# Patient Record
Sex: MALE | Race: ASIAN | NOT HISPANIC OR LATINO
[De-identification: names, ages, dates, MRNs, and addresses within clinical notes are randomized per-mention and may not be internally consistent; named-entity substitution may affect disease eponyms.]

---

## 2015-03-02 RX ORDER — ENOXAPARIN SODIUM 100 MG/ML
14 INJECTION SUBCUTANEOUS
Qty: 0 | Refills: 0 | COMMUNITY
Start: 2015-03-02

## 2017-01-11 ENCOUNTER — RESULT REVIEW (OUTPATIENT)
Age: 57
End: 2017-01-11

## 2017-01-11 ENCOUNTER — CHART COPY (OUTPATIENT)
Age: 57
End: 2017-01-11

## 2017-01-12 ENCOUNTER — APPOINTMENT (OUTPATIENT)
Dept: INFUSION THERAPY | Facility: HOSPITAL | Age: 57
End: 2017-01-12

## 2017-01-15 ENCOUNTER — FORM ENCOUNTER (OUTPATIENT)
Age: 57
End: 2017-01-15

## 2017-01-16 ENCOUNTER — APPOINTMENT (OUTPATIENT)
Dept: MRI IMAGING | Facility: IMAGING CENTER | Age: 57
End: 2017-01-16

## 2017-01-16 ENCOUNTER — OUTPATIENT (OUTPATIENT)
Dept: OUTPATIENT SERVICES | Facility: HOSPITAL | Age: 57
LOS: 1 days | End: 2017-01-16
Payer: MEDICAID

## 2017-01-16 DIAGNOSIS — Z95.5 PRESENCE OF CORONARY ANGIOPLASTY IMPLANT AND GRAFT: Chronic | ICD-10-CM

## 2017-01-16 DIAGNOSIS — C79.31 SECONDARY MALIGNANT NEOPLASM OF BRAIN: ICD-10-CM

## 2017-01-16 PROCEDURE — A9585: CPT

## 2017-01-16 PROCEDURE — 70553 MRI BRAIN STEM W/O & W/DYE: CPT

## 2017-01-17 ENCOUNTER — APPOINTMENT (OUTPATIENT)
Dept: GERIATRICS | Facility: CLINIC | Age: 57
End: 2017-01-17

## 2017-01-18 ENCOUNTER — RESULT REVIEW (OUTPATIENT)
Age: 57
End: 2017-01-18

## 2017-01-19 ENCOUNTER — APPOINTMENT (OUTPATIENT)
Dept: HEMATOLOGY ONCOLOGY | Facility: CLINIC | Age: 57
End: 2017-01-19

## 2017-01-19 VITALS
WEIGHT: 186.29 LBS | DIASTOLIC BLOOD PRESSURE: 97 MMHG | TEMPERATURE: 98 F | HEART RATE: 87 BPM | OXYGEN SATURATION: 96 % | RESPIRATION RATE: 16 BRPM | SYSTOLIC BLOOD PRESSURE: 143 MMHG | BODY MASS INDEX: 31.98 KG/M2

## 2017-01-24 ENCOUNTER — OUTPATIENT (OUTPATIENT)
Dept: OUTPATIENT SERVICES | Facility: HOSPITAL | Age: 57
LOS: 1 days | Discharge: HOME | End: 2017-01-24

## 2017-01-24 DIAGNOSIS — Z95.5 PRESENCE OF CORONARY ANGIOPLASTY IMPLANT AND GRAFT: Chronic | ICD-10-CM

## 2017-01-24 DIAGNOSIS — C64.9 MALIGNANT NEOPLASM OF UNSPECIFIED KIDNEY, EXCEPT RENAL PELVIS: ICD-10-CM

## 2017-01-25 ENCOUNTER — RESULT REVIEW (OUTPATIENT)
Age: 57
End: 2017-01-25

## 2017-01-25 ENCOUNTER — OTHER (OUTPATIENT)
Age: 57
End: 2017-01-25

## 2017-01-26 ENCOUNTER — LABORATORY RESULT (OUTPATIENT)
Age: 57
End: 2017-01-26

## 2017-01-26 ENCOUNTER — APPOINTMENT (OUTPATIENT)
Dept: INFUSION THERAPY | Facility: HOSPITAL | Age: 57
End: 2017-01-26

## 2017-01-26 LAB
HCT VFR BLD CALC: 35 % — LOW (ref 39–50)
HGB BLD-MCNC: 11.4 G/DL — LOW (ref 13–17)
MCHC RBC-ENTMCNC: 27 PG — SIGNIFICANT CHANGE UP (ref 27–34)
MCHC RBC-ENTMCNC: 32.6 GM/DL — SIGNIFICANT CHANGE UP (ref 32–36)
MCV RBC AUTO: 82.7 FL — SIGNIFICANT CHANGE UP (ref 80–100)
PLATELET # BLD AUTO: 324 K/UL — SIGNIFICANT CHANGE UP (ref 150–400)
RBC # BLD: 4.23 M/UL — SIGNIFICANT CHANGE UP (ref 4.2–5.8)
RBC # FLD: 14.7 % — HIGH (ref 10.3–14.5)
WBC # BLD: 8.6 K/UL — SIGNIFICANT CHANGE UP (ref 3.8–10.5)
WBC # FLD AUTO: 8.6 K/UL — SIGNIFICANT CHANGE UP (ref 3.8–10.5)

## 2017-01-27 ENCOUNTER — APPOINTMENT (OUTPATIENT)
Dept: CT IMAGING | Facility: IMAGING CENTER | Age: 57
End: 2017-01-27

## 2017-01-27 ENCOUNTER — OUTPATIENT (OUTPATIENT)
Dept: OUTPATIENT SERVICES | Facility: HOSPITAL | Age: 57
LOS: 1 days | End: 2017-01-27
Payer: MEDICAID

## 2017-01-27 DIAGNOSIS — C64.9 MALIGNANT NEOPLASM OF UNSPECIFIED KIDNEY, EXCEPT RENAL PELVIS: ICD-10-CM

## 2017-01-27 DIAGNOSIS — Z51.11 ENCOUNTER FOR ANTINEOPLASTIC CHEMOTHERAPY: ICD-10-CM

## 2017-01-27 DIAGNOSIS — Z95.5 PRESENCE OF CORONARY ANGIOPLASTY IMPLANT AND GRAFT: Chronic | ICD-10-CM

## 2017-01-27 PROCEDURE — 82565 ASSAY OF CREATININE: CPT

## 2017-01-27 PROCEDURE — 71260 CT THORAX DX C+: CPT

## 2017-01-27 PROCEDURE — 74177 CT ABD & PELVIS W/CONTRAST: CPT

## 2017-02-01 ENCOUNTER — RESULT REVIEW (OUTPATIENT)
Age: 57
End: 2017-02-01

## 2017-02-02 ENCOUNTER — APPOINTMENT (OUTPATIENT)
Dept: HEMATOLOGY ONCOLOGY | Facility: CLINIC | Age: 57
End: 2017-02-02

## 2017-02-02 VITALS
SYSTOLIC BLOOD PRESSURE: 120 MMHG | RESPIRATION RATE: 16 BRPM | TEMPERATURE: 99.2 F | HEART RATE: 99 BPM | OXYGEN SATURATION: 98 % | DIASTOLIC BLOOD PRESSURE: 80 MMHG | BODY MASS INDEX: 31.98 KG/M2 | WEIGHT: 186.29 LBS

## 2017-02-02 DIAGNOSIS — G62.9 POLYNEUROPATHY, UNSPECIFIED: ICD-10-CM

## 2017-02-02 LAB
HCT VFR BLD CALC: 38.1 % — LOW (ref 39–50)
HGB BLD-MCNC: 12 G/DL — LOW (ref 13–17)
MCHC RBC-ENTMCNC: 26.4 PG — LOW (ref 27–34)
MCHC RBC-ENTMCNC: 31.5 GM/DL — LOW (ref 32–36)
MCV RBC AUTO: 83.9 FL — SIGNIFICANT CHANGE UP (ref 80–100)
PLATELET # BLD AUTO: 359 K/UL — SIGNIFICANT CHANGE UP (ref 150–400)
RBC # BLD: 4.54 M/UL — SIGNIFICANT CHANGE UP (ref 4.2–5.8)
RBC # FLD: 14.6 % — HIGH (ref 10.3–14.5)
WBC # BLD: 9.6 K/UL — SIGNIFICANT CHANGE UP (ref 3.8–10.5)
WBC # FLD AUTO: 9.6 K/UL — SIGNIFICANT CHANGE UP (ref 3.8–10.5)

## 2017-02-03 LAB
ALBUMIN SERPL ELPH-MCNC: 4.3 G/DL — SIGNIFICANT CHANGE UP (ref 3.3–5)
ALP SERPL-CCNC: 137 U/L — HIGH (ref 40–120)
ALT FLD-CCNC: 14 U/L — SIGNIFICANT CHANGE UP (ref 10–45)
ANION GAP SERPL CALC-SCNC: 18 MMOL/L — HIGH (ref 5–17)
AST SERPL-CCNC: 17 U/L — SIGNIFICANT CHANGE UP (ref 10–40)
BILIRUB SERPL-MCNC: 0.5 MG/DL — SIGNIFICANT CHANGE UP (ref 0.2–1.2)
BUN SERPL-MCNC: 21 MG/DL — SIGNIFICANT CHANGE UP (ref 7–23)
CALCIUM SERPL-MCNC: 9.5 MG/DL — SIGNIFICANT CHANGE UP (ref 8.4–10.5)
CHLORIDE SERPL-SCNC: 93 MMOL/L — LOW (ref 96–108)
CO2 SERPL-SCNC: 27 MMOL/L — SIGNIFICANT CHANGE UP (ref 22–31)
CREAT SERPL-MCNC: 1.15 MG/DL — SIGNIFICANT CHANGE UP (ref 0.5–1.3)
GLUCOSE SERPL-MCNC: 270 MG/DL — HIGH (ref 70–99)
POTASSIUM SERPL-MCNC: 4.5 MMOL/L — SIGNIFICANT CHANGE UP (ref 3.5–5.3)
POTASSIUM SERPL-SCNC: 4.5 MMOL/L — SIGNIFICANT CHANGE UP (ref 3.5–5.3)
PROT SERPL-MCNC: 8.4 G/DL — HIGH (ref 6–8.3)
SODIUM SERPL-SCNC: 138 MMOL/L — SIGNIFICANT CHANGE UP (ref 135–145)
T3 SERPL-MCNC: 91 NG/DL — SIGNIFICANT CHANGE UP (ref 80–200)
T3FREE SERPL-MCNC: 2.19 PG/ML — SIGNIFICANT CHANGE UP (ref 1.8–4.6)
T4 AB SER-ACNC: 6.7 UG/DL — SIGNIFICANT CHANGE UP (ref 4.6–12)
T4 FREE SERPL-MCNC: 1 NG/DL — SIGNIFICANT CHANGE UP (ref 0.9–1.8)
TSH SERPL-MCNC: 35.98 UIU/ML — HIGH (ref 0.27–4.2)

## 2017-02-13 ENCOUNTER — RESULT REVIEW (OUTPATIENT)
Age: 57
End: 2017-02-13

## 2017-02-14 ENCOUNTER — APPOINTMENT (OUTPATIENT)
Dept: INFUSION THERAPY | Facility: HOSPITAL | Age: 57
End: 2017-02-14

## 2017-02-14 LAB
ALBUMIN SERPL ELPH-MCNC: 4 G/DL — SIGNIFICANT CHANGE UP (ref 3.3–5)
ALP SERPL-CCNC: 133 U/L — HIGH (ref 40–120)
ALT FLD-CCNC: 14 U/L — SIGNIFICANT CHANGE UP (ref 10–45)
ANION GAP SERPL CALC-SCNC: 16 MMOL/L — SIGNIFICANT CHANGE UP (ref 5–17)
AST SERPL-CCNC: 17 U/L — SIGNIFICANT CHANGE UP (ref 10–40)
BILIRUB SERPL-MCNC: 0.5 MG/DL — SIGNIFICANT CHANGE UP (ref 0.2–1.2)
BUN SERPL-MCNC: 23 MG/DL — SIGNIFICANT CHANGE UP (ref 7–23)
CALCIUM SERPL-MCNC: 9 MG/DL — SIGNIFICANT CHANGE UP (ref 8.4–10.5)
CHLORIDE SERPL-SCNC: 90 MMOL/L — LOW (ref 96–108)
CO2 SERPL-SCNC: 28 MMOL/L — SIGNIFICANT CHANGE UP (ref 22–31)
CREAT SERPL-MCNC: 1.09 MG/DL — SIGNIFICANT CHANGE UP (ref 0.5–1.3)
GLUCOSE SERPL-MCNC: 422 MG/DL — HIGH (ref 70–99)
HCT VFR BLD CALC: 35.1 % — LOW (ref 39–50)
HGB BLD-MCNC: 11.6 G/DL — LOW (ref 13–17)
MCHC RBC-ENTMCNC: 27.5 PG — SIGNIFICANT CHANGE UP (ref 27–34)
MCHC RBC-ENTMCNC: 33.1 G/DL — SIGNIFICANT CHANGE UP (ref 32–36)
MCV RBC AUTO: 83.1 FL — SIGNIFICANT CHANGE UP (ref 80–100)
PLATELET # BLD AUTO: 338 K/UL — SIGNIFICANT CHANGE UP (ref 150–400)
POTASSIUM SERPL-MCNC: 4.1 MMOL/L — SIGNIFICANT CHANGE UP (ref 3.5–5.3)
POTASSIUM SERPL-SCNC: 4.1 MMOL/L — SIGNIFICANT CHANGE UP (ref 3.5–5.3)
PROT SERPL-MCNC: 7.9 G/DL — SIGNIFICANT CHANGE UP (ref 6–8.3)
RBC # BLD: 4.22 M/UL — SIGNIFICANT CHANGE UP (ref 4.2–5.8)
RBC # FLD: 14.6 % — HIGH (ref 10.3–14.5)
SODIUM SERPL-SCNC: 134 MMOL/L — LOW (ref 135–145)
WBC # BLD: 8.1 K/UL — SIGNIFICANT CHANGE UP (ref 3.8–10.5)
WBC # FLD AUTO: 8.1 K/UL — SIGNIFICANT CHANGE UP (ref 3.8–10.5)

## 2017-02-27 ENCOUNTER — OUTPATIENT (OUTPATIENT)
Dept: OUTPATIENT SERVICES | Facility: HOSPITAL | Age: 57
LOS: 1 days | Discharge: HOME | End: 2017-02-27

## 2017-02-27 ENCOUNTER — RESULT REVIEW (OUTPATIENT)
Age: 57
End: 2017-02-27

## 2017-02-27 DIAGNOSIS — Z95.5 PRESENCE OF CORONARY ANGIOPLASTY IMPLANT AND GRAFT: Chronic | ICD-10-CM

## 2017-02-27 DIAGNOSIS — C64.9 MALIGNANT NEOPLASM OF UNSPECIFIED KIDNEY, EXCEPT RENAL PELVIS: ICD-10-CM

## 2017-02-28 ENCOUNTER — APPOINTMENT (OUTPATIENT)
Dept: INFUSION THERAPY | Facility: HOSPITAL | Age: 57
End: 2017-02-28

## 2017-02-28 LAB
HCT VFR BLD CALC: 30.7 % — LOW (ref 39–50)
HGB BLD-MCNC: 10.2 G/DL — LOW (ref 13–17)
MCHC RBC-ENTMCNC: 27.4 PG — SIGNIFICANT CHANGE UP (ref 27–34)
MCHC RBC-ENTMCNC: 33.3 G/DL — SIGNIFICANT CHANGE UP (ref 32–36)
MCV RBC AUTO: 82.2 FL — SIGNIFICANT CHANGE UP (ref 80–100)
PLATELET # BLD AUTO: 293 K/UL — SIGNIFICANT CHANGE UP (ref 150–400)
RBC # BLD: 3.74 M/UL — LOW (ref 4.2–5.8)
RBC # FLD: 14.9 % — HIGH (ref 10.3–14.5)
WBC # BLD: 7.2 K/UL — SIGNIFICANT CHANGE UP (ref 3.8–10.5)
WBC # FLD AUTO: 7.2 K/UL — SIGNIFICANT CHANGE UP (ref 3.8–10.5)

## 2017-03-01 DIAGNOSIS — Z51.11 ENCOUNTER FOR ANTINEOPLASTIC CHEMOTHERAPY: ICD-10-CM

## 2017-03-01 LAB
ALBUMIN SERPL ELPH-MCNC: 3.8 G/DL — SIGNIFICANT CHANGE UP (ref 3.3–5)
ALP SERPL-CCNC: 120 U/L — SIGNIFICANT CHANGE UP (ref 40–120)
ALT FLD-CCNC: 11 U/L — SIGNIFICANT CHANGE UP (ref 10–45)
ANION GAP SERPL CALC-SCNC: 21 MMOL/L — HIGH (ref 5–17)
AST SERPL-CCNC: 20 U/L — SIGNIFICANT CHANGE UP (ref 10–40)
BILIRUB SERPL-MCNC: 0.5 MG/DL — SIGNIFICANT CHANGE UP (ref 0.2–1.2)
BUN SERPL-MCNC: 17 MG/DL — SIGNIFICANT CHANGE UP (ref 7–23)
CALCIUM SERPL-MCNC: 9.1 MG/DL — SIGNIFICANT CHANGE UP (ref 8.4–10.5)
CHLORIDE SERPL-SCNC: 93 MMOL/L — LOW (ref 96–108)
CO2 SERPL-SCNC: 21 MMOL/L — LOW (ref 22–31)
CREAT SERPL-MCNC: 0.96 MG/DL — SIGNIFICANT CHANGE UP (ref 0.5–1.3)
GLUCOSE SERPL-MCNC: 302 MG/DL — HIGH (ref 70–99)
POTASSIUM SERPL-MCNC: 4.7 MMOL/L — SIGNIFICANT CHANGE UP (ref 3.5–5.3)
POTASSIUM SERPL-SCNC: 4.7 MMOL/L — SIGNIFICANT CHANGE UP (ref 3.5–5.3)
PROT SERPL-MCNC: 7.7 G/DL — SIGNIFICANT CHANGE UP (ref 6–8.3)
SODIUM SERPL-SCNC: 135 MMOL/L — SIGNIFICANT CHANGE UP (ref 135–145)

## 2017-03-02 ENCOUNTER — APPOINTMENT (OUTPATIENT)
Dept: RADIOLOGY | Facility: IMAGING CENTER | Age: 57
End: 2017-03-02

## 2017-03-02 ENCOUNTER — APPOINTMENT (OUTPATIENT)
Dept: HEMATOLOGY ONCOLOGY | Facility: CLINIC | Age: 57
End: 2017-03-02

## 2017-03-02 ENCOUNTER — OUTPATIENT (OUTPATIENT)
Dept: OUTPATIENT SERVICES | Facility: HOSPITAL | Age: 57
LOS: 1 days | End: 2017-03-02
Payer: MEDICAID

## 2017-03-02 VITALS
DIASTOLIC BLOOD PRESSURE: 77 MMHG | HEART RATE: 81 BPM | SYSTOLIC BLOOD PRESSURE: 129 MMHG | BODY MASS INDEX: 32.73 KG/M2 | OXYGEN SATURATION: 93 % | RESPIRATION RATE: 16 BRPM | TEMPERATURE: 99.7 F | WEIGHT: 190.7 LBS

## 2017-03-02 DIAGNOSIS — Z95.5 PRESENCE OF CORONARY ANGIOPLASTY IMPLANT AND GRAFT: Chronic | ICD-10-CM

## 2017-03-02 DIAGNOSIS — R05 COUGH: ICD-10-CM

## 2017-03-02 PROCEDURE — 71046 X-RAY EXAM CHEST 2 VIEWS: CPT

## 2017-03-16 ENCOUNTER — RESULT REVIEW (OUTPATIENT)
Age: 57
End: 2017-03-16

## 2017-03-17 ENCOUNTER — APPOINTMENT (OUTPATIENT)
Dept: INFUSION THERAPY | Facility: HOSPITAL | Age: 57
End: 2017-03-17

## 2017-03-17 LAB
ALBUMIN SERPL ELPH-MCNC: 4.1 G/DL — SIGNIFICANT CHANGE UP (ref 3.3–5)
ALP SERPL-CCNC: 107 U/L — SIGNIFICANT CHANGE UP (ref 40–120)
ALT FLD-CCNC: 10 U/L — SIGNIFICANT CHANGE UP (ref 10–45)
ANION GAP SERPL CALC-SCNC: 13 MMOL/L — SIGNIFICANT CHANGE UP (ref 5–17)
AST SERPL-CCNC: 15 U/L — SIGNIFICANT CHANGE UP (ref 10–40)
BILIRUB SERPL-MCNC: 0.4 MG/DL — SIGNIFICANT CHANGE UP (ref 0.2–1.2)
BUN SERPL-MCNC: 21 MG/DL — SIGNIFICANT CHANGE UP (ref 7–23)
CALCIUM SERPL-MCNC: 9.9 MG/DL — SIGNIFICANT CHANGE UP (ref 8.4–10.5)
CHLORIDE SERPL-SCNC: 96 MMOL/L — SIGNIFICANT CHANGE UP (ref 96–108)
CO2 SERPL-SCNC: 28 MMOL/L — SIGNIFICANT CHANGE UP (ref 22–31)
CREAT SERPL-MCNC: 1.44 MG/DL — HIGH (ref 0.5–1.3)
GLUCOSE SERPL-MCNC: 120 MG/DL — HIGH (ref 70–99)
HCT VFR BLD CALC: 33.2 % — LOW (ref 39–50)
HGB BLD-MCNC: 10.8 G/DL — LOW (ref 13–17)
MCHC RBC-ENTMCNC: 27.4 PG — SIGNIFICANT CHANGE UP (ref 27–34)
MCHC RBC-ENTMCNC: 32.6 G/DL — SIGNIFICANT CHANGE UP (ref 32–36)
MCV RBC AUTO: 84 FL — SIGNIFICANT CHANGE UP (ref 80–100)
PLATELET # BLD AUTO: 391 K/UL — SIGNIFICANT CHANGE UP (ref 150–400)
POTASSIUM SERPL-MCNC: 4.9 MMOL/L — SIGNIFICANT CHANGE UP (ref 3.5–5.3)
POTASSIUM SERPL-SCNC: 4.9 MMOL/L — SIGNIFICANT CHANGE UP (ref 3.5–5.3)
PROT SERPL-MCNC: 7.9 G/DL — SIGNIFICANT CHANGE UP (ref 6–8.3)
RBC # BLD: 3.95 M/UL — LOW (ref 4.2–5.8)
RBC # FLD: 15.4 % — HIGH (ref 10.3–14.5)
SODIUM SERPL-SCNC: 137 MMOL/L — SIGNIFICANT CHANGE UP (ref 135–145)
T3 SERPL-MCNC: 110 NG/DL — SIGNIFICANT CHANGE UP (ref 80–200)
T3FREE SERPL-MCNC: 2.82 PG/ML — SIGNIFICANT CHANGE UP (ref 1.8–4.6)
T4 AB SER-ACNC: 8.1 UG/DL — SIGNIFICANT CHANGE UP (ref 4.6–12)
T4 FREE SERPL-MCNC: 1.3 NG/DL — SIGNIFICANT CHANGE UP (ref 0.9–1.8)
TSH SERPL-MCNC: 12.2 UIU/ML — HIGH (ref 0.27–4.2)
WBC # BLD: 10.2 K/UL — SIGNIFICANT CHANGE UP (ref 3.8–10.5)
WBC # FLD AUTO: 10.2 K/UL — SIGNIFICANT CHANGE UP (ref 3.8–10.5)

## 2017-03-18 LAB
BASOPHILS # BLD AUTO: 0.1 K/UL — SIGNIFICANT CHANGE UP (ref 0–0.2)
BASOPHILS NFR BLD AUTO: 0.6 % — SIGNIFICANT CHANGE UP (ref 0–2)
EOSINOPHIL # BLD AUTO: 1.3 K/UL — HIGH (ref 0–0.5)
EOSINOPHIL NFR BLD AUTO: 12.3 % — HIGH (ref 0–6)
LYMPHOCYTES # BLD AUTO: 2.2 K/UL — SIGNIFICANT CHANGE UP (ref 1–3.3)
LYMPHOCYTES # BLD AUTO: 21.4 % — SIGNIFICANT CHANGE UP (ref 13–44)
MONOCYTES # BLD AUTO: 0.6 K/UL — SIGNIFICANT CHANGE UP (ref 0–0.9)
MONOCYTES NFR BLD AUTO: 6.1 % — SIGNIFICANT CHANGE UP (ref 2–14)
NEUTROPHILS # BLD AUTO: 6.1 K/UL — SIGNIFICANT CHANGE UP (ref 1.8–7.4)
NEUTROPHILS NFR BLD AUTO: 59.6 % — SIGNIFICANT CHANGE UP (ref 43–77)

## 2017-03-20 ENCOUNTER — APPOINTMENT (OUTPATIENT)
Dept: RADIATION ONCOLOGY | Facility: CLINIC | Age: 57
End: 2017-03-20

## 2017-03-20 VITALS
HEART RATE: 97 BPM | RESPIRATION RATE: 16 BRPM | SYSTOLIC BLOOD PRESSURE: 145 MMHG | WEIGHT: 187.17 LBS | BODY MASS INDEX: 32.13 KG/M2 | OXYGEN SATURATION: 96 % | DIASTOLIC BLOOD PRESSURE: 96 MMHG

## 2017-03-20 DIAGNOSIS — L27.0 GENERALIZED SKIN ERUPTION DUE TO DRUGS AND MEDICAMENTS TAKEN INTERNALLY: ICD-10-CM

## 2017-03-20 RX ORDER — AZITHROMYCIN 250 MG/1
250 TABLET, FILM COATED ORAL
Qty: 6 | Refills: 0 | Status: DISCONTINUED | COMMUNITY
Start: 2017-03-02 | End: 2017-03-20

## 2017-03-21 ENCOUNTER — OUTPATIENT (OUTPATIENT)
Dept: OUTPATIENT SERVICES | Facility: HOSPITAL | Age: 57
LOS: 1 days | Discharge: HOME | End: 2017-03-21

## 2017-03-21 DIAGNOSIS — Z95.5 PRESENCE OF CORONARY ANGIOPLASTY IMPLANT AND GRAFT: Chronic | ICD-10-CM

## 2017-03-27 ENCOUNTER — RX RENEWAL (OUTPATIENT)
Age: 57
End: 2017-03-27

## 2017-03-30 ENCOUNTER — OUTPATIENT (OUTPATIENT)
Dept: OUTPATIENT SERVICES | Facility: HOSPITAL | Age: 57
LOS: 1 days | Discharge: HOME | End: 2017-03-30

## 2017-03-30 DIAGNOSIS — C64.9 MALIGNANT NEOPLASM OF UNSPECIFIED KIDNEY, EXCEPT RENAL PELVIS: ICD-10-CM

## 2017-03-30 DIAGNOSIS — Z95.5 PRESENCE OF CORONARY ANGIOPLASTY IMPLANT AND GRAFT: Chronic | ICD-10-CM

## 2017-03-31 ENCOUNTER — LABORATORY RESULT (OUTPATIENT)
Age: 57
End: 2017-03-31

## 2017-03-31 ENCOUNTER — APPOINTMENT (OUTPATIENT)
Dept: INFUSION THERAPY | Facility: HOSPITAL | Age: 57
End: 2017-03-31

## 2017-04-02 ENCOUNTER — RESULT REVIEW (OUTPATIENT)
Age: 57
End: 2017-04-02

## 2017-04-03 ENCOUNTER — APPOINTMENT (OUTPATIENT)
Dept: HEMATOLOGY ONCOLOGY | Facility: CLINIC | Age: 57
End: 2017-04-03

## 2017-04-03 VITALS
DIASTOLIC BLOOD PRESSURE: 90 MMHG | OXYGEN SATURATION: 95 % | TEMPERATURE: 98.5 F | RESPIRATION RATE: 16 BRPM | BODY MASS INDEX: 31.94 KG/M2 | WEIGHT: 186.07 LBS | SYSTOLIC BLOOD PRESSURE: 143 MMHG | HEART RATE: 84 BPM

## 2017-04-03 DIAGNOSIS — R05 COUGH: ICD-10-CM

## 2017-04-03 DIAGNOSIS — Z51.11 ENCOUNTER FOR ANTINEOPLASTIC CHEMOTHERAPY: ICD-10-CM

## 2017-04-03 LAB
ALBUMIN SERPL ELPH-MCNC: 4.2 G/DL — SIGNIFICANT CHANGE UP (ref 3.3–5)
ALP SERPL-CCNC: 113 U/L — SIGNIFICANT CHANGE UP (ref 40–120)
ALT FLD-CCNC: 11 U/L — SIGNIFICANT CHANGE UP (ref 10–45)
ANION GAP SERPL CALC-SCNC: 20 MMOL/L — HIGH (ref 5–17)
AST SERPL-CCNC: 17 U/L — SIGNIFICANT CHANGE UP (ref 10–40)
BASOPHILS # BLD AUTO: 0.1 K/UL — SIGNIFICANT CHANGE UP (ref 0–0.2)
BASOPHILS NFR BLD AUTO: 0.9 % — SIGNIFICANT CHANGE UP (ref 0–2)
BILIRUB SERPL-MCNC: 0.6 MG/DL — SIGNIFICANT CHANGE UP (ref 0.2–1.2)
BUN SERPL-MCNC: 16 MG/DL — SIGNIFICANT CHANGE UP (ref 7–23)
CALCIUM SERPL-MCNC: 9.5 MG/DL — SIGNIFICANT CHANGE UP (ref 8.4–10.5)
CHLORIDE SERPL-SCNC: 95 MMOL/L — LOW (ref 96–108)
CO2 SERPL-SCNC: 26 MMOL/L — SIGNIFICANT CHANGE UP (ref 22–31)
CREAT SERPL-MCNC: 1.07 MG/DL — SIGNIFICANT CHANGE UP (ref 0.5–1.3)
EOSINOPHIL # BLD AUTO: 0.8 K/UL — HIGH (ref 0–0.5)
EOSINOPHIL NFR BLD AUTO: 9.9 % — HIGH (ref 0–6)
GLUCOSE SERPL-MCNC: 184 MG/DL — HIGH (ref 70–99)
HCT VFR BLD CALC: 32.3 % — LOW (ref 39–50)
HGB BLD-MCNC: 10.7 G/DL — LOW (ref 13–17)
LYMPHOCYTES # BLD AUTO: 1.3 K/UL — SIGNIFICANT CHANGE UP (ref 1–3.3)
LYMPHOCYTES # BLD AUTO: 16.6 % — SIGNIFICANT CHANGE UP (ref 13–44)
MCHC RBC-ENTMCNC: 27.5 PG — SIGNIFICANT CHANGE UP (ref 27–34)
MCHC RBC-ENTMCNC: 33.2 G/DL — SIGNIFICANT CHANGE UP (ref 32–36)
MCV RBC AUTO: 83 FL — SIGNIFICANT CHANGE UP (ref 80–100)
MONOCYTES # BLD AUTO: 0.5 K/UL — SIGNIFICANT CHANGE UP (ref 0–0.9)
MONOCYTES NFR BLD AUTO: 5.8 % — SIGNIFICANT CHANGE UP (ref 2–14)
NEUTROPHILS # BLD AUTO: 5.3 K/UL — SIGNIFICANT CHANGE UP (ref 1.8–7.4)
NEUTROPHILS NFR BLD AUTO: 66.9 % — SIGNIFICANT CHANGE UP (ref 43–77)
PLATELET # BLD AUTO: 323 K/UL — SIGNIFICANT CHANGE UP (ref 150–400)
POTASSIUM SERPL-MCNC: 4.7 MMOL/L — SIGNIFICANT CHANGE UP (ref 3.5–5.3)
POTASSIUM SERPL-SCNC: 4.7 MMOL/L — SIGNIFICANT CHANGE UP (ref 3.5–5.3)
PROT SERPL-MCNC: 8.2 G/DL — SIGNIFICANT CHANGE UP (ref 6–8.3)
RBC # BLD: 3.89 M/UL — LOW (ref 4.2–5.8)
RBC # FLD: 15.3 % — HIGH (ref 10.3–14.5)
SODIUM SERPL-SCNC: 141 MMOL/L — SIGNIFICANT CHANGE UP (ref 135–145)
T3 SERPL-MCNC: 101 NG/DL — SIGNIFICANT CHANGE UP (ref 80–200)
T4 AB SER-ACNC: 9.1 UG/DL — SIGNIFICANT CHANGE UP (ref 4.6–12)
TSH SERPL-MCNC: 5.34 UIU/ML — HIGH (ref 0.27–4.2)
WBC # BLD: 7.9 K/UL — SIGNIFICANT CHANGE UP (ref 3.8–10.5)
WBC # FLD AUTO: 7.9 K/UL — SIGNIFICANT CHANGE UP (ref 3.8–10.5)

## 2017-04-12 ENCOUNTER — APPOINTMENT (OUTPATIENT)
Dept: CT IMAGING | Facility: IMAGING CENTER | Age: 57
End: 2017-04-12

## 2017-04-12 ENCOUNTER — OUTPATIENT (OUTPATIENT)
Dept: OUTPATIENT SERVICES | Facility: HOSPITAL | Age: 57
LOS: 1 days | End: 2017-04-12
Payer: MEDICAID

## 2017-04-12 VITALS
OXYGEN SATURATION: 97 % | RESPIRATION RATE: 18 BRPM | SYSTOLIC BLOOD PRESSURE: 135 MMHG | DIASTOLIC BLOOD PRESSURE: 85 MMHG | HEART RATE: 79 BPM

## 2017-04-12 DIAGNOSIS — C79.9 SECONDARY MALIGNANT NEOPLASM OF UNSPECIFIED SITE: ICD-10-CM

## 2017-04-12 DIAGNOSIS — C64.9 MALIGNANT NEOPLASM OF UNSPECIFIED KIDNEY, EXCEPT RENAL PELVIS: ICD-10-CM

## 2017-04-12 DIAGNOSIS — Z95.5 PRESENCE OF CORONARY ANGIOPLASTY IMPLANT AND GRAFT: Chronic | ICD-10-CM

## 2017-04-12 PROCEDURE — 82565 ASSAY OF CREATININE: CPT

## 2017-04-12 PROCEDURE — 74177 CT ABD & PELVIS W/CONTRAST: CPT

## 2017-04-12 PROCEDURE — 71260 CT THORAX DX C+: CPT

## 2017-04-14 ENCOUNTER — APPOINTMENT (OUTPATIENT)
Dept: GERIATRICS | Facility: CLINIC | Age: 57
End: 2017-04-14

## 2017-04-14 ENCOUNTER — APPOINTMENT (OUTPATIENT)
Dept: INFUSION THERAPY | Facility: HOSPITAL | Age: 57
End: 2017-04-14

## 2017-04-14 DIAGNOSIS — K21.9 GASTRO-ESOPHAGEAL REFLUX DISEASE W/OUT ESOPHAGITIS: ICD-10-CM

## 2017-04-25 ENCOUNTER — OUTPATIENT (OUTPATIENT)
Dept: OUTPATIENT SERVICES | Facility: HOSPITAL | Age: 57
LOS: 1 days | Discharge: HOME | End: 2017-04-25

## 2017-04-25 DIAGNOSIS — Z95.5 PRESENCE OF CORONARY ANGIOPLASTY IMPLANT AND GRAFT: Chronic | ICD-10-CM

## 2017-04-25 DIAGNOSIS — C64.9 MALIGNANT NEOPLASM OF UNSPECIFIED KIDNEY, EXCEPT RENAL PELVIS: ICD-10-CM

## 2017-04-27 ENCOUNTER — RESULT REVIEW (OUTPATIENT)
Age: 57
End: 2017-04-27

## 2017-04-28 ENCOUNTER — LABORATORY RESULT (OUTPATIENT)
Age: 57
End: 2017-04-28

## 2017-04-28 ENCOUNTER — APPOINTMENT (OUTPATIENT)
Dept: INFUSION THERAPY | Facility: HOSPITAL | Age: 57
End: 2017-04-28

## 2017-04-28 LAB
BASOPHILS # BLD AUTO: 0 K/UL — SIGNIFICANT CHANGE UP (ref 0–0.2)
BASOPHILS NFR BLD AUTO: 0.6 % — SIGNIFICANT CHANGE UP (ref 0–2)
EOSINOPHIL # BLD AUTO: 0.7 K/UL — HIGH (ref 0–0.5)
EOSINOPHIL NFR BLD AUTO: 11 % — HIGH (ref 0–6)
HCT VFR BLD CALC: 30.7 % — LOW (ref 39–50)
HGB BLD-MCNC: 10.2 G/DL — LOW (ref 13–17)
LYMPHOCYTES # BLD AUTO: 1.3 K/UL — SIGNIFICANT CHANGE UP (ref 1–3.3)
LYMPHOCYTES # BLD AUTO: 19.4 % — SIGNIFICANT CHANGE UP (ref 13–44)
MCHC RBC-ENTMCNC: 27.3 PG — SIGNIFICANT CHANGE UP (ref 27–34)
MCHC RBC-ENTMCNC: 33.3 G/DL — SIGNIFICANT CHANGE UP (ref 32–36)
MCV RBC AUTO: 82.1 FL — SIGNIFICANT CHANGE UP (ref 80–100)
MONOCYTES # BLD AUTO: 0.4 K/UL — SIGNIFICANT CHANGE UP (ref 0–0.9)
MONOCYTES NFR BLD AUTO: 6.7 % — SIGNIFICANT CHANGE UP (ref 2–14)
NEUTROPHILS # BLD AUTO: 4 K/UL — SIGNIFICANT CHANGE UP (ref 1.8–7.4)
NEUTROPHILS NFR BLD AUTO: 62.3 % — SIGNIFICANT CHANGE UP (ref 43–77)
PLATELET # BLD AUTO: 303 K/UL — SIGNIFICANT CHANGE UP (ref 150–400)
RBC # BLD: 3.75 M/UL — LOW (ref 4.2–5.8)
RBC # FLD: 15.2 % — HIGH (ref 10.3–14.5)
WBC # BLD: 6.5 K/UL — SIGNIFICANT CHANGE UP (ref 3.8–10.5)
WBC # FLD AUTO: 6.5 K/UL — SIGNIFICANT CHANGE UP (ref 3.8–10.5)

## 2017-05-01 DIAGNOSIS — Z51.11 ENCOUNTER FOR ANTINEOPLASTIC CHEMOTHERAPY: ICD-10-CM

## 2017-05-04 ENCOUNTER — RESULT REVIEW (OUTPATIENT)
Age: 57
End: 2017-05-04

## 2017-05-04 ENCOUNTER — APPOINTMENT (OUTPATIENT)
Dept: HEMATOLOGY ONCOLOGY | Facility: CLINIC | Age: 57
End: 2017-05-04

## 2017-05-04 VITALS
OXYGEN SATURATION: 98 % | RESPIRATION RATE: 16 BRPM | SYSTOLIC BLOOD PRESSURE: 124 MMHG | HEART RATE: 80 BPM | WEIGHT: 182.98 LBS | TEMPERATURE: 98 F | DIASTOLIC BLOOD PRESSURE: 74 MMHG | BODY MASS INDEX: 31.41 KG/M2

## 2017-05-04 LAB
ALBUMIN SERPL ELPH-MCNC: 4.2 G/DL — SIGNIFICANT CHANGE UP (ref 3.3–5)
ALP SERPL-CCNC: 116 U/L — SIGNIFICANT CHANGE UP (ref 40–120)
ALT FLD-CCNC: 13 U/L — SIGNIFICANT CHANGE UP (ref 10–45)
ANION GAP SERPL CALC-SCNC: 16 MMOL/L — SIGNIFICANT CHANGE UP (ref 5–17)
AST SERPL-CCNC: 12 U/L — SIGNIFICANT CHANGE UP (ref 10–40)
BILIRUB SERPL-MCNC: 0.6 MG/DL — SIGNIFICANT CHANGE UP (ref 0.2–1.2)
BUN SERPL-MCNC: 17 MG/DL — SIGNIFICANT CHANGE UP (ref 7–23)
CALCIUM SERPL-MCNC: 9.7 MG/DL — SIGNIFICANT CHANGE UP (ref 8.4–10.5)
CHLORIDE SERPL-SCNC: 94 MMOL/L — LOW (ref 96–108)
CO2 SERPL-SCNC: 26 MMOL/L — SIGNIFICANT CHANGE UP (ref 22–31)
CREAT SERPL-MCNC: 1.19 MG/DL — SIGNIFICANT CHANGE UP (ref 0.5–1.3)
GLUCOSE SERPL-MCNC: 288 MG/DL — HIGH (ref 70–99)
HCT VFR BLD CALC: 32.9 % — LOW (ref 39–50)
HGB BLD-MCNC: 10.7 G/DL — LOW (ref 13–17)
MCHC RBC-ENTMCNC: 26.9 PG — LOW (ref 27–34)
MCHC RBC-ENTMCNC: 32.7 G/DL — SIGNIFICANT CHANGE UP (ref 32–36)
MCV RBC AUTO: 82.3 FL — SIGNIFICANT CHANGE UP (ref 80–100)
PLATELET # BLD AUTO: 296 K/UL — SIGNIFICANT CHANGE UP (ref 150–400)
POTASSIUM SERPL-MCNC: 4.5 MMOL/L — SIGNIFICANT CHANGE UP (ref 3.5–5.3)
POTASSIUM SERPL-SCNC: 4.5 MMOL/L — SIGNIFICANT CHANGE UP (ref 3.5–5.3)
PROT SERPL-MCNC: 8.6 G/DL — HIGH (ref 6–8.3)
RBC # BLD: 3.99 M/UL — LOW (ref 4.2–5.8)
RBC # FLD: 15.4 % — HIGH (ref 10.3–14.5)
SODIUM SERPL-SCNC: 136 MMOL/L — SIGNIFICANT CHANGE UP (ref 135–145)
T3FREE SERPL-MCNC: 2.32 PG/ML — SIGNIFICANT CHANGE UP (ref 1.8–4.6)
T4 FREE SERPL-MCNC: 1.2 NG/DL — SIGNIFICANT CHANGE UP (ref 0.9–1.8)
TSH SERPL-MCNC: 6.21 UIU/ML — HIGH (ref 0.27–4.2)
WBC # BLD: 6.9 K/UL — SIGNIFICANT CHANGE UP (ref 3.8–10.5)
WBC # FLD AUTO: 6.9 K/UL — SIGNIFICANT CHANGE UP (ref 3.8–10.5)

## 2017-05-05 LAB
BASOPHILS # BLD AUTO: 0 K/UL — SIGNIFICANT CHANGE UP (ref 0–0.2)
BASOPHILS NFR BLD AUTO: 0.6 % — SIGNIFICANT CHANGE UP (ref 0–2)
EOSINOPHIL # BLD AUTO: 0.7 K/UL — HIGH (ref 0–0.5)
EOSINOPHIL NFR BLD AUTO: 10.2 % — HIGH (ref 0–6)
LYMPHOCYTES # BLD AUTO: 1.1 K/UL — SIGNIFICANT CHANGE UP (ref 1–3.3)
LYMPHOCYTES # BLD AUTO: 16.2 % — SIGNIFICANT CHANGE UP (ref 13–44)
MONOCYTES # BLD AUTO: 0.3 K/UL — SIGNIFICANT CHANGE UP (ref 0–0.9)
MONOCYTES NFR BLD AUTO: 4.8 % — SIGNIFICANT CHANGE UP (ref 2–14)
NEUTROPHILS # BLD AUTO: 4.7 K/UL — SIGNIFICANT CHANGE UP (ref 1.8–7.4)
NEUTROPHILS NFR BLD AUTO: 68.2 % — SIGNIFICANT CHANGE UP (ref 43–77)

## 2017-05-11 ENCOUNTER — APPOINTMENT (OUTPATIENT)
Dept: INFUSION THERAPY | Facility: HOSPITAL | Age: 57
End: 2017-05-11

## 2017-05-15 ENCOUNTER — APPOINTMENT (OUTPATIENT)
Dept: RADIATION ONCOLOGY | Facility: CLINIC | Age: 57
End: 2017-05-15

## 2017-05-16 ENCOUNTER — APPOINTMENT (OUTPATIENT)
Dept: MRI IMAGING | Facility: IMAGING CENTER | Age: 57
End: 2017-05-16

## 2017-05-31 ENCOUNTER — OUTPATIENT (OUTPATIENT)
Dept: OUTPATIENT SERVICES | Facility: HOSPITAL | Age: 57
LOS: 1 days | Discharge: HOME | End: 2017-05-31

## 2017-05-31 DIAGNOSIS — C64.9 MALIGNANT NEOPLASM OF UNSPECIFIED KIDNEY, EXCEPT RENAL PELVIS: ICD-10-CM

## 2017-05-31 DIAGNOSIS — Z95.5 PRESENCE OF CORONARY ANGIOPLASTY IMPLANT AND GRAFT: Chronic | ICD-10-CM

## 2017-06-06 ENCOUNTER — APPOINTMENT (OUTPATIENT)
Dept: RADIATION ONCOLOGY | Facility: CLINIC | Age: 57
End: 2017-06-06

## 2017-06-06 ENCOUNTER — RESULT REVIEW (OUTPATIENT)
Age: 57
End: 2017-06-06

## 2017-06-06 ENCOUNTER — APPOINTMENT (OUTPATIENT)
Dept: INFUSION THERAPY | Facility: HOSPITAL | Age: 57
End: 2017-06-06

## 2017-06-06 ENCOUNTER — FORM ENCOUNTER (OUTPATIENT)
Age: 57
End: 2017-06-06

## 2017-06-07 ENCOUNTER — APPOINTMENT (OUTPATIENT)
Dept: MRI IMAGING | Facility: IMAGING CENTER | Age: 57
End: 2017-06-07
Payer: MEDICARE

## 2017-06-07 ENCOUNTER — OUTPATIENT (OUTPATIENT)
Dept: OUTPATIENT SERVICES | Facility: HOSPITAL | Age: 57
LOS: 1 days | End: 2017-06-07
Payer: MEDICARE

## 2017-06-07 DIAGNOSIS — Z00.8 ENCOUNTER FOR OTHER GENERAL EXAMINATION: ICD-10-CM

## 2017-06-07 DIAGNOSIS — Z95.5 PRESENCE OF CORONARY ANGIOPLASTY IMPLANT AND GRAFT: Chronic | ICD-10-CM

## 2017-06-07 DIAGNOSIS — Z51.11 ENCOUNTER FOR ANTINEOPLASTIC CHEMOTHERAPY: ICD-10-CM

## 2017-06-07 LAB
T3 SERPL-MCNC: 77 NG/DL — LOW (ref 80–200)
T4 AB SER-ACNC: 7.3 UG/DL — SIGNIFICANT CHANGE UP (ref 4.6–12)
TSH SERPL-MCNC: 4.03 UIU/ML — SIGNIFICANT CHANGE UP (ref 0.27–4.2)

## 2017-06-07 PROCEDURE — 82565 ASSAY OF CREATININE: CPT

## 2017-06-07 PROCEDURE — A9585: CPT

## 2017-06-07 PROCEDURE — 72157 MRI CHEST SPINE W/O & W/DYE: CPT | Mod: 26

## 2017-06-07 PROCEDURE — 72157 MRI CHEST SPINE W/O & W/DYE: CPT

## 2017-06-09 ENCOUNTER — OUTPATIENT (OUTPATIENT)
Dept: OUTPATIENT SERVICES | Facility: HOSPITAL | Age: 57
LOS: 1 days | End: 2017-06-09

## 2017-06-09 ENCOUNTER — APPOINTMENT (OUTPATIENT)
Dept: ENDOCRINOLOGY | Facility: HOSPITAL | Age: 57
End: 2017-06-09
Payer: MEDICARE

## 2017-06-09 ENCOUNTER — LABORATORY RESULT (OUTPATIENT)
Age: 57
End: 2017-06-09

## 2017-06-09 ENCOUNTER — RESULT CHARGE (OUTPATIENT)
Age: 57
End: 2017-06-09

## 2017-06-09 VITALS
BODY MASS INDEX: 31.24 KG/M2 | HEIGHT: 64 IN | HEART RATE: 95 BPM | DIASTOLIC BLOOD PRESSURE: 72 MMHG | SYSTOLIC BLOOD PRESSURE: 129 MMHG | WEIGHT: 183 LBS

## 2017-06-09 DIAGNOSIS — E11.65 TYPE 2 DIABETES MELLITUS WITH HYPERGLYCEMIA: ICD-10-CM

## 2017-06-09 DIAGNOSIS — R94.6 ABNORMAL RESULTS OF THYROID FUNCTION STUDIES: ICD-10-CM

## 2017-06-09 DIAGNOSIS — Z00.00 ENCOUNTER FOR GENERAL ADULT MEDICAL EXAMINATION WITHOUT ABNORMAL FINDINGS: ICD-10-CM

## 2017-06-09 DIAGNOSIS — Z95.5 PRESENCE OF CORONARY ANGIOPLASTY IMPLANT AND GRAFT: Chronic | ICD-10-CM

## 2017-06-09 DIAGNOSIS — C79.51 SECONDARY MALIGNANT NEOPLASM OF BONE: ICD-10-CM

## 2017-06-09 DIAGNOSIS — E03.9 HYPOTHYROIDISM, UNSPECIFIED: ICD-10-CM

## 2017-06-09 DIAGNOSIS — C79.31 SECONDARY MALIGNANT NEOPLASM OF BRAIN: ICD-10-CM

## 2017-06-09 LAB
GLUCOSE BLDC GLUCOMTR-MCNC: 139
HBA1C BLD-MCNC: 8.3 % — HIGH (ref 4–5.6)
T4 FREE SERPL-MCNC: 1.2 NG/DL — SIGNIFICANT CHANGE UP (ref 0.9–1.8)
TSH SERPL-MCNC: 5.3 UIU/ML — HIGH (ref 0.27–4.2)

## 2017-06-09 PROCEDURE — 99214 OFFICE O/P EST MOD 30 MIN: CPT | Mod: GC

## 2017-06-09 RX ORDER — FLUTICASONE PROPIONATE 50 UG/1
50 SPRAY, METERED NASAL
Qty: 3 | Refills: 1 | Status: DISCONTINUED | COMMUNITY
Start: 2017-01-17 | End: 2017-06-09

## 2017-06-15 ENCOUNTER — APPOINTMENT (OUTPATIENT)
Dept: RADIATION ONCOLOGY | Facility: CLINIC | Age: 57
End: 2017-06-15
Payer: MEDICARE

## 2017-06-15 ENCOUNTER — APPOINTMENT (OUTPATIENT)
Dept: HEMATOLOGY ONCOLOGY | Facility: CLINIC | Age: 57
End: 2017-06-15

## 2017-06-15 ENCOUNTER — RESULT REVIEW (OUTPATIENT)
Age: 57
End: 2017-06-15

## 2017-06-15 VITALS — OXYGEN SATURATION: 97 % | WEIGHT: 182.98 LBS | RESPIRATION RATE: 16 BRPM | BODY MASS INDEX: 30.45 KG/M2

## 2017-06-15 VITALS — HEIGHT: 65 IN | BODY MASS INDEX: 30.49 KG/M2 | WEIGHT: 183 LBS

## 2017-06-15 PROCEDURE — 99214 OFFICE O/P EST MOD 30 MIN: CPT

## 2017-06-16 RX ORDER — LEVOTHYROXINE SODIUM 100 UG/1
100 TABLET ORAL
Refills: 0 | Status: DISCONTINUED | COMMUNITY
End: 2017-06-16

## 2017-06-20 ENCOUNTER — APPOINTMENT (OUTPATIENT)
Dept: INFUSION THERAPY | Facility: HOSPITAL | Age: 57
End: 2017-06-20

## 2017-06-29 ENCOUNTER — OUTPATIENT (OUTPATIENT)
Dept: OUTPATIENT SERVICES | Facility: HOSPITAL | Age: 57
LOS: 1 days | Discharge: HOME | End: 2017-06-29

## 2017-06-29 DIAGNOSIS — Z95.5 PRESENCE OF CORONARY ANGIOPLASTY IMPLANT AND GRAFT: Chronic | ICD-10-CM

## 2017-06-29 DIAGNOSIS — C64.9 MALIGNANT NEOPLASM OF UNSPECIFIED KIDNEY, EXCEPT RENAL PELVIS: ICD-10-CM

## 2017-07-05 ENCOUNTER — RESULT REVIEW (OUTPATIENT)
Age: 57
End: 2017-07-05

## 2017-07-05 ENCOUNTER — APPOINTMENT (OUTPATIENT)
Dept: INFUSION THERAPY | Facility: HOSPITAL | Age: 57
End: 2017-07-05

## 2017-07-06 DIAGNOSIS — Z51.11 ENCOUNTER FOR ANTINEOPLASTIC CHEMOTHERAPY: ICD-10-CM

## 2017-07-06 LAB
ALBUMIN SERPL ELPH-MCNC: 4 G/DL — SIGNIFICANT CHANGE UP (ref 3.3–5)
ALP SERPL-CCNC: 116 U/L — SIGNIFICANT CHANGE UP (ref 40–120)
ALT FLD-CCNC: 10 U/L — SIGNIFICANT CHANGE UP (ref 10–45)
ANION GAP SERPL CALC-SCNC: 18 MMOL/L — HIGH (ref 5–17)
AST SERPL-CCNC: 15 U/L — SIGNIFICANT CHANGE UP (ref 10–40)
BILIRUB SERPL-MCNC: 0.4 MG/DL — SIGNIFICANT CHANGE UP (ref 0.2–1.2)
BUN SERPL-MCNC: 24 MG/DL — HIGH (ref 7–23)
CALCIUM SERPL-MCNC: 9.8 MG/DL — SIGNIFICANT CHANGE UP (ref 8.4–10.5)
CHLORIDE SERPL-SCNC: 95 MMOL/L — LOW (ref 96–108)
CO2 SERPL-SCNC: 25 MMOL/L — SIGNIFICANT CHANGE UP (ref 22–31)
CREAT SERPL-MCNC: 1.49 MG/DL — HIGH (ref 0.5–1.3)
GLUCOSE SERPL-MCNC: 122 MG/DL — HIGH (ref 70–99)
POTASSIUM SERPL-MCNC: 4.5 MMOL/L — SIGNIFICANT CHANGE UP (ref 3.5–5.3)
POTASSIUM SERPL-SCNC: 4.5 MMOL/L — SIGNIFICANT CHANGE UP (ref 3.5–5.3)
PROT SERPL-MCNC: 8.5 G/DL — HIGH (ref 6–8.3)
SODIUM SERPL-SCNC: 138 MMOL/L — SIGNIFICANT CHANGE UP (ref 135–145)
T3 SERPL-MCNC: 99 NG/DL — SIGNIFICANT CHANGE UP (ref 80–200)
T4 AB SER-ACNC: 8.5 UG/DL — SIGNIFICANT CHANGE UP (ref 4.6–12)
TSH SERPL-MCNC: 4.1 UIU/ML — SIGNIFICANT CHANGE UP (ref 0.27–4.2)

## 2017-07-07 ENCOUNTER — APPOINTMENT (OUTPATIENT)
Dept: GERIATRICS | Facility: CLINIC | Age: 57
End: 2017-07-07

## 2017-07-07 VITALS
WEIGHT: 179.99 LBS | SYSTOLIC BLOOD PRESSURE: 110 MMHG | OXYGEN SATURATION: 97 % | BODY MASS INDEX: 29.95 KG/M2 | RESPIRATION RATE: 16 BRPM | DIASTOLIC BLOOD PRESSURE: 70 MMHG | HEART RATE: 92 BPM | TEMPERATURE: 98.2 F

## 2017-07-07 DIAGNOSIS — J31.0 CHRONIC RHINITIS: ICD-10-CM

## 2017-07-10 ENCOUNTER — APPOINTMENT (OUTPATIENT)
Dept: HEMATOLOGY ONCOLOGY | Facility: CLINIC | Age: 57
End: 2017-07-10

## 2017-07-13 ENCOUNTER — OUTPATIENT (OUTPATIENT)
Dept: OUTPATIENT SERVICES | Facility: HOSPITAL | Age: 57
LOS: 1 days | End: 2017-07-13
Payer: MEDICARE

## 2017-07-13 ENCOUNTER — APPOINTMENT (OUTPATIENT)
Dept: CT IMAGING | Facility: IMAGING CENTER | Age: 57
End: 2017-07-13
Payer: MEDICARE

## 2017-07-13 DIAGNOSIS — C64.9 MALIGNANT NEOPLASM OF UNSPECIFIED KIDNEY, EXCEPT RENAL PELVIS: ICD-10-CM

## 2017-07-13 DIAGNOSIS — Z00.8 ENCOUNTER FOR OTHER GENERAL EXAMINATION: ICD-10-CM

## 2017-07-13 DIAGNOSIS — Z95.5 PRESENCE OF CORONARY ANGIOPLASTY IMPLANT AND GRAFT: Chronic | ICD-10-CM

## 2017-07-13 PROCEDURE — 71260 CT THORAX DX C+: CPT

## 2017-07-13 PROCEDURE — 82565 ASSAY OF CREATININE: CPT

## 2017-07-13 PROCEDURE — 71260 CT THORAX DX C+: CPT | Mod: 26

## 2017-07-13 PROCEDURE — 74177 CT ABD & PELVIS W/CONTRAST: CPT | Mod: 26

## 2017-07-13 PROCEDURE — 74177 CT ABD & PELVIS W/CONTRAST: CPT

## 2017-07-18 ENCOUNTER — APPOINTMENT (OUTPATIENT)
Dept: INFUSION THERAPY | Facility: HOSPITAL | Age: 57
End: 2017-07-18

## 2017-07-24 ENCOUNTER — APPOINTMENT (OUTPATIENT)
Dept: INFUSION THERAPY | Facility: HOSPITAL | Age: 57
End: 2017-07-24

## 2017-08-03 ENCOUNTER — OUTPATIENT (OUTPATIENT)
Dept: OUTPATIENT SERVICES | Facility: HOSPITAL | Age: 57
LOS: 1 days | Discharge: HOME | End: 2017-08-03

## 2017-08-03 DIAGNOSIS — Z95.5 PRESENCE OF CORONARY ANGIOPLASTY IMPLANT AND GRAFT: Chronic | ICD-10-CM

## 2017-08-03 DIAGNOSIS — C64.9 MALIGNANT NEOPLASM OF UNSPECIFIED KIDNEY, EXCEPT RENAL PELVIS: ICD-10-CM

## 2017-08-07 ENCOUNTER — APPOINTMENT (OUTPATIENT)
Dept: INFUSION THERAPY | Facility: HOSPITAL | Age: 57
End: 2017-08-07

## 2017-08-08 DIAGNOSIS — Z51.11 ENCOUNTER FOR ANTINEOPLASTIC CHEMOTHERAPY: ICD-10-CM

## 2017-08-11 ENCOUNTER — RESULT CHARGE (OUTPATIENT)
Age: 57
End: 2017-08-11

## 2017-08-11 ENCOUNTER — APPOINTMENT (OUTPATIENT)
Dept: ENDOCRINOLOGY | Facility: HOSPITAL | Age: 57
End: 2017-08-11
Payer: MEDICARE

## 2017-08-11 ENCOUNTER — OUTPATIENT (OUTPATIENT)
Dept: OUTPATIENT SERVICES | Facility: HOSPITAL | Age: 57
LOS: 1 days | End: 2017-08-11

## 2017-08-11 VITALS
DIASTOLIC BLOOD PRESSURE: 72 MMHG | HEART RATE: 79 BPM | SYSTOLIC BLOOD PRESSURE: 109 MMHG | WEIGHT: 183 LBS | BODY MASS INDEX: 30.49 KG/M2 | HEIGHT: 65 IN

## 2017-08-11 DIAGNOSIS — E11.65 TYPE 2 DIABETES MELLITUS WITH HYPERGLYCEMIA: ICD-10-CM

## 2017-08-11 DIAGNOSIS — E03.9 HYPOTHYROIDISM, UNSPECIFIED: ICD-10-CM

## 2017-08-11 DIAGNOSIS — Z95.5 PRESENCE OF CORONARY ANGIOPLASTY IMPLANT AND GRAFT: Chronic | ICD-10-CM

## 2017-08-11 DIAGNOSIS — I10 ESSENTIAL (PRIMARY) HYPERTENSION: ICD-10-CM

## 2017-08-11 PROCEDURE — 99215 OFFICE O/P EST HI 40 MIN: CPT | Mod: GC

## 2017-08-15 LAB — GLUCOSE BLDC GLUCOMTR-MCNC: 279

## 2017-08-29 ENCOUNTER — APPOINTMENT (OUTPATIENT)
Dept: INFUSION THERAPY | Facility: HOSPITAL | Age: 57
End: 2017-08-29

## 2017-08-29 ENCOUNTER — RESULT REVIEW (OUTPATIENT)
Age: 57
End: 2017-08-29

## 2017-08-29 LAB
HCT VFR BLD CALC: 30.7 % — LOW (ref 39–50)
HGB BLD-MCNC: 9.6 G/DL — LOW (ref 13–17)
MCHC RBC-ENTMCNC: 24.8 PG — LOW (ref 27–34)
MCHC RBC-ENTMCNC: 31.1 G/DL — LOW (ref 32–36)
MCV RBC AUTO: 79.6 FL — LOW (ref 80–100)
PLATELET # BLD AUTO: 354 K/UL — SIGNIFICANT CHANGE UP (ref 150–400)
RBC # BLD: 3.86 M/UL — LOW (ref 4.2–5.8)
RBC # FLD: 18.1 % — HIGH (ref 10.3–14.5)
WBC # BLD: 7.1 K/UL — SIGNIFICANT CHANGE UP (ref 3.8–10.5)
WBC # FLD AUTO: 7.1 K/UL — SIGNIFICANT CHANGE UP (ref 3.8–10.5)

## 2017-08-30 LAB
ALBUMIN SERPL ELPH-MCNC: 3.9 G/DL — SIGNIFICANT CHANGE UP (ref 3.3–5)
ALP SERPL-CCNC: 114 U/L — SIGNIFICANT CHANGE UP (ref 40–120)
ALT FLD-CCNC: 9 U/L — LOW (ref 10–45)
ANION GAP SERPL CALC-SCNC: 16 MMOL/L — SIGNIFICANT CHANGE UP (ref 5–17)
AST SERPL-CCNC: 11 U/L — SIGNIFICANT CHANGE UP (ref 10–40)
BILIRUB SERPL-MCNC: 0.4 MG/DL — SIGNIFICANT CHANGE UP (ref 0.2–1.2)
BUN SERPL-MCNC: 18 MG/DL — SIGNIFICANT CHANGE UP (ref 7–23)
CALCIUM SERPL-MCNC: 8.7 MG/DL — SIGNIFICANT CHANGE UP (ref 8.4–10.5)
CHLORIDE SERPL-SCNC: 91 MMOL/L — LOW (ref 96–108)
CO2 SERPL-SCNC: 26 MMOL/L — SIGNIFICANT CHANGE UP (ref 22–31)
CREAT SERPL-MCNC: 1.24 MG/DL — SIGNIFICANT CHANGE UP (ref 0.5–1.3)
GLUCOSE SERPL-MCNC: 326 MG/DL — HIGH (ref 70–99)
POTASSIUM SERPL-MCNC: 4.4 MMOL/L — SIGNIFICANT CHANGE UP (ref 3.5–5.3)
POTASSIUM SERPL-SCNC: 4.4 MMOL/L — SIGNIFICANT CHANGE UP (ref 3.5–5.3)
PROT SERPL-MCNC: 8 G/DL — SIGNIFICANT CHANGE UP (ref 6–8.3)
SODIUM SERPL-SCNC: 133 MMOL/L — LOW (ref 135–145)

## 2017-09-08 ENCOUNTER — OUTPATIENT (OUTPATIENT)
Dept: OUTPATIENT SERVICES | Facility: HOSPITAL | Age: 57
LOS: 1 days | Discharge: HOME | End: 2017-09-08

## 2017-09-08 DIAGNOSIS — Z95.5 PRESENCE OF CORONARY ANGIOPLASTY IMPLANT AND GRAFT: Chronic | ICD-10-CM

## 2017-09-08 DIAGNOSIS — C64.9 MALIGNANT NEOPLASM OF UNSPECIFIED KIDNEY, EXCEPT RENAL PELVIS: ICD-10-CM

## 2017-09-12 ENCOUNTER — APPOINTMENT (OUTPATIENT)
Dept: INFUSION THERAPY | Facility: HOSPITAL | Age: 57
End: 2017-09-12

## 2017-09-13 DIAGNOSIS — Z51.11 ENCOUNTER FOR ANTINEOPLASTIC CHEMOTHERAPY: ICD-10-CM

## 2017-09-15 ENCOUNTER — APPOINTMENT (OUTPATIENT)
Dept: RADIATION ONCOLOGY | Facility: CLINIC | Age: 57
End: 2017-09-15
Payer: MEDICARE

## 2017-09-15 VITALS
SYSTOLIC BLOOD PRESSURE: 123 MMHG | TEMPERATURE: 98.4 F | WEIGHT: 183.76 LBS | OXYGEN SATURATION: 99 % | RESPIRATION RATE: 16 BRPM | HEART RATE: 79 BPM | HEIGHT: 65 IN | DIASTOLIC BLOOD PRESSURE: 76 MMHG | BODY MASS INDEX: 30.62 KG/M2

## 2017-09-15 PROCEDURE — 99214 OFFICE O/P EST MOD 30 MIN: CPT

## 2017-09-26 ENCOUNTER — APPOINTMENT (OUTPATIENT)
Dept: INFUSION THERAPY | Facility: HOSPITAL | Age: 57
End: 2017-09-26

## 2017-09-28 ENCOUNTER — APPOINTMENT (OUTPATIENT)
Dept: HEMATOLOGY ONCOLOGY | Facility: CLINIC | Age: 57
End: 2017-09-28

## 2017-09-28 VITALS
SYSTOLIC BLOOD PRESSURE: 143 MMHG | HEART RATE: 79 BPM | OXYGEN SATURATION: 98 % | DIASTOLIC BLOOD PRESSURE: 81 MMHG | BODY MASS INDEX: 30.08 KG/M2 | TEMPERATURE: 99.4 F | WEIGHT: 180.78 LBS | RESPIRATION RATE: 20 BRPM

## 2017-10-03 ENCOUNTER — FORM ENCOUNTER (OUTPATIENT)
Age: 57
End: 2017-10-03

## 2017-10-04 ENCOUNTER — OUTPATIENT (OUTPATIENT)
Dept: OUTPATIENT SERVICES | Facility: HOSPITAL | Age: 57
LOS: 1 days | End: 2017-10-04
Payer: MEDICARE

## 2017-10-04 ENCOUNTER — APPOINTMENT (OUTPATIENT)
Dept: MRI IMAGING | Facility: IMAGING CENTER | Age: 57
End: 2017-10-04
Payer: MEDICARE

## 2017-10-04 DIAGNOSIS — Z95.5 PRESENCE OF CORONARY ANGIOPLASTY IMPLANT AND GRAFT: Chronic | ICD-10-CM

## 2017-10-04 DIAGNOSIS — M54.9 DORSALGIA, UNSPECIFIED: ICD-10-CM

## 2017-10-04 PROCEDURE — 72157 MRI CHEST SPINE W/O & W/DYE: CPT | Mod: 26

## 2017-10-04 PROCEDURE — 72157 MRI CHEST SPINE W/O & W/DYE: CPT

## 2017-10-04 PROCEDURE — 82565 ASSAY OF CREATININE: CPT

## 2017-10-04 PROCEDURE — A9585: CPT

## 2017-10-05 ENCOUNTER — OUTPATIENT (OUTPATIENT)
Dept: OUTPATIENT SERVICES | Facility: HOSPITAL | Age: 57
LOS: 1 days | Discharge: HOME | End: 2017-10-05

## 2017-10-05 DIAGNOSIS — Z95.5 PRESENCE OF CORONARY ANGIOPLASTY IMPLANT AND GRAFT: Chronic | ICD-10-CM

## 2017-10-05 DIAGNOSIS — C64.9 MALIGNANT NEOPLASM OF UNSPECIFIED KIDNEY, EXCEPT RENAL PELVIS: ICD-10-CM

## 2017-10-06 ENCOUNTER — APPOINTMENT (OUTPATIENT)
Dept: MRI IMAGING | Facility: IMAGING CENTER | Age: 57
End: 2017-10-06

## 2017-10-09 ENCOUNTER — APPOINTMENT (OUTPATIENT)
Dept: GERIATRICS | Facility: CLINIC | Age: 57
End: 2017-10-09

## 2017-10-10 ENCOUNTER — APPOINTMENT (OUTPATIENT)
Dept: INFUSION THERAPY | Facility: HOSPITAL | Age: 57
End: 2017-10-10

## 2017-10-10 ENCOUNTER — LABORATORY RESULT (OUTPATIENT)
Age: 57
End: 2017-10-10

## 2017-10-11 DIAGNOSIS — Z51.11 ENCOUNTER FOR ANTINEOPLASTIC CHEMOTHERAPY: ICD-10-CM

## 2017-10-13 ENCOUNTER — RESULT CHARGE (OUTPATIENT)
Age: 57
End: 2017-10-13

## 2017-10-13 ENCOUNTER — APPOINTMENT (OUTPATIENT)
Dept: ENDOCRINOLOGY | Facility: HOSPITAL | Age: 57
End: 2017-10-13
Payer: MEDICARE

## 2017-10-13 ENCOUNTER — LABORATORY RESULT (OUTPATIENT)
Age: 57
End: 2017-10-13

## 2017-10-13 ENCOUNTER — OUTPATIENT (OUTPATIENT)
Dept: OUTPATIENT SERVICES | Facility: HOSPITAL | Age: 57
LOS: 1 days | End: 2017-10-13

## 2017-10-13 VITALS
DIASTOLIC BLOOD PRESSURE: 78 MMHG | HEIGHT: 65 IN | WEIGHT: 181 LBS | BODY MASS INDEX: 30.16 KG/M2 | HEART RATE: 67 BPM | SYSTOLIC BLOOD PRESSURE: 148 MMHG

## 2017-10-13 DIAGNOSIS — Z95.5 PRESENCE OF CORONARY ANGIOPLASTY IMPLANT AND GRAFT: Chronic | ICD-10-CM

## 2017-10-13 LAB
CHOLEST SERPL-MCNC: 126 MG/DL — SIGNIFICANT CHANGE UP (ref 120–199)
CREAT UR-MCNC: 162 MG/DL — SIGNIFICANT CHANGE UP
GLUCOSE BLDC GLUCOMTR-MCNC: 110
HBA1C BLD-MCNC: 9.5 % — HIGH (ref 4–5.6)
HDLC SERPL-MCNC: 29 MG/DL — LOW (ref 35–55)
LIPID PNL WITH DIRECT LDL SERPL: 84 MG/DL — SIGNIFICANT CHANGE UP
MICROALBUMIN UR-MCNC: 20.6 MG/DL — SIGNIFICANT CHANGE UP
MICROALBUMIN/CREAT UR-RTO: 127 MG/G — HIGH (ref 0–30)
TRIGL SERPL-MCNC: 57 MG/DL — SIGNIFICANT CHANGE UP (ref 10–149)

## 2017-10-13 PROCEDURE — 99214 OFFICE O/P EST MOD 30 MIN: CPT | Mod: GC

## 2017-10-16 DIAGNOSIS — E11.65 TYPE 2 DIABETES MELLITUS WITH HYPERGLYCEMIA: ICD-10-CM

## 2017-10-24 ENCOUNTER — RESULT REVIEW (OUTPATIENT)
Age: 57
End: 2017-10-24

## 2017-10-24 ENCOUNTER — APPOINTMENT (OUTPATIENT)
Dept: INFUSION THERAPY | Facility: HOSPITAL | Age: 57
End: 2017-10-24

## 2017-10-24 ENCOUNTER — APPOINTMENT (OUTPATIENT)
Dept: HEMATOLOGY ONCOLOGY | Facility: CLINIC | Age: 57
End: 2017-10-24

## 2017-10-24 DIAGNOSIS — R11.2 NAUSEA WITH VOMITING, UNSPECIFIED: ICD-10-CM

## 2017-10-25 DIAGNOSIS — R11.2 NAUSEA WITH VOMITING, UNSPECIFIED: ICD-10-CM

## 2017-10-26 ENCOUNTER — APPOINTMENT (OUTPATIENT)
Dept: HEMATOLOGY ONCOLOGY | Facility: CLINIC | Age: 57
End: 2017-10-26

## 2017-10-26 ENCOUNTER — RESULT REVIEW (OUTPATIENT)
Age: 57
End: 2017-10-26

## 2017-10-26 ENCOUNTER — FORM ENCOUNTER (OUTPATIENT)
Age: 57
End: 2017-10-26

## 2017-10-26 VITALS
WEIGHT: 181.88 LBS | BODY MASS INDEX: 30.27 KG/M2 | TEMPERATURE: 98.2 F | RESPIRATION RATE: 18 BRPM | DIASTOLIC BLOOD PRESSURE: 70 MMHG | SYSTOLIC BLOOD PRESSURE: 130 MMHG | HEART RATE: 94 BPM | OXYGEN SATURATION: 97 %

## 2017-10-27 ENCOUNTER — APPOINTMENT (OUTPATIENT)
Dept: CT IMAGING | Facility: IMAGING CENTER | Age: 57
End: 2017-10-27
Payer: MEDICARE

## 2017-10-27 ENCOUNTER — OUTPATIENT (OUTPATIENT)
Dept: OUTPATIENT SERVICES | Facility: HOSPITAL | Age: 57
LOS: 1 days | End: 2017-10-27
Payer: MEDICARE

## 2017-10-27 DIAGNOSIS — Z95.5 PRESENCE OF CORONARY ANGIOPLASTY IMPLANT AND GRAFT: Chronic | ICD-10-CM

## 2017-10-27 DIAGNOSIS — C64.9 MALIGNANT NEOPLASM OF UNSPECIFIED KIDNEY, EXCEPT RENAL PELVIS: ICD-10-CM

## 2017-10-27 LAB
FERRITIN SERPL-MCNC: 342 NG/ML — SIGNIFICANT CHANGE UP (ref 30–400)
HAPTOGLOB SERPL-MCNC: 369 MG/DL — HIGH (ref 34–200)
IRON SATN MFR SERPL: 11 % — LOW (ref 16–55)
IRON SATN MFR SERPL: 30 UG/DL — LOW (ref 45–165)
LDH SERPL L TO P-CCNC: 228 U/L — SIGNIFICANT CHANGE UP (ref 50–242)
TIBC SERPL-MCNC: 281 UG/DL — SIGNIFICANT CHANGE UP (ref 220–430)
UIBC SERPL-MCNC: 251 UG/DL — SIGNIFICANT CHANGE UP (ref 110–370)
VIT B12 SERPL-MCNC: 383 PG/ML — SIGNIFICANT CHANGE UP (ref 243–894)

## 2017-10-27 PROCEDURE — 71260 CT THORAX DX C+: CPT | Mod: 26

## 2017-10-27 PROCEDURE — 74177 CT ABD & PELVIS W/CONTRAST: CPT | Mod: 26

## 2017-10-27 PROCEDURE — 71260 CT THORAX DX C+: CPT

## 2017-10-27 PROCEDURE — 74177 CT ABD & PELVIS W/CONTRAST: CPT

## 2017-11-03 ENCOUNTER — OUTPATIENT (OUTPATIENT)
Dept: OUTPATIENT SERVICES | Facility: HOSPITAL | Age: 57
LOS: 1 days | Discharge: HOME | End: 2017-11-03

## 2017-11-03 DIAGNOSIS — Z95.5 PRESENCE OF CORONARY ANGIOPLASTY IMPLANT AND GRAFT: Chronic | ICD-10-CM

## 2017-11-03 DIAGNOSIS — C64.9 MALIGNANT NEOPLASM OF UNSPECIFIED KIDNEY, EXCEPT RENAL PELVIS: ICD-10-CM

## 2017-11-07 ENCOUNTER — APPOINTMENT (OUTPATIENT)
Dept: INFUSION THERAPY | Facility: HOSPITAL | Age: 57
End: 2017-11-07

## 2017-11-09 ENCOUNTER — APPOINTMENT (OUTPATIENT)
Dept: HEMATOLOGY ONCOLOGY | Facility: CLINIC | Age: 57
End: 2017-11-09

## 2017-11-09 VITALS
DIASTOLIC BLOOD PRESSURE: 84 MMHG | HEART RATE: 84 BPM | SYSTOLIC BLOOD PRESSURE: 138 MMHG | WEIGHT: 181.88 LBS | OXYGEN SATURATION: 98 % | RESPIRATION RATE: 18 BRPM | TEMPERATURE: 98.3 F | BODY MASS INDEX: 30.27 KG/M2

## 2017-11-21 ENCOUNTER — APPOINTMENT (OUTPATIENT)
Dept: INFUSION THERAPY | Facility: HOSPITAL | Age: 57
End: 2017-11-21

## 2017-11-27 ENCOUNTER — APPOINTMENT (OUTPATIENT)
Dept: HEMATOLOGY ONCOLOGY | Facility: CLINIC | Age: 57
End: 2017-11-27

## 2017-11-27 ENCOUNTER — RESULT REVIEW (OUTPATIENT)
Age: 57
End: 2017-11-27

## 2017-11-27 VITALS
SYSTOLIC BLOOD PRESSURE: 138 MMHG | TEMPERATURE: 97.5 F | OXYGEN SATURATION: 99 % | RESPIRATION RATE: 16 BRPM | WEIGHT: 182.98 LBS | BODY MASS INDEX: 30.45 KG/M2 | DIASTOLIC BLOOD PRESSURE: 91 MMHG | HEART RATE: 69 BPM

## 2017-11-27 LAB
ALBUMIN SERPL ELPH-MCNC: 4.1 G/DL — SIGNIFICANT CHANGE UP (ref 3.3–5)
ALP SERPL-CCNC: 143 U/L — HIGH (ref 40–120)
ALT FLD-CCNC: 56 U/L — HIGH (ref 10–45)
ANION GAP SERPL CALC-SCNC: 15 MMOL/L — SIGNIFICANT CHANGE UP (ref 5–17)
AST SERPL-CCNC: 42 U/L — HIGH (ref 10–40)
BASOPHILS # BLD AUTO: 0 K/UL — SIGNIFICANT CHANGE UP (ref 0–0.2)
BASOPHILS NFR BLD AUTO: 0.6 % — SIGNIFICANT CHANGE UP (ref 0–2)
BILIRUB SERPL-MCNC: 0.5 MG/DL — SIGNIFICANT CHANGE UP (ref 0.2–1.2)
BUN SERPL-MCNC: 16 MG/DL — SIGNIFICANT CHANGE UP (ref 7–23)
CALCIUM SERPL-MCNC: 9.5 MG/DL — SIGNIFICANT CHANGE UP (ref 8.4–10.5)
CHLORIDE SERPL-SCNC: 96 MMOL/L — SIGNIFICANT CHANGE UP (ref 96–108)
CO2 SERPL-SCNC: 29 MMOL/L — SIGNIFICANT CHANGE UP (ref 22–31)
CREAT SERPL-MCNC: 1.24 MG/DL — SIGNIFICANT CHANGE UP (ref 0.5–1.3)
EOSINOPHIL # BLD AUTO: 0.8 K/UL — HIGH (ref 0–0.5)
EOSINOPHIL NFR BLD AUTO: 10.9 % — HIGH (ref 0–6)
GLUCOSE SERPL-MCNC: 142 MG/DL — HIGH (ref 70–99)
LYMPHOCYTES # BLD AUTO: 1.4 K/UL — SIGNIFICANT CHANGE UP (ref 1–3.3)
LYMPHOCYTES # BLD AUTO: 19.3 % — SIGNIFICANT CHANGE UP (ref 13–44)
MONOCYTES # BLD AUTO: 0.3 K/UL — SIGNIFICANT CHANGE UP (ref 0–0.9)
MONOCYTES NFR BLD AUTO: 3.8 % — SIGNIFICANT CHANGE UP (ref 2–14)
NEUTROPHILS # BLD AUTO: 4.6 K/UL — SIGNIFICANT CHANGE UP (ref 1.8–7.4)
NEUTROPHILS NFR BLD AUTO: 65.5 % — SIGNIFICANT CHANGE UP (ref 43–77)
POTASSIUM SERPL-MCNC: 4.6 MMOL/L — SIGNIFICANT CHANGE UP (ref 3.5–5.3)
POTASSIUM SERPL-SCNC: 4.6 MMOL/L — SIGNIFICANT CHANGE UP (ref 3.5–5.3)
PROT SERPL-MCNC: 8.6 G/DL — HIGH (ref 6–8.3)
SODIUM SERPL-SCNC: 140 MMOL/L — SIGNIFICANT CHANGE UP (ref 135–145)

## 2017-12-05 ENCOUNTER — APPOINTMENT (OUTPATIENT)
Dept: INFUSION THERAPY | Facility: HOSPITAL | Age: 57
End: 2017-12-05

## 2017-12-07 ENCOUNTER — OUTPATIENT (OUTPATIENT)
Dept: OUTPATIENT SERVICES | Facility: HOSPITAL | Age: 57
LOS: 1 days | Discharge: HOME | End: 2017-12-07

## 2017-12-07 DIAGNOSIS — Z95.5 PRESENCE OF CORONARY ANGIOPLASTY IMPLANT AND GRAFT: Chronic | ICD-10-CM

## 2017-12-07 DIAGNOSIS — C64.9 MALIGNANT NEOPLASM OF UNSPECIFIED KIDNEY, EXCEPT RENAL PELVIS: ICD-10-CM

## 2017-12-14 ENCOUNTER — RESULT REVIEW (OUTPATIENT)
Age: 57
End: 2017-12-14

## 2017-12-14 ENCOUNTER — APPOINTMENT (OUTPATIENT)
Dept: HEMATOLOGY ONCOLOGY | Facility: CLINIC | Age: 57
End: 2017-12-14

## 2017-12-14 VITALS
TEMPERATURE: 97.3 F | OXYGEN SATURATION: 94 % | SYSTOLIC BLOOD PRESSURE: 158 MMHG | DIASTOLIC BLOOD PRESSURE: 107 MMHG | WEIGHT: 185.19 LBS | RESPIRATION RATE: 16 BRPM | BODY MASS INDEX: 30.82 KG/M2 | HEART RATE: 87 BPM

## 2017-12-14 DIAGNOSIS — K06.8 OTHER SPECIFIED DISORDERS OF GINGIVA AND EDENTULOUS ALVEOLAR RIDGE: ICD-10-CM

## 2017-12-14 LAB
BASOPHILS # BLD AUTO: 0.1 K/UL — SIGNIFICANT CHANGE UP (ref 0–0.2)
BASOPHILS NFR BLD AUTO: 0.7 % — SIGNIFICANT CHANGE UP (ref 0–2)
EOSINOPHIL # BLD AUTO: 0.5 K/UL — SIGNIFICANT CHANGE UP (ref 0–0.5)
EOSINOPHIL NFR BLD AUTO: 3.7 % — SIGNIFICANT CHANGE UP (ref 0–6)
LYMPHOCYTES # BLD AUTO: 20.7 % — SIGNIFICANT CHANGE UP (ref 13–44)
LYMPHOCYTES # BLD AUTO: 3 K/UL — SIGNIFICANT CHANGE UP (ref 1–3.3)
MONOCYTES # BLD AUTO: 1 K/UL — HIGH (ref 0–0.9)
MONOCYTES NFR BLD AUTO: 7.3 % — SIGNIFICANT CHANGE UP (ref 2–14)
NEUTROPHILS # BLD AUTO: 9.6 K/UL — HIGH (ref 1.8–7.4)
NEUTROPHILS NFR BLD AUTO: 67.5 % — SIGNIFICANT CHANGE UP (ref 43–77)

## 2017-12-14 RX ORDER — CLOTRIMAZOLE 10 MG/G
1 CREAM TOPICAL TWICE DAILY
Qty: 30 | Refills: 0 | Status: DISCONTINUED | COMMUNITY
Start: 2017-02-28 | End: 2017-12-14

## 2017-12-14 RX ORDER — METOCLOPRAMIDE HYDROCHLORIDE 10 MG/1
10 TABLET, ORALLY DISINTEGRATING ORAL EVERY 8 HOURS
Qty: 42 | Refills: 0 | Status: DISCONTINUED | COMMUNITY
Start: 2017-10-24 | End: 2017-12-14

## 2017-12-15 PROBLEM — K06.8 PAIN IN GUMS: Status: ACTIVE | Noted: 2017-12-14

## 2017-12-19 ENCOUNTER — APPOINTMENT (OUTPATIENT)
Dept: INFUSION THERAPY | Facility: HOSPITAL | Age: 57
End: 2017-12-19

## 2017-12-29 ENCOUNTER — OUTPATIENT (OUTPATIENT)
Dept: OUTPATIENT SERVICES | Facility: HOSPITAL | Age: 57
LOS: 1 days | Discharge: HOME | End: 2017-12-29

## 2017-12-29 DIAGNOSIS — C64.9 MALIGNANT NEOPLASM OF UNSPECIFIED KIDNEY, EXCEPT RENAL PELVIS: ICD-10-CM

## 2017-12-29 DIAGNOSIS — Z95.5 PRESENCE OF CORONARY ANGIOPLASTY IMPLANT AND GRAFT: Chronic | ICD-10-CM

## 2018-01-08 ENCOUNTER — INPATIENT (INPATIENT)
Facility: HOSPITAL | Age: 58
LOS: 1 days | Discharge: HOME | End: 2018-01-10
Attending: HOSPITALIST | Admitting: HOSPITALIST
Payer: MEDICARE

## 2018-01-08 ENCOUNTER — APPOINTMENT (OUTPATIENT)
Dept: HEMATOLOGY ONCOLOGY | Facility: CLINIC | Age: 58
End: 2018-01-08

## 2018-01-08 VITALS
RESPIRATION RATE: 18 BRPM | HEART RATE: 92 BPM | OXYGEN SATURATION: 98 % | SYSTOLIC BLOOD PRESSURE: 208 MMHG | TEMPERATURE: 98 F | DIASTOLIC BLOOD PRESSURE: 138 MMHG

## 2018-01-08 VITALS
OXYGEN SATURATION: 96 % | RESPIRATION RATE: 16 BRPM | TEMPERATURE: 97.7 F | SYSTOLIC BLOOD PRESSURE: 197 MMHG | HEART RATE: 93 BPM | DIASTOLIC BLOOD PRESSURE: 141 MMHG | BODY MASS INDEX: 30.45 KG/M2 | WEIGHT: 182.98 LBS

## 2018-01-08 DIAGNOSIS — I10 ESSENTIAL (PRIMARY) HYPERTENSION: ICD-10-CM

## 2018-01-08 DIAGNOSIS — Z95.5 PRESENCE OF CORONARY ANGIOPLASTY IMPLANT AND GRAFT: Chronic | ICD-10-CM

## 2018-01-08 DIAGNOSIS — I16.0 HYPERTENSIVE URGENCY: ICD-10-CM

## 2018-01-08 LAB
ALBUMIN SERPL ELPH-MCNC: 4 G/DL — SIGNIFICANT CHANGE UP (ref 3.3–5)
ALP SERPL-CCNC: 119 U/L — SIGNIFICANT CHANGE UP (ref 40–120)
ALT FLD-CCNC: 54 U/L — HIGH (ref 4–41)
AST SERPL-CCNC: 50 U/L — HIGH (ref 4–40)
BASE EXCESS BLDV CALC-SCNC: 9.3 MMOL/L — SIGNIFICANT CHANGE UP
BASOPHILS # BLD AUTO: 0.04 K/UL — SIGNIFICANT CHANGE UP (ref 0–0.2)
BASOPHILS NFR BLD AUTO: 0.6 % — SIGNIFICANT CHANGE UP (ref 0–2)
BILIRUB SERPL-MCNC: 0.7 MG/DL — SIGNIFICANT CHANGE UP (ref 0.2–1.2)
BLOOD GAS VENOUS - CREATININE: 1.13 MG/DL — SIGNIFICANT CHANGE UP (ref 0.5–1.3)
BUN SERPL-MCNC: 13 MG/DL — SIGNIFICANT CHANGE UP (ref 7–23)
CALCIUM SERPL-MCNC: 8.5 MG/DL — SIGNIFICANT CHANGE UP (ref 8.4–10.5)
CHLORIDE BLDV-SCNC: 99 MMOL/L — SIGNIFICANT CHANGE UP (ref 96–108)
CHLORIDE SERPL-SCNC: 98 MMOL/L — SIGNIFICANT CHANGE UP (ref 98–107)
CK MB BLD-MCNC: 1.1 — SIGNIFICANT CHANGE UP (ref 0–2.5)
CK MB BLD-MCNC: 2.32 NG/ML — SIGNIFICANT CHANGE UP (ref 1–6.6)
CK SERPL-CCNC: 212 U/L — HIGH (ref 30–200)
CO2 SERPL-SCNC: 33 MMOL/L — HIGH (ref 22–31)
CREAT SERPL-MCNC: 1.24 MG/DL — SIGNIFICANT CHANGE UP (ref 0.5–1.3)
EOSINOPHIL # BLD AUTO: 0.49 K/UL — SIGNIFICANT CHANGE UP (ref 0–0.5)
EOSINOPHIL NFR BLD AUTO: 7.3 % — HIGH (ref 0–6)
GAS PNL BLDV: 137 MMOL/L — SIGNIFICANT CHANGE UP (ref 136–146)
GLUCOSE BLDV-MCNC: 133 — HIGH (ref 70–99)
GLUCOSE SERPL-MCNC: 130 MG/DL — HIGH (ref 70–99)
HCO3 BLDV-SCNC: 30 MMOL/L — HIGH (ref 20–27)
HCT VFR BLD CALC: 38.8 % — LOW (ref 39–50)
HCT VFR BLDV CALC: 39.9 % — SIGNIFICANT CHANGE UP (ref 39–51)
HGB BLD-MCNC: 12.6 G/DL — LOW (ref 13–17)
HGB BLDV-MCNC: 13 G/DL — SIGNIFICANT CHANGE UP (ref 13–17)
IMM GRANULOCYTES # BLD AUTO: 0.02 # — SIGNIFICANT CHANGE UP
IMM GRANULOCYTES NFR BLD AUTO: 0.3 % — SIGNIFICANT CHANGE UP (ref 0–1.5)
LACTATE BLDV-MCNC: 1.9 MMOL/L — SIGNIFICANT CHANGE UP (ref 0.5–2)
LYMPHOCYTES # BLD AUTO: 1.75 K/UL — SIGNIFICANT CHANGE UP (ref 1–3.3)
LYMPHOCYTES # BLD AUTO: 25.9 % — SIGNIFICANT CHANGE UP (ref 13–44)
MCHC RBC-ENTMCNC: 27.1 PG — SIGNIFICANT CHANGE UP (ref 27–34)
MCHC RBC-ENTMCNC: 32.5 % — SIGNIFICANT CHANGE UP (ref 32–36)
MCV RBC AUTO: 83.4 FL — SIGNIFICANT CHANGE UP (ref 80–100)
MONOCYTES # BLD AUTO: 0.34 K/UL — SIGNIFICANT CHANGE UP (ref 0–0.9)
MONOCYTES NFR BLD AUTO: 5 % — SIGNIFICANT CHANGE UP (ref 2–14)
NEUTROPHILS # BLD AUTO: 4.11 K/UL — SIGNIFICANT CHANGE UP (ref 1.8–7.4)
NEUTROPHILS NFR BLD AUTO: 60.9 % — SIGNIFICANT CHANGE UP (ref 43–77)
NRBC # FLD: 0 — SIGNIFICANT CHANGE UP
PCO2 BLDV: 65 MMHG — HIGH (ref 41–51)
PH BLDV: 7.35 PH — SIGNIFICANT CHANGE UP (ref 7.32–7.43)
PLATELET # BLD AUTO: 163 K/UL — SIGNIFICANT CHANGE UP (ref 150–400)
PMV BLD: 9.4 FL — SIGNIFICANT CHANGE UP (ref 7–13)
PO2 BLDV: < 24 MMHG — LOW (ref 35–40)
POTASSIUM BLDV-SCNC: 3.6 MMOL/L — SIGNIFICANT CHANGE UP (ref 3.4–4.5)
POTASSIUM SERPL-MCNC: 3.9 MMOL/L — SIGNIFICANT CHANGE UP (ref 3.5–5.3)
POTASSIUM SERPL-SCNC: 3.9 MMOL/L — SIGNIFICANT CHANGE UP (ref 3.5–5.3)
PROT SERPL-MCNC: 8.2 G/DL — SIGNIFICANT CHANGE UP (ref 6–8.3)
RBC # BLD: 4.65 M/UL — SIGNIFICANT CHANGE UP (ref 4.2–5.8)
RBC # FLD: 21.2 % — HIGH (ref 10.3–14.5)
SAO2 % BLDV: 13.6 % — LOW (ref 60–85)
SODIUM SERPL-SCNC: 142 MMOL/L — SIGNIFICANT CHANGE UP (ref 135–145)
T3 SERPL-MCNC: 63.2 NG/DL — LOW (ref 80–200)
T4 FREE SERPL-MCNC: 0.77 NG/DL — LOW (ref 0.9–1.8)
TROPONIN T SERPL-MCNC: < 0.06 NG/ML — SIGNIFICANT CHANGE UP (ref 0–0.06)
TSH SERPL-MCNC: 28.37 UIU/ML — HIGH (ref 0.27–4.2)
WBC # BLD: 6.75 K/UL — SIGNIFICANT CHANGE UP (ref 3.8–10.5)
WBC # FLD AUTO: 6.75 K/UL — SIGNIFICANT CHANGE UP (ref 3.8–10.5)

## 2018-01-08 PROCEDURE — 71046 X-RAY EXAM CHEST 2 VIEWS: CPT | Mod: 26

## 2018-01-08 PROCEDURE — 99223 1ST HOSP IP/OBS HIGH 75: CPT | Mod: GC

## 2018-01-08 PROCEDURE — 70450 CT HEAD/BRAIN W/O DYE: CPT | Mod: 26

## 2018-01-08 RX ORDER — ATORVASTATIN CALCIUM 80 MG/1
20 TABLET, FILM COATED ORAL AT BEDTIME
Qty: 0 | Refills: 0 | Status: DISCONTINUED | OUTPATIENT
Start: 2018-01-08 | End: 2018-01-10

## 2018-01-08 RX ORDER — LORATADINE 10 MG/1
10 TABLET ORAL DAILY
Qty: 0 | Refills: 0 | Status: DISCONTINUED | OUTPATIENT
Start: 2018-01-08 | End: 2018-01-10

## 2018-01-08 RX ORDER — LABETALOL HCL 100 MG
10 TABLET ORAL ONCE
Qty: 0 | Refills: 0 | Status: COMPLETED | OUTPATIENT
Start: 2018-01-08 | End: 2018-01-08

## 2018-01-08 RX ORDER — DEXTROSE 50 % IN WATER 50 %
12.5 SYRINGE (ML) INTRAVENOUS ONCE
Qty: 0 | Refills: 0 | Status: DISCONTINUED | OUTPATIENT
Start: 2018-01-08 | End: 2018-01-10

## 2018-01-08 RX ORDER — LABETALOL HCL 100 MG
100 TABLET ORAL ONCE
Qty: 0 | Refills: 0 | Status: COMPLETED | OUTPATIENT
Start: 2018-01-08 | End: 2018-01-08

## 2018-01-08 RX ORDER — SENNA PLUS 8.6 MG/1
2 TABLET ORAL AT BEDTIME
Qty: 0 | Refills: 0 | Status: DISCONTINUED | OUTPATIENT
Start: 2018-01-08 | End: 2018-01-10

## 2018-01-08 RX ORDER — GLUCAGON INJECTION, SOLUTION 0.5 MG/.1ML
1 INJECTION, SOLUTION SUBCUTANEOUS ONCE
Qty: 0 | Refills: 0 | Status: DISCONTINUED | OUTPATIENT
Start: 2018-01-08 | End: 2018-01-10

## 2018-01-08 RX ORDER — OXYCODONE HYDROCHLORIDE 5 MG/1
10 TABLET ORAL EVERY 6 HOURS
Qty: 0 | Refills: 0 | Status: DISCONTINUED | OUTPATIENT
Start: 2018-01-08 | End: 2018-01-10

## 2018-01-08 RX ORDER — INSULIN GLARGINE 100 [IU]/ML
14 INJECTION, SOLUTION SUBCUTANEOUS AT BEDTIME
Qty: 0 | Refills: 0 | Status: DISCONTINUED | OUTPATIENT
Start: 2018-01-08 | End: 2018-01-10

## 2018-01-08 RX ORDER — DEXTROSE 50 % IN WATER 50 %
1 SYRINGE (ML) INTRAVENOUS ONCE
Qty: 0 | Refills: 0 | Status: DISCONTINUED | OUTPATIENT
Start: 2018-01-08 | End: 2018-01-10

## 2018-01-08 RX ORDER — INSULIN LISPRO 100/ML
VIAL (ML) SUBCUTANEOUS
Qty: 0 | Refills: 0 | Status: DISCONTINUED | OUTPATIENT
Start: 2018-01-08 | End: 2018-01-10

## 2018-01-08 RX ORDER — INSULIN LISPRO 100/ML
VIAL (ML) SUBCUTANEOUS AT BEDTIME
Qty: 0 | Refills: 0 | Status: DISCONTINUED | OUTPATIENT
Start: 2018-01-08 | End: 2018-01-10

## 2018-01-08 RX ORDER — DEXTROSE 50 % IN WATER 50 %
25 SYRINGE (ML) INTRAVENOUS ONCE
Qty: 0 | Refills: 0 | Status: DISCONTINUED | OUTPATIENT
Start: 2018-01-08 | End: 2018-01-10

## 2018-01-08 RX ORDER — DOCUSATE SODIUM 100 MG
100 CAPSULE ORAL DAILY
Qty: 0 | Refills: 0 | Status: DISCONTINUED | OUTPATIENT
Start: 2018-01-08 | End: 2018-01-10

## 2018-01-08 RX ORDER — ENOXAPARIN SODIUM 100 MG/ML
40 INJECTION SUBCUTANEOUS DAILY
Qty: 0 | Refills: 0 | Status: DISCONTINUED | OUTPATIENT
Start: 2018-01-08 | End: 2018-01-08

## 2018-01-08 RX ORDER — SODIUM CHLORIDE 9 MG/ML
1000 INJECTION, SOLUTION INTRAVENOUS
Qty: 0 | Refills: 0 | Status: DISCONTINUED | OUTPATIENT
Start: 2018-01-08 | End: 2018-01-10

## 2018-01-08 RX ADMIN — Medication 100 MILLIGRAM(S): at 20:07

## 2018-01-08 RX ADMIN — Medication 10 MILLIGRAM(S): at 16:03

## 2018-01-08 RX ADMIN — Medication 10 MILLIGRAM(S): at 15:25

## 2018-01-08 NOTE — H&P ADULT - PROBLEM SELECTOR PLAN 6
Likely 2/2 chemotherapy, no liver lesions seen on CT abd/pel from October 2017, AST/ALT normal in Oct 2017 before starting current chemotherapy  - Continue to trend daily  - Avoid hepatotoxic agents

## 2018-01-08 NOTE — ED PROVIDER NOTE - PMH
Cervicalgia    Coronary artery disease    Diabetes mellitus    Elevated TSH    GERD (gastroesophageal reflux disease)    Hypertension    Hypothyroidism    Metastatic renal cell carcinoma to brain

## 2018-01-08 NOTE — ED PROVIDER NOTE - ATTENDING CONTRIBUTION TO CARE
Dr. Patel: I have personally performed a face to face bedside history and physical examination of this patient. I have discussed the history, examination, review of systems, assessment and plan of management with the resident. I have reviewed the electronic medical record and amended it to reflect my history, review of systems, physical exam, assessment and plan.  57M h/o renal cell carcinoma with mets, HTN, DM, sent by PMD for HTN systolic 240s. Pt also c/o lightheadedness and dizziness, no vertigo. No cp or sob. Takes enalapril and took it today. No fevers or chills, no nausea or vomiting, no abdo pain, no dysuria.  On exam pt appears well, nad, rrr, ctab, abdo soft/nt/nd, no pedal edema or calf ttp.  Plan - Aileen, recheck bp, tx if >200, CT head given h/o mets, likely admit

## 2018-01-08 NOTE — H&P ADULT - NSHPLABSRESULTS_GEN_ALL_CORE
12.6   6.75  )-----------( 163      ( 08 Jan 2018 14:47 )             38.8   01-08    142  |  98  |  13  ----------------------------<  130<H>  3.9   |  33<H>  |  1.24    Ca    8.5      08 Jan 2018 14:47    TPro  8.2  /  Alb  4.0  /  TBili  0.7  /  DBili  x   /  AST  50<H>  /  ALT  54<H>  /  AlkPhos  119  01-08    TSH 28.37   T3 63.2  T4 0.77    CARDIAC MARKERS ( 08 Jan 2018 14:47 )  x     / < 0.06 ng/mL / 212 u/L / 2.32 ng/mL / x        VBG: pH 7.45 pCO2 65 pO2 <24 HCO3 30    CT head with no acute changes    CXR negative for consolidations 12.6   6.75  )-----------( 163      ( 08 Jan 2018 14:47 )             38.8   01-08    142  |  98  |  13  ----------------------------<  130<H>  3.9   |  33<H>  |  1.24    Ca    8.5      08 Jan 2018 14:47    TPro  8.2  /  Alb  4.0  /  TBili  0.7  /  DBili  x   /  AST  50<H>  /  ALT  54<H>  /  AlkPhos  119  01-08    TSH 28.37   T3 63.2  T4 0.77    CARDIAC MARKERS ( 08 Jan 2018 14:47 )  x     / < 0.06 ng/mL / 212 u/L / 2.32 ng/mL / x        VBG: pH 7.45 pCO2 65 pO2 <24 HCO3 30    CT head with no acute changes    CXR negative for consolidations    EKG: Normal sinus rhythm, no ST elevations EKG, 1/8/2018, NSR, 86bpm, qtc 476, no acute TW or ST changes, unchanged compared to EKG from 6/3/2016 - my reading     12.6   6.75  )-----------( 163      ( 08 Jan 2018 14:47 )             38.8   01-08    142  |  98  |  13  ----------------------------<  130<H>  3.9   |  33<H>  |  1.24    Ca    8.5      08 Jan 2018 14:47    TPro  8.2  /  Alb  4.0  /  TBili  0.7  /  DBili  x   /  AST  50<H>  /  ALT  54<H>  /  AlkPhos  119  01-08    TSH 28.37   T3 63.2  T4 0.77    CARDIAC MARKERS ( 08 Jan 2018 14:47 )  x     / < 0.06 ng/mL / 212 u/L / 2.32 ng/mL / x        VBG: pH 7.45 pCO2 65 pO2 <24 HCO3 30    CT head with no acute changes    CXR negative for consolidations    EKG: Normal sinus rhythm, no ST elevations EKG, 1/8/2018, NSR, 86bpm, qtc 476, no acute TW or ST changes, unchanged compared to EKG from 6/3/2016 - my reading     CT BRAIN, Jan 8 2018   COMPARISON: MRI brain 1/16/2017.  There is no hydrocephalus, midline shift, mass effect, or acute   hemorrhage. There is lucency within the high left frontal lobe   corresponding to known previously enhancing lesion seen on prior MRI   currently consistent with gliosis.  The ventricles, and sulci are normal in size for the patient's age.  The   basal cisterns are patent.   Moderate mucosal thickening of the bilateral ethmoid sinuses, left   maxillary, and left sphenoid sinuses. There is no displaced calvarial   fracture.   IMPRESSION:   Lucency in the high right frontal region consistent with treated   metastatic disease.  No hydrocephalus, mass effect, shift, or acute   intracranial hemorrhage.             12.6   6.75  )-----------( 163      ( 08 Jan 2018 14:47 )             38.8   01-08    142  |  98  |  13  ----------------------------<  130<H>  3.9   |  33<H>  |  1.24    Ca    8.5      08 Jan 2018 14:47    TPro  8.2  /  Alb  4.0  /  TBili  0.7  /  DBili  x   /  AST  50<H>  /  ALT  54<H>  /  AlkPhos  119  01-08    TSH 28.37   T3 63.2  T4 0.77

## 2018-01-08 NOTE — H&P ADULT - PROBLEM SELECTOR PLAN 4
HgbA1c 9.5% in Oct 2017  - Will cover with 8 units of Lantus for now and resume full home dose of 14 units tomorrow  - ISS and FS qac and qHS  - Holding home repaglinide   - HgbA1c in AM

## 2018-01-08 NOTE — H&P ADULT - PROBLEM SELECTOR PLAN 8
DVT ppx: SCDs- pt with known RCC metastatic to the brain  Diet: DASH, consistent carb  Dispo: Likely home, no needs

## 2018-01-08 NOTE — H&P ADULT - HISTORY OF PRESENT ILLNESS
Pt is a 58yo M with a history of right-sided RCC diagnosed in 2014 s/p chemo (currently on cabzatinib) and RT with metastatic disease to the lung, spine and brain, HTN and Type 2 DM presenting from [facility] for uncontrolled hypertension. As per pt, he has noticed increasing blood pressure at home while on cabzatinib, which he was told may happen. He has been having blurry vision and dizziness for the past several days. Pt usually checks his BP at home and notes that it is usually around 140s/80s. He takes enalapril 20mg qd at home which he has been compliant with. He also endorses having chronic abdominal pain which he takes oxycodone at home for. Pt was [place] [facility] [place] and noted to have a BP of 197/141 and was sent to the ED for further management.    ED vitals: Temp 98.3 HR 92 /138 initially, max 240/150 RR 18 O2 98% on room air. PT was given labetalol 10mg IV x2 and labetalol 100mg PO x1. Labs remarkable for normocytic anemia with Hgb 12.6, creatinine 1.24 at pt's baseline, HCO3 33, AST 50, ALT 54, TSH 28.36, T3 63, T4 0.77, , negative troponin x1. CT head with no acute changes but showing a lucency in the high right frontal region consistent with treated metastatic disease. CXR showed clear lungs with scarring of the left mid to upper lung. Pt is a 56yo Male with a history of right-sided RCC diagnosed in 2014 s/p chemo (currently on cabzatinib) and RT with metastatic disease to the lung, spine and brain, HTN and Type 2 DM presenting from [facility] for uncontrolled hypertension. As per pt, he has noticed increasing blood pressure at home while on cabzatinib, which he was told may happen. He has been having blurry vision and dizziness for the past several days. Pt usually checks his BP at home and notes that it is usually around 140s/80s. He takes enalapril 20mg qd at home which he has been compliant with. He also endorses having chronic abdominal pain which he takes oxycodone at home for. [place] [facility] [place] noted to have a BP of 197/141 and was sent to the ED for further management. The pt says that takes Levothyroxine in the morning 1 hrs before eating every day; the medication was gradually increased from 25 mcg to 100mcg within the period of 1/2016 to 11/2017 by the pt's endocrinologist;     ED vitals: Temp 98.3 HR 92 /138 initially, max 240/150 RR 18 O2 98% on room air. PT was given labetalol 10mg IV x2 and labetalol 100mg PO x1. Labs remarkable for normocytic anemia with Hgb 12.6, creatinine 1.24 at pt's baseline, HCO3 33, AST 50, ALT 54, TSH 28.36, T3 63, T4 0.77, , negative troponin x1. CT head with no acute changes but showing a lucency in the high right frontal region consistent with treated metastatic disease. CXR showed clear lungs with scarring of the left mid to upper lung. Pt is a 58yo Male with a history of right-sided RCC diagnosed in 2014 s/p chemo (currently on cabzatinib) and RT with metastatic disease to the lung, spine and brain, HTN and Type 2 DM presenting from [facility] for uncontrolled hypertension. As per pt, he has noticed increasing blood pressure at home while on cabzatinib, which he was told may happen. He has been having blurry vision and dizziness for the past several days. Pt usually checks his BP at home and notes that it is usually around 140s/80s. He takes enalapril 20mg qd at home which he has been compliant with. He also endorses having chronic abdominal pain which he takes oxycodone at home for. Pt was seen at [facility] today and noted to have a BP of 197/141 and was sent to the ED for further management. The pt says that takes Levothyroxine in the morning 1 hrs before eating every day; the medication was gradually increased from 25 mcg to 100mcg within the period of 1/2016 to 11/2017 by the pt's endocrinologist, per the pt;     ED vitals: Temp 98.3 HR 92 /138 initially, max 240/150 RR 18 O2 98% on room air. PT was given labetalol 10mg IV x2 and labetalol 100mg PO x1. Labs remarkable for normocytic anemia with Hgb 12.6, creatinine 1.24 at pt's baseline, HCO3 33, AST 50, ALT 54, TSH 28.36, T3 63, T4 0.77, , negative troponin x1. CT head with no acute changes but showing a lucency in the high right frontal region consistent with treated metastatic disease. CXR showed clear lungs with scarring of the left mid to upper lung.

## 2018-01-08 NOTE — H&P ADULT - PROBLEM SELECTOR PLAN 2
Pt with initial BP of 208/138 on presentation, increased to 240/150  - Plan as stated above, if pt requires long-acting antihypertensive agent would consider amlodipine, and holding off on home enalapril for now Pt with initial BP of 208/138 on presentation, increased to 240/150  - Plan as stated above, if pt requires long-acting antihypertensive agent would consider amlodipine, and holding off on home enalapril for now  - EKG - no acute changes Pt with initial BP of 208/138 on presentation, increased to 240/150  - Plan as stated above, if pt requires long-acting antihypertensive agent would consider amlodipine, and holding off on home enalapril for now  - EKG - no acute changes  - control BP with Labetalol and low dose of Enalapril (home medication)   - CT head c/w no acute changes  - Telemetry monitoring (will d/c if 2nd set of CE negative)  - CE serial, 1st set negative

## 2018-01-08 NOTE — H&P ADULT - PROBLEM SELECTOR PLAN 3
Oncology consult in AM  - Metastatic disease to bone, brain and lung  - Hold off on DVT ppx for now given brain mets Oncology consult in AM  - Metastatic disease to bone, brain and lung  - Hold off Heparin/Lovenox DVT ppx given RCC brain mets

## 2018-01-08 NOTE — H&P ADULT - PROBLEM SELECTOR PLAN 1
Hypertension likely adverse reaction to cabzatinib- on [employer], elevated high blood pressure can be seen in 39-96% of patients  - Goal of lowering BP by no more than 25% within 24hours  - Would hold off on standing antihypertensives at this point and continue to monitor as current BP in 170s is slightly below goal  - Oncology consult in AM  - Vitals q4h Hypertension likely adverse reaction to Cabzatinib, per [employer] article, elevated high blood pressure can be seen in 39-96% of patients  - Goal of lowering BP  25% within 24hours  - Would hold off on standing antihypertensives at this point and continue to monitor as current BP in 170s is slightly below goal  - Oncology consult in AM  - Vitals q4h Hypertension likely adverse reaction to Cabzatinib, per [employer] article, elevated high blood pressure can be seen in 39-96% of patients  - Goal of lowering BP  25% within 24hours  - Oncology consult in AM  - Vitals q4h

## 2018-01-08 NOTE — ED PROVIDER NOTE - FAMILY HISTORY
Family history of diabetes mellitus in father     Family history of coronary artery disease     Family history of diabetes mellitus in mother     Father  Still living? Unknown  Family history of colon cancer, Age at diagnosis: Age Unknown

## 2018-01-08 NOTE — H&P ADULT - NSHPSOCIALHISTORY_GEN_ALL_CORE
Lives at home with his wife. Retired [employer] worker. Denies history of smoking. Previous alcohol use, drinking 1 cup of vodka on the weekends, quit 5 years ago. Denies illicit drug use. Lives at home with his wife.  Retired cable company worker.  Denies history of smoking.  Previous alcohol use, drinking 1 cup of vodka on the weekends, quit 5 years ago.  Denies illicit drug use.

## 2018-01-08 NOTE — ED ADULT NURSE NOTE - OBJECTIVE STATEMENT
pt A&Ox3 c/o high blood pressure for past week, as per pt he was recently placed on BP medication but its has not been working. as per pt hes been taking his chem medication which is known to make his bp elevate. denies cp, dizziness, headache, report given to primary rn perla

## 2018-01-08 NOTE — ED PROVIDER NOTE - OBJECTIVE STATEMENT
57M hx of RCC w/ mets to lung, liver, bone, brain, HTN, T2DM sent for ED from [facility] for hypertension. Patient states that he was told his blood pressure has been going up slowly since he started his chemotherapy. He was started Cabzatinib about 6 weeks ago which he self-discontinued on Friday because of his blood pressure. He is on Enalpril 20mg daily for HTN, which he took today. He went to [facility] for appointment were his SBP was 190s and was told to come to ED. He denies headache but is endorsing 1.5 weeks of dizziness. Denies chest pain, shortness of breath, fevers.  He endorses chronic nausea while on the chemotherapy.     Onc: Dr. Wren ([facility]) 57M hx of RCC w/ mets to lung, liver, bone, brain, HTN, T2DM sent for ED from [facility] for hypertension. Patient states that he was told his blood pressure has been going up slowly since he started his chemotherapy. He was started Cabzatinib about 6 weeks ago which he self-discontinued on Friday because of his blood pressure. He is on Enalpril 20mg daily for HTN, which he took today. He went to [facility] for appointment were his SBP was 190s and was told to come to ED. He denies headache but is endorsing 1.5 weeks of blurry vision and puffiness around his eyes. Denies chest pain, shortness of breath, fevers.  He endorses chronic nausea while on the chemotherapy.     Onc: Dr. Wren ([facility] [place]) 57M hx of RCC w/ mets to lung/liver/bone/brain, HTN, T2DM, hypothyroidism [place] [facility] [place]. Patient states that he was told his blood pressure has been going up slowly since he started his chemotherapy. He was started Cabzatinib about 6 weeks ago which he self-discontinued on Friday because of his blood pressure. He is on Enalpril 20mg daily for HTN, which he took today. [place] [facility] [place] were his SBP was 190s and was told to come to ED. He denies headache but is endorsing 1.5 weeks of blurry vision and puffiness around his eyes. Denies chest pain, shortness of breath, fevers.  He endorses chronic nausea while on the chemotherapy.     PCP: Dr. Cuevas   Onc: Dr. Wren ([facility] 57M hx of RCC w/ mets to lung/liver/bone/brain, HTN, T2DM, hypothyroidism sent for ED from [facility] for hypertension. Patient states that he was told his blood pressure has been going up slowly since he started his chemotherapy. He was started Cabzatinib about 6 weeks ago which he self-discontinued on Friday because of his blood pressure. He is on Enalpril 20mg daily for HTN, which he took today. He went to [facility] for appointment were his SBP was 190s and was told to come to ED. He denies headache but is endorsing 1.5 weeks of blurry vision and puffiness around his eyes. Denies chest pain, shortness of breath, fevers.  He endorses chronic nausea while on the chemotherapy.     PCP: Dr. Cuevas   Onc: Dr. Bradley Goldberg ([facility])

## 2018-01-08 NOTE — ED ADULT TRIAGE NOTE - CHIEF COMPLAINT QUOTE
p/t c/o of mild neck discomfort and high b/p for few days sent by pmd for eval, p/t denies any chest pain denies sob, no neuro deficits noted

## 2018-01-08 NOTE — ED PROVIDER NOTE - PSH
History of coronary artery stent placement    Status post gamma knife treatment  Left forehead/temple

## 2018-01-08 NOTE — H&P ADULT - PROBLEM SELECTOR PLAN 7
Pt unsure about many medications that he takes at home  - Pt's pharmacy is [facility]- #[phone]- will need to call for further med rec in AM

## 2018-01-08 NOTE — ED PROVIDER NOTE - MEDICAL DECISION MAKING DETAILS
57M hx of RCC w/ mets to lung, liver, bone, brain, HTN, T2DM sent for ED from [facility] for hypertension. BP elevated to 230s-240s/110s. Will get basic labs, Aileen, CXR. Likely admit. 57M hx of RCC w/ mets to lung, liver, bone, brain, HTN, T2DM [place] [facility] [place]. BP elevated to 230s-240s/110s. Will get basic labs, Aileen, CXR. Will give labetalol 10mg IVP for HTN and re-eval. Likely admit.

## 2018-01-08 NOTE — H&P ADULT - NEGATIVE GENERAL GENITOURINARY SYMPTOMS
normal urinary frequency/no dysuria no flank pain L/no dysuria/no flank pain R/normal urinary frequency

## 2018-01-08 NOTE — H&P ADULT - MUSCULOSKELETAL
detailed exam no calf tenderness/no joint swelling/normal strength/no joint warmth/ROM intact/no joint erythema details…

## 2018-01-08 NOTE — H&P ADULT - PROBLEM SELECTOR PLAN 5
TSH elevated to 28, unsure if pt compliant with home dose of levothyroxine  - Will restart home dose of levothyroxine for now TSH elevated to 28, unsure if pt compliant with home dose of levothyroxine  - Will restart home dose of levothyroxine, 100mcg, for now  - Consider Endo c/s

## 2018-01-08 NOTE — ED PROVIDER NOTE - PROGRESS NOTE DETAILS
Resident Frank: TSH noted to be elevated to 28, will add on T3/T4.  Will get CT head given hx of brain mets and HTN. [place]ot: Pt signed out to me. 57M with metastatic RCC, here with dizziness and HTN to 240s.  BP has been treated, CXR shows no acute path, trop neg x 1.  Pending CT head.  If neg, will admit for ACS rule-out.

## 2018-01-08 NOTE — H&P ADULT - FAMILY HISTORY
Mother  Still living? Unknown  Family history of diabetes mellitus in mother, Age at diagnosis: Age Unknown  Family history of colon cancer, Age at diagnosis: Age Unknown     Father  Still living? Unknown  Family history of diabetes mellitus in father, Age at diagnosis: Age Unknown

## 2018-01-08 NOTE — H&P ADULT - ASSESSMENT
56yo M with a history of right-sided RCC diagnosed in 2014 s/p chemo (currently on cabzatinib) and RT with metastatic disease to the lung, spine and brain, HTN and Type 2 DM presenting from [facility] for hypertensive urgency likely 2/2 chemotherapy. 56yo Male with a history of right-sided RCC diagnosed in 2014 s/p chemo (currently on Cabzatinib) and RT with metastatic disease to the lung, spine and brain, HTN and Type 2 DM presenting from [facility] for hypertensive urgency likely side-effect due chemotherapy agent. Found to have uncontrolled hypothyroidism in the setting of reported compliance with Levothyroxine supplementation;

## 2018-01-09 DIAGNOSIS — Z29.9 ENCOUNTER FOR PROPHYLACTIC MEASURES, UNSPECIFIED: ICD-10-CM

## 2018-01-09 DIAGNOSIS — R74.0 NONSPECIFIC ELEVATION OF LEVELS OF TRANSAMINASE AND LACTIC ACID DEHYDROGENASE [LDH]: ICD-10-CM

## 2018-01-09 DIAGNOSIS — T45.1X5A ADVERSE EFFECT OF ANTINEOPLASTIC AND IMMUNOSUPPRESSIVE DRUGS, INITIAL ENCOUNTER: ICD-10-CM

## 2018-01-09 DIAGNOSIS — E11.42 TYPE 2 DIABETES MELLITUS WITH DIABETIC POLYNEUROPATHY: ICD-10-CM

## 2018-01-09 DIAGNOSIS — E03.9 HYPOTHYROIDISM, UNSPECIFIED: ICD-10-CM

## 2018-01-09 DIAGNOSIS — I16.0 HYPERTENSIVE URGENCY: ICD-10-CM

## 2018-01-09 DIAGNOSIS — Z79.899 OTHER LONG TERM (CURRENT) DRUG THERAPY: ICD-10-CM

## 2018-01-09 DIAGNOSIS — C64.1 MALIGNANT NEOPLASM OF RIGHT KIDNEY, EXCEPT RENAL PELVIS: ICD-10-CM

## 2018-01-09 LAB
ALBUMIN SERPL ELPH-MCNC: 3.5 G/DL — SIGNIFICANT CHANGE UP (ref 3.3–5)
ALP SERPL-CCNC: 98 U/L — SIGNIFICANT CHANGE UP (ref 40–120)
ALT FLD-CCNC: 43 U/L — HIGH (ref 4–41)
AST SERPL-CCNC: 41 U/L — HIGH (ref 4–40)
BILIRUB SERPL-MCNC: 0.7 MG/DL — SIGNIFICANT CHANGE UP (ref 0.2–1.2)
BUN SERPL-MCNC: 17 MG/DL — SIGNIFICANT CHANGE UP (ref 7–23)
CALCIUM SERPL-MCNC: 8.2 MG/DL — LOW (ref 8.4–10.5)
CHLORIDE SERPL-SCNC: 98 MMOL/L — SIGNIFICANT CHANGE UP (ref 98–107)
CK MB BLD-MCNC: 1 — SIGNIFICANT CHANGE UP (ref 0–2.5)
CK MB BLD-MCNC: 1.74 NG/ML — SIGNIFICANT CHANGE UP (ref 1–6.6)
CK SERPL-CCNC: 174 U/L — SIGNIFICANT CHANGE UP (ref 30–200)
CO2 SERPL-SCNC: 28 MMOL/L — SIGNIFICANT CHANGE UP (ref 22–31)
CREAT SERPL-MCNC: 1.15 MG/DL — SIGNIFICANT CHANGE UP (ref 0.5–1.3)
GLUCOSE SERPL-MCNC: 129 MG/DL — HIGH (ref 70–99)
HBA1C BLD-MCNC: 7.1 % — HIGH (ref 4–5.6)
HCT VFR BLD CALC: 33.3 % — LOW (ref 39–50)
HGB BLD-MCNC: 10.4 G/DL — LOW (ref 13–17)
MAGNESIUM SERPL-MCNC: 1.4 MG/DL — LOW (ref 1.6–2.6)
MCHC RBC-ENTMCNC: 25.8 PG — LOW (ref 27–34)
MCHC RBC-ENTMCNC: 31.2 % — LOW (ref 32–36)
MCV RBC AUTO: 82.6 FL — SIGNIFICANT CHANGE UP (ref 80–100)
NRBC # FLD: 0 — SIGNIFICANT CHANGE UP
PHOSPHATE SERPL-MCNC: 2.8 MG/DL — SIGNIFICANT CHANGE UP (ref 2.5–4.5)
PLATELET # BLD AUTO: 136 K/UL — LOW (ref 150–400)
PMV BLD: 8.5 FL — SIGNIFICANT CHANGE UP (ref 7–13)
POTASSIUM SERPL-MCNC: 3.7 MMOL/L — SIGNIFICANT CHANGE UP (ref 3.5–5.3)
POTASSIUM SERPL-SCNC: 3.7 MMOL/L — SIGNIFICANT CHANGE UP (ref 3.5–5.3)
PROT SERPL-MCNC: 7.5 G/DL — SIGNIFICANT CHANGE UP (ref 6–8.3)
RBC # BLD: 4.03 M/UL — LOW (ref 4.2–5.8)
RBC # FLD: 21.2 % — HIGH (ref 10.3–14.5)
SODIUM SERPL-SCNC: 138 MMOL/L — SIGNIFICANT CHANGE UP (ref 135–145)
TROPONIN T SERPL-MCNC: < 0.06 NG/ML — SIGNIFICANT CHANGE UP (ref 0–0.06)
WBC # BLD: 4.84 K/UL — SIGNIFICANT CHANGE UP (ref 3.8–10.5)
WBC # FLD AUTO: 4.84 K/UL — SIGNIFICANT CHANGE UP (ref 3.8–10.5)

## 2018-01-09 PROCEDURE — 99233 SBSQ HOSP IP/OBS HIGH 50: CPT

## 2018-01-09 PROCEDURE — 99233 SBSQ HOSP IP/OBS HIGH 50: CPT | Mod: GC

## 2018-01-09 RX ORDER — LEVOTHYROXINE SODIUM 125 MCG
100 TABLET ORAL DAILY
Qty: 0 | Refills: 0 | Status: DISCONTINUED | OUTPATIENT
Start: 2018-01-09 | End: 2018-01-10

## 2018-01-09 RX ORDER — LABETALOL HCL 100 MG
100 TABLET ORAL ONCE
Qty: 0 | Refills: 0 | Status: COMPLETED | OUTPATIENT
Start: 2018-01-09 | End: 2018-01-09

## 2018-01-09 RX ORDER — ACETAMINOPHEN 500 MG
650 TABLET ORAL EVERY 6 HOURS
Qty: 0 | Refills: 0 | Status: DISCONTINUED | OUTPATIENT
Start: 2018-01-09 | End: 2018-01-10

## 2018-01-09 RX ORDER — LABETALOL HCL 100 MG
200 TABLET ORAL ONCE
Qty: 0 | Refills: 0 | Status: COMPLETED | OUTPATIENT
Start: 2018-01-09 | End: 2018-01-09

## 2018-01-09 RX ORDER — INSULIN GLARGINE 100 [IU]/ML
8 INJECTION, SOLUTION SUBCUTANEOUS ONCE
Qty: 0 | Refills: 0 | Status: COMPLETED | OUTPATIENT
Start: 2018-01-09 | End: 2018-01-09

## 2018-01-09 RX ORDER — MAGNESIUM SULFATE 500 MG/ML
2 VIAL (ML) INJECTION ONCE
Qty: 0 | Refills: 0 | Status: COMPLETED | OUTPATIENT
Start: 2018-01-09 | End: 2018-01-09

## 2018-01-09 RX ORDER — PANTOPRAZOLE SODIUM 20 MG/1
40 TABLET, DELAYED RELEASE ORAL
Qty: 0 | Refills: 0 | Status: DISCONTINUED | OUTPATIENT
Start: 2018-01-09 | End: 2018-01-10

## 2018-01-09 RX ORDER — INFLUENZA VIRUS VACCINE 15; 15; 15; 15 UG/.5ML; UG/.5ML; UG/.5ML; UG/.5ML
0.5 SUSPENSION INTRAMUSCULAR ONCE
Qty: 0 | Refills: 0 | Status: DISCONTINUED | OUTPATIENT
Start: 2018-01-09 | End: 2018-01-10

## 2018-01-09 RX ADMIN — LORATADINE 10 MILLIGRAM(S): 10 TABLET ORAL at 13:23

## 2018-01-09 RX ADMIN — Medication 100 MILLIGRAM(S): at 02:06

## 2018-01-09 RX ADMIN — INSULIN GLARGINE 14 UNIT(S): 100 INJECTION, SOLUTION SUBCUTANEOUS at 22:05

## 2018-01-09 RX ADMIN — Medication 1: at 13:22

## 2018-01-09 RX ADMIN — Medication 20 MILLIGRAM(S): at 22:00

## 2018-01-09 RX ADMIN — Medication 100 MICROGRAM(S): at 06:04

## 2018-01-09 RX ADMIN — Medication 100 MILLIGRAM(S): at 13:23

## 2018-01-09 RX ADMIN — Medication 200 MILLIGRAM(S): at 03:22

## 2018-01-09 RX ADMIN — Medication 650 MILLIGRAM(S): at 06:14

## 2018-01-09 RX ADMIN — ATORVASTATIN CALCIUM 20 MILLIGRAM(S): 80 TABLET, FILM COATED ORAL at 22:00

## 2018-01-09 RX ADMIN — SENNA PLUS 2 TABLET(S): 8.6 TABLET ORAL at 22:00

## 2018-01-09 RX ADMIN — PANTOPRAZOLE SODIUM 40 MILLIGRAM(S): 20 TABLET, DELAYED RELEASE ORAL at 06:04

## 2018-01-09 RX ADMIN — Medication 650 MILLIGRAM(S): at 07:00

## 2018-01-09 RX ADMIN — Medication 50 GRAM(S): at 08:52

## 2018-01-09 RX ADMIN — INSULIN GLARGINE 8 UNIT(S): 100 INJECTION, SOLUTION SUBCUTANEOUS at 02:03

## 2018-01-09 NOTE — CONSULT NOTE ADULT - PROBLEM SELECTOR RECOMMENDATION 2
- would restart patient's home enalapril and uptitrate his PO BP meds for better control    Steve Cruz, PGY4  Hematology/Oncology Fellow  Pager: [phone]

## 2018-01-09 NOTE — CONSULT NOTE ADULT - ATTENDING COMMENTS
Mr. Jack was seen in the emergency room in consultation today. He has been on treatment with cabozantinib since sometime in late October. There was some instances where medication was held due to elevated blood pressure. He contacted the office on Friday, and his blood pressure was elevated. He was told to stop the medication. He came into the office on Monday, and his blood pressure was 140+ diastolic. He was referred to the emergency room for evaluation he received labetalol intravenously. He has good control of his blood pressure at this time. He has been off of medication for 3 or 4 days.    He does have a home blood pressure monitor, and has been reasonably well-controlled. He is on enalapril. His medication list also says that he is on amlodipine and hydrochlorothiazide, but it does not appear that he has been taking these medications. We discussed the adverse events that may occur with elevated blood pressures and tyrosine kinase inhibitors. The danger of posterior reversible leukoencephalopathy is a concern with uncontrolled blood pressure.    Given that his blood pressure is now much better controlled, we feel that is safe for him to go home. He don't think he should start the medication as yet. He should contact Dr. Goldberg in regards to directions for blood pressure medications. Once his blood pressure is well stabilized, he can then likely resume treatment with cabozantinib. All questions were answered to the best of my ability and to their apparent satisfaction. I agree with the above assessment and plan as outlined by Dr. Cruz.

## 2018-01-09 NOTE — CONSULT NOTE ADULT - PROBLEM SELECTOR RECOMMENDATION 9
- hold capozantinib for now   - patient to f/u with Dr. Bradley Goldberg as an outpatient regarding restarting the medication with better blood pressure control

## 2018-01-09 NOTE — PROGRESS NOTE ADULT - PROBLEM SELECTOR PLAN 1
Pt sent from [facility] [place] for hypertension attributed to chemotherapy side effect. Studies have shown that elevated BP can occur as an adverse effect in 39-96% of patients.   -Monitor vital signs Q4  -Oncology consulted, recommendations appreciated Pt sent from [facility] for hypertension suspected 2/2 chemotherapy side effect. Studies have shown that elevated BP can occur as an adverse effect in 39-96% of patients.   -Monitor vital signs Q4  -Oncology consulted, recommendations appreciated. Plan is to hold capozantinib for now an patient to f/u with Dr. Bradley Goldberg as an outpatient regarding restarting the medication with better blood pressure control. Pt sent from [facility] [place] for hypertension suspected 2/2 chemotherapy side effect. Studies have shown that elevated BP can occur as an adverse effect in 39-96% of patients.   -Monitor vital signs Q4  -Oncology consulted, recommendations appreciated. Plan is to hold capozantinib for now an patient to f/u with Dr. Newsome as an outpatient regarding restarting the medication with better blood pressure control

## 2018-01-09 NOTE — CONSULT NOTE ADULT - SUBJECTIVE AND OBJECTIVE BOX
HPI: 57M PMH metastatic RCC to the lungs, mediastinum and bones who presents with with severely elevated BP associated with headache and blurred vision. HE received labetalol IV and PO with improvement in his BP. He is on cabozantinib and measures his blood pressure at home as hypertension is a known adverse effect. Patient takes enalapril 20mg for his BP. He reports his blood pressure has been going up for the past week. HE has been on this medication since October. Patient denies any chest pain, sob, headache, nausea, vomiting. Patient stopped taking his cabozantinib on Saturday (1/6). Patient's CT head was without intracranial hemorrhage, he was ruled out for ACS and his renal function is stable.    Patient was previously treated with pazopanib, sunitinib and nivolumab with progression of disease.      Allergies    No Known Allergies    Intolerances        MEDICATIONS  (STANDING):  atorvastatin 20 milliGRAM(s) Oral at bedtime  dextrose 5%. 1000 milliLiter(s) (50 mL/Hr) IV Continuous <Continuous>  dextrose 50% Injectable 12.5 Gram(s) IV Push once  dextrose 50% Injectable 25 Gram(s) IV Push once  dextrose 50% Injectable 25 Gram(s) IV Push once  docusate sodium 100 milliGRAM(s) Oral daily  insulin glargine Injectable (LANTUS) 14 Unit(s) SubCutaneous at bedtime  insulin lispro (HumaLOG) corrective regimen sliding scale   SubCutaneous three times a day before meals  insulin lispro (HumaLOG) corrective regimen sliding scale   SubCutaneous at bedtime  levothyroxine 100 MICROGram(s) Oral daily  loratadine 10 milliGRAM(s) Oral daily  pantoprazole    Tablet 40 milliGRAM(s) Oral before breakfast  senna 2 Tablet(s) Oral at bedtime    MEDICATIONS  (PRN):  acetaminophen   Tablet. 650 milliGRAM(s) Oral every 6 hours PRN Mild to moderate pain  dextrose Gel 1 Dose(s) Oral once PRN Blood Glucose LESS THAN 70 milliGRAM(s)/deciliter  glucagon  Injectable 1 milliGRAM(s) IntraMuscular once PRN Glucose LESS THAN 70 milligrams/deciliter  oxyCODONE    IR 10 milliGRAM(s) Oral every 6 hours PRN Moderate to severe pain      PAST MEDICAL & SURGICAL HISTORY:  Hypothyroidism  GERD (gastroesophageal reflux disease)  Elevated TSH  Cervicalgia  Metastatic renal cell carcinoma to brain  Coronary artery disease  Hypertension  Diabetes mellitus  History of coronary artery stent placement  Status post gamma knife treatment: Left forehead/temple      FAMILY HISTORY:  Family history of colon cancer (Mother)  Family history of diabetes mellitus in mother (Mother)  Family history of diabetes mellitus in father (Father)      SOCIAL HISTORY: No EtOH, no tobacco    REVIEW OF SYSTEMS:    CONSTITUTIONAL: No weakness, fevers or chills  EYES/ENT: No vertigo or throat pain, +blurred vision  NECK: No pain or stiffness  RESPIRATORY: No cough, wheezing, hemoptysis; No shortness of breath  CARDIOVASCULAR: No chest pain or palpitations  GASTROINTESTINAL: chronic RUQ discomfort related to renal mass  GENITOURINARY: No dysuria, frequency or hematuria  NEUROLOGICAL: No numbness or weakness  SKIN: No itching, burning, rashes, or lesions   All other review of systems is negative unless indicated above.        T(F): 97.9 (01-09-18 @ 17:36), Max: 98.9 (01-08-18 @ 18:03)  HR: 56 (01-09-18 @ 17:36)  BP: 187/115 (01-09-18 @ 17:36)  RR: 16 (01-09-18 @ 17:36)  SpO2: 98% (01-09-18 @ 17:36)  Wt(kg): --    GENERAL: NAD, well-developed  HEAD:  Atraumatic, Normocephalic  EYES: EOMI, PERRLA, conjunctiva and sclera clear  NECK: Supple, No JVD  CHEST/LUNG: Clear to auscultation bilaterally; No wheeze  HEART: Regular rate and rhythm; No murmurs, rubs, or gallops  ABDOMEN: Soft, protuberant, RUQ tenderness to deep palpation  EXTREMITIES:  2+ Peripheral Pulses, No clubbing, cyanosis, or edema  NEUROLOGY: non-focal  SKIN: No rashes or lesions                          10.4   4.84  )-----------( 136      ( 09 Jan 2018 05:30 )             33.3       01-09    138  |  98  |  17  ----------------------------<  129<H>  3.7   |  28  |  1.15    Ca    8.2<L>      09 Jan 2018 05:30  Phos  2.8     01-09  Mg     1.4     01-09    TPro  7.5  /  Alb  3.5  /  TBili  0.7  /  DBili  x   /  AST  41<H>  /  ALT  43<H>  /  AlkPhos  98  01-09      Phosphorus Level, Serum: 2.8 mg/dL (01-09 @ 05:30)  Magnesium, Serum: 1.4 mg/dL (01-09 @ 05:30)

## 2018-01-09 NOTE — PROGRESS NOTE ADULT - SUBJECTIVE AND OBJECTIVE BOX
CONTACT INFO:  Izzy Babin MD  Internal Medicine PGY1  Pager:  [phone] / [phone]    ROBIN PETTIT  Patient is a 57y old  Male who presents with a chief complaint of High blood pressure (08 Jan 2018 23:15)    INTERVAL HPI / OVERNIGHT EVENTS:      OBJECTIVE:  Vitals Signs (24 Hrs):  T(C): 37.1 (01-09-18 @ 05:20), Max: 37.2 (01-08-18 @ 18:03)  HR: 60 (01-09-18 @ 05:20) (60 - 98)  BP: 159/87 (01-09-18 @ 05:20) (159/87 - 240/150)  RR: 18 (01-09-18 @ 05:20) (14 - 18)  SpO2: 98% (01-09-18 @ 05:20) (95% - 100%)    PHYSICAL EXAM:  General: Comfortable, no apparent distress  HEENT: Atraumatic; EOMI, PERRLA, conjunctiva and sclera clear; no tonsillar erythema/exudates/enlargement  Neck: Supple; no JVD; thyroid normal without masses or enlargement  Chest/Lungs: Clear to auscultation B/L; no rales, rhonchi or wheezing  Heart: Regular rate and rhythm; normal S1/S2; no murmurs, rubs, or gallops  Abdomen: Soft, nontender, nondistended; bowel sounds present  Extremities: PT/DP pulses 2+ B/L; no edema  Skin: No rashes or lesions  Neurological: Alert and oriented to person/place/time    LABS:                        10.4   4.84  )-----------( 136      ( 09 Jan 2018 05:30 )             33.3     01-09    138  |  98  |  17  ----------------------------<  129<H>  3.7   |  28  |  1.15    Ca       8.2<L>      09 Jan 2018 05:30  Phos   2.8     01-09  Mg     1.4     01-09    TPro  7.5  /  Alb  3.5  /  TBili  0.7  /  DBili  x   /  AST  41<H>  /  ALT  43<H>  /  AlkPhos  98  01-09    POCT Blood Glucose.: 122 mg/dL (09 Jan 2018 09:05)  POCT Blood Glucose.: 147 mg/dL (09 Jan 2018 01:28)    RADIOLOGY & ADDITIONAL TESTS:    MEDICATIONS:  acetaminophen   Tablet. 650 milliGRAM(s) Oral every 6 hours PRN Mild to moderate pain  atorvastatin 20 milliGRAM(s) Oral at bedtime  dextrose 5%. 1000 milliLiter(s) (50 mL/Hr) IV Continuous <Continuous>  dextrose 50% Injectable 12.5 Gram(s) IV Push once  dextrose 50% Injectable 25 Gram(s) IV Push once  dextrose 50% Injectable 25 Gram(s) IV Push once  dextrose Gel 1 Dose(s) Oral once PRN Blood Glucose LESS THAN 70 milliGRAM(s)/deciliter  docusate sodium 100 milliGRAM(s) Oral daily  glucagon  Injectable 1 milliGRAM(s) IntraMuscular once PRN Glucose LESS THAN 70 milligrams/deciliter  insulin glargine Injectable (LANTUS) 14 Unit(s) SubCutaneous at bedtime  insulin lispro (HumaLOG) corrective regimen sliding scale   SubCutaneous three times a day before meals  insulin lispro (HumaLOG) corrective regimen sliding scale   SubCutaneous at bedtime  levothyroxine 100 MICROGram(s) Oral daily  loratadine 10 milliGRAM(s) Oral daily  oxyCODONE    IR 10 milliGRAM(s) Oral every 6 hours PRN Moderate to severe pain  pantoprazole    Tablet 40 milliGRAM(s) Oral before breakfast  senna 2 Tablet(s) Oral at bedtime    ALLERGIES:  No Known Allergies    Labs and imaging personally reviewed and interpreted [ X ]  Consult notes reviewed   Case discussed with consultants CONTACT INFO:  Izzy Babin MD  Internal Medicine PGY1  Pager:  [phone] / [phone]    ROBIN PETTIT  Patient is a 57y old  Male who presents with a chief complaint of High blood pressure (08 Jan 2018 23:15)    INTERVAL HPI / OVERNIGHT EVENTS:  57 y.o. man with metastatic RCC on daily chemotherapy with cabzatinib for the past 2 months, HTN, and T2DM presenting from [facility] for elevated BP in 190s/140s. He c/o blurred vision and dizziness but was otherwise asymptomatic.     OBJECTIVE:  Vitals Signs (24 Hrs):  T(C): 37.1 (01-09-18 @ 05:20), Max: 37.2 (01-08-18 @ 18:03)  HR: 60 (01-09-18 @ 05:20) (60 - 98)  BP: 159/87 (01-09-18 @ 05:20) (159/87 - 240/150)  RR: 18 (01-09-18 @ 05:20) (14 - 18)  SpO2: 98% (01-09-18 @ 05:20) (95% - 100%)    PHYSICAL EXAM:  General: Comfortable, no apparent distress  HEENT: Head atraumatic; EOMI, PERRLA, conjunctiva and sclera clear; no tonsillar erythema/exudates/enlargement  Neck: Supple; no JVD  Chest/Lungs: Clear to auscultation B/L; no rales, rhonchi or wheezing  Heart: Regular rate and rhythm; normal S1/S2; no murmurs or extra heart sounds  Abdomen: Soft, nontender, nondistended; bowel sounds present  Extremities: PT/DP pulses 2+ B/L; no edema  Skin: No rashes or lesions  Neurological: Alert and oriented to person/place/time    LABS:                        10.4   4.84  )-----------( 136      ( 09 Jan 2018 05:30 )             33.3     01-09    138  |  98  |  17  ----------------------------<  129<H>  3.7   |  28  |  1.15    Ca       8.2<L>      09 Jan 2018 05:30  Phos   2.8     01-09  Mg     1.4     01-09    TPro  7.5  /  Alb  3.5  /  TBili  0.7  /  DBili  x   /  AST  41<H>  /  ALT  43<H>  /  AlkPhos  98  01-09    POCT Blood Glucose.: 122 mg/dL (09 Jan 2018 09:05)  POCT Blood Glucose.: 147 mg/dL (09 Jan 2018 01:28)    RADIOLOGY & ADDITIONAL TESTS:  CT Head No Cont (01.08.18 @ 18:55)  IMPRESSION:   Lucency in the high right frontal region consistent with treated   metastatic disease.  No hydrocephalus, mass effect, shift,or acute   intracranial hemorrhage.     Xray Chest 2 Views PA/Lat (01.08.18 @ 15:39)  IMPRESSION:  Low lung volumes.   Transversely oriented thin linear scarring again noted in left mid to   upper lung. Clear remaining visualized lungs. No pleural effusions or pneumothorax.  Stable cardiac and mediastinal silhouettes.  Trachea midline.  Old healed posterior lateral left 6th rib fracture deformity again noted.   Lytic osseous metastases apparent in a thoracic vertebra on the CT images   not as well demonstrated radiographically.    MEDICATIONS:  acetaminophen   Tablet. 650 milliGRAM(s) Oral every 6 hours PRN Mild to moderate pain  atorvastatin 20 milliGRAM(s) Oral at bedtime  dextrose 5%. 1000 milliLiter(s) (50 mL/Hr) IV Continuous <Continuous>  dextrose 50% Injectable 12.5 Gram(s) IV Push once  dextrose 50% Injectable 25 Gram(s) IV Push once  dextrose 50% Injectable 25 Gram(s) IV Push once  dextrose Gel 1 Dose(s) Oral once PRN Blood Glucose LESS THAN 70 milliGRAM(s)/deciliter  docusate sodium 100 milliGRAM(s) Oral daily  glucagon  Injectable 1 milliGRAM(s) IntraMuscular once PRN Glucose LESS THAN 70 milligrams/deciliter  insulin glargine Injectable (LANTUS) 14 Unit(s) SubCutaneous at bedtime  insulin lispro (HumaLOG) corrective regimen sliding scale   SubCutaneous three times a day before meals  insulin lispro (HumaLOG) corrective regimen sliding scale   SubCutaneous at bedtime  levothyroxine 100 MICROGram(s) Oral daily  loratadine 10 milliGRAM(s) Oral daily  oxyCODONE    IR 10 milliGRAM(s) Oral every 6 hours PRN Moderate to severe pain  pantoprazole    Tablet 40 milliGRAM(s) Oral before breakfast  senna 2 Tablet(s) Oral at bedtime    ALLERGIES:  No Known Allergies    Labs and imaging personally reviewed and interpreted [ X ]  Consult notes reviewed   Case discussed with consultants CONTACT INFO:  Izzy Babin MD  Internal Medicine PGY1  Pager:  [phone] / [phone]    ROBIN PETTIT  Patient is a 57y old  Male who presents with a chief complaint of High blood pressure (08 Jan 2018 23:15)    INTERVAL HPI / OVERNIGHT EVENTS:  57 y.o. man with metastatic RCC on daily chemotherapy with cabzatinib for the past 2 months, HTN, and T2DM presenting from [facility] for elevated BP in 190s/140s. He c/o blurred vision and dizziness but was otherwise asymptomatic.     OBJECTIVE:  Vitals Signs (24 Hrs):  T(C): 37.1 (01-09-18 @ 05:20), Max: 37.2 (01-08-18 @ 18:03)  HR: 60 (01-09-18 @ 05:20) (60 - 98)  BP: 159/87 (01-09-18 @ 05:20) (159/87 - 240/150)  RR: 18 (01-09-18 @ 05:20) (14 - 18)  SpO2: 98% (01-09-18 @ 05:20) (95% - 100%)    PHYSICAL EXAM:  General: Comfortable, no apparent distress  HEENT: Head atraumatic; EOMI, PERRLA, conjunctiva and sclera clear; no tonsillar erythema/exudates/enlargement  Neck: Supple; no JVD  Chest/Lungs: Clear to auscultation B/L; no rales, rhonchi or wheezing  Heart: Regular rate and rhythm; normal S1/S2; no murmurs or extra heart sounds  Abdomen: Soft, nontender, nondistended; bowel sounds present  Extremities: PT/DP pulses 2+ B/L; no edema  Skin: No rashes or lesions  Neurological: Alert and oriented to person/place/time    LABS:                        10.4   4.84  )-----------( 136      ( 09 Jan 2018 05:30 )             33.3     01-09    138  |  98  |  17  ----------------------------<  129<H>  3.7   |  28  |  1.15    Ca       8.2<L>      09 Jan 2018 05:30  Phos   2.8     01-09  Mg     1.4     01-09    TPro  7.5  /  Alb  3.5  /  TBili  0.7  /  DBili  x   /  AST  41<H>  /  ALT  43<H>  /  AlkPhos  98  01-09    POCT Blood Glucose.: 122 mg/dL (09 Jan 2018 09:05)  POCT Blood Glucose.: 147 mg/dL (09 Jan 2018 01:28)    RADIOLOGY & ADDITIONAL TESTS Personally reviewed :  CT Head No Cont (01.08.18 @ 18:55)  IMPRESSION:   Lucency in the high right frontal region consistent with treated   metastatic disease.  No hydrocephalus, mass effect, shift,or acute   intracranial hemorrhage.     Xray Chest 2 Views PA/Lat (01.08.18 @ 15:39)  IMPRESSION:  Low lung volumes.   Transversely oriented thin linear scarring again noted in left mid to   upper lung. Clear remaining visualized lungs. No pleural effusions or pneumothorax.  Stable cardiac and mediastinal silhouettes.  Trachea midline.  Old healed posterior lateral left 6th rib fracture deformity again noted.   Lytic osseous metastases apparent in a thoracic vertebra on the CT images   not as well demonstrated radiographically.    MEDICATIONS:  acetaminophen   Tablet. 650 milliGRAM(s) Oral every 6 hours PRN Mild to moderate pain  atorvastatin 20 milliGRAM(s) Oral at bedtime  dextrose 5%. 1000 milliLiter(s) (50 mL/Hr) IV Continuous <Continuous>  dextrose 50% Injectable 12.5 Gram(s) IV Push once  dextrose 50% Injectable 25 Gram(s) IV Push once  dextrose 50% Injectable 25 Gram(s) IV Push once  dextrose Gel 1 Dose(s) Oral once PRN Blood Glucose LESS THAN 70 milliGRAM(s)/deciliter  docusate sodium 100 milliGRAM(s) Oral daily  glucagon  Injectable 1 milliGRAM(s) IntraMuscular once PRN Glucose LESS THAN 70 milligrams/deciliter  insulin glargine Injectable (LANTUS) 14 Unit(s) SubCutaneous at bedtime  insulin lispro (HumaLOG) corrective regimen sliding scale   SubCutaneous three times a day before meals  insulin lispro (HumaLOG) corrective regimen sliding scale   SubCutaneous at bedtime  levothyroxine 100 MICROGram(s) Oral daily  loratadine 10 milliGRAM(s) Oral daily  oxyCODONE    IR 10 milliGRAM(s) Oral every 6 hours PRN Moderate to severe pain  pantoprazole    Tablet 40 milliGRAM(s) Oral before breakfast  senna 2 Tablet(s) Oral at bedtime    ALLERGIES:  No Known Allergies    Labs and imaging personally reviewed and interpreted [ X ]  Consult notes reviewed   Case discussed with consultants CONTACT INFO:  Izzy Babin MD  Internal Medicine PGY1  Pager:  [phone] / [phone]    ROBIN PETTIT  Patient is a 57y old  Male who presents with a chief complaint of High blood pressure (08 Jan 2018 23:15)    INTERVAL HPI / OVERNIGHT EVENTS:  57 y.o. man with metastatic RCC on daily chemotherapy with cabzatinib for the past 2 months, HTN, and T2DM presenting from [facility] for elevated BP in 190s/140s. He c/o blurred vision and dizziness but was otherwise asymptomatic.     OBJECTIVE:  Vitals Signs (24 Hrs):  T(C): 37.1 (01-09-18 @ 05:20), Max: 37.2 (01-08-18 @ 18:03)  HR: 60 (01-09-18 @ 05:20) (60 - 98)  BP: 159/87 (01-09-18 @ 05:20) (159/87 - 240/150)  RR: 18 (01-09-18 @ 05:20) (14 - 18)  SpO2: 98% (01-09-18 @ 05:20) (95% - 100%)    PHYSICAL EXAM:  General: Comfortable, no apparent distress  HEENT: Head atraumatic; EOMI, PERRLA, conjunctiva and sclera clear; no tonsillar erythema/exudates/enlargement  Neck: Supple; no JVD  Chest/Lungs: Clear to auscultation B/L; no rales, rhonchi or wheezing  Heart: Regular rate and rhythm; normal S1/S2; no murmurs or extra heart sounds  Abdomen: Soft, nontender, nondistended; bowel sounds present  Extremities: PT/DP pulses 2+ B/L; no edema  Skin: No rashes or lesions  Neurological: Alert and oriented to person/place/time    LABS:                        10.4   4.84  )-----------( 136      ( 09 Jan 2018 05:30 )             33.3     01-09    138  |  98  |  17  ----------------------------<  129<H>  3.7   |  28  |  1.15    Ca       8.2<L>      09 Jan 2018 05:30  Phos   2.8     01-09  Mg     1.4     01-09    TPro  7.5  /  Alb  3.5  /  TBili  0.7  /  DBili  x   /  AST  41<H>  /  ALT  43<H>  /  AlkPhos  98  01-09    POCT Blood Glucose.: 122 mg/dL (09 Jan 2018 09:05)  POCT Blood Glucose.: 147 mg/dL (09 Jan 2018 01:28)    RADIOLOGY & ADDITIONAL TESTS Personally reviewed :  CT Head No Cont (01.08.18 @ 18:55)  IMPRESSION:   Lucency in the high right frontal region consistent with treated   metastatic disease.  No hydrocephalus, mass effect, shift,or acute   intracranial hemorrhage.     Xray Chest 2 Views PA/Lat (01.08.18 @ 15:39)  IMPRESSION:  Low lung volumes.   Transversely oriented thin linear scarring again noted in left mid to   upper lung. Clear remaining visualized lungs. No pleural effusions or pneumothorax.  Stable cardiac and mediastinal silhouettes.  Trachea midline.  Old healed posterior lateral left 6th rib fracture deformity again noted.   Lytic osseous metastases apparent in a thoracic vertebra on the CT images   not as well demonstrated radiographically.    MEDICATIONS:  acetaminophen   Tablet. 650 milliGRAM(s) Oral every 6 hours PRN Mild to moderate pain  atorvastatin 20 milliGRAM(s) Oral at bedtime  dextrose 5%. 1000 milliLiter(s) (50 mL/Hr) IV Continuous <Continuous>  dextrose 50% Injectable 12.5 Gram(s) IV Push once  dextrose 50% Injectable 25 Gram(s) IV Push once  dextrose 50% Injectable 25 Gram(s) IV Push once  dextrose Gel 1 Dose(s) Oral once PRN Blood Glucose LESS THAN 70 milliGRAM(s)/deciliter  docusate sodium 100 milliGRAM(s) Oral daily  glucagon  Injectable 1 milliGRAM(s) IntraMuscular once PRN Glucose LESS THAN 70 milligrams/deciliter  insulin glargine Injectable (LANTUS) 14 Unit(s) SubCutaneous at bedtime  insulin lispro (HumaLOG) corrective regimen sliding scale   SubCutaneous three times a day before meals  insulin lispro (HumaLOG) corrective regimen sliding scale   SubCutaneous at bedtime  levothyroxine 100 MICROGram(s) Oral daily  loratadine 10 milliGRAM(s) Oral daily  oxyCODONE    IR 10 milliGRAM(s) Oral every 6 hours PRN Moderate to severe pain  pantoprazole    Tablet 40 milliGRAM(s) Oral before breakfast  senna 2 Tablet(s) Oral at bedtime    ALLERGIES:  No Known Allergies    Labs and imaging personally reviewed and interpreted [ X ]  Consult notes reviewed   Case discussed with consultants

## 2018-01-09 NOTE — PROGRESS NOTE ADULT - PROBLEM SELECTOR PLAN 2
Pt with initial BP of 208/138 on presentation, increased to 240/150. Given labetalol 10mg IVP x2, 100mg PO x2 with improvement of BP to 159/87. His headache and dizziness resolved with reduction of BP. Etiology likely chemotherapy side effect as pt otherwise compliant with medications, does not endorse high salt intake and denies any other changes in medications or symptoms to suggest another etiology. CT head negative for acute hemorrhage. CXR without evidence of pulmonary edema. ECG without ischemic changes. Cardiac enzymes negative x2.   -Will hold off on further antihypertensives today as pt has already reached goal BP and would not want to lower it further in a 24-hr period  -Resume enalapril 20mg (home dose) tomorrow  -Currently on telemetry -- can discontinue as cardiac enzymes negative and ECG wnl  -Monitor BP Pt with initial BP of 208/138 on presentation, increased to 240/150. Given labetalol 10mg IVP x2, 100mg PO x2 with improvement of BP to 159/87. His headache and dizziness resolved with reduction of BP. Etiology likely chemotherapy side effect as pt otherwise compliant with medications, does not endorse high salt intake and denies any other changes in medications or symptoms to suggest another etiology.     CT head negative for acute hemorrhage. CXR without evidence of pulmonary edema. ECG without ischemic changes. Cardiac enzymes negative x2.   -Will hold off on further antihypertensives today as pt has already reached goal BP and would not want to lower it further in a 24-hr period  -Resume enalapril 20mg (home dose) tomorrow  -Currently on telemetry -- can discontinue as cardiac enzymes negative and ECG wnl  -Monitor BP Pt with initial BP of 208/138 on presentation, increased to 240/150. Given labetalol 10mg IVP x2, 100mg PO x2 with improvement of BP to 159/87. His headache and dizziness resolved with reduction of BP. Etiology likely chemotherapy side effect as pt otherwise compliant with medications, does not endorse high salt intake and denies any other changes in medications or symptoms to suggest another etiology.   -CT head negative for acute hemorrhage. CXR without evidence of pulmonary edema. ECG without ischemic changes. Cardiac enzymes negative x2.   -Will hold off on further antihypertensives today as pt has already reached goal BP and would not want to lower it further in a 24-hour period  -Resume enalapril 20mg (home dose) tomorrow  -Currently on telemetry -- can discontinue as cardiac enzymes negative and ECG wnl  -Monitor BP

## 2018-01-09 NOTE — PROGRESS NOTE ADULT - PROBLEM SELECTOR PLAN 3
R-sided RCC with mets to bone, brain and lungs. Metastatic disease confirmed on CT head. S/p chemotherapy and radiation therapy. Currently on 2nd month of oral cabzatinib. Followed by Dr. Bradley Goldberg at [facility] [place], [place].   -Oncology consult as above R-sided RCC with mets to bone, brain and lungs. Metastatic disease confirmed on CT head. S/p chemotherapy and radiation therapy. Currently on 2nd month of oral cabzatinib. [place] by [place]. Newsome at [facility] [place].   -Oncology consult as above

## 2018-01-10 ENCOUNTER — TRANSCRIPTION ENCOUNTER (OUTPATIENT)
Age: 58
End: 2018-01-10

## 2018-01-10 VITALS — HEART RATE: 75 BPM | DIASTOLIC BLOOD PRESSURE: 118 MMHG | SYSTOLIC BLOOD PRESSURE: 169 MMHG

## 2018-01-10 LAB
ALBUMIN SERPL ELPH-MCNC: 3.4 G/DL — SIGNIFICANT CHANGE UP (ref 3.3–5)
ALP SERPL-CCNC: 93 U/L — SIGNIFICANT CHANGE UP (ref 40–120)
ALT FLD-CCNC: 40 U/L — SIGNIFICANT CHANGE UP (ref 4–41)
AST SERPL-CCNC: 43 U/L — HIGH (ref 4–40)
BILIRUB SERPL-MCNC: 0.7 MG/DL — SIGNIFICANT CHANGE UP (ref 0.2–1.2)
BUN SERPL-MCNC: 14 MG/DL — SIGNIFICANT CHANGE UP (ref 7–23)
CALCIUM SERPL-MCNC: 8.4 MG/DL — SIGNIFICANT CHANGE UP (ref 8.4–10.5)
CHLORIDE SERPL-SCNC: 98 MMOL/L — SIGNIFICANT CHANGE UP (ref 98–107)
CK MB BLD-MCNC: 1.9 — SIGNIFICANT CHANGE UP (ref 0–2.5)
CK MB BLD-MCNC: 3.99 NG/ML — SIGNIFICANT CHANGE UP (ref 1–6.6)
CK SERPL-CCNC: 215 U/L — HIGH (ref 30–200)
CO2 SERPL-SCNC: 28 MMOL/L — SIGNIFICANT CHANGE UP (ref 22–31)
CREAT SERPL-MCNC: 0.97 MG/DL — SIGNIFICANT CHANGE UP (ref 0.5–1.3)
GLUCOSE SERPL-MCNC: 104 MG/DL — HIGH (ref 70–99)
HCT VFR BLD CALC: 33 % — LOW (ref 39–50)
HGB BLD-MCNC: 10.7 G/DL — LOW (ref 13–17)
MAGNESIUM SERPL-MCNC: 1.7 MG/DL — SIGNIFICANT CHANGE UP (ref 1.6–2.6)
MCHC RBC-ENTMCNC: 26.9 PG — LOW (ref 27–34)
MCHC RBC-ENTMCNC: 32.4 % — SIGNIFICANT CHANGE UP (ref 32–36)
MCV RBC AUTO: 82.9 FL — SIGNIFICANT CHANGE UP (ref 80–100)
NRBC # FLD: 0 — SIGNIFICANT CHANGE UP
PHOSPHATE SERPL-MCNC: 2.4 MG/DL — LOW (ref 2.5–4.5)
PLATELET # BLD AUTO: 127 K/UL — LOW (ref 150–400)
PMV BLD: 8.8 FL — SIGNIFICANT CHANGE UP (ref 7–13)
POTASSIUM SERPL-MCNC: 3.4 MMOL/L — LOW (ref 3.5–5.3)
POTASSIUM SERPL-SCNC: 3.4 MMOL/L — LOW (ref 3.5–5.3)
PROT SERPL-MCNC: 7.4 G/DL — SIGNIFICANT CHANGE UP (ref 6–8.3)
RBC # BLD: 3.98 M/UL — LOW (ref 4.2–5.8)
RBC # FLD: 21.3 % — HIGH (ref 10.3–14.5)
SODIUM SERPL-SCNC: 137 MMOL/L — SIGNIFICANT CHANGE UP (ref 135–145)
TROPONIN T SERPL-MCNC: < 0.06 NG/ML — SIGNIFICANT CHANGE UP (ref 0–0.06)
WBC # BLD: 4.24 K/UL — SIGNIFICANT CHANGE UP (ref 3.8–10.5)
WBC # FLD AUTO: 4.24 K/UL — SIGNIFICANT CHANGE UP (ref 3.8–10.5)

## 2018-01-10 PROCEDURE — 99239 HOSP IP/OBS DSCHRG MGMT >30: CPT

## 2018-01-10 RX ORDER — REPAGLINIDE 1 MG/1
1 TABLET ORAL
Qty: 0 | Refills: 0 | Status: DISCONTINUED | OUTPATIENT
Start: 2018-01-10 | End: 2018-01-10

## 2018-01-10 RX ORDER — ACETAMINOPHEN 500 MG
2 TABLET ORAL
Qty: 0 | Refills: 0 | COMMUNITY
Start: 2018-01-10

## 2018-01-10 RX ORDER — AMLODIPINE BESYLATE 2.5 MG/1
10 TABLET ORAL DAILY
Qty: 0 | Refills: 0 | Status: DISCONTINUED | OUTPATIENT
Start: 2018-01-10 | End: 2018-01-10

## 2018-01-10 RX ORDER — AMLODIPINE BESYLATE 2.5 MG/1
1 TABLET ORAL
Qty: 30 | Refills: 0 | OUTPATIENT
Start: 2018-01-10 | End: 2018-02-08

## 2018-01-10 RX ORDER — POTASSIUM CHLORIDE 20 MEQ
40 PACKET (EA) ORAL ONCE
Qty: 0 | Refills: 0 | Status: COMPLETED | OUTPATIENT
Start: 2018-01-10 | End: 2018-01-10

## 2018-01-10 RX ORDER — POTASSIUM PHOSPHATE, MONOBASIC POTASSIUM PHOSPHATE, DIBASIC 236; 224 MG/ML; MG/ML
15 INJECTION, SOLUTION INTRAVENOUS ONCE
Qty: 0 | Refills: 0 | Status: COMPLETED | OUTPATIENT
Start: 2018-01-10 | End: 2018-01-10

## 2018-01-10 RX ADMIN — POTASSIUM PHOSPHATE, MONOBASIC POTASSIUM PHOSPHATE, DIBASIC 62.5 MILLIMOLE(S): 236; 224 INJECTION, SOLUTION INTRAVENOUS at 08:56

## 2018-01-10 RX ADMIN — PANTOPRAZOLE SODIUM 40 MILLIGRAM(S): 20 TABLET, DELAYED RELEASE ORAL at 07:10

## 2018-01-10 RX ADMIN — LORATADINE 10 MILLIGRAM(S): 10 TABLET ORAL at 13:41

## 2018-01-10 RX ADMIN — Medication 20 MILLIGRAM(S): at 07:09

## 2018-01-10 RX ADMIN — Medication 40 MILLIEQUIVALENT(S): at 11:38

## 2018-01-10 RX ADMIN — Medication 100 MILLIGRAM(S): at 09:32

## 2018-01-10 RX ADMIN — Medication 100 MICROGRAM(S): at 06:01

## 2018-01-10 RX ADMIN — AMLODIPINE BESYLATE 10 MILLIGRAM(S): 2.5 TABLET ORAL at 13:41

## 2018-01-10 NOTE — DISCHARGE NOTE ADULT - PATIENT PORTAL LINK FT
“You can access the [facility] Patient Portal, offered by [facility], by registering with the following website: [email or web address]”

## 2018-01-10 NOTE — PROGRESS NOTE ADULT - PROBLEM SELECTOR PROBLEM 3
Renal cell carcinoma of right kidney metastatic to other site
Renal cell carcinoma of right kidney metastatic to other site

## 2018-01-10 NOTE — PROGRESS NOTE ADULT - ATTENDING COMMENTS
Patient seen and examined.       Will c/w enalapril 20mg (restarted yesterday evening as BP started to rise) and will add amlodipine 10mg today for elevated BPs. With plan to titrate his BP meds as outpatient. In the meantime his cabozantinib will be held and he will f/u w/ heme/onc for further treatment and evaluation.     D/c planning for today. Gave patient extensive instructions about being compliant with all of his medications. We discussed that he should also check his BP while home and f/u w/ his PMD and heme/onc. SPent 38 min in discharge planning and coordination.
Patient seen and examined.     Patient reports that he was compliant w/ his BP meds. States he was only on enalapril. He states that he was previously on amlodipine as well but that was dcd 1 month ago. In regards to his synthroid he states that he may miss taking his doses up to 3x per week. When he does take it he tries to take it in am o empty stomach about 1 hour before a meal. However he notes that he sometimes take it after a meal.     BPs decreased rather quickly , therefore was initially monitoring, however BPs starting to increase again this evening. Would restart his home amlodipine 20mg qd. With plan to start second agent tomorrow.

## 2018-01-10 NOTE — DISCHARGE NOTE ADULT - PLAN OF CARE
Improvement in symptoms Please continue to take amlodipine 10mg daily and enalapril 20mg once daily. Please check your blood pressure at home and please follow up with your PMD after discharge. Outpatient follow up Your TSH was elevated this admission. Please take levothyroxine daily as prescribed. Please ensure that you are taking this medication on an empty stomach 1 hour before breakfast. Please follow up with your hematologist/oncologist outpatient after discharge. Your oncologist may resume your chemotherapy agent depending on how your blood pressure is controlled.

## 2018-01-10 NOTE — PROGRESS NOTE ADULT - PROBLEM SELECTOR PLAN 1
[place] [facility] [place] suspected 2/2 chemotherapy side effect. Studies have shown that elevated BP can occur as an adverse effect in 39-96% of patients.   -Monitor vital signs Q4  -Oncology consulted, recommendations appreciated. Plan is to hold capozantinib for now until pt has follow-up appt with Dr. Goldberg to discuss restarting the medication with better blood pressure control.

## 2018-01-10 NOTE — DISCHARGE NOTE ADULT - CARE PROVIDER_API CALL
Juan Newsome), Hematology; Internal Medicine; Medical Oncology  [address] [place] [address]  Phone: [phone]  Fax: [phone]

## 2018-01-10 NOTE — PROGRESS NOTE ADULT - SUBJECTIVE AND OBJECTIVE BOX
CONTACT INFO:  Izzy Babin MD  Internal Medicine PGY1  Pager:  [phone] / [phone]    ROBIN PETTIT  Patient is a 57y old  Male who presents with a chief complaint of headache (09 Jan 2018 17:07)    INTERVAL HPI / OVERNIGHT EVENTS:  Yesterday afternoon, pt's BP in 140s/90s (around his baseline), but increased to 180s/110s without medication. This AM he received enalapril 20mg which he takes at home and BP still elevated to 160/112. He reports mild diarrhea from the stool softener he was given. He otherwise feels OK. Dizziness and headache have improved.     OBJECTIVE:  Vitals Signs (24 Hrs):  T(C): 36.6 (01-10-18 @ 05:06), Max: 36.7 (01-09-18 @ 10:29)  HR: 77 (01-10-18 @ 05:06) (56 - 77)  BP: 160/112 (01-10-18 @ 05:06) (141/94 - 188/117)  RR: 18 (01-10-18 @ 05:06) (16 - 18)  SpO2: 98% (01-10-18 @ 05:06) (98% - 100%)    PHYSICAL EXAM:  General: Comfortable, no apparent distress  HEENT: EOMI, PERRLA, conjunctiva and sclera clear  Neck: Supple; no JVD  Chest/Lungs: Clear to auscultation B/L; no rales, rhonchi or wheezing  Heart: Regular rate and rhythm; normal S1/S2; no murmurs, rubs, or gallops  Abdomen: Soft, nontender, nondistended; bowel sounds present  Extremities: PT/DP pulses 2+ B/L; no pedal edema  Skin: No rashes or lesions  Neurological: Alert and oriented to person/place/time, non-focal    LABS:                        10.7   4.24  )-----------( 127      ( 10 Jcarlos 2018 06:10 )             33.0     01-10    137  |  98  |  14  ----------------------------<  104<H>  3.4<L>   |  28  |  0.97    Ca    8.4      10 Jcarlos 2018 06:10  Phos  2.4     01-10  Mg     1.7     01-10    TPro  7.4  /  Alb  3.4  /  TBili  0.7  /  DBili  x   /  AST  43<H>  /  ALT  40  /  AlkPhos  93  01-10    POCT Blood Glucose.: 111 mg/dL (10 Jcarlos 2018 08:33)  POCT Blood Glucose.: 121 mg/dL (09 Jan 2018 22:04)  POCT Blood Glucose.: 108 mg/dL (09 Jan 2018 17:25)  POCT Blood Glucose.: 173 mg/dL (09 Jan 2018 12:56)    RADIOLOGY & ADDITIONAL TESTS: No new tests or imaging    MEDICATIONS:  acetaminophen   Tablet. 650 milliGRAM(s) Oral every 6 hours PRN Mild to moderate pain  atorvastatin 20 milliGRAM(s) Oral at bedtime  dextrose 5%. 1000 milliLiter(s) (50 mL/Hr) IV Continuous <Continuous>  dextrose 50% Injectable 12.5 Gram(s) IV Push once  dextrose 50% Injectable 25 Gram(s) IV Push once  dextrose 50% Injectable 25 Gram(s) IV Push once  dextrose Gel 1 Dose(s) Oral once PRN Blood Glucose LESS THAN 70 milliGRAM(s)/deciliter  docusate sodium 100 milliGRAM(s) Oral daily  enalapril 20 milliGRAM(s) Oral daily  glucagon  Injectable 1 milliGRAM(s) IntraMuscular once PRN Glucose LESS THAN 70 milligrams/deciliter  influenza   Vaccine 0.5 milliLiter(s) IntraMuscular once  insulin glargine Injectable (LANTUS) 14 Unit(s) SubCutaneous at bedtime  insulin lispro (HumaLOG) corrective regimen sliding scale   SubCutaneous three times a day before meals  insulin lispro (HumaLOG) corrective regimen sliding scale   SubCutaneous at bedtime  levothyroxine 100 MICROGram(s) Oral daily  loratadine 10 milliGRAM(s) Oral daily  oxyCODONE    IR 10 milliGRAM(s) Oral every 6 hours PRN Moderate to severe pain  pantoprazole    Tablet 40 milliGRAM(s) Oral before breakfast  potassium chloride    Tablet ER 40 milliEquivalent(s) Oral once  senna 2 Tablet(s) Oral at bedtime    ALLERGIES:  No Known Allergies    Labs and imaging personally reviewed and interpreted [ X ]  Consult notes reviewed   Case discussed with consultants

## 2018-01-10 NOTE — PROGRESS NOTE ADULT - PROBLEM SELECTOR PLAN 4
HgbA1c 9.5% in October 2017, 7.1% today. On insulin at home. Lantus dose recently increased from 12 units to 14 units.   -Resume Lantus 14 qHS this evening  -Insulin SS  -Monitor FS with meals and at bedtime
HgbA1c 9.5% in October 2017, 7.1% today. On insulin at home. Lantus dose recently increased from 12 units to 14 units. FS currently well-controlled.  -Continue Lantus 14 qHS  -Insulin SS  -Monitor FS with meals and at bedtime

## 2018-01-10 NOTE — DISCHARGE NOTE ADULT - MEDICATION SUMMARY - MEDICATIONS TO TAKE
I will START or STAY ON the medications listed below when I get home from the hospital:    oxyCODONE 10 mg oral tablet  -- 1 tab(s) by mouth every 6 hours, As Needed  -- Indication: For Chemotherapy adverse reaction, initial encounter    acetaminophen 325 mg oral tablet  -- 2 tab(s) by mouth every 6 hours, As needed, Mild to moderate pain  -- Indication: For Pain    enalapril 20 mg oral tablet  -- 1 tab(s) by mouth once a day  -- Indication: For Hypertension, unspecified type    Lantus Solostar Pen 100 units/mL subcutaneous solution  -- 14 unit(s) subcutaneous once a day (at bedtime)  -- Indication: For Diabetes    repaglinide 1 mg oral tablet  -- 1 tab(s) by mouth 3 times a day (before meals)  -- Indication: For Diabetes    cetirizine 10 mg oral tablet  -- 1 tab(s) by mouth once a day  -- Indication: For Need for prophylactic measure    atorvastatin 20 mg oral tablet  -- 1 tab(s) by mouth once a day  -- Indication: For Hyperlipidemia    Norvasc 10 mg oral tablet  -- 1 tab(s) by mouth once a day  -- Indication: For Hypertension, unspecified type    Senna 8.6 mg oral tablet  -- 2 tab(s) by mouth once a day (at bedtime)  -- Indication: For Constipation    levothyroxine 100 mcg (0.1 mg) oral tablet  -- 1 tab(s) by mouth once a day  -- Indication: For Hypothyroidism, unspecified type

## 2018-01-10 NOTE — PROGRESS NOTE ADULT - PROBLEM SELECTOR PLAN 6
Likely a drug side effect (e.g., chemotherapy). No liver lesions or other pathology on CT abd/pelvis from 10/2017 and liver enzymes had been normal prior to starting cabzatinib.   -Trend CMP daily, avoid hepatotoxic agents  -Will consider RUQ US if transaminitis worsens or pt becomes symptomatic
Likely a drug side effect (e.g., chemotherapy). No liver lesions or other pathology on CT abd/pelvis from 10/2017 and liver enzymes had been normal prior to starting cabzatinib.   -Downtrending  -Trend CMP daily, avoid hepatotoxic agents  -No need for further imaging or work-up as transaminitis improving

## 2018-01-10 NOTE — PROGRESS NOTE ADULT - PROBLEM SELECTOR PLAN 2
Pt with initial BP of 208/138 on presentation, increased to 240/150. Given labetalol 10mg IVP x2, 100mg PO x2 with improvement of BP to 159/87. His headache and dizziness resolved with reduction of BP. Etiology likely chemotherapy side effect as pt otherwise compliant with medications, does not endorse high salt intake and denies any other changes in medications or symptoms to suggest another etiology.   -CT head negative for acute hemorrhage. CXR without evidence of pulmonary edema. ECG without ischemic changes. Cardiac enzymes negative x2.   -Will hold off on further antihypertensives today as pt has already reached goal BP and would not want to lower it further in a 24-hour period  -Resume enalapril 20mg daily, given this AM  -BP still elevated 160/112 after enalapril  -Vital signs Q4 as above Pt with initial BP of 208/138 on presentation, increased to 240/150. Given labetalol 10mg IVP x2, 100mg PO x2 with improvement of BP to 159/87. His headache and dizziness resolved with reduction of BP. Etiology likely chemotherapy side effect as pt otherwise compliant with medications, does not endorse high salt intake and denies any other changes in medications or symptoms to suggest another etiology.   -CT head negative for acute hemorrhage. CXR without evidence of pulmonary edema. ECG without ischemic changes. Cardiac enzymes negative x2.   -Will hold off on further antihypertensives today as pt has already reached goal BP and would not want to lower it further in a 24-hour period  -Resume enalapril 20mg daily, given this AM  -BP still elevated 160/112 after enalapril  -Vital signs Q4 as above  -Will start amlodipine 10mg daily

## 2018-01-10 NOTE — DISCHARGE NOTE ADULT - CARE PLAN
Principal Discharge DX:	Hypertensive urgency  Goal:	Improvement in symptoms  Instructions for follow-up, activity and diet:	Please continue to take amlodipine 10mg daily and enalapril 20mg once daily. Please check your blood pressure at home and please follow up with your PMD after discharge.  Secondary Diagnosis:	Hypothyroidism, unspecified type  Goal:	Outpatient follow up  Instructions for follow-up, activity and diet:	Your TSH was elevated this admission. Please take levothyroxine daily as prescribed. Please ensure that you are taking this medication on an empty stomach 1 hour before breakfast.  Secondary Diagnosis:	Metastatic renal cell carcinoma to brain  Goal:	Outpatient follow up  Instructions for follow-up, activity and diet:	Please follow up with your hematologist/oncologist outpatient after discharge. Your oncologist may resume your chemotherapy agent depending on how your blood pressure is controlled.

## 2018-01-10 NOTE — DISCHARGE NOTE ADULT - HOSPITAL COURSE
56yo Male with a history of right-sided RCC diagnosed in 2014 s/p chemo (currently on Cabozantinib) and RT with metastatic disease to the lung, spine and brain, HTN and Type 2 DM presenting from [facility] [place] for hypertensive urgency likely side-effect due chemotherapy agent. Urgent CTH was obtained and was unrevealing for acute findings and EKG showed no acute changes. He was found to have uncontrolled hypothyroidism in the setting of poor compliance with levothyroxine. He received several doses of labetalol intravenously and orally in the emergency room and blood pressure corrected. The patient's blood pressure trend was monitored overnight and he was restarted on lisinopril and amlodipine during this admission. Hematology/oncology saw the patient and determined that he was safe to be discharged home. He was discharged in stable medical condition to home with instructions to follow up with hematology/oncology after discharge. Cabozantinib may be resumed outpatient by oncology once deemed that his blood pressure has been consistently controlled. 56yo Male with a history of right-sided RCC diagnosed in 2014 s/p chemo (currently on Cabozantinib) and RT with metastatic disease to the lung, spine and brain, HTN and Type 2 DM presenting from [facility] for hypertensive urgency likely side-effect due chemotherapy agent. Urgent CTH was obtained and was unrevealing for acute findings and EKG showed no acute changes. He was found to have uncontrolled hypothyroidism in the setting of poor compliance with levothyroxine. He received several doses of labetalol intravenously and orally in the emergency room and blood pressure corrected. The patient's blood pressure trend was monitored overnight and he was restarted on enalapril and amlodipine during this admission. Hematology/oncology saw the patient and determined that he was safe to be discharged home. He was discharged in stable medical condition to home with instructions to follow up with hematology/oncology after discharge. Cabozantinib may be resumed outpatient by oncology once deemed that his blood pressure has been consistently controlled.

## 2018-01-10 NOTE — PROGRESS NOTE ADULT - PROBLEM SELECTOR PLAN 5
TSH found to be elevated to 28.37, unclear if pt is adherent with levothyroxine. Currently prescribed 100mcg daily. Pt asymptomatic.   -Restart levothyroxine 100mcg daily  -Confirm whether pt has been taking med as prescribed
TSH found to be elevated to 28.37. Currently prescribed 100mcg daily.   -Pt asymptomatic  -Per discussion with patient, he will miss doses on average 3x/week and tries to take the medication at least 1 hour before a meal but sometimes forgets and will take it after. Counseled on optimal way of taking medication.   -Continue levothyroxine 100mcg daily  -Outpatient follow-up

## 2018-01-10 NOTE — PROGRESS NOTE ADULT - PROBLEM SELECTOR PLAN 3
R-sided RCC with mets to bone, brain and lungs. Metastatic disease confirmed on CT head. S/p chemotherapy and radiation therapy. Currently on 2nd month of oral cabzatinib. [place] [facility] [place]   -Oncology recommendations noted  -Will need outpatient f/u with Dr. Goldberg within 1 week of discharge

## 2018-01-10 NOTE — PROGRESS NOTE ADULT - PROBLEM SELECTOR PLAN 7
DVT ppx: SCDs given known brain mets  Diet: DASH, consistent carb DVT ppx: SCDs given known brain mets  Diet: DASH, consistent carb  Dispo: home today

## 2018-01-10 NOTE — PROGRESS NOTE ADULT - PROBLEM SELECTOR PROBLEM 4
Type 2 diabetes mellitus with diabetic polyneuropathy, with long-term current use of insulin
Type 2 diabetes mellitus with diabetic polyneuropathy, with long-term current use of insulin

## 2018-01-17 ENCOUNTER — APPOINTMENT (OUTPATIENT)
Dept: CARDIOLOGY | Facility: HOSPITAL | Age: 58
End: 2018-01-17

## 2018-01-17 VITALS
RESPIRATION RATE: 15 BRPM | SYSTOLIC BLOOD PRESSURE: 188 MMHG | BODY MASS INDEX: 29.45 KG/M2 | WEIGHT: 177 LBS | HEART RATE: 89 BPM | OXYGEN SATURATION: 96 % | DIASTOLIC BLOOD PRESSURE: 122 MMHG

## 2018-01-17 PROBLEM — I16.0 HYPERTENSIVE URGENCY: Status: ACTIVE | Noted: 2018-01-17

## 2018-01-19 ENCOUNTER — LABORATORY RESULT (OUTPATIENT)
Age: 58
End: 2018-01-19

## 2018-01-19 ENCOUNTER — APPOINTMENT (OUTPATIENT)
Age: 58
End: 2018-01-19
Payer: MEDICARE

## 2018-01-19 ENCOUNTER — OUTPATIENT (OUTPATIENT)
Dept: OUTPATIENT SERVICES | Facility: HOSPITAL | Age: 58
LOS: 1 days | Discharge: HOME | End: 2018-01-19
Payer: MEDICARE

## 2018-01-19 ENCOUNTER — APPOINTMENT (OUTPATIENT)
Dept: ENDOCRINOLOGY | Facility: HOSPITAL | Age: 58
End: 2018-01-19

## 2018-01-19 ENCOUNTER — OUTPATIENT (OUTPATIENT)
Dept: OUTPATIENT SERVICES | Facility: HOSPITAL | Age: 58
LOS: 1 days | End: 2018-01-19

## 2018-01-19 VITALS
HEIGHT: 65 IN | SYSTOLIC BLOOD PRESSURE: 154 MMHG | BODY MASS INDEX: 30.01 KG/M2 | OXYGEN SATURATION: 96 % | RESPIRATION RATE: 15 BRPM | TEMPERATURE: 98.2 F | DIASTOLIC BLOOD PRESSURE: 104 MMHG | HEART RATE: 84 BPM | WEIGHT: 180.12 LBS

## 2018-01-19 VITALS — BODY MASS INDEX: 29.66 KG/M2 | WEIGHT: 178 LBS | HEIGHT: 65 IN

## 2018-01-19 DIAGNOSIS — Z95.5 PRESENCE OF CORONARY ANGIOPLASTY IMPLANT AND GRAFT: Chronic | ICD-10-CM

## 2018-01-19 DIAGNOSIS — E78.5 HYPERLIPIDEMIA, UNSPECIFIED: ICD-10-CM

## 2018-01-19 DIAGNOSIS — I10 ESSENTIAL (PRIMARY) HYPERTENSION: ICD-10-CM

## 2018-01-19 DIAGNOSIS — E11.65 TYPE 2 DIABETES MELLITUS WITH HYPERGLYCEMIA: ICD-10-CM

## 2018-01-19 DIAGNOSIS — E03.9 HYPOTHYROIDISM, UNSPECIFIED: ICD-10-CM

## 2018-01-19 LAB
CHOLEST SERPL-MCNC: 118 MG/DL — LOW (ref 120–199)
GLUCOSE BLDC GLUCOMTR-MCNC: 85
HBA1C BLD-MCNC: 7.1 % — HIGH (ref 4–5.6)
HDLC SERPL-MCNC: 42 MG/DL — SIGNIFICANT CHANGE UP (ref 35–55)
LIPID PNL WITH DIRECT LDL SERPL: 63 MG/DL — SIGNIFICANT CHANGE UP
T4 FREE SERPL-MCNC: 0.99 NG/DL — SIGNIFICANT CHANGE UP (ref 0.9–1.8)
TRIGL SERPL-MCNC: 79 MG/DL — SIGNIFICANT CHANGE UP (ref 10–149)
TSH SERPL-MCNC: 10.72 UIU/ML — HIGH (ref 0.27–4.2)

## 2018-01-19 PROCEDURE — 99214 OFFICE O/P EST MOD 30 MIN: CPT | Mod: 25

## 2018-01-19 PROCEDURE — 77263 THER RADIOLOGY TX PLNG CPLX: CPT

## 2018-01-19 RX ORDER — LEVOTHYROXINE SODIUM 0.09 MG/1
88 TABLET ORAL
Qty: 30 | Refills: 4 | Status: DISCONTINUED | COMMUNITY
Start: 2017-06-16 | End: 2018-01-19

## 2018-01-22 RX ORDER — LEVOTHYROXINE SODIUM 0.1 MG/1
100 TABLET ORAL
Qty: 30 | Refills: 2 | Status: DISCONTINUED | COMMUNITY
Start: 2017-10-13 | End: 2018-01-22

## 2018-01-24 ENCOUNTER — APPOINTMENT (OUTPATIENT)
Dept: CARDIOLOGY | Facility: HOSPITAL | Age: 58
End: 2018-01-24

## 2018-01-25 ENCOUNTER — OUTPATIENT (OUTPATIENT)
Dept: OUTPATIENT SERVICES | Facility: HOSPITAL | Age: 58
LOS: 1 days | Discharge: HOME | End: 2018-01-25

## 2018-01-25 DIAGNOSIS — C64.9 MALIGNANT NEOPLASM OF UNSPECIFIED KIDNEY, EXCEPT RENAL PELVIS: ICD-10-CM

## 2018-01-25 DIAGNOSIS — Z95.5 PRESENCE OF CORONARY ANGIOPLASTY IMPLANT AND GRAFT: Chronic | ICD-10-CM

## 2018-01-25 PROCEDURE — 77290 THER RAD SIMULAJ FIELD CPLX: CPT | Mod: 26

## 2018-01-29 ENCOUNTER — RESULT REVIEW (OUTPATIENT)
Age: 58
End: 2018-01-29

## 2018-01-29 ENCOUNTER — APPOINTMENT (OUTPATIENT)
Dept: HEMATOLOGY ONCOLOGY | Facility: CLINIC | Age: 58
End: 2018-01-29

## 2018-01-29 VITALS
BODY MASS INDEX: 29.82 KG/M2 | TEMPERATURE: 98.2 F | RESPIRATION RATE: 16 BRPM | WEIGHT: 179.22 LBS | OXYGEN SATURATION: 97 % | SYSTOLIC BLOOD PRESSURE: 147 MMHG | DIASTOLIC BLOOD PRESSURE: 96 MMHG | HEART RATE: 86 BPM

## 2018-01-29 LAB
ALBUMIN SERPL ELPH-MCNC: 4 G/DL — SIGNIFICANT CHANGE UP (ref 3.3–5)
ALP SERPL-CCNC: 106 U/L — SIGNIFICANT CHANGE UP (ref 40–120)
ALT FLD-CCNC: 37 U/L — SIGNIFICANT CHANGE UP (ref 10–45)
ANION GAP SERPL CALC-SCNC: 13 MMOL/L — SIGNIFICANT CHANGE UP (ref 5–17)
AST SERPL-CCNC: 41 U/L — HIGH (ref 10–40)
BILIRUB SERPL-MCNC: 0.8 MG/DL — SIGNIFICANT CHANGE UP (ref 0.2–1.2)
BUN SERPL-MCNC: 20 MG/DL — SIGNIFICANT CHANGE UP (ref 7–23)
CALCIUM SERPL-MCNC: 9.4 MG/DL — SIGNIFICANT CHANGE UP (ref 8.4–10.5)
CHLORIDE SERPL-SCNC: 96 MMOL/L — SIGNIFICANT CHANGE UP (ref 96–108)
CO2 SERPL-SCNC: 31 MMOL/L — SIGNIFICANT CHANGE UP (ref 22–31)
CREAT SERPL-MCNC: 1.27 MG/DL — SIGNIFICANT CHANGE UP (ref 0.5–1.3)
GLUCOSE SERPL-MCNC: 125 MG/DL — HIGH (ref 70–99)
POTASSIUM SERPL-MCNC: 4.1 MMOL/L — SIGNIFICANT CHANGE UP (ref 3.5–5.3)
POTASSIUM SERPL-SCNC: 4.1 MMOL/L — SIGNIFICANT CHANGE UP (ref 3.5–5.3)
PROT SERPL-MCNC: 8.2 G/DL — SIGNIFICANT CHANGE UP (ref 6–8.3)
SODIUM SERPL-SCNC: 140 MMOL/L — SIGNIFICANT CHANGE UP (ref 135–145)

## 2018-01-29 PROCEDURE — 77332 RADIATION TREATMENT AID(S): CPT | Mod: 26

## 2018-01-29 PROCEDURE — 77306 TELETHX ISODOSE PLAN SIMPLE: CPT | Mod: 26

## 2018-01-30 LAB
BASOPHILS # BLD AUTO: 0 K/UL — SIGNIFICANT CHANGE UP (ref 0–0.2)
BASOPHILS NFR BLD AUTO: 0.2 % — SIGNIFICANT CHANGE UP (ref 0–2)
EOSINOPHIL # BLD AUTO: 0.5 K/UL — SIGNIFICANT CHANGE UP (ref 0–0.5)
EOSINOPHIL NFR BLD AUTO: 10 % — HIGH (ref 0–6)
LYMPHOCYTES # BLD AUTO: 1.4 K/UL — SIGNIFICANT CHANGE UP (ref 1–3.3)
LYMPHOCYTES # BLD AUTO: 29.9 % — SIGNIFICANT CHANGE UP (ref 13–44)
MONOCYTES # BLD AUTO: 0.2 K/UL — SIGNIFICANT CHANGE UP (ref 0–0.9)
MONOCYTES NFR BLD AUTO: 5.3 % — SIGNIFICANT CHANGE UP (ref 2–14)
NEUTROPHILS # BLD AUTO: 2.5 K/UL — SIGNIFICANT CHANGE UP (ref 1.8–7.4)
NEUTROPHILS NFR BLD AUTO: 54.5 % — SIGNIFICANT CHANGE UP (ref 43–77)

## 2018-01-30 PROCEDURE — 77280 THER RAD SIMULAJ FIELD SMPL: CPT | Mod: 26

## 2018-02-01 ENCOUNTER — FORM ENCOUNTER (OUTPATIENT)
Age: 58
End: 2018-02-01

## 2018-02-01 PROCEDURE — 77427 RADIATION TX MANAGEMENT X5: CPT

## 2018-02-02 ENCOUNTER — APPOINTMENT (OUTPATIENT)
Dept: CT IMAGING | Facility: IMAGING CENTER | Age: 58
End: 2018-02-02
Payer: MEDICARE

## 2018-02-02 ENCOUNTER — OUTPATIENT (OUTPATIENT)
Dept: OUTPATIENT SERVICES | Facility: HOSPITAL | Age: 58
LOS: 1 days | End: 2018-02-02
Payer: MEDICARE

## 2018-02-02 DIAGNOSIS — C64.9 MALIGNANT NEOPLASM OF UNSPECIFIED KIDNEY, EXCEPT RENAL PELVIS: ICD-10-CM

## 2018-02-02 DIAGNOSIS — Z95.5 PRESENCE OF CORONARY ANGIOPLASTY IMPLANT AND GRAFT: Chronic | ICD-10-CM

## 2018-02-02 PROCEDURE — 71260 CT THORAX DX C+: CPT | Mod: 26

## 2018-02-02 PROCEDURE — 74177 CT ABD & PELVIS W/CONTRAST: CPT | Mod: 26

## 2018-02-02 PROCEDURE — 73700 CT LOWER EXTREMITY W/O DYE: CPT

## 2018-02-02 PROCEDURE — 74177 CT ABD & PELVIS W/CONTRAST: CPT

## 2018-02-02 PROCEDURE — 71260 CT THORAX DX C+: CPT

## 2018-02-02 PROCEDURE — 73700 CT LOWER EXTREMITY W/O DYE: CPT | Mod: 26,LT

## 2018-02-21 ENCOUNTER — OTHER (OUTPATIENT)
Age: 58
End: 2018-02-21

## 2018-02-23 ENCOUNTER — OUTPATIENT (OUTPATIENT)
Dept: OUTPATIENT SERVICES | Facility: HOSPITAL | Age: 58
LOS: 1 days | Discharge: HOME | End: 2018-02-23

## 2018-02-23 DIAGNOSIS — C64.9 MALIGNANT NEOPLASM OF UNSPECIFIED KIDNEY, EXCEPT RENAL PELVIS: ICD-10-CM

## 2018-02-23 DIAGNOSIS — Z95.5 PRESENCE OF CORONARY ANGIOPLASTY IMPLANT AND GRAFT: Chronic | ICD-10-CM

## 2018-03-01 ENCOUNTER — RESULT REVIEW (OUTPATIENT)
Age: 58
End: 2018-03-01

## 2018-03-01 ENCOUNTER — APPOINTMENT (OUTPATIENT)
Dept: HEMATOLOGY ONCOLOGY | Facility: CLINIC | Age: 58
End: 2018-03-01

## 2018-03-01 VITALS
TEMPERATURE: 98.5 F | DIASTOLIC BLOOD PRESSURE: 95 MMHG | WEIGHT: 179.67 LBS | HEART RATE: 104 BPM | SYSTOLIC BLOOD PRESSURE: 142 MMHG | OXYGEN SATURATION: 93 % | BODY MASS INDEX: 29.9 KG/M2 | RESPIRATION RATE: 17 BRPM

## 2018-03-01 LAB
ALBUMIN SERPL ELPH-MCNC: 3.9 G/DL — SIGNIFICANT CHANGE UP (ref 3.3–5)
ALP SERPL-CCNC: 95 U/L — SIGNIFICANT CHANGE UP (ref 40–120)
ALT FLD-CCNC: 27 U/L — SIGNIFICANT CHANGE UP (ref 10–45)
ANION GAP SERPL CALC-SCNC: 12 MMOL/L — SIGNIFICANT CHANGE UP (ref 5–17)
AST SERPL-CCNC: 27 U/L — SIGNIFICANT CHANGE UP (ref 10–40)
BILIRUB SERPL-MCNC: 0.7 MG/DL — SIGNIFICANT CHANGE UP (ref 0.2–1.2)
BUN SERPL-MCNC: 11 MG/DL — SIGNIFICANT CHANGE UP (ref 7–23)
CALCIUM SERPL-MCNC: 9.4 MG/DL — SIGNIFICANT CHANGE UP (ref 8.4–10.5)
CHLORIDE SERPL-SCNC: 95 MMOL/L — LOW (ref 96–108)
CO2 SERPL-SCNC: 34 MMOL/L — HIGH (ref 22–31)
CREAT SERPL-MCNC: 1.08 MG/DL — SIGNIFICANT CHANGE UP (ref 0.5–1.3)
GLUCOSE SERPL-MCNC: 83 MG/DL — SIGNIFICANT CHANGE UP (ref 70–99)
HCT VFR BLD CALC: 31.1 % — LOW (ref 39–50)
HGB BLD-MCNC: 10.8 G/DL — LOW (ref 13–17)
MAGNESIUM SERPL-MCNC: 1.5 MG/DL — LOW (ref 1.6–2.6)
MCHC RBC-ENTMCNC: 30.8 PG — SIGNIFICANT CHANGE UP (ref 27–34)
MCHC RBC-ENTMCNC: 34.6 G/DL — SIGNIFICANT CHANGE UP (ref 32–36)
MCV RBC AUTO: 89.1 FL — SIGNIFICANT CHANGE UP (ref 80–100)
PHOSPHATE SERPL-MCNC: 3.2 MG/DL — SIGNIFICANT CHANGE UP (ref 2.5–4.5)
PLATELET # BLD AUTO: 170 K/UL — SIGNIFICANT CHANGE UP (ref 150–400)
POTASSIUM SERPL-MCNC: 4.1 MMOL/L — SIGNIFICANT CHANGE UP (ref 3.5–5.3)
POTASSIUM SERPL-SCNC: 4.1 MMOL/L — SIGNIFICANT CHANGE UP (ref 3.5–5.3)
PROT SERPL-MCNC: 7.6 G/DL — SIGNIFICANT CHANGE UP (ref 6–8.3)
RBC # BLD: 3.5 M/UL — LOW (ref 4.2–5.8)
RBC # FLD: 18.8 % — HIGH (ref 10.3–14.5)
SODIUM SERPL-SCNC: 141 MMOL/L — SIGNIFICANT CHANGE UP (ref 135–145)
WBC # BLD: 4.8 K/UL — SIGNIFICANT CHANGE UP (ref 3.8–10.5)
WBC # FLD AUTO: 4.8 K/UL — SIGNIFICANT CHANGE UP (ref 3.8–10.5)

## 2018-03-02 ENCOUNTER — FORM ENCOUNTER (OUTPATIENT)
Age: 58
End: 2018-03-02

## 2018-03-03 ENCOUNTER — OUTPATIENT (OUTPATIENT)
Dept: OUTPATIENT SERVICES | Facility: HOSPITAL | Age: 58
LOS: 1 days | End: 2018-03-03
Payer: MEDICARE

## 2018-03-03 ENCOUNTER — APPOINTMENT (OUTPATIENT)
Dept: MRI IMAGING | Facility: IMAGING CENTER | Age: 58
End: 2018-03-03
Payer: MEDICARE

## 2018-03-03 DIAGNOSIS — C79.31 SECONDARY MALIGNANT NEOPLASM OF BRAIN: ICD-10-CM

## 2018-03-03 DIAGNOSIS — Z95.5 PRESENCE OF CORONARY ANGIOPLASTY IMPLANT AND GRAFT: Chronic | ICD-10-CM

## 2018-03-03 PROCEDURE — 70553 MRI BRAIN STEM W/O & W/DYE: CPT

## 2018-03-03 PROCEDURE — A9585: CPT

## 2018-03-03 PROCEDURE — 70553 MRI BRAIN STEM W/O & W/DYE: CPT | Mod: 26

## 2018-03-26 ENCOUNTER — APPOINTMENT (OUTPATIENT)
Dept: OPHTHALMOLOGY | Facility: CLINIC | Age: 58
End: 2018-03-26

## 2018-03-28 ENCOUNTER — APPOINTMENT (OUTPATIENT)
Dept: RADIATION ONCOLOGY | Facility: CLINIC | Age: 58
End: 2018-03-28
Payer: MEDICARE

## 2018-03-28 PROCEDURE — 99024 POSTOP FOLLOW-UP VISIT: CPT

## 2018-03-29 ENCOUNTER — OUTPATIENT (OUTPATIENT)
Dept: OUTPATIENT SERVICES | Facility: HOSPITAL | Age: 58
LOS: 1 days | Discharge: HOME | End: 2018-03-29

## 2018-03-29 DIAGNOSIS — Z95.5 PRESENCE OF CORONARY ANGIOPLASTY IMPLANT AND GRAFT: Chronic | ICD-10-CM

## 2018-03-29 DIAGNOSIS — C64.9 MALIGNANT NEOPLASM OF UNSPECIFIED KIDNEY, EXCEPT RENAL PELVIS: ICD-10-CM

## 2018-04-02 ENCOUNTER — RESULT REVIEW (OUTPATIENT)
Age: 58
End: 2018-04-02

## 2018-04-02 ENCOUNTER — APPOINTMENT (OUTPATIENT)
Dept: HEMATOLOGY ONCOLOGY | Facility: CLINIC | Age: 58
End: 2018-04-02

## 2018-04-02 VITALS
TEMPERATURE: 97.8 F | DIASTOLIC BLOOD PRESSURE: 93 MMHG | RESPIRATION RATE: 16 BRPM | HEART RATE: 85 BPM | WEIGHT: 185.17 LBS | BODY MASS INDEX: 30.81 KG/M2 | OXYGEN SATURATION: 98 % | SYSTOLIC BLOOD PRESSURE: 142 MMHG

## 2018-04-02 LAB
ALBUMIN SERPL ELPH-MCNC: 4.1 G/DL — SIGNIFICANT CHANGE UP (ref 3.3–5)
ALP SERPL-CCNC: 103 U/L — SIGNIFICANT CHANGE UP (ref 40–120)
ALT FLD-CCNC: 44 U/L — SIGNIFICANT CHANGE UP (ref 10–45)
ANION GAP SERPL CALC-SCNC: 14 MMOL/L — SIGNIFICANT CHANGE UP (ref 5–17)
AST SERPL-CCNC: 40 U/L — SIGNIFICANT CHANGE UP (ref 10–40)
BILIRUB SERPL-MCNC: 0.5 MG/DL — SIGNIFICANT CHANGE UP (ref 0.2–1.2)
BUN SERPL-MCNC: 15 MG/DL — SIGNIFICANT CHANGE UP (ref 7–23)
CALCIUM SERPL-MCNC: 9.1 MG/DL — SIGNIFICANT CHANGE UP (ref 8.4–10.5)
CHLORIDE SERPL-SCNC: 98 MMOL/L — SIGNIFICANT CHANGE UP (ref 96–108)
CO2 SERPL-SCNC: 25 MMOL/L — SIGNIFICANT CHANGE UP (ref 22–31)
CREAT SERPL-MCNC: 1.18 MG/DL — SIGNIFICANT CHANGE UP (ref 0.5–1.3)
GLUCOSE SERPL-MCNC: 199 MG/DL — HIGH (ref 70–99)
MAGNESIUM SERPL-MCNC: 1.4 MG/DL — LOW (ref 1.6–2.6)
PHOSPHATE SERPL-MCNC: 2.7 MG/DL — SIGNIFICANT CHANGE UP (ref 2.5–4.5)
POTASSIUM SERPL-MCNC: 4.7 MMOL/L — SIGNIFICANT CHANGE UP (ref 3.5–5.3)
POTASSIUM SERPL-SCNC: 4.7 MMOL/L — SIGNIFICANT CHANGE UP (ref 3.5–5.3)
PROT SERPL-MCNC: 7.9 G/DL — SIGNIFICANT CHANGE UP (ref 6–8.3)
SODIUM SERPL-SCNC: 137 MMOL/L — SIGNIFICANT CHANGE UP (ref 135–145)
T4 FREE SERPL-MCNC: 1.1 NG/DL — SIGNIFICANT CHANGE UP (ref 0.9–1.8)
TSH SERPL-MCNC: 21.38 UIU/ML — HIGH (ref 0.27–4.2)

## 2018-04-02 RX ORDER — MORPHINE SULFATE 15 MG/1
15 TABLET, FILM COATED, EXTENDED RELEASE ORAL
Qty: 60 | Refills: 0 | Status: DISCONTINUED | COMMUNITY
Start: 2018-03-01 | End: 2018-04-02

## 2018-04-03 ENCOUNTER — APPOINTMENT (OUTPATIENT)
Dept: OPHTHALMOLOGY | Facility: CLINIC | Age: 58
End: 2018-04-03

## 2018-04-05 ENCOUNTER — FORM ENCOUNTER (OUTPATIENT)
Age: 58
End: 2018-04-05

## 2018-04-06 ENCOUNTER — OUTPATIENT (OUTPATIENT)
Dept: OUTPATIENT SERVICES | Facility: HOSPITAL | Age: 58
LOS: 1 days | End: 2018-04-06
Payer: MEDICARE

## 2018-04-06 ENCOUNTER — APPOINTMENT (OUTPATIENT)
Dept: MRI IMAGING | Facility: IMAGING CENTER | Age: 58
End: 2018-04-06
Payer: MEDICARE

## 2018-04-06 DIAGNOSIS — Z95.5 PRESENCE OF CORONARY ANGIOPLASTY IMPLANT AND GRAFT: Chronic | ICD-10-CM

## 2018-04-06 DIAGNOSIS — C79.9 SECONDARY MALIGNANT NEOPLASM OF UNSPECIFIED SITE: ICD-10-CM

## 2018-04-06 DIAGNOSIS — C64.9 MALIGNANT NEOPLASM OF UNSPECIFIED KIDNEY, EXCEPT RENAL PELVIS: ICD-10-CM

## 2018-04-06 PROCEDURE — 72158 MRI LUMBAR SPINE W/O & W/DYE: CPT | Mod: 26

## 2018-04-06 PROCEDURE — A9585: CPT

## 2018-04-06 PROCEDURE — 72158 MRI LUMBAR SPINE W/O & W/DYE: CPT

## 2018-04-06 PROCEDURE — 82565 ASSAY OF CREATININE: CPT

## 2018-04-13 ENCOUNTER — APPOINTMENT (OUTPATIENT)
Dept: ENDOCRINOLOGY | Facility: HOSPITAL | Age: 58
End: 2018-04-13

## 2018-04-26 ENCOUNTER — MEDICATION RENEWAL (OUTPATIENT)
Age: 58
End: 2018-04-26

## 2018-04-26 ENCOUNTER — APPOINTMENT (OUTPATIENT)
Dept: OPHTHALMOLOGY | Facility: CLINIC | Age: 58
End: 2018-04-26

## 2018-04-29 ENCOUNTER — FORM ENCOUNTER (OUTPATIENT)
Age: 58
End: 2018-04-29

## 2018-04-30 ENCOUNTER — OUTPATIENT (OUTPATIENT)
Dept: OUTPATIENT SERVICES | Facility: HOSPITAL | Age: 58
LOS: 1 days | End: 2018-04-30
Payer: MEDICARE

## 2018-04-30 ENCOUNTER — APPOINTMENT (OUTPATIENT)
Dept: CT IMAGING | Facility: IMAGING CENTER | Age: 58
End: 2018-04-30
Payer: MEDICARE

## 2018-04-30 DIAGNOSIS — C64.9 MALIGNANT NEOPLASM OF UNSPECIFIED KIDNEY, EXCEPT RENAL PELVIS: ICD-10-CM

## 2018-04-30 DIAGNOSIS — Z95.5 PRESENCE OF CORONARY ANGIOPLASTY IMPLANT AND GRAFT: Chronic | ICD-10-CM

## 2018-04-30 PROCEDURE — 74178 CT ABD&PLV WO CNTR FLWD CNTR: CPT

## 2018-04-30 PROCEDURE — 70491 CT SOFT TISSUE NECK W/DYE: CPT

## 2018-04-30 PROCEDURE — 70491 CT SOFT TISSUE NECK W/DYE: CPT | Mod: 26

## 2018-04-30 PROCEDURE — 71260 CT THORAX DX C+: CPT | Mod: 26

## 2018-04-30 PROCEDURE — 71260 CT THORAX DX C+: CPT

## 2018-04-30 PROCEDURE — 74178 CT ABD&PLV WO CNTR FLWD CNTR: CPT | Mod: 26

## 2018-04-30 PROCEDURE — 82565 ASSAY OF CREATININE: CPT

## 2018-05-14 ENCOUNTER — OUTPATIENT (OUTPATIENT)
Dept: OUTPATIENT SERVICES | Facility: HOSPITAL | Age: 58
LOS: 1 days | Discharge: HOME | End: 2018-05-14

## 2018-05-14 DIAGNOSIS — C64.9 MALIGNANT NEOPLASM OF UNSPECIFIED KIDNEY, EXCEPT RENAL PELVIS: ICD-10-CM

## 2018-05-14 DIAGNOSIS — Z95.5 PRESENCE OF CORONARY ANGIOPLASTY IMPLANT AND GRAFT: Chronic | ICD-10-CM

## 2018-06-07 ENCOUNTER — RESULT REVIEW (OUTPATIENT)
Age: 58
End: 2018-06-07

## 2018-06-07 ENCOUNTER — APPOINTMENT (OUTPATIENT)
Dept: HEMATOLOGY ONCOLOGY | Facility: CLINIC | Age: 58
End: 2018-06-07

## 2018-06-07 VITALS
BODY MASS INDEX: 30.63 KG/M2 | TEMPERATURE: 98.1 F | DIASTOLIC BLOOD PRESSURE: 79 MMHG | WEIGHT: 184.08 LBS | SYSTOLIC BLOOD PRESSURE: 114 MMHG | OXYGEN SATURATION: 97 % | RESPIRATION RATE: 16 BRPM | HEART RATE: 89 BPM

## 2018-06-07 LAB
BASOPHILS # BLD AUTO: 0 K/UL — SIGNIFICANT CHANGE UP (ref 0–0.2)
BASOPHILS NFR BLD AUTO: 0.6 % — SIGNIFICANT CHANGE UP (ref 0–2)
EOSINOPHIL # BLD AUTO: 0.3 K/UL — SIGNIFICANT CHANGE UP (ref 0–0.5)
EOSINOPHIL NFR BLD AUTO: 6.2 % — HIGH (ref 0–6)
LYMPHOCYTES # BLD AUTO: 1.1 K/UL — SIGNIFICANT CHANGE UP (ref 1–3.3)
LYMPHOCYTES # BLD AUTO: 20.7 % — SIGNIFICANT CHANGE UP (ref 13–44)
MONOCYTES # BLD AUTO: 0.4 K/UL — SIGNIFICANT CHANGE UP (ref 0–0.9)
MONOCYTES NFR BLD AUTO: 6.9 % — SIGNIFICANT CHANGE UP (ref 2–14)
NEUTROPHILS # BLD AUTO: 3.5 K/UL — SIGNIFICANT CHANGE UP (ref 1.8–7.4)
NEUTROPHILS NFR BLD AUTO: 65.6 % — SIGNIFICANT CHANGE UP (ref 43–77)
TSH SERPL-MCNC: 13.01 UIU/ML — HIGH (ref 0.27–4.2)

## 2018-06-08 ENCOUNTER — OUTPATIENT (OUTPATIENT)
Dept: OUTPATIENT SERVICES | Facility: HOSPITAL | Age: 58
LOS: 1 days | End: 2018-06-08

## 2018-06-08 ENCOUNTER — RESULT CHARGE (OUTPATIENT)
Age: 58
End: 2018-06-08

## 2018-06-08 ENCOUNTER — APPOINTMENT (OUTPATIENT)
Dept: ENDOCRINOLOGY | Facility: HOSPITAL | Age: 58
End: 2018-06-08
Payer: MEDICARE

## 2018-06-08 ENCOUNTER — LABORATORY RESULT (OUTPATIENT)
Age: 58
End: 2018-06-08

## 2018-06-08 VITALS
HEART RATE: 84 BPM | HEIGHT: 65 IN | DIASTOLIC BLOOD PRESSURE: 87 MMHG | BODY MASS INDEX: 30.82 KG/M2 | SYSTOLIC BLOOD PRESSURE: 124 MMHG | WEIGHT: 185 LBS

## 2018-06-08 DIAGNOSIS — E78.5 HYPERLIPIDEMIA, UNSPECIFIED: ICD-10-CM

## 2018-06-08 DIAGNOSIS — I10 ESSENTIAL (PRIMARY) HYPERTENSION: ICD-10-CM

## 2018-06-08 DIAGNOSIS — E11.65 TYPE 2 DIABETES MELLITUS WITH HYPERGLYCEMIA: ICD-10-CM

## 2018-06-08 DIAGNOSIS — Z95.5 PRESENCE OF CORONARY ANGIOPLASTY IMPLANT AND GRAFT: Chronic | ICD-10-CM

## 2018-06-08 DIAGNOSIS — E11.9 TYPE 2 DIABETES MELLITUS WITHOUT COMPLICATIONS: ICD-10-CM

## 2018-06-08 DIAGNOSIS — E03.9 HYPOTHYROIDISM, UNSPECIFIED: ICD-10-CM

## 2018-06-08 LAB
ALBUMIN SERPL ELPH-MCNC: 3.9 G/DL — SIGNIFICANT CHANGE UP (ref 3.3–5)
ALP SERPL-CCNC: 97 U/L — SIGNIFICANT CHANGE UP (ref 40–120)
ALT FLD-CCNC: 40 U/L — SIGNIFICANT CHANGE UP (ref 10–45)
ANION GAP SERPL CALC-SCNC: 13 MMOL/L — SIGNIFICANT CHANGE UP (ref 5–17)
AST SERPL-CCNC: 41 U/L — HIGH (ref 10–40)
BILIRUB SERPL-MCNC: 0.5 MG/DL — SIGNIFICANT CHANGE UP (ref 0.2–1.2)
BUN SERPL-MCNC: 19 MG/DL — SIGNIFICANT CHANGE UP (ref 7–23)
CALCIUM SERPL-MCNC: 9.6 MG/DL — SIGNIFICANT CHANGE UP (ref 8.4–10.5)
CHLORIDE SERPL-SCNC: 96 MMOL/L — SIGNIFICANT CHANGE UP (ref 96–108)
CO2 SERPL-SCNC: 31 MMOL/L — SIGNIFICANT CHANGE UP (ref 22–31)
CREAT SERPL-MCNC: 1.15 MG/DL — SIGNIFICANT CHANGE UP (ref 0.5–1.3)
GLUCOSE BLDC GLUCOMTR-MCNC: 223
GLUCOSE SERPL-MCNC: 208 MG/DL — HIGH (ref 70–99)
HBA1C BLD-MCNC: 6.7 % — HIGH (ref 4–5.6)
POTASSIUM SERPL-MCNC: 4.8 MMOL/L — SIGNIFICANT CHANGE UP (ref 3.5–5.3)
POTASSIUM SERPL-SCNC: 4.8 MMOL/L — SIGNIFICANT CHANGE UP (ref 3.5–5.3)
PROT SERPL-MCNC: 7.7 G/DL — SIGNIFICANT CHANGE UP (ref 6–8.3)
SODIUM SERPL-SCNC: 140 MMOL/L — SIGNIFICANT CHANGE UP (ref 135–145)

## 2018-06-08 PROCEDURE — 99214 OFFICE O/P EST MOD 30 MIN: CPT | Mod: GC

## 2018-06-11 ENCOUNTER — INPATIENT (INPATIENT)
Facility: HOSPITAL | Age: 58
LOS: 3 days | Discharge: HOME | End: 2018-06-15
Attending: HOSPITALIST | Admitting: HOSPITALIST
Payer: MEDICARE

## 2018-06-11 VITALS
RESPIRATION RATE: 16 BRPM | HEART RATE: 78 BPM | TEMPERATURE: 99 F | DIASTOLIC BLOOD PRESSURE: 86 MMHG | OXYGEN SATURATION: 99 % | SYSTOLIC BLOOD PRESSURE: 127 MMHG

## 2018-06-11 DIAGNOSIS — G95.20 UNSPECIFIED CORD COMPRESSION: ICD-10-CM

## 2018-06-11 DIAGNOSIS — Z95.5 PRESENCE OF CORONARY ANGIOPLASTY IMPLANT AND GRAFT: Chronic | ICD-10-CM

## 2018-06-11 DIAGNOSIS — D49.2 NEOPLASM OF UNSPECIFIED BEHAVIOR OF BONE, SOFT TISSUE, AND SKIN: ICD-10-CM

## 2018-06-11 LAB
ALBUMIN SERPL ELPH-MCNC: 3.9 G/DL — SIGNIFICANT CHANGE UP (ref 3.3–5)
ALP SERPL-CCNC: 92 U/L — SIGNIFICANT CHANGE UP (ref 40–120)
ALT FLD-CCNC: 38 U/L — SIGNIFICANT CHANGE UP (ref 4–41)
APPEARANCE UR: CLEAR — SIGNIFICANT CHANGE UP
APTT BLD: 30.2 SEC — SIGNIFICANT CHANGE UP (ref 27.5–37.4)
AST SERPL-CCNC: 35 U/L — SIGNIFICANT CHANGE UP (ref 4–40)
BASOPHILS # BLD AUTO: 0.03 K/UL — SIGNIFICANT CHANGE UP (ref 0–0.2)
BASOPHILS NFR BLD AUTO: 0.6 % — SIGNIFICANT CHANGE UP (ref 0–2)
BILIRUB SERPL-MCNC: 0.4 MG/DL — SIGNIFICANT CHANGE UP (ref 0.2–1.2)
BILIRUB UR-MCNC: NEGATIVE — SIGNIFICANT CHANGE UP
BLD GP AB SCN SERPL QL: NEGATIVE — SIGNIFICANT CHANGE UP
BLOOD UR QL VISUAL: NEGATIVE — SIGNIFICANT CHANGE UP
BUN SERPL-MCNC: 21 MG/DL — SIGNIFICANT CHANGE UP (ref 7–23)
CALCIUM SERPL-MCNC: 9.1 MG/DL — SIGNIFICANT CHANGE UP (ref 8.4–10.5)
CHLORIDE SERPL-SCNC: 98 MMOL/L — SIGNIFICANT CHANGE UP (ref 98–107)
CO2 SERPL-SCNC: 30 MMOL/L — SIGNIFICANT CHANGE UP (ref 22–31)
COLOR SPEC: YELLOW — SIGNIFICANT CHANGE UP
CREAT SERPL-MCNC: 1.22 MG/DL — SIGNIFICANT CHANGE UP (ref 0.5–1.3)
EOSINOPHIL # BLD AUTO: 0.27 K/UL — SIGNIFICANT CHANGE UP (ref 0–0.5)
EOSINOPHIL NFR BLD AUTO: 5.5 % — SIGNIFICANT CHANGE UP (ref 0–6)
GLUCOSE SERPL-MCNC: 88 MG/DL — SIGNIFICANT CHANGE UP (ref 70–99)
GLUCOSE UR-MCNC: 50 — SIGNIFICANT CHANGE UP
HCT VFR BLD CALC: 31.5 % — LOW (ref 39–50)
HGB BLD-MCNC: 10 G/DL — LOW (ref 13–17)
IMM GRANULOCYTES # BLD AUTO: 0.01 # — SIGNIFICANT CHANGE UP
IMM GRANULOCYTES NFR BLD AUTO: 0.2 % — SIGNIFICANT CHANGE UP (ref 0–1.5)
INR BLD: 1.05 — SIGNIFICANT CHANGE UP (ref 0.88–1.17)
KETONES UR-MCNC: NEGATIVE — SIGNIFICANT CHANGE UP
LEUKOCYTE ESTERASE UR-ACNC: NEGATIVE — SIGNIFICANT CHANGE UP
LYMPHOCYTES # BLD AUTO: 1.14 K/UL — SIGNIFICANT CHANGE UP (ref 1–3.3)
LYMPHOCYTES # BLD AUTO: 23.4 % — SIGNIFICANT CHANGE UP (ref 13–44)
MCHC RBC-ENTMCNC: 29.9 PG — SIGNIFICANT CHANGE UP (ref 27–34)
MCHC RBC-ENTMCNC: 31.7 % — LOW (ref 32–36)
MCV RBC AUTO: 94 FL — SIGNIFICANT CHANGE UP (ref 80–100)
MONOCYTES # BLD AUTO: 0.36 K/UL — SIGNIFICANT CHANGE UP (ref 0–0.9)
MONOCYTES NFR BLD AUTO: 7.4 % — SIGNIFICANT CHANGE UP (ref 2–14)
MUCOUS THREADS # UR AUTO: SIGNIFICANT CHANGE UP
NEUTROPHILS # BLD AUTO: 3.06 K/UL — SIGNIFICANT CHANGE UP (ref 1.8–7.4)
NEUTROPHILS NFR BLD AUTO: 62.9 % — SIGNIFICANT CHANGE UP (ref 43–77)
NITRITE UR-MCNC: NEGATIVE — SIGNIFICANT CHANGE UP
NRBC # FLD: 0 — SIGNIFICANT CHANGE UP
PH UR: 6 — SIGNIFICANT CHANGE UP (ref 4.6–8)
PLATELET # BLD AUTO: 223 K/UL — SIGNIFICANT CHANGE UP (ref 150–400)
PMV BLD: 9.6 FL — SIGNIFICANT CHANGE UP (ref 7–13)
POTASSIUM SERPL-MCNC: 4.2 MMOL/L — SIGNIFICANT CHANGE UP (ref 3.5–5.3)
POTASSIUM SERPL-SCNC: 4.2 MMOL/L — SIGNIFICANT CHANGE UP (ref 3.5–5.3)
PROT SERPL-MCNC: 8 G/DL — SIGNIFICANT CHANGE UP (ref 6–8.3)
PROT UR-MCNC: 20 MG/DL — SIGNIFICANT CHANGE UP
PROTHROM AB SERPL-ACNC: 12.1 SEC — SIGNIFICANT CHANGE UP (ref 9.8–13.1)
RBC # BLD: 3.35 M/UL — LOW (ref 4.2–5.8)
RBC # FLD: 16.6 % — HIGH (ref 10.3–14.5)
RBC CASTS # UR COMP ASSIST: SIGNIFICANT CHANGE UP (ref 0–?)
RH IG SCN BLD-IMP: POSITIVE — SIGNIFICANT CHANGE UP
SODIUM SERPL-SCNC: 139 MMOL/L — SIGNIFICANT CHANGE UP (ref 135–145)
SP GR SPEC: 1.02 — SIGNIFICANT CHANGE UP (ref 1–1.04)
UROBILINOGEN FLD QL: NORMAL MG/DL — SIGNIFICANT CHANGE UP
WBC # BLD: 4.87 K/UL — SIGNIFICANT CHANGE UP (ref 3.8–10.5)
WBC # FLD AUTO: 4.87 K/UL — SIGNIFICANT CHANGE UP (ref 3.8–10.5)
WBC UR QL: SIGNIFICANT CHANGE UP (ref 0–?)

## 2018-06-11 PROCEDURE — 70450 CT HEAD/BRAIN W/O DYE: CPT | Mod: 26

## 2018-06-11 PROCEDURE — 99223 1ST HOSP IP/OBS HIGH 75: CPT

## 2018-06-11 PROCEDURE — 71045 X-RAY EXAM CHEST 1 VIEW: CPT | Mod: 26

## 2018-06-11 PROCEDURE — 99231 SBSQ HOSP IP/OBS SF/LOW 25: CPT

## 2018-06-11 PROCEDURE — 76498 UNLISTED MR PROCEDURE: CPT | Mod: 26

## 2018-06-11 PROCEDURE — 72157 MRI CHEST SPINE W/O & W/DYE: CPT | Mod: 26

## 2018-06-11 RX ORDER — DIPHENHYDRAMINE HCL 50 MG
25 CAPSULE ORAL ONCE
Qty: 0 | Refills: 0 | Status: COMPLETED | OUTPATIENT
Start: 2018-06-11 | End: 2018-06-11

## 2018-06-11 RX ORDER — SODIUM CHLORIDE 9 MG/ML
1000 INJECTION, SOLUTION INTRAVENOUS
Qty: 0 | Refills: 0 | Status: DISCONTINUED | OUTPATIENT
Start: 2018-06-11 | End: 2018-06-15

## 2018-06-11 RX ORDER — SODIUM CHLORIDE 9 MG/ML
1000 INJECTION INTRAMUSCULAR; INTRAVENOUS; SUBCUTANEOUS ONCE
Qty: 0 | Refills: 0 | Status: COMPLETED | OUTPATIENT
Start: 2018-06-11 | End: 2018-06-11

## 2018-06-11 RX ORDER — INSULIN GLARGINE 100 [IU]/ML
14 INJECTION, SOLUTION SUBCUTANEOUS AT BEDTIME
Qty: 0 | Refills: 0 | Status: DISCONTINUED | OUTPATIENT
Start: 2018-06-12 | End: 2018-06-12

## 2018-06-11 RX ORDER — INSULIN GLARGINE 100 [IU]/ML
14 INJECTION, SOLUTION SUBCUTANEOUS ONCE
Qty: 0 | Refills: 0 | Status: COMPLETED | OUTPATIENT
Start: 2018-06-11 | End: 2018-06-12

## 2018-06-11 RX ORDER — AMLODIPINE BESYLATE 2.5 MG/1
10 TABLET ORAL ONCE
Qty: 0 | Refills: 0 | Status: COMPLETED | OUTPATIENT
Start: 2018-06-11 | End: 2018-06-12

## 2018-06-11 RX ORDER — DEXAMETHASONE 0.5 MG/5ML
10 ELIXIR ORAL ONCE
Qty: 0 | Refills: 0 | Status: COMPLETED | OUTPATIENT
Start: 2018-06-11 | End: 2018-06-11

## 2018-06-11 RX ORDER — GLUCAGON INJECTION, SOLUTION 0.5 MG/.1ML
1 INJECTION, SOLUTION SUBCUTANEOUS ONCE
Qty: 0 | Refills: 0 | Status: DISCONTINUED | OUTPATIENT
Start: 2018-06-11 | End: 2018-06-15

## 2018-06-11 RX ORDER — DEXTROSE 50 % IN WATER 50 %
15 SYRINGE (ML) INTRAVENOUS ONCE
Qty: 0 | Refills: 0 | Status: DISCONTINUED | OUTPATIENT
Start: 2018-06-11 | End: 2018-06-15

## 2018-06-11 RX ORDER — DEXAMETHASONE 0.5 MG/5ML
4 ELIXIR ORAL EVERY 6 HOURS
Qty: 0 | Refills: 0 | Status: DISCONTINUED | OUTPATIENT
Start: 2018-06-11 | End: 2018-06-15

## 2018-06-11 RX ORDER — DEXTROSE 50 % IN WATER 50 %
12.5 SYRINGE (ML) INTRAVENOUS ONCE
Qty: 0 | Refills: 0 | Status: DISCONTINUED | OUTPATIENT
Start: 2018-06-11 | End: 2018-06-15

## 2018-06-11 RX ORDER — INSULIN LISPRO 100/ML
VIAL (ML) SUBCUTANEOUS AT BEDTIME
Qty: 0 | Refills: 0 | Status: DISCONTINUED | OUTPATIENT
Start: 2018-06-11 | End: 2018-06-12

## 2018-06-11 RX ORDER — DEXTROSE 50 % IN WATER 50 %
25 SYRINGE (ML) INTRAVENOUS ONCE
Qty: 0 | Refills: 0 | Status: DISCONTINUED | OUTPATIENT
Start: 2018-06-11 | End: 2018-06-15

## 2018-06-11 RX ORDER — INSULIN LISPRO 100/ML
VIAL (ML) SUBCUTANEOUS
Qty: 0 | Refills: 0 | Status: DISCONTINUED | OUTPATIENT
Start: 2018-06-11 | End: 2018-06-12

## 2018-06-11 RX ORDER — DEXAMETHASONE 0.5 MG/5ML
10 ELIXIR ORAL ONCE
Qty: 0 | Refills: 0 | Status: DISCONTINUED | OUTPATIENT
Start: 2018-06-11 | End: 2018-06-11

## 2018-06-11 RX ADMIN — Medication 25 MILLIGRAM(S): at 13:36

## 2018-06-11 RX ADMIN — Medication 4 MILLIGRAM(S): at 21:41

## 2018-06-11 RX ADMIN — SODIUM CHLORIDE 1000 MILLILITER(S): 9 INJECTION INTRAMUSCULAR; INTRAVENOUS; SUBCUTANEOUS at 13:36

## 2018-06-11 RX ADMIN — Medication 102 MILLIGRAM(S): at 13:36

## 2018-06-11 NOTE — H&P ADULT - NSHPLABSRESULTS_GEN_ALL_CORE
10.0   4.87  )-----------( 223      ( 2018 13:20 )             31.5     06-11    139  |  98  |  21  ----------------------------<  88  4.2   |  30  |  1.22    Ca    9.1      2018 13:20    TPro  8.0  /  Alb  3.9  /  TBili  0.4  /  DBili  x   /  AST  35  /  ALT  38  /  AlkPhos  92  06-11    CAPILLARY BLOOD GLUCOSE        PT/INR - ( 2018 13:20 )   PT: 12.1 SEC;   INR: 1.05          PTT - ( 2018 13:20 )  PTT:30.2 SEC  Urinalysis Basic - ( 2018 13:20 )    Color: YELLOW / Appearance: CLEAR / S.019 / pH: 6.0  Gluc: 50 / Ketone: NEGATIVE  / Bili: NEGATIVE / Urobili: NORMAL mg/dL   Blood: NEGATIVE / Protein: 20 mg/dL / Nitrite: NEGATIVE   Leuk Esterase: NEGATIVE / RBC: 0-2 / WBC 0-2   Sq Epi: x / Non Sq Epi: x / Bacteria: x      Vital Signs Last 24 Hrs  T(C): 36.4 (2018 23:07), Max: 37 (2018 11:24)  T(F): 97.5 (2018 23:07), Max: 98.6 (2018 11:24)  HR: 92 (2018 23:07) (73 - 92)  BP: 180/100 (2018 23:08) (127/86 - 195/101)  BP(mean): --  RR: 18 (2018 23:07) (16 - 18)  SpO2: 99% (2018 23:07) (99% - 100%)

## 2018-06-11 NOTE — ED ADULT NURSE REASSESSMENT NOTE - NS ED NURSE REASSESS COMMENT FT1
intake pt with Hx. right kidney Ca on Chemo coming with left leg pain/numbness with some loss sensation x 1 month + some toe movements, labs sent IV to right AC 20 g angio cath medicated as ordered.   pt to [facility].     Amarilys Hicks RN

## 2018-06-11 NOTE — H&P ADULT - PROBLEM SELECTOR PLAN 3
-hold oral hypoglycemics  -c/w lantus, place on low dose ISS; may need to increase 2/2 decadron regimen

## 2018-06-11 NOTE — CONSULT NOTE ADULT - SUBJECTIVE AND OBJECTIVE BOX
57 YO Male with a history of right-sided RCC diagnosed in 2014 s/p chemo (currently on Cabozantinib) and RT with metastatic disease to the lung, spine and brain, C/O LLE weakness and groin numbness X 5 weeks, acutely worsening over the past four days. Pt denies urine retention but states his urine flow has been diminished. No bowel incontinence.    WDWN male in NAD  Vital Signs Last 24 Hrs  T(C): 37 (11 Jun 2018 11:24), Max: 37 (11 Jun 2018 11:24)  T(F): 98.6 (11 Jun 2018 11:24), Max: 98.6 (11 Jun 2018 11:24)  HR: 78 (11 Jun 2018 11:24) (78 - 78)  BP: 127/86 (11 Jun 2018 11:24) (127/86 - 127/86)  BP(mean): --  RR: 16 (11 Jun 2018 11:24) (16 - 16)  SpO2: 99% (11 Jun 2018 11:24) (99% - 99%)    AAO X 3  PERRLA, EOMI  STEWART Left LE 4/5, strength otherwise 5/5  Sensation mildly decreased in groin and left thigh, otherwise SILT    < from: MR Thoracic Spine w/wo IV Cont (06.11.18 @ 17:20) >  VERTEBRAL BODIES:  Again noted is abnormal signal within the TE 6 T7 and   T8 vertebral bodies. At the T6 level heterogeneous signal intensity is   seen with mild enhancement noted which has decreased from the prior exam   and inferior endplate cavity is seen which also demonstrates decreased   enhancement suggesting decreased metastatic involvement. At the T7 level   diffuse T1 hypointensity is seen replacing the fatty signal seen   previously and there is diffuse heterogeneous enhancement compatible with   tumor involvement which has progressed. Abnormal signal is seen within   the paraspinal and prevertebral soft tissues particularly on the left as   seen previously compatible with extraosseous tumor extension. At the T8   level the vertebral body is diffusely hypointense with a cavity   superiorly and mild enhancement. This is compatible with diffuse tumor   involvement which is relatively unchanged or may have decreased slightly.   A superior endplate deformity of L2 is seen, not in its entirety which   may represent a Schmorl's node although metastatic involvement is not   excluded. The remaining vertebral bodies are normal in appearance.    ALIGNMENT:  No subluxations.    INTERVERTEBRAL DISCS:No significant disc bulge or focal disc herniation.    NEURAL FORAMINA:  No significant foraminal narrowing.    SPINAL CORD: There is a focus of abnormal enhancement within the ventral   aspect of the spinal cord at the T7-T8 interspace with mild associated   cord expansion and adjacent edema. Edema extends from the level of the T7   vertebral body to the T8-T9 interspace. This was not seen on the prior   exam. The spinal cord is otherwise unremarkable.    SPINAL CANAL:  No other intradural or extradural defects are seen. No   abnormal enhancement is seen.      MISCELLANEOUS:  None.      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    Metastatic disease involving the T6-T8 vertebral bodies with progression   at the T7 level as described above.    < end of copied text >

## 2018-06-11 NOTE — H&P ADULT - PROBLEM SELECTOR PLAN 2
-will obtain formal Onc input tomorrow  -daily cabozantinib pending  Onc reccs tomorrow  -MRI abd pending Onc input tomorrow -will obtain formal Onc input tomorrow  -daily cabozantinib pending  Onc reccs tomorrow  -MRI abd pending Onc input tomorrow  -c/w oxycodone prn

## 2018-06-11 NOTE — ED PROVIDER NOTE - PROGRESS NOTE DETAILS
José Luis JESSICA- wife told about the mets to the spine and pending ortho and radiation oncology JW Cord compression.  PT received steroids.  Ortho spine no acute intervention.  Rad Onc Dr. Goldberg will see the patient on the medicine service in preparation for radiation therapy. MAR text paged. José Luis JESSICA- pt seen by farida covering spine, pt has intramedually process and needs neurosurgical consult , called neurosurgery

## 2018-06-11 NOTE — H&P ADULT - PROBLEM SELECTOR PLAN 5
IMPROVE VTE Individual Risk Assessment    RISK                                                          Points  [] Previous VTE                                           3  [] Thrombophilia                                        2  [] Lower limb paralysis                              2   [x] Current Cancer                                       2   [] Immobilization > 24 hrs                        1  [] ICU/CCU stay > 24 hours                       1  [] Age > 60                                                   1    IMPROVE VTE Score: 2

## 2018-06-11 NOTE — ED ADULT TRIAGE NOTE - CHIEF COMPLAINT QUOTE
states" I have pain in groin with pain to back and leg and with trouble urinating since few days"" also has problem with foot and needs an MRI

## 2018-06-11 NOTE — ED PROVIDER NOTE - OBJECTIVE STATEMENT
58yo Male with a history of right-sided RCC diagnosed in 2014 s/p chemo (currently on Cabozantinib) and RT with metastatic disease to the lung, spine and brain, HTN and Type 2 DM presenting from [facility] [place] for hypertensive urgency likely side-effect due chemotherapy 56yo Male with a history of right-sided RCC diagnosed in 2014 s/p chemo (currently on Cabozantinib) and RT with metastatic disease to the lung, spine and brain, HTN and Type 2 DM presenting from [facility] for hypertensive urgency likely side-effect due chemotherapy c/o LLEX weakness for the past month acutely worsening over the past four days.  Associated decreased urinary output, left groin numbness.  PT schedule for MRI in the future had difficulty walking today and came to the ED.  No trauma, no midline spinal tenderness,  no diarrhea, no loss of bowel continence, and no loss of bladder continence.

## 2018-06-11 NOTE — ED PROVIDER NOTE - MEDICAL DECISION MAKING DETAILS
58yo Male with a history of right-sided RCC diagnosed in 2014 s/p chemo (currently on Cabozantinib) and RT with metastatic disease to the lung, spine and brain, HTN and Type 2 DM [place] [facility] [place] likely side-effect due chemotherapy c/o LLEX weakness for the past month acutely worsening over the past four days.  VSS WNL.  Exam concerning for cord compression.  BS US bladder scan reveals no urinary retention: 38ccs post void.  DDX sciatica, neuropathy.  R/O cord compression.  MR cord compression study.  Symptomatic treatment, decadron, neurosurgery consultation, reassess.

## 2018-06-11 NOTE — H&P ADULT - NSHPPHYSICALEXAM_GEN_ALL_CORE
PHYSICAL EXAM:      Constitutional: NAD, well-groomed, well-developed  HEENT:  EOMI, Normal Hearing  Neck: No LAD, No JVD  Back: Normal spine flexure  Respiratory: CTAB  Cardiovascular: S1 and S2, RRR  Gastrointestinal: BS+, mild LLQ tenderness on palpation   Extremities: No peripheral edema  Vascular: 2+ peripheral pulses  Neurological: A/O x 3  Psychiatric: Normal mood, normal affect  Musculoskeletal: left sided weakness leg extension and flexion at knee as well as left foot weakness with plantar and dorsiflexion. Numbness throughout leg, most pronounced posteriorly   Skin: No rashes

## 2018-06-11 NOTE — CHART NOTE - NSCHARTNOTEFT_GEN_A_CORE
Briefly, Mr. Jack is a 58 year old man with a history of renal cell carcinoma who has a history of several months of LLE weakness, but which acutely worsened over the past few days. He also complains of urinary retention and pelvic numbness. MRI revealed osseous spinal mets from T6-8 with cord compression, in addition to intramedullary disease at T7 with surrounding cord edema.     Given that the patient received SBRT 16Gy/1fx (high dose radiation) in 2015 to the same area in question (T6 & T8), a complex plan evaluation will be required to see if repeat radiation to this area is feasible. Please load the patient with decadron and have neurosurgery evaluate the patient. We will see him and drop a full consult note tomorrow morning. Once neurosurgery has decided on their plan of action and we are able to assess the possibility of re-irradiation, we will be able to create a more comprehensive plan.     Call [phone] with any further questions. Briefly, Mr. Jack is a 58 year old man with a history of renal cell carcinoma who has a history of several months of LLE weakness, but which acutely worsened over the past few days. He also complains of urinary retention and pelvic numbness. MRI revealed osseous spinal mets from T6-8 with cord compression, in addition to intramedullary disease at T7 with surrounding cord edema.     Treatment planning records from patient's previous spine radiation (SBRT to T6, T8) were reviewed.  His thoracic spine MRI was reviewed.  Patient has disease / progression in the area of T6 to T8.  Given that the patient received SBRT 16Gy/1fx (high dose radiation) in 2015, a complex plan evaluation will be required to see if repeat radiation to this area is feasible.  It may be feasible for patient to have some additional radiation, using SBRT.  This will require simulation with complex immobilization set up and planning at the [facility] location.  Please continue decadron and have neurosurgery evaluate the patient.  Once neurosurgery has decided on their plan of action and we are able to assess the possibility of re-irradiation, we will be able to create a more comprehensive plan and likely set up as outpatient at [facility].    Call [phone] with any further questions.

## 2018-06-11 NOTE — ED PROVIDER NOTE - ATTENDING CONTRIBUTION TO CARE
José Luis JESSICA- 58 Y/O M with h/o rt sided renal cell carcinoma with mets and now on oral chemo everyday , htn, dm p/w left leg weakness which is progressive and he is dragging his left leg with foot drop and has numbness in his saddle area and urinary hesitancy. No fall or trauma reported , no fever, chills, weight loss but has been having itchy rash all over the face and trunk, No back pain, nausea, vomiting. Pt has gained weight recently.    Pt is alert, well appearing male s1s2 normal reg, b/l clear breath sounds, abd soft, nt, nd, normal bowel sounds, back non tender in midline, saddle anesthesia noted, full rom at both hips , knee and ankle and foot but pt has decreased flexion and extension strength at foot and knee, able to straight raise the left leg but decreased strength, nekc soft, supple, full rom, peerl eomi, aox3, cn 2-12 intact    plan to do MRI whole spine given cancer history to r.o cord compression vs recurrent or worsening mets, will check labs, cxr, ct head, and given fluids and benadryl for rash

## 2018-06-11 NOTE — H&P ADULT - PROBLEM SELECTOR PLAN 1
-seen by neurosurgery  who for now recommend decadron; no role for surgical intervention at this time  -seen by radiation oncology who will determine if there is role for RT  -pt should continue to ambulate as tolerated with fall precuations -seen by neurosurgery  who for now recommend decadron; no role for surgical intervention at this time  -seen by radiation oncology who will determine if there is role for RT  -pt should continue to ambulate as tolerated with fall precautions

## 2018-06-11 NOTE — H&P ADULT - NSHPREVIEWOFSYSTEMS_GEN_ALL_CORE
Review of Systems:   CONSTITUTIONAL: No fever, weight loss  EYES: No eye pain, visual disturbances, or discharge  ENMT:  No difficulty hearing, tinnitus, vertigo; No sinus or throat pain  NECK: No pain or stiffness  RESPIRATORY: No cough, wheezing, chills or hemoptysis; No shortness of breath  CARDIOVASCULAR: No chest pain, palpitations, dizziness, or leg swelling  GASTROINTESTINAL: LLQ abdominal pain. No nausea, vomiting, or hematemesis; No diarrhea or constipation. No melena or hematochezia.  GENITOURINARY: Increased retention   NEUROLOGICAL: No headaches, memory loss; + loss of strength, numbness,   SKIN: No itching, burning, rashes, or lesions   MUSCULOSKELETAL: Left leg, and lower back  pain

## 2018-06-11 NOTE — CONSULT NOTE ADULT - SUBJECTIVE AND OBJECTIVE BOX
Ortho Spine Consult    HPI:   Patient is a 58y Male with hx of metastatic RCC, HTN, HLD, hypothyroidswho presents c/o 6 weeks of lower extremity weakness and new urinary incontinence of for 3-4 days.     . Denies HS/LOC. Denies pain/injury elsewhere. Denies numbness/tingling/paresthesias/weakness. Denies bowel/bladder incontinence. Denies fevers/chills. No other complaints at this time.            HEALTH ISSUES - PROBLEM Dx:          MEDICATIONS  (STANDING):  dexamethasone  Injectable 4 milliGRAM(s) IV Push every 6 hours      Allergies    No Known Allergies    Intolerances        PAST MEDICAL & SURGICAL HISTORY:  Hypothyroidism  GERD (gastroesophageal reflux disease)  Elevated TSH  Cervicalgia  Metastatic renal cell carcinoma to brain  Metastatic carcinoma to bone  Coronary artery disease  Hypertension  Metastatic renal cell carcinoma  Diabetes mellitus  Cancer  No pertinent past medical history  History of coronary artery stent placement  Status post gamma knife treatment: Left forehead/temple  No significant past surgical history                            10.0   4.87  )-----------( 223      ( 2018 13:20 )             31.5       2018 13:20    139    |  98     |  21     ----------------------------<  88     4.2     |  30     |  1.22     Ca    9.1        2018 13:20    TPro  8.0    /  Alb  3.9    /  TBili  0.4    /  DBili  x      /  AST  35     /  ALT  38     /  AlkPhos  92     2018 13:20      PT/INR - ( 2018 13:20 )   PT: 12.1 SEC;   INR: 1.05          PTT - ( 2018 13:20 )  PTT:30.2 SEC    Urinalysis Basic - ( 2018 13:20 )    Color: YELLOW / Appearance: CLEAR / S.019 / pH: 6.0  Gluc: 50 / Ketone: NEGATIVE  / Bili: NEGATIVE / Urobili: NORMAL mg/dL   Blood: NEGATIVE / Protein: 20 mg/dL / Nitrite: NEGATIVE   Leuk Esterase: NEGATIVE / RBC: 0-2 / WBC 0-2   Sq Epi: x / Non Sq Epi: x / Bacteria: x        Vital Signs Last 24 Hrs  T(C): 37 (18 @ 11:24), Max: 37 (18 @ 11:24)  T(F): 98.6 (18 @ 11:24), Max: 98.6 (18 @ 11:24)  HR: 78 (18 @ 11:24) (78 - 78)  BP: 127/86 (18 @ 11:24) (127/86 - 127/86)  BP(mean): --  RR: 16 (18 @ 11:24) (16 - 16)  SpO2: 99% (18 @ 11:24) (99% - 99%)    Gen: NAD    Spine PE:  Skin intact  No gross deformity  No midnline TTP C/T/L/S spine  No bony step offs  No paraspinal muscle ttp/hypertonicity   Negative Straight leg raise  Negative clonus  Negative babinski  Negative olea  + rectal tone  No saddle anesthesia    Motor:                   C5                C6              C7               C8           T1   R            5/5                5/5            5/5             5/5          5/5  L             5/5               5/5             5/5             5/5          5/5                L2             L3             L4               L5            S1  R         5/5           5/5          5/5             5/5           5/5  L          5/5          5/5           5/5             5/5           5/5    Sensory:            C5         C6         C7      C8       T1        (0=absent, 1=impaired, 2=normal, NT=not testable)  R         2            2           2        2         2  L          2            2           2        2         2               L2          L3         L4      L5       S1         (0=absent, 1=impaired, 2=normal, NT=not testable)  R         2            2            2        2        2  L          2            2           2        2         2    Imaging: ???    A/P: 58y Male with ???  Pain control  WBAT with assistive devices as needed  FU Labs/imaging  SCDs Ortho Spine Consult    HPI:   Patient is a 58y Male with hx of metastatic RCC, HTN, HLD, hypothyroidswho presents c/o 6 weeks of lower extremity weakness and new urinary incontinence of for 3-4 days. Endorses weakness and numbness of LLE. Urinary incontinence has been worsening and denies bowel incontinence. No saddle anesthesia or upper extremity symptoms. Denies back pain and no recent trauma Denies fevers/chills. No other complaints at this time.    MEDICATIONS  (STANDING):  dexamethasone  Injectable 4 milliGRAM(s) IV Push every 6 hours      Allergies    No Known Allergies    Intolerances        PAST MEDICAL & SURGICAL HISTORY:  Hypothyroidism  GERD (gastroesophageal reflux disease)  Elevated TSH  Cervicalgia  Metastatic renal cell carcinoma to brain  Metastatic carcinoma to bone  Coronary artery disease  Hypertension  Metastatic renal cell carcinoma  Diabetes mellitus  Cancer  No pertinent past medical history  History of coronary artery stent placement  Status post gamma knife treatment: Left forehead/temple  No significant past surgical history                            10.0   4.87  )-----------( 223      ( 2018 13:20 )             31.5       2018 13:20    139    |  98     |  21     ----------------------------<  88     4.2     |  30     |  1.22     Ca    9.1        2018 13:20    TPro  8.0    /  Alb  3.9    /  TBili  0.4    /  DBili  x      /  AST  35     /  ALT  38     /  AlkPhos  92     2018 13:20      PT/INR - ( 2018 13:20 )   PT: 12.1 SEC;   INR: 1.05          PTT - ( 2018 13:20 )  PTT:30.2 SEC    Urinalysis Basic - ( 2018 13:20 )    Color: YELLOW / Appearance: CLEAR / S.019 / pH: 6.0  Gluc: 50 / Ketone: NEGATIVE  / Bili: NEGATIVE / Urobili: NORMAL mg/dL   Blood: NEGATIVE / Protein: 20 mg/dL / Nitrite: NEGATIVE   Leuk Esterase: NEGATIVE / RBC: 0-2 / WBC 0-2   Sq Epi: x / Non Sq Epi: x / Bacteria: x        Vital Signs Last 24 Hrs  T(C): 37 (18 @ 11:24), Max: 37 (18 @ 11:24)  T(F): 98.6 (18 @ 11:24), Max: 98.6 (18 @ 11:24)  HR: 78 (18 @ 11:24) (78 - 78)  BP: 127/86 (18 @ 11:24) (127/86 - 127/86)  BP(mean): --  RR: 16 (18 @ 11:24) (16 - 16)  SpO2: 99% (18 @ 11:24) (99% - 99%)    Gen: NAD    Spine PE:  Skin intact  No gross deformity  No midline TTP C/T/L/S spine  No bony step offs  No paraspinal muscle ttp/hypertonicity   Negative Straight leg raise  Negative clonus  Negative babinski  Negative olea  + rectal tone  No saddle anesthesia    Motor:                   C5                C6              C7               C8           T1   R            5/5                5/5            5/5             5/5          5/5  L             5/5               5/5             5/5             5/5          5/5                L2             L3             L4               L5            S1  R         5/5           5/5          5/5             5/5           5/5  L          3/5          3/5           3/5             3/5           3/5    Sensory:            C5         C6         C7      C8       T1        (0=absent, 1=impaired, 2=normal, NT=not testable)  R         2            2           2        2         2  L          2            2           2        2         2               L2          L3         L4      L5       S1         (0=absent, 1=impaired, 2=normal, NT=not testable)  R         2            2            2        2        2  L          1            1           1        1         1    Imaging:   T spine MRI  IMPRESSION:   Focal area of enhancement with surrounding edema and cord expansion at the   level of the T7-8 interspace compatible with an intramedullary spinal cord   metastasis.     Metastatic disease involving the T6-T8 vertebral bodies with progression at   the T7 level as described above.         A/P: 58y Male with RCC with metastatic disease involving T6-T8 VBs with intramedullary spinal cord mets at T7-T8. Patient have been largely chronic in nature and due to intramedullary process no acute orthopaedic intervention will be offered at this time.      -Neuro surgery consult due to intramedullary spinal cord process  -Radiation oncology evaluation for feasibility of therapy  -Pain control  -WBAT with assistive devices as needed  -FU Labs/imaging  -SCDs

## 2018-06-11 NOTE — H&P ADULT - HISTORY OF PRESENT ILLNESS
57 yo m with stage 4 RCC and 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 S/p gamma knife to brain met, not a candidate for nephrectomy per Urology given brain mets. S/p spinal radiation. Previously on pazopanib, switched to nivolumab, now on cabozantinib since Nov 2017. Pt also with secondary htn attributed to RCC antineoplastic agents, no on htn meds 59 yo m with stage 4 RCC and 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 S/p gamma knife to brain met, not a candidate for nephrectomy per Urology given brain mets. S/p spinal radiation. Previously on pazopanib, switched to nivolumab, now on cabozantinib since Nov 2017. Pt also with secondary htn attributed to RCC antineoplastic agents, now on htn meds 59 yo m with stage 4 RCC and 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 S/p gamma knife to brain met, not a candidate for nephrectomy per Urology given brain mets. S/p spinal radiation. Previously on pazopanib, switched to nivolumab, now on cabozantinib since Nov 2017. 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  Pt comes to ed in setting of several weeks of LLE weakness and numbness but which acutely worsened over the past few days. He also complains of urinary retention, (able to void, but weakened stream, incomplete bladder emptying) and pelvic numbness. MRI revealed osseous spinal mets from T6-8 with cord compression, in addition to intramedullary disease at T7 with surrounding cord edema.   Pt also with BP elevation in ED as high as 195/101. Takes enalapril daily for last few months (and this morning) for HTN attributed to cabozantinib; checks his BP daily and says has been consistently below 140. Normal BP in ed prior to decadron. Denies HA, blurry vision, chest pain, or sob. Reports left-sided abd and lower back pain for last 4- 5 weeks that he reports was to be evaluated with MRI of abdomen shortly. Denies constipation but notes increased straining for passage of BM. UA negative  Pt also with DM 59 yo m with 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 S/p gamma knife to brain met, not a candidate for nephrectomy per Urology given brain mets. S/p spinal radiation. Previously on pazopanib, switched to nivolumab, now on cabozantinib since Nov 2017. 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  Pt comes to ed in setting of several weeks of LLE weakness and numbness but which acutely worsened over the past few days. He also complains of urinary retention, (able to void, but weakened stream, incomplete bladder emptying) and pelvic numbness. MRI revealed osseous spinal mets from T6-8 with cord compression, in addition to intramedullary disease at T7 with surrounding cord edema.   Pt also with BP elevation in ED as high as 195/101. Takes enalapril daily for last few months (and this morning) for HTN attributed to cabozantinib; checks his BP daily and says has been consistently below 140. Normal BP in ed prior to decadron. Denies HA, blurry vision, chest pain, or sob. Reports left-sided abd and lower back pain for last 4- 5 weeks that he reports was to be evaluated with MRI of abdomen shortly. Denies constipation but notes increased straining for passage of BM. UA negative  Pt also with DM, hypothyroid

## 2018-06-11 NOTE — ED PROCEDURE NOTE - PROCEDURE ADDITIONAL DETAILS
Focused ED ultrasound of the bladder for volume measurement [id number]    Indication: suspected urinary retention  Findings:  Bladder nondistended  Bladder volume: 38cc  Impression: No urinary retention    Procedure note and images placed in patient's chart

## 2018-06-11 NOTE — H&P ADULT - PROBLEM SELECTOR PLAN 4
DD includes cabozantinib effect vs decadron effect vs white-coat htn as similar spikes  in BP have occurred during prior hospitalization and outpt visits  -will c/w home enalapril, and add norvasc to regimen for now DD includes cabozantinib effect vs decadron effect vs white-coat htn as similar spikes  in BP have occurred during prior hospitalization and outpt visits  -will c/w home enalapril, and add norvasc to regimen for now    med list based on pt verbalization; will email pharmacy for med rec DD includes cabozantinib effect vs decadron effect vs pain vs white-coat htn as similar spikes  in BP have occurred during prior hospitalization and outpt visits  -will c/w home enalapril, and add norvasc to regimen for now      med list based on pt verbalization; will email pharmacy for med rec

## 2018-06-12 DIAGNOSIS — Z29.9 ENCOUNTER FOR PROPHYLACTIC MEASURES, UNSPECIFIED: ICD-10-CM

## 2018-06-12 DIAGNOSIS — I15.8 OTHER SECONDARY HYPERTENSION: ICD-10-CM

## 2018-06-12 DIAGNOSIS — G95.20 UNSPECIFIED CORD COMPRESSION: ICD-10-CM

## 2018-06-12 DIAGNOSIS — E11.8 TYPE 2 DIABETES MELLITUS WITH UNSPECIFIED COMPLICATIONS: ICD-10-CM

## 2018-06-12 DIAGNOSIS — E03.9 HYPOTHYROIDISM, UNSPECIFIED: ICD-10-CM

## 2018-06-12 DIAGNOSIS — C79.31 SECONDARY MALIGNANT NEOPLASM OF BRAIN: ICD-10-CM

## 2018-06-12 LAB
BUN SERPL-MCNC: 21 MG/DL — SIGNIFICANT CHANGE UP (ref 7–23)
CALCIUM SERPL-MCNC: 9.2 MG/DL — SIGNIFICANT CHANGE UP (ref 8.4–10.5)
CHLORIDE SERPL-SCNC: 92 MMOL/L — LOW (ref 98–107)
CO2 SERPL-SCNC: 24 MMOL/L — SIGNIFICANT CHANGE UP (ref 22–31)
CREAT SERPL-MCNC: 1.01 MG/DL — SIGNIFICANT CHANGE UP (ref 0.5–1.3)
GLUCOSE BLDC GLUCOMTR-MCNC: 253 MG/DL — HIGH (ref 70–99)
GLUCOSE BLDC GLUCOMTR-MCNC: 356 MG/DL — HIGH (ref 70–99)
GLUCOSE BLDC GLUCOMTR-MCNC: 403 MG/DL — HIGH (ref 70–99)
GLUCOSE SERPL-MCNC: 298 MG/DL — HIGH (ref 70–99)
HCT VFR BLD CALC: 34.4 % — LOW (ref 39–50)
HGB BLD-MCNC: 11.2 G/DL — LOW (ref 13–17)
MCHC RBC-ENTMCNC: 30.2 PG — SIGNIFICANT CHANGE UP (ref 27–34)
MCHC RBC-ENTMCNC: 32.6 % — SIGNIFICANT CHANGE UP (ref 32–36)
MCV RBC AUTO: 92.7 FL — SIGNIFICANT CHANGE UP (ref 80–100)
NRBC # FLD: 0 — SIGNIFICANT CHANGE UP
PLATELET # BLD AUTO: 286 K/UL — SIGNIFICANT CHANGE UP (ref 150–400)
PMV BLD: 9.7 FL — SIGNIFICANT CHANGE UP (ref 7–13)
POTASSIUM SERPL-MCNC: 4.8 MMOL/L — SIGNIFICANT CHANGE UP (ref 3.5–5.3)
POTASSIUM SERPL-SCNC: 4.8 MMOL/L — SIGNIFICANT CHANGE UP (ref 3.5–5.3)
RBC # BLD: 3.71 M/UL — LOW (ref 4.2–5.8)
RBC # FLD: 16.5 % — HIGH (ref 10.3–14.5)
SODIUM SERPL-SCNC: 133 MMOL/L — LOW (ref 135–145)
SPECIMEN SOURCE: SIGNIFICANT CHANGE UP
WBC # BLD: 7.01 K/UL — SIGNIFICANT CHANGE UP (ref 3.8–10.5)
WBC # FLD AUTO: 7.01 K/UL — SIGNIFICANT CHANGE UP (ref 3.8–10.5)

## 2018-06-12 PROCEDURE — 99233 SBSQ HOSP IP/OBS HIGH 50: CPT

## 2018-06-12 PROCEDURE — 99223 1ST HOSP IP/OBS HIGH 75: CPT

## 2018-06-12 PROCEDURE — 93010 ELECTROCARDIOGRAM REPORT: CPT

## 2018-06-12 PROCEDURE — 99233 SBSQ HOSP IP/OBS HIGH 50: CPT | Mod: GC

## 2018-06-12 RX ORDER — INSULIN LISPRO 100/ML
VIAL (ML) SUBCUTANEOUS
Qty: 0 | Refills: 0 | Status: DISCONTINUED | OUTPATIENT
Start: 2018-06-12 | End: 2018-06-15

## 2018-06-12 RX ORDER — OXYCODONE HYDROCHLORIDE 5 MG/1
10 TABLET ORAL EVERY 6 HOURS
Qty: 0 | Refills: 0 | Status: DISCONTINUED | OUTPATIENT
Start: 2018-06-12 | End: 2018-06-14

## 2018-06-12 RX ORDER — SENNA PLUS 8.6 MG/1
2 TABLET ORAL AT BEDTIME
Qty: 0 | Refills: 0 | Status: DISCONTINUED | OUTPATIENT
Start: 2018-06-12 | End: 2018-06-15

## 2018-06-12 RX ORDER — PANTOPRAZOLE SODIUM 20 MG/1
40 TABLET, DELAYED RELEASE ORAL
Qty: 0 | Refills: 0 | Status: DISCONTINUED | OUTPATIENT
Start: 2018-06-12 | End: 2018-06-15

## 2018-06-12 RX ORDER — HYDROCORTISONE 1 %
1 OINTMENT (GRAM) TOPICAL DAILY
Qty: 0 | Refills: 0 | Status: DISCONTINUED | OUTPATIENT
Start: 2018-06-12 | End: 2018-06-15

## 2018-06-12 RX ORDER — AMLODIPINE BESYLATE 2.5 MG/1
10 TABLET ORAL DAILY
Qty: 0 | Refills: 0 | Status: DISCONTINUED | OUTPATIENT
Start: 2018-06-12 | End: 2018-06-15

## 2018-06-12 RX ORDER — HEPARIN SODIUM 5000 [USP'U]/ML
5000 INJECTION INTRAVENOUS; SUBCUTANEOUS EVERY 8 HOURS
Qty: 0 | Refills: 0 | Status: DISCONTINUED | OUTPATIENT
Start: 2018-06-12 | End: 2018-06-15

## 2018-06-12 RX ORDER — INSULIN GLARGINE 100 [IU]/ML
16 INJECTION, SOLUTION SUBCUTANEOUS AT BEDTIME
Qty: 0 | Refills: 0 | Status: DISCONTINUED | OUTPATIENT
Start: 2018-06-12 | End: 2018-06-13

## 2018-06-12 RX ORDER — ACETAMINOPHEN 500 MG
650 TABLET ORAL EVERY 6 HOURS
Qty: 0 | Refills: 0 | Status: DISCONTINUED | OUTPATIENT
Start: 2018-06-12 | End: 2018-06-15

## 2018-06-12 RX ORDER — LEVOTHYROXINE SODIUM 125 MCG
137 TABLET ORAL DAILY
Qty: 0 | Refills: 0 | Status: DISCONTINUED | OUTPATIENT
Start: 2018-06-12 | End: 2018-06-15

## 2018-06-12 RX ORDER — LEVOTHYROXINE SODIUM 125 MCG
1 TABLET ORAL
Qty: 0 | Refills: 0 | COMMUNITY

## 2018-06-12 RX ORDER — ATORVASTATIN CALCIUM 80 MG/1
20 TABLET, FILM COATED ORAL AT BEDTIME
Qty: 0 | Refills: 0 | Status: DISCONTINUED | OUTPATIENT
Start: 2018-06-12 | End: 2018-06-15

## 2018-06-12 RX ORDER — INSULIN LISPRO 100/ML
6 VIAL (ML) SUBCUTANEOUS
Qty: 0 | Refills: 0 | Status: DISCONTINUED | OUTPATIENT
Start: 2018-06-12 | End: 2018-06-13

## 2018-06-12 RX ADMIN — Medication 6 UNIT(S): at 18:04

## 2018-06-12 RX ADMIN — OXYCODONE HYDROCHLORIDE 10 MILLIGRAM(S): 5 TABLET ORAL at 09:08

## 2018-06-12 RX ADMIN — Medication 5: at 12:53

## 2018-06-12 RX ADMIN — OXYCODONE HYDROCHLORIDE 10 MILLIGRAM(S): 5 TABLET ORAL at 10:05

## 2018-06-12 RX ADMIN — OXYCODONE HYDROCHLORIDE 10 MILLIGRAM(S): 5 TABLET ORAL at 23:16

## 2018-06-12 RX ADMIN — INSULIN GLARGINE 14 UNIT(S): 100 INJECTION, SOLUTION SUBCUTANEOUS at 02:15

## 2018-06-12 RX ADMIN — Medication 4 MILLIGRAM(S): at 13:01

## 2018-06-12 RX ADMIN — Medication 4 MILLIGRAM(S): at 08:00

## 2018-06-12 RX ADMIN — PANTOPRAZOLE SODIUM 40 MILLIGRAM(S): 20 TABLET, DELAYED RELEASE ORAL at 05:51

## 2018-06-12 RX ADMIN — Medication 4 MILLIGRAM(S): at 02:17

## 2018-06-12 RX ADMIN — HEPARIN SODIUM 5000 UNIT(S): 5000 INJECTION INTRAVENOUS; SUBCUTANEOUS at 05:51

## 2018-06-12 RX ADMIN — Medication 1 APPLICATION(S): at 11:52

## 2018-06-12 RX ADMIN — INSULIN GLARGINE 16 UNIT(S): 100 INJECTION, SOLUTION SUBCUTANEOUS at 22:13

## 2018-06-12 RX ADMIN — ATORVASTATIN CALCIUM 20 MILLIGRAM(S): 80 TABLET, FILM COATED ORAL at 22:14

## 2018-06-12 RX ADMIN — Medication 12: at 18:04

## 2018-06-12 RX ADMIN — AMLODIPINE BESYLATE 10 MILLIGRAM(S): 2.5 TABLET ORAL at 00:06

## 2018-06-12 RX ADMIN — HEPARIN SODIUM 5000 UNIT(S): 5000 INJECTION INTRAVENOUS; SUBCUTANEOUS at 22:13

## 2018-06-12 RX ADMIN — Medication 2: at 00:05

## 2018-06-12 RX ADMIN — HEPARIN SODIUM 5000 UNIT(S): 5000 INJECTION INTRAVENOUS; SUBCUTANEOUS at 13:01

## 2018-06-12 RX ADMIN — Medication 4 MILLIGRAM(S): at 19:15

## 2018-06-12 RX ADMIN — Medication 137 MICROGRAM(S): at 05:51

## 2018-06-12 RX ADMIN — Medication 2: at 09:08

## 2018-06-12 RX ADMIN — Medication 20 MILLIGRAM(S): at 05:50

## 2018-06-12 RX ADMIN — OXYCODONE HYDROCHLORIDE 10 MILLIGRAM(S): 5 TABLET ORAL at 22:16

## 2018-06-12 RX ADMIN — AMLODIPINE BESYLATE 10 MILLIGRAM(S): 2.5 TABLET ORAL at 05:51

## 2018-06-12 NOTE — CONSULT NOTE ADULT - SUBJECTIVE AND OBJECTIVE BOX
HPI:  59 yo m with a history of RCC diffusely metastatic, s/p multiple systemic regimens (under care of Dr. Bradley Goldberg), s/p multiple RT courses to skull base, brain, pelvis, lung, and SBRT to T6 & T8 (16Gy/1fx in 2015), presents with several month history of LLE weakness and pain, which has acutely worsened over the past few days. In addition to this, he has L foot drop, urinary retention, and LLE/pelvic numbness. 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 No complaints of bowel changes or other symptoms. He is still ambulatory. He states that his pain and urinary retention have improved this morning, but are still present.         Allergies    No Known Allergies    ROS: [  ] Fever  [  ] Chills  [  ]Chest Pain [  ] SOB  [  ]Cough [  ] N/V  [  ] Diarrhea [  ]Constipation [  ]Other ROS:  [  ] ROS otherwise negative    PAST MEDICAL & SURGICAL HISTORY:  Hypothyroidism  GERD (gastroesophageal reflux disease)  Elevated TSH  Cervicalgia  Metastatic renal cell carcinoma to brain  Metastatic carcinoma to bone  Coronary artery disease  Hypertension  Metastatic renal cell carcinoma  Diabetes mellitus  Cancer  No pertinent past medical history  History of coronary artery stent placement  Status post gamma knife treatment: Left forehead/temple  No significant past surgical history      FAMILY HISTORY:  Family history of colon cancer  Family history of diabetes mellitus in mother  Family history of diabetes mellitus in father      MEDICATIONS  (STANDING):  amLODIPine   Tablet 10 milliGRAM(s) Oral daily  atorvastatin 20 milliGRAM(s) Oral at bedtime  dexamethasone  Injectable 4 milliGRAM(s) IV Push every 6 hours  dextrose 5%. 1000 milliLiter(s) (50 mL/Hr) IV Continuous <Continuous>  dextrose 50% Injectable 12.5 Gram(s) IV Push once  dextrose 50% Injectable 25 Gram(s) IV Push once  dextrose 50% Injectable 25 Gram(s) IV Push once  enalapril 20 milliGRAM(s) Oral daily  heparin  Injectable 5000 Unit(s) SubCutaneous every 8 hours  hydrocortisone 0.5% Cream 1 Application(s) Topical daily  insulin glargine Injectable (LANTUS) 14 Unit(s) SubCutaneous at bedtime  insulin lispro (HumaLOG) corrective regimen sliding scale   SubCutaneous three times a day before meals  insulin lispro (HumaLOG) corrective regimen sliding scale   SubCutaneous at bedtime  levothyroxine 137 MICROGram(s) Oral daily  pantoprazole    Tablet 40 milliGRAM(s) Oral before breakfast  senna 2 Tablet(s) Oral at bedtime    MEDICATIONS  (PRN):  acetaminophen   Tablet 650 milliGRAM(s) Oral every 6 hours PRN Mild and moderate pain  dextrose 40% Gel 15 Gram(s) Oral once PRN Blood Glucose LESS THAN 70 milliGRAM(s)/deciliter  glucagon  Injectable 1 milliGRAM(s) IntraMuscular once PRN Glucose LESS THAN 70 milligrams/deciliter  oxyCODONE    IR 10 milliGRAM(s) Oral every 6 hours PRN Severe Pain (7 - 10)      PHYSICAL EXAM  General: Well nourished, well developed, no acute distress  HEENT: NC/AT; EOMI, PERRL, sclera nonicteric; external ears normal; no rhinorrhea or epistaxis; mucous membranes moist; oropharynx clear and without erythema  CV: NR, RR; no appreciable r/m/g  Lungs: CTAB, no increased work of breathing  Abdomen: Bowel sounds present; soft, NTND  Neuro: AAOx3; cranial nerves II-XII intact; strength 5/5 in upper extremities and RLE, 4/5 in LLE; sensation of LLE and pelvis present but decreased compared to RLE.   Psych: Full affect; mood congruent  Skin: no visible rashes on limited examination        ASSESSMENT/PLAN    ROBIN PETTIT is a 57yo M with history of RCC, s/p multiple systemic agents, s/p multiple RT courses to skull base, brain, pelvis, lung, and SBRT to T6 & T8 (16Gy/1fx in 2015), presents with several month history of LLE weakness, which has acutely worsened over the past few days. In addition to this, he has L foot drop, urinary retention, and pelvic numbness. 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     Due to the previous SBRT (high dose radiation) to this area (T6 & T8), he is at increased risk of toxicity from re-irradiation. Please have neurosurgery consulted. If they do not want to intervene at this time, we will consider radiation. Please reach out to our department if there are further questions and/or once neurosurgery has seen the patient ([phone]).     We discussed the use of palliative radiation in this setting, namely to improve quality of life through the reduction of symptoms.  We talked about the risks, benefits, acute and long term side effects, as well as expected treatment outcomes.  He was given the opportunity to ask questions, which were answered to his apparent satisfaction. HPI:  57 yo male with a history of RCC diffusely metastatic, s/p multiple systemic regimens (under care of Dr. Bradley Goldberg), s/p multiple RT courses to skull base, brain, pelvis, lung, and SBRT to T6 & T8 (16Gy/1fx in June 2015), presents with several month history of LLE weakness and pain, which has acutely worsened over the past few days. In addition to this, he has complaints of L foot drop, urinary retention, and LLE/pelvic numbness.  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  No complaints of bowel changes or other symptoms. He is still ambulatory. He states that his pain and urinary retention have improved this morning, but are still present.         Allergies    No Known Allergies    ROS: [  ] Fever  [  ] Chills  [  ]Chest Pain [  ] SOB  [  ]Cough [  ] N/V  [  ] Diarrhea [  ]Constipation [  ]Other ROS:  [  ] ROS otherwise negative    PAST MEDICAL & SURGICAL HISTORY:  Hypothyroidism  GERD (gastroesophageal reflux disease)  Elevated TSH  Cervicalgia  Metastatic renal cell carcinoma to brain  Metastatic carcinoma to bone  Coronary artery disease  Hypertension  Metastatic renal cell carcinoma  Diabetes mellitus  Cancer  No pertinent past medical history  History of coronary artery stent placement  Status post gamma knife treatment: Left forehead/temple  No significant past surgical history      FAMILY HISTORY:  Family history of colon cancer  Family history of diabetes mellitus in mother  Family history of diabetes mellitus in father      MEDICATIONS  (STANDING):  amLODIPine   Tablet 10 milliGRAM(s) Oral daily  atorvastatin 20 milliGRAM(s) Oral at bedtime  dexamethasone  Injectable 4 milliGRAM(s) IV Push every 6 hours  dextrose 5%. 1000 milliLiter(s) (50 mL/Hr) IV Continuous <Continuous>  dextrose 50% Injectable 12.5 Gram(s) IV Push once  dextrose 50% Injectable 25 Gram(s) IV Push once  dextrose 50% Injectable 25 Gram(s) IV Push once  enalapril 20 milliGRAM(s) Oral daily  heparin  Injectable 5000 Unit(s) SubCutaneous every 8 hours  hydrocortisone 0.5% Cream 1 Application(s) Topical daily  insulin glargine Injectable (LANTUS) 14 Unit(s) SubCutaneous at bedtime  insulin lispro (HumaLOG) corrective regimen sliding scale   SubCutaneous three times a day before meals  insulin lispro (HumaLOG) corrective regimen sliding scale   SubCutaneous at bedtime  levothyroxine 137 MICROGram(s) Oral daily  pantoprazole    Tablet 40 milliGRAM(s) Oral before breakfast  senna 2 Tablet(s) Oral at bedtime    MEDICATIONS  (PRN):  acetaminophen   Tablet 650 milliGRAM(s) Oral every 6 hours PRN Mild and moderate pain  dextrose 40% Gel 15 Gram(s) Oral once PRN Blood Glucose LESS THAN 70 milliGRAM(s)/deciliter  glucagon  Injectable 1 milliGRAM(s) IntraMuscular once PRN Glucose LESS THAN 70 milligrams/deciliter  oxyCODONE    IR 10 milliGRAM(s) Oral every 6 hours PRN Severe Pain (7 - 10)      PHYSICAL EXAM  General: Well nourished, well developed, no acute distress  HEENT: NC/AT; EOMI, PERRL, sclera nonicteric; external ears normal; no rhinorrhea or epistaxis; mucous membranes moist; oropharynx clear and without erythema  CV: NR, RR; no appreciable r/m/g  Lungs: CTAB, no increased work of breathing  Abdomen: Bowel sounds present; soft, NTND  Neuro: AAOx3; cranial nerves II-XII intact; strength 5/5 in upper extremities and RLE, 4/5 in LLE; sensation of LLE and pelvis present but decreased compared to RLE.   Psych: Full affect; mood congruent  Skin: no visible rashes on limited examination        ASSESSMENT/PLAN    ROBIN PETTIT is a 59yo M with history of RCC, s/p multiple systemic agents, s/p multiple RT courses to skull base, brain, pelvis, lung, and SBRT to T6 & T8 (16Gy/1fx in 2015), presents with several month history of LLE weakness, which has acutely worsened over the past few days. In addition to this, he has L foot drop, urinary retention, and pelvic numbness. 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     Due to the previous radiation therapy with SBRT (high dose radiation) to this area (T6 & T8) as well as palliative RT to the lung, his spinal cord has received radiation and he is at increased risk of toxicity from re-irradiation.  Please have neurosurgery consult to see if patient is a candidate for neurosurgical intervention.  In the meantime, we will review patient's RT treatment planning records to determine if there is more room for additional spine RT.  Please reach out to our department if there are further questions and/or once neurosurgery has seen the patient ([phone]).     We discussed the use of palliative radiation in this setting, namely to improve quality of life through the reduction of symptoms.  We talked about the risks, benefits, acute and long term side effects, as well as expected treatment outcomes.  He was given the opportunity to ask questions, which were answered to his apparent satisfaction.

## 2018-06-12 NOTE — PROGRESS NOTE ADULT - SUBJECTIVE AND OBJECTIVE BOX
Patient is a 58y old  Male who presents with a chief complaint of back pain (2018 22:53)      SUBJECTIVE / OVERNIGHT EVENTS: pt seen this am at 11.  felt a bit better w/ his leg pain.  Still having trouble urinating but better since he came in    MEDICATIONS  (STANDING):  amLODIPine   Tablet 10 milliGRAM(s) Oral daily  atorvastatin 20 milliGRAM(s) Oral at bedtime  dexamethasone  Injectable 4 milliGRAM(s) IV Push every 6 hours  dextrose 5%. 1000 milliLiter(s) (50 mL/Hr) IV Continuous <Continuous>  dextrose 50% Injectable 12.5 Gram(s) IV Push once  dextrose 50% Injectable 25 Gram(s) IV Push once  dextrose 50% Injectable 25 Gram(s) IV Push once  enalapril 20 milliGRAM(s) Oral daily  heparin  Injectable 5000 Unit(s) SubCutaneous every 8 hours  hydrocortisone 0.5% Cream 1 Application(s) Topical daily  insulin glargine Injectable (LANTUS) 14 Unit(s) SubCutaneous at bedtime  insulin lispro (HumaLOG) corrective regimen sliding scale   SubCutaneous three times a day before meals  insulin lispro (HumaLOG) corrective regimen sliding scale   SubCutaneous at bedtime  levothyroxine 137 MICROGram(s) Oral daily  pantoprazole    Tablet 40 milliGRAM(s) Oral before breakfast  senna 2 Tablet(s) Oral at bedtime    MEDICATIONS  (PRN):  acetaminophen   Tablet 650 milliGRAM(s) Oral every 6 hours PRN Mild and moderate pain  dextrose 40% Gel 15 Gram(s) Oral once PRN Blood Glucose LESS THAN 70 milliGRAM(s)/deciliter  glucagon  Injectable 1 milliGRAM(s) IntraMuscular once PRN Glucose LESS THAN 70 milligrams/deciliter  oxyCODONE    IR 10 milliGRAM(s) Oral every 6 hours PRN Severe Pain (7 - 10)      Meds ordered within last 24hours  dexamethasone  IVPB: [Ordered as DECADRON IVPB]  10 milliGRAM(s) in dextrose 5% 50 milliLiter(s), IV Intermittent, Once, infuse over 30 Minute(s), Stop After 1 Doses  Provider's Contact #: [phone] ( @ 18:00)  dexamethasone  Injectable: [Known as DECADRON Injectable]  4 milliGRAM(s), IV Push, every 6 hours  Indication: cord compression  Administration Instructions: Max IV Push dose 6 milliGRAM(s)  IF IV PUSH, ADMINISTER OVER 1 MIN  Provider's Contact #: [phone] ( @ 19:19)  (Floorstock):   Qty Removed: 1 each ( @ 21:43)  amLODIPine   Tablet: [Ordered as NORVASC]  10 milliGRAM(s), Oral, once, Stop After 1 Doses  Administration Instructions: This is a Look-alike/Sound-alike Medication  Provider's Contact #: [phone] ( @ 23:21)  insulin glargine Injectable (LANTUS): [Ordered as LANTUS]  14 Unit(s), SubCutaneous, once, Stop After 1 Doses  Administration Instructions: NOT to be mixed with other insulins  Dispose unused medication in BLACK bin.  Provider's Contact #: [phone] ( @ 23:40)  insulin glargine Injectable (LANTUS):   14 Unit(s), SubCutaneous, at bedtime  Administration Instructions: NOT to be mixed with other insulins  Dispose unused medication in BLACK bin.  Provider's Contact #: [phone] ( @ 23:42)  insulin lispro (HumaLOG) corrective regimen sliding scale:       1 Unit(s) if Glucose 151 - 200      2 Unit(s) if Glucose 201 - 250      3 Unit(s) if Glucose 251 - 300      4 Unit(s) if Glucose 301 - 350      5 Unit(s) if Glucose 351 - 400      6 Unit(s) if Glucose Greater Than 400 + Contact MD  SubCutaneous, three times a day before meals  Special Instructions: Give correctional scale insulin REGARDLESS of PO status NOTIFY Provider for blood glucose LESS THAN 70 milliGRAM(s)/deciLiter or above 400 milliGRAM(s)/deciLiter.  Administration Instructions: *Per Sliding Scale*  Dispose unused medication in BLACK bin.  This is a Look-alike/Sound-alike Medication  Provider's Contact #: [phone] ( @ 23:42)  insulin lispro (HumaLOG) corrective regimen sliding scale:       0 Unit(s) if Glucose 0 - 250      1 Unit(s) if Glucose 251 - 300      2 Unit(s) if Glucose 301 - 350      3 Unit(s) if Glucose 351 - 400      4 Unit(s) if Glucose Greater Than 400 + Contact Provider  SubCutaneous, at bedtime  Special Instructions: Give correctional scale insulin REGARDLESS of PO status NOTIFY Provider for blood glucose LESS THAN 70 milliGRAM(s)/deciLiter or above 400 milliGRAM(s)/deciLiter.  Administration Instructions: Dispose unused medication in BLACK bin.  This is a Look-alike/Sound-alike Medication  Provider's Contact #: [phone] ( @ 23:42)  dextrose 5%.: Solution, 1000 milliLiter(s) infuse at 50 mL/Hr  Special Instructions: Conditional Order: HYPOGLYCEMIA PROTOCOL  Provider's Contact #: [phone] ( @ 23:42)  dextrose 40% Gel: [Known as GLUTOSE 15]  15 Gram(s), Oral, once, PRN for Blood Glucose LESS THAN 70 milliGRAM(s)/deciliter, Stop After 1 Doses  Special Instructions: Conditional Order: HYPOGLYCEMIA PROTOCOL  Administration Instructions: Contents of 37.5 Gram(s) tube delivers 15 Gram(s) dextrose  Provider's Contact #: [phone] ( @ 23:42)  dextrose 50% Injectable:   12.5 Gram(s), IV Push, once, Stop After 1 Doses  Special Instructions: Conditional Order: HYPOGLYCEMIA PROTOCOL  Provider's Contact #: [phone] ( @ 23:42)  dextrose 50% Injectable:   25 Gram(s), IV Push, once, Stop After 1 Doses  Special Instructions: Conditional Order: HYPOGLYCEMIA PROTOCOL  Provider's Contact #: [phone] ( @ 23:42)  dextrose 50% Injectable:   25 Gram(s), IV Push, once, Stop After 1 Doses  Special Instructions: Conditional Order: HYPOGLYCEMIA PROTOCOL  Provider's Contact #: [phone] ( @ 23:42)  glucagon  Injectable:   1 milliGRAM(s), IntraMuscular, once, PRN for Glucose LESS THAN 70 milligrams/deciliter, Stop After 1 Doses  Special Instructions: Conditional Order: HYPOGLYCEMIA PROTOCOL  Provider's Contact #: [phone] ( @ 23:42)  heparin  Injectable:   5000 Unit(s), SubCutaneous, every 8 hours  Provider's Contact #: [phone] ( 00:11)  oxyCODONE    IR:   10 milliGRAM(s), Oral, every 6 hours, PRN for Severe Pain (7 - 10)  Administration Instructions: This is a Look-alike/Sound-alike Medication  Provider's Contact #: [phone] ( @ 00:19)  acetaminophen   Tablet: [Known as TYLENOL]  650 milliGRAM(s), Oral, every 6 hours, PRN for Mild and moderate pain  Administration Instructions: MAX DAILY DOSE:  ADULT = 4,000 mG/Day  Provider's Contact #: [phone] ( @ 00:19)  enalapril: [Known as VASOTEC]  20 milliGRAM(s), Oral, daily  Special Instructions: hold for sbp<110  Provider's Contact #: [phone] ( @ 00:19)  atorvastatin: [Known as LIPITOR]  20 milliGRAM(s), Oral, at bedtime  Provider's Contact #: [phone] ( @ 00:19)  senna:   2 Tablet(s), Oral, at bedtime  Provider's Contact #: [phone] ( @ 00:19)  levothyroxine: [Known as SYNTHROID]  137 MICROGram(s), Oral, daily  Provider's Contact #: [phone] ( @ 00:19)  pantoprazole    Tablet: [Ordered as PROTONIX   DR]  40 milliGRAM(s), Oral, before breakfast  Administration Instructions: swallow whole * don't crush/chew  Provider's Contact #: [phone] ( @ 00:21)  hydrocortisone 0.5% Cream:   1 Application(s), Topical, daily, To affected area  Administration Instructions: external use only  Provider's Contact #: [phone] ( @ 00:33)  amLODIPine   Tablet: [Ordered as NORVASC]  10 milliGRAM(s), Oral, daily  Special Instructions: Hold for SBP < 110  Administration Instructions: This is a Look-alike/Sound-alike Medication  Provider's Contact #: [phone] ( @ 02:27)      T(C): 36.6 (18 @ 05:45), Max: 36.7 (18 @ 20:51)  HR: 100 (18 @ 05:45) (73 - 100)  BP: 137/97 (18 @ 05:45) (137/97 - 195/101)  RR: 18 (18 @ 05:45) (18 - 18)  SpO2: 100% (18 @ 05:45) (99% - 100%)    CAPILLARY BLOOD GLUCOSE      POCT Blood Glucose.: 356 mg/dL (2018 12:42)  POCT Blood Glucose.: 221 mg/dL (2018 08:32)  POCT Blood Glucose.: 347 mg/dL (2018 02:12)  POCT Blood Glucose.: 313 mg/dL (2018 23:58)    I&O's Summary      PHYSICAL EXAM:  GENERAL: NAD  CHEST/LUNG: Clear to auscultation bilaterally; No wheeze  HEART: Regular rate and rhythm; No murmurs, rubs, or gallops  ABDOMEN: Soft, Nontender, Nondistended; Bowel sounds present  EXTREMITIES:  No clubbing, cyanosis, or edema  NEURO: 5-/5 LUE; 4/5 LLE      LABS:                        11.2   7.01  )-----------( 286      ( 2018 06:00 )             34.4     06-12    133<L>  |  92<L>  |  21  ----------------------------<  298<H>  4.8   |  24  |  1.01    Ca    9.2      2018 06:00    TPro  8.0  /  Alb  3.9  /  TBili  0.4  /  DBili  x   /  AST  35  /  ALT  38  /  AlkPhos  92  06-11    PT/INR - ( 2018 13:20 )   PT: 12.1 SEC;   INR: 1.05          PTT - ( 2018 13:20 )  PTT:30.2 SEC      Urinalysis Basic - ( 2018 13:20 )    Color: YELLOW / Appearance: CLEAR / S.019 / pH: 6.0  Gluc: 50 / Ketone: NEGATIVE  / Bili: NEGATIVE / Urobili: NORMAL mg/dL   Blood: NEGATIVE / Protein: 20 mg/dL / Nitrite: NEGATIVE   Leuk Esterase: NEGATIVE / RBC: 0-2 / WBC 0-2   Sq Epi: x / Non Sq Epi: x / Bacteria: x        RADIOLOGY & ADDITIONAL TESTS:    Imaging Personally Reviewed:< from: MR Thoracic Spine w/wo IV Cont (18 @ 17:20) >  Focal area of enhancement with surrounding edema and cord expansion at   the level of the T7-8 interspace compatible with an intramedullary spinal   cord metastasis.    Metastatic disease involving the T6-T8 vertebral bodies with progression   at the T7 level as described above.    < end of copied text >      Consultant(s) Notes Reviewed:      Care Discussed with Consultants/Other Providers:

## 2018-06-12 NOTE — CONSULT NOTE ADULT - PROBLEM SELECTOR RECOMMENDATION 4
Patient wishes to continue to follow up with Oncology at [facility].   Previously followed with Dr. Cooper. Will follow up as an outpatient for symptoms management.   Patient is full code at this time, but expresses understanding of severity of disease.

## 2018-06-12 NOTE — CONSULT NOTE ADULT - SUBJECTIVE AND OBJECTIVE BOX
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 Previously on sunitinib and then pazopanib, switched to nivolumab and had stable disease for 1.5 years then progressed, now on cabozantinib since Nov 2017 with mixed response and complicated by hypertensive urgency in the beginning now stabilized.     Pt reported to ER in setting of several weeks of LLE weakness and numbness but which acutely worsened over the past few days. He also complains of recently developing urinary retention, (able to void, but weakened stream, incomplete bladder emptying) and pelvic numbness. 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 Pt also with BP elevation in ED as high as 195/101 after receiving dexamethasone. At home he checks his BP daily and says has been consistently below 140. Denies HA, blurry vision, chest pain, or sob. Reports left-sided abd and lower back pain for last 4- 5 weeks had MRI lumbar spine last month with no cord compression.     PAST MEDICAL & SURGICAL HISTORY:  Hypothyroidism  GERD (gastroesophageal reflux disease)  Elevated TSH  Cervicalgia  Metastatic renal cell carcinoma to brain  Coronary artery disease  Hypertension  Diabetes mellitus  Status post gamma knife treatment: Left forehead/temple      Allergies    No Known Allergies    Intolerances        MEDICATIONS  (STANDING):  amLODIPine   Tablet 10 milliGRAM(s) Oral daily  atorvastatin 20 milliGRAM(s) Oral at bedtime  dexamethasone  Injectable 4 milliGRAM(s) IV Push every 6 hours  dextrose 5%. 1000 milliLiter(s) (50 mL/Hr) IV Continuous <Continuous>  dextrose 50% Injectable 12.5 Gram(s) IV Push once  dextrose 50% Injectable 25 Gram(s) IV Push once  dextrose 50% Injectable 25 Gram(s) IV Push once  enalapril 20 milliGRAM(s) Oral daily  heparin  Injectable 5000 Unit(s) SubCutaneous every 8 hours  hydrocortisone 0.5% Cream 1 Application(s) Topical daily  insulin glargine Injectable (LANTUS) 14 Unit(s) SubCutaneous at bedtime  insulin lispro (HumaLOG) corrective regimen sliding scale   SubCutaneous three times a day before meals  insulin lispro (HumaLOG) corrective regimen sliding scale   SubCutaneous at bedtime  levothyroxine 137 MICROGram(s) Oral daily  pantoprazole    Tablet 40 milliGRAM(s) Oral before breakfast  senna 2 Tablet(s) Oral at bedtime    MEDICATIONS  (PRN):  acetaminophen   Tablet 650 milliGRAM(s) Oral every 6 hours PRN Mild and moderate pain  dextrose 40% Gel 15 Gram(s) Oral once PRN Blood Glucose LESS THAN 70 milliGRAM(s)/deciliter  glucagon  Injectable 1 milliGRAM(s) IntraMuscular once PRN Glucose LESS THAN 70 milligrams/deciliter  oxyCODONE    IR 10 milliGRAM(s) Oral every 6 hours PRN Severe Pain (7 - 10)      FAMILY HISTORY:  Family history of colon cancer  Family history of diabetes mellitus in mother  Family history of diabetes mellitus in father      SOCIAL HISTORY: No EtOH, no tobacco    REVIEW OF SYSTEMS:    CONSTITUTIONAL: No weakness, fevers or chills  EYES/ENT: No visual changes;  No vertigo or throat pain   NECK: No pain or stiffness  RESPIRATORY: No cough, wheezing, hemoptysis; No shortness of breath  CARDIOVASCULAR: No chest pain or palpitations  GASTROINTESTINAL: No abdominal or epigastric pain. No nausea, vomiting, or hematemesis; No diarrhea or constipation. No melena or hematochezia.  GENITOURINARY: No dysuria, frequency or hematuria  NEUROLOGICAL: No numbness or weakness  SKIN: No itching, burning, rashes, or lesions   All other review of systems is negative unless indicated above.    Height (cm): 170.18 (06-11 @ 23:08)  Weight (kg): 82 (06-11 @ 23:08)  BMI (kg/m2): 28.3 (06-11 @ 23:08)  BSA (m2): 1.94 (06-11 @ 23:08)    T(F): 98.3 (06-12-18 @ 15:26), Max: 98.3 (06-12-18 @ 15:26)  HR: 78 (06-12-18 @ 15:26)  BP: 122/82 (06-12-18 @ 15:26)  RR: 18 (06-12-18 @ 15:26)  SpO2: 100% (06-12-18 @ 15:26)  Wt(kg): --    GENERAL: NAD, well-developed  HEAD:  Atraumatic, Normocephalic  EYES: EOMI, PERRLA, conjunctiva and sclera clear  NECK: Supple, No JVD  CHEST/LUNG: Clear to auscultation bilaterally; No wheeze  HEART: Regular rate and rhythm; No murmurs, rubs, or gallops  ABDOMEN: Soft, Nontender, Nondistended; Bowel sounds present  EXTREMITIES:  2+ Peripheral Pulses, No clubbing, cyanosis, or edema  NEUROLOGY: non-focal  SKIN: No rashes or lesions                          11.2   7.01  )-----------( 286      ( 12 Jun 2018 06:00 )             34.4       06-12    133<L>  |  92<L>  |  21  ----------------------------<  298<H>  4.8   |  24  |  1.01    Ca    9.2      12 Jun 2018 06:00    TPro  8.0  /  Alb  3.9  /  TBili  0.4  /  DBili  x   /  AST  35  /  ALT  38  /  AlkPhos  92  06-11          PT/INR - ( 11 Jun 2018 13:20 )   PT: 12.1 SEC;   INR: 1.05          PTT - ( 11 Jun 2018 13:20 )  PTT:30.2 SEC    URINE MIDSTREAM  06-11 @ 14:06 --  --  --      < from: CT Head No Cont (06.11.18 @ 18:39) >  IMPRESSION:     Stable exam. No mass effect or hemorrhage.    Lack of intravenous contrast hampers detection of metastatic disease.    < end of copied text >        < from: MR Thoracic Spine w/wo IV Cont (06.11.18 @ 17:20) >  IMPRESSION:    Focal area of enhancement with surrounding edema and cord expansion at   the level of the T7-8 interspace compatible with an intramedullary spinal   cord metastasis.    Metastatic disease involving the T6-T8 vertebral bodies with progression   at the T7 level as described above.    < end of copied text >

## 2018-06-12 NOTE — PROGRESS NOTE ADULT - SUBJECTIVE AND OBJECTIVE BOX
Orthopaedic Surgery Progress Note    Subjective:   Patient seen and examined  No acute events overnight  Pain controlled.    Objective:  T(C): 36.6 (18 @ 05:45), Max: 37 (18 @ 11:24)  HR: 100 (18 @ 05:45) (73 - 100)  BP: 137/97 (18 @ 05:45) (127/86 - 195/101)  RR: 18 (18 @ 05:45) (16 - 18)  SpO2: 100% (18 @ 05:45) (99% - 100%)  Wt(kg): --      PE    NAD  Neuro:  RLE IP 5/5 HS 5/5 Q 5/5 GS 5/5 TA 5/5 EHL 5/5   SILT L2-S1  LLE IP 5/5 HS 4/5 Q 5/5 GS 5/5 TA 4/5 EHL 5/5  SILT L2-S1  WWP BLE                          10.0   4.87  )-----------( 223      ( 2018 13:20 )             31.5     06-11    139  |  98  |  21  ----------------------------<  88  4.2   |  30  |  1.22    Ca    9.1      2018 13:20    TPro  8.0  /  Alb  3.9  /  TBili  0.4  /  DBili  x   /  AST  35  /  ALT  38  /  AlkPhos  92  06-11    PT/INR - ( 2018 13:20 )   PT: 12.1 SEC;   INR: 1.05          PTT - ( 2018 13:20 )  PTT:30.2 SEC  Urinalysis Basic - ( 2018 13:20 )    Color: YELLOW / Appearance: CLEAR / S.019 / pH: 6.0  Gluc: 50 / Ketone: NEGATIVE  / Bili: NEGATIVE / Urobili: NORMAL mg/dL   Blood: NEGATIVE / Protein: 20 mg/dL / Nitrite: NEGATIVE   Leuk Esterase: NEGATIVE / RBC: 0-2 / WBC 0-2   Sq Epi: x / Non Sq Epi: x / Bacteria: x        58y Male with T6-T8 RCC metastatic disease.  - Pain control  - Care per NSx  - Care per primary Team  - Discussed with attending Dr. Gannon.  - F/u with NSX

## 2018-06-12 NOTE — CONSULT NOTE ADULT - SUBJECTIVE AND OBJECTIVE BOX
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 S/p gamma knife to brain met, not a candidate for nephrectomy per Urology given brain mets. S/p spinal radiation. Previously on pazopanib, switched to nivolumab, now on cabozantinib since 2017. 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  Pt comes to ed in setting of several weeks of LLE weakness and numbness but which acutely worsened over the past few days. He also complains of urinary retention, (able to void, but weakened stream, incomplete bladder emptying) and pelvic numbness. MRI revealed osseous spinal mets from T6-8 with cord compression, in addition to intramedullary disease at T7 with surrounding cord edema.   Pt also with BP elevation in ED as high as 195/101. Takes enalapril daily for last few months (and this morning) for HTN attributed to cabozantinib; checks his BP daily and says has been consistently below 140. Normal BP in ed prior to decadron. Denies HA, blurry vision, chest pain, or sob. Reports left-sided abd and lower back pain for last 4- 5 weeks that he reports was to be evaluated with MRI of abdomen shortly. Denies constipation but notes increased straining for passage of BM. UA negative  Pt also with DM, hypothyroid (2018 21:57)      PERTINENT PMH REVIEWED:  [ ] YES [ ] NO           SOCIAL HISTORY:  Significant other/partner:  [ ] YES  [ ] NO            Children:  [ ] YES  [ ] NO                   Cheondoism/Spirituality:  Subtance hx:  [ ] YES   [ ] NO           Tobacco hx:  [ ] YES  [ ] NO             Alcohol hx: [ ] YES  [ ] NO        Home Opiod hx:  [ ] YES  [ ] NO   Living Situation: [ ] Home  [ ] Long term care  [ ] Rehab    REFERRALS:   [ ] Chaplaincy  [ ] Hospice  [ ] Child Life  [ ] Social Work  [ ] Case management [ ] Holistic Therapy     FAMILY HISTORY:  Family history of colon cancer  Family history of diabetes mellitus in mother  Family history of diabetes mellitus in father    [ ] Family history non contributory     BASELINE ADLs (prior to admission):  Independent [ ] moderately [ ] fully   Dependent   [ ] moderately [ ] fully    ADVANCE DIRECTIVES:  [ ] YES [ ] NO   DNR [ ] YES [ ] NO                      MOLST  [ ] YES [ ] NO    Living Will  [ ] YES [ ] NO    Health Care Proxy [ ] YES  [ ] NO      [ ] Surrogate  [ ] HCP  [ ] Guardian:                                                                  Phone#:    Allergies    No Known Allergies    Intolerances        MEDICATIONS  (STANDING):  amLODIPine   Tablet 10 milliGRAM(s) Oral daily  atorvastatin 20 milliGRAM(s) Oral at bedtime  dexamethasone  Injectable 4 milliGRAM(s) IV Push every 6 hours  dextrose 5%. 1000 milliLiter(s) (50 mL/Hr) IV Continuous <Continuous>  dextrose 50% Injectable 12.5 Gram(s) IV Push once  dextrose 50% Injectable 25 Gram(s) IV Push once  dextrose 50% Injectable 25 Gram(s) IV Push once  enalapril 20 milliGRAM(s) Oral daily  heparin  Injectable 5000 Unit(s) SubCutaneous every 8 hours  hydrocortisone 0.5% Cream 1 Application(s) Topical daily  insulin glargine Injectable (LANTUS) 20 Unit(s) SubCutaneous at bedtime  insulin lispro (HumaLOG) corrective regimen sliding scale   SubCutaneous three times a day before meals  insulin lispro Injectable (HumaLOG) 12 Unit(s) SubCutaneous three times a day before meals  levothyroxine 137 MICROGram(s) Oral daily  pantoprazole    Tablet 40 milliGRAM(s) Oral before breakfast  senna 2 Tablet(s) Oral at bedtime    MEDICATIONS  (PRN):  acetaminophen   Tablet 650 milliGRAM(s) Oral every 6 hours PRN Mild and moderate pain  dextrose 40% Gel 15 Gram(s) Oral once PRN Blood Glucose LESS THAN 70 milliGRAM(s)/deciliter  glucagon  Injectable 1 milliGRAM(s) IntraMuscular once PRN Glucose LESS THAN 70 milligrams/deciliter  oxyCODONE    IR 10 milliGRAM(s) Oral every 6 hours PRN Severe Pain (7 - 10)      PRESENT SYMPTOMS:  Source: [ ] Patient   [ ] Family   [ ] Team     Pain: [ ] YES [ ] NO  OLDCARTS:     Dyspnea: [ ] YES [ ] NO   Anxiety: [ ] YES [ ] NO  Fatigue: [ ] YES [ ] NO   Nausea: [ ] YES [ ] NO  Loss of appetite: [ ] YES [ ] NO   Constipation: [ ] YES [ ] NO     Other Symptoms:  [ ] All other review of systems negative   [ ] Unable to obtain due to poor mentation     Karnofsky Performance Score/Palliative Performance Status Version 2:         %  Protein Calorie Malutrition:  [ ] Mild   [ ] Moderate   [ ] Severe     Vital Signs Last 24 Hrs  T(C): 36.4 (2018 05:42), Max: 36.8 (2018 15:26)  T(F): 97.5 (2018 05:42), Max: 98.3 (2018 15:26)  HR: 84 (2018 05:42) (66 - 84)  BP: 125/82 (2018 05:42) (122/82 - 134/87)  BP(mean): --  RR: 18 (2018 05:42) (18 - 18)  SpO2: 100% (2018 05:42) (99% - 100%)    Physical Exam:    General: [ ] Alert,  A&O x     [ ] lethargic   [ ] Agitated   [ ] Cachexia   HEENT: [ ] Normal   [ ] Dry mouth   [ ] ET Tube    [ ] Trach   Lungs: [ ] Clear [ ] Rhonchi  [ ] Crackles [ ] Wheezing [ ] Tachypnea  [ ] Audible excessive secretions   Cardiovascular:  [ ] Regular rate and rhythm  [ ] Irregular [ ] Tachycardia   [ ] Bradycardia   Abdomen: [ ] Soft  [ ] Distended  [ ]  [ ] +BS  [ ] Non tender [ ] Tender  [ ]PEG   [ ] NGT   Last BM:     Genitourinary: [ ] Normal [ ] Incontinent   [ ] Oliguria/Anuria   [ ] [place]  Musculoskeletal:  [ ] Normal   [ ] Generalized weakness  [ ] Bedbound   Neurological: [ ] No focal deficits  [ ] Cognitive impairment     Skin: [ ] Normal   [ ] Pressure ulcers     LABS:                        10.8   11.61 )-----------( 241      ( 2018 06:48 )             34.4     06-13    133<L>  |  91<L>  |  28<H>  ----------------------------<  266<H>  4.4   |  21<L>  |  0.96    Ca    9.2      2018 05:48    TPro  8.0  /  Alb  3.9  /  TBili  0.4  /  DBili  x   /  AST  35  /  ALT  38  /  AlkPhos  92  06-11    PT/INR - ( 2018 13:20 )   PT: 12.1 SEC;   INR: 1.05          PTT - ( 2018 13:20 )  PTT:30.2 SEC  Urinalysis Basic - ( 2018 13:20 )    Color: YELLOW / Appearance: CLEAR / S.019 / pH: 6.0  Gluc: 50 / Ketone: NEGATIVE  / Bili: NEGATIVE / Urobili: NORMAL mg/dL   Blood: NEGATIVE / Protein: 20 mg/dL / Nitrite: NEGATIVE   Leuk Esterase: NEGATIVE / RBC: 0-2 / WBC 0-2   Sq Epi: x / Non Sq Epi: x / Bacteria: x      I&O's Summary      RADIOLOGY & ADDITIONAL STUDIES: HPI:  59 yo m with 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 S/p gamma knife to brain met, not a candidate for nephrectomy per Urology given brain mets. S/p spinal radiation. Previously on pazopanib, switched to nivolumab, now on cabozantinib since 2017. 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  Pt comes to ed in setting of several weeks of LLE weakness and numbness but which acutely worsened over the past few days. He also complains of urinary retention, (able to void, but weakened stream, incomplete bladder emptying) and pelvic numbness. MRI revealed osseous spinal mets from T6-8 with cord compression, in addition to intramedullary disease at T7 with surrounding cord edema.   Pt also with BP elevation in ED as high as 195/101. Takes enalapril daily for last few months (and this morning) for HTN attributed to cabozantinib; checks his BP daily and says has been consistently below 140. Normal BP in ed prior to decadron. Denies HA, blurry vision, chest pain, or sob. Reports left-sided abd and lower back pain for last 4- 5 weeks that he reports was to be evaluated with MRI of abdomen shortly. Denies constipation but notes increased straining for passage of BM. UA negative  Pt also with DM, hypothyroid (2018 21:57)      PERTINENT PMH REVIEWED:  [x] YES [ ] NO           SOCIAL HISTORY:  Significant other/partner:  [x ] YES  [ ] NO            Children:  [ ] YES  [x ] NO                   Adventist/Spirituality: Yazdanism  Subtance hx:  [ ] YES   [x ] NO           Tobacco hx:  [x ] YES  [ ] NO             Alcohol hx: [ ] YES  [x ] NO        Home Opiod hx:  [ ] YES  [x ] NO   Living Situation: [x ] Home  [ ] Long term care  [ ] Rehab    REFERRALS:   [ ] Chaplaincy  [ ] Hospice  [ ] Child Life  [ ] Social Work  [ ] Case management [x ] Holistic Therapy     FAMILY HISTORY:  Family history of colon cancer  Family history of diabetes mellitus in mother  Family history of diabetes mellitus in father    [x ] Family history non contributory     BASELINE ADLs (prior to admission):  Independent [ ] moderately [x ] fully   Dependent   [ ] moderately [ ] fully    ADVANCE DIRECTIVES:  [ ] YES [x ] NO   DNR [ ] YES [x ] NO                      MOLST  [ ] YES [x ] NO    Living Will  [ ] YES [x ] NO    Health Care Proxy [ ] YES  [ x] NO      [x] Surrogate  [ ] HCP  [ ] Guardian: Salbador Jack (spouse)                                      Phone#: [phone]    Allergies    No Known Allergies    Intolerances        MEDICATIONS  (STANDING):  amLODIPine   Tablet 10 milliGRAM(s) Oral daily  atorvastatin 20 milliGRAM(s) Oral at bedtime  dexamethasone  Injectable 4 milliGRAM(s) IV Push every 6 hours  dextrose 5%. 1000 milliLiter(s) (50 mL/Hr) IV Continuous <Continuous>  dextrose 50% Injectable 12.5 Gram(s) IV Push once  dextrose 50% Injectable 25 Gram(s) IV Push once  dextrose 50% Injectable 25 Gram(s) IV Push once  enalapril 20 milliGRAM(s) Oral daily  heparin  Injectable 5000 Unit(s) SubCutaneous every 8 hours  hydrocortisone 0.5% Cream 1 Application(s) Topical daily  insulin glargine Injectable (LANTUS) 20 Unit(s) SubCutaneous at bedtime  insulin lispro (HumaLOG) corrective regimen sliding scale   SubCutaneous three times a day before meals  insulin lispro Injectable (HumaLOG) 12 Unit(s) SubCutaneous three times a day before meals  levothyroxine 137 MICROGram(s) Oral daily  pantoprazole    Tablet 40 milliGRAM(s) Oral before breakfast  senna 2 Tablet(s) Oral at bedtime    MEDICATIONS  (PRN):  acetaminophen   Tablet 650 milliGRAM(s) Oral every 6 hours PRN Mild and moderate pain  dextrose 40% Gel 15 Gram(s) Oral once PRN Blood Glucose LESS THAN 70 milliGRAM(s)/deciliter  glucagon  Injectable 1 milliGRAM(s) IntraMuscular once PRN Glucose LESS THAN 70 milligrams/deciliter  oxyCODONE    IR 10 milliGRAM(s) Oral every 6 hours PRN Severe Pain (7 - 10)      PRESENT SYMPTOMS:  Source: [x ] Patient   [ ] Family   [ ] Team     Pain: [x ] YES [ ] NO  OLDCARTS:   LLQ and left lower back.  7/10, dull, achy, non radiating. Constant    Dyspnea: [ ] YES [x ] NO   Anxiety: [ ] YES [x ] NO  Fatigue: [x ] YES [ ] NO   Nausea: [ ] YES [ x] NO  Loss of appetite: [x ] YES [ ] NO   Constipation: [ ] YES [x ] NO     Other Symptoms:  [ x] All other review of systems negative   [ ] Unable to obtain due to poor mentation     Karnofsky Performance Score/Palliative Performance Status Version 2:  60       %  Protein Calorie Malutrition:  [ ] Mild   [ ] Moderate   [ ] Severe     Vital Signs Last 24 Hrs  T(C): 36.4 (2018 05:42), Max: 36.8 (2018 15:26)  T(F): 97.5 (2018 05:42), Max: 98.3 (2018 15:26)  HR: 84 (2018 05:42) (66 - 84)  BP: 125/82 (2018 05:42) (122/82 - 134/87)  BP(mean): --  RR: 18 (2018 05:42) (18 - 18)  SpO2: 100% (2018 05:42) (99% - 100%)    Physical Exam:    General: [x ] Alert,  A&O x  3   [ ] lethargic   [ ] Agitated   [ ] Cachexia   HEENT: [x ] Normal   [ ] Dry mouth   [ ] ET Tube    [ ] Trach   Lungs: [x ] Clear [ ] Rhonchi  [ ] Crackles [ ] Wheezing [ ] Tachypnea  [ ] Audible excessive secretions   Cardiovascular:  [ x] Regular rate and rhythm  [ ] Irregular [ ] Tachycardia   [ ] Bradycardia   Abdomen: [x ] Soft  [ ] Distended  [ ]  [ x] +BS  [ ] Non tender [x ] Tender  [ ]PEG   [ ] NGT   Last BM:     Genitourinary: [x ] Normal [ ] Incontinent   [ ] Oliguria/Anuria   [ ] [place]  Musculoskeletal:  [ ] Normal   [x ] Generalized weakness  [ ] Bedbound   Neurological: [x ] No focal deficits  [ ] Cognitive impairment     Skin: [x ] Normal   [ ] Pressure ulcers     LABS:                        10.8   11.61 )-----------( 241      ( 2018 06:48 )             34.4     06-13    133<L>  |  91<L>  |  28<H>  ----------------------------<  266<H>  4.4   |  21<L>  |  0.96    Ca    9.2      2018 05:48    TPro  8.0  /  Alb  3.9  /  TBili  0.4  /  DBili  x   /  AST  35  /  ALT  38  /  AlkPhos  92  06-11    PT/INR - ( 2018 13:20 )   PT: 12.1 SEC;   INR: 1.05          PTT - ( 2018 13:20 )  PTT:30.2 SEC  Urinalysis Basic - ( 2018 13:20 )    Color: YELLOW / Appearance: CLEAR / S.019 / pH: 6.0  Gluc: 50 / Ketone: NEGATIVE  / Bili: NEGATIVE / Urobili: NORMAL mg/dL   Blood: NEGATIVE / Protein: 20 mg/dL / Nitrite: NEGATIVE   Leuk Esterase: NEGATIVE / RBC: 0-2 / WBC 0-2   Sq Epi: x / Non Sq Epi: x / Bacteria: x      I&O's Summary      RADIOLOGY & ADDITIONAL STUDIES:

## 2018-06-13 DIAGNOSIS — I10 ESSENTIAL (PRIMARY) HYPERTENSION: ICD-10-CM

## 2018-06-13 LAB
BACTERIA UR CULT: SIGNIFICANT CHANGE UP
BUN SERPL-MCNC: 28 MG/DL — HIGH (ref 7–23)
CALCIUM SERPL-MCNC: 9.2 MG/DL — SIGNIFICANT CHANGE UP (ref 8.4–10.5)
CHLORIDE SERPL-SCNC: 91 MMOL/L — LOW (ref 98–107)
CO2 SERPL-SCNC: 21 MMOL/L — LOW (ref 22–31)
CREAT SERPL-MCNC: 0.96 MG/DL — SIGNIFICANT CHANGE UP (ref 0.5–1.3)
GLUCOSE BLDC GLUCOMTR-MCNC: 305 MG/DL — HIGH (ref 70–99)
GLUCOSE BLDC GLUCOMTR-MCNC: 329 MG/DL — HIGH (ref 70–99)
GLUCOSE BLDC GLUCOMTR-MCNC: 361 MG/DL — HIGH (ref 70–99)
GLUCOSE BLDC GLUCOMTR-MCNC: 367 MG/DL — HIGH (ref 70–99)
GLUCOSE BLDC GLUCOMTR-MCNC: 405 MG/DL — HIGH (ref 70–99)
GLUCOSE SERPL-MCNC: 266 MG/DL — HIGH (ref 70–99)
HCT VFR BLD CALC: 34.4 % — LOW (ref 39–50)
HGB BLD-MCNC: 10.8 G/DL — LOW (ref 13–17)
MCHC RBC-ENTMCNC: 30.5 PG — SIGNIFICANT CHANGE UP (ref 27–34)
MCHC RBC-ENTMCNC: 31.4 % — LOW (ref 32–36)
MCV RBC AUTO: 97.2 FL — SIGNIFICANT CHANGE UP (ref 80–100)
NRBC # FLD: 0 — SIGNIFICANT CHANGE UP
PLATELET # BLD AUTO: 241 K/UL — SIGNIFICANT CHANGE UP (ref 150–400)
PMV BLD: 10.8 FL — SIGNIFICANT CHANGE UP (ref 7–13)
POTASSIUM SERPL-MCNC: 4.4 MMOL/L — SIGNIFICANT CHANGE UP (ref 3.5–5.3)
POTASSIUM SERPL-SCNC: 4.4 MMOL/L — SIGNIFICANT CHANGE UP (ref 3.5–5.3)
RBC # BLD: 3.54 M/UL — LOW (ref 4.2–5.8)
RBC # FLD: 16.6 % — HIGH (ref 10.3–14.5)
SODIUM SERPL-SCNC: 133 MMOL/L — LOW (ref 135–145)
WBC # BLD: 11.61 K/UL — HIGH (ref 3.8–10.5)
WBC # FLD AUTO: 11.61 K/UL — HIGH (ref 3.8–10.5)

## 2018-06-13 PROCEDURE — 99222 1ST HOSP IP/OBS MODERATE 55: CPT | Mod: GC

## 2018-06-13 PROCEDURE — 99233 SBSQ HOSP IP/OBS HIGH 50: CPT

## 2018-06-13 PROCEDURE — 99223 1ST HOSP IP/OBS HIGH 75: CPT

## 2018-06-13 RX ORDER — INSULIN GLARGINE 100 [IU]/ML
20 INJECTION, SOLUTION SUBCUTANEOUS AT BEDTIME
Qty: 0 | Refills: 0 | Status: DISCONTINUED | OUTPATIENT
Start: 2018-06-13 | End: 2018-06-14

## 2018-06-13 RX ORDER — INSULIN LISPRO 100/ML
12 VIAL (ML) SUBCUTANEOUS
Qty: 0 | Refills: 0 | Status: DISCONTINUED | OUTPATIENT
Start: 2018-06-13 | End: 2018-06-14

## 2018-06-13 RX ADMIN — Medication 12 UNIT(S): at 13:19

## 2018-06-13 RX ADMIN — Medication 12: at 13:18

## 2018-06-13 RX ADMIN — Medication 137 MICROGRAM(S): at 06:26

## 2018-06-13 RX ADMIN — Medication 20 MILLIGRAM(S): at 06:26

## 2018-06-13 RX ADMIN — PANTOPRAZOLE SODIUM 40 MILLIGRAM(S): 20 TABLET, DELAYED RELEASE ORAL at 06:26

## 2018-06-13 RX ADMIN — Medication 8: at 17:55

## 2018-06-13 RX ADMIN — Medication 4 MILLIGRAM(S): at 06:26

## 2018-06-13 RX ADMIN — Medication 1 APPLICATION(S): at 11:55

## 2018-06-13 RX ADMIN — Medication 6 UNIT(S): at 09:07

## 2018-06-13 RX ADMIN — Medication 4 MILLIGRAM(S): at 01:00

## 2018-06-13 RX ADMIN — Medication 4 MILLIGRAM(S): at 19:37

## 2018-06-13 RX ADMIN — INSULIN GLARGINE 20 UNIT(S): 100 INJECTION, SOLUTION SUBCUTANEOUS at 22:48

## 2018-06-13 RX ADMIN — HEPARIN SODIUM 5000 UNIT(S): 5000 INJECTION INTRAVENOUS; SUBCUTANEOUS at 06:26

## 2018-06-13 RX ADMIN — Medication 4 MILLIGRAM(S): at 14:46

## 2018-06-13 RX ADMIN — HEPARIN SODIUM 5000 UNIT(S): 5000 INJECTION INTRAVENOUS; SUBCUTANEOUS at 22:48

## 2018-06-13 RX ADMIN — Medication 12 UNIT(S): at 17:56

## 2018-06-13 RX ADMIN — AMLODIPINE BESYLATE 10 MILLIGRAM(S): 2.5 TABLET ORAL at 06:26

## 2018-06-13 RX ADMIN — Medication 8: at 09:06

## 2018-06-13 RX ADMIN — ATORVASTATIN CALCIUM 20 MILLIGRAM(S): 80 TABLET, FILM COATED ORAL at 22:48

## 2018-06-13 RX ADMIN — HEPARIN SODIUM 5000 UNIT(S): 5000 INJECTION INTRAVENOUS; SUBCUTANEOUS at 14:46

## 2018-06-13 NOTE — CONSULT NOTE ADULT - PROBLEM SELECTOR PROBLEM 1
Metastatic renal cell carcinoma to brain
Thoracic spine tumor
Type 2 diabetes mellitus with complication, unspecified whether long term insulin use
Metastatic renal cell carcinoma to brain

## 2018-06-13 NOTE — CONSULT NOTE ADULT - PROBLEM SELECTOR RECOMMENDATION 9
-Patient had well controlled diabetes prior the initiation of the dexamethasone. His A1c was 6.7% on basal and prandin.  At this time he is hyperglycemia due to the steroids so adjustments to basal/bolus are recommended.  For now, increase Lantus to 20 units at bedtime and increase humalog to 12 units TID with meals.  Based on further data we will adjust.  If/when the dexamethasone is tapered he may have decreased insulin requirements  -consistent carbohydrate diet  -continue current sliding scales  -for discharge: pending based on steroid regimen
Agree with admission to medical service  No acute surgical intervention  Radiation therapy consult, start XRT   High dose steroids
As per radiation oncology patient at high risk of toxicity from radiation to spine given previous SBRT to the area, will follow up on neurosurgical recommendations about possible interventions.   Continue with steroids at the present time.   Can hold the cabozantinib at the present time while inpatient and solving these acute issues.  Appreciate rad-onc input.   Patient with mixed response on recent scans, had some enlargening paratracheal nodes however other disease was improved although now with new spinal disease. At this point, patient has failed multiple other lines of therapy though and he is still deriving some benefit from this medication.
Patient follows at [facility].  Original diagnosis 12/2014.   Inability to do nephrectomy at that time due to progression of disease.   Patient is s/p Gamma Knife for brain mets, RT to spine, s/p Pazopanib and most recently 8 months of Cabozantinib. Overall, some portions of malignancy are increasing in size, while others are decreasing.   At this point, patient is electing to continue with disease modifying interventions if offered.

## 2018-06-13 NOTE — CONSULT NOTE ADULT - PROBLEM SELECTOR RECOMMENDATION 2
-patient is clinically euthyroid but chemically hypothyroid based on TSH from June 7.  Endocrine Dr. Carolina increased the levothyroxine dose accordingly. As such, continue with levothyroxine 137 mcg daily and repeat TFTs in the ouptatient setting in 4-6 weeks
Continue with Oxycodone 10mg prn. However, can change to Q4hour prn dosing.  Continue with bowel regimen.

## 2018-06-13 NOTE — CONSULT NOTE ADULT - SUBJECTIVE AND OBJECTIVE BOX
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 S/p gamma knife to brain met, not a candidate for nephrectomy per Urology given brain mets. S/p spinal radiation. Previously on pazopanib, switched to nivolumab, now on cabozantinib since Nov 2017. 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  Pt comes to ed in setting of several weeks of LLE weakness and numbness but which acutely worsened over the past few days. He also complains of urinary retention, (able to void, but weakened stream, incomplete bladder emptying) and pelvic numbness. MRI revealed osseous spinal mets from T6-8 with cord compression, in addition to intramedullary disease at T7 with surrounding cord edema.   Pt also with BP elevation in ED as high as 195/101. Takes enalapril daily for last few months (and this morning) for HTN attributed to cabozantinib; checks his BP daily and says has been consistently below 140. Normal BP in ed prior to decadron. Denies HA, blurry vision, chest pain, or sob. Reports left-sided abd and lower back pain for last 4- 5 weeks that he reports was to be evaluated with MRI of abdomen shortly. Denies constipation but notes increased straining for passage of BM. UA negative  Pt also with DM, hypothyroid (11 Jun 2018 21:57)    Endocrine History  Patient is a 57 yo man with type 2 diabetes with peripheral neuropathy, HTN, HLD and hypothyroidism.  The patient is followed by the fellows endocrine clinic at [facility].  He was diagnosed with type 2 diabetes about 3-4 years ago.  He is on Lantus 14 units at bedtime and prandin 1 mg TID with meals. He checks sugars twice a day and his values range from .  He had only one episode of hypoglycemia to the 60's but cannot recall the circumstances regarding that episode.    Breakfast: typically fruits  Lunch: steamed fish and vegetables, small amount of brown rice  Dinner: fish and vegetables  Drinks Ginger Ale once a week and occasional sweets  Last ophthalmology visit was about 2 months ago with no reported retinopathy  He does have neuropathy but that may be due to diabetes vs effects of chemotherapy  Of note, the patient is hyperglycemia as he is on dexamethasone due to the renal cell cancer mets to brain and acute cord compression    As far as his hypothyroidism is concerned, he was diagnosed in 2016.  During this period, he was on nivolumab and the hypothyroidism was attributed to that.  He was initiated on small doses of levothyroxine with missed doses from time to time.  His current dose is levothyroxine 137 mcg and this was just increased on June 8 when TSH resulted at 13 on June 7th. He is without overt symptoms of hypothyroidism. He has constipation but is on percocet and senna.    PAST MEDICAL & SURGICAL HISTORY:  Hypothyroidism  GERD (gastroesophageal reflux disease)  Elevated TSH  Cervicalgia  Metastatic renal cell carcinoma to brain  Coronary artery disease  Hypertension  Diabetes mellitus  Status post gamma knife treatment: Left forehead/temple      FAMILY HISTORY:  Family history of colon cancer  Family history of diabetes mellitus in mother  Family history of diabetes mellitus in father      Social History:    Outpatient Medications:  · 	Norvasc 10 mg oral tablet: 1 tab(s) orally once a day  · 	enalapril 20 mg oral tablet: 1 tab(s) orally once a day  · 	acetaminophen 325 mg oral tablet: 2 tab(s) orally every 6 hours, As needed, Mild to moderate pain  · 	Lantus Solostar Pen 100 units/mL subcutaneous solution: 14 unit(s) subcutaneous once a day (at bedtime)  · 	levothyroxine 100 mcg (0.1 mg) oral tablet: 1 tab(s) orally once a day  · 	repaglinide 1 mg oral tablet: 1 tab(s) orally 3 times a day (before meals)  · 	atorvastatin 20 mg oral tablet: 1 tab(s) orally once a day  · 	Senna 8.6 mg oral tablet: 2 tab(s) orally once a day (at bedtime)  · 	cetirizine 10 mg oral tablet: 1 tab(s) orally once a day  ·	oxyCODONE 10 mg oral tablet: 1 tab(s) orally every 6 hours, As Needed    MEDICATIONS  (STANDING):  amLODIPine   Tablet 10 milliGRAM(s) Oral daily  atorvastatin 20 milliGRAM(s) Oral at bedtime  dexamethasone  Injectable 4 milliGRAM(s) IV Push every 6 hours  dextrose 5%. 1000 milliLiter(s) (50 mL/Hr) IV Continuous <Continuous>  dextrose 50% Injectable 12.5 Gram(s) IV Push once  dextrose 50% Injectable 25 Gram(s) IV Push once  dextrose 50% Injectable 25 Gram(s) IV Push once  enalapril 20 milliGRAM(s) Oral daily  heparin  Injectable 5000 Unit(s) SubCutaneous every 8 hours  hydrocortisone 0.5% Cream 1 Application(s) Topical daily  insulin glargine Injectable (LANTUS) 16 Unit(s) SubCutaneous at bedtime  insulin lispro (HumaLOG) corrective regimen sliding scale   SubCutaneous three times a day before meals  insulin lispro Injectable (HumaLOG) 6 Unit(s) SubCutaneous three times a day before meals  levothyroxine 137 MICROGram(s) Oral daily  pantoprazole    Tablet 40 milliGRAM(s) Oral before breakfast  senna 2 Tablet(s) Oral at bedtime    MEDICATIONS  (PRN):  acetaminophen   Tablet 650 milliGRAM(s) Oral every 6 hours PRN Mild and moderate pain  dextrose 40% Gel 15 Gram(s) Oral once PRN Blood Glucose LESS THAN 70 milliGRAM(s)/deciliter  glucagon  Injectable 1 milliGRAM(s) IntraMuscular once PRN Glucose LESS THAN 70 milligrams/deciliter  oxyCODONE    IR 10 milliGRAM(s) Oral every 6 hours PRN Severe Pain (7 - 10)      Allergies  No Known Allergies    Review of Systems:  Constitutional: No fever, tolerating meals, no pain  Eyes: No blurry vision  Neuro: No tremors  HEENT: No pain  Cardiovascular: No chest pain, palpitations  Respiratory: No SOB, no cough  GI: No nausea, vomiting, abdominal pain  : No dysuria  Skin: no rash  Endocrine: no polyuria, polydipsia  ALL OTHER SYSTEMS REVIEWED AND NEGATIVE    PHYSICAL EXAM:  VITALS: T(C): 36.4 (06-13-18 @ 05:42)  T(F): 97.5 (06-13-18 @ 05:42), Max: 98.3 (06-12-18 @ 15:26)  HR: 84 (06-13-18 @ 05:42) (66 - 84)  BP: 125/82 (06-13-18 @ 05:42) (122/82 - 134/87)  RR:  (18 - 18)  SpO2:  (99% - 100%)  Wt(kg): --  GENERAL: NAD, well-groomed, well-developed  EYES: No proptosis, no lid lag, anicteric  HEENT:  Atraumatic, Normocephalic, moist mucous membranes  THYROID: Normal size, no palpable nodules  RESPIRATORY: Clear to auscultation bilaterally; No rales, rhonchi, wheezing, or rubs  CARDIOVASCULAR: Regular rate and rhythm; No murmurs; very trace lower extremity edema  GI: Soft, nontender, non distended, normal bowel sounds  SKIN: Dry, intact, No rashes or lesions; no foot ulcers  MUSCULOSKELETAL: Full range of motion, normal strength  PSYCH: Alert and oriented x 3, reactive affect, normal mood    POCT Blood Glucose.: 305 mg/dL (06-13-18 @ 08:19)  Humalog 6+8  POCT Blood Glucose.: 253 mg/dL (06-12-18 @ 21:56)  Lantus 16  POCT Blood Glucose.: 403 mg/dL (06-12-18 @ 17:41)  Humalog 12 + 6  POCT Blood Glucose.: 356 mg/dL (06-12-18 @ 12:42)  Humalog 5  POCT Blood Glucose.: 221 mg/dL (06-12-18 @ 08:32)  Humalog 2  POCT Blood Glucose.: 347 mg/dL (06-12-18 @ 02:12)  Lantus 14 + Humalog 2  POCT Blood Glucose.: 313 mg/dL (06-11-18 @ 23:58)  Patient received dexamethasone 10 mg X 1 followed by 4 Q 6 H                          10.8   11.61 )-----------( 241      ( 13 Jun 2018 06:48 )             34.4       06-13    133<L>  |  91<L>  |  28<H>  ----------------------------<  266<H>  4.4   |  21<L>  |  0.96    EGFR if : 99  EGFR if non : 86    Ca    9.2      06-13    TPro  8.0  /  Alb  3.9  /  TBili  0.4  /  DBili  x   /  AST  35  /  ALT  38  /  AlkPhos  92  06-11      Thyroid Function Tests:  06-07 @ 14:40 TSH 13.01 FreeT4 -- T3 -- Anti TPO -- Anti Thyroglobulin Ab -- TSI --  Hemoglobin A1C, Whole Blood: 6.7 % <H> [4.0 - 5.6] (06-08-18 @ 12:50) HPI:  57 yo m with 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 S/p gamma knife to brain met, not a candidate for nephrectomy per Urology given brain mets. S/p spinal radiation. Previously on pazopanib, switched to nivolumab, now on cabozantinib since 2017. 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  Pt comes to ed in setting of several weeks of LLE weakness and numbness but which acutely worsened over the past few days. He also complains of urinary retention, (able to void, but weakened stream, incomplete bladder emptying) and pelvic numbness. MRI revealed osseous spinal mets from T6-8 with cord compression, in addition to intramedullary disease at T7 with surrounding cord edema.   Pt also with BP elevation in ED as high as 195/101. Takes enalapril daily for last few months (and this morning) for HTN attributed to cabozantinib; checks his BP daily and says has been consistently below 140. Normal BP in ed prior to decadron. Denies HA, blurry vision, chest pain, or sob. Reports left-sided abd and lower back pain for last 4- 5 weeks that he reports was to be evaluated with MRI of abdomen shortly. Denies constipation but notes increased straining for passage of BM. UA negative  Pt also with DM, hypothyroid (2018 21:57)    Endocrine History  Patient is a 57 yo man with type 2 diabetes with peripheral neuropathy, HTN, HLD and hypothyroidism.  The patient is followed by the fellows endocrine clinic at [facility].  He was diagnosed with type 2 diabetes about 3-4 years ago.  He is on Lantus 14 units at bedtime and prandin 1 mg TID with meals. He checks sugars twice a day and his values range from .  He had only one episode of hypoglycemia to the 60's but cannot recall the circumstances regarding that episode.    Breakfast: typically fruits  Lunch: steamed fish and vegetables, small amount of brown rice  Dinner: fish and vegetables  Drinks Ginger Ale once a week and occasional sweets  Last ophthalmology visit was about 2 months ago with no reported retinopathy  He does have neuropathy but that may be due to diabetes vs effects of chemotherapy  Of note, the patient is hyperglycemia as he is on dexamethasone due to the renal cell cancer mets to brain and acute cord compression    As far as his hypothyroidism is concerned, he was diagnosed in .  During this period, he was on nivolumab and the hypothyroidism was attributed to that.  He was initiated on small doses of levothyroxine with missed doses from time to time.  His current dose is levothyroxine 137 mcg and this was just increased on  when TSH resulted at 13 on . He is without overt symptoms of hypothyroidism. He has constipation but is on percocet and senna.    PAST MEDICAL & SURGICAL HISTORY:  Hypothyroidism  GERD (gastroesophageal reflux disease)  Elevated TSH  Cervicalgia  Metastatic renal cell carcinoma to brain  Coronary artery disease  Hypertension  Diabetes mellitus  Status post gamma knife treatment: Left forehead/temple      FAMILY HISTORY:  Family history of colon cancer  Family history of diabetes mellitus in mother  Family history of diabetes mellitus in father  Both parents with diabetes  Father  at the age of 59 from sudden cardiac event    Social History:  No smoking  No alcohol  No recreational drugs  Currently unemployed    Outpatient Medications:  · 	Norvasc 10 mg oral tablet: 1 tab(s) orally once a day  · 	enalapril 20 mg oral tablet: 1 tab(s) orally once a day  · 	acetaminophen 325 mg oral tablet: 2 tab(s) orally every 6 hours, As needed, Mild to moderate pain  · 	Lantus Solostar Pen 100 units/mL subcutaneous solution: 14 unit(s) subcutaneous once a day (at bedtime)  · 	levothyroxine 100 mcg (0.1 mg) oral tablet: 1 tab(s) orally once a day  · 	repaglinide 1 mg oral tablet: 1 tab(s) orally 3 times a day (before meals)  · 	atorvastatin 20 mg oral tablet: 1 tab(s) orally once a day  · 	Senna 8.6 mg oral tablet: 2 tab(s) orally once a day (at bedtime)  · 	cetirizine 10 mg oral tablet: 1 tab(s) orally once a day  ·	oxyCODONE 10 mg oral tablet: 1 tab(s) orally every 6 hours, As Needed    MEDICATIONS  (STANDING):  amLODIPine   Tablet 10 milliGRAM(s) Oral daily  atorvastatin 20 milliGRAM(s) Oral at bedtime  dexamethasone  Injectable 4 milliGRAM(s) IV Push every 6 hours  dextrose 5%. 1000 milliLiter(s) (50 mL/Hr) IV Continuous <Continuous>  dextrose 50% Injectable 12.5 Gram(s) IV Push once  dextrose 50% Injectable 25 Gram(s) IV Push once  dextrose 50% Injectable 25 Gram(s) IV Push once  enalapril 20 milliGRAM(s) Oral daily  heparin  Injectable 5000 Unit(s) SubCutaneous every 8 hours  hydrocortisone 0.5% Cream 1 Application(s) Topical daily  insulin glargine Injectable (LANTUS) 16 Unit(s) SubCutaneous at bedtime  insulin lispro (HumaLOG) corrective regimen sliding scale   SubCutaneous three times a day before meals  insulin lispro Injectable (HumaLOG) 6 Unit(s) SubCutaneous three times a day before meals  levothyroxine 137 MICROGram(s) Oral daily  pantoprazole    Tablet 40 milliGRAM(s) Oral before breakfast  senna 2 Tablet(s) Oral at bedtime    MEDICATIONS  (PRN):  acetaminophen   Tablet 650 milliGRAM(s) Oral every 6 hours PRN Mild and moderate pain  dextrose 40% Gel 15 Gram(s) Oral once PRN Blood Glucose LESS THAN 70 milliGRAM(s)/deciliter  glucagon  Injectable 1 milliGRAM(s) IntraMuscular once PRN Glucose LESS THAN 70 milligrams/deciliter  oxyCODONE    IR 10 milliGRAM(s) Oral every 6 hours PRN Severe Pain (7 - 10)      Allergies  No Known Allergies    Review of Systems:  Constitutional: No fever, tolerating meals, no pain  Eyes: No blurry vision  Neuro: No tremors  HEENT: No pain  Cardiovascular: No chest pain, palpitations  Respiratory: No SOB, no cough  GI: No nausea, vomiting, abdominal pain  : No dysuria  Skin: no rash  Endocrine: no polyuria, polydipsia  ALL OTHER SYSTEMS REVIEWED AND NEGATIVE    PHYSICAL EXAM:  VITALS: T(C): 36.4 (18 @ 05:42)  T(F): 97.5 (18 @ 05:42), Max: 98.3 (18 @ 15:26)  HR: 84 (18 @ 05:42) (66 - 84)  BP: 125/82 (18 @ 05:42) (122/82 - 134/87)  RR:  (18 - 18)  SpO2:  (99% - 100%)  Wt(kg): --  GENERAL: NAD, well-groomed, well-developed  EYES: No proptosis, no lid lag, anicteric  HEENT:  Atraumatic, Normocephalic, moist mucous membranes  THYROID: Normal size, no palpable nodules  RESPIRATORY: Clear to auscultation bilaterally; No rales, rhonchi, wheezing, or rubs  CARDIOVASCULAR: Regular rate and rhythm; No murmurs; very trace lower extremity edema  GI: Soft, nontender, non distended, normal bowel sounds  SKIN: Dry, intact, No rashes or lesions; no foot ulcers  MUSCULOSKELETAL: Full range of motion, normal strength  PSYCH: Alert and oriented x 3, reactive affect, normal mood    POCT Blood Glucose.: 305 mg/dL (18 @ 08:19)  Humalog 6+8  POCT Blood Glucose.: 253 mg/dL (18 @ 21:56)  Lantus 16  POCT Blood Glucose.: 403 mg/dL (18 @ 17:41)  Humalog 12 + 6  POCT Blood Glucose.: 356 mg/dL (18 @ 12:42)  Humalog 5  POCT Blood Glucose.: 221 mg/dL (18 @ 08:32)  Humalog 2  POCT Blood Glucose.: 347 mg/dL (18 @ 02:12)  Lantus 14 + Humalog 2  POCT Blood Glucose.: 313 mg/dL (18 @ 23:58)  Patient received dexamethasone 10 mg X 1 followed by 4 Q 6 H                          10.8   11.61 )-----------( 241      ( 2018 06:48 )             34.4           133<L>  |  91<L>  |  28<H>  ----------------------------<  266<H>  4.4   |  21<L>  |  0.96    EGFR if : 99  EGFR if non : 86    Ca    9.2          TPro  8.0  /  Alb  3.9  /  TBili  0.4  /  DBili  x   /  AST  35  /  ALT  38  /  AlkPhos  92        Thyroid Function Tests:   @ 14:40 TSH 13.01 FreeT4 -- T3 -- Anti TPO -- Anti Thyroglobulin Ab -- TSI --  Hemoglobin A1C, Whole Blood: 6.7 % <H> [4.0 - 5.6] (18 @ 12:50)

## 2018-06-13 NOTE — CONSULT NOTE ADULT - SUBJECTIVE AND OBJECTIVE BOX
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 S/p gamma knife to brain met, not a candidate for nephrectomy per Urology given brain mets. S/p spinal radiation. Previously on pazopanib, switched to nivolumab, now on cabozantinib since Nov 2017. 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  Pt comes to ed in setting of several weeks of LLE weakness and numbness but which acutely worsened over the past few days. He also complains of urinary retention, (able to void, but weakened stream, incomplete bladder emptying) and pelvic numbness. MRI revealed osseous spinal mets from T6-8 with cord compression, in addition to intramedullary disease at T7 with surrounding cord edema.   Pt also with BP elevation in ED as high as 195/101. Takes enalapril daily for last few months (and this morning) for HTN attributed to cabozantinib; checks his BP daily and says has been consistently below 140. Normal BP in ed prior to decadron. Denies HA, blurry vision, chest pain, or sob. Reports left-sided abd and lower back pain for last 4- 5 weeks that he reports was to be evaluated with MRI of abdomen shortly. Denies constipation but notes increased straining for passage of BM. UA negative  Pt also with DM, hypothyroid (11 Jun 2018 21:57)      REVIEW OF SYSTEMS: No chest pain, shortness of breath, nausea, vomiting or diarhea.      PAST MEDICAL & SURGICAL HISTORY  Hypothyroidism  GERD (gastroesophageal reflux disease)  Elevated TSH  Cervicalgia  Metastatic renal cell carcinoma to brain  Metastatic carcinoma to bone  Coronary artery disease  Hypertension  Metastatic renal cell carcinoma  Diabetes mellitus  Cancer  No pertinent past medical history  History of coronary artery stent placement  Status post gamma knife treatment  No significant past surgical history      SOCIAL HISTORY  Smoking - Denied, EtOH - Denied, Drugs - Denied    FUNCTIONAL HISTORY:   Lives   Independent    CURRENT FUNCTIONAL STATUS:      FAMILY HISTORY   Family history of colon cancer  Family history of diabetes mellitus in mother  Family history of coronary artery disease  Family history of diabetes mellitus in father      RECENT LABS/IMAGING  CBC Full  -  ( 13 Jun 2018 06:48 )  WBC Count : 11.61 K/uL  Hemoglobin : 10.8 g/dL  Hematocrit : 34.4 %  Platelet Count - Automated : 241 K/uL  Mean Cell Volume : 97.2 fL  Mean Cell Hemoglobin : 30.5 pg  Mean Cell Hemoglobin Concentration : 31.4 %  Auto Neutrophil # : x  Auto Lymphocyte # : x  Auto Monocyte # : x  Auto Eosinophil # : x  Auto Basophil # : x  Auto Neutrophil % : x  Auto Lymphocyte % : x  Auto Monocyte % : x  Auto Eosinophil % : x  Auto Basophil % : x    06-13    133<L>  |  91<L>  |  28<H>  ----------------------------<  266<H>  4.4   |  21<L>  |  0.96    Ca    9.2      13 Jun 2018 05:48          VITALS  T(C): 36.5 (06-13-18 @ 15:01), Max: 36.7 (06-12-18 @ 21:21)  HR: 82 (06-13-18 @ 15:01) (66 - 84)  BP: 128/86 (06-13-18 @ 15:01) (125/82 - 134/87)  RR: 18 (06-13-18 @ 15:01) (18 - 18)  SpO2: 100% (06-13-18 @ 15:01) (99% - 100%)  Wt(kg): --    ALLERGIES  No Known Allergies      MEDICATIONS   acetaminophen   Tablet 650 milliGRAM(s) Oral every 6 hours PRN  amLODIPine   Tablet 10 milliGRAM(s) Oral daily  atorvastatin 20 milliGRAM(s) Oral at bedtime  dexamethasone  Injectable 4 milliGRAM(s) IV Push every 6 hours  dextrose 40% Gel 15 Gram(s) Oral once PRN  dextrose 5%. 1000 milliLiter(s) IV Continuous <Continuous>  dextrose 50% Injectable 12.5 Gram(s) IV Push once  dextrose 50% Injectable 25 Gram(s) IV Push once  dextrose 50% Injectable 25 Gram(s) IV Push once  enalapril 20 milliGRAM(s) Oral daily  glucagon  Injectable 1 milliGRAM(s) IntraMuscular once PRN  heparin  Injectable 5000 Unit(s) SubCutaneous every 8 hours  hydrocortisone 0.5% Cream 1 Application(s) Topical daily  insulin glargine Injectable (LANTUS) 20 Unit(s) SubCutaneous at bedtime  insulin lispro (HumaLOG) corrective regimen sliding scale   SubCutaneous three times a day before meals  insulin lispro Injectable (HumaLOG) 12 Unit(s) SubCutaneous three times a day before meals  levothyroxine 137 MICROGram(s) Oral daily  oxyCODONE    IR 10 milliGRAM(s) Oral every 6 hours PRN  pantoprazole    Tablet 40 milliGRAM(s) Oral before breakfast  senna 2 Tablet(s) Oral at bedtime      ----------------------------------------------------------------------------------------  PHYSICAL EXAM  Constitutional - NAD, Comfortable  HEENT - NCAT, EOMI  Neck - Supple, No limited ROM  Chest - CTA bilaterally, No wheeze, No rhonchi, No crackles  Cardiovascular - RRR, S1S2, No murmurs  Abdomen - BS+, Soft, NTND  Extremities - No C/C/E, No calf tenderness   Neurologic Exam -                    Cognitive - Awake, Alert, AAO to self, place, date, year, situation     Communication - Fluent, No dysarthria, no aphasia     Cranial Nerves - CN 2-12 intact     Motor - No focal deficits                       Sensory - Intact to LT     Reflexes - DTR Intact, No primitive reflexive     Balance - WNL Static  Psychiatric - Mood stable, Affect WNL HPI:  57 yo m with 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 S/p gamma knife to brain met, not a candidate for nephrectomy per Urology given brain mets. S/p spinal radiation. Previously on pazopanib, switched to nivolumab, now on cabozantinib since Nov 2017. 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  Pt comes to ed in setting of several weeks of LLE weakness and numbness but which acutely worsened over the past few days. He also complains of urinary retention, (able to void, but weakened stream, incomplete bladder emptying) and pelvic numbness. MRI revealed osseous spinal mets from T6-8 with cord compression, in addition to intramedullary disease at T7 with surrounding cord edema.     seen by neurosurgery  who for now recommend decadron; no role for surgical intervention at this time. pending rad onc recs.       REVIEW OF SYSTEMS: No chest pain, shortness of breath, nausea, vomiting or diarhea.      PAST MEDICAL & SURGICAL HISTORY  Hypothyroidism  GERD (gastroesophageal reflux disease)  Elevated TSH  Cervicalgia  Metastatic renal cell carcinoma to brain  Metastatic carcinoma to bone  Coronary artery disease  Hypertension  Metastatic renal cell carcinoma  Diabetes mellitus  Cancer  No pertinent past medical history  History of coronary artery stent placement  Status post gamma knife treatment  No significant past surgical history      SOCIAL HISTORY  Smoking - Denied, EtOH - Denied, Drugs - Denied    FUNCTIONAL HISTORY:   Lives   Independent    CURRENT FUNCTIONAL STATUS:      FAMILY HISTORY   Family history of colon cancer  Family history of diabetes mellitus in mother  Family history of coronary artery disease  Family history of diabetes mellitus in father      RECENT LABS/IMAGING  CBC Full  -  ( 13 Jun 2018 06:48 )  WBC Count : 11.61 K/uL  Hemoglobin : 10.8 g/dL  Hematocrit : 34.4 %  Platelet Count - Automated : 241 K/uL  Mean Cell Volume : 97.2 fL  Mean Cell Hemoglobin : 30.5 pg  Mean Cell Hemoglobin Concentration : 31.4 %  Auto Neutrophil # : x  Auto Lymphocyte # : x  Auto Monocyte # : x  Auto Eosinophil # : x  Auto Basophil # : x  Auto Neutrophil % : x  Auto Lymphocyte % : x  Auto Monocyte % : x  Auto Eosinophil % : x  Auto Basophil % : x    06-13    133<L>  |  91<L>  |  28<H>  ----------------------------<  266<H>  4.4   |  21<L>  |  0.96    Ca    9.2      13 Jun 2018 05:48          VITALS  T(C): 36.5 (06-13-18 @ 15:01), Max: 36.7 (06-12-18 @ 21:21)  HR: 82 (06-13-18 @ 15:01) (66 - 84)  BP: 128/86 (06-13-18 @ 15:01) (125/82 - 134/87)  RR: 18 (06-13-18 @ 15:01) (18 - 18)  SpO2: 100% (06-13-18 @ 15:01) (99% - 100%)  Wt(kg): --    ALLERGIES  No Known Allergies      MEDICATIONS   acetaminophen   Tablet 650 milliGRAM(s) Oral every 6 hours PRN  amLODIPine   Tablet 10 milliGRAM(s) Oral daily  atorvastatin 20 milliGRAM(s) Oral at bedtime  dexamethasone  Injectable 4 milliGRAM(s) IV Push every 6 hours  dextrose 40% Gel 15 Gram(s) Oral once PRN  dextrose 5%. 1000 milliLiter(s) IV Continuous <Continuous>  dextrose 50% Injectable 12.5 Gram(s) IV Push once  dextrose 50% Injectable 25 Gram(s) IV Push once  dextrose 50% Injectable 25 Gram(s) IV Push once  enalapril 20 milliGRAM(s) Oral daily  glucagon  Injectable 1 milliGRAM(s) IntraMuscular once PRN  heparin  Injectable 5000 Unit(s) SubCutaneous every 8 hours  hydrocortisone 0.5% Cream 1 Application(s) Topical daily  insulin glargine Injectable (LANTUS) 20 Unit(s) SubCutaneous at bedtime  insulin lispro (HumaLOG) corrective regimen sliding scale   SubCutaneous three times a day before meals  insulin lispro Injectable (HumaLOG) 12 Unit(s) SubCutaneous three times a day before meals  levothyroxine 137 MICROGram(s) Oral daily  oxyCODONE    IR 10 milliGRAM(s) Oral every 6 hours PRN  pantoprazole    Tablet 40 milliGRAM(s) Oral before breakfast  senna 2 Tablet(s) Oral at bedtime      ----------------------------------------------------------------------------------------  PHYSICAL EXAM  Constitutional - NAD, Comfortable  HEENT - NCAT, EOMI  Neck - Supple, No limited ROM  Chest - CTA bilaterally, No wheeze, No rhonchi, No crackles  Cardiovascular - RRR, S1S2, No murmurs  Abdomen - BS+, Soft, NTND  Extremities - No C/C/E, No calf tenderness   Neurologic Exam -                    Cognitive - Awake, Alert, AAO to self, place, date, year, situation     Communication - Fluent, No dysarthria, no aphasia     Cranial Nerves - CN 2-12 intact     Motor - No focal deficits                       Sensory - Intact to LT     Reflexes - DTR Intact, No primitive reflexive     Balance - WNL Static  Psychiatric - Mood stable, Affect WNL HPI:  59 yo m with 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 S/p gamma knife to brain met, not a candidate for nephrectomy per Urology given brain mets. S/p spinal radiation. Previously on pazopanib, switched to nivolumab, now on cabozantinib since Nov 2017. 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  Pt comes to ed in setting of several weeks of LLE weakness and numbness but which acutely worsened over the past few days. He also complains of urinary retention, (able to void, but weakened stream, incomplete bladder emptying) and pelvic numbness. MRI revealed osseous spinal mets from T6-8 with cord compression, in addition to intramedullary disease at T7 with surrounding cord edema.     seen by neurosurgery  who for now recommend decadron; no role for surgical intervention at this time. pending rad onc recs.       REVIEW OF SYSTEMS: No chest pain, shortness of breath, nausea, vomiting or diarhea.      PAST MEDICAL & SURGICAL HISTORY  Hypothyroidism  GERD (gastroesophageal reflux disease)  Elevated TSH  Cervicalgia  Metastatic renal cell carcinoma to brain  Metastatic carcinoma to bone  Coronary artery disease  Hypertension  Metastatic renal cell carcinoma  Diabetes mellitus  Cancer  No pertinent past medical history  History of coronary artery stent placement  Status post gamma knife treatment  No significant past surgical history      SOCIAL HISTORY  Smoking - Denied, EtOH - Denied, Drugs - Denied    FUNCTIONAL HISTORY:   Lives   Independent    CURRENT FUNCTIONAL STATUS:      FAMILY HISTORY   Family history of colon cancer  Family history of diabetes mellitus in mother  Family history of coronary artery disease  Family history of diabetes mellitus in father      RECENT LABS/IMAGING  CBC Full  -  ( 13 Jun 2018 06:48 )  WBC Count : 11.61 K/uL  Hemoglobin : 10.8 g/dL  Hematocrit : 34.4 %  Platelet Count - Automated : 241 K/uL  Mean Cell Volume : 97.2 fL  Mean Cell Hemoglobin : 30.5 pg  Mean Cell Hemoglobin Concentration : 31.4 %  Auto Neutrophil # : x  Auto Lymphocyte # : x  Auto Monocyte # : x  Auto Eosinophil # : x  Auto Basophil # : x  Auto Neutrophil % : x  Auto Lymphocyte % : x  Auto Monocyte % : x  Auto Eosinophil % : x  Auto Basophil % : x    06-13    133<L>  |  91<L>  |  28<H>  ----------------------------<  266<H>  4.4   |  21<L>  |  0.96    Ca    9.2      13 Jun 2018 05:48          VITALS  T(C): 36.5 (06-13-18 @ 15:01), Max: 36.7 (06-12-18 @ 21:21)  HR: 82 (06-13-18 @ 15:01) (66 - 84)  BP: 128/86 (06-13-18 @ 15:01) (125/82 - 134/87)  RR: 18 (06-13-18 @ 15:01) (18 - 18)  SpO2: 100% (06-13-18 @ 15:01) (99% - 100%)  Wt(kg): --    ALLERGIES  No Known Allergies      MEDICATIONS   acetaminophen   Tablet 650 milliGRAM(s) Oral every 6 hours PRN  amLODIPine   Tablet 10 milliGRAM(s) Oral daily  atorvastatin 20 milliGRAM(s) Oral at bedtime  dexamethasone  Injectable 4 milliGRAM(s) IV Push every 6 hours  dextrose 40% Gel 15 Gram(s) Oral once PRN  dextrose 5%. 1000 milliLiter(s) IV Continuous <Continuous>  dextrose 50% Injectable 12.5 Gram(s) IV Push once  dextrose 50% Injectable 25 Gram(s) IV Push once  dextrose 50% Injectable 25 Gram(s) IV Push once  enalapril 20 milliGRAM(s) Oral daily  glucagon  Injectable 1 milliGRAM(s) IntraMuscular once PRN  heparin  Injectable 5000 Unit(s) SubCutaneous every 8 hours  hydrocortisone 0.5% Cream 1 Application(s) Topical daily  insulin glargine Injectable (LANTUS) 20 Unit(s) SubCutaneous at bedtime  insulin lispro (HumaLOG) corrective regimen sliding scale   SubCutaneous three times a day before meals  insulin lispro Injectable (HumaLOG) 12 Unit(s) SubCutaneous three times a day before meals  levothyroxine 137 MICROGram(s) Oral daily  oxyCODONE    IR 10 milliGRAM(s) Oral every 6 hours PRN  pantoprazole    Tablet 40 milliGRAM(s) Oral before breakfast  senna 2 Tablet(s) Oral at bedtime      ----------------------------------------------------------------------------------------  PHYSICAL EXAM  Constitutional - NAD, Comfortable  HEENT - NCAT, EOMI  Neck - Supple, No limited ROM  Chest - CTA bilaterally, No wheeze, No rhonchi, No crackles  Cardiovascular - RRR, S1S2, No murmurs  Abdomen - BS+, Soft, NTND  Extremities - No C/C/E, No calf tenderness   Neurologic Exam -                    Cognitive - Awake, Alert, AAO to self, place, date, year, situation     Communication - Fluent, No dysarthria, no aphasia     Cranial Nerves - CN 2-12 intact     Motor - 3/5 RLE   4/5 LLE                        Sensory - Intact to LT     Reflexes - DTR Intact, No primitive reflexive     Balance - WNL Static  Psychiatric - Mood stable, Affect WNL

## 2018-06-13 NOTE — CONSULT NOTE ADULT - ASSESSMENT
57 YO male with history of renal cell carcinoma, known spinal metastases with new thoracic intramedullary metastasis
57 y/o M w/ stage IV right-sided RCC and mets to lung, brain and bone s/p gamma knife to brain met with stability on recent scans, s/p spinal radiation. Previously on sunitinib and then pazopanib, switched to nivolumab and had stable disease for 1.5 years then progressed, now on cabozantinib since Nov 2017 with mixed response and now presenting with evidence for cord compression at T7.
Patient is a 59 yo man with type 2 diabetes, hypothyroidism, renal cell cancer with metastatic disease admitted for acute cord compression on high dose steroids.
58 M with metastatic RCC, neoplasm related pain, spinal cord compression, encounter for palliative care.
PT while inpt  Decadron  Rad onc f/u   Foot drop due to cord compression.

## 2018-06-13 NOTE — CONSULT NOTE ADULT - SUBJECTIVE AND OBJECTIVE BOX
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 S/p gamma knife to brain met, not a candidate for nephrectomy per Urology given brain mets. S/p spinal radiation. Previously on pazopanib, switched to nivolumab, now on cabozantinib since Nov 2017. 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  Pt comes to ed in setting of several weeks of LLE weakness and numbness but which acutely worsened over the past few days. He also complains of urinary retention, (able to void, but weakened stream, incomplete bladder emptying) and pelvic numbness. MRI revealed osseous spinal mets from T6-8 with cord compression, in addition to intramedullary disease at T7 with surrounding cord edema.   Pt also with BP elevation in ED as high as 195/101. Takes enalapril daily for last few months (and this morning) for HTN attributed to cabozantinib; checks his BP daily and says has been consistently below 140. Normal BP in ed prior to decadron. Denies HA, blurry vision, chest pain, or sob. Reports left-sided abd and lower back pain for last 4- 5 weeks that he reports was to be evaluated with MRI of abdomen shortly. Denies constipation but notes increased straining for passage of BM. UA negative  Pt also with DM, hypothyroid             *****PAST MEDICAL / Surgical  HISTORY:  PAST MEDICAL & SURGICAL HISTORY:  Hypothyroidism  GERD (gastroesophageal reflux disease)  Elevated TSH  Cervicalgia  Metastatic renal cell carcinoma to brain  Coronary artery disease  Hypertension  Diabetes mellitus  Status post gamma knife treatment: Left forehead/temple           *****FAMILY HISTORY:  FAMILY HISTORY:  Family history of colon cancer  Family history of diabetes mellitus in mother  Family history of diabetes mellitus in father           *****SOCIAL HISTORY:  Alcohol: None  Smoking: None         *****ALLERGIES:   Allergies    No Known Allergies    Intolerances             *****MEDICATIONS: current medication reviewed and documented.   MEDICATIONS  (STANDING):  amLODIPine   Tablet 10 milliGRAM(s) Oral daily  atorvastatin 20 milliGRAM(s) Oral at bedtime  dexamethasone  Injectable 4 milliGRAM(s) IV Push every 6 hours  dextrose 5%. 1000 milliLiter(s) (50 mL/Hr) IV Continuous <Continuous>  dextrose 50% Injectable 12.5 Gram(s) IV Push once  dextrose 50% Injectable 25 Gram(s) IV Push once  dextrose 50% Injectable 25 Gram(s) IV Push once  enalapril 20 milliGRAM(s) Oral daily  heparin  Injectable 5000 Unit(s) SubCutaneous every 8 hours  hydrocortisone 0.5% Cream 1 Application(s) Topical daily  insulin glargine Injectable (LANTUS) 20 Unit(s) SubCutaneous at bedtime  insulin lispro (HumaLOG) corrective regimen sliding scale   SubCutaneous three times a day before meals  insulin lispro Injectable (HumaLOG) 12 Unit(s) SubCutaneous three times a day before meals  levothyroxine 137 MICROGram(s) Oral daily  pantoprazole    Tablet 40 milliGRAM(s) Oral before breakfast  senna 2 Tablet(s) Oral at bedtime    MEDICATIONS  (PRN):  acetaminophen   Tablet 650 milliGRAM(s) Oral every 6 hours PRN Mild and moderate pain  dextrose 40% Gel 15 Gram(s) Oral once PRN Blood Glucose LESS THAN 70 milliGRAM(s)/deciliter  glucagon  Injectable 1 milliGRAM(s) IntraMuscular once PRN Glucose LESS THAN 70 milligrams/deciliter  oxyCODONE    IR 10 milliGRAM(s) Oral every 6 hours PRN Severe Pain (7 - 10)           *****REVIEW OF SYSTEM:  GEN: no fever, no chills, no pain  RESP: no SOB, no cough, no sputum  CVS: no chest pain, no palpitations, no edema  GI: no abdominal pain, no nausea, no vomiting, no constipation, no diarrhea  : no dysurea, no frequency, no hematurea  Neuro: no headache, no dizziness  PSYCH: no anxiety, no depression  Derm : no itching, no rash         *****VITAL SIGNS:  T(C): 36.4 (06-13-18 @ 05:42), Max: 36.8 (06-12-18 @ 15:26)  HR: 84 (06-13-18 @ 05:42) (66 - 84)  BP: 125/82 (06-13-18 @ 05:42) (122/82 - 134/87)  RR: 18 (06-13-18 @ 05:42) (18 - 18)  SpO2: 100% (06-13-18 @ 05:42) (99% - 100%)  Wt(kg): --           *****PHYSICAL EXAM:   Alert oriented x   Attention comprehension are fair. Able to name, repeat, read without any difficulty.   Able to follow 3 step commands.     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 S/p gamma knife to brain met, not a candidate for nephrectomy per Urology given brain mets. S/p spinal radiation. Previously on pazopanib, switched to nivolumab, now on cabozantinib since Nov 2017. 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  Pt comes to ed in setting of several weeks of LLE weakness and numbness but which acutely worsened over the past few days. He also complains of urinary retention, (able to void, but weakened stream, incomplete bladder emptying) and pelvic numbness. MRI revealed osseous spinal mets from T6-8 with cord compression, in addition to intramedullary disease at T7 with surrounding cord edema.   Pt also with BP elevation in ED as high as 195/101. Takes enalapril daily for last few months (and this morning) for HTN attributed to cabozantinib; checks his BP daily and says has been consistently below 140. Normal BP in ed prior to decadron. Denies HA, blurry vision, chest pain, or sob. Reports left-sided abd and lower back pain for last 4- 5 weeks that he reports was to be evaluated with MRI of abdomen shortly. Denies constipation but notes increased straining for passage of BM. UA negative               60 minutes spent on the total encounter;  more than 50 % of the visit was spent on counseling  and or coordinating care by the attending physician.    Thank you for allowing me to participate in the care of this jennifer patient. Please do not hesitate to call me if you have any questions.   ___________________________  Will follow with you.  Thank you,  Dara Swanson MD  Diplomate of the American Board of Neurology and Psychiatry.  Diplomate of the American Board of Vascular Neurology.   [facility] ([place]), [facility]   Ph: [phone]      This and subsequent notes were partially created using voice recognition software and will  inherently be subject to errors including those of syntax and sound alike substitutions which may escape proofreading. In such instances original meaning may be extrapolated by contextual derivation. [facility] [place]([facility]), [facility]        Patient is a 58y old  Male who presents with a chief complaint of back pain (11 Jun 2018 22:53)      HPI:  57 yo m with 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 S/p gamma knife to brain met, not a candidate for nephrectomy per Urology given brain mets. S/p spinal radiation. Previously on pazopanib, switched to nivolumab, now on cabozantinib since Nov 2017. 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  Pt comes to ed in setting of several weeks of LLE weakness and numbness but which acutely worsened over the past few days. He also complains of urinary retention, (able to void, but weakened stream, incomplete bladder emptying) and pelvic numbness. MRI revealed osseous spinal mets from T6-8 with cord compression, in addition to intramedullary disease at T7 with surrounding cord edema.    progresive weakness which developed over the last 2 months.            *****PAST MEDICAL / Surgical  HISTORY:  PAST MEDICAL & SURGICAL HISTORY:  Hypothyroidism  GERD (gastroesophageal reflux disease)  Elevated TSH  Cervicalgia  Metastatic renal cell carcinoma to brain  Coronary artery disease  Hypertension  Diabetes mellitus  Status post gamma knife treatment: Left forehead/temple           *****FAMILY HISTORY:  FAMILY HISTORY:  Family history of colon cancer  Family history of diabetes mellitus in mother  Family history of diabetes mellitus in father           *****SOCIAL HISTORY:  Alcohol: None  Smoking: None         *****ALLERGIES:   Allergies    No Known Allergies    Intolerances             *****MEDICATIONS: current medication reviewed and documented.   MEDICATIONS  (STANDING):  amLODIPine   Tablet 10 milliGRAM(s) Oral daily  atorvastatin 20 milliGRAM(s) Oral at bedtime  dexamethasone  Injectable 4 milliGRAM(s) IV Push every 6 hours  dextrose 5%. 1000 milliLiter(s) (50 mL/Hr) IV Continuous <Continuous>  dextrose 50% Injectable 12.5 Gram(s) IV Push once  dextrose 50% Injectable 25 Gram(s) IV Push once  dextrose 50% Injectable 25 Gram(s) IV Push once  enalapril 20 milliGRAM(s) Oral daily  heparin  Injectable 5000 Unit(s) SubCutaneous every 8 hours  hydrocortisone 0.5% Cream 1 Application(s) Topical daily  insulin glargine Injectable (LANTUS) 20 Unit(s) SubCutaneous at bedtime  insulin lispro (HumaLOG) corrective regimen sliding scale   SubCutaneous three times a day before meals  insulin lispro Injectable (HumaLOG) 12 Unit(s) SubCutaneous three times a day before meals  levothyroxine 137 MICROGram(s) Oral daily  pantoprazole    Tablet 40 milliGRAM(s) Oral before breakfast  senna 2 Tablet(s) Oral at bedtime    MEDICATIONS  (PRN):  acetaminophen   Tablet 650 milliGRAM(s) Oral every 6 hours PRN Mild and moderate pain  dextrose 40% Gel 15 Gram(s) Oral once PRN Blood Glucose LESS THAN 70 milliGRAM(s)/deciliter  glucagon  Injectable 1 milliGRAM(s) IntraMuscular once PRN Glucose LESS THAN 70 milligrams/deciliter  oxyCODONE    IR 10 milliGRAM(s) Oral every 6 hours PRN Severe Pain (7 - 10)           *****REVIEW OF SYSTEM:  GEN: no fever, no chills, no pain  RESP: no SOB, no cough, no sputum  CVS: no chest pain, no palpitations, no edema  GI: no abdominal pain, no nausea, no vomiting, no constipation, no diarrhea  : no dysurea, no frequency, no hematurea  Neuro: no headache, no dizziness  PSYCH: no anxiety, no depression  Derm : no itching, no rash         *****VITAL SIGNS:  T(C): 36.4 (06-13-18 @ 05:42), Max: 36.8 (06-12-18 @ 15:26)  HR: 84 (06-13-18 @ 05:42) (66 - 84)  BP: 125/82 (06-13-18 @ 05:42) (122/82 - 134/87)  RR: 18 (06-13-18 @ 05:42) (18 - 18)  SpO2: 100% (06-13-18 @ 05:42) (99% - 100%)  Wt(kg): --           *****PHYSICAL EXAM:   Alert oriented x   Attention comprehension are fair. Able to name, repeat, read without any difficulty.   Able to follow 3 step commands.     EOMI fundi not visualized,  VFF to confrontration  No facial asymmetry   Tongue is midline   Palate elevates symmetrically    il 4+/5   hamstrings 4/5   quad 4/5   dorsiflexion 3/5  toes 1/5   no clonus  left knee jerk brisk, other 1+ throughtout.  upgoing toe on the left.      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 S/p gamma knife to brain met, not a candidate for nephrectomy per Urology given brain mets. S/p spinal radiation. Previously on pazopanib, switched to nivolumab, now on cabozantinib since Nov 2017. 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  Pt comes to ed in setting of several weeks of LLE weakness and numbness but which acutely worsened over the past few days. He also complains of urinary retention, (able to void, but weakened stream, incomplete bladder emptying) and pelvic numbness. MRI revealed osseous spinal mets from T6-8 with cord compression, in addition to intramedullary disease at T7 with surrounding cord edema.   Pt also with BP elevation in ED as high as 195/101. Takes enalapril daily for last few months (and this morning) for HTN attributed to cabozantinib; checks his BP daily and says has been consistently below 140. Normal BP in ed prior to decadron. Denies HA, blurry vision, chest pain, or sob. Reports left-sided abd and lower back pain for last 4- 5 weeks that he reports was to be evaluated with MRI of abdomen shortly. Denies constipation but notes increased straining for passage of BM. UA negative       Spinal cord compression likely causative of the left foot drop whcih has improved since admission  likely due to steroids.  Neurosurgery does not recommend any intervention.  Palliative radiation was recommned  Prosthesis for AFO brace to left foot  Continue pt/ot   overall prognosis guarded.         60 minutes spent on the total encounter;  more than 50 % of the visit was spent on counseling  and or coordinating care by the attending physician.    Thank you for allowing me to participate in the care of this jennifer patient. Please do not hesitate to call me if you have any questions.   ___________________________  Will follow with you.  Thank you,  Dara Swanson MD  Diplomate of the American Board of Neurology and Psychiatry.  Diplomate of the American Board of Vascular Neurology.   [facility] ([facility]), [facility]   Ph: [phone]      This and subsequent notes were partially created using voice recognition software and will  inherently be subject to errors including those of syntax and sound alike substitutions which may escape proofreading. In such instances original meaning may be extrapolated by contextual derivation.

## 2018-06-13 NOTE — CONSULT NOTE ADULT - CONSULT REQUESTED DATE/TIME
11-Jun-2018
11-Jun-2018 19:52
12-Jun-2018 08:34
12-Jun-2018 16:39
13-Jun-2018 09:47
13-Jun-2018 12:37
13-Jun-2018 16:07
13-Jun-2018 14:14

## 2018-06-13 NOTE — CONSULT NOTE ADULT - PROBLEM SELECTOR RECOMMENDATION 3
-His blood pressure is currently at goal < 130/80    He can follow up with [facility] clinic upon discharge.  We will continue to follow for diabetes control
Continue with Decadron and Protonix.  Ortho spine and IR are both seeing the patient.

## 2018-06-13 NOTE — PROGRESS NOTE ADULT - SUBJECTIVE AND OBJECTIVE BOX
Patient is a 58y old  Male who presents with a chief complaint of back pain (11 Jun 2018 22:53)      SUBJECTIVE / OVERNIGHT EVENTS: feels okay overall when seen at 1p- explained plan of MRI lumbar imaging and outpt RT f/u.  voiding without difficulties    MEDICATIONS  (STANDING):  amLODIPine   Tablet 10 milliGRAM(s) Oral daily  atorvastatin 20 milliGRAM(s) Oral at bedtime  dexamethasone  Injectable 4 milliGRAM(s) IV Push every 6 hours  dextrose 5%. 1000 milliLiter(s) (50 mL/Hr) IV Continuous <Continuous>  dextrose 50% Injectable 12.5 Gram(s) IV Push once  dextrose 50% Injectable 25 Gram(s) IV Push once  dextrose 50% Injectable 25 Gram(s) IV Push once  enalapril 20 milliGRAM(s) Oral daily  heparin  Injectable 5000 Unit(s) SubCutaneous every 8 hours  hydrocortisone 0.5% Cream 1 Application(s) Topical daily  insulin glargine Injectable (LANTUS) 20 Unit(s) SubCutaneous at bedtime  insulin lispro (HumaLOG) corrective regimen sliding scale   SubCutaneous three times a day before meals  insulin lispro Injectable (HumaLOG) 12 Unit(s) SubCutaneous three times a day before meals  levothyroxine 137 MICROGram(s) Oral daily  pantoprazole    Tablet 40 milliGRAM(s) Oral before breakfast  senna 2 Tablet(s) Oral at bedtime    MEDICATIONS  (PRN):  acetaminophen   Tablet 650 milliGRAM(s) Oral every 6 hours PRN Mild and moderate pain  dextrose 40% Gel 15 Gram(s) Oral once PRN Blood Glucose LESS THAN 70 milliGRAM(s)/deciliter  glucagon  Injectable 1 milliGRAM(s) IntraMuscular once PRN Glucose LESS THAN 70 milligrams/deciliter  oxyCODONE    IR 10 milliGRAM(s) Oral every 6 hours PRN Severe Pain (7 - 10)      Meds ordered within last 24hours  insulin glargine Injectable (LANTUS):   16 Unit(s), SubCutaneous, at bedtime  Administration Instructions: NOT to be mixed with other insulins  Dispose unused medication in BLACK bin.  Provider's Contact #: [phone] (06-12 @ 17:16)  insulin lispro Injectable (HumaLOG):   6 Unit(s), SubCutaneous, three times a day before meals  Special Instructions: Administer 5 to 15 minutes prior to meal. HOLD nutritional insulin if patient is NPO  Administration Instructions: Dispose unused medication in BLACK bin.  This is a Look-alike/Sound-alike Medication  Provider's Contact #: [phone] (06-12 @ 17:22)  insulin lispro (HumaLOG) corrective regimen sliding scale:       2 Unit(s) if Glucose 151 - 200      4 Unit(s) if Glucose 201 - 250      6 Unit(s) if Glucose 251 - 300      8 Unit(s) if Glucose 301 - 350      10 Unit(s) if Glucose 351 - 400      12 Unit(s) if Glucose Greater Than 400 + Contact MD  SubCutaneous, three times a day before meals  Special Instructions: Give correctional scale insulin REGARDLESS of PO status NOTIFY Provider for blood glucose LESS THAN 70 milliGRAM(s)/deciLiter or above 400 milliGRAM(s)/deciLiter.  Administration Instructions: *Per Sliding Scale*  Dispose unused medication in BLACK bin.  This is a Look-alike/Sound-alike Medication  Provider's Contact #: [phone] (06-12 @ 17:22)  insulin glargine Injectable (LANTUS):   20 Unit(s), SubCutaneous, at bedtime  Administration Instructions: NOT to be mixed with other insulins  Dispose unused medication in BLACK bin.  Provider's Contact #: [phone] (06-13 @ 10:07)  insulin lispro Injectable (HumaLOG):   12 Unit(s), SubCutaneous, three times a day before meals  Special Instructions: Administer 5 to 15 minutes prior to meal. HOLD nutritional insulin if patient is NPO  Administration Instructions: Dispose unused medication in BLACK bin.  This is a Look-alike/Sound-alike Medication  Provider's Contact #: [phone] (06-13 @ 10:07)      T(C): 36.5 (06-13-18 @ 15:01), Max: 36.7 (06-12-18 @ 21:21)  HR: 82 (06-13-18 @ 15:01) (66 - 84)  BP: 128/86 (06-13-18 @ 15:01) (125/82 - 134/87)  RR: 18 (06-13-18 @ 15:01) (18 - 18)  SpO2: 100% (06-13-18 @ 15:01) (99% - 100%)    CAPILLARY BLOOD GLUCOSE      POCT Blood Glucose.: 367 mg/dL (13 Jun 2018 14:45)  POCT Blood Glucose.: 405 mg/dL (13 Jun 2018 12:40)  POCT Blood Glucose.: 305 mg/dL (13 Jun 2018 08:19)  POCT Blood Glucose.: 253 mg/dL (12 Jun 2018 21:56)  POCT Blood Glucose.: 403 mg/dL (12 Jun 2018 17:41)    I&O's Summary      PHYSICAL EXAM:  GENERAL: NAD  CHEST/LUNG: Clear to auscultation bilaterally; No wheeze  HEART: Regular rate and rhythm; No murmurs, rubs, or gallops  ABDOMEN: Soft, Nontender, Nondistended; Bowel sounds present  EXTREMITIES:  No clubbing, cyanosis, or edema  MOTOR: 4-/5 LLE       LABS:                        10.8   11.61 )-----------( 241      ( 13 Jun 2018 06:48 )             34.4     06-13    133<L>  |  91<L>  |  28<H>  ----------------------------<  266<H>  4.4   |  21<L>  |  0.96    Ca    9.2      13 Jun 2018 05:48                RADIOLOGY & ADDITIONAL TESTS:    Imaging Personally Reviewed:    Consultant(s) Notes Reviewed:      Care Discussed with Consultants/Other Providers:

## 2018-06-14 DIAGNOSIS — G89.3 NEOPLASM RELATED PAIN (ACUTE) (CHRONIC): ICD-10-CM

## 2018-06-14 DIAGNOSIS — Z51.5 ENCOUNTER FOR PALLIATIVE CARE: ICD-10-CM

## 2018-06-14 LAB
BUN SERPL-MCNC: 32 MG/DL — HIGH (ref 7–23)
CALCIUM SERPL-MCNC: 9.3 MG/DL — SIGNIFICANT CHANGE UP (ref 8.4–10.5)
CHLORIDE SERPL-SCNC: 93 MMOL/L — LOW (ref 98–107)
CO2 SERPL-SCNC: 25 MMOL/L — SIGNIFICANT CHANGE UP (ref 22–31)
CREAT SERPL-MCNC: 1.1 MG/DL — SIGNIFICANT CHANGE UP (ref 0.5–1.3)
GLUCOSE BLDC GLUCOMTR-MCNC: 215 MG/DL — HIGH (ref 70–99)
GLUCOSE BLDC GLUCOMTR-MCNC: 318 MG/DL — HIGH (ref 70–99)
GLUCOSE BLDC GLUCOMTR-MCNC: 339 MG/DL — HIGH (ref 70–99)
GLUCOSE BLDC GLUCOMTR-MCNC: 413 MG/DL — HIGH (ref 70–99)
GLUCOSE SERPL-MCNC: 353 MG/DL — HIGH (ref 70–99)
HCT VFR BLD CALC: 34 % — LOW (ref 39–50)
HGB BLD-MCNC: 10.7 G/DL — LOW (ref 13–17)
MCHC RBC-ENTMCNC: 30.1 PG — SIGNIFICANT CHANGE UP (ref 27–34)
MCHC RBC-ENTMCNC: 31.5 % — LOW (ref 32–36)
MCV RBC AUTO: 95.8 FL — SIGNIFICANT CHANGE UP (ref 80–100)
NRBC # FLD: 0.02 — SIGNIFICANT CHANGE UP
PLATELET # BLD AUTO: 308 K/UL — SIGNIFICANT CHANGE UP (ref 150–400)
PMV BLD: 9.4 FL — SIGNIFICANT CHANGE UP (ref 7–13)
POTASSIUM SERPL-MCNC: 4.3 MMOL/L — SIGNIFICANT CHANGE UP (ref 3.5–5.3)
POTASSIUM SERPL-SCNC: 4.3 MMOL/L — SIGNIFICANT CHANGE UP (ref 3.5–5.3)
RBC # BLD: 3.55 M/UL — LOW (ref 4.2–5.8)
RBC # FLD: 16.6 % — HIGH (ref 10.3–14.5)
SODIUM SERPL-SCNC: 134 MMOL/L — LOW (ref 135–145)
WBC # BLD: 11.77 K/UL — HIGH (ref 3.8–10.5)
WBC # FLD AUTO: 11.77 K/UL — HIGH (ref 3.8–10.5)

## 2018-06-14 PROCEDURE — 72158 MRI LUMBAR SPINE W/O & W/DYE: CPT | Mod: 26

## 2018-06-14 PROCEDURE — 99233 SBSQ HOSP IP/OBS HIGH 50: CPT

## 2018-06-14 PROCEDURE — 99232 SBSQ HOSP IP/OBS MODERATE 35: CPT

## 2018-06-14 RX ORDER — OXYCODONE HYDROCHLORIDE 5 MG/1
10 TABLET ORAL EVERY 6 HOURS
Qty: 0 | Refills: 0 | Status: DISCONTINUED | OUTPATIENT
Start: 2018-06-14 | End: 2018-06-15

## 2018-06-14 RX ORDER — INSULIN LISPRO 100/ML
VIAL (ML) SUBCUTANEOUS AT BEDTIME
Qty: 0 | Refills: 0 | Status: DISCONTINUED | OUTPATIENT
Start: 2018-06-14 | End: 2018-06-15

## 2018-06-14 RX ORDER — INSULIN LISPRO 100/ML
16 VIAL (ML) SUBCUTANEOUS
Qty: 0 | Refills: 0 | Status: DISCONTINUED | OUTPATIENT
Start: 2018-06-14 | End: 2018-06-15

## 2018-06-14 RX ORDER — INSULIN GLARGINE 100 [IU]/ML
32 INJECTION, SOLUTION SUBCUTANEOUS AT BEDTIME
Qty: 0 | Refills: 0 | Status: DISCONTINUED | OUTPATIENT
Start: 2018-06-14 | End: 2018-06-15

## 2018-06-14 RX ADMIN — Medication 20 MILLIGRAM(S): at 05:54

## 2018-06-14 RX ADMIN — PANTOPRAZOLE SODIUM 40 MILLIGRAM(S): 20 TABLET, DELAYED RELEASE ORAL at 05:54

## 2018-06-14 RX ADMIN — Medication 4 MILLIGRAM(S): at 05:54

## 2018-06-14 RX ADMIN — HEPARIN SODIUM 5000 UNIT(S): 5000 INJECTION INTRAVENOUS; SUBCUTANEOUS at 13:02

## 2018-06-14 RX ADMIN — Medication 4 MILLIGRAM(S): at 13:02

## 2018-06-14 RX ADMIN — Medication 1 APPLICATION(S): at 11:17

## 2018-06-14 RX ADMIN — Medication 8: at 09:56

## 2018-06-14 RX ADMIN — Medication 12: at 18:10

## 2018-06-14 RX ADMIN — INSULIN GLARGINE 32 UNIT(S): 100 INJECTION, SOLUTION SUBCUTANEOUS at 22:34

## 2018-06-14 RX ADMIN — HEPARIN SODIUM 5000 UNIT(S): 5000 INJECTION INTRAVENOUS; SUBCUTANEOUS at 21:12

## 2018-06-14 RX ADMIN — Medication 4 MILLIGRAM(S): at 00:00

## 2018-06-14 RX ADMIN — AMLODIPINE BESYLATE 10 MILLIGRAM(S): 2.5 TABLET ORAL at 05:54

## 2018-06-14 RX ADMIN — Medication 12 UNIT(S): at 18:10

## 2018-06-14 RX ADMIN — HEPARIN SODIUM 5000 UNIT(S): 5000 INJECTION INTRAVENOUS; SUBCUTANEOUS at 05:54

## 2018-06-14 RX ADMIN — ATORVASTATIN CALCIUM 20 MILLIGRAM(S): 80 TABLET, FILM COATED ORAL at 21:12

## 2018-06-14 RX ADMIN — Medication 4 MILLIGRAM(S): at 19:07

## 2018-06-14 RX ADMIN — Medication 12 UNIT(S): at 13:01

## 2018-06-14 RX ADMIN — Medication 12 UNIT(S): at 09:56

## 2018-06-14 RX ADMIN — Medication 137 MICROGRAM(S): at 05:54

## 2018-06-14 RX ADMIN — Medication 8: at 13:02

## 2018-06-14 NOTE — PROGRESS NOTE ADULT - SUBJECTIVE AND OBJECTIVE BOX
Patient is a 58y old  Male who presents with a chief complaint of back pain (11 Jun 2018 22:53)      SUBJECTIVE / OVERNIGHT EVENTS: pt seen at 1130am- still weak in leg    MEDICATIONS  (STANDING):  amLODIPine   Tablet 10 milliGRAM(s) Oral daily  atorvastatin 20 milliGRAM(s) Oral at bedtime  dexamethasone  Injectable 4 milliGRAM(s) IV Push every 6 hours  dextrose 5%. 1000 milliLiter(s) (50 mL/Hr) IV Continuous <Continuous>  dextrose 50% Injectable 12.5 Gram(s) IV Push once  dextrose 50% Injectable 25 Gram(s) IV Push once  dextrose 50% Injectable 25 Gram(s) IV Push once  enalapril 20 milliGRAM(s) Oral daily  heparin  Injectable 5000 Unit(s) SubCutaneous every 8 hours  hydrocortisone 0.5% Cream 1 Application(s) Topical daily  insulin glargine Injectable (LANTUS) 20 Unit(s) SubCutaneous at bedtime  insulin lispro (HumaLOG) corrective regimen sliding scale   SubCutaneous three times a day before meals  insulin lispro Injectable (HumaLOG) 12 Unit(s) SubCutaneous three times a day before meals  levothyroxine 137 MICROGram(s) Oral daily  pantoprazole    Tablet 40 milliGRAM(s) Oral before breakfast  senna 2 Tablet(s) Oral at bedtime    MEDICATIONS  (PRN):  acetaminophen   Tablet 650 milliGRAM(s) Oral every 6 hours PRN Mild and moderate pain  dextrose 40% Gel 15 Gram(s) Oral once PRN Blood Glucose LESS THAN 70 milliGRAM(s)/deciliter  glucagon  Injectable 1 milliGRAM(s) IntraMuscular once PRN Glucose LESS THAN 70 milligrams/deciliter  oxyCODONE    IR 10 milliGRAM(s) Oral every 6 hours PRN Severe Pain (7 - 10)      Meds ordered within last 24hours      T(C): 36.6 (06-14-18 @ 14:58), Max: 36.6 (06-14-18 @ 14:58)  HR: 77 (06-14-18 @ 14:58) (66 - 77)  BP: 139/89 (06-14-18 @ 14:58) (126/82 - 139/89)  RR: 17 (06-14-18 @ 14:58) (17 - 18)  SpO2: 100% (06-14-18 @ 14:58) (98% - 100%)    CAPILLARY BLOOD GLUCOSE      POCT Blood Glucose.: 339 mg/dL (14 Jun 2018 12:27)  POCT Blood Glucose.: 318 mg/dL (14 Jun 2018 09:42)  POCT Blood Glucose.: 361 mg/dL (13 Jun 2018 22:29)  POCT Blood Glucose.: 329 mg/dL (13 Jun 2018 17:42)    I&O's Summary      PHYSICAL EXAM:  GENERAL: NAD  CHEST/LUNG: Clear to auscultation bilaterally; No wheeze  HEART: Regular rate and rhythm; No murmurs, rubs, or gallops  ABDOMEN: Soft, Nontender, Nondistended; Bowel sounds present  EXTREMITIES:  No clubbing, cyanosis, or edema  MOTOR- 4/5 LLE      LABS:                        10.7   11.77 )-----------( 308      ( 14 Jun 2018 05:56 )             34.0     06-14    134<L>  |  93<L>  |  32<H>  ----------------------------<  353<H>  4.3   |  25  |  1.10    Ca    9.3      14 Jun 2018 05:56                RADIOLOGY & ADDITIONAL TESTS:    Imaging Personally Reviewed:< from: MR Lumbar Spine w/wo IV Cont (06.14.18 @ 09:27) >  L1-2: Bilateral hypertrophic facet joint changes seen. Moderate narrowing   of both neural foramen.    L2-3: Disc bulge and bilateral hypertrophic facet joint changes are seen.   Mild narrowing of the spinal canal.    L3-4: Bilateral hypertrophic facet joint changes are seen. Mild narrowing   of the spinal canal.    L4-5: Disc bulge and bilateral hypertrophic facet joint changes are seen.   Mild narrowing of the spinal canal and both neural foramen    L5-S1: Disc bulge and bilateral hypertrophic facet joint changes.   Epidural lipomatosis is suspected as well. Severe narrowing is of the   spinal canal. Moderate narrowing of the eighth left neural foramen and   moderate to severe narrowing of the right neural foramen.    The conus ends at top of L2 and appears normal    There is evidence of abnormal lesion seen involving both iliac bones.   These lesions were demonstrated on the prior study. It is difficult to   evaluate differences in size especially on the right side due to   differences in technique.    Well-defined sebaceous cyst is suspected in the right subcutaneous tissue   at the level of the L4-5 disc space.    Partially demonstrated renal cell mass is seen on the right side. This   finding was demonstrated on prior CT scan of the abdomen pelvis performed   on December 2, 2018.     Impression: Degenerative changes involving the lumbar spine is again seen.    Bilateral iliac bone lesions are againidentified.    < end of copied text >      Consultant(s) Notes Reviewed:      Care Discussed with Consultants/Other Providers:

## 2018-06-14 NOTE — PROGRESS NOTE ADULT - SUBJECTIVE AND OBJECTIVE BOX
[facility]        - Patient seen and examined.  - Today, patient is without complaints.         *****MEDICATIONS: Current medication reviewed and documented.    MEDICATIONS  (STANDING):  amLODIPine   Tablet 10 milliGRAM(s) Oral daily  atorvastatin 20 milliGRAM(s) Oral at bedtime  dexamethasone  Injectable 4 milliGRAM(s) IV Push every 6 hours  dextrose 5%. 1000 milliLiter(s) (50 mL/Hr) IV Continuous <Continuous>  dextrose 50% Injectable 12.5 Gram(s) IV Push once  dextrose 50% Injectable 25 Gram(s) IV Push once  dextrose 50% Injectable 25 Gram(s) IV Push once  enalapril 20 milliGRAM(s) Oral daily  heparin  Injectable 5000 Unit(s) SubCutaneous every 8 hours  hydrocortisone 0.5% Cream 1 Application(s) Topical daily  insulin glargine Injectable (LANTUS) 20 Unit(s) SubCutaneous at bedtime  insulin lispro (HumaLOG) corrective regimen sliding scale   SubCutaneous three times a day before meals  insulin lispro Injectable (HumaLOG) 12 Unit(s) SubCutaneous three times a day before meals  levothyroxine 137 MICROGram(s) Oral daily  pantoprazole    Tablet 40 milliGRAM(s) Oral before breakfast  senna 2 Tablet(s) Oral at bedtime    MEDICATIONS  (PRN):  acetaminophen   Tablet 650 milliGRAM(s) Oral every 6 hours PRN Mild and moderate pain  dextrose 40% Gel 15 Gram(s) Oral once PRN Blood Glucose LESS THAN 70 milliGRAM(s)/deciliter  glucagon  Injectable 1 milliGRAM(s) IntraMuscular once PRN Glucose LESS THAN 70 milligrams/deciliter  oxyCODONE    IR 10 milliGRAM(s) Oral every 6 hours PRN Severe Pain (7 - 10)           ***** REVIEW OF SYSTEM:  GEN: no fever, no chills, no pain  RESP: no SOB, no cough, no sputum  CVS: no chest pain, no palpitations, no edema  GI: no abdominal pain, no nausea, no vomiting, no constipation, no diarrhea  : no dysurea, no frequency  NEURO: no headache, no diziness  PSYCH: no depression, not anxious  Derm : no itching, no rash         ***** VITAL SIGNS:  T(F): 97.8 (06-14-18 @ 14:58), Max: 97.8 (06-14-18 @ 14:58)  HR: 77 (06-14-18 @ 14:58) (66 - 77)  BP: 139/89 (06-14-18 @ 14:58) (126/82 - 139/89)  RR: 17 (06-14-18 @ 14:58) (17 - 18)  SpO2: 100% (06-14-18 @ 14:58) (98% - 100%)  Wt(kg): --  ,   I&O's Summary           *****PHYSICAL EXAM: Alert oriented x 3  Attention comprehension are fair. Able to name, repeat, read without any difficulty.   Able to follow 3 step commands.     EOMI fundi not visualized,  VFF to confrontration  No facial asymmetry   Tongue is midline   Palate elevates symmetrically    il 4/5   hamstrings 4/5   quad 4/5   dorsiflexion 3/5  toes 1/5   no clonus  left knee jerk brisk, other 1+ throughtout.  upgoing toe on the left.            *****LAB AND IMAGING:                        10.7   11.77 )-----------( 308      ( 14 Jun 2018 05:56 )             34.0               06-14    134<L>  |  93<L>  |  32<H>  ----------------------------<  353<H>  4.3   |  25  |  1.10    Ca    9.3      14 Jun 2018 05:56             < from: MR Lumbar Spine w/wo IV Cont (06.14.18 @ 09:27) >    Disc desiccation is seen involving the L2-3, L3-4 and L5-S1 levels. These   findings are secondary to degenerative changes    T12-L1: Normal.    L1-2: Bilateral hypertrophic facet joint changes seen. Moderate narrowing   of both neural foramen.    L2-3: Disc bulge and bilateral hypertrophic facet joint changes are seen.   Mild narrowing of the spinal canal.    L3-4: Bilateral hypertrophic facet joint changes are seen. Mild narrowing   of the spinal canal.    L4-5: Disc bulge and bilateral hypertrophic facet joint changes are seen.   Mild narrowing of the spinal canal and both neural foramen    L5-S1: Disc bulge and bilateral hypertrophic facet joint changes.   Epidural lipomatosis is suspected as well. Severe narrowing is of the   spinal canal. Moderate narrowing of the eighth left neural foramen and   moderate to severe narrowing of the right neural foramen.    The conus ends at top of L2 and appears normal    There is evidence of abnormal lesion seen involving both iliac bones.   These lesions were demonstrated on the prior study. It is difficult to   evaluate differences in size especially on the right side due to   differences in technique.      < end of copied text >                [All pertinent recent Imaging/Reports reviewed]           *****A S S E S S M E N T   A N D   P L A N :    57 yo m with 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 S/p gamma knife to brain met, not a candidate for nephrectomy per Urology given brain mets. S/p spinal radiation. Previously on pazopanib, switched to nivolumab, now on cabozantinib since Nov 2017. 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  Pt comes to ed in setting of several weeks of LLE weakness and numbness but which acutely worsened over the past few days. He also complains of urinary retention, (able to void, but weakened stream, incomplete bladder emptying) and pelvic numbness. MRI revealed osseous spinal mets from T6-8 with cord compression, in addition to intramedullary disease at T7 with surrounding cord edema.   Pt also with BP elevation in ED as high as 195/101. Takes enalapril daily for last few months (and this morning) for HTN attributed to cabozantinib; checks his BP daily and says has been consistently below 140. Normal BP in ed prior to decadron. Denies HA, blurry vision, chest pain, or sob. Reports left-sided abd and lower back pain for last 4- 5 weeks that he reports was to be evaluated with MRI of abdomen shortly. Denies constipation but notes increased straining for passage of BM. UA negative       Spinal cord compression likely causing left foot drop whcih has improved since admission likely due to steroids.  Neurosurgery does not recommend any intervention.  Palliative radiation was recommned  Prosthesis for AFO brace to left foot  Continue pt/ot   overall prognosis guarded.           Thank you for allowing me to participate in the care of this patient. Please do not hesitate to call me if you have any  questions.        ________________  Dara Swanson MD  [facility]  [phone]     30 minutes spent on total encounter; more than 50 % of the visit was  spent counseling and or  coordinating care by the attending physician.   At the present time, [facility] does not  provide outpatient followup, best to call the your insurance to find a participating provider.  This was explained to you at the time of the visit. Alternatively, if your insurance allows it, you can follow up with a neurologist  Dr. Olman Smith([place]) [phone] or Dr. Olu Napoles ( [place]) [phone]

## 2018-06-14 NOTE — PROVIDER CONTACT NOTE (OTHER) - ACTION/TREATMENT ORDERED:
awaiting orders
Given Insulin as ordered.  Recheck FS
Monitor pts FS premeal dinner. Will adjust accordingly. No further insulin ordered at this time.
Insulin orders changed

## 2018-06-14 NOTE — PROGRESS NOTE ADULT - SUBJECTIVE AND OBJECTIVE BOX
Chief Complaint: DM2 exacerbated by steroids    History: Tolerating po. Continued hyperglycemia. No hypoglycemia.    MEDICATIONS  (STANDING):  amLODIPine   Tablet 10 milliGRAM(s) Oral daily  atorvastatin 20 milliGRAM(s) Oral at bedtime  dexamethasone  Injectable 4 milliGRAM(s) IV Push every 6 hours  dextrose 5%. 1000 milliLiter(s) (50 mL/Hr) IV Continuous <Continuous>  dextrose 50% Injectable 12.5 Gram(s) IV Push once  dextrose 50% Injectable 25 Gram(s) IV Push once  dextrose 50% Injectable 25 Gram(s) IV Push once  enalapril 20 milliGRAM(s) Oral daily  heparin  Injectable 5000 Unit(s) SubCutaneous every 8 hours  hydrocortisone 0.5% Cream 1 Application(s) Topical daily  insulin glargine Injectable (LANTUS) 20 Unit(s) SubCutaneous at bedtime  insulin lispro (HumaLOG) corrective regimen sliding scale   SubCutaneous three times a day before meals  insulin lispro Injectable (HumaLOG) 12 Unit(s) SubCutaneous three times a day before meals  levothyroxine 137 MICROGram(s) Oral daily  pantoprazole    Tablet 40 milliGRAM(s) Oral before breakfast  senna 2 Tablet(s) Oral at bedtime    MEDICATIONS  (PRN):  acetaminophen   Tablet 650 milliGRAM(s) Oral every 6 hours PRN Mild and moderate pain  dextrose 40% Gel 15 Gram(s) Oral once PRN Blood Glucose LESS THAN 70 milliGRAM(s)/deciliter  glucagon  Injectable 1 milliGRAM(s) IntraMuscular once PRN Glucose LESS THAN 70 milligrams/deciliter  oxyCODONE    IR 10 milliGRAM(s) Oral every 6 hours PRN Severe Pain (7 - 10)      Allergies    No Known Allergies    Intolerances      Review of Systems:    ALL OTHER SYSTEMS REVIEWED AND NEGATIVE      PHYSICAL EXAM:  VITALS: T(C): 36.6 (06-14-18 @ 14:58)  T(F): 97.8 (06-14-18 @ 14:58), Max: 97.8 (06-14-18 @ 14:58)  HR: 77 (06-14-18 @ 14:58) (66 - 77)  BP: 139/89 (06-14-18 @ 14:58) (126/82 - 139/89)  RR:  (17 - 18)  SpO2:  (98% - 100%)  Wt(kg): --  GENERAL: NAD, well-groomed, well-developed  EYES: No proptosis, no lid lag, anicteric  HEENT:  Atraumatic, Normocephalic, moist mucous membranes  PSYCH: Alert and oriented x 3, normal affect, normal mood      CAPILLARY BLOOD GLUCOSE      POCT Blood Glucose.: 413 mg/dL (14 Jun 2018 18:00)  POCT Blood Glucose.: 339 mg/dL (14 Jun 2018 12:27)  POCT Blood Glucose.: 318 mg/dL (14 Jun 2018 09:42)  POCT Blood Glucose.: 361 mg/dL (13 Jun 2018 22:29)      06-14    134<L>  |  93<L>  |  32<H>  ----------------------------<  353<H>  4.3   |  25  |  1.10    EGFR if : 84  EGFR if non : 73    Ca    9.3      06-14            Thyroid Function Tests:  06-07 @ 14:40 TSH 13.01 FreeT4 -- T3 -- Anti TPO -- Anti Thyroglobulin Ab -- TSI --      Hemoglobin A1C, Whole Blood: 6.7 % <H> [4.0 - 5.6] (06-08-18 @ 12:50)

## 2018-06-14 NOTE — PROGRESS NOTE ADULT - SUBJECTIVE AND OBJECTIVE BOX
HPI:  59 yo m with 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 S/p gamma knife to brain met, not a candidate for nephrectomy per Urology given brain mets. S/p spinal radiation. Previously on pazopanib, switched to nivolumab, now on cabozantinib since Nov 2017. 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  Pt comes to ed in setting of several weeks of LLE weakness and numbness but which acutely worsened over the past few days. He also complains of urinary retention, (able to void, but weakened stream, incomplete bladder emptying) and pelvic numbness. MRI revealed osseous spinal mets from T6-8 with cord compression, in addition to intramedullary disease at T7 with surrounding cord edema.     seen by neurosurgery  who for now recommend decadron; no role for surgical intervention at this time. pending rad onc recs. pain controlled.       REVIEW OF SYSTEMS: No chest pain, shortness of breath, nausea, vomiting or diarhea.      MEDICATIONS  (STANDING):  amLODIPine   Tablet 10 milliGRAM(s) Oral daily  atorvastatin 20 milliGRAM(s) Oral at bedtime  dexamethasone  Injectable 4 milliGRAM(s) IV Push every 6 hours  dextrose 5%. 1000 milliLiter(s) (50 mL/Hr) IV Continuous <Continuous>  dextrose 50% Injectable 12.5 Gram(s) IV Push once  dextrose 50% Injectable 25 Gram(s) IV Push once  dextrose 50% Injectable 25 Gram(s) IV Push once  enalapril 20 milliGRAM(s) Oral daily  heparin  Injectable 5000 Unit(s) SubCutaneous every 8 hours  hydrocortisone 0.5% Cream 1 Application(s) Topical daily  insulin glargine Injectable (LANTUS) 20 Unit(s) SubCutaneous at bedtime  insulin lispro (HumaLOG) corrective regimen sliding scale   SubCutaneous three times a day before meals  insulin lispro Injectable (HumaLOG) 12 Unit(s) SubCutaneous three times a day before meals  levothyroxine 137 MICROGram(s) Oral daily  pantoprazole    Tablet 40 milliGRAM(s) Oral before breakfast  senna 2 Tablet(s) Oral at bedtime    MEDICATIONS  (PRN):  acetaminophen   Tablet 650 milliGRAM(s) Oral every 6 hours PRN Mild and moderate pain  dextrose 40% Gel 15 Gram(s) Oral once PRN Blood Glucose LESS THAN 70 milliGRAM(s)/deciliter  glucagon  Injectable 1 milliGRAM(s) IntraMuscular once PRN Glucose LESS THAN 70 milligrams/deciliter  oxyCODONE    IR 10 milliGRAM(s) Oral every 6 hours PRN Severe Pain (7 - 10)    Vital Signs Last 24 Hrs  T(C): 36.6 (14 Jun 2018 14:58), Max: 36.6 (14 Jun 2018 14:58)  T(F): 97.8 (14 Jun 2018 14:58), Max: 97.8 (14 Jun 2018 14:58)  HR: 77 (14 Jun 2018 14:58) (66 - 77)  BP: 139/89 (14 Jun 2018 14:58) (126/82 - 139/89)  BP(mean): --  RR: 17 (14 Jun 2018 14:58) (17 - 18)  SpO2: 100% (14 Jun 2018 14:58) (98% - 100%)    ----------------------------------------------------------------------------------------  PHYSICAL EXAM  Constitutional - NAD, Comfortable  HEENT - NCAT, EOMI  Neck - Supple, No limited ROM  Chest - CTA bilaterally, No wheeze, No rhonchi, No crackles  Cardiovascular - RRR, S1S2, No murmurs  Abdomen - BS+, Soft, NTND  Extremities - No C/C/E, No calf tenderness   Neurologic Exam -                    Cognitive - Awake, Alert, AAO to self, place, date, year, situation     Communication - Fluent, No dysarthria, no aphasia     Cranial Nerves - CN 2-12 intact     Motor -RLE 3/5 HF/KE, 2/5 DF  LLE 4/5                        Sensory - Intact to LT     Reflexes - DTR Intact, No primitive reflexive     Balance - WNL Static  Psychiatric - Mood stable, Affect WNL

## 2018-06-15 ENCOUNTER — TRANSCRIPTION ENCOUNTER (OUTPATIENT)
Age: 58
End: 2018-06-15

## 2018-06-15 VITALS
OXYGEN SATURATION: 98 % | TEMPERATURE: 98 F | RESPIRATION RATE: 18 BRPM | SYSTOLIC BLOOD PRESSURE: 99 MMHG | DIASTOLIC BLOOD PRESSURE: 64 MMHG | HEART RATE: 64 BPM

## 2018-06-15 LAB
BUN SERPL-MCNC: 33 MG/DL — HIGH (ref 7–23)
CALCIUM SERPL-MCNC: 8.8 MG/DL — SIGNIFICANT CHANGE UP (ref 8.4–10.5)
CHLORIDE SERPL-SCNC: 96 MMOL/L — LOW (ref 98–107)
CO2 SERPL-SCNC: 25 MMOL/L — SIGNIFICANT CHANGE UP (ref 22–31)
CREAT SERPL-MCNC: 0.97 MG/DL — SIGNIFICANT CHANGE UP (ref 0.5–1.3)
GLUCOSE BLDC GLUCOMTR-MCNC: 259 MG/DL — HIGH (ref 70–99)
GLUCOSE SERPL-MCNC: 236 MG/DL — HIGH (ref 70–99)
HCT VFR BLD CALC: 31.3 % — LOW (ref 39–50)
HGB BLD-MCNC: 10.3 G/DL — LOW (ref 13–17)
MCHC RBC-ENTMCNC: 30.9 PG — SIGNIFICANT CHANGE UP (ref 27–34)
MCHC RBC-ENTMCNC: 32.9 % — SIGNIFICANT CHANGE UP (ref 32–36)
MCV RBC AUTO: 94 FL — SIGNIFICANT CHANGE UP (ref 80–100)
NRBC # FLD: 0 — SIGNIFICANT CHANGE UP
PLATELET # BLD AUTO: 283 K/UL — SIGNIFICANT CHANGE UP (ref 150–400)
PMV BLD: 9.6 FL — SIGNIFICANT CHANGE UP (ref 7–13)
POTASSIUM SERPL-MCNC: 4.2 MMOL/L — SIGNIFICANT CHANGE UP (ref 3.5–5.3)
POTASSIUM SERPL-SCNC: 4.2 MMOL/L — SIGNIFICANT CHANGE UP (ref 3.5–5.3)
RBC # BLD: 3.33 M/UL — LOW (ref 4.2–5.8)
RBC # FLD: 16.6 % — HIGH (ref 10.3–14.5)
SODIUM SERPL-SCNC: 134 MMOL/L — LOW (ref 135–145)
WBC # BLD: 10.72 K/UL — HIGH (ref 3.8–10.5)
WBC # FLD AUTO: 10.72 K/UL — HIGH (ref 3.8–10.5)

## 2018-06-15 PROCEDURE — 99239 HOSP IP/OBS DSCHRG MGMT >30: CPT

## 2018-06-15 PROCEDURE — 99232 SBSQ HOSP IP/OBS MODERATE 35: CPT

## 2018-06-15 RX ORDER — INSULIN GLARGINE 100 [IU]/ML
38 INJECTION, SOLUTION SUBCUTANEOUS
Qty: 1 | Refills: 0 | OUTPATIENT
Start: 2018-06-15 | End: 2018-07-14

## 2018-06-15 RX ORDER — LANOLIN ALCOHOL/MO/W.PET/CERES
3 CREAM (GRAM) TOPICAL ONCE
Qty: 0 | Refills: 0 | Status: COMPLETED | OUTPATIENT
Start: 2018-06-15 | End: 2018-06-15

## 2018-06-15 RX ORDER — REPAGLINIDE 1 MG/1
1 TABLET ORAL
Qty: 0 | Refills: 0 | COMMUNITY

## 2018-06-15 RX ORDER — DEXAMETHASONE 0.5 MG/5ML
1 ELIXIR ORAL
Qty: 120 | Refills: 0 | OUTPATIENT
Start: 2018-06-15 | End: 2018-07-14

## 2018-06-15 RX ORDER — INSULIN ASPART 100 [IU]/ML
18 INJECTION, SOLUTION SUBCUTANEOUS
Qty: 1 | Refills: 0 | OUTPATIENT
Start: 2018-06-15 | End: 2018-07-14

## 2018-06-15 RX ORDER — CABOZANTINIB 140 MG/DAY
1 KIT ORAL
Qty: 0 | Refills: 0 | COMMUNITY

## 2018-06-15 RX ORDER — INSULIN GLARGINE 100 [IU]/ML
38 INJECTION, SOLUTION SUBCUTANEOUS AT BEDTIME
Qty: 0 | Refills: 0 | Status: DISCONTINUED | OUTPATIENT
Start: 2018-06-15 | End: 2018-06-15

## 2018-06-15 RX ORDER — INSULIN LISPRO 100/ML
18 VIAL (ML) SUBCUTANEOUS
Qty: 1 | Refills: 0 | OUTPATIENT
Start: 2018-06-15 | End: 2018-07-14

## 2018-06-15 RX ORDER — HYDROCORTISONE 1 %
1 OINTMENT (GRAM) TOPICAL
Qty: 1 | Refills: 0 | OUTPATIENT
Start: 2018-06-15 | End: 2018-06-21

## 2018-06-15 RX ORDER — INSULIN LISPRO 100/ML
18 VIAL (ML) SUBCUTANEOUS
Qty: 0 | Refills: 0 | Status: DISCONTINUED | OUTPATIENT
Start: 2018-06-15 | End: 2018-06-15

## 2018-06-15 RX ORDER — MORPHINE SULFATE 50 MG/1
1 CAPSULE, EXTENDED RELEASE ORAL
Qty: 0 | Refills: 0 | COMMUNITY

## 2018-06-15 RX ORDER — ISOPROPYL ALCOHOL, BENZOCAINE .7; .06 ML/ML; ML/ML
1 SWAB TOPICAL
Qty: 120 | Refills: 0 | OUTPATIENT
Start: 2018-06-15 | End: 2018-07-14

## 2018-06-15 RX ORDER — CHLORTHALIDONE 50 MG
1 TABLET ORAL
Qty: 0 | Refills: 0 | COMMUNITY

## 2018-06-15 RX ORDER — PANTOPRAZOLE SODIUM 20 MG/1
1 TABLET, DELAYED RELEASE ORAL
Qty: 30 | Refills: 0 | OUTPATIENT
Start: 2018-06-15 | End: 2018-07-14

## 2018-06-15 RX ADMIN — Medication 137 MICROGRAM(S): at 06:10

## 2018-06-15 RX ADMIN — Medication 20 MILLIGRAM(S): at 06:10

## 2018-06-15 RX ADMIN — AMLODIPINE BESYLATE 10 MILLIGRAM(S): 2.5 TABLET ORAL at 06:10

## 2018-06-15 RX ADMIN — Medication 4 MILLIGRAM(S): at 02:30

## 2018-06-15 RX ADMIN — Medication 4 MILLIGRAM(S): at 13:42

## 2018-06-15 RX ADMIN — HEPARIN SODIUM 5000 UNIT(S): 5000 INJECTION INTRAVENOUS; SUBCUTANEOUS at 13:42

## 2018-06-15 RX ADMIN — HEPARIN SODIUM 5000 UNIT(S): 5000 INJECTION INTRAVENOUS; SUBCUTANEOUS at 06:10

## 2018-06-15 RX ADMIN — PANTOPRAZOLE SODIUM 40 MILLIGRAM(S): 20 TABLET, DELAYED RELEASE ORAL at 06:10

## 2018-06-15 RX ADMIN — Medication 6: at 12:23

## 2018-06-15 RX ADMIN — Medication 16 UNIT(S): at 09:11

## 2018-06-15 RX ADMIN — Medication 6: at 09:10

## 2018-06-15 RX ADMIN — Medication 1 APPLICATION(S): at 12:24

## 2018-06-15 RX ADMIN — Medication 4 MILLIGRAM(S): at 07:29

## 2018-06-15 RX ADMIN — Medication 16 UNIT(S): at 12:23

## 2018-06-15 RX ADMIN — Medication 3 MILLIGRAM(S): at 02:42

## 2018-06-15 NOTE — PROGRESS NOTE ADULT - ASSESSMENT
59 yo m with stage 4 RCC w mets to lung, brain, bone, HTN, DM here w/ LLE pain worsening over the last few weeks- symptoms d/t cord edema at T7-8.  Patient has had radiation in the past - RT will do radiation as outpt as it is a complex plan.
59 yo m with stage 4 RCC w mets to lung, brain, bone, HTN, DM here w/ LLE pain worsening over the last few weeks- symptoms d/t cord edema at T7-8.  Patient has had radiation in the past thus RT has to figure out a comprehensive radiation plan to avoid harm from re-radiating.
PT while inpt  Decadron  Rad onc f/u   Foot drop due to cord compression, rec AFO   F/u as outpt with me [phone]
PT while inpt  Decadron  Rad onc f/u   Foot drop due to cord compression.
Patient is a 57 yo man with type 2 diabetes, hypothyroidism, renal cell cancer with metastatic disease admitted for acute cord compression on high dose steroids.
Patient is a 57 yo man with type 2 diabetes, hypothyroidism, renal cell cancer with metastatic disease admitted for acute cord compression on high dose steroids.
59 yo m with stage 4 RCC w mets to lung, brain, bone, HTN, DM here w/ worsening LLE pain, found to have cord edema at T7-8, also with LLE parasthesia and foot drop
59 yo m with stage 4 RCC w mets to lung, brain, bone, HTN, DM here w/ LLE pain worsening over the last few weeks- symptoms d/t cord edema at T7-8.  Patient has had radiation in the past - RT will do radiation as outpt as it is a complex plan.  for now will get neuro eval for left foot drop; MRI L spine ordered w and w/o contrast

## 2018-06-15 NOTE — PROGRESS NOTE ADULT - PROBLEM SELECTOR PLAN 4
BP better now
BP better now
DD includes cabozantinib effect vs decadron effect vs pain vs white-coat htn as similar spikes  in BP have occurred during prior hospitalization and outpt visits  -will c/w home enalapril, and add norvasc to regimen for now
BP better now  -c/w current antihypertensive regimen

## 2018-06-15 NOTE — DISCHARGE NOTE ADULT - MEDICATION SUMMARY - MEDICATIONS TO STOP TAKING
I will STOP taking the medications listed below when I get home from the hospital:    levothyroxine 100 mcg (0.1 mg) oral tablet  -- 1 tab(s) by mouth once a day    repaglinide 1 mg oral tablet  -- 1 tab(s) by mouth 3 times a day (before meals)    cabozantinib 60 mg oral tablet  -- 1 tab(s) by mouth once a day    chlorthalidone 25 mg oral tablet  -- 1 tab(s) by mouth once a day    morphine 15 mg/12 hr oral tablet, extended release  -- 1 tab(s) by mouth every 12 hours, As Needed    ISTOP Reference#[id number]   RxDispensed: 3/1/2018  Quantity: 60  Day Supply: 30 I will STOP taking the medications listed below when I get home from the hospital:    levothyroxine 100 mcg (0.1 mg) oral tablet  -- 1 tab(s) by mouth once a day    repaglinide 1 mg oral tablet  -- 1 tab(s) by mouth 3 times a day (before meals)    cabozantinib 60 mg oral tablet  -- 1 tab(s) by mouth once a day    chlorthalidone 25 mg oral tablet  -- 1 tab(s) by mouth once a day    morphine 15 mg/12 hr oral tablet, extended release  -- 1 tab(s) by mouth every 12 hours, As Needed    ISTOP Reference#[id number]   RxDispensed: 3/1/2018  Quantity: 60  Day Supply: 30 I will STOP taking the medications listed below when I get home from the hospital:    levothyroxine 100 mcg (0.1 mg) oral tablet  -- 1 tab(s) by mouth once a day    repaglinide 1 mg oral tablet  -- 1 tab(s) by mouth 3 times a day (before meals)    cabozantinib 60 mg oral tablet  -- 1 tab(s) by mouth once a day    chlorthalidone 25 mg oral tablet  -- 1 tab(s) by mouth once a day    morphine 15 mg/12 hr oral tablet, extended release  -- 1 tab(s) by mouth every 12 hours, As Needed    ISTOP Reference#[id number]   RxDispensed: 3/1/2018  Quantity: 60  Day Supply: 30

## 2018-06-15 NOTE — PROGRESS NOTE ADULT - PROBLEM SELECTOR PLAN 5
IMPROVE VTE Individual Risk Assessment    RISK                                                          Points  [] Previous VTE                                           3  [] Thrombophilia                                        2  [] Lower limb paralysis                              2   [x] Current Cancer                                       2   [] Immobilization > 24 hrs                        1  [] ICU/CCU stay > 24 hours                       1  [] Age > 60                                                   1    IMPROVE VTE Score: 2
DVT Ppx - c/w lovenox    Dispo- Dc planning for today pending Dr. Winchester's second opinion and endocrine final recs. Plan discussed with patient. Discharge details in discharge document. . Spent 36 min in discharge planning and coordination.

## 2018-06-15 NOTE — DISCHARGE NOTE ADULT - CARE PROVIDER_API CALL
[facility],   Outpatient follow up [facility] endocrine clinic next appointment scheduled for 9/21/18 at 11AM.  Phone: [phone]  Fax: (   )    -    Dr. Winchester (neurologist),   [address] [place] [address]  Phone: [phone]  Fax: (   )    -    [facility],   Phone: [phone]  Fax: (   )    - [facility],   Outpatient follow up [facility] endocrine clinic next appointment scheduled for 9/21/18 at 11AM.  Phone: [phone]  Fax: (   )    -    [facility],   Phone: [phone]  Fax: (   )    -    Dr. Matson,   Phone: [phone]  Fax: (   )    -

## 2018-06-15 NOTE — DISCHARGE NOTE ADULT - MEDICATION SUMMARY - MEDICATIONS TO CHANGE
I will SWITCH the dose or number of times a day I take the medications listed below when I get home from the hospital:    Lantus Solostar Pen 100 units/mL subcutaneous solution  -- 14 unit(s) subcutaneous once a day (at bedtime)

## 2018-06-15 NOTE — DISCHARGE NOTE ADULT - ADDITIONAL INSTRUCTIONS
Follow up with your PCP within 1 week of discharge, please call for an appt.  Follow up with [facility] ([facility] will call you for an appt date and time)  Follow up with neurologist Dr Winchester, please call for an appt. Follow up with your PCP within 1 week of discharge, please call for an appt.  Follow up with detention (detention will call you for an appt date and time).  Follow up with [facility], Dr. Matson. Please call for an appt. Follow up with your PCP within 1 week of discharge, please call for an appt.  Follow up with nursing home (nursing home will call you for an appt date and time).  Follow up with [facility], Dr. Maston. Please call for an appt.  Outpt follow up [facility] endocrine clinic next appointment scheduled for 9/21/18 at 11AM.

## 2018-06-15 NOTE — DISCHARGE NOTE ADULT - HOSPITAL COURSE
59 yo m with stage 4 RCC w mets to lung, brain, bone, HTN, DM here w/ worsening LLE pain, found to have cord edema at T7-8, also with LLE parasthesia and foot drop        Hospital course:    Cord compression syndrome.    -T7-T8 cord edema. MRI L spine shows neural foraminal narrowing  -Neurosurgery consulted -recommend decadron; no role for surgical intervention at this time  -IV steroids while in hospital - transitioned to oral steroids upon discharge  -Neurology consulted (2 opinions): both consults agree with steroids  -Patient has had radiation in the past - RT will do radiation as outpt as it is a complex plan  -PM & R consulted. Follow up with PM&R as out patient ([phone])  -Ambulate as tolerated with fall precautions  -Routine neuro checks    Metastatic renal cell carcinoma to brain.   -Oncology consulted - outpt f/u after acute issues resolve  -Continued with oxycodone prn.     Type 2 diabetes mellitus with complication, unspecified whether long term insulin use.    -A1c 6.8 on 6/8/1  -Endocrine consulted - medications adjusted as needed  -Monitored fingersticks - FS overall improved but still not optimal. Hyperglycemia in the setting of steroids. Pt will need decadron for the next few weeks as RT planned as outpt  -monitored FS    Other secondary hypertension.   -Continued with antihypertensive medications  -Monitored BP    Prophylactic measure.   -DVT Ppx - Lovenox    Dispo: home with out patient follow up 57 yo m with stage 4 RCC w mets to lung, brain, bone, HTN, DM here w/ worsening LLE pain, found to have cord edema at T7-8, also with LLE parasthesia and foot drop        Hospital course:    Cord compression syndrome.    -T7-T8 cord edema. MRI L spine shows neural foraminal narrowing  -Neurosurgery consulted -recommend decadron; no role for surgical intervention at this time  -IV steroids while in hospital - transitioned to oral steroids upon discharge  -Neurology consulted (2 opinions): both consults agree with steroids  -Patient has had radiation in the past - RT will do radiation as outpt as it is a complex plan  -PM & R consulted. Follow up with PM&R as out patient ([phone])  -Ambulate as tolerated with fall precautions  -Routine neuro checks    Metastatic renal cell carcinoma to brain.   -Oncology consulted - Plan is for outpt f/u after acute issues resolve  -Pain was controlled with with oxycodone PO  prn.     Type 2 diabetes mellitus with complication, unspecified whether long term insulin use.    -A1c 6.8 on 6/8/1  -Endocrine consulted - medications adjusted as needed- Pt to be discharged on lantus 38U qhs and Novolog 18U qac   -Monitored fingersticks - FS overall improved but still not optimal. Hyperglycemia in the setting of steroids. Pt will need decadron for the next few weeks as RT planned as outpt      Other secondary hypertension.   -Continued with antihypertensive medications  -Monitored BP    Dispo:  Pt stable for discharge with outpatient follow up as stated above.

## 2018-06-15 NOTE — PROGRESS NOTE ADULT - SUBJECTIVE AND OBJECTIVE BOX
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 S/p gamma knife to brain met, not a candidate for nephrectomy per Urology given brain mets. S/p spinal radiation. Previously on pazopanib, switched to nivolumab, now on cabozantinib since Nov 2017. 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  Pt comes to ed in setting of several weeks of LLE weakness and numbness but which acutely worsened over the past few days. He also complains of urinary retention, (able to void, but weakened stream, incomplete bladder emptying) and pelvic numbness. MRI revealed osseous spinal mets from T6-8 with cord compression, in addition to intramedullary disease at T7 with surrounding cord edema.     seen by neurosurgery  who for now recommend decadron; no role for surgical intervention at this time.  pain controlled. walked to bathroom.       REVIEW OF SYSTEMS: No chest pain, shortness of breath, nausea, vomiting or diarhea.      Vital Signs Last 24 Hrs  T(C): 36.5 (15 Nir 2018 14:23), Max: 36.7 (14 Jun 2018 22:12)  T(F): 97.7 (15 Nir 2018 14:23), Max: 98 (14 Jun 2018 22:12)  HR: 64 (15 Nir 2018 14:23) (56 - 64)  BP: 99/64 (15 Nir 2018 14:23) (99/64 - 130/85)  BP(mean): --  RR: 18 (15 Nir 2018 14:23) (18 - 18)  SpO2: 98% (15 Nir 2018 14:23) (98% - 100%)  ----------------------------------------------------------------------------------------  PHYSICAL EXAM  Constitutional - NAD, Comfortable  HEENT - NCAT, EOMI  Neck - Supple, No limited ROM  Chest - CTA bilaterally, No wheeze, No rhonchi, No crackles  Cardiovascular - RRR, S1S2, No murmurs  Abdomen - BS+, Soft, NTND  Extremities - No C/C/E, No calf tenderness   Neurologic Exam -                    Cognitive - Awake, Alert, AAO to self, place, date, year, situation     Communication - Fluent, No dysarthria, no aphasia     Cranial Nerves - CN 2-12 intact     Motor -RLE 3/5 HF/KE, 3/5 DF  LLE 4/5                        Sensory - Intact to LT     Reflexes - DTR Intact, No primitive reflexive     Balance - WNL Static  Psychiatric - Mood stable, Affect WNL

## 2018-06-15 NOTE — PROGRESS NOTE ADULT - PROBLEM SELECTOR PROBLEM 1
Cord compression syndrome
Cord compression syndrome
Type 2 diabetes mellitus with complication, unspecified whether long term insulin use
Type 2 diabetes mellitus with complication, unspecified whether long term insulin use
Cord compression syndrome
Cord compression syndrome

## 2018-06-15 NOTE — PROGRESS NOTE ADULT - PROBLEM SELECTOR PROBLEM 3
Type 2 diabetes mellitus with complication, unspecified whether long term insulin use

## 2018-06-15 NOTE — PROGRESS NOTE ADULT - PROBLEM SELECTOR PLAN 2
-Onc to see pt  -c/w oxycodone prn
seen by onc- outpt f/u after acute issues resolve  -c/w oxycodone prn
-Seen by onc recs appreciated - outpt f/u after acute issues resolve  -c/w oxycodone prn
-Onc to see pt  -c/w oxycodone prn

## 2018-06-15 NOTE — DISCHARGE NOTE ADULT - SECONDARY DIAGNOSIS.
Hypertension, unspecified type Hypothyroidism, unspecified type Type 2 diabetes mellitus with complication, unspecified whether long term insulin use Metastatic renal cell carcinoma to brain

## 2018-06-15 NOTE — PROGRESS NOTE ADULT - PROBLEM SELECTOR PLAN 6
pt reports recent synthroid  increase to 137 mcg
cont synthroid 137 mcg
pt reports recent synthroid  increase to 137 mcg
cont synthroid 137 mcg

## 2018-06-15 NOTE — PROGRESS NOTE ADULT - PROBLEM SELECTOR PLAN 1
-seen by neurosurgery  who for now recommend decadron; no role for surgical intervention at this time  -seen by radiation oncology who will determine a comprehensive plan as it is complicated due hx of prior radiation  -pt should continue to ambulate as tolerated with fall precautions
-seen by neurosurgery  who for now recommend decadron; no role for surgical intervention at this time  -seen by radiation oncology who will determine a comprehensive plan as it is complicated due hx of prior radiation  -pt should continue to ambulate as tolerated with fall precautions  MRI L spine shows neural foraminal narrowing  pt w/ cord edema  f/u neuro and pmr recs  await 2nd opinion from Dr. Winchester
BG remain elevated due to dexamethasone.  Increase Lantus to 38u qhs  Increae Humalog to 18/18/18  Moderate mealtime scale. Add moderate bedtime scale.  Patient to be dc home on dexamethasone at current dose with taper at some point in the future.  Recommend for dc:  lantus 38 u qhs  Humalog 18/18/18  stop Prandin  Outpatient follow up [facility] endocrine clinic next appointment scheduled for 9/21/18 at 11AM. He was instructed to call Dr. Carolina to discuss if steroid dose is changed or if glucose is < 70 or > 300 at home.
BG remain grossly elevated.  Increase Lantus to 32u qhs  Increae Humalog to 16/16/16  Moderate mealtime scale. Add moderate bedtime scale.  If dc'd home on dexamethasone patient will need basal bolus insulin for dc, to be clarified.  Outpatient follow up [facility] endocrine clinic next appointment scheduled for 9/21/18 at 11AM.
T7-T8 cord edema. MRI L spine shows neural foraminal narrowing  -seen by neurosurgery  who for now recommend decadron; no role for surgical intervention at this time  -c/w decadron- will switch to PO on discharge   -Patient has had radiation in the past - RT will do radiation as outpt as it is a complex plan.   -pt should continue to ambulate as tolerated with fall precautions  -will continue to appreciate neuro and pmr   -Awaiting 2nd opinion recs from Dr. Winchester  -continue w/ neuro checks
-seen by neurosurgery  who for now recommend decadron; no role for surgical intervention at this time  -seen by radiation oncology who will determine a comprehensive plan as it is complicated due hx of prior radiation  -pt should continue to ambulate as tolerated with fall precautions  MRI L spine ordered  pt w/ cord edema  f/u neuro and pmr recs

## 2018-06-15 NOTE — DISCHARGE NOTE ADULT - CARE PLAN
Principal Discharge DX:	Cord compression syndrome  Assessment and plan of treatment:	Continue with decadron as prescribed. Follow up with Dr. Winchester (neurologist) within 1-2 weeks of discharge. Follow up with your oncologist at [facility] ([facility] will call you with your appt date and time).  Secondary Diagnosis:	Hypertension, unspecified type  Assessment and plan of treatment:	Continue current blood pressure medication regimen as directed. Monitor for any visual changes, headaches or dizziness.  Monitor blood pressure regularly.  Follow up with your PCP for further management for high blood pressure, please call to make appointment within 1 week of discharge  Secondary Diagnosis:	Hypothyroidism, unspecified type  Assessment and plan of treatment:	Continue with synthroid. Follow up with PCP for further monitoring of TFTs.  Secondary Diagnosis:	Type 2 diabetes mellitus with complication, unspecified whether long term insulin use  Assessment and plan of treatment:	Continue consistent carbohydrate diet.  Monitor blood glucose levels throughout the day before meals and at bedtime.  Record blood sugars and bring to outpatient providers appointment in order to be reviewed by your doctor for management modifications.  Be aware of diabetes management symptoms including feeling cool and clammy may be related to low glucose levels.  Feeling hot and dry may indicate high glucose levels.  If  you feel these symptoms, check your blood sugar.  Make regular podiatry appointments in order to have feet checked for wounds and toe nails cut by a doctor to prevent infections. Outpatient follow up [facility] endocrine clinic next appointment scheduled for 9/21/18 at 11AM. IF YOUR STEROIDS ARE DECREASED OR STOPPED, PLEASE CALL YOUR ENDOCRINOLOGIST AND LET THEM KNOW SO YOUR DM MEDS CAN BE ADJUSTED ACCORDINGLY.  Secondary Diagnosis:	Metastatic renal cell carcinoma to brain  Assessment and plan of treatment:	Follow up with [facility] ([facility] will reach out to you for an appt). Principal Discharge DX:	Cord compression syndrome  Goal:	Symptom control - prevention of progression  Assessment and plan of treatment:	Continue with decadron as prescribed. [place] with [place] [facility] [place] [place] you with your appt date and time). Plan for RT as out patient and chemo once acute issues completely resolved. Follow up with PM&R as out patient.  Secondary Diagnosis:	Hypertension, unspecified type  Assessment and plan of treatment:	Continue current blood pressure medication regimen as directed. Monitor for any visual changes, headaches or dizziness.  Monitor blood pressure regularly.  Follow up with your PCP for further management for high blood pressure, please call to make appointment within 1 week of discharge  Secondary Diagnosis:	Hypothyroidism, unspecified type  Assessment and plan of treatment:	Continue with synthroid. Follow up with PCP for further monitoring of TFTs.  Secondary Diagnosis:	Type 2 diabetes mellitus with complication, unspecified whether long term insulin use  Assessment and plan of treatment:	Continue consistent carbohydrate diet.  Monitor blood glucose levels throughout the day before meals and at bedtime.  Record blood sugars and bring to outpatient providers appointment in order to be reviewed by your doctor for management modifications.  Be aware of diabetes management symptoms including feeling cool and clammy may be related to low glucose levels.  Feeling hot and dry may indicate high glucose levels.  If  you feel these symptoms, check your blood sugar.  Make regular podiatry appointments in order to have feet checked for wounds and toe nails cut by a doctor to prevent infections. Outpatient follow up [facility] endocrine clinic next appointment scheduled for 9/21/18 at 11AM. IF YOUR STEROIDS ARE DECREASED OR STOPPED, PLEASE CALL YOUR ENDOCRINOLOGIST AND LET THEM KNOW SO YOUR DM MEDS CAN BE ADJUSTED ACCORDINGLY.  Secondary Diagnosis:	Metastatic renal cell carcinoma to brain  Assessment and plan of treatment:	Follow up with [facility] ([facility] [place] reach out to you for an appt). Principal Discharge DX:	Cord compression syndrome  Goal:	Symptom control - prevention of progression  Assessment and plan of treatment:	Continue with decadron as prescribed. [place] with [place] [facility] [place] [place] with your appt date and time). Plan for RT as out patient and chemo once acute issues completely resolved. Follow up with PM&R as out patient.  Secondary Diagnosis:	Hypertension, unspecified type  Assessment and plan of treatment:	Continue current blood pressure medication regimen as directed. Monitor for any visual changes, headaches or dizziness.  Monitor blood pressure regularly.  Follow up with your PCP for further management for high blood pressure, please call to make appointment within 1 week of discharge  Secondary Diagnosis:	Hypothyroidism, unspecified type  Assessment and plan of treatment:	Continue with synthroid. Follow up with PCP for further monitoring of TFTs.  Secondary Diagnosis:	Type 2 diabetes mellitus with complication, unspecified whether long term insulin use  Assessment and plan of treatment:	Continue consistent carbohydrate diet.  Monitor blood glucose levels throughout the day before meals and at bedtime.  Record blood sugars and bring to outpatient providers appointment in order to be reviewed by your doctor for management modifications.  Be aware of diabetes management symptoms including feeling cool and clammy may be related to low glucose levels.  Feeling hot and dry may indicate high glucose levels.  If  you feel these symptoms, check your blood sugar.  Make regular podiatry appointments in order to have feet checked for wounds and toe nails cut by a doctor to prevent infections. Outpatient follow up [facility] endocrine clinic next appointment scheduled for 9/21/18 at 11AM.   Please call Dr. Carolina to discuss if steroid dose is changed or if glucose is < 70 or > 300 at home.   IF YOUR STEROIDS ARE DECREASED OR STOPPED, PLEASE CALL YOUR ENDOCRINOLOGIST AND LET THEM KNOW SO YOUR DM MEDS CAN BE ADJUSTED ACCORDINGLY.  Secondary Diagnosis:	Metastatic renal cell carcinoma to brain  Assessment and plan of treatment:	Follow up with [facility] ([facility] [place] reach out to you for an appt).

## 2018-06-15 NOTE — DISCHARGE NOTE ADULT - PLAN OF CARE
Continue with decadron as prescribed. Follow up with Dr. Winchester (neurologist) within 1-2 weeks of discharge. Follow up with your oncologist at [facility] ([facility] will call you with your appt date and time). Continue current blood pressure medication regimen as directed. Monitor for any visual changes, headaches or dizziness.  Monitor blood pressure regularly.  Follow up with your PCP for further management for high blood pressure, please call to make appointment within 1 week of discharge Continue with synthroid. Follow up with PCP for further monitoring of TFTs. Continue consistent carbohydrate diet.  Monitor blood glucose levels throughout the day before meals and at bedtime.  Record blood sugars and bring to outpatient providers appointment in order to be reviewed by your doctor for management modifications.  Be aware of diabetes management symptoms including feeling cool and clammy may be related to low glucose levels.  Feeling hot and dry may indicate high glucose levels.  If  you feel these symptoms, check your blood sugar.  Make regular podiatry appointments in order to have feet checked for wounds and toe nails cut by a doctor to prevent infections. Outpatient follow up [facility] endocrine clinic next appointment scheduled for 9/21/18 at 11AM. IF YOUR STEROIDS ARE DECREASED OR STOPPED, PLEASE CALL YOUR ENDOCRINOLOGIST AND LET THEM KNOW SO YOUR DM MEDS CAN BE ADJUSTED ACCORDINGLY. Follow up with [facility] ([facility] will reach out to you for an appt). Symptom control - prevention of progression Continue with decadron as prescribed. Follow up with your oncologist at [facility] ([facility] will call you with your appt date and time). Plan for RT as out patient and chemo once acute issues completely resolved. Follow up with PM&R as out patient. Continue consistent carbohydrate diet.  Monitor blood glucose levels throughout the day before meals and at bedtime.  Record blood sugars and bring to outpatient providers appointment in order to be reviewed by your doctor for management modifications.  Be aware of diabetes management symptoms including feeling cool and clammy may be related to low glucose levels.  Feeling hot and dry may indicate high glucose levels.  If  you feel these symptoms, check your blood sugar.  Make regular podiatry appointments in order to have feet checked for wounds and toe nails cut by a doctor to prevent infections. Outpatient follow up [facility] endocrine clinic next appointment scheduled for 9/21/18 at 11AM.   Please call Dr. Carolina to discuss if steroid dose is changed or if glucose is < 70 or > 300 at home.   IF YOUR STEROIDS ARE DECREASED OR STOPPED, PLEASE CALL YOUR ENDOCRINOLOGIST AND LET THEM KNOW SO YOUR DM MEDS CAN BE ADJUSTED ACCORDINGLY.

## 2018-06-15 NOTE — PROGRESS NOTE ADULT - SUBJECTIVE AND OBJECTIVE BOX
Patient is a 58y old  Male who presents with a chief complaint of back pain (11 Jun 2018 22:53)      SUBJECTIVE / OVERNIGHT EVENTS:    Review of Systems:     MEDICATIONS  (STANDING):  amLODIPine   Tablet 10 milliGRAM(s) Oral daily  atorvastatin 20 milliGRAM(s) Oral at bedtime  dexamethasone  Injectable 4 milliGRAM(s) IV Push every 6 hours  dextrose 5%. 1000 milliLiter(s) (50 mL/Hr) IV Continuous <Continuous>  dextrose 50% Injectable 12.5 Gram(s) IV Push once  dextrose 50% Injectable 25 Gram(s) IV Push once  dextrose 50% Injectable 25 Gram(s) IV Push once  enalapril 20 milliGRAM(s) Oral daily  heparin  Injectable 5000 Unit(s) SubCutaneous every 8 hours  hydrocortisone 0.5% Cream 1 Application(s) Topical daily  insulin glargine Injectable (LANTUS) 32 Unit(s) SubCutaneous at bedtime  insulin lispro (HumaLOG) corrective regimen sliding scale   SubCutaneous at bedtime  insulin lispro (HumaLOG) corrective regimen sliding scale   SubCutaneous three times a day before meals  insulin lispro Injectable (HumaLOG) 16 Unit(s) SubCutaneous three times a day before meals  levothyroxine 137 MICROGram(s) Oral daily  pantoprazole    Tablet 40 milliGRAM(s) Oral before breakfast  senna 2 Tablet(s) Oral at bedtime    MEDICATIONS  (PRN):  acetaminophen   Tablet 650 milliGRAM(s) Oral every 6 hours PRN Mild and moderate pain  dextrose 40% Gel 15 Gram(s) Oral once PRN Blood Glucose LESS THAN 70 milliGRAM(s)/deciliter  glucagon  Injectable 1 milliGRAM(s) IntraMuscular once PRN Glucose LESS THAN 70 milligrams/deciliter  oxyCODONE    IR 10 milliGRAM(s) Oral every 6 hours PRN Severe Pain (7 - 10)      PHYSICAL EXAM:    I&O's Summary    GENERAL: NAD, well-developed  HEAD:  Atraumatic, Normocephalic  EYES: EOMI, PERRLA, conjunctiva and sclera clear  NECK: Supple, No JVD  CHEST/LUNG: Clear to auscultation bilaterally; No wheeze  HEART: Regular rate and rhythm; No murmurs, rubs, or gallops  ABDOMEN: Soft, Nontender, Nondistended; Bowel sounds present  EXTREMITIES:  2+ Peripheral Pulses, No clubbing, cyanosis, or edema  PSYCH: AAOx3  NEUROLOGY: non-focal  SKIN: No rashes or lesions    LABS:  CAPILLARY BLOOD GLUCOSE      POCT Blood Glucose.: 259 mg/dL (15 Nir 2018 09:01)  POCT Blood Glucose.: 215 mg/dL (14 Jun 2018 22:22)  POCT Blood Glucose.: 413 mg/dL (14 Jun 2018 18:00)  POCT Blood Glucose.: 339 mg/dL (14 Jun 2018 12:27)                          10.3   10.72 )-----------( 283      ( 15 Nir 2018 06:15 )             31.3     06-15    134<L>  |  96<L>  |  33<H>  ----------------------------<  236<H>  4.2   |  25  |  0.97    Ca    8.8      15 Nir 2018 06:15                RADIOLOGY & ADDITIONAL TESTS:    Imaging Personally Reviewed:    Consultant(s) Notes Reviewed:      Care Discussed with Consultants/Other Providers: Patient is a 58y old  Male who presents with a chief complaint of back pain (11 Jun 2018 22:53)      SUBJECTIVE / OVERNIGHT EVENTS: Pt states that his ROM in his LLE and paresthesias has improved. He wants to go home today.     Review of Systems:     MEDICATIONS  (STANDING):  amLODIPine   Tablet 10 milliGRAM(s) Oral daily  atorvastatin 20 milliGRAM(s) Oral at bedtime  dexamethasone  Injectable 4 milliGRAM(s) IV Push every 6 hours  dextrose 5%. 1000 milliLiter(s) (50 mL/Hr) IV Continuous <Continuous>  dextrose 50% Injectable 12.5 Gram(s) IV Push once  dextrose 50% Injectable 25 Gram(s) IV Push once  dextrose 50% Injectable 25 Gram(s) IV Push once  enalapril 20 milliGRAM(s) Oral daily  heparin  Injectable 5000 Unit(s) SubCutaneous every 8 hours  hydrocortisone 0.5% Cream 1 Application(s) Topical daily  insulin glargine Injectable (LANTUS) 32 Unit(s) SubCutaneous at bedtime  insulin lispro (HumaLOG) corrective regimen sliding scale   SubCutaneous at bedtime  insulin lispro (HumaLOG) corrective regimen sliding scale   SubCutaneous three times a day before meals  insulin lispro Injectable (HumaLOG) 16 Unit(s) SubCutaneous three times a day before meals  levothyroxine 137 MICROGram(s) Oral daily  pantoprazole    Tablet 40 milliGRAM(s) Oral before breakfast  senna 2 Tablet(s) Oral at bedtime    MEDICATIONS  (PRN):  acetaminophen   Tablet 650 milliGRAM(s) Oral every 6 hours PRN Mild and moderate pain  dextrose 40% Gel 15 Gram(s) Oral once PRN Blood Glucose LESS THAN 70 milliGRAM(s)/deciliter  glucagon  Injectable 1 milliGRAM(s) IntraMuscular once PRN Glucose LESS THAN 70 milligrams/deciliter  oxyCODONE    IR 10 milliGRAM(s) Oral every 6 hours PRN Severe Pain (7 - 10)      PHYSICAL EXAM:  Vital Signs Last 24 Hrs  T(C): 36.6 (15 Nir 2018 05:00), Max: 36.7 (14 Jun 2018 22:12)  T(F): 97.8 (15 Nir 2018 05:00), Max: 98 (14 Jun 2018 22:12)  HR: 62 (15 Nir 2018 05:00) (56 - 77)  BP: 130/85 (15 Nir 2018 05:00) (123/84 - 139/89)  BP(mean): --  RR: 18 (15 Nir 2018 05:00) (17 - 18)  SpO2: 100% (15 Nir 2018 05:00) (98% - 100%)  I&O's Summary    GENERAL: NAD, well-developed  HEAD:  Atraumatic, Normocephalic  EYES: EOMI, conjunctiva and sclera clear  NECK: Supple,  CHEST/LUNG: Clear to auscultation bilaterally; No wheeze  HEART: Regular rate and rhythm; No murmurs, rubs, or gallops  ABDOMEN: Soft, Nontender, Nondistended; Bowel sounds present  EXTREMITIES:  2+ Peripheral Pulses, No clubbing, cyanosis,  PSYCH: AAOx3  NEUROLOGY 4/5 in LLE.   SKIN: No rashes or lesions    LABS:  CAPILLARY BLOOD GLUCOSE      POCT Blood Glucose.: 259 mg/dL (15 Nir 2018 09:01)  POCT Blood Glucose.: 215 mg/dL (14 Jun 2018 22:22)  POCT Blood Glucose.: 413 mg/dL (14 Jun 2018 18:00)  POCT Blood Glucose.: 339 mg/dL (14 Jun 2018 12:27)                          10.3   10.72 )-----------( 283      ( 15 Nir 2018 06:15 )             31.3     06-15    134<L>  |  96<L>  |  33<H>  ----------------------------<  236<H>  4.2   |  25  |  0.97    Ca    8.8      15 Nir 2018 06:15                RADIOLOGY & ADDITIONAL TESTS:    Imaging Personally Reviewed:    Consultant(s) Notes Reviewed:      Care Discussed with Consultants/Other Providers:

## 2018-06-15 NOTE — PROGRESS NOTE ADULT - PROBLEM SELECTOR PROBLEM 4
Other secondary hypertension

## 2018-06-15 NOTE — DISCHARGE NOTE ADULT - PATIENT PORTAL LINK FT
You can access the [facility] Patient Portal, offered by [facility], by registering with the following website: [email or web address]

## 2018-06-15 NOTE — DISCHARGE NOTE ADULT - MEDICATION SUMMARY - MEDICATIONS TO TAKE
I will START or STAY ON the medications listed below when I get home from the hospital:    glucometer  -- 1 glucometer as covered by insurance to assist with checking blood sugar 4 times a day (before meals and at bedtime)   -- Indication: For DM    glucose test strips  --  Glucose test strips to match glucometer as covered by insurance to assess blood glucose 4 times a day (before meals and at bedtime)   -- Indication: For DM    lancets  -- Lancets to assist with checking blood sugar4 times a day (before meals and at bedtime)  -- Indication: For DM    insulin pen needles  -- Insulin pen needles to assist with administering DM medication neede  4 times a day (before meals and at bedtime)   -- Indication: For DM    dexamethasone 4 mg oral tablet  -- 1 tab(s) by mouth every 6 hours   -- It is very important that you take or use this exactly as directed.  Do not skip doses or discontinue unless directed by your doctor.  Obtain medical advice before taking any non-prescription drugs as some may affect the action of this medication.  Take with food or milk.    -- Indication: For Cord compression syndrome    oxyCODONE 10 mg oral tablet  -- 1 tab(s) by mouth every 6 hours, As Needed    ISTOP Reference#: [id number]   RxDispensed: 6/8/2018  Quantity: 120  Day Supply: 30  -- Indication: For Pain 2/2 neoplasm    acetaminophen 325 mg oral tablet  -- 2 tab(s) by mouth every 6 hours, As Needed  -- Indication: For Pain    enalapril 20 mg oral tablet  -- 1 tab(s) by mouth once a day  -- Indication: For HTN    Lantus 100 units/mL subcutaneous solution  -- 38 unit(s) subcutaneous once a day (at bedtime)   -- Do not drink alcoholic beverages when taking this medication.  It is very important that you take or use this exactly as directed.  Do not skip doses or discontinue unless directed by your doctor.  Keep in refrigerator.  Do not freeze.    -- Indication: For DM    HumaLOG 100 units/mL injectable solution  -- 18 unit(s) injectable 3 times a day (before meals)   -- Indication: For DM    cetirizine 10 mg oral tablet  -- 1 tab(s) by mouth once a day  -- Indication: For Allergies    atorvastatin 20 mg oral tablet  -- 1 tab(s) by mouth once a day  -- Indication: For HLD    Alcohol Swabs with Benzocaine topical pad  -- Apply on skin to affected area 4 times a day (before meals and at bedtime) when checking blood sugar  -- For external use only.    -- Indication: For DM    benzonatate 100 mg oral capsule  -- 1 cap(s) by mouth 3 times a day, As Needed  -- Indication: For Cough    amLODIPine 10 mg oral tablet  -- 1 tab(s) by mouth once a day  -- Indication: For HTN    hydrocortisone 0.5% topical cream  -- 1 application on skin once a day to the affected area  -- Indication: For Rash    Senna 8.6 mg oral tablet  -- 2 tab(s) by mouth once a day (at bedtime), As Needed  -- Indication: For Constipation    pantoprazole 40 mg oral delayed release tablet  -- 1 tab(s) by mouth once a day (before a meal)  -- Indication: For GERD    levothyroxine 137 mcg (0.137 mg) oral tablet  -- 1 tab(s) by mouth once a day  -- Indication: For Hypothyroidism, unspecified type I will START or STAY ON the medications listed below when I get home from the hospital:    glucometer  -- 1 glucometer as covered by insurance to assist with checking blood sugar 4 times a day (before meals and at bedtime)   -- Indication: For DM    glucose test strips  --  Glucose test strips to match glucometer as covered by insurance to assess blood glucose 4 times a day (before meals and at bedtime)   -- Indication: For DM    lancets  -- Lancets to assist with checking blood sugar4 times a day (before meals and at bedtime)  -- Indication: For DM    insulin pen needles  -- Insulin pen needles to assist with administering DM medication neede  4 times a day (before meals and at bedtime)   -- Indication: For DM    dexamethasone 4 mg oral tablet  -- 1 tab(s) by mouth every 6 hours   -- It is very important that you take or use this exactly as directed.  Do not skip doses or discontinue unless directed by your doctor.  Obtain medical advice before taking any non-prescription drugs as some may affect the action of this medication.  Take with food or milk.    -- Indication: For Cord compression syndrome    oxyCODONE 10 mg oral tablet  -- 1 tab(s) by mouth every 6 hours, As Needed    ISTOP Reference#: [id number]   RxDispensed: 6/8/2018  Quantity: 120  Day Supply: 30  -- Indication: For Pain 2/2 neoplasm    acetaminophen 325 mg oral tablet  -- 2 tab(s) by mouth every 6 hours, As Needed  -- Indication: For Pain    enalapril 20 mg oral tablet  -- 1 tab(s) by mouth once a day  -- Indication: For HTN    Lantus 100 units/mL subcutaneous solution  -- 38 unit(s) subcutaneous once a day (at bedtime)   -- Do not drink alcoholic beverages when taking this medication.  It is very important that you take or use this exactly as directed.  Do not skip doses or discontinue unless directed by your doctor.  Keep in refrigerator.  Do not freeze.    -- Indication: For DM    HumaLOG 100 units/mL injectable solution  -- 18 unit(s) injectable 3 times a day (before meals)   -- Indication: For DM    cetirizine 10 mg oral tablet  -- 1 tab(s) by mouth once a day  -- Indication: For Allergies    atorvastatin 20 mg oral tablet  -- 1 tab(s) by mouth once a day  -- Indication: For HLD    Alcohol Swabs with Benzocaine topical pad  -- Apply on skin to affected area 4 times a day (before meals and at bedtime) when checking blood sugar  -- For external use only.    -- Indication: For DM    benzonatate 100 mg oral capsule  -- 1 cap(s) by mouth 3 times a day, As Needed  -- Indication: For Cough    amLODIPine 10 mg oral tablet  -- 1 tab(s) by mouth once a day  -- Indication: For HTN    hydrocortisone 0.5% topical cream  -- 1 application on skin once a day to the affected area  -- Indication: For Rash    Senna 8.6 mg oral tablet  -- 2 tab(s) by mouth once a day (at bedtime), As Needed  -- Indication: For Constipation    pantoprazole 40 mg oral delayed release tablet  -- 1 tab(s) by mouth once a day (before a meal)  -- Indication: For GERD    levothyroxine 137 mcg (0.137 mg) oral tablet  -- 1 tab(s) by mouth once a day  -- Indication: For Hypothyroidism, unspecified type I will START or STAY ON the medications listed below when I get home from the hospital:    glucometer  -- 1 glucometer as covered by insurance to assist with checking blood sugar 4 times a day (before meals and at bedtime)   -- Indication: For DM    glucose test strips  --  Glucose test strips to match glucometer as covered by insurance to assess blood glucose 4 times a day (before meals and at bedtime)   -- Indication: For DM    lancets  -- Lancets to assist with checking blood sugar4 times a day (before meals and at bedtime)  -- Indication: For DM    insulin pen needles  -- Insulin pen needles to assist with administering DM medication neede  4 times a day (before meals and at bedtime)   -- Indication: For DM    dexamethasone 4 mg oral tablet  -- 1 tab(s) by mouth every 6 hours   -- It is very important that you take or use this exactly as directed.  Do not skip doses or discontinue unless directed by your doctor.  Obtain medical advice before taking any non-prescription drugs as some may affect the action of this medication.  Take with food or milk.    -- Indication: For Cord compression syndrome    oxyCODONE 10 mg oral tablet  -- 1 tab(s) by mouth every 6 hours, As Needed    ISTOP Reference#: [id number]   RxDispensed: 6/8/2018  Quantity: 120  Day Supply: 30  -- Indication: For Pain 2/2 neoplasm    acetaminophen 325 mg oral tablet  -- 2 tab(s) by mouth every 6 hours, As Needed  -- Indication: For Pain    enalapril 20 mg oral tablet  -- 1 tab(s) by mouth once a day  -- Indication: For HTN    Lantus 100 units/mL subcutaneous solution  -- 38 unit(s) subcutaneous once a day (at bedtime)   -- Do not drink alcoholic beverages when taking this medication.  It is very important that you take or use this exactly as directed.  Do not skip doses or discontinue unless directed by your doctor.  Keep in refrigerator.  Do not freeze.    -- Indication: For DM    NovoLOG 100 units/mL injectable solution  -- 18 unit(s) injectable 3 times a day (before meals)   -- Check with your doctor before becoming pregnant.  Do not drink alcoholic beverages when taking this medication.  Keep in refrigerator.  Do not freeze.  Obtain medical advice before taking any non-prescription drugs as some may affect the action of this medication.    -- Indication: For DM    cetirizine 10 mg oral tablet  -- 1 tab(s) by mouth once a day  -- Indication: For Allergies    atorvastatin 20 mg oral tablet  -- 1 tab(s) by mouth once a day  -- Indication: For HLD    Alcohol Swabs with Benzocaine topical pad  -- Apply on skin to affected area 4 times a day (before meals and at bedtime) when checking blood sugar  -- For external use only.    -- Indication: For DM    benzonatate 100 mg oral capsule  -- 1 cap(s) by mouth 3 times a day, As Needed  -- Indication: For Cough    amLODIPine 10 mg oral tablet  -- 1 tab(s) by mouth once a day  -- Indication: For HTN    hydrocortisone 0.5% topical cream  -- 1 application on skin once a day to the affected area  -- Indication: For Rash    Senna 8.6 mg oral tablet  -- 2 tab(s) by mouth once a day (at bedtime), As Needed  -- Indication: For Constipation    pantoprazole 40 mg oral delayed release tablet  -- 1 tab(s) by mouth once a day (before a meal)  -- Indication: For GERD    levothyroxine 137 mcg (0.137 mg) oral tablet  -- 1 tab(s) by mouth once a day  -- Indication: For Hypothyroidism, unspecified type

## 2018-06-15 NOTE — DISCHARGE NOTE ADULT - PROVIDER TOKENS
FREE:[LAST:[[facility]],PHONE:[phone]],FAX:[(   )    -],ADDRESS:[Outpatient follow up [facility] endocrine clinic next appointment scheduled for 9/21/18 at 11AM.]],FREE:[LAST:[Dr. Winchester (neurologist)],PHONE:[phone]],FAX:[(   )    -],ADDRESS:[[address] [place] [address]]],FREE:[LAST:[[facility]],PHONE:[phone]],FAX:[(   )    -]] FREE:[LAST:[[facility]],PHONE:[phone]],FAX:[(   )    -],ADDRESS:[Outpatient follow up [facility] endocrine clinic next appointment scheduled for 9/21/18 at 11AM.]],FREE:[LAST:[[facility]PHONE:[phone]],FAX:[(   )    -]],FREE:[LAST:[Dr. Matson],PHONE:[phone]],FAX:[(   )    -]]

## 2018-06-15 NOTE — PROGRESS NOTE ADULT - PROBLEM SELECTOR PROBLEM 2
Metastatic renal cell carcinoma to brain

## 2018-06-15 NOTE — PROGRESS NOTE ADULT - PROBLEM SELECTOR PLAN 3
-endocrine eval apprec
-endocrine eval apprec- will need decadron for the next few weeks as RT planned as outpt  sugars still high
-hold oral hypoglycemics  -c/w lantus, place on low dose ISS; may need to increase 2/2 decadron regimen  will get endocrine input
A1c 6.8 on 6/8/18 . FS overall improved but still not optimal. Hyperglycemia in the setting of steroids. Pt will need decadron for the next few weeks as RT planned as outpt  -continue to monitor FS  -pt currently on ISS, lantus 32Uqhs and humalog 16u qac  -will f/u w/ endocrine in regards to discharge regimen     sugars still high

## 2018-06-15 NOTE — PROGRESS NOTE ADULT - SUBJECTIVE AND OBJECTIVE BOX
Chief Complaint: DM2 exacerbated by steroids    History: Tolerating po. No hypoglycemia.    MEDICATIONS  (STANDING):  amLODIPine   Tablet 10 milliGRAM(s) Oral daily  atorvastatin 20 milliGRAM(s) Oral at bedtime  dexamethasone  Injectable 4 milliGRAM(s) IV Push every 6 hours  dextrose 5%. 1000 milliLiter(s) (50 mL/Hr) IV Continuous <Continuous>  dextrose 50% Injectable 12.5 Gram(s) IV Push once  dextrose 50% Injectable 25 Gram(s) IV Push once  dextrose 50% Injectable 25 Gram(s) IV Push once  enalapril 20 milliGRAM(s) Oral daily  heparin  Injectable 5000 Unit(s) SubCutaneous every 8 hours  hydrocortisone 0.5% Cream 1 Application(s) Topical daily  insulin glargine Injectable (LANTUS) 32 Unit(s) SubCutaneous at bedtime  insulin lispro (HumaLOG) corrective regimen sliding scale   SubCutaneous at bedtime  insulin lispro (HumaLOG) corrective regimen sliding scale   SubCutaneous three times a day before meals  insulin lispro Injectable (HumaLOG) 16 Unit(s) SubCutaneous three times a day before meals  levothyroxine 137 MICROGram(s) Oral daily  pantoprazole    Tablet 40 milliGRAM(s) Oral before breakfast  senna 2 Tablet(s) Oral at bedtime    MEDICATIONS  (PRN):  acetaminophen   Tablet 650 milliGRAM(s) Oral every 6 hours PRN Mild and moderate pain  dextrose 40% Gel 15 Gram(s) Oral once PRN Blood Glucose LESS THAN 70 milliGRAM(s)/deciliter  glucagon  Injectable 1 milliGRAM(s) IntraMuscular once PRN Glucose LESS THAN 70 milligrams/deciliter  oxyCODONE    IR 10 milliGRAM(s) Oral every 6 hours PRN Severe Pain (7 - 10)      Allergies    No Known Allergies    Intolerances      Review of Systems:  ALL OTHER SYSTEMS REVIEWED AND NEGATIVE      PHYSICAL EXAM:  VITALS: T(C): 36.5 (06-15-18 @ 14:23)  T(F): 97.7 (06-15-18 @ 14:23), Max: 98 (06-14-18 @ 22:12)  HR: 64 (06-15-18 @ 14:23) (56 - 64)  BP: 99/64 (06-15-18 @ 14:23) (99/64 - 130/85)  RR:  (18 - 18)  SpO2:  (98% - 100%)  Wt(kg): --  GENERAL: NAD, well-groomed, well-developed  EYES: No proptosis, no lid lag, anicteric  HEENT:  Atraumatic, Normocephalic, moist mucous membranes  PSYCH: Alert and oriented x 3, normal affect, normal mood      CAPILLARY BLOOD GLUCOSE      POCT Blood Glucose.: 256 mg/dL (15 Nir 2018 12:04)  POCT Blood Glucose.: 259 mg/dL (15 Nir 2018 09:01)  POCT Blood Glucose.: 215 mg/dL (14 Jun 2018 22:22)  POCT Blood Glucose.: 413 mg/dL (14 Jun 2018 18:00)      06-15    134<L>  |  96<L>  |  33<H>  ----------------------------<  236<H>  4.2   |  25  |  0.97    EGFR if : 98  EGFR if non : 85    Ca    8.8      06-15            Thyroid Function Tests:  06-07 @ 14:40 TSH 13.01 FreeT4 -- T3 -- Anti TPO -- Anti Thyroglobulin Ab -- TSI --      Hemoglobin A1C, Whole Blood: 6.7 % <H> [4.0 - 5.6] (06-08-18 @ 12:50)

## 2018-06-15 NOTE — PROGRESS NOTE ADULT - SUBJECTIVE AND OBJECTIVE BOX
[facility]        - Patient seen and examined.  - Today, patient is without complaints.         *****MEDICATIONS: Current medication reviewed and documented.    MEDICATIONS  (STANDING):  amLODIPine   Tablet 10 milliGRAM(s) Oral daily  atorvastatin 20 milliGRAM(s) Oral at bedtime  dexamethasone  Injectable 4 milliGRAM(s) IV Push every 6 hours  dextrose 5%. 1000 milliLiter(s) (50 mL/Hr) IV Continuous <Continuous>  dextrose 50% Injectable 12.5 Gram(s) IV Push once  dextrose 50% Injectable 25 Gram(s) IV Push once  dextrose 50% Injectable 25 Gram(s) IV Push once  enalapril 20 milliGRAM(s) Oral daily  heparin  Injectable 5000 Unit(s) SubCutaneous every 8 hours  hydrocortisone 0.5% Cream 1 Application(s) Topical daily  insulin glargine Injectable (LANTUS) 32 Unit(s) SubCutaneous at bedtime  insulin lispro (HumaLOG) corrective regimen sliding scale   SubCutaneous at bedtime  insulin lispro (HumaLOG) corrective regimen sliding scale   SubCutaneous three times a day before meals  insulin lispro Injectable (HumaLOG) 16 Unit(s) SubCutaneous three times a day before meals  levothyroxine 137 MICROGram(s) Oral daily  pantoprazole    Tablet 40 milliGRAM(s) Oral before breakfast  senna 2 Tablet(s) Oral at bedtime    MEDICATIONS  (PRN):  acetaminophen   Tablet 650 milliGRAM(s) Oral every 6 hours PRN Mild and moderate pain  dextrose 40% Gel 15 Gram(s) Oral once PRN Blood Glucose LESS THAN 70 milliGRAM(s)/deciliter  glucagon  Injectable 1 milliGRAM(s) IntraMuscular once PRN Glucose LESS THAN 70 milligrams/deciliter  oxyCODONE    IR 10 milliGRAM(s) Oral every 6 hours PRN Severe Pain (7 - 10)           ***** REVIEW OF SYSTEM:  GEN: no fever, no chills, no pain  RESP: no SOB, no cough, no sputum  CVS: no chest pain, no palpitations, no edema  GI: no abdominal pain, no nausea, no vomiting, no constipation, no diarrhea  : no dysurea, no frequency  NEURO: no headache, no diziness  PSYCH: no depression, not anxious  Derm : no itching, no rash         ***** VITAL SIGNS:  T(F): 97.8 (06-15-18 @ 05:00), Max: 98 (06-14-18 @ 22:12)  HR: 62 (06-15-18 @ 05:00) (56 - 77)  BP: 130/85 (06-15-18 @ 05:00) (123/84 - 139/89)  RR: 18 (06-15-18 @ 05:00) (17 - 18)  SpO2: 100% (06-15-18 @ 05:00) (98% - 100%)  Wt(kg): --  ,   I&O's Summary           *****PHYSICAL EXAM: Attention comprehension are fair. Able to name, repeat, read without any difficulty.   Able to follow 3 step commands.     EOMI fundi not visualized,  VFF to confrontration  No facial asymmetry   Tongue is midline   Palate elevates symmetrically    il 4/5   hamstrings 4/5   quad 4/5   dorsiflexion 4/5  toes 3/5   no clonus  left knee jerk brisk, other 1+ throughtout.  upgoing toe on the left.            *****LAB AND IMAGING:                        10.3   10.72 )-----------( 283      ( 15 Nir 2018 06:15 )             31.3               06-15    134<L>  |  96<L>  |  33<H>  ----------------------------<  236<H>  4.2   |  25  |  0.97    Ca    8.8      15 Nir 2018 06:15                           [All pertinent recent Imaging/Reports reviewed]           *****A S S E S S M E N T   A N D   P L A N :    57 yo m with 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 S/p gamma knife to brain met, not a candidate for nephrectomy per Urology given brain mets. S/p spinal radiation. Previously on pazopanib, switched to nivolumab, now on cabozantinib since Nov 2017. 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  Pt comes to ed in setting of several weeks of LLE weakness and numbness but which acutely worsened over the past few days. He also complains of urinary retention, (able to void, but weakened stream, incomplete bladder emptying) and pelvic numbness. MRI revealed osseous spinal mets from T6-8 with cord compression, in addition to intramedullary disease at T7 with surrounding cord edema.   Pt also with BP elevation in ED as high as 195/101. Takes enalapril daily for last few months (and this morning) for HTN attributed to cabozantinib; checks his BP daily and says has been consistently below 140. Normal BP in ed prior to decadron. Denies HA, blurry vision, chest pain, or sob. Reports left-sided abd and lower back pain for last 4- 5 weeks that he reports was to be evaluated with MRI of abdomen shortly. Denies constipation but notes increased straining for passage of BM. UA negative     exam improving  Spinal cord compression likely causing left foot drop whcih has improved since admission likely due to steroids.  Neurosurgery does not recommend any intervention.  Prosthesis for AFO brace to left foot  Continue pt/ot   overall prognosis guarded.       Thank you for allowing me to participate in the care of this patient. Please do not hesitate to call me if you have any  questions.        ________________  Dara Swanson MD  [facility] ([facility]  [phone]     30 minutes spent on total encounter; more than 50 % of the visit was  spent counseling and or  coordinating care by the attending physician.   At the present time, [facility] does not  provide outpatient followup, best to call the your insurance to find a participating provider.  This was explained to you at the time of the visit. Alternatively, if your insurance allows it, you can follow up with a neurologist  Dr. Olman Smith([place]) [phone] or Dr. Olu Napoles ( [place]) [phone]

## 2018-06-27 ENCOUNTER — APPOINTMENT (OUTPATIENT)
Dept: RADIATION ONCOLOGY | Facility: CLINIC | Age: 58
End: 2018-06-27
Payer: MEDICARE

## 2018-06-27 VITALS
DIASTOLIC BLOOD PRESSURE: 90 MMHG | WEIGHT: 185 LBS | RESPIRATION RATE: 18 BRPM | HEIGHT: 65 IN | SYSTOLIC BLOOD PRESSURE: 140 MMHG | HEART RATE: 85 BPM | BODY MASS INDEX: 30.82 KG/M2

## 2018-06-27 PROCEDURE — 99213 OFFICE O/P EST LOW 20 MIN: CPT

## 2018-06-28 ENCOUNTER — OUTPATIENT (OUTPATIENT)
Dept: OUTPATIENT SERVICES | Facility: HOSPITAL | Age: 58
LOS: 1 days | Discharge: HOME | End: 2018-06-28

## 2018-06-28 DIAGNOSIS — C64.9 MALIGNANT NEOPLASM OF UNSPECIFIED KIDNEY, EXCEPT RENAL PELVIS: ICD-10-CM

## 2018-07-03 ENCOUNTER — APPOINTMENT (OUTPATIENT)
Dept: SPINE | Facility: CLINIC | Age: 58
End: 2018-07-03
Payer: MEDICARE

## 2018-07-03 VITALS
HEIGHT: 65 IN | SYSTOLIC BLOOD PRESSURE: 98 MMHG | WEIGHT: 185 LBS | HEART RATE: 84 BPM | DIASTOLIC BLOOD PRESSURE: 64 MMHG | BODY MASS INDEX: 30.82 KG/M2

## 2018-07-03 PROCEDURE — 99213 OFFICE O/P EST LOW 20 MIN: CPT

## 2018-07-03 RX ORDER — BENZONATATE 100 MG/1
100 CAPSULE ORAL EVERY 8 HOURS
Qty: 21 | Refills: 0 | Status: COMPLETED | COMMUNITY
Start: 2017-03-27 | End: 2018-07-03

## 2018-07-03 RX ORDER — GABAPENTIN 100 MG/1
100 CAPSULE ORAL 3 TIMES DAILY
Qty: 90 | Refills: 5 | Status: COMPLETED | COMMUNITY
Start: 2017-02-02 | End: 2018-07-03

## 2018-07-03 RX ORDER — SYRING-NEEDL,DISP,INSUL,0.3 ML 31 GX5/16"
31G X 5/16" SYRINGE, EMPTY DISPOSABLE MISCELLANEOUS
Qty: 100 | Refills: 0 | Status: COMPLETED | COMMUNITY
Start: 2018-06-15 | End: 2018-07-03

## 2018-07-03 RX ORDER — LEVOTHYROXINE SODIUM 0.12 MG/1
125 TABLET ORAL
Qty: 30 | Refills: 0 | Status: COMPLETED | COMMUNITY
Start: 2018-02-27 | End: 2018-07-03

## 2018-07-03 RX ORDER — PANTOPRAZOLE 40 MG/1
40 TABLET, DELAYED RELEASE ORAL
Qty: 30 | Refills: 0 | Status: COMPLETED | COMMUNITY
Start: 2018-06-15 | End: 2018-07-03

## 2018-07-03 RX ORDER — CETIRIZINE HYDROCHLORIDE 10 MG/1
10 TABLET, FILM COATED ORAL DAILY
Qty: 90 | Refills: 2 | Status: COMPLETED | COMMUNITY
Start: 2017-11-27 | End: 2018-07-03

## 2018-07-03 RX ORDER — CHLORTHALIDONE 25 MG/1
25 TABLET ORAL DAILY
Qty: 30 | Refills: 2 | Status: COMPLETED | COMMUNITY
Start: 2018-01-17 | End: 2018-07-03

## 2018-07-03 RX ORDER — INSULIN ASPART 100 [IU]/ML
100 INJECTION, SOLUTION INTRAVENOUS; SUBCUTANEOUS
Qty: 10 | Refills: 0 | Status: COMPLETED | COMMUNITY
Start: 2018-06-15 | End: 2018-07-03

## 2018-07-05 ENCOUNTER — APPOINTMENT (OUTPATIENT)
Dept: HEMATOLOGY ONCOLOGY | Facility: CLINIC | Age: 58
End: 2018-07-05

## 2018-07-06 ENCOUNTER — APPOINTMENT (OUTPATIENT)
Dept: NUCLEAR MEDICINE | Facility: IMAGING CENTER | Age: 58
End: 2018-07-06

## 2018-07-06 ENCOUNTER — APPOINTMENT (OUTPATIENT)
Dept: MRI IMAGING | Facility: IMAGING CENTER | Age: 58
End: 2018-07-06

## 2018-07-09 ENCOUNTER — APPOINTMENT (OUTPATIENT)
Dept: NUCLEAR MEDICINE | Facility: IMAGING CENTER | Age: 58
End: 2018-07-09

## 2018-07-09 ENCOUNTER — APPOINTMENT (OUTPATIENT)
Dept: MRI IMAGING | Facility: IMAGING CENTER | Age: 58
End: 2018-07-09

## 2018-07-16 ENCOUNTER — CHART COPY (OUTPATIENT)
Age: 58
End: 2018-07-16

## 2018-07-26 ENCOUNTER — OUTPATIENT (OUTPATIENT)
Dept: OUTPATIENT SERVICES | Facility: HOSPITAL | Age: 58
LOS: 1 days | Discharge: HOME | End: 2018-07-26
Payer: MEDICARE

## 2018-08-07 PROCEDURE — 77263 THER RADIOLOGY TX PLNG CPLX: CPT

## 2018-08-08 PROCEDURE — 77334 RADIATION TREATMENT AID(S): CPT | Mod: 26

## 2018-08-08 PROCEDURE — 77290 THER RAD SIMULAJ FIELD CPLX: CPT | Mod: 26

## 2018-08-09 ENCOUNTER — RESULT REVIEW (OUTPATIENT)
Age: 58
End: 2018-08-09

## 2018-08-09 ENCOUNTER — OUTPATIENT (OUTPATIENT)
Dept: OUTPATIENT SERVICES | Facility: HOSPITAL | Age: 58
LOS: 1 days | Discharge: HOME | End: 2018-08-09

## 2018-08-09 ENCOUNTER — APPOINTMENT (OUTPATIENT)
Dept: HEMATOLOGY ONCOLOGY | Facility: CLINIC | Age: 58
End: 2018-08-09

## 2018-08-09 VITALS
SYSTOLIC BLOOD PRESSURE: 161 MMHG | DIASTOLIC BLOOD PRESSURE: 111 MMHG | BODY MASS INDEX: 31.29 KG/M2 | WEIGHT: 187.99 LBS | RESPIRATION RATE: 16 BRPM | OXYGEN SATURATION: 96 % | HEART RATE: 80 BPM | TEMPERATURE: 99.2 F

## 2018-08-09 DIAGNOSIS — C64.9 MALIGNANT NEOPLASM OF UNSPECIFIED KIDNEY, EXCEPT RENAL PELVIS: ICD-10-CM

## 2018-08-09 DIAGNOSIS — R21 RASH AND OTHER NONSPECIFIC SKIN ERUPTION: ICD-10-CM

## 2018-08-09 DIAGNOSIS — R35.1 NOCTURIA: ICD-10-CM

## 2018-08-09 LAB
BASOPHILS # BLD AUTO: 0 K/UL — SIGNIFICANT CHANGE UP (ref 0–0.2)
BASOPHILS NFR BLD AUTO: 0.6 % — SIGNIFICANT CHANGE UP (ref 0–2)
EOSINOPHIL # BLD AUTO: 0 K/UL — SIGNIFICANT CHANGE UP (ref 0–0.5)
EOSINOPHIL NFR BLD AUTO: 0 % — SIGNIFICANT CHANGE UP (ref 0–6)
HCT VFR BLD CALC: 35.8 % — LOW (ref 39–50)
HGB BLD-MCNC: 11.7 G/DL — LOW (ref 13–17)
LYMPHOCYTES # BLD AUTO: 1.8 K/UL — SIGNIFICANT CHANGE UP (ref 1–3.3)
LYMPHOCYTES # BLD AUTO: 34.2 % — SIGNIFICANT CHANGE UP (ref 13–44)
MCHC RBC-ENTMCNC: 31.1 PG — SIGNIFICANT CHANGE UP (ref 27–34)
MCHC RBC-ENTMCNC: 32.7 G/DL — SIGNIFICANT CHANGE UP (ref 32–36)
MCV RBC AUTO: 94.9 FL — SIGNIFICANT CHANGE UP (ref 80–100)
MONOCYTES # BLD AUTO: 0.5 K/UL — SIGNIFICANT CHANGE UP (ref 0–0.9)
MONOCYTES NFR BLD AUTO: 9.1 % — SIGNIFICANT CHANGE UP (ref 2–14)
NEUTROPHILS # BLD AUTO: 2.9 K/UL — SIGNIFICANT CHANGE UP (ref 1.8–7.4)
NEUTROPHILS NFR BLD AUTO: 56.1 % — SIGNIFICANT CHANGE UP (ref 43–77)
PLATELET # BLD AUTO: 229 K/UL — SIGNIFICANT CHANGE UP (ref 150–400)
RBC # BLD: 3.78 M/UL — LOW (ref 4.2–5.8)
RBC # FLD: 17.4 % — HIGH (ref 10.3–14.5)
WBC # BLD: 5.1 K/UL — SIGNIFICANT CHANGE UP (ref 3.8–10.5)
WBC # FLD AUTO: 5.1 K/UL — SIGNIFICANT CHANGE UP (ref 3.8–10.5)

## 2018-08-09 RX ORDER — ESOMEPRAZOLE MAGNESIUM 40 MG/1
40 CAPSULE, DELAYED RELEASE ORAL DAILY
Qty: 30 | Refills: 3 | Status: ACTIVE | COMMUNITY
Start: 2017-04-14

## 2018-08-10 PROBLEM — R21 RASH: Status: ACTIVE | Noted: 2018-08-09

## 2018-08-13 ENCOUNTER — OUTPATIENT (OUTPATIENT)
Dept: OUTPATIENT SERVICES | Facility: HOSPITAL | Age: 58
LOS: 1 days | End: 2018-08-13
Payer: MEDICARE

## 2018-08-13 VITALS
DIASTOLIC BLOOD PRESSURE: 90 MMHG | SYSTOLIC BLOOD PRESSURE: 148 MMHG | WEIGHT: 188.94 LBS | RESPIRATION RATE: 15 BRPM | OXYGEN SATURATION: 98 % | HEIGHT: 66 IN | HEART RATE: 72 BPM | TEMPERATURE: 98 F

## 2018-08-13 DIAGNOSIS — Z29.9 ENCOUNTER FOR PROPHYLACTIC MEASURES, UNSPECIFIED: ICD-10-CM

## 2018-08-13 DIAGNOSIS — I10 ESSENTIAL (PRIMARY) HYPERTENSION: ICD-10-CM

## 2018-08-13 DIAGNOSIS — E11.9 TYPE 2 DIABETES MELLITUS WITHOUT COMPLICATIONS: ICD-10-CM

## 2018-08-13 DIAGNOSIS — C41.2 MALIGNANT NEOPLASM OF VERTEBRAL COLUMN: ICD-10-CM

## 2018-08-13 DIAGNOSIS — Z01.818 ENCOUNTER FOR OTHER PREPROCEDURAL EXAMINATION: ICD-10-CM

## 2018-08-13 PROCEDURE — G0463: CPT

## 2018-08-13 RX ORDER — SODIUM CHLORIDE 9 MG/ML
3 INJECTION INTRAMUSCULAR; INTRAVENOUS; SUBCUTANEOUS EVERY 8 HOURS
Qty: 0 | Refills: 0 | Status: DISCONTINUED | OUTPATIENT
Start: 2018-08-29 | End: 2018-08-31

## 2018-08-13 RX ORDER — CETIRIZINE HYDROCHLORIDE 10 MG/1
1 TABLET ORAL
Qty: 0 | Refills: 0 | COMMUNITY

## 2018-08-13 RX ORDER — CEFAZOLIN SODIUM 1 G
2000 VIAL (EA) INJECTION ONCE
Qty: 0 | Refills: 0 | Status: DISCONTINUED | OUTPATIENT
Start: 2018-08-29 | End: 2018-08-31

## 2018-08-13 RX ORDER — LIDOCAINE HCL 20 MG/ML
0.2 VIAL (ML) INJECTION ONCE
Qty: 0 | Refills: 0 | Status: DISCONTINUED | OUTPATIENT
Start: 2018-08-29 | End: 2018-08-31

## 2018-08-13 RX ORDER — ATORVASTATIN CALCIUM 80 MG/1
1 TABLET, FILM COATED ORAL
Qty: 0 | Refills: 0 | COMMUNITY

## 2018-08-13 NOTE — H&P PST ADULT - NSANTHOSAYNRD_GEN_A_CORE
No. TEA screening performed.  STOP BANG Legend: 0-2 = LOW Risk; 3-4 = INTERMEDIATE Risk; 5-8 = HIGH Risk

## 2018-08-13 NOTE — H&P PST ADULT - HISTORY OF PRESENT ILLNESS
57 yo m with 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 S/p gamma knife to brain met, not a candidate for nephrectomy per Urology given brain mets. S/p spinal radiation. Previously on pazopanib, switched to nivolumab, now on cabozantinib since Nov 2017. 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  Pt comes to ed in setting of several weeks of LLE weakness and numbness but which acutely worsened over the past few days. He also complains of urinary retention, (able to void, but weakened stream, incomplete bladder emptying) and pelvic numbness. MRI revealed osseous spinal mets from T6-8 with cord compression, in addition to intramedullary disease at T7 with surrounding cord edema.   Pt also with BP elevation in ED as high as 195/101. Takes enalapril daily for last few months (and this morning) for HTN attributed to cabozantinib; checks his BP daily and says has been consistently below 140. Normal BP in ed prior to decadron. Denies HA, blurry vision, chest pain, or sob. Reports left-sided abd and lower back pain for last 4- 5 weeks that he reports was to be evaluated with MRI of abdomen shortly. Denies constipation but notes increased straining 57 y/o male with PMHx of HTN, T2DM, HLD, stage 4 right-sided RCC with 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 S/p gamma knife to brain met, not a candidate for nephrectomy per Urology given brain mets. S/p spinal radiation, now on cabozantinib since Nov 2017 c/o worsening back pain radiating to LLE with associated weakness and numbness. Reports urinary retention, (able to void, but weakened stream, incomplete bladder emptying) and pelvic numbness. MRI revealed osseous spinal mets from T6-8 with cord compression, in addition to intramedullary disease at T7 with surrounding cord edema. Denies HA, blurry vision, chest pain, or sob. Presents to PST for a scheduled T8 Kyphoplasty on 8/29/2018. 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 S/p gamma knife to brain met, not a candidate for nephrectomy per Urology given brain mets. S/p spinal radiation, now on cabozantinib since Nov 2017 c/o worsening back pain radiating to LLE with associated weakness and numbness. Reports urinary retention, (able to void, but weakened stream, incomplete bladder emptying) and pelvic numbness-s/p MRI revealed osseous spinal mets from T6-8 with cord compression, in addition to intramedullary disease at T7 with surrounding cord edema. Evaluated by Dr. Tobias. Denies HA, blurry vision, chest pain, or sob. Presents to PST for a scheduled T8 Kyphoplasty on 8/29/2018.

## 2018-08-13 NOTE — H&P PST ADULT - PROBLEM SELECTOR PLAN 3
Instructed pt to hold Repaglinide in am of surgery  Pt to c/w Lantus 14 units night before surgery   Last HgA1c from June = 6.7 in chart  FS DOS

## 2018-08-13 NOTE — H&P PST ADULT - PMH
Cervicalgia    Coronary artery disease    Diabetes mellitus  Type 2  Elevated TSH    GERD (gastroesophageal reflux disease)    Hypertension    Hypothyroidism    Malignant neoplasm of vertebral column    Metastatic renal cell carcinoma to brain Cervicalgia    Coronary artery disease    Diabetes mellitus  Type 2  Elevated TSH    GERD (gastroesophageal reflux disease)    Hypertension    Hypothyroidism    Malignant neoplasm of vertebral column    Metastatic renal cell carcinoma to brain    S/P radiation therapy  spine, on cabozantinib since Nov 2017 Cervicalgia    Diabetes mellitus  Type 2  Elevated TSH    GERD (gastroesophageal reflux disease)    HLD (hyperlipidemia)    Hypertension    Hypothyroidism    Malignant neoplasm of vertebral column    Metastatic renal cell carcinoma to brain    S/P radiation therapy  spine, on cabozantinib since Nov 2017

## 2018-08-13 NOTE — H&P PST ADULT - PROBLEM SELECTOR PLAN 1
Scheduled T8 Kyphoplasty 8/29/2018  CBC, CMP from 8/9/18 reviewed and in chart  Pre-op instructions given to pt and wife, including chlorhexidine soap

## 2018-08-13 NOTE — H&P PST ADULT - ACTIVITY
ambulates with cane, limited due to lower extremity weakness ambulates with cane, able to do light housework, limited due to lower extremity weakness

## 2018-08-13 NOTE — H&P PST ADULT - ASSESSMENT
CAPRINI SCORE [CLOT]    AGE RELATED RISK FACTORS                                                       MOBILITY RELATED FACTORS  [x] Age 41-60 years                                            (1 Point)                  [ ] Bed rest                                                        (1 Point)  [ ] Age: 61-74 years                                           (2 Points)                 [ ] Plaster cast                                                   (2 Points)  [ ] Age= 75 years                                              (3 Points)                 [ ] Bed bound for more than 72 hours                 (2 Points)    DISEASE RELATED RISK FACTORS                                               GENDER SPECIFIC FACTORS  [x] Edema in the lower extremities                       (1 Point)                  [ ] Pregnancy                                                     (1 Point)  [ ] Varicose veins                                               (1 Point)                  [ ] Post-partum < 6 weeks                                   (1 Point)             [x] BMI > 25 Kg/m2                                            (1 Point)                  [ ] Hormonal therapy  or oral contraception          (1 Point)                 [ ] Sepsis (in the previous month)                        (1 Point)                  [ ] History of pregnancy complications                 (1 point)  [ ] Pneumonia or serious lung disease                                               [ ] Unexplained or recurrent                     (1 Point)           (in the previous month)                               (1 Point)  [ ] Abnormal pulmonary function test                     (1 Point)                 SURGERY RELATED RISK FACTORS  [ ] Acute myocardial infarction                              (1 Point)                 [ ]  Section                                             (1 Point)  [ ] Congestive heart failure (in the previous month)  (1 Point)               [ ] Minor surgery                                                  (1 Point)   [ ] Inflammatory bowel disease                             (1 Point)                 [ ] Arthroscopic surgery                                        (2 Points)  [ ] Central venous access                                      (2 Points)                [x] General surgery lasting more than 45 minutes   (2 Points)       [ ] Stroke (in the previous month)                          (5 Points)               [ ] Elective arthroplasty                                         (5 Points)                                                                                                                                               HEMATOLOGY RELATED FACTORS                                                 TRAUMA RELATED RISK FACTORS  [ ] Prior episodes of VTE                                     (3 Points)                 [ ] Fracture of the hip, pelvis, or leg                       (5 Points)  [ ] Positive family history for VTE                         (3 Points)                 [ ] Acute spinal cord injury (in the previous month)  (5 Points)  [ ] Prothrombin 15888 A                                     (3 Points)                 [ ] Paralysis  (less than 1 month)                             (5 Points)  [ ] Factor V Leiden                                             (3 Points)                  [ ] Multiple Trauma within 1 month                        (5 Points)  [ ] Lupus anticoagulants                                     (3 Points)                                                           [ ] Anticardiolipin antibodies                               (3 Points)                                                       [ ] High homocysteine in the blood                      (3 Points)                                             [ ] Other congenital or acquired thrombophilia      (3 Points)                                                [ ] Heparin induced thrombocytopenia                  (3 Points)                                          Total Score [    5      ]

## 2018-08-13 NOTE — H&P PST ADULT - PRIMARY CARE PROVIDER
[facility], [place] [phone] [facility], [place] [phone]

## 2018-08-14 ENCOUNTER — FORM ENCOUNTER (OUTPATIENT)
Age: 58
End: 2018-08-14

## 2018-08-14 PROBLEM — Z92.3 PERSONAL HISTORY OF IRRADIATION: Chronic | Status: ACTIVE | Noted: 2018-08-13

## 2018-08-15 ENCOUNTER — APPOINTMENT (OUTPATIENT)
Dept: CT IMAGING | Facility: IMAGING CENTER | Age: 58
End: 2018-08-15
Payer: MEDICARE

## 2018-08-15 ENCOUNTER — OUTPATIENT (OUTPATIENT)
Dept: OUTPATIENT SERVICES | Facility: HOSPITAL | Age: 58
LOS: 1 days | End: 2018-08-15
Payer: MEDICARE

## 2018-08-15 DIAGNOSIS — C64.9 MALIGNANT NEOPLASM OF UNSPECIFIED KIDNEY, EXCEPT RENAL PELVIS: ICD-10-CM

## 2018-08-15 PROBLEM — C41.2 MALIGNANT NEOPLASM OF VERTEBRAL COLUMN: Chronic | Status: ACTIVE | Noted: 2018-08-13

## 2018-08-15 PROBLEM — E78.5 HYPERLIPIDEMIA, UNSPECIFIED: Chronic | Status: ACTIVE | Noted: 2018-08-13

## 2018-08-15 PROCEDURE — 77295 3-D RADIOTHERAPY PLAN: CPT | Mod: 26

## 2018-08-15 PROCEDURE — 74177 CT ABD & PELVIS W/CONTRAST: CPT | Mod: 26

## 2018-08-15 PROCEDURE — 74177 CT ABD & PELVIS W/CONTRAST: CPT

## 2018-08-15 PROCEDURE — 77334 RADIATION TREATMENT AID(S): CPT | Mod: 26

## 2018-08-15 PROCEDURE — 71260 CT THORAX DX C+: CPT

## 2018-08-15 PROCEDURE — 71260 CT THORAX DX C+: CPT | Mod: 26

## 2018-08-15 PROCEDURE — 77300 RADIATION THERAPY DOSE PLAN: CPT | Mod: 26

## 2018-08-21 VITALS
HEART RATE: 74 BPM | WEIGHT: 187.83 LBS | OXYGEN SATURATION: 98 % | RESPIRATION RATE: 18 BRPM | SYSTOLIC BLOOD PRESSURE: 160 MMHG | BODY MASS INDEX: 31.26 KG/M2 | DIASTOLIC BLOOD PRESSURE: 90 MMHG

## 2018-08-22 PROCEDURE — 77435 SBRT MANAGEMENT: CPT

## 2018-08-24 ENCOUNTER — FORM ENCOUNTER (OUTPATIENT)
Age: 58
End: 2018-08-24

## 2018-08-25 ENCOUNTER — APPOINTMENT (OUTPATIENT)
Dept: NUCLEAR MEDICINE | Facility: IMAGING CENTER | Age: 58
End: 2018-08-25
Payer: MEDICARE

## 2018-08-25 ENCOUNTER — OUTPATIENT (OUTPATIENT)
Dept: OUTPATIENT SERVICES | Facility: HOSPITAL | Age: 58
LOS: 1 days | End: 2018-08-25
Payer: MEDICARE

## 2018-08-25 ENCOUNTER — APPOINTMENT (OUTPATIENT)
Dept: MRI IMAGING | Facility: IMAGING CENTER | Age: 58
End: 2018-08-25
Payer: MEDICARE

## 2018-08-25 DIAGNOSIS — C79.51 SECONDARY MALIGNANT NEOPLASM OF BONE: ICD-10-CM

## 2018-08-25 DIAGNOSIS — C64.9 MALIGNANT NEOPLASM OF UNSPECIFIED KIDNEY, EXCEPT RENAL PELVIS: ICD-10-CM

## 2018-08-25 PROCEDURE — 78815 PET IMAGE W/CT SKULL-THIGH: CPT | Mod: 26,PS

## 2018-08-25 PROCEDURE — 72158 MRI LUMBAR SPINE W/O & W/DYE: CPT | Mod: 26

## 2018-08-25 PROCEDURE — A9552: CPT

## 2018-08-25 PROCEDURE — A9585: CPT

## 2018-08-25 PROCEDURE — 78815 PET IMAGE W/CT SKULL-THIGH: CPT

## 2018-08-25 PROCEDURE — 72158 MRI LUMBAR SPINE W/O & W/DYE: CPT

## 2018-08-27 ENCOUNTER — APPOINTMENT (OUTPATIENT)
Dept: RADIATION ONCOLOGY | Facility: CLINIC | Age: 58
End: 2018-08-27

## 2018-08-28 ENCOUNTER — TRANSCRIPTION ENCOUNTER (OUTPATIENT)
Age: 58
End: 2018-08-28

## 2018-08-28 ENCOUNTER — APPOINTMENT (OUTPATIENT)
Dept: SPINE | Facility: CLINIC | Age: 58
End: 2018-08-28
Payer: MEDICARE

## 2018-08-28 PROCEDURE — 99213 OFFICE O/P EST LOW 20 MIN: CPT | Mod: PD

## 2018-08-29 ENCOUNTER — INPATIENT (INPATIENT)
Facility: HOSPITAL | Age: 58
LOS: 1 days | Discharge: HOME | DRG: 543 | End: 2018-08-31
Attending: NEUROLOGICAL SURGERY | Admitting: NEUROLOGICAL SURGERY
Payer: MEDICARE

## 2018-08-29 ENCOUNTER — APPOINTMENT (OUTPATIENT)
Dept: SPINE | Facility: HOSPITAL | Age: 58
End: 2018-08-29

## 2018-08-29 VITALS
RESPIRATION RATE: 18 BRPM | OXYGEN SATURATION: 96 % | HEART RATE: 77 BPM | HEIGHT: 66 IN | WEIGHT: 184.97 LBS | SYSTOLIC BLOOD PRESSURE: 153 MMHG | TEMPERATURE: 98 F | DIASTOLIC BLOOD PRESSURE: 108 MMHG

## 2018-08-29 DIAGNOSIS — C41.2 MALIGNANT NEOPLASM OF VERTEBRAL COLUMN: ICD-10-CM

## 2018-08-29 DIAGNOSIS — I10 ESSENTIAL (PRIMARY) HYPERTENSION: ICD-10-CM

## 2018-08-29 DIAGNOSIS — E11.8 TYPE 2 DIABETES MELLITUS WITH UNSPECIFIED COMPLICATIONS: ICD-10-CM

## 2018-08-29 DIAGNOSIS — C79.31 SECONDARY MALIGNANT NEOPLASM OF BRAIN: ICD-10-CM

## 2018-08-29 DIAGNOSIS — G95.20 UNSPECIFIED CORD COMPRESSION: ICD-10-CM

## 2018-08-29 LAB
GLUCOSE BLDC GLUCOMTR-MCNC: 108 MG/DL — HIGH (ref 70–99)
GLUCOSE BLDC GLUCOMTR-MCNC: 147 MG/DL — HIGH (ref 70–99)
GLUCOSE BLDC GLUCOMTR-MCNC: 75 MG/DL — SIGNIFICANT CHANGE UP (ref 70–99)

## 2018-08-29 PROCEDURE — 72128 CT CHEST SPINE W/O DYE: CPT | Mod: 26

## 2018-08-29 PROCEDURE — 99223 1ST HOSP IP/OBS HIGH 75: CPT

## 2018-08-29 RX ORDER — ATORVASTATIN CALCIUM 80 MG/1
20 TABLET, FILM COATED ORAL AT BEDTIME
Qty: 0 | Refills: 0 | Status: DISCONTINUED | OUTPATIENT
Start: 2018-08-29 | End: 2018-08-31

## 2018-08-29 RX ORDER — DEXTROSE 50 % IN WATER 50 %
25 SYRINGE (ML) INTRAVENOUS ONCE
Qty: 0 | Refills: 0 | Status: DISCONTINUED | OUTPATIENT
Start: 2018-08-29 | End: 2018-08-31

## 2018-08-29 RX ORDER — PANTOPRAZOLE SODIUM 20 MG/1
40 TABLET, DELAYED RELEASE ORAL
Qty: 0 | Refills: 0 | Status: DISCONTINUED | OUTPATIENT
Start: 2018-08-29 | End: 2018-08-31

## 2018-08-29 RX ORDER — LEVOTHYROXINE SODIUM 125 MCG
137 TABLET ORAL DAILY
Qty: 0 | Refills: 0 | Status: DISCONTINUED | OUTPATIENT
Start: 2018-08-29 | End: 2018-08-31

## 2018-08-29 RX ORDER — DEXTROSE 50 % IN WATER 50 %
15 SYRINGE (ML) INTRAVENOUS ONCE
Qty: 0 | Refills: 0 | Status: DISCONTINUED | OUTPATIENT
Start: 2018-08-29 | End: 2018-08-31

## 2018-08-29 RX ORDER — REPAGLINIDE 1 MG/1
1 TABLET ORAL THREE TIMES A DAY
Qty: 0 | Refills: 0 | Status: DISCONTINUED | OUTPATIENT
Start: 2018-08-29 | End: 2018-08-29

## 2018-08-29 RX ORDER — AMLODIPINE BESYLATE 2.5 MG/1
10 TABLET ORAL DAILY
Qty: 0 | Refills: 0 | Status: DISCONTINUED | OUTPATIENT
Start: 2018-08-29 | End: 2018-08-31

## 2018-08-29 RX ORDER — INSULIN GLARGINE 100 [IU]/ML
14 INJECTION, SOLUTION SUBCUTANEOUS AT BEDTIME
Qty: 0 | Refills: 0 | Status: DISCONTINUED | OUTPATIENT
Start: 2018-08-29 | End: 2018-08-29

## 2018-08-29 RX ORDER — HYDROCORTISONE 1 %
1 OINTMENT (GRAM) TOPICAL DAILY
Qty: 0 | Refills: 0 | Status: DISCONTINUED | OUTPATIENT
Start: 2018-08-29 | End: 2018-08-31

## 2018-08-29 RX ORDER — OXYCODONE HYDROCHLORIDE 5 MG/1
10 TABLET ORAL EVERY 6 HOURS
Qty: 0 | Refills: 0 | Status: DISCONTINUED | OUTPATIENT
Start: 2018-08-29 | End: 2018-08-31

## 2018-08-29 RX ORDER — SODIUM CHLORIDE 9 MG/ML
1000 INJECTION, SOLUTION INTRAVENOUS
Qty: 0 | Refills: 0 | Status: DISCONTINUED | OUTPATIENT
Start: 2018-08-29 | End: 2018-08-31

## 2018-08-29 RX ORDER — SENNA PLUS 8.6 MG/1
2 TABLET ORAL AT BEDTIME
Qty: 0 | Refills: 0 | Status: DISCONTINUED | OUTPATIENT
Start: 2018-08-29 | End: 2018-08-31

## 2018-08-29 RX ORDER — DEXTROSE 50 % IN WATER 50 %
12.5 SYRINGE (ML) INTRAVENOUS ONCE
Qty: 0 | Refills: 0 | Status: DISCONTINUED | OUTPATIENT
Start: 2018-08-29 | End: 2018-08-31

## 2018-08-29 RX ORDER — GLUCAGON INJECTION, SOLUTION 0.5 MG/.1ML
1 INJECTION, SOLUTION SUBCUTANEOUS ONCE
Qty: 0 | Refills: 0 | Status: DISCONTINUED | OUTPATIENT
Start: 2018-08-29 | End: 2018-08-31

## 2018-08-29 RX ORDER — INSULIN LISPRO 100/ML
VIAL (ML) SUBCUTANEOUS
Qty: 0 | Refills: 0 | Status: DISCONTINUED | OUTPATIENT
Start: 2018-08-29 | End: 2018-08-31

## 2018-08-29 RX ORDER — INSULIN GLARGINE 100 [IU]/ML
14 INJECTION, SOLUTION SUBCUTANEOUS AT BEDTIME
Qty: 0 | Refills: 0 | Status: DISCONTINUED | OUTPATIENT
Start: 2018-08-30 | End: 2018-08-31

## 2018-08-29 RX ORDER — LABETALOL HCL 100 MG
10 TABLET ORAL ONCE
Qty: 0 | Refills: 0 | Status: COMPLETED | OUTPATIENT
Start: 2018-08-29 | End: 2018-08-29

## 2018-08-29 RX ADMIN — SODIUM CHLORIDE 3 MILLILITER(S): 9 INJECTION INTRAMUSCULAR; INTRAVENOUS; SUBCUTANEOUS at 21:10

## 2018-08-29 RX ADMIN — ATORVASTATIN CALCIUM 20 MILLIGRAM(S): 80 TABLET, FILM COATED ORAL at 21:15

## 2018-08-29 RX ADMIN — OXYCODONE HYDROCHLORIDE 10 MILLIGRAM(S): 5 TABLET ORAL at 21:15

## 2018-08-29 RX ADMIN — Medication 10 MILLIGRAM(S): at 22:20

## 2018-08-29 RX ADMIN — Medication 10 MILLIGRAM(S): at 21:00

## 2018-08-29 RX ADMIN — REPAGLINIDE 1 MILLIGRAM(S): 1 TABLET ORAL at 21:15

## 2018-08-29 RX ADMIN — INSULIN GLARGINE 14 UNIT(S): 100 INJECTION, SOLUTION SUBCUTANEOUS at 21:19

## 2018-08-29 RX ADMIN — OXYCODONE HYDROCHLORIDE 10 MILLIGRAM(S): 5 TABLET ORAL at 20:24

## 2018-08-29 RX ADMIN — SENNA PLUS 2 TABLET(S): 8.6 TABLET ORAL at 21:15

## 2018-08-29 NOTE — PATIENT PROFILE ADULT. - PMH
Cervicalgia    Diabetes mellitus  Type 2  Elevated TSH    GERD (gastroesophageal reflux disease)    HLD (hyperlipidemia)    Hypertension    Hypothyroidism    Malignant neoplasm of vertebral column    Metastatic renal cell carcinoma to brain    S/P radiation therapy  spine, on cabozantinib since Nov 2017

## 2018-08-29 NOTE — CONSULT NOTE ADULT - PROBLEM SELECTOR RECOMMENDATION 2
- if not receiving systemic steroids, pt can be discharge home on lantus 14u qhs + prandin qac  - if NPO inpt, would give pt 7U lantus + ISS  - if tolerating diet inpt, can cont home 14 lantus dose +ISS  - pt reports his AM FSBS is around 110.  - pt reports not eating anything today, with FSBS of 75 @ noon.

## 2018-08-29 NOTE — CONSULT NOTE ADULT - SUBJECTIVE AND OBJECTIVE BOX
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    Pt presented to hospital for kyphoplasty that was cancelled 2/2 elevated SBP in OR. Pt reports persistent back pain with radiation down left leg, new urinary incontinence, LLE weakness with paresthesias, urinary retention. Pt has been receiving RT to his iliac mets. Pt reports taking all of his meds today, and has not eaten anything. Reports his pain is adequately controlled with oral oxycodone. Pt currently feels well. pt is able to ambulate but drags his left foot. Other ROS as below.    VS: Tm 98.2, BP variable -240s. P 69, R18 96% RA    Home Medications:  amLODIPine 10 mg oral tablet: 1 tab(s) orally once a day (29 Aug 2018 12:14)  atorvastatin 20 mg oral tablet: 1 tab(s) orally once a day (at bedtime) (29 Aug 2018 12:14)  benzonatate 100 mg oral capsule: 1 cap(s) orally 3 times a day, As Needed (29 Aug 2018 12:14)  cabozantinib 60 mg oral tablet: 1 tab(s) orally once a day (1hr before breakfast) (29 Aug 2018 12:14)  Lantus Solostar Pen 100 units/mL subcutaneous solution: 14 unit(s) subcutaneous once a day (at bedtime) (29 Aug 2018 12:14)  levothyroxine 137 mcg (0.137 mg) oral tablet: 1 tab(s) orally once a day (29 Aug 2018 12:14)  oxyCODONE 10 mg oral tablet: 1 tab(s) orally every 6 hours, As Needed  repaglinide 1 mg oral tablet: 1 tab(s) orally 3 times a day (before meals) (29 Aug 2018 12:14)  Senna 8.6 mg oral tablet: 2 tab(s) orally once a day (at bedtime), As Needed (29 Aug 2018 12:14)    REVIEW OF SYSTEMS:  CONSTITUTIONAL: No weakness. No fevers. No chills. No rigors. No weight loss. No poor appetite. +weight gain, good appetite.  EYES: No visual changes. No eye pain.  ENT: No hearing difficulty. No vertigo. No dysphagia. No Sinusitis/rhinorrhea.  NECK: No pain. No stiffness/rigidity.  CARDIAC: No chest pain. No palpitations.  RESPIRATORY: No cough. No SOB. No hemoptysis.  GASTROINTESTINAL: +lower abdominal tenderness. No nausea. No vomiting. No hematemesis. No diarrhea. No constipation. No melena. No hematochezia.  GENITOURINARY: No dysuria. +incontinence, frequency. +hesitancy. No hematuria.  NEUROLOGICAL: +LLE numbness/tingling, +LLE focal weakness. +incontinence. No headache.  BACK: +back pain.  EXTREMITIES: No lower extremity edema. Full ROM.  SKIN: No rashes. No itching. No other lesions.  ALLERGIC: No lip swelling. No hives.  All other review of systems is negative unless indicated above.  Unless indicated above, unable to assess ROS 2/2     Social History:    Marital Status: ( x ) , (  ) Single, (  ) , (  ) , (  )   # of Children: 0  Lives with: (  ) alone, (  ) children, ( x ) spouse, (  ) parents, (  ) siblings, (  ) friends, (  ) other:   Occupation:   Last Alcohol Usage/Frequency/Amount/Withdrawal/Hx of Abuse:  x    PHYSICAL EXAM:   GENERAL: Alert. Not confused. No acute distress. Not thin. Not cachectic. Not obese.  HEAD:  Atraumatic. Normocephalic.  EYES: EOMI. PERRLA. Normal conjunctiva/sclera.  ENT: Neck supple. No JVD. Moist oral mucosa. Not edentulous. No thrush.  LYMPH: Normal supraclavicular/cervical lymph nodes.   CARDIAC: Not tachy, Not brittany. Regular rhythm. Not irregularly irregular. S1. S2. No murmur. No rub. No distant heart sounds.  LUNG/CHEST: CTAB. BS equal bilaterally. No wheezes. No rales. No rhonchi.  ABDOMEN: Soft. +mild suprapubic tenderness. No distension. No fluid wave. Normal bowel sounds.  BACK: No midline/vertebral tenderness. No flank tenderness.  VASCULAR: +2 b/l radial or ulnar pulses. Palpable DP pulses.  EXTREMITIES:  No clubbing. No cyanosis. +1 b/l LE edema. Moving all 4.  NEUROLOGY: A&Ox3. LLE weakness. Cranial nerves intact. Normal speech. Sensation intact.  PSYCH: Normal behavior. Flat affect.  SKIN: No jaundice. No erythema. No rash/lesion.  Vascular Access: Left radial a-line    ICU Vital Signs Last 24 Hrs  T(C): 36.1 (29 Aug 2018 20:00), Max: 36.8 (29 Aug 2018 12:08)  T(F): 97 (29 Aug 2018 20:00), Max: 98.2 (29 Aug 2018 12:08)  HR: 69 (29 Aug 2018 22:15) (69 - 80)  BP: 147/93 (29 Aug 2018 22:15) (104/61 - 197/109)  BP(mean): 114 (29 Aug 2018 22:15) (78 - 145)  ABP: 161/82 (29 Aug 2018 22:15) (0/0 - 195/95)  ABP(mean): 115 (29 Aug 2018 22:15) (0 - 133)  RR: 18 (29 Aug 2018 22:15) (14 - 19)  SpO2: 97% (29 Aug 2018 22:15) (94% - 98%)      I&O's Summary    29 Aug 2018 07:01  -  29 Aug 2018 22:33  --------------------------------------------------------  IN: 360 mL / OUT: 600 mL / NET: -240 mL

## 2018-08-29 NOTE — CONSULT NOTE ADULT - PROBLEM SELECTOR RECOMMENDATION 9
- pt's blood pressure has been labile today, between 104/61, which increased to >200s by evening. Pt takes enalapril 20 once a day in morning + norvasc 10.  - current -180s by a-line  - Would switch enalapril to 10 BID for now. informed pt to split his enalapril 20 dose in half and take half tab twice a day.  - if SBP continues to remain elevated overnight, can increase enalapril to 20 BID at discharge.  - cont norvasc 10  - f/u with PMD within 1 week for BP mgmt  - denies CP, HA, vision change, palpitations

## 2018-08-29 NOTE — CONSULT NOTE ADULT - ASSESSMENT
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

## 2018-08-29 NOTE — CONSULT NOTE ADULT - PROBLEM SELECTOR RECOMMENDATION 4
- plan per NSG  - cont home oxycodone PRN - plan per NSG  - cont home oxycodone PRN  - pt at risk for neurogenic bladder and urinary incontinence 2/2 overflow incontinence. should f/u with urology as outpt

## 2018-08-30 ENCOUNTER — TRANSCRIPTION ENCOUNTER (OUTPATIENT)
Age: 58
End: 2018-08-30

## 2018-08-30 DIAGNOSIS — R60.0 LOCALIZED EDEMA: ICD-10-CM

## 2018-08-30 LAB
GLUCOSE BLDC GLUCOMTR-MCNC: 102 MG/DL — HIGH (ref 70–99)
GLUCOSE BLDC GLUCOMTR-MCNC: 139 MG/DL — HIGH (ref 70–99)
GLUCOSE BLDC GLUCOMTR-MCNC: 174 MG/DL — HIGH (ref 70–99)
GLUCOSE BLDC GLUCOMTR-MCNC: 94 MG/DL — SIGNIFICANT CHANGE UP (ref 70–99)
HBA1C BLD-MCNC: 7.6 % — HIGH (ref 4–5.6)

## 2018-08-30 PROCEDURE — 99232 SBSQ HOSP IP/OBS MODERATE 35: CPT

## 2018-08-30 PROCEDURE — 99233 SBSQ HOSP IP/OBS HIGH 50: CPT

## 2018-08-30 PROCEDURE — 93970 EXTREMITY STUDY: CPT | Mod: 26

## 2018-08-30 RX ORDER — HYDRALAZINE HCL 50 MG
5 TABLET ORAL ONCE
Qty: 0 | Refills: 0 | Status: COMPLETED | OUTPATIENT
Start: 2018-08-30 | End: 2018-08-30

## 2018-08-30 RX ORDER — INSULIN LISPRO 100/ML
VIAL (ML) SUBCUTANEOUS
Qty: 0 | Refills: 0 | Status: DISCONTINUED | OUTPATIENT
Start: 2018-08-30 | End: 2018-08-31

## 2018-08-30 RX ORDER — ACETAMINOPHEN 500 MG
1000 TABLET ORAL ONCE
Qty: 0 | Refills: 0 | Status: COMPLETED | OUTPATIENT
Start: 2018-08-30 | End: 2018-08-30

## 2018-08-30 RX ORDER — HEPARIN SODIUM 5000 [USP'U]/ML
5000 INJECTION INTRAVENOUS; SUBCUTANEOUS EVERY 8 HOURS
Qty: 0 | Refills: 0 | Status: DISCONTINUED | OUTPATIENT
Start: 2018-08-30 | End: 2018-08-31

## 2018-08-30 RX ORDER — INSULIN LISPRO 100/ML
VIAL (ML) SUBCUTANEOUS AT BEDTIME
Qty: 0 | Refills: 0 | Status: DISCONTINUED | OUTPATIENT
Start: 2018-08-30 | End: 2018-08-31

## 2018-08-30 RX ADMIN — Medication 1: at 12:15

## 2018-08-30 RX ADMIN — SENNA PLUS 2 TABLET(S): 8.6 TABLET ORAL at 22:00

## 2018-08-30 RX ADMIN — PANTOPRAZOLE SODIUM 40 MILLIGRAM(S): 20 TABLET, DELAYED RELEASE ORAL at 06:56

## 2018-08-30 RX ADMIN — SODIUM CHLORIDE 3 MILLILITER(S): 9 INJECTION INTRAMUSCULAR; INTRAVENOUS; SUBCUTANEOUS at 16:53

## 2018-08-30 RX ADMIN — Medication 1000 MILLIGRAM(S): at 05:39

## 2018-08-30 RX ADMIN — INSULIN GLARGINE 14 UNIT(S): 100 INJECTION, SOLUTION SUBCUTANEOUS at 22:11

## 2018-08-30 RX ADMIN — Medication 5 MILLIGRAM(S): at 05:08

## 2018-08-30 RX ADMIN — Medication 10 MILLIGRAM(S): at 06:35

## 2018-08-30 RX ADMIN — HEPARIN SODIUM 5000 UNIT(S): 5000 INJECTION INTRAVENOUS; SUBCUTANEOUS at 22:00

## 2018-08-30 RX ADMIN — SODIUM CHLORIDE 3 MILLILITER(S): 9 INJECTION INTRAMUSCULAR; INTRAVENOUS; SUBCUTANEOUS at 22:03

## 2018-08-30 RX ADMIN — ATORVASTATIN CALCIUM 20 MILLIGRAM(S): 80 TABLET, FILM COATED ORAL at 22:00

## 2018-08-30 RX ADMIN — AMLODIPINE BESYLATE 10 MILLIGRAM(S): 2.5 TABLET ORAL at 06:35

## 2018-08-30 RX ADMIN — Medication 1 APPLICATION(S): at 19:03

## 2018-08-30 RX ADMIN — Medication 137 MICROGRAM(S): at 06:35

## 2018-08-30 RX ADMIN — SODIUM CHLORIDE 3 MILLILITER(S): 9 INJECTION INTRAMUSCULAR; INTRAVENOUS; SUBCUTANEOUS at 06:35

## 2018-08-30 RX ADMIN — Medication 10 MILLIGRAM(S): at 19:03

## 2018-08-30 RX ADMIN — Medication 400 MILLIGRAM(S): at 05:09

## 2018-08-30 NOTE — DISCHARGE NOTE ADULT - PLAN OF CARE
prevent disease progression no surgery this admission due to hypertensive urgency  No strenuous activity.  No lifting.  Do not return to work until cleared to do so by physician.  No driving until cleared to do so by physician.  follow up with neurosurgery, Dr. Tobias, within 1 week of discharge to reschedule surgery, call [phone] glucose control continue lantus and repaglinide  follow up with PMD within 1 week of discharge bp control continue enalapril 10mg po BID and norvasc 10 mg PO daily  follow up with PMD within 1 week of discharge maintain euthyroid continue synthroid  follow up with PMD/endocrinologist within 1 week of discharge treatment continue cabozantinib  follow up with oncologist within 1 week of discharge symptom control continue protonix  follow up with PMD within 1 week of discharge no surgery this admission due to hypertensive urgency  No strenuous activity.  No lifting.  Do not return to work until cleared to do so by physician.  No driving until cleared to do so by physician.  follow up with neurosurgery, Dr. Tobias, within 1 week of discharge to reschedule surgery, call [phone]  Call Orthotist Tigre Joseph to have AFO brace delivered to your home [phone]

## 2018-08-30 NOTE — DISCHARGE NOTE ADULT - CARE PROVIDER_API CALL
Jennie Tobias (DO), Neurological Surgery  [address]  [place] [address]  Phone: [phone]  Fax: [phone]

## 2018-08-30 NOTE — DISCHARGE NOTE ADULT - ADDITIONAL INSTRUCTIONS
no surgery this admission due to hypertensive urgency  No strenuous activity.  No lifting.  Do not return to work until cleared to do so by physician.  No driving until cleared to do so by physician.  follow up with neurosurgery, Dr. Tobias, within 1 week of discharge to reschedule surgery, call [phone]  follow up with endocrinologist within 1 week of discharge  follow up with PMD within 1 week of discharge  follow up with oncologist within 1 week of discharge    Go to the ED if you have any: bleeding, nausea, vomiting, diarrhea, constipation, fevers, chills, chest pain, shortness of breath, dizziness, gait/balance disturbances, wound drainage/erythema, pain not controlled by medication, or any other new symptoms.

## 2018-08-30 NOTE — DISCHARGE NOTE ADULT - SECONDARY DIAGNOSIS.
Diabetes mellitus Hypertension Hypothyroidism Metastatic renal cell carcinoma to brain GERD (gastroesophageal reflux disease)

## 2018-08-30 NOTE — DISCHARGE NOTE ADULT - NS AS DC STROKE ED MATERIALS
Prescribed Medications/Risk Factors for Stroke/Need for Followup After Discharge/Stroke Warning Signs and Symptoms/Call 911 for Stroke/Stroke Education Booklet

## 2018-08-30 NOTE — DISCHARGE NOTE ADULT - REASON FOR ADMISSION
Patient is a 58 year old male with a history of metastatic RCC.  Workup was done for LLE weakness and spinal metastasis was found T6-T8 with cord compression and intramedullary lesion at T7 w/ cord edema.  Now presented for kyphoplasty.

## 2018-08-30 NOTE — DISCHARGE NOTE ADULT - MEDICATION SUMMARY - MEDICATIONS TO TAKE
I will START or STAY ON the medications listed below when I get home from the hospital:    oxyCODONE 10 mg oral tablet  -- 1 tab(s) by mouth every 6 hours, As needed, Moderate Pain (4 - 6)  -- Indication: For pain    enalapril 10 mg oral tablet  -- 1 tab(s) by mouth 2 times a day  -- Indication: For blood pressure    Lantus Solostar Pen 100 units/mL subcutaneous solution  -- 14 unit(s) subcutaneous once a day (at bedtime)  -- Indication: For Diabetes mellitus    repaglinide 1 mg oral tablet  -- 1 tab(s) by mouth 3 times a day (before meals)  -- Indication: For Diabetes mellitus    atorvastatin 20 mg oral tablet  -- 1 tab(s) by mouth once a day (at bedtime)  -- Indication: For HLD (hyperlipidemia)    benzonatate 100 mg oral capsule  -- 1 cap(s) by mouth 3 times a day, As Needed  -- Indication: For cough    amLODIPine 10 mg oral tablet  -- 1 tab(s) by mouth once a day  -- Indication: For Hypertension    hydrocortisone 0.5% topical cream  -- 1 application on skin once a day to the affected area  -- Indication: For rash    Senna 8.6 mg oral tablet  -- 2 tab(s) by mouth once a day (at bedtime), As Needed  -- Indication: For constipation    cabozantinib 60 mg oral tablet  -- 1 tab(s) by mouth once a day (1hr before breakfast)  -- Indication: For Malignant neoplasm of vertebral column    pantoprazole 40 mg oral delayed release tablet  -- 1 tab(s) by mouth once a day (before a meal)  -- Indication: For GERD (gastroesophageal reflux disease)    levothyroxine 137 mcg (0.137 mg) oral tablet  -- 1 tab(s) by mouth once a day  -- Indication: For Hypothyroidism

## 2018-08-30 NOTE — PROGRESS NOTE ADULT - ASSESSMENT
HPI:  Patient is a 58 year old male with a history of metastatic RCC.  Workup was done for LLE weakness and spinal metastasis was found T6-T8 with cord compression and intramedullary lesion at T7 w/ cord edema.  Now presented for kyphoplasty.    PLAN:  -neurological exam discussed w/ neurosurgery resident, no further workup at this time as per Dr. Padilla  -lower extremity duplex to rule out DVT b/l  -bp better controlled, follow up hospitalist recs  -oxycodone for pain control  -lantus and HISS for glucose control  -home medications  -regular diet  -out of bed with assistance  -incentive spirometer for lung expansion    Spectra #[id number] HPI:  Patient is a 58 year old male with a history of metastatic RCC.  Workup was done for LLE weakness and spinal metastasis was found T6-T8 with cord compression and intramedullary lesion at T7 w/ cord edema.  Now presented for kyphoplasty.    PLAN:  -neurological exam discussed w/ neurosurgery resident, no further workup at this time as per Dr. Padilla  -lower extremity duplex to rule out DVT b/l  -bp better controlled, follow up hospitalist recs  -oxycodone for pain control  -lantus and HISS for glucose control  -home medications  -consistent carbohydrate diet  -out of bed with assistance  -incentive spirometer for lung expansion    Spectra #[id number]

## 2018-08-30 NOTE — PROGRESS NOTE ADULT - SUBJECTIVE AND OBJECTIVE BOX
Authored by Dr Jaden Handley [phone]  After hours or if no response please page Hospitalist service [phone]    Patient is a 58y old  Male who presents with a chief complaint of Patient is a 58 year old male with a history of metastatic RCC.  Workup was done for LLE weakness and spinal metastasis was found T6-T8 with cord compression and intramedullary lesion at T7 w/ cord edema.  Now presented for kyphoplasty. (30 Aug 2018 12:24)    SUBJECTIVE / OVERNIGHT EVENTS: pain is well controlled, BP better    MEDICATIONS  (STANDING):  amLODIPine   Tablet 10 milliGRAM(s) Oral daily  atorvastatin 20 milliGRAM(s) Oral at bedtime  ceFAZolin   IVPB 2000 milliGRAM(s) IV Intermittent once  dextrose 5%. 1000 milliLiter(s) (50 mL/Hr) IV Continuous <Continuous>  dextrose 50% Injectable 12.5 Gram(s) IV Push once  dextrose 50% Injectable 25 Gram(s) IV Push once  dextrose 50% Injectable 25 Gram(s) IV Push once  enalapril 10 milliGRAM(s) Oral two times a day  hydrocortisone 0.5% Cream 1 Application(s) Topical daily  insulin glargine Injectable (LANTUS) 14 Unit(s) SubCutaneous at bedtime  insulin lispro (HumaLOG) corrective regimen sliding scale   SubCutaneous three times a day before meals  levothyroxine 137 MICROGram(s) Oral daily  lidocaine 1% Injectable 0.2 milliLiter(s) Local Injection once  pantoprazole    Tablet 40 milliGRAM(s) Oral before breakfast  senna 2 Tablet(s) Oral at bedtime  sodium chloride 0.9% lock flush 3 milliLiter(s) IV Push every 8 hours    MEDICATIONS  (PRN):  benzonatate 100 milliGRAM(s) Oral three times a day PRN Cough  dextrose 40% Gel 15 Gram(s) Oral once PRN Blood Glucose LESS THAN 70 milliGRAM(s)/deciliter  glucagon  Injectable 1 milliGRAM(s) IntraMuscular once PRN Glucose LESS THAN 70 milligrams/deciliter  oxyCODONE    IR 10 milliGRAM(s) Oral every 6 hours PRN Moderate Pain (4 - 6)      Vital Signs Last 24 Hrs  T(C): 36.9 (30 Aug 2018 13:21), Max: 37 (29 Aug 2018 23:00)  T(F): 98.5 (30 Aug 2018 13:21), Max: 98.6 (29 Aug 2018 23:00)  HR: 91 (30 Aug 2018 13:21) (66 - 91)  BP: 147/90 (30 Aug 2018 13:21) (104/61 - 197/109)  BP(mean): 115 (29 Aug 2018 22:45) (78 - 145)  RR: 16 (30 Aug 2018 13:21) (14 - 19)  SpO2: 96% (30 Aug 2018 13:21) (94% - 98%)  CAPILLARY BLOOD GLUCOSE      POCT Blood Glucose.: 174 mg/dL (30 Aug 2018 11:44)  POCT Blood Glucose.: 94 mg/dL (30 Aug 2018 10:29)  POCT Blood Glucose.: 147 mg/dL (29 Aug 2018 21:18)  POCT Blood Glucose.: 108 mg/dL (29 Aug 2018 19:29)    I&O's Summary    29 Aug 2018 07:01  -  30 Aug 2018 07:00  --------------------------------------------------------  IN: 560 mL / OUT: 1500 mL / NET: -940 mL    30 Aug 2018 07:01  -  30 Aug 2018 14:13  --------------------------------------------------------  IN: 760 mL / OUT: 200 mL / NET: 560 mL        PHYSICAL EXAM  GENERAL: Alert. Not confused. No acute distress. Not thin. Not cachectic.   EYES: EOMI. PERRLA. Normal conjunctiva/sclera.  ENT: Neck supple. No JVD. Moist oral mucosa. Not edentulous. No thrush. facial fullness  CARDIAC: RRR no murmur  LUNG/CHEST: CTAB. BS equal bilaterally. No wheezes. No rales. No rhonchi.  ABDOMEN: Soft. +mild suprapubic tenderness. No distension. No fluid wave. Normal bowel sounds.  VASCULAR: Palpable DP pulses.  EXTREMITIES:  No clubbing. No cyanosis. trace b/l LE edema. Moving all 4.  NEUROLOGY: A&Ox3. LLE weakness. Cranial nerves intact. Normal speech. Sensation intact.  PSYCH: Normal behavior  SKIN: No jaundice. No erythema. No rash/lesion.    LABS:                      RADIOLOGY & ADDITIONAL TESTS:    Imaging Personally Reviewed: < from: CT Thoracic Spine No Cont (08.29.18 @ 15:06) >  Impression: Destructive lesion involving the T8 vertebral body with   compression deformity seen. Paraspinal extension of this process is   suspected as well. Heterogeneous attenuation is seen involving the T6 and   T7 vertebral body which is suspicious for underlying osseous metastasis   as well. Bilateral paraspinal extension of this process is also   suspected. Contrast enhanced MRI of thoracic spine can be done to this   finding.    < end of copied text >    Consultant(s) Notes Reviewed:    Care Discussed with Consultants/Other Providers: OANH glez dispo, BP mgmt

## 2018-08-30 NOTE — DISCHARGE NOTE ADULT - PATIENT PORTAL LINK FT
You can access the [facility], offered by [facility], by registering with the following website: [email or web address]

## 2018-08-30 NOTE — DISCHARGE NOTE ADULT - NS AS ACTIVITY OBS
No Heavy lifting/straining/Showering allowed/Do not make important decisions/Do not drive or operate machinery/Walking-Indoors allowed/Walking-Outdoors allowed

## 2018-08-30 NOTE — DISCHARGE NOTE ADULT - CARE PLAN
Principal Discharge DX:	Malignant neoplasm of vertebral column  Goal:	prevent disease progression  Assessment and plan of treatment:	no surgery this admission due to hypertensive urgency  No strenuous activity.  No lifting.  Do not return to work until cleared to do so by physician.  No driving until cleared to do so by physician.  follow up with neurosurgery, Dr. Tobias, within 1 week of discharge to reschedule surgery, call [phone]  Secondary Diagnosis:	Diabetes mellitus  Goal:	glucose control  Assessment and plan of treatment:	continue lantus and repaglinide  follow up with PMD within 1 week of discharge  Secondary Diagnosis:	Hypertension  Goal:	bp control  Assessment and plan of treatment:	continue enalapril 10mg po BID and norvasc 10 mg PO daily  follow up with PMD within 1 week of discharge  Secondary Diagnosis:	Hypothyroidism  Goal:	maintain euthyroid  Assessment and plan of treatment:	continue synthroid  follow up with PMD/endocrinologist within 1 week of discharge  Secondary Diagnosis:	Metastatic renal cell carcinoma to brain  Goal:	treatment  Assessment and plan of treatment:	continue cabozantinib  follow up with oncologist within 1 week of discharge  Secondary Diagnosis:	GERD (gastroesophageal reflux disease)  Goal:	symptom control  Assessment and plan of treatment:	continue protonix  follow up with PMD within 1 week of discharge Principal Discharge DX:	Malignant neoplasm of vertebral column  Goal:	prevent disease progression  Assessment and plan of treatment:	no surgery this admission due to hypertensive urgency  No strenuous activity.  No lifting.  Do not return to work until cleared to do so by physician.  No driving until cleared to do so by physician.  follow up with neurosurgery, Dr. Tobias, within 1 week of discharge to reschedule surgery, call [phone]  Call Orthotist Tigre Joseph to have AFO brace delivered to your home [phone]  Secondary Diagnosis:	Diabetes mellitus  Goal:	glucose control  Assessment and plan of treatment:	continue lantus and repaglinide  follow up with PMD within 1 week of discharge  Secondary Diagnosis:	Hypertension  Goal:	bp control  Assessment and plan of treatment:	continue enalapril 10mg po BID and norvasc 10 mg PO daily  follow up with PMD within 1 week of discharge  Secondary Diagnosis:	Hypothyroidism  Goal:	maintain euthyroid  Assessment and plan of treatment:	continue synthroid  follow up with PMD/endocrinologist within 1 week of discharge  Secondary Diagnosis:	Metastatic renal cell carcinoma to brain  Goal:	treatment  Assessment and plan of treatment:	continue cabozantinib  follow up with oncologist within 1 week of discharge  Secondary Diagnosis:	GERD (gastroesophageal reflux disease)  Goal:	symptom control  Assessment and plan of treatment:	continue protonix  follow up with PMD within 1 week of discharge

## 2018-08-30 NOTE — PROGRESS NOTE ADULT - SUBJECTIVE AND OBJECTIVE BOX
HPI:  Patient is a 58 year old male with a history of metastatic RCC.  Workup was done for LLE weakness and spinal metastasis was found T6-T8 with cord compression and intramedullary lesion at T7 w/ cord edema.  Now presented for kyphoplasty.    OVERNIGHT EVENTS:  Patient brought to OR yesterday however case was cancelled due to hypertensive urgency.  Hospitalist consulted for BP control.  BP elevated overnight but now improving.  Patient feels well.  LE duplex pending to rule out  DVT.  Tolerating diet.    Vital Signs Last 24 Hrs  T(C): 36.6 (30 Aug 2018 09:16), Max: 37 (29 Aug 2018 23:00)  T(F): 97.9 (30 Aug 2018 09:16), Max: 98.6 (29 Aug 2018 23:00)  HR: 72 (30 Aug 2018 09:16) (66 - 80)  BP: 133/84 (30 Aug 2018 09:16) (104/61 - 197/109)  BP(mean): 115 (29 Aug 2018 22:45) (78 - 145)  RR: 16 (30 Aug 2018 09:16) (14 - 19)  SpO2: 96% (30 Aug 2018 09:16) (94% - 98%)        PHYSICAL EXAM:  Neurological: awake, alert, oriented x3, follows commands, speech clear and fluent, moves UE 5/5 strength throughout b/l  LLE HF 4/5, KF/KE 3/5, DF 1-2/5, PF 3/5  RLE 5/5 strength throughout  sensation present, intact, equal throughout    Cardiovascular: +s1, s2  Respiratory: clear to auscultation b/l  Gastrointestinal: soft, non-distended, non-tender  Genitourinary: +voiding  Extremities: LE warm throughout b/l, +dp/pt pulses palpable 2+ b/l    TUBES/LINES:  [x] none    DIET:  [x] regular    LABS:  no new labs          CAPILLARY BLOOD GLUCOSE      POCT Blood Glucose.: 147 mg/dL (29 Aug 2018 21:18)  POCT Blood Glucose.: 108 mg/dL (29 Aug 2018 19:29)  POCT Blood Glucose.: 75 mg/dL (29 Aug 2018 11:58)        Allergies    No Known Allergies        MEDICATIONS:  Antibiotics:  ceFAZolin   IVPB 2000 milliGRAM(s) IV Intermittent once    Neuro:  oxyCODONE    IR 10 milliGRAM(s) Oral every 6 hours PRN    Anticoagulation:    OTHER:  amLODIPine   Tablet 10 milliGRAM(s) Oral daily  atorvastatin 20 milliGRAM(s) Oral at bedtime  benzonatate 100 milliGRAM(s) Oral three times a day PRN  dextrose 40% Gel 15 Gram(s) Oral once PRN  dextrose 50% Injectable 12.5 Gram(s) IV Push once  dextrose 50% Injectable 25 Gram(s) IV Push once  dextrose 50% Injectable 25 Gram(s) IV Push once  enalapril 10 milliGRAM(s) Oral two times a day  glucagon  Injectable 1 milliGRAM(s) IntraMuscular once PRN  hydrocortisone 0.5% Cream 1 Application(s) Topical daily  insulin glargine Injectable (LANTUS) 14 Unit(s) SubCutaneous at bedtime  insulin lispro (HumaLOG) corrective regimen sliding scale   SubCutaneous three times a day before meals  levothyroxine 137 MICROGram(s) Oral daily  lidocaine 1% Injectable 0.2 milliLiter(s) Local Injection once  pantoprazole    Tablet 40 milliGRAM(s) Oral before breakfast  senna 2 Tablet(s) Oral at bedtime    IVF:  dextrose 5%. 1000 milliLiter(s) IV Continuous <Continuous>  sodium chloride 0.9% lock flush 3 milliLiter(s) IV Push every 8 hours    CULTURES:  none    RADIOLOGY & ADDITIONAL TESTS:  none HPI:  Patient is a 58 year old male with a history of metastatic RCC.  Workup was done for LLE weakness and spinal metastasis was found T6-T8 with cord compression and intramedullary lesion at T7 w/ cord edema.  Now presented for kyphoplasty.    OVERNIGHT EVENTS:  Patient brought to OR yesterday however case was cancelled due to hypertensive urgency.  Hospitalist consulted for BP control.  BP elevated overnight but now improving.  Patient feels well.  LE duplex pending to rule out  DVT.  Tolerating diet.    Vital Signs Last 24 Hrs  T(C): 36.6 (30 Aug 2018 09:16), Max: 37 (29 Aug 2018 23:00)  T(F): 97.9 (30 Aug 2018 09:16), Max: 98.6 (29 Aug 2018 23:00)  HR: 72 (30 Aug 2018 09:16) (66 - 80)  BP: 133/84 (30 Aug 2018 09:16) (104/61 - 197/109)  BP(mean): 115 (29 Aug 2018 22:45) (78 - 145)  RR: 16 (30 Aug 2018 09:16) (14 - 19)  SpO2: 96% (30 Aug 2018 09:16) (94% - 98%)        PHYSICAL EXAM:  Neurological: awake, alert, oriented x3, follows commands, speech clear and fluent, moves UE 5/5 strength throughout b/l  LLE HF 4/5, KF/KE 3/5, DF 1-2/5, PF 3/5  RLE 5/5 strength throughout  sensation present, intact, equal throughout    Cardiovascular: +s1, s2  Respiratory: clear to auscultation b/l  Gastrointestinal: soft, non-distended, non-tender  Genitourinary: +voiding  Extremities: LE warm throughout b/l, +dp/pt pulses palpable 2+ b/l    TUBES/LINES:  [x] none    DIET:  [x] consistent carbohydrate    LABS:  no new labs      CAPILLARY BLOOD GLUCOSE      POCT Blood Glucose.: 147 mg/dL (29 Aug 2018 21:18)  POCT Blood Glucose.: 108 mg/dL (29 Aug 2018 19:29)  POCT Blood Glucose.: 75 mg/dL (29 Aug 2018 11:58)        Allergies    No Known Allergies        MEDICATIONS:  Antibiotics:  ceFAZolin   IVPB 2000 milliGRAM(s) IV Intermittent once    Neuro:  oxyCODONE    IR 10 milliGRAM(s) Oral every 6 hours PRN    Anticoagulation:    OTHER:  amLODIPine   Tablet 10 milliGRAM(s) Oral daily  atorvastatin 20 milliGRAM(s) Oral at bedtime  benzonatate 100 milliGRAM(s) Oral three times a day PRN  dextrose 40% Gel 15 Gram(s) Oral once PRN  dextrose 50% Injectable 12.5 Gram(s) IV Push once  dextrose 50% Injectable 25 Gram(s) IV Push once  dextrose 50% Injectable 25 Gram(s) IV Push once  enalapril 10 milliGRAM(s) Oral two times a day  glucagon  Injectable 1 milliGRAM(s) IntraMuscular once PRN  hydrocortisone 0.5% Cream 1 Application(s) Topical daily  insulin glargine Injectable (LANTUS) 14 Unit(s) SubCutaneous at bedtime  insulin lispro (HumaLOG) corrective regimen sliding scale   SubCutaneous three times a day before meals  levothyroxine 137 MICROGram(s) Oral daily  lidocaine 1% Injectable 0.2 milliLiter(s) Local Injection once  pantoprazole    Tablet 40 milliGRAM(s) Oral before breakfast  senna 2 Tablet(s) Oral at bedtime    IVF:  dextrose 5%. 1000 milliLiter(s) IV Continuous <Continuous>  sodium chloride 0.9% lock flush 3 milliLiter(s) IV Push every 8 hours    CULTURES:  none    RADIOLOGY & ADDITIONAL TESTS:  none HPI:  Patient is a 58 year old male with a history of metastatic RCC.  Workup was done for LLE weakness and spinal metastasis was found T6-T8 with cord compression and intramedullary lesion at T7 w/ cord edema.  Now presented for kyphoplasty.    OVERNIGHT EVENTS:  Patient brought to OR yesterday however case was cancelled due to hypertensive urgency.  Hospitalist consulted for BP control.  BP elevated overnight but now improving.  Patient feels well.  LE duplex pending to rule out  DVT.  Tolerating diet.    Vital Signs Last 24 Hrs  T(C): 36.6 (30 Aug 2018 09:16), Max: 37 (29 Aug 2018 23:00)  T(F): 97.9 (30 Aug 2018 09:16), Max: 98.6 (29 Aug 2018 23:00)  HR: 72 (30 Aug 2018 09:16) (66 - 80)  BP: 133/84 (30 Aug 2018 09:16) (104/61 - 197/109)  BP(mean): 115 (29 Aug 2018 22:45) (78 - 145)  RR: 16 (30 Aug 2018 09:16) (14 - 19)  SpO2: 96% (30 Aug 2018 09:16) (94% - 98%)        PHYSICAL EXAM:  Neurological: awake, alert, oriented x3, follows commands, speech clear and fluent, moves UE 5/5 strength throughout b/l  LLE HF 4/5, KF/KE 3/5, DF 1-2/5, PF 3/5  RLE 5/5 strength throughout  sensation present, intact, equal throughout    Cardiovascular: +s1, s2  Respiratory: clear to auscultation b/l  Gastrointestinal: soft, non-distended, non-tender  Genitourinary: +voiding  Extremities: LE warm throughout b/l, skin intact with no overlying changes LE throughout b/l, +dp/pt pulses palpable 2+ b/l    TUBES/LINES:  [x] none    DIET:  [x] consistent carbohydrate    LABS:  no new labs      CAPILLARY BLOOD GLUCOSE      POCT Blood Glucose.: 147 mg/dL (29 Aug 2018 21:18)  POCT Blood Glucose.: 108 mg/dL (29 Aug 2018 19:29)  POCT Blood Glucose.: 75 mg/dL (29 Aug 2018 11:58)        Allergies    No Known Allergies        MEDICATIONS:  Antibiotics:  ceFAZolin   IVPB 2000 milliGRAM(s) IV Intermittent once    Neuro:  oxyCODONE    IR 10 milliGRAM(s) Oral every 6 hours PRN    Anticoagulation:    OTHER:  amLODIPine   Tablet 10 milliGRAM(s) Oral daily  atorvastatin 20 milliGRAM(s) Oral at bedtime  benzonatate 100 milliGRAM(s) Oral three times a day PRN  dextrose 40% Gel 15 Gram(s) Oral once PRN  dextrose 50% Injectable 12.5 Gram(s) IV Push once  dextrose 50% Injectable 25 Gram(s) IV Push once  dextrose 50% Injectable 25 Gram(s) IV Push once  enalapril 10 milliGRAM(s) Oral two times a day  glucagon  Injectable 1 milliGRAM(s) IntraMuscular once PRN  hydrocortisone 0.5% Cream 1 Application(s) Topical daily  insulin glargine Injectable (LANTUS) 14 Unit(s) SubCutaneous at bedtime  insulin lispro (HumaLOG) corrective regimen sliding scale   SubCutaneous three times a day before meals  levothyroxine 137 MICROGram(s) Oral daily  lidocaine 1% Injectable 0.2 milliLiter(s) Local Injection once  pantoprazole    Tablet 40 milliGRAM(s) Oral before breakfast  senna 2 Tablet(s) Oral at bedtime    IVF:  dextrose 5%. 1000 milliLiter(s) IV Continuous <Continuous>  sodium chloride 0.9% lock flush 3 milliLiter(s) IV Push every 8 hours    CULTURES:  none    RADIOLOGY & ADDITIONAL TESTS:  none Chief Complaint:  left lower extremity weakness    HPI:  Patient is a 58 year old male with a history of metastatic RCC.  Workup was done for LLE weakness and spinal metastasis was found T6-T8 with cord compression and intramedullary lesion at T7 w/ cord edema.  Now presented for kyphoplasty.    OVERNIGHT EVENTS:  Patient brought to OR yesterday however case was cancelled due to hypertensive urgency.  Hospitalist consulted for BP control.  BP elevated overnight but now improving.  Patient feels well.  LE duplex pending to rule out  DVT.  Tolerating diet.    Vital Signs Last 24 Hrs  T(C): 36.6 (30 Aug 2018 09:16), Max: 37 (29 Aug 2018 23:00)  T(F): 97.9 (30 Aug 2018 09:16), Max: 98.6 (29 Aug 2018 23:00)  HR: 72 (30 Aug 2018 09:16) (66 - 80)  BP: 133/84 (30 Aug 2018 09:16) (104/61 - 197/109)  BP(mean): 115 (29 Aug 2018 22:45) (78 - 145)  RR: 16 (30 Aug 2018 09:16) (14 - 19)  SpO2: 96% (30 Aug 2018 09:16) (94% - 98%)        PHYSICAL EXAM:  Neurological: awake, alert, oriented x3, follows commands, speech clear and fluent, moves UE 5/5 strength throughout b/l  LLE HF 4/5, KF/KE 3/5, DF 1-2/5, PF 3/5  RLE 5/5 strength throughout  sensation present, intact, equal throughout    Cardiovascular: +s1, s2  Respiratory: clear to auscultation b/l  Gastrointestinal: soft, non-distended, non-tender  Genitourinary: +voiding  Extremities: LE warm throughout b/l, skin intact with no overlying changes LE throughout b/l, +dp/pt pulses palpable 2+ b/l    TUBES/LINES:  [x] none    DIET:  [x] consistent carbohydrate    LABS:  no new labs      CAPILLARY BLOOD GLUCOSE      POCT Blood Glucose.: 147 mg/dL (29 Aug 2018 21:18)  POCT Blood Glucose.: 108 mg/dL (29 Aug 2018 19:29)  POCT Blood Glucose.: 75 mg/dL (29 Aug 2018 11:58)        Allergies    No Known Allergies        MEDICATIONS:  Antibiotics:  ceFAZolin   IVPB 2000 milliGRAM(s) IV Intermittent once    Neuro:  oxyCODONE    IR 10 milliGRAM(s) Oral every 6 hours PRN    Anticoagulation:    OTHER:  amLODIPine   Tablet 10 milliGRAM(s) Oral daily  atorvastatin 20 milliGRAM(s) Oral at bedtime  benzonatate 100 milliGRAM(s) Oral three times a day PRN  dextrose 40% Gel 15 Gram(s) Oral once PRN  dextrose 50% Injectable 12.5 Gram(s) IV Push once  dextrose 50% Injectable 25 Gram(s) IV Push once  dextrose 50% Injectable 25 Gram(s) IV Push once  enalapril 10 milliGRAM(s) Oral two times a day  glucagon  Injectable 1 milliGRAM(s) IntraMuscular once PRN  hydrocortisone 0.5% Cream 1 Application(s) Topical daily  insulin glargine Injectable (LANTUS) 14 Unit(s) SubCutaneous at bedtime  insulin lispro (HumaLOG) corrective regimen sliding scale   SubCutaneous three times a day before meals  levothyroxine 137 MICROGram(s) Oral daily  lidocaine 1% Injectable 0.2 milliLiter(s) Local Injection once  pantoprazole    Tablet 40 milliGRAM(s) Oral before breakfast  senna 2 Tablet(s) Oral at bedtime    IVF:  dextrose 5%. 1000 milliLiter(s) IV Continuous <Continuous>  sodium chloride 0.9% lock flush 3 milliLiter(s) IV Push every 8 hours    CULTURES:  none    RADIOLOGY & ADDITIONAL TESTS:  none

## 2018-08-30 NOTE — DISCHARGE NOTE ADULT - MEDICATION SUMMARY - MEDICATIONS TO CHANGE
I will SWITCH the dose or number of times a day I take the medications listed below when I get home from the hospital:    enalapril 20 mg oral tablet  -- 1 tab(s) by mouth once a day

## 2018-08-30 NOTE — DISCHARGE NOTE ADULT - HOSPITAL COURSE
Patient is a 58 year old male with a history of metastatic RCC.  Workup was done for LLE weakness and spinal metastasis was found T6-T8 with cord compression and intramedullary lesion at T7 w/ cord edema.  Now presented for kyphoplasty.  Patient went to the OR on 8/29/2018 for surgery however before surgery patient was noted to be in hypertensive urgency.  Case was aborted and medicine consulted for bp control.  Lisinopril started at 10 BID and Norvasc at 10.  Blood pressure trended down once medications started.  Other home medications restarted.  Lower extremity duplex b/l ordered to rule out DVT which showed ..........  Patient at baseline strength so no need for physical therapy evalulation, advised to continue using his cane for ambulation as he was prior to admission.  Stable for discharge.  Planned to follow up with Dr. Tobias next week to reschedule procedure. Patient is a 58 year old male with a history of metastatic RCC.  Workup was done for LLE weakness and spinal metastasis was found T6-T8 with cord compression and intramedullary lesion at T7 w/ cord edema.  Now presented for kyphoplasty.  Patient went to the OR on 8/29/2018 for surgery however before surgery patient was noted to be in hypertensive urgency.  Case was aborted and medicine consulted for bp control.  Lisinopril started at 10 BID and Norvasc at 10.  Blood pressure trended down once medications started.  Other home medications restarted.  Lower extremity duplex b/l ordered to rule out DVT which was negative for DVT b/l.  Physical therapy evaluated patient and said ...........  Stable for discharge.  Planned to follow up with Dr. Tobias next week to reschedule procedure. Patient is a 58 year old male with a history of metastatic RCC.  Workup was done for LLE weakness and spinal metastasis was found T6-T8 with cord compression and intramedullary lesion at T7 w/ cord edema.  Now presented for kyphoplasty.  Patient went to the OR on 8/29/2018 for surgery however before surgery patient was noted to be in hypertensive urgency.  Case was aborted and medicine consulted for bp control.  Lisinopril started at 10 BID and Norvasc at 10.  Blood pressure trended down once medications started.  Other home medications restarted.  Lower extremity duplex b/l ordered to rule out DVT which was negative for DVT b/l.  Physical therapy evaluated patient and recommended an AFO brace for left leg.  Stable for discharge.  Planned to follow up with Dr. Tobias next week to reschedule procedure.

## 2018-08-30 NOTE — PROGRESS NOTE ADULT - PROBLEM SELECTOR PLAN 1
BP better controlled now - enalapril changed to 10 BID - can uptitrate to 20 BID but no need at this time, c/w amlodipine

## 2018-08-31 VITALS — DIASTOLIC BLOOD PRESSURE: 91 MMHG | HEART RATE: 93 BPM | OXYGEN SATURATION: 95 % | SYSTOLIC BLOOD PRESSURE: 131 MMHG

## 2018-08-31 LAB
ALBUMIN SERPL ELPH-MCNC: 3.7 G/DL — SIGNIFICANT CHANGE UP (ref 3.3–5)
ALP SERPL-CCNC: 81 U/L — SIGNIFICANT CHANGE UP (ref 40–120)
ALT FLD-CCNC: 85 U/L — HIGH (ref 10–45)
ANION GAP SERPL CALC-SCNC: 12 MMOL/L — SIGNIFICANT CHANGE UP (ref 5–17)
AST SERPL-CCNC: 58 U/L — HIGH (ref 10–40)
BILIRUB DIRECT SERPL-MCNC: <0.1 MG/DL — SIGNIFICANT CHANGE UP (ref 0–0.2)
BILIRUB INDIRECT FLD-MCNC: >0.5 MG/DL — SIGNIFICANT CHANGE UP (ref 0.2–1)
BILIRUB SERPL-MCNC: 0.6 MG/DL — SIGNIFICANT CHANGE UP (ref 0.2–1.2)
BUN SERPL-MCNC: 17 MG/DL — SIGNIFICANT CHANGE UP (ref 7–23)
CALCIUM SERPL-MCNC: 8.9 MG/DL — SIGNIFICANT CHANGE UP (ref 8.4–10.5)
CHLORIDE SERPL-SCNC: 101 MMOL/L — SIGNIFICANT CHANGE UP (ref 96–108)
CO2 SERPL-SCNC: 25 MMOL/L — SIGNIFICANT CHANGE UP (ref 22–31)
CREAT SERPL-MCNC: 1 MG/DL — SIGNIFICANT CHANGE UP (ref 0.5–1.3)
GLUCOSE BLDC GLUCOMTR-MCNC: 113 MG/DL — HIGH (ref 70–99)
GLUCOSE BLDC GLUCOMTR-MCNC: 141 MG/DL — HIGH (ref 70–99)
GLUCOSE BLDC GLUCOMTR-MCNC: 150 MG/DL — HIGH (ref 70–99)
GLUCOSE SERPL-MCNC: 105 MG/DL — HIGH (ref 70–99)
HCT VFR BLD CALC: 37.5 % — LOW (ref 39–50)
HGB BLD-MCNC: 12.1 G/DL — LOW (ref 13–17)
MCHC RBC-ENTMCNC: 30.1 PG — SIGNIFICANT CHANGE UP (ref 27–34)
MCHC RBC-ENTMCNC: 32.4 GM/DL — SIGNIFICANT CHANGE UP (ref 32–36)
MCV RBC AUTO: 92.9 FL — SIGNIFICANT CHANGE UP (ref 80–100)
PLATELET # BLD AUTO: 217 K/UL — SIGNIFICANT CHANGE UP (ref 150–400)
POTASSIUM SERPL-MCNC: 3.9 MMOL/L — SIGNIFICANT CHANGE UP (ref 3.5–5.3)
POTASSIUM SERPL-SCNC: 3.9 MMOL/L — SIGNIFICANT CHANGE UP (ref 3.5–5.3)
PROT SERPL-MCNC: 7.2 G/DL — SIGNIFICANT CHANGE UP (ref 6–8.3)
RBC # BLD: 4.04 M/UL — LOW (ref 4.2–5.8)
RBC # FLD: 16.7 % — HIGH (ref 10.3–14.5)
SODIUM SERPL-SCNC: 138 MMOL/L — SIGNIFICANT CHANGE UP (ref 135–145)
WBC # BLD: 4.7 K/UL — SIGNIFICANT CHANGE UP (ref 3.8–10.5)
WBC # FLD AUTO: 4.7 K/UL — SIGNIFICANT CHANGE UP (ref 3.8–10.5)

## 2018-08-31 PROCEDURE — 99239 HOSP IP/OBS DSCHRG MGMT >30: CPT

## 2018-08-31 PROCEDURE — 93970 EXTREMITY STUDY: CPT

## 2018-08-31 PROCEDURE — 80076 HEPATIC FUNCTION PANEL: CPT

## 2018-08-31 PROCEDURE — 97161 PT EVAL LOW COMPLEX 20 MIN: CPT

## 2018-08-31 PROCEDURE — 82962 GLUCOSE BLOOD TEST: CPT

## 2018-08-31 PROCEDURE — 72128 CT CHEST SPINE W/O DYE: CPT

## 2018-08-31 PROCEDURE — 85027 COMPLETE CBC AUTOMATED: CPT

## 2018-08-31 PROCEDURE — 83036 HEMOGLOBIN GLYCOSYLATED A1C: CPT

## 2018-08-31 PROCEDURE — 80048 BASIC METABOLIC PNL TOTAL CA: CPT

## 2018-08-31 PROCEDURE — 99233 SBSQ HOSP IP/OBS HIGH 50: CPT

## 2018-08-31 RX ORDER — LEVOTHYROXINE SODIUM 125 MCG
1 TABLET ORAL
Qty: 0 | Refills: 0 | DISCHARGE
Start: 2018-08-31

## 2018-08-31 RX ORDER — AMLODIPINE BESYLATE 2.5 MG/1
1 TABLET ORAL
Qty: 0 | Refills: 0 | DISCHARGE
Start: 2018-08-31

## 2018-08-31 RX ORDER — OXYCODONE HYDROCHLORIDE 5 MG/1
1 TABLET ORAL
Qty: 0 | Refills: 0 | COMMUNITY
Start: 2018-08-31

## 2018-08-31 RX ORDER — LEVOTHYROXINE SODIUM 125 MCG
1 TABLET ORAL
Qty: 0 | Refills: 0 | COMMUNITY

## 2018-08-31 RX ORDER — ATORVASTATIN CALCIUM 80 MG/1
1 TABLET, FILM COATED ORAL
Qty: 0 | Refills: 0 | DISCHARGE
Start: 2018-08-31

## 2018-08-31 RX ORDER — ATORVASTATIN CALCIUM 80 MG/1
1 TABLET, FILM COATED ORAL
Qty: 0 | Refills: 0 | COMMUNITY

## 2018-08-31 RX ORDER — AMLODIPINE BESYLATE 2.5 MG/1
1 TABLET ORAL
Qty: 0 | Refills: 0 | COMMUNITY

## 2018-08-31 RX ADMIN — Medication 10 MILLIGRAM(S): at 06:04

## 2018-08-31 RX ADMIN — HEPARIN SODIUM 5000 UNIT(S): 5000 INJECTION INTRAVENOUS; SUBCUTANEOUS at 13:14

## 2018-08-31 RX ADMIN — Medication 1 APPLICATION(S): at 13:11

## 2018-08-31 RX ADMIN — Medication 137 MICROGRAM(S): at 06:04

## 2018-08-31 RX ADMIN — PANTOPRAZOLE SODIUM 40 MILLIGRAM(S): 20 TABLET, DELAYED RELEASE ORAL at 06:04

## 2018-08-31 RX ADMIN — HEPARIN SODIUM 5000 UNIT(S): 5000 INJECTION INTRAVENOUS; SUBCUTANEOUS at 06:04

## 2018-08-31 RX ADMIN — SODIUM CHLORIDE 3 MILLILITER(S): 9 INJECTION INTRAMUSCULAR; INTRAVENOUS; SUBCUTANEOUS at 12:43

## 2018-08-31 RX ADMIN — AMLODIPINE BESYLATE 10 MILLIGRAM(S): 2.5 TABLET ORAL at 06:04

## 2018-08-31 RX ADMIN — SODIUM CHLORIDE 3 MILLILITER(S): 9 INJECTION INTRAMUSCULAR; INTRAVENOUS; SUBCUTANEOUS at 06:03

## 2018-08-31 NOTE — PHYSICAL THERAPY INITIAL EVALUATION ADULT - PERTINENT HX OF CURRENT PROBLEM, REHAB EVAL
58yoM w/ PMHx of HTN, T2DM, HLD, stage 4 R-sided RCC w/ mets to lung, brain & bone. S/p gamma knife to brain met, not a candidate for nephrectomy per Urology given brain mets. S/p spinal radiation, now on cabozantinib since Nov 2017 c/o worsening back pain radiating to LLE w/ associated weakness & numbness. Reports urinary retention, (able to void, but weakened stream, incomplete bladder emptying) & pelvic numbness-s/p MRI revealed osseous spinal mets from T6-8 w/ cord compression,

## 2018-08-31 NOTE — PROGRESS NOTE ADULT - ASSESSMENT
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 S/p gamma knife to brain met, not a candidate for nephrectomy per Urology given brain mets. S/p spinal radiation, now on cabozantinib since Nov 2017 c/o worsening back pain radiating to LLE with associated weakness and numbness. Reports urinary retention, (able to void, but weakened stream, incomplete bladder emptying) and pelvic numbness-s/p MRI revealed osseous spinal mets from T6-8 with cord compression, in addition to intramedullary disease at T7 with surrounding cord edema. Evaluated by Dr. Tobias. Denies HA, blurry vision, chest pain, or sob. Presents to PST for a scheduled T8 Kyphoplasty on 8/29/2018.     Procedure aborted due to hypertension (SBP in 200's)    PLAN:  Neuro:   - PT eval due to left leg weakness  - pending AFO bralen- Tigre Joseph to se today  - pain control- oxycodone prn  CV:  - BP well controlled  - followed by Hospitalist for BP control- pt to continue amlodipine and enalapril, statin  Endocrine:   - DMT2 - continue current insulin regimen  - hypothyroid- continue synthroid  Heme/Onc:   - to follow up at [facility] as directed              DVT ppx:   - venodynes, sq heparin  - LE dopp- neg for DVT  PT/OT:   - cleared by PT for discharge  - awaiting SANDRAO Tigre jensen to see pt this afternoon    Spectralink # [id number]

## 2018-08-31 NOTE — PROGRESS NOTE ADULT - SUBJECTIVE AND OBJECTIVE BOX
Authored by Dr Jaden Handley [phone] / [phone]  After hours or if no response please page Hospitalist service [phone]    Patient is a 58y old  Male who presents with a chief complaint of Patient is a 58 year old male with a history of metastatic RCC.  Workup was done for LLE weakness and spinal metastasis was found T6-T8 with cord compression and intramedullary lesion at T7 w/ cord edema.  Now presented for kyphoplasty. (30 Aug 2018 12:24)    SUBJECTIVE / OVERNIGHT EVENTS: feels well, no pain    MEDICATIONS  (STANDING):  amLODIPine   Tablet 10 milliGRAM(s) Oral daily  atorvastatin 20 milliGRAM(s) Oral at bedtime  ceFAZolin   IVPB 2000 milliGRAM(s) IV Intermittent once  dextrose 5%. 1000 milliLiter(s) (50 mL/Hr) IV Continuous <Continuous>  dextrose 50% Injectable 12.5 Gram(s) IV Push once  dextrose 50% Injectable 25 Gram(s) IV Push once  dextrose 50% Injectable 25 Gram(s) IV Push once  enalapril 10 milliGRAM(s) Oral two times a day  heparin  Injectable 5000 Unit(s) SubCutaneous every 8 hours  hydrocortisone 0.5% Cream 1 Application(s) Topical daily  insulin glargine Injectable (LANTUS) 14 Unit(s) SubCutaneous at bedtime  insulin lispro (HumaLOG) corrective regimen sliding scale   SubCutaneous three times a day before meals  insulin lispro (HumaLOG) corrective regimen sliding scale   SubCutaneous <User Schedule>  insulin lispro (HumaLOG) corrective regimen sliding scale   SubCutaneous at bedtime  levothyroxine 137 MICROGram(s) Oral daily  lidocaine 1% Injectable 0.2 milliLiter(s) Local Injection once  pantoprazole    Tablet 40 milliGRAM(s) Oral before breakfast  senna 2 Tablet(s) Oral at bedtime  sodium chloride 0.9% lock flush 3 milliLiter(s) IV Push every 8 hours    MEDICATIONS  (PRN):  benzonatate 100 milliGRAM(s) Oral three times a day PRN Cough  dextrose 40% Gel 15 Gram(s) Oral once PRN Blood Glucose LESS THAN 70 milliGRAM(s)/deciliter  glucagon  Injectable 1 milliGRAM(s) IntraMuscular once PRN Glucose LESS THAN 70 milligrams/deciliter  oxyCODONE    IR 10 milliGRAM(s) Oral every 6 hours PRN Moderate Pain (4 - 6)      Vital Signs Last 24 Hrs  T(C): 36.6 (31 Aug 2018 09:58), Max: 37.4 (31 Aug 2018 00:34)  T(F): 97.8 (31 Aug 2018 09:58), Max: 99.4 (31 Aug 2018 00:34)  HR: 93 (31 Aug 2018 11:14) (87 - 95)  BP: 131/91 (31 Aug 2018 11:14) (109/78 - 151/99)  BP(mean): --  RR: 18 (31 Aug 2018 09:58) (16 - 18)  SpO2: 95% (31 Aug 2018 11:14) (95% - 96%)  CAPILLARY BLOOD GLUCOSE      POCT Blood Glucose.: 113 mg/dL (31 Aug 2018 08:39)  POCT Blood Glucose.: 141 mg/dL (31 Aug 2018 02:57)  POCT Blood Glucose.: 139 mg/dL (30 Aug 2018 22:17)  POCT Blood Glucose.: 102 mg/dL (30 Aug 2018 17:03)    I&O's Summary    30 Aug 2018 07:01  -  31 Aug 2018 07:00  --------------------------------------------------------  IN: 1660 mL / OUT: 400 mL / NET: 1260 mL    31 Aug 2018 07:01  -  31 Aug 2018 12:20  --------------------------------------------------------  IN: 480 mL / OUT: 0 mL / NET: 480 mL        PHYSICAL EXAM  GENERAL: Alert. Not confused. No acute distress. Not thin. Not cachectic.   EYES: EOMI. PERRLA. Normal conjunctiva/sclera.  ENT: Neck supple. No JVD. Moist oral mucosa. Not edentulous. No thrush. facial fullness  CARDIAC: RRR no murmur  LUNG/CHEST: CTAB. BS equal bilaterally. No wheezes. No rales. No rhonchi.  ABDOMEN: Soft. +mild suprapubic tenderness. No distension. No fluid wave. Normal bowel sounds.  VASCULAR: Palpable DP pulses.  EXTREMITIES:  No clubbing. No cyanosis. trace b/l LE edema. Moving all 4.  NEUROLOGY: A&Ox3. LLE weakness. Cranial nerves intact. Normal speech. Sensation intact.  PSYCH: Normal behavior  SKIN: No jaundice. No erythema. No rash/lesion.    LABS:                        12.1   4.7   )-----------( 217      ( 31 Aug 2018 07:16 )             37.5     08-31    138  |  101  |  17  ----------------------------<  105<H>  3.9   |  25  |  1.00    Ca    8.9      31 Aug 2018 07:16    TPro  7.2  /  Alb  3.7  /  TBili  0.6  /  DBili  <0.1  /  AST  58<H>  /  ALT  85<H>  /  AlkPhos  81  08-31                RADIOLOGY & ADDITIONAL TESTS:    Imaging Personally Reviewed:  Consultant(s) Notes Reviewed:    Care Discussed with Consultants/Other Providers: OANH glez BP mgmt, dispo plan

## 2018-08-31 NOTE — PHYSICAL THERAPY INITIAL EVALUATION ADULT - ADDITIONAL COMMENTS
Pt lives in a house w/ 3 steps to enter w/ B/L handrails. Once inside bedroom/bathroom located on the first floor, bathroom w/ grab bars. PTA pt (I) w/ functional mobility w/ use of straight cane for community ambulation

## 2018-08-31 NOTE — PHYSICAL THERAPY INITIAL EVALUATION ADULT - PRECAUTIONS/LIMITATIONS, REHAB EVAL
fall precautions/, in addition to intramedullary disease at T7 w/ surrounding cord edema. Evaluated by Dr. Tobias. Denies HA, blurry vision, chest pain, or sob. Presents to PST for a scheduled T8 Kyphoplasty on 8/29/2018 -cancelled due to hypertensive urgency. Venous duplex B LE 8/30 (-) for DVT

## 2018-08-31 NOTE — CHART NOTE - NSCHARTNOTEFT_GEN_A_CORE
Fit and deliver left AFO. Proper shoe wear not available at this time. Reviewed application skin precautions and care. Contact information given to patient to notify office with any issues questions or concerns.  Tigre MCGRATH  [facility]  [phone]

## 2018-08-31 NOTE — PHYSICAL THERAPY INITIAL EVALUATION ADULT - ACTIVE RANGE OF MOTION EXAMINATION, REHAB EVAL
deficits as listed below/L ankle AROM dorsiflexion not present/bilateral upper extremity Active ROM was WFL (within functional limits)/bilateral  lower extremity Active ROM was WFL (within functional limits)

## 2018-08-31 NOTE — PROGRESS NOTE ADULT - SUBJECTIVE AND OBJECTIVE BOX
SUBJECTIVE:     OVERNIGHT EVENTS:     Vital Signs Last 24 Hrs  T(C): 36.6 (31 Aug 2018 09:58), Max: 37.4 (31 Aug 2018 00:34)  T(F): 97.8 (31 Aug 2018 09:58), Max: 99.4 (31 Aug 2018 00:34)  HR: 93 (31 Aug 2018 11:14) (87 - 95)  BP: 131/91 (31 Aug 2018 11:14) (109/78 - 151/99)  BP(mean): --  RR: 18 (31 Aug 2018 09:58) (16 - 18)  SpO2: 95% (31 Aug 2018 11:14) (95% - 96%)    PHYSICAL EXAM:    General: No Acute Distress     Neurological: Awake, alert oriented to person, place and time, Following Commands, PERRL, EOMI, Face Symmetrical, Speech Fluent, Moving all extremities, Muscle Strength normal in all four extremities, No Drift, Sensation to Light Touch Intact    Pulmonary: Clear to Auscultation, No Rales, No Rhonchi, No Wheezes     Cardiovascular: S1, S2, Regular Rate and Rhythm     Gastrointestinal: Soft, Nontender, Nondistended     Incision:     LABS:                        12.1   4.7   )-----------( 217      ( 31 Aug 2018 07:16 )             37.5    08-31    138  |  101  |  17  ----------------------------<  105<H>  3.9   |  25  |  1.00    Ca    8.9      31 Aug 2018 07:16    TPro  7.2  /  Alb  3.7  /  TBili  0.6  /  DBili  <0.1  /  AST  58<H>  /  ALT  85<H>  /  AlkPhos  81  08-31    Hemoglobin A1C, Whole Blood: 7.6 % (08-30 @ 09:23)      08-30 @ 07:01  -  08-31 @ 07:00  --------------------------------------------------------  IN: 1660 mL / OUT: 400 mL / NET: 1260 mL      DRAINS:     MEDICATIONS:  Antibiotics:  ceFAZolin   IVPB 2000 milliGRAM(s) IV Intermittent once    Neuro:  oxyCODONE    IR 10 milliGRAM(s) Oral every 6 hours PRN Moderate Pain (4 - 6)    Cardiac:  amLODIPine   Tablet 10 milliGRAM(s) Oral daily  enalapril 10 milliGRAM(s) Oral two times a day    Pulm:  benzonatate 100 milliGRAM(s) Oral three times a day PRN Cough    GI/:  pantoprazole    Tablet 40 milliGRAM(s) Oral before breakfast  senna 2 Tablet(s) Oral at bedtime    Other:   atorvastatin 20 milliGRAM(s) Oral at bedtime  dextrose 40% Gel 15 Gram(s) Oral once PRN Blood Glucose LESS THAN 70 milliGRAM(s)/deciliter  dextrose 5%. 1000 milliLiter(s) IV Continuous <Continuous>  dextrose 50% Injectable 12.5 Gram(s) IV Push once  dextrose 50% Injectable 25 Gram(s) IV Push once  dextrose 50% Injectable 25 Gram(s) IV Push once  glucagon  Injectable 1 milliGRAM(s) IntraMuscular once PRN Glucose LESS THAN 70 milligrams/deciliter  heparin  Injectable 5000 Unit(s) SubCutaneous every 8 hours  hydrocortisone 0.5% Cream 1 Application(s) Topical daily  insulin glargine Injectable (LANTUS) 14 Unit(s) SubCutaneous at bedtime  insulin lispro (HumaLOG) corrective regimen sliding scale   SubCutaneous three times a day before meals  insulin lispro (HumaLOG) corrective regimen sliding scale   SubCutaneous <User Schedule>  insulin lispro (HumaLOG) corrective regimen sliding scale   SubCutaneous at bedtime  levothyroxine 137 MICROGram(s) Oral daily  lidocaine 1% Injectable 0.2 milliLiter(s) Local Injection once  sodium chloride 0.9% lock flush 3 milliLiter(s) IV Push every 8 hours    DIET: [] Regular [] CCD [] Renal [] Puree [] Dysphagia [] Tube Feeds:     IMAGING: SUBJECTIVE: Pt seen and examined, pt resting comfortably in bed. OR case was aborted yesterday due to hypertension. PT cleared pt for discharge pending AFO brace    OVERNIGHT EVENTS: none    Vital Signs Last 24 Hrs  T(C): 36.6 (31 Aug 2018 09:58), Max: 37.4 (31 Aug 2018 00:34)  T(F): 97.8 (31 Aug 2018 09:58), Max: 99.4 (31 Aug 2018 00:34)  HR: 93 (31 Aug 2018 11:14) (87 - 95)  BP: 131/91 (31 Aug 2018 11:14) (109/78 - 151/99)  BP(mean): --  RR: 18 (31 Aug 2018 09:58) (16 - 18)  SpO2: 95% (31 Aug 2018 11:14) (95% - 96%)    PHYSICAL EXAM:    General: No Acute Distress     Neurological: Awake, alert oriented to person, place and time, Following Commands, Moving all extremities LLE 3-4/5, DF 2-3/5 PF 4/5 Sensation to Light Touch Intact    Pulmonary: Clear to Auscultation, No Rales, No Rhonchi, No Wheezes     Cardiovascular: S1, S2, Regular Rate and Rhythm     Gastrointestinal: Soft, Nontender, Nondistended     Incision: none    LABS:                        12.1   4.7   )-----------( 217      ( 31 Aug 2018 07:16 )             37.5    08-31    138  |  101  |  17  ----------------------------<  105<H>  3.9   |  25  |  1.00    Ca    8.9      31 Aug 2018 07:16    TPro  7.2  /  Alb  3.7  /  TBili  0.6  /  DBili  <0.1  /  AST  58<H>  /  ALT  85<H>  /  AlkPhos  81  08-31    Hemoglobin A1C, Whole Blood: 7.6 % (08-30 @ 09:23)      08-30 @ 07:01  -  08-31 @ 07:00  --------------------------------------------------------  IN: 1660 mL / OUT: 400 mL / NET: 1260 mL      DRAINS: none    MEDICATIONS:  Antibiotics:  ceFAZolin   IVPB 2000 milliGRAM(s) IV Intermittent once    Neuro:  oxyCODONE    IR 10 milliGRAM(s) Oral every 6 hours PRN Moderate Pain (4 - 6)    Cardiac:  amLODIPine   Tablet 10 milliGRAM(s) Oral daily  enalapril 10 milliGRAM(s) Oral two times a day    Pulm:  benzonatate 100 milliGRAM(s) Oral three times a day PRN Cough    GI/:  pantoprazole    Tablet 40 milliGRAM(s) Oral before breakfast  senna 2 Tablet(s) Oral at bedtime    Other:   atorvastatin 20 milliGRAM(s) Oral at bedtime  dextrose 40% Gel 15 Gram(s) Oral once PRN Blood Glucose LESS THAN 70 milliGRAM(s)/deciliter  dextrose 5%. 1000 milliLiter(s) IV Continuous <Continuous>  dextrose 50% Injectable 12.5 Gram(s) IV Push once  dextrose 50% Injectable 25 Gram(s) IV Push once  dextrose 50% Injectable 25 Gram(s) IV Push once  glucagon  Injectable 1 milliGRAM(s) IntraMuscular once PRN Glucose LESS THAN 70 milligrams/deciliter  heparin  Injectable 5000 Unit(s) SubCutaneous every 8 hours  hydrocortisone 0.5% Cream 1 Application(s) Topical daily  insulin glargine Injectable (LANTUS) 14 Unit(s) SubCutaneous at bedtime  insulin lispro (HumaLOG) corrective regimen sliding scale   SubCutaneous three times a day before meals  insulin lispro (HumaLOG) corrective regimen sliding scale   SubCutaneous <User Schedule>  insulin lispro (HumaLOG) corrective regimen sliding scale   SubCutaneous at bedtime  levothyroxine 137 MICROGram(s) Oral daily  lidocaine 1% Injectable 0.2 milliLiter(s) Local Injection once  sodium chloride 0.9% lock flush 3 milliLiter(s) IV Push every 8 hours    DIET: [] Regular [x] CCD [] Renal [] Puree [] Dysphagia [] Tube Feeds:     IMAGING:   < from: VA Duplex Lower Ext Vein Scan, Bilat (08.30.18 @ 15:21) >    IMPRESSION:     No evidence of bilateral lower extremity deep venous thrombosis.    < end of copied text >

## 2018-09-03 ENCOUNTER — RX RENEWAL (OUTPATIENT)
Age: 58
End: 2018-09-03

## 2018-09-06 ENCOUNTER — APPOINTMENT (OUTPATIENT)
Dept: HEMATOLOGY ONCOLOGY | Facility: CLINIC | Age: 58
End: 2018-09-06

## 2018-09-07 ENCOUNTER — OUTPATIENT (OUTPATIENT)
Dept: OUTPATIENT SERVICES | Facility: HOSPITAL | Age: 58
LOS: 1 days | Discharge: HOME | End: 2018-09-07

## 2018-09-07 DIAGNOSIS — C64.9 MALIGNANT NEOPLASM OF UNSPECIFIED KIDNEY, EXCEPT RENAL PELVIS: ICD-10-CM

## 2018-09-13 ENCOUNTER — RESULT REVIEW (OUTPATIENT)
Age: 58
End: 2018-09-13

## 2018-09-13 ENCOUNTER — APPOINTMENT (OUTPATIENT)
Dept: HEMATOLOGY ONCOLOGY | Facility: CLINIC | Age: 58
End: 2018-09-13

## 2018-09-13 VITALS
BODY MASS INDEX: 30.82 KG/M2 | DIASTOLIC BLOOD PRESSURE: 81 MMHG | RESPIRATION RATE: 16 BRPM | WEIGHT: 185.19 LBS | HEART RATE: 96 BPM | SYSTOLIC BLOOD PRESSURE: 115 MMHG | TEMPERATURE: 98.4 F | OXYGEN SATURATION: 92 %

## 2018-09-13 LAB
BASOPHILS # BLD AUTO: 0 K/UL — SIGNIFICANT CHANGE UP (ref 0–0.2)
BASOPHILS NFR BLD AUTO: 0.1 % — SIGNIFICANT CHANGE UP (ref 0–2)
EOSINOPHIL # BLD AUTO: 0.1 K/UL — SIGNIFICANT CHANGE UP (ref 0–0.5)
EOSINOPHIL NFR BLD AUTO: 2.1 % — SIGNIFICANT CHANGE UP (ref 0–6)
HCT VFR BLD CALC: 34.8 % — LOW (ref 39–50)
HGB BLD-MCNC: 11.6 G/DL — LOW (ref 13–17)
LYMPHOCYTES # BLD AUTO: 1.2 K/UL — SIGNIFICANT CHANGE UP (ref 1–3.3)
LYMPHOCYTES # BLD AUTO: 21.5 % — SIGNIFICANT CHANGE UP (ref 13–44)
MAGNESIUM SERPL-MCNC: 1.6 MG/DL — SIGNIFICANT CHANGE UP (ref 1.6–2.6)
MCHC RBC-ENTMCNC: 31.3 PG — SIGNIFICANT CHANGE UP (ref 27–34)
MCHC RBC-ENTMCNC: 33.4 G/DL — SIGNIFICANT CHANGE UP (ref 32–36)
MCV RBC AUTO: 93.7 FL — SIGNIFICANT CHANGE UP (ref 80–100)
MONOCYTES # BLD AUTO: 0.4 K/UL — SIGNIFICANT CHANGE UP (ref 0–0.9)
MONOCYTES NFR BLD AUTO: 7.3 % — SIGNIFICANT CHANGE UP (ref 2–14)
NEUTROPHILS # BLD AUTO: 3.8 K/UL — SIGNIFICANT CHANGE UP (ref 1.8–7.4)
NEUTROPHILS NFR BLD AUTO: 68.9 % — SIGNIFICANT CHANGE UP (ref 43–77)
PHOSPHATE SERPL-MCNC: 2.9 MG/DL — SIGNIFICANT CHANGE UP (ref 2.5–4.5)
PLATELET # BLD AUTO: 187 K/UL — SIGNIFICANT CHANGE UP (ref 150–400)
RBC # BLD: 3.71 M/UL — LOW (ref 4.2–5.8)
RBC # FLD: 16 % — HIGH (ref 10.3–14.5)
WBC # BLD: 5.5 K/UL — SIGNIFICANT CHANGE UP (ref 3.8–10.5)
WBC # FLD AUTO: 5.5 K/UL — SIGNIFICANT CHANGE UP (ref 3.8–10.5)

## 2018-09-14 DIAGNOSIS — C79.51 SECONDARY MALIGNANT NEOPLASM OF BONE: ICD-10-CM

## 2018-09-21 ENCOUNTER — APPOINTMENT (OUTPATIENT)
Dept: ENDOCRINOLOGY | Facility: HOSPITAL | Age: 58
End: 2018-09-21

## 2018-10-03 ENCOUNTER — APPOINTMENT (OUTPATIENT)
Dept: MRI IMAGING | Facility: IMAGING CENTER | Age: 58
End: 2018-10-03

## 2018-10-04 ENCOUNTER — APPOINTMENT (OUTPATIENT)
Dept: HEMATOLOGY ONCOLOGY | Facility: CLINIC | Age: 58
End: 2018-10-04

## 2018-10-05 ENCOUNTER — OUTPATIENT (OUTPATIENT)
Dept: OUTPATIENT SERVICES | Facility: HOSPITAL | Age: 58
LOS: 1 days | Discharge: HOME | End: 2018-10-05

## 2018-10-05 DIAGNOSIS — C64.9 MALIGNANT NEOPLASM OF UNSPECIFIED KIDNEY, EXCEPT RENAL PELVIS: ICD-10-CM

## 2018-10-05 NOTE — H&P ADULT - MENTAL STATUS
Breath Sounds equal & clear to percussion & auscultation, no accessory muscle use conversant, not lethargic or confused

## 2018-10-11 ENCOUNTER — RESULT REVIEW (OUTPATIENT)
Age: 58
End: 2018-10-11

## 2018-10-11 ENCOUNTER — APPOINTMENT (OUTPATIENT)
Dept: HEMATOLOGY ONCOLOGY | Facility: CLINIC | Age: 58
End: 2018-10-11

## 2018-10-11 VITALS
HEART RATE: 91 BPM | WEIGHT: 186.29 LBS | BODY MASS INDEX: 31 KG/M2 | RESPIRATION RATE: 16 BRPM | TEMPERATURE: 98.3 F | SYSTOLIC BLOOD PRESSURE: 138 MMHG | DIASTOLIC BLOOD PRESSURE: 97 MMHG | OXYGEN SATURATION: 96 %

## 2018-10-11 LAB
ALBUMIN SERPL ELPH-MCNC: 4.3 G/DL — SIGNIFICANT CHANGE UP (ref 3.3–5)
ALP SERPL-CCNC: 85 U/L — SIGNIFICANT CHANGE UP (ref 30–120)
ALT FLD-CCNC: 67 U/L — HIGH (ref 10–45)
ANION GAP SERPL CALC-SCNC: 10 MMOL/L — SIGNIFICANT CHANGE UP (ref 5–17)
AST SERPL-CCNC: 54 U/L — HIGH (ref 10–40)
BASOPHILS # BLD AUTO: 0 K/UL — SIGNIFICANT CHANGE UP (ref 0–0.2)
BASOPHILS NFR BLD AUTO: 0.3 % — SIGNIFICANT CHANGE UP (ref 0–2)
BILIRUB SERPL-MCNC: 0.3 MG/DL — SIGNIFICANT CHANGE UP (ref 0.2–1.2)
BUN SERPL-MCNC: 15 MG/DL — SIGNIFICANT CHANGE UP (ref 7–23)
CALCIUM SERPL-MCNC: 9 MG/DL — SIGNIFICANT CHANGE UP (ref 8.4–10.5)
CHLORIDE SERPL-SCNC: 97 MMOL/L — SIGNIFICANT CHANGE UP (ref 96–108)
CO2 SERPL-SCNC: 30 MMOL/L — SIGNIFICANT CHANGE UP (ref 22–31)
CREAT SERPL-MCNC: 1.04 MG/DL — SIGNIFICANT CHANGE UP (ref 0.5–1.3)
EOSINOPHIL # BLD AUTO: 0.1 K/UL — SIGNIFICANT CHANGE UP (ref 0–0.5)
EOSINOPHIL NFR BLD AUTO: 1.9 % — SIGNIFICANT CHANGE UP (ref 0–6)
GLUCOSE SERPL-MCNC: 108 MG/DL — HIGH (ref 70–99)
HCT VFR BLD CALC: 36.8 % — LOW (ref 39–50)
HGB BLD-MCNC: 12.1 G/DL — LOW (ref 13–17)
LYMPHOCYTES # BLD AUTO: 1.2 K/UL — SIGNIFICANT CHANGE UP (ref 1–3.3)
LYMPHOCYTES # BLD AUTO: 24.2 % — SIGNIFICANT CHANGE UP (ref 13–44)
MAGNESIUM SERPL-MCNC: 1.7 MG/DL — SIGNIFICANT CHANGE UP (ref 1.6–2.6)
MCHC RBC-ENTMCNC: 30.6 PG — SIGNIFICANT CHANGE UP (ref 27–34)
MCHC RBC-ENTMCNC: 32.8 G/DL — SIGNIFICANT CHANGE UP (ref 32–36)
MCV RBC AUTO: 93.4 FL — SIGNIFICANT CHANGE UP (ref 80–100)
MONOCYTES # BLD AUTO: 0.5 K/UL — SIGNIFICANT CHANGE UP (ref 0–0.9)
MONOCYTES NFR BLD AUTO: 9.5 % — SIGNIFICANT CHANGE UP (ref 2–14)
NEUTROPHILS # BLD AUTO: 3.1 K/UL — SIGNIFICANT CHANGE UP (ref 1.8–7.4)
NEUTROPHILS NFR BLD AUTO: 64.1 % — SIGNIFICANT CHANGE UP (ref 43–77)
PHOSPHATE SERPL-MCNC: 3.2 MG/DL — SIGNIFICANT CHANGE UP (ref 2.5–4.5)
PLATELET # BLD AUTO: 244 K/UL — SIGNIFICANT CHANGE UP (ref 150–400)
POTASSIUM SERPL-MCNC: 4.8 MMOL/L — SIGNIFICANT CHANGE UP (ref 3.5–5.3)
POTASSIUM SERPL-SCNC: 4.8 MMOL/L — SIGNIFICANT CHANGE UP (ref 3.5–5.3)
PROT SERPL-MCNC: 7.5 G/DL — SIGNIFICANT CHANGE UP (ref 6–8.3)
RBC # BLD: 3.94 M/UL — LOW (ref 4.2–5.8)
RBC # FLD: 15 % — HIGH (ref 10.3–14.5)
SODIUM SERPL-SCNC: 137 MMOL/L — SIGNIFICANT CHANGE UP (ref 135–145)
WBC # BLD: 4.8 K/UL — SIGNIFICANT CHANGE UP (ref 3.8–10.5)
WBC # FLD AUTO: 4.8 K/UL — SIGNIFICANT CHANGE UP (ref 3.8–10.5)

## 2018-10-11 RX ORDER — CEPHALEXIN 500 MG/1
500 TABLET ORAL
Qty: 10 | Refills: 0 | Status: DISCONTINUED | COMMUNITY
Start: 2018-08-10 | End: 2018-10-11

## 2018-10-11 RX ORDER — MUPIROCIN 2 G/100G
2 CREAM TOPICAL 3 TIMES DAILY
Qty: 4 | Refills: 0 | Status: DISCONTINUED | COMMUNITY
Start: 2018-08-09 | End: 2018-10-11

## 2018-10-12 ENCOUNTER — APPOINTMENT (OUTPATIENT)
Dept: UROLOGY | Facility: CLINIC | Age: 58
End: 2018-10-12
Payer: MEDICARE

## 2018-10-12 ENCOUNTER — OUTPATIENT (OUTPATIENT)
Dept: OUTPATIENT SERVICES | Facility: HOSPITAL | Age: 58
LOS: 1 days | End: 2018-10-12
Payer: MEDICARE

## 2018-10-12 PROCEDURE — 99204 OFFICE O/P NEW MOD 45 MIN: CPT | Mod: 25

## 2018-10-12 PROCEDURE — 51702 INSERT TEMP BLADDER CATH: CPT

## 2018-10-12 RX ORDER — TAMSULOSIN HYDROCHLORIDE 0.4 MG/1
0.4 CAPSULE ORAL
Qty: 180 | Refills: 3 | Status: ACTIVE | COMMUNITY
Start: 2018-08-09 | End: 1900-01-01

## 2018-10-15 ENCOUNTER — RX RENEWAL (OUTPATIENT)
Age: 58
End: 2018-10-15

## 2018-10-15 RX ORDER — FINASTERIDE 5 MG/1
5 TABLET, FILM COATED ORAL DAILY
Qty: 90 | Refills: 6 | Status: ACTIVE | COMMUNITY
Start: 2018-10-12 | End: 1900-01-01

## 2018-10-16 DIAGNOSIS — C79.51 SECONDARY MALIGNANT NEOPLASM OF BONE: ICD-10-CM

## 2018-10-16 DIAGNOSIS — C64.9 MALIGNANT NEOPLASM OF UNSPECIFIED KIDNEY, EXCEPT RENAL PELVIS: ICD-10-CM

## 2018-10-16 DIAGNOSIS — R33.8 OTHER RETENTION OF URINE: ICD-10-CM

## 2018-10-16 DIAGNOSIS — R39.11 HESITANCY OF MICTURITION: ICD-10-CM

## 2018-10-18 ENCOUNTER — OUTPATIENT (OUTPATIENT)
Dept: OUTPATIENT SERVICES | Facility: HOSPITAL | Age: 58
LOS: 1 days | End: 2018-10-18
Payer: MEDICARE

## 2018-10-18 ENCOUNTER — APPOINTMENT (OUTPATIENT)
Dept: UROLOGY | Facility: CLINIC | Age: 58
End: 2018-10-18
Payer: MEDICARE

## 2018-10-18 DIAGNOSIS — R39.11 HESITANCY OF MICTURITION: ICD-10-CM

## 2018-10-18 PROCEDURE — 51798 US URINE CAPACITY MEASURE: CPT

## 2018-10-18 PROCEDURE — 51700 IRRIGATION OF BLADDER: CPT

## 2018-10-18 PROCEDURE — 99213 OFFICE O/P EST LOW 20 MIN: CPT | Mod: 25

## 2018-10-19 ENCOUNTER — APPOINTMENT (OUTPATIENT)
Dept: UROLOGY | Facility: CLINIC | Age: 58
End: 2018-10-19
Payer: MEDICARE

## 2018-10-19 ENCOUNTER — APPOINTMENT (OUTPATIENT)
Dept: DERMATOLOGY | Facility: HOSPITAL | Age: 58
End: 2018-10-19

## 2018-10-19 DIAGNOSIS — R82.998 OTHER ABNORMAL FINDINGS IN URINE: ICD-10-CM

## 2018-10-19 DIAGNOSIS — R31.29 OTHER MICROSCOPIC HEMATURIA: ICD-10-CM

## 2018-10-19 LAB
BILIRUB UR QL STRIP: NEGATIVE
CLARITY UR: NORMAL
COLLECTION METHOD: NORMAL
GLUCOSE UR-MCNC: NEGATIVE
HCG UR QL: 1 EU/DL
HGB UR QL STRIP.AUTO: NORMAL
KETONES UR-MCNC: NORMAL
LEUKOCYTE ESTERASE UR QL STRIP: NORMAL
NITRITE UR QL STRIP: POSITIVE
PH UR STRIP: 6
PROT UR STRIP-MCNC: 100
SP GR UR STRIP: 1.02

## 2018-10-19 PROCEDURE — 51798 US URINE CAPACITY MEASURE: CPT

## 2018-10-19 PROCEDURE — 99213 OFFICE O/P EST LOW 20 MIN: CPT | Mod: 25

## 2018-10-22 ENCOUNTER — APPOINTMENT (OUTPATIENT)
Dept: RADIATION ONCOLOGY | Facility: CLINIC | Age: 58
End: 2018-10-22
Payer: MEDICARE

## 2018-10-22 ENCOUNTER — OUTPATIENT (OUTPATIENT)
Dept: OUTPATIENT SERVICES | Facility: HOSPITAL | Age: 58
LOS: 1 days | End: 2018-10-22
Payer: MEDICARE

## 2018-10-22 ENCOUNTER — APPOINTMENT (OUTPATIENT)
Dept: UROLOGY | Facility: CLINIC | Age: 58
End: 2018-10-22
Payer: MEDICARE

## 2018-10-22 VITALS
RESPIRATION RATE: 18 BRPM | TEMPERATURE: 98.24 F | SYSTOLIC BLOOD PRESSURE: 134 MMHG | OXYGEN SATURATION: 97 % | DIASTOLIC BLOOD PRESSURE: 91 MMHG | WEIGHT: 184 LBS | BODY MASS INDEX: 30.62 KG/M2 | HEART RATE: 95 BPM

## 2018-10-22 DIAGNOSIS — R33.9 RETENTION OF URINE, UNSPECIFIED: ICD-10-CM

## 2018-10-22 DIAGNOSIS — N40.1 BENIGN PROSTATIC HYPERPLASIA WITH LOWER URINARY TRACT SYMPTOMS: ICD-10-CM

## 2018-10-22 DIAGNOSIS — K59.00 CONSTIPATION, UNSPECIFIED: ICD-10-CM

## 2018-10-22 DIAGNOSIS — R39.11 HESITANCY OF MICTURITION: ICD-10-CM

## 2018-10-22 LAB
APPEARANCE: ABNORMAL
BACTERIA UR CULT: ABNORMAL
BACTERIA: ABNORMAL
BILIRUBIN URINE: NEGATIVE
BLOOD URINE: ABNORMAL
COLOR: YELLOW
GLUCOSE QUALITATIVE U: NEGATIVE MG/DL
HYALINE CASTS: 1 /LPF
KETONES URINE: NEGATIVE
LEUKOCYTE ESTERASE URINE: ABNORMAL
MICROSCOPIC-UA: NORMAL
NITRITE URINE: NEGATIVE
PH URINE: 6
PROTEIN URINE: 100 MG/DL
RED BLOOD CELLS URINE: 6 /HPF
SPECIFIC GRAVITY URINE: 1.02
SQUAMOUS EPITHELIAL CELLS: 0 /HPF
UROBILINOGEN URINE: 1 MG/DL
WHITE BLOOD CELLS URINE: >720 /HPF

## 2018-10-22 PROCEDURE — 51702 INSERT TEMP BLADDER CATH: CPT

## 2018-10-22 PROCEDURE — 99213 OFFICE O/P EST LOW 20 MIN: CPT

## 2018-10-22 PROCEDURE — 99213 OFFICE O/P EST LOW 20 MIN: CPT | Mod: 25

## 2018-10-22 RX ORDER — LORATADINE 10 MG/1
10 TABLET ORAL DAILY
Qty: 60 | Refills: 2 | Status: DISCONTINUED | COMMUNITY
Start: 2018-09-13 | End: 2018-10-22

## 2018-10-22 RX ORDER — CAMPHOR 0.45 %
25 GEL (GRAM) TOPICAL
Qty: 30 | Refills: 3 | Status: DISCONTINUED | COMMUNITY
Start: 2018-09-13 | End: 2018-10-22

## 2018-10-22 RX ADMIN — ONDANSETRON HYDROCHLORIDE 1 MG: 4 TABLET, ORALLY DISINTEGRATING ORAL at 00:00

## 2018-10-22 NOTE — REVIEW OF SYSTEMS
[Abdominal Pain] : abdominal pain [Vomiting] : vomiting [Constipation] : constipation [Nocturia] : nocturia [Urinary Frequency] : urinary frequency [Joint Pain] : joint pain [Muscle Pain] : muscle pain [Muscle Weakness] : muscle weakness [Disturbance Of Gait] : gait disturbance [Dizziness] : dizziness [Difficulty Walking] : difficulty walking [Negative] : Allergic/Immunologic [Skin Rash] : no skin rash [Skin Wound] : no skin wound [Confused] : no confusion [Fainting] : no fainting [FreeTextEntry7] : Vomited small amounts of saliva, once a day for the last three days. Daily bowel movements, but is a little constipated. Taking Senna, will add some fiber to soften bowel movements.  [FreeTextEntry8] : Visiting urology after his appointment here today. [FreeTextEntry9] : Left leg weakness, and pain [de-identified] : dizziness when he turns his head to fast or bends over

## 2018-10-22 NOTE — VITALS
[Maximal Pain Intensity: 9/10] : 9/10 [Least Pain Intensity: 1/10] : 1/10 [Pain Description/Quality: ___] : Pain description/quality: [unfilled] [60: Requires occasional assistance, but is able to care for most of his/her needs] : 60: Requires occasional assistance, but is able to care for most of his/her needs [ECOG Performance Status: 3 - Capable of only limited self care, confined to bed or chair more than 50% of waking hours] : Performance Status: 3 - Capable of only limited self care, confined to bed or chair more than 50% of waking hours

## 2018-10-22 NOTE — HISTORY OF PRESENT ILLNESS
[FreeTextEntry1] : Mr. Davila is a 57 year old man with a stage IV renal cell carcinoma who has previously received multiple courses of radiation for brain and osseous metastases. He most recently was treated to T6-7, 21Gy/3fxns completed 8/22/18. \par \par He was treated to a left frontal lesion with SRS to 18 Gy in January 2015, to T6 and T8 with SBRT to 16 Gy in June 2015, to the right iliac bone to 8 GY in August 2015, to the right skull base to 24 Gy with SBRT in April 2016, to the left lung to 20 Gy in April 2017 prior, and R pelvis 20Gy in Feb ’18. \par \par He most recently saw Dr. Goldberg on 3/1/18. He was previously on pazopanib, then sutent, Nivolumab, and now on cabozatinib.\par \par He underwent brain MRI on 3/3/18 noting his left parietal lesion is smaller compared to previous, now 4 x 5.5 mm, previously 6 x 11 mm without new lesions identified.\par \par He followed up on3/28/18 and reported left leg heaviness/weakness as compared to right leg. HE underwent lumbar MRI on 4/6/18 demonstrating a 1.9 cm lesion in the left iliac bone and a 1.8 cm lesion in the right iliac bone.\par \par CT Neck, Chest, Abdomen and Pelvis were performed on 4/30/18 demonstrating enlarged pathologic lymph nodes in the mediastinum up to 3.5 cm increased from prior.\par \par He followed up with oncologist DR. Goldberg on 6/7/18. He has been on cabozantinib.\par \par He presented to [facility] on 6/11[facility]18 with left lower extremity weakness, urinary retention, and pelvic numbness. MRI of the thoracic spine demonstrated a focal area of enhancement with surrounding edema and cord compression at the level of T7-8 compatible with intramedullary spinal cord metastasis. He was started on steroids, symptoms improved and he was discharged.\par \par CT 8/15/18 showed unchanged disease of the kidneys, bones, and retroperitoneal nodes, in addition to interval decrease in size of mediastinal nodes. \par \par 8/22/18-completed radiation therapy to T6-7.\par 8/29/18-Kypho cancelled due to hypertension. \par No new scans.\par Having pain, 10/10 maximum,  originating at the left foot radiating up to his stomach, and then stops. No associated symptoms. Occurring multiple times a day. Oxycodone assists in providing comfort.  Small amount of vomiting of saliva in the  morning on getting out of bed, daily over the last three day. This has been resolving on its own. Frequent urination, has an appointment with urology after his visit with Dr Dorantes. \par

## 2018-10-24 DIAGNOSIS — R33.8 OTHER RETENTION OF URINE: ICD-10-CM

## 2018-10-29 ENCOUNTER — APPOINTMENT (OUTPATIENT)
Dept: UROLOGY | Facility: CLINIC | Age: 58
End: 2018-10-29
Payer: MEDICARE

## 2018-10-29 ENCOUNTER — OUTPATIENT (OUTPATIENT)
Dept: OUTPATIENT SERVICES | Facility: HOSPITAL | Age: 58
LOS: 1 days | End: 2018-10-29
Payer: MEDICARE

## 2018-10-29 DIAGNOSIS — Z78.9 OTHER SPECIFIED HEALTH STATUS: ICD-10-CM

## 2018-10-29 PROCEDURE — 51702 INSERT TEMP BLADDER CATH: CPT

## 2018-10-29 PROCEDURE — 51798 US URINE CAPACITY MEASURE: CPT

## 2018-10-29 PROCEDURE — 99214 OFFICE O/P EST MOD 30 MIN: CPT | Mod: 25

## 2018-10-29 RX ORDER — SULFAMETHOXAZOLE AND TRIMETHOPRIM 800; 160 MG/1; MG/1
800-160 TABLET ORAL
Qty: 8 | Refills: 0 | Status: COMPLETED | OUTPATIENT
Start: 2018-10-22 | End: 2018-10-29

## 2018-11-01 ENCOUNTER — OUTPATIENT (OUTPATIENT)
Dept: OUTPATIENT SERVICES | Facility: HOSPITAL | Age: 58
LOS: 1 days | Discharge: HOME | End: 2018-11-01

## 2018-11-01 DIAGNOSIS — C64.9 MALIGNANT NEOPLASM OF UNSPECIFIED KIDNEY, EXCEPT RENAL PELVIS: ICD-10-CM

## 2018-11-02 ENCOUNTER — APPOINTMENT (OUTPATIENT)
Dept: INTERNAL MEDICINE | Facility: HOSPITAL | Age: 58
End: 2018-11-02

## 2018-11-02 ENCOUNTER — OUTPATIENT (OUTPATIENT)
Dept: OUTPATIENT SERVICES | Facility: HOSPITAL | Age: 58
LOS: 1 days | End: 2018-11-02
Payer: MEDICARE

## 2018-11-02 VITALS
WEIGHT: 179.9 LBS | DIASTOLIC BLOOD PRESSURE: 92 MMHG | HEIGHT: 66 IN | TEMPERATURE: 98 F | HEART RATE: 83 BPM | OXYGEN SATURATION: 97 % | RESPIRATION RATE: 18 BRPM | SYSTOLIC BLOOD PRESSURE: 131 MMHG

## 2018-11-02 DIAGNOSIS — Z01.818 ENCOUNTER FOR OTHER PREPROCEDURAL EXAMINATION: ICD-10-CM

## 2018-11-02 DIAGNOSIS — C41.2 MALIGNANT NEOPLASM OF VERTEBRAL COLUMN: ICD-10-CM

## 2018-11-02 LAB
ALBUMIN SERPL ELPH-MCNC: 3.8 G/DL — SIGNIFICANT CHANGE UP (ref 3.3–5)
ALP SERPL-CCNC: 89 U/L — SIGNIFICANT CHANGE UP (ref 40–120)
ALT FLD-CCNC: 46 U/L — HIGH (ref 10–45)
ANION GAP SERPL CALC-SCNC: 12 MMOL/L — SIGNIFICANT CHANGE UP (ref 5–17)
AST SERPL-CCNC: 43 U/L — HIGH (ref 10–40)
BILIRUB SERPL-MCNC: 0.4 MG/DL — SIGNIFICANT CHANGE UP (ref 0.2–1.2)
BUN SERPL-MCNC: 12 MG/DL — SIGNIFICANT CHANGE UP (ref 7–23)
CALCIUM SERPL-MCNC: 9.2 MG/DL — SIGNIFICANT CHANGE UP (ref 8.4–10.5)
CHLORIDE SERPL-SCNC: 100 MMOL/L — SIGNIFICANT CHANGE UP (ref 96–108)
CO2 SERPL-SCNC: 26 MMOL/L — SIGNIFICANT CHANGE UP (ref 22–31)
CREAT SERPL-MCNC: 1.02 MG/DL — SIGNIFICANT CHANGE UP (ref 0.5–1.3)
GLUCOSE SERPL-MCNC: 132 MG/DL — HIGH (ref 70–99)
HCT VFR BLD CALC: 36.2 % — LOW (ref 39–50)
HGB BLD-MCNC: 11.8 G/DL — LOW (ref 13–17)
MCHC RBC-ENTMCNC: 29.6 PG — SIGNIFICANT CHANGE UP (ref 27–34)
MCHC RBC-ENTMCNC: 32.6 GM/DL — SIGNIFICANT CHANGE UP (ref 32–36)
MCV RBC AUTO: 90.7 FL — SIGNIFICANT CHANGE UP (ref 80–100)
PLATELET # BLD AUTO: 226 K/UL — SIGNIFICANT CHANGE UP (ref 150–400)
POTASSIUM SERPL-MCNC: 4 MMOL/L — SIGNIFICANT CHANGE UP (ref 3.5–5.3)
POTASSIUM SERPL-SCNC: 4 MMOL/L — SIGNIFICANT CHANGE UP (ref 3.5–5.3)
PROT SERPL-MCNC: 7.8 G/DL — SIGNIFICANT CHANGE UP (ref 6–8.3)
RBC # BLD: 3.99 M/UL — LOW (ref 4.2–5.8)
RBC # FLD: 15.5 % — HIGH (ref 10.3–14.5)
SODIUM SERPL-SCNC: 138 MMOL/L — SIGNIFICANT CHANGE UP (ref 135–145)
WBC # BLD: 5.12 K/UL — SIGNIFICANT CHANGE UP (ref 3.8–10.5)
WBC # FLD AUTO: 5.12 K/UL — SIGNIFICANT CHANGE UP (ref 3.8–10.5)

## 2018-11-02 PROCEDURE — 85027 COMPLETE CBC AUTOMATED: CPT

## 2018-11-02 PROCEDURE — 80053 COMPREHEN METABOLIC PANEL: CPT

## 2018-11-02 PROCEDURE — G0463: CPT

## 2018-11-02 RX ORDER — CEFAZOLIN SODIUM 1 G
2000 VIAL (EA) INJECTION ONCE
Qty: 0 | Refills: 0 | Status: DISCONTINUED | OUTPATIENT
Start: 2018-11-07 | End: 2018-11-10

## 2018-11-02 RX ORDER — LIDOCAINE HCL 20 MG/ML
0.2 VIAL (ML) INJECTION ONCE
Qty: 0 | Refills: 0 | Status: DISCONTINUED | OUTPATIENT
Start: 2018-11-07 | End: 2018-11-10

## 2018-11-02 NOTE — H&P PST ADULT - PROBLEM SELECTOR PLAN 1
Scheduled T8 Kyphoplasty   CBC, CMP  Pre-op instructions given to pt and wife, including chlorhexidine soap

## 2018-11-02 NOTE — H&P PST ADULT - PROBLEM/PLAN-1
Discussion/Summary   small polyp removed was precancerous  Verified Results  (1) TISSUE EXAM [id number] 01:53PM Marky Alexander     Test Name Result Flag Reference   LAB AP CASE REPORT (Report)     Surgical Pathology Report             Case: [id number]                   Authorizing Provider: Eduard Leija MD       Collected:      06/26/2017 1353        Ordering Location:   [address] [place] [address] Received:      06/26/2017 1954                    3 [facility]                                    Pathologist:      Valentino Poor, MD                             Specimen:  Large Intestine, Right/Ascending Colon, Cold snare, polyp   LAB AP FINAL DIAGNOSIS      A  Ascending colon polyp:    - Tubular adenoma  - Negative for high grade dysplasia and malignancy  Electronically signed by Valentino Poor, MD on 6/28/2017 at 11:56 AM   LAB AP NOTE      Interpretation performed at [facility], [address] [place] [address] Drive [address] [place] [address]   LAB [address] [place] [address] (Report)     These tests were developed and their performance characteristics   determined by [facility] or [facility]  They may not be cleared or approved by the [place]  Food and   Drug Administration  The FDA has determined that such clearance or   approval is not necessary  These tests are used for clinical purposes  They should not be regarded as investigational or for research  This   laboratory has been approved by [place] 88, designated as a high-complexity   laboratory and is qualified to perform these tests  LAB AP GROSS DESCRIPTION (Report)     A  The specimen is received in formalin, labeled with the patient's name   and hospital number, and is designated cold snare, polyp, is a tan   polypoid structure measuring 0 8 cm in greatest dimension  The specimen is   bisected revealing tan cut surfaces  Entirely submitted  One cassette      Note: The estimated total formalin fixation time based upon information provided by the submitting clinician and the standard processing schedule   is 24 0 hours        RLR DISPLAY PLAN FREE TEXT

## 2018-11-02 NOTE — H&P PST ADULT - HISTORY OF PRESENT ILLNESS
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 S/p gamma knife to brain met, not a candidate for nephrectomy per Urology given brain mets. S/p spinal radiation, now on cabozantinib since Nov 2017 c/o worsening back pain radiating to LLE with associated weakness and numbness. Reports urinary retention, (able to void, but weakened stream, incomplete bladder emptying) and pelvic numbness-s/p MRI revealed osseous spinal mets from T6-8 with cord compression, in addition to intramedullary disease at T7 with surrounding cord edema. Evaluated by Dr. Tobias. Denies HA, blurry vision, chest pain, or sob. Presents to PST for a scheduled T8 Kyphoplasty on 8/29/2018.     Previous surgery was 57 y/o male with PMHx of HTN, T2DM, HLD, stage 4 right-sided RCC with 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 S/p gamma knife to brain met, not a candidate for nephrectomy per Urology given brain mets. S/p spinal radiation, now on cabozantinib since Nov 2017 c/o worsening back pain radiating to LLE with associated weakness and numbness. Reports urinary retention, (able to void, but weakened stream, incomplete bladder emptying) and pelvic numbness-s/p MRI revealed osseous spinal mets from T6-8 with cord compression, in addition to intramedullary disease at T7 with surrounding cord edema. Evaluated by Dr. Tobias. Denies HA, blurry vision, chest pain, or sob. Presents to PST for a scheduled T8 Kyphoplasty on 8/29/2018.     Case was canceled in 8/29/2018 due  to severe hypertension after induction as per anesthesiology notes ( alpha page 14 ) 57 y/o male with PMHx of HTN, T2DM, HLD, stage 4 right-sided RCC with 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 S/p gamma knife to brain met, not a candidate for nephrectomy per Urology given brain mets. S/p spinal radiation, now on cabozantinib since Nov 2017 c/o worsening back pain radiating to LLE with associated weakness and numbness and pelvic numbness-s/p MRI revealed osseous spinal mets from T6-8 with cord compression, in addition to intramedullary disease at T7 with surrounding cord edema. Evaluated by Dr. Tobias. Denies HA, blurry vision, chest pain, or sob. Presents to PST for a scheduled T8 Kyphoplasty on 11/7/2018.     Case was canceled in 8/29/2018 due  to severe hypertension after induction as per anesthesiology notes ( alpha page 14 ) 59 y/o male with PMHx of HTN, T2DM, HLD, stage 4 right-sided RCC with 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 S/p gamma knife to brain met, not a candidate for nephrectomy per Urology given brain mets. S/p spinal radiation, now on cabozantinib since Nov 2017 c/o worsening back pain radiating to LLE with associated weakness and numbness and pelvic numbness-s/p MRI revealed osseous spinal mets from T6-8 with cord compression, in addition to intramedullary disease at T7 with surrounding cord edema. Evaluated by Dr. Tobias. Denies HA, blurry vision, chest pain, or sob. Presents to PST for a scheduled T8 Kyphoplasty on 11/7/2018.     Case was aborted in 8/29/2018 due to severe hypertension after induction as per anesthesiology notes ( alpha page 14 )

## 2018-11-02 NOTE — H&P PST ADULT - SKIN/BREAST
Spoke to patient and conveyed results from below.  She understood no further questions/concerns     negative

## 2018-11-02 NOTE — H&P PST ADULT - ASSESSMENT
CAPRINI SCORE [CLOT]    AGE RELATED RISK FACTORS                                                       MOBILITY RELATED FACTORS  [x] Age 41-60 years                                            (1 Point)                  [ ] Bed rest                                                        (1 Point)  [ ] Age: 61-74 years                                           (2 Points)                 [ ] Plaster cast                                                   (2 Points)  [ ] Age= 75 years                                              (3 Points)                 [ ] Bed bound for more than 72 hours                 (2 Points)    DISEASE RELATED RISK FACTORS                                               GENDER SPECIFIC FACTORS  [x] Edema in the lower extremities                       (1 Point)                  [ ] Pregnancy                                                     (1 Point)  [ ] Varicose veins                                               (1 Point)                  [ ] Post-partum < 6 weeks                                   (1 Point)             [x] BMI > 25 Kg/m2                                            (1 Point)                  [ ] Hormonal therapy  or oral contraception          (1 Point)                 [ ] Sepsis (in the previous month)                        (1 Point)                  [ ] History of pregnancy complications                 (1 point)  [ ] Pneumonia or serious lung disease                                               [ ] Unexplained or recurrent                     (1 Point)           (in the previous month)                               (1 Point)  [ ] Abnormal pulmonary function test                     (1 Point)                 SURGERY RELATED RISK FACTORS  [ ] Acute myocardial infarction                              (1 Point)                 [ ]  Section                                             (1 Point)  [ ] Congestive heart failure (in the previous month)  (1 Point)               [ ] Minor surgery                                                  (1 Point)   [ ] Inflammatory bowel disease                             (1 Point)                 [ ] Arthroscopic surgery                                        (2 Points)  [ ] Central venous access                                      (2 Points)                [x] General surgery lasting more than 45 minutes   (2 Points)       [ ] Stroke (in the previous month)                          (5 Points)               [ ] Elective arthroplasty                                         (5 Points)                                                                                                                                               HEMATOLOGY RELATED FACTORS                                                 TRAUMA RELATED RISK FACTORS  [ ] Prior episodes of VTE                                     (3 Points)                 [ ] Fracture of the hip, pelvis, or leg                       (5 Points)  [ ] Positive family history for VTE                         (3 Points)                 [ ] Acute spinal cord injury (in the previous month)  (5 Points)  [ ] Prothrombin 94441 A                                     (3 Points)                 [ ] Paralysis  (less than 1 month)                             (5 Points)  [ ] Factor V Leiden                                             (3 Points)                  [ ] Multiple Trauma within 1 month                        (5 Points)  [ ] Lupus anticoagulants                                     (3 Points)                                                           [ ] Anticardiolipin antibodies                               (3 Points)                                                       [ ] High homocysteine in the blood                      (3 Points)                                             [ ] Other congenital or acquired thrombophilia      (3 Points)                                                [ ] Heparin induced thrombocytopenia                  (3 Points)                                          Total Score [    5      ]

## 2018-11-06 ENCOUNTER — TRANSCRIPTION ENCOUNTER (OUTPATIENT)
Age: 58
End: 2018-11-06

## 2018-11-06 ENCOUNTER — APPOINTMENT (OUTPATIENT)
Dept: UROLOGY | Facility: CLINIC | Age: 58
End: 2018-11-06
Payer: MEDICARE

## 2018-11-06 ENCOUNTER — OUTPATIENT (OUTPATIENT)
Dept: OUTPATIENT SERVICES | Facility: HOSPITAL | Age: 58
LOS: 1 days | End: 2018-11-06

## 2018-11-06 DIAGNOSIS — N31.9 NEUROMUSCULAR DYSFUNCTION OF BLADDER, UNSPECIFIED: ICD-10-CM

## 2018-11-06 DIAGNOSIS — R35.0 FREQUENCY OF MICTURITION: ICD-10-CM

## 2018-11-06 DIAGNOSIS — N40.1 BENIGN PROSTATIC HYPERPLASIA WITH LOWER URINARY TRACT SYMPTOMS: ICD-10-CM

## 2018-11-06 DIAGNOSIS — R33.9 RETENTION OF URINE, UNSPECIFIED: ICD-10-CM

## 2018-11-06 PROCEDURE — 51784 ANAL/URINARY MUSCLE STUDY: CPT | Mod: 26,PD

## 2018-11-06 PROCEDURE — 51797 INTRAABDOMINAL PRESSURE TEST: CPT | Mod: 26,PD

## 2018-11-06 PROCEDURE — 51728 CYSTOMETROGRAM W/VP: CPT | Mod: 26,PD

## 2018-11-06 PROCEDURE — 51741 ELECTRO-UROFLOWMETRY FIRST: CPT | Mod: 26,PD

## 2018-11-07 ENCOUNTER — INPATIENT (INPATIENT)
Facility: HOSPITAL | Age: 58
LOS: 2 days | Discharge: HOME | DRG: 543 | End: 2018-11-10
Attending: NEUROLOGICAL SURGERY | Admitting: NEUROLOGICAL SURGERY
Payer: MEDICARE

## 2018-11-07 ENCOUNTER — APPOINTMENT (OUTPATIENT)
Dept: SPINE | Facility: HOSPITAL | Age: 58
End: 2018-11-07
Payer: MEDICARE

## 2018-11-07 VITALS
OXYGEN SATURATION: 97 % | SYSTOLIC BLOOD PRESSURE: 143 MMHG | DIASTOLIC BLOOD PRESSURE: 93 MMHG | HEART RATE: 70 BPM | WEIGHT: 179.9 LBS | TEMPERATURE: 98 F | RESPIRATION RATE: 18 BRPM | HEIGHT: 66 IN

## 2018-11-07 DIAGNOSIS — C41.2 MALIGNANT NEOPLASM OF VERTEBRAL COLUMN: ICD-10-CM

## 2018-11-07 LAB
GLUCOSE BLDC GLUCOMTR-MCNC: 140 MG/DL — HIGH (ref 70–99)
GLUCOSE BLDC GLUCOMTR-MCNC: 184 MG/DL — HIGH (ref 70–99)

## 2018-11-07 PROCEDURE — 22513 PERQ VERTEBRAL AUGMENTATION: CPT

## 2018-11-07 PROCEDURE — 72128 CT CHEST SPINE W/O DYE: CPT | Mod: 26

## 2018-11-07 RX ORDER — DEXTROSE 50 % IN WATER 50 %
25 SYRINGE (ML) INTRAVENOUS ONCE
Qty: 0 | Refills: 0 | Status: DISCONTINUED | OUTPATIENT
Start: 2018-11-07 | End: 2018-11-10

## 2018-11-07 RX ORDER — DEXTROSE 50 % IN WATER 50 %
12.5 SYRINGE (ML) INTRAVENOUS ONCE
Qty: 0 | Refills: 0 | Status: DISCONTINUED | OUTPATIENT
Start: 2018-11-07 | End: 2018-11-10

## 2018-11-07 RX ORDER — ATORVASTATIN CALCIUM 80 MG/1
20 TABLET, FILM COATED ORAL AT BEDTIME
Qty: 0 | Refills: 0 | Status: DISCONTINUED | OUTPATIENT
Start: 2018-11-07 | End: 2018-11-10

## 2018-11-07 RX ORDER — ACETAMINOPHEN 500 MG
650 TABLET ORAL EVERY 6 HOURS
Qty: 0 | Refills: 0 | Status: DISCONTINUED | OUTPATIENT
Start: 2018-11-07 | End: 2018-11-10

## 2018-11-07 RX ORDER — HYDRALAZINE HCL 50 MG
10 TABLET ORAL ONCE
Qty: 0 | Refills: 0 | Status: COMPLETED | OUTPATIENT
Start: 2018-11-07 | End: 2018-11-07

## 2018-11-07 RX ORDER — SODIUM CHLORIDE 9 MG/ML
3 INJECTION INTRAMUSCULAR; INTRAVENOUS; SUBCUTANEOUS EVERY 8 HOURS
Qty: 0 | Refills: 0 | Status: DISCONTINUED | OUTPATIENT
Start: 2018-11-07 | End: 2018-11-07

## 2018-11-07 RX ORDER — ONDANSETRON 8 MG/1
4 TABLET, FILM COATED ORAL EVERY 6 HOURS
Qty: 0 | Refills: 0 | Status: DISCONTINUED | OUTPATIENT
Start: 2018-11-07 | End: 2018-11-10

## 2018-11-07 RX ORDER — LEVOTHYROXINE SODIUM 125 MCG
137 TABLET ORAL DAILY
Qty: 0 | Refills: 0 | Status: DISCONTINUED | OUTPATIENT
Start: 2018-11-07 | End: 2018-11-10

## 2018-11-07 RX ORDER — DEXTROSE 50 % IN WATER 50 %
15 SYRINGE (ML) INTRAVENOUS ONCE
Qty: 0 | Refills: 0 | Status: DISCONTINUED | OUTPATIENT
Start: 2018-11-07 | End: 2018-11-10

## 2018-11-07 RX ORDER — CEPHALEXIN 500 MG
1 CAPSULE ORAL
Qty: 10 | Refills: 0 | OUTPATIENT
Start: 2018-11-07 | End: 2018-11-11

## 2018-11-07 RX ORDER — DOCUSATE SODIUM 100 MG
100 CAPSULE ORAL THREE TIMES A DAY
Qty: 0 | Refills: 0 | Status: DISCONTINUED | OUTPATIENT
Start: 2018-11-07 | End: 2018-11-10

## 2018-11-07 RX ORDER — DEXTROSE MONOHYDRATE, SODIUM CHLORIDE, AND POTASSIUM CHLORIDE 50; .745; 4.5 G/1000ML; G/1000ML; G/1000ML
1000 INJECTION, SOLUTION INTRAVENOUS
Qty: 0 | Refills: 0 | Status: DISCONTINUED | OUTPATIENT
Start: 2018-11-07 | End: 2018-11-08

## 2018-11-07 RX ORDER — SENNA PLUS 8.6 MG/1
2 TABLET ORAL AT BEDTIME
Qty: 0 | Refills: 0 | Status: DISCONTINUED | OUTPATIENT
Start: 2018-11-07 | End: 2018-11-10

## 2018-11-07 RX ORDER — OXYCODONE AND ACETAMINOPHEN 5; 325 MG/1; MG/1
2 TABLET ORAL EVERY 6 HOURS
Qty: 0 | Refills: 0 | Status: DISCONTINUED | OUTPATIENT
Start: 2018-11-07 | End: 2018-11-10

## 2018-11-07 RX ORDER — ONDANSETRON 8 MG/1
4 TABLET, FILM COATED ORAL EVERY 4 HOURS
Qty: 0 | Refills: 0 | Status: DISCONTINUED | OUTPATIENT
Start: 2018-11-07 | End: 2018-11-08

## 2018-11-07 RX ORDER — GLUCAGON INJECTION, SOLUTION 0.5 MG/.1ML
1 INJECTION, SOLUTION SUBCUTANEOUS ONCE
Qty: 0 | Refills: 0 | Status: DISCONTINUED | OUTPATIENT
Start: 2018-11-07 | End: 2018-11-10

## 2018-11-07 RX ORDER — PANTOPRAZOLE SODIUM 20 MG/1
40 TABLET, DELAYED RELEASE ORAL DAILY
Qty: 0 | Refills: 0 | Status: DISCONTINUED | OUTPATIENT
Start: 2018-11-07 | End: 2018-11-10

## 2018-11-07 RX ORDER — HYDROMORPHONE HYDROCHLORIDE 2 MG/ML
0.3 INJECTION INTRAMUSCULAR; INTRAVENOUS; SUBCUTANEOUS
Qty: 0 | Refills: 0 | Status: DISCONTINUED | OUTPATIENT
Start: 2018-11-07 | End: 2018-11-08

## 2018-11-07 RX ORDER — SODIUM CHLORIDE 9 MG/ML
1000 INJECTION, SOLUTION INTRAVENOUS
Qty: 0 | Refills: 0 | Status: DISCONTINUED | OUTPATIENT
Start: 2018-11-07 | End: 2018-11-10

## 2018-11-07 RX ORDER — INFLUENZA VIRUS VACCINE 15; 15; 15; 15 UG/.5ML; UG/.5ML; UG/.5ML; UG/.5ML
0.5 SUSPENSION INTRAMUSCULAR ONCE
Qty: 0 | Refills: 0 | Status: DISCONTINUED | OUTPATIENT
Start: 2018-11-07 | End: 2018-11-10

## 2018-11-07 RX ORDER — INSULIN LISPRO 100/ML
VIAL (ML) SUBCUTANEOUS
Qty: 0 | Refills: 0 | Status: DISCONTINUED | OUTPATIENT
Start: 2018-11-07 | End: 2018-11-10

## 2018-11-07 RX ORDER — OXYCODONE AND ACETAMINOPHEN 5; 325 MG/1; MG/1
1 TABLET ORAL EVERY 6 HOURS
Qty: 0 | Refills: 0 | Status: DISCONTINUED | OUTPATIENT
Start: 2018-11-07 | End: 2018-11-10

## 2018-11-07 RX ORDER — AMLODIPINE BESYLATE 2.5 MG/1
10 TABLET ORAL DAILY
Qty: 0 | Refills: 0 | Status: DISCONTINUED | OUTPATIENT
Start: 2018-11-07 | End: 2018-11-10

## 2018-11-07 RX ORDER — LABETALOL HCL 100 MG
10 TABLET ORAL ONCE
Qty: 0 | Refills: 0 | Status: COMPLETED | OUTPATIENT
Start: 2018-11-07 | End: 2018-11-07

## 2018-11-07 RX ORDER — CEFAZOLIN SODIUM 1 G
1000 VIAL (EA) INJECTION EVERY 8 HOURS
Qty: 0 | Refills: 0 | Status: COMPLETED | OUTPATIENT
Start: 2018-11-07 | End: 2018-11-08

## 2018-11-07 RX ORDER — HYDROCORTISONE 1 %
1 OINTMENT (GRAM) TOPICAL DAILY
Qty: 0 | Refills: 0 | Status: DISCONTINUED | OUTPATIENT
Start: 2018-11-07 | End: 2018-11-10

## 2018-11-07 RX ADMIN — HYDROMORPHONE HYDROCHLORIDE 0.3 MILLIGRAM(S): 2 INJECTION INTRAMUSCULAR; INTRAVENOUS; SUBCUTANEOUS at 18:45

## 2018-11-07 RX ADMIN — DEXTROSE MONOHYDRATE, SODIUM CHLORIDE, AND POTASSIUM CHLORIDE 75 MILLILITER(S): 50; .745; 4.5 INJECTION, SOLUTION INTRAVENOUS at 19:55

## 2018-11-07 RX ADMIN — Medication 10 MILLIGRAM(S): at 18:55

## 2018-11-07 RX ADMIN — HYDROMORPHONE HYDROCHLORIDE 0.3 MILLIGRAM(S): 2 INJECTION INTRAMUSCULAR; INTRAVENOUS; SUBCUTANEOUS at 19:09

## 2018-11-07 RX ADMIN — Medication 10 MILLIGRAM(S): at 19:35

## 2018-11-07 RX ADMIN — HYDROMORPHONE HYDROCHLORIDE 0.3 MILLIGRAM(S): 2 INJECTION INTRAMUSCULAR; INTRAVENOUS; SUBCUTANEOUS at 19:00

## 2018-11-07 RX ADMIN — HYDROMORPHONE HYDROCHLORIDE 0.3 MILLIGRAM(S): 2 INJECTION INTRAMUSCULAR; INTRAVENOUS; SUBCUTANEOUS at 19:25

## 2018-11-07 RX ADMIN — Medication 100 MILLIGRAM(S): at 21:02

## 2018-11-07 RX ADMIN — ATORVASTATIN CALCIUM 20 MILLIGRAM(S): 80 TABLET, FILM COATED ORAL at 21:02

## 2018-11-07 NOTE — ASU DISCHARGE PLAN (ADULT/PEDIATRIC). - MEDICATION SUMMARY - MEDICATIONS TO TAKE
I will START or STAY ON the medications listed below when I get home from the hospital:    Percocet 5/325 oral tablet  -- 1 tab(s) by mouth every 8 hours, As Needed -for severe pain MDD:3   -- Caution federal law prohibits the transfer of this drug to any person other  than the person for whom it was prescribed.  May cause drowsiness.  Alcohol may intensify this effect.  Use care when operating dangerous machinery.  This prescription cannot be refilled.  This product contains acetaminophen.  Do not use  with any other product containing acetaminophen to prevent possible liver damage.  Using more of this medication than prescribed may cause serious breathing problems.    -- Indication: For Malignant neoplasm of vertebral column    enalapril 10 mg oral tablet  -- 1 tab(s) by mouth 2 times a day  -- Indication: For Malignant neoplasm of vertebral column    Lantus Solostar Pen 100 units/mL subcutaneous solution  -- 14 unit(s) subcutaneous once a day (at bedtime)  -- Indication: For Malignant neoplasm of vertebral column    repaglinide 1 mg oral tablet  -- 1 tab(s) by mouth 3 times a day (before meals)  -- Indication: For Malignant neoplasm of vertebral column    atorvastatin 20 mg oral tablet  -- 1 tab(s) by mouth once a day (at bedtime)  -- Indication: For Malignant neoplasm of vertebral column    benzonatate 100 mg oral capsule  -- 1 cap(s) by mouth 3 times a day, As Needed  -- Indication: For Malignant neoplasm of vertebral column    amLODIPine 10 mg oral tablet  -- 1 tab(s) by mouth once a day  -- Indication: For Malignant neoplasm of vertebral column    Keflex 500 mg oral capsule  -- 1 cap(s) by mouth 2 times a day MDD:2  -- Finish all this medication unless otherwise directed by prescriber.    -- Indication: For Malignant neoplasm of vertebral column    hydrocortisone 0.5% topical cream  -- 1 application on skin once a day to the affected area  -- Indication: For Malignant neoplasm of vertebral column    Senna 8.6 mg oral tablet  -- 2 tab(s) by mouth once a day (at bedtime), As Needed  -- Indication: For Malignant neoplasm of vertebral column    cabozantinib 60 mg oral tablet  -- 1 tab(s) by mouth once a day (1hr before breakfast)  -- Indication: For Malignant neoplasm of vertebral column    levothyroxine 137 mcg (0.137 mg) oral tablet  -- 1 tab(s) by mouth once a day  -- Indication: For Malignant neoplasm of vertebral column

## 2018-11-07 NOTE — ASU DISCHARGE PLAN (ADULT/PEDIATRIC). - NOTIFY
Numbness, color, or temperature change to extremity/Fever greater than 101/Numbness, tingling/Bleeding that does not stop/Unable to Urinate

## 2018-11-07 NOTE — PATIENT PROFILE ADULT - TOBACCO USE
Never smoker Inflammation Suggestive Of Cancer Camouflage Histology Text: There was a dense lymphocytic infiltrate which prevented adequate histologic evaluation of adjacent structures.

## 2018-11-08 ENCOUNTER — APPOINTMENT (OUTPATIENT)
Dept: HEMATOLOGY ONCOLOGY | Facility: CLINIC | Age: 58
End: 2018-11-08

## 2018-11-08 DIAGNOSIS — C41.2 MALIGNANT NEOPLASM OF VERTEBRAL COLUMN: ICD-10-CM

## 2018-11-08 DIAGNOSIS — R33.9 RETENTION OF URINE, UNSPECIFIED: ICD-10-CM

## 2018-11-08 DIAGNOSIS — I10 ESSENTIAL (PRIMARY) HYPERTENSION: ICD-10-CM

## 2018-11-08 LAB
GLUCOSE BLDC GLUCOMTR-MCNC: 134 MG/DL — HIGH (ref 70–99)
GLUCOSE BLDC GLUCOMTR-MCNC: 145 MG/DL — HIGH (ref 70–99)
GLUCOSE BLDC GLUCOMTR-MCNC: 158 MG/DL — HIGH (ref 70–99)
GLUCOSE BLDC GLUCOMTR-MCNC: 177 MG/DL — HIGH (ref 70–99)
GLUCOSE BLDC GLUCOMTR-MCNC: 234 MG/DL — HIGH (ref 70–99)

## 2018-11-08 RX ORDER — HYDRALAZINE HCL 50 MG
5 TABLET ORAL ONCE
Qty: 0 | Refills: 0 | Status: COMPLETED | OUTPATIENT
Start: 2018-11-08 | End: 2018-11-08

## 2018-11-08 RX ORDER — ENOXAPARIN SODIUM 100 MG/ML
40 INJECTION SUBCUTANEOUS AT BEDTIME
Qty: 0 | Refills: 0 | Status: DISCONTINUED | OUTPATIENT
Start: 2018-11-08 | End: 2018-11-10

## 2018-11-08 RX ORDER — INSULIN GLARGINE 100 [IU]/ML
14 INJECTION, SOLUTION SUBCUTANEOUS AT BEDTIME
Qty: 0 | Refills: 0 | Status: DISCONTINUED | OUTPATIENT
Start: 2018-11-08 | End: 2018-11-10

## 2018-11-08 RX ADMIN — Medication 100 MILLIGRAM(S): at 05:36

## 2018-11-08 RX ADMIN — Medication 100 MILLIGRAM(S): at 15:58

## 2018-11-08 RX ADMIN — Medication 10 MILLIGRAM(S): at 05:37

## 2018-11-08 RX ADMIN — OXYCODONE AND ACETAMINOPHEN 2 TABLET(S): 5; 325 TABLET ORAL at 22:10

## 2018-11-08 RX ADMIN — Medication 1: at 12:42

## 2018-11-08 RX ADMIN — ENOXAPARIN SODIUM 40 MILLIGRAM(S): 100 INJECTION SUBCUTANEOUS at 21:16

## 2018-11-08 RX ADMIN — Medication 2: at 06:53

## 2018-11-08 RX ADMIN — ATORVASTATIN CALCIUM 20 MILLIGRAM(S): 80 TABLET, FILM COATED ORAL at 21:15

## 2018-11-08 RX ADMIN — Medication 1 APPLICATION(S): at 21:16

## 2018-11-08 RX ADMIN — INSULIN GLARGINE 14 UNIT(S): 100 INJECTION, SOLUTION SUBCUTANEOUS at 22:10

## 2018-11-08 RX ADMIN — Medication 5 MILLIGRAM(S): at 08:56

## 2018-11-08 RX ADMIN — OXYCODONE AND ACETAMINOPHEN 2 TABLET(S): 5; 325 TABLET ORAL at 22:40

## 2018-11-08 RX ADMIN — Medication 137 MICROGRAM(S): at 06:27

## 2018-11-08 RX ADMIN — OXYCODONE AND ACETAMINOPHEN 2 TABLET(S): 5; 325 TABLET ORAL at 16:04

## 2018-11-08 RX ADMIN — PANTOPRAZOLE SODIUM 40 MILLIGRAM(S): 20 TABLET, DELAYED RELEASE ORAL at 12:42

## 2018-11-08 RX ADMIN — OXYCODONE AND ACETAMINOPHEN 2 TABLET(S): 5; 325 TABLET ORAL at 17:00

## 2018-11-08 RX ADMIN — AMLODIPINE BESYLATE 10 MILLIGRAM(S): 2.5 TABLET ORAL at 05:37

## 2018-11-08 RX ADMIN — Medication 100 MILLIGRAM(S): at 05:37

## 2018-11-08 RX ADMIN — Medication 100 MILLIGRAM(S): at 21:15

## 2018-11-08 NOTE — PHYSICAL THERAPY INITIAL EVALUATION ADULT - PERTINENT HX OF CURRENT PROBLEM, REHAB EVAL
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 S/p gamma knife to brain met, not a candidate for nephrectomy per Urology given brain mets.

## 2018-11-08 NOTE — CONSULT NOTE ADULT - SUBJECTIVE AND OBJECTIVE BOX
Patient is a 58y old  Male who presents with a chief complaint of kyphoplasty (08 Nov 2018 11:47)      HPI:  ROIBN PETTIT is a 59yo Male with HTN, T2DM, HLD, metastatic RCC s/p gamma knife, spinal radiation, on chemo, presents w/ worsening back pain and MRI showing cord compression from spinal mets T6-T8 and intramedullary disease T7, s/p T8 kyphoplasty with intra-op radiation therapy on 11/7. Tolerating PO. Cards following for uncontrolled HTN.           REVIEW OF SYSTEMS  Constitutional - Denies fevers, chills  HEENT - Denies changes in vision or hearing  Respiratory - Denies cough, dyspnea  Cardiovascular - Denies chest pain, palpitations  Gastrointestinal - Denies n/v, constipation, bowel incontinence  Genitourinary - Denies dysuria, urinary incontinence  Neurological - Denies weakness, numbness, headaches  Skin - Denies rashes  Musculoskeletal - Denies arthralgia, myalgias, back pain  Psychiatric - Denies depressed mood, anxiety    PAST MEDICAL & SURGICAL HISTORY  Urinary retention  HLD (hyperlipidemia)  S/P radiation therapy  Malignant neoplasm of vertebral column  Hypothyroidism  GERD (gastroesophageal reflux disease)  Elevated TSH  Cervicalgia  Metastatic renal cell carcinoma to brain  Metastatic carcinoma to bone  Coronary artery disease  Hypertension  Metastatic renal cell carcinoma  Diabetes mellitus  Cancer  No pertinent past medical history  History of coronary artery stent placement  Status post gamma knife treatment  No significant past surgical history      SOCIAL HISTORY  Lives in  w/ wife, 3STE, only needs to be on 1st flr. Wife is currently in poor health.  PTA mod-I ambulation w/ quad cane and ADLs.    CURRENT FUNCTIONAL STATUS  Bed mobility - CG  Transfers - CG  Gait - CG RW 20' x2    FAMILY HISTORY:  Family history of colon cancer  Family history of diabetes mellitus in mother  Family history of diabetes mellitus in father      ALLERGIES  No Known Allergies      MEDICATIONS   acetaminophen   Tablet .. 650 milliGRAM(s) Oral every 6 hours PRN  amLODIPine   Tablet 10 milliGRAM(s) Oral daily  atorvastatin 20 milliGRAM(s) Oral at bedtime  benzonatate 100 milliGRAM(s) Oral three times a day PRN  ceFAZolin   IVPB 2000 milliGRAM(s) IV Intermittent once  docusate sodium 100 milliGRAM(s) Oral three times a day  enalapril 30 milliGRAM(s) Oral once  enalapril 10 milliGRAM(s) Oral two times a day  enoxaparin Injectable 40 milliGRAM(s) SubCutaneous at bedtime  glucagon  Injectable 1 milliGRAM(s) IntraMuscular once PRN  hydrocortisone 0.5% Cream 1 Application(s) Topical daily  influenza   Vaccine 0.5 milliLiter(s) IntraMuscular once  insulin glargine Injectable (LANTUS) 14 Unit(s) SubCutaneous at bedtime  insulin lispro (HumaLOG) corrective regimen sliding scale   SubCutaneous three times a day before meals  levothyroxine 137 MICROGram(s) Oral daily  lidocaine 1% Injectable 0.2 milliLiter(s) Local Injection once  ondansetron Injectable 4 milliGRAM(s) IV Push every 6 hours PRN  oxyCODONE    5 mG/acetaminophen 325 mG 1 Tablet(s) Oral every 6 hours PRN  oxyCODONE    5 mG/acetaminophen 325 mG 2 Tablet(s) Oral every 6 hours PRN  pantoprazole    Tablet 40 milliGRAM(s) Oral daily  senna 2 Tablet(s) Oral at bedtime PRN    ----------------------------------------------------------------------------------------  PHYSICAL EXAM  VITALS  T(C): 36.6 (11-08-18 @ 20:32), Max: 36.8 (11-08-18 @ 12:58)  HR: 81 (11-08-18 @ 20:32) (57 - 97)  BP: 146/88 (11-08-18 @ 15:54) (116/67 - 178/98)  RR: 18 (11-08-18 @ 20:32) (14 - 18)  SpO2: 95% (11-08-18 @ 20:32) (93% - 98%)    Constitutional - NAD, Comfortable  HEENT - NCAT, EOMI  Neck - Supple  Chest - CTAB  Cardiovascular - RRR  Abdomen - BS+, Soft, NTND  Extremities - No C/C/E, No calf tenderness   Neurologic Exam -                    Cognitive - Awake, Alert, AAO to self, place, date, year, situation     Communication - Fluent, No dysarthria     Cranial Nerves - CN 2-12 intact     Motor -                     LEFT    UE - ShAB 5/5, EF 5/5, EE 5/5, WE 5/5,  5/5                    RIGHT UE - ShAB 5/5, EF 5/5, EE 5/5, WE 5/5,  5/5                    LEFT    LE - HF 5/5, KE 5/5, DF 5/5, PF 5/5                    RIGHT LE - HF 5/5, KE 5/5, DF 5/5, PF 5/5        Sensory - Intact to LT     Reflexes - DTR Intact, No primitive reflexive     Coordination - FTN intact  Psychiatric - Mood stable, Affect WNL    RECENT LABS/IMAGING    < from: MR Thoracic Spine w/wo IV Cont (06.11.18 @ 17:20) >  IMPRESSION: Focal area of enhancement with surrounding edema and cord expansion at the level of the T7-8 interspace 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 Metastatic disease involving the T6-T8 vertebral bodies with progression at the T7 level as described above.    < from: MR Lumbar Spine w/wo IV Cont (08.25.18 @ 20:54) >  IMPRESSION: No cord signal abnormality or leptomeningeal enhancement of the cord at the lumbosacral level. Redemonstration of bilateral iliac lesions, most consistent with metastases. Partially visualized right renal mass.    < from: CT Thoracic Spine No Cont (11.07.18 @ 14:23) >  Impression: Lytic lesion in the T8 vertebral body which is of slightly decreased height. The T6 and T7 vertebral bodies are slightly decreased hyper of increased density.    ----------------------------------------------------------------------------------------  ASSESSMENT/PLAN    ROBIN PETTIT is a 59yo Male with PMH HTN HLD DM and metastatic RCC to spine admitted for K8 kyphoplasty with intra-op RT, now with functional, gait, ADL impairments.    Spinal mets s/p kyphoplasty - Pain control  HTN - mgmt per cards  DVT PPx - lovenox  Diet - CC DASH TLC    *** Incomplete *** Patient is a 58y old  Male who presents with a chief complaint of kyphoplasty (08 Nov 2018 11:47)      HPI:  ROBIN PETTIT is a 59yo Male with HTN, T2DM, HLD, metastatic RCC s/p gamma knife, spinal radiation, on chemo, presents w/ worsening back pain and MRI showing cord compression from spinal mets T6-T8 and intramedullary disease T7, s/p T8 kyphoplasty with intra-op radiation therapy on 11/7. Tolerating PO. Cards following for uncontrolled HTN.     REVIEW OF SYSTEMS  Constitutional - Denies fevers, chills  HEENT - Denies changes in vision or hearing  Respiratory - Denies cough, dyspnea  Cardiovascular - Denies chest pain, palpitations  Gastrointestinal - Denies n/v, constipation, bowel incontinence  Genitourinary - Denies dysuria, urinary incontinence  Neurological - Denies weakness, numbness, headaches  Skin - Denies rashes  Musculoskeletal - Denies arthralgia, myalgias, back pain  Psychiatric - Denies depressed mood, anxiety    PAST MEDICAL & SURGICAL HISTORY  Urinary retention  HLD (hyperlipidemia)  S/P radiation therapy  Malignant neoplasm of vertebral column  Hypothyroidism  GERD (gastroesophageal reflux disease)  Elevated TSH  Cervicalgia  Metastatic renal cell carcinoma to brain  Metastatic carcinoma to bone  Coronary artery disease  Hypertension  Metastatic renal cell carcinoma  Diabetes mellitus  Cancer  History of coronary artery stent placement  Status post gamma knife treatment    SOCIAL HISTORY  Lives in  w/ wife, 3STE, only needs to be on 1st flr. Wife is currently in poor health.  PTA mod-I ambulation w/ quad cane and ADLs.    CURRENT FUNCTIONAL STATUS  Bed mobility - CG  Transfers - CG  Gait - CG RW 20' x2    FAMILY HISTORY:  Family history of colon cancer  Family history of diabetes mellitus in mother  Family history of diabetes mellitus in father    ALLERGIES  No Known Allergies    MEDICATIONS  (STANDING):  amLODIPine   Tablet 10 milliGRAM(s) Oral daily  atorvastatin 20 milliGRAM(s) Oral at bedtime  ceFAZolin   IVPB 2000 milliGRAM(s) IV Intermittent once  docusate sodium 100 milliGRAM(s) Oral three times a day  enalapril 30 milliGRAM(s) Oral once  enalapril 10 milliGRAM(s) Oral two times a day  enoxaparin Injectable 40 milliGRAM(s) SubCutaneous at bedtime  hydrocortisone 0.5% Cream 1 Application(s) Topical daily  insulin glargine Injectable (LANTUS) 14 Unit(s) SubCutaneous at bedtime  insulin lispro (HumaLOG) corrective regimen sliding scale   SubCutaneous three times a day before meals  levothyroxine 137 MICROGram(s) Oral daily  lidocaine 1% Injectable 0.2 milliLiter(s) Local Injection once  pantoprazole    Tablet 40 milliGRAM(s) Oral daily    MEDICATIONS  (PRN):  acetaminophen   Tablet .. 650 milliGRAM(s) Oral every 6 hours PRN Temp greater or equal to 38C (100.4F), Mild Pain (1 - 3)  benzonatate 100 milliGRAM(s) Oral three times a day PRN Cough  ondansetron Injectable 4 milliGRAM(s) IV Push every 6 hours PRN Nausea and/or Vomiting  oxyCODONE    5 mG/acetaminophen 325 mG 1 Tablet(s) Oral every 6 hours PRN Moderate Pain (4 - 6)  oxyCODONE    5 mG/acetaminophen 325 mG 2 Tablet(s) Oral every 6 hours PRN Severe Pain (7 - 10)  senna 2 Tablet(s) Oral at bedtime PRN Constipation  ----------------------------------------------------------------------------------------  PHYSICAL EXAM  VITALS  T(C): 36.6 (11-08-18 @ 20:32), Max: 36.8 (11-08-18 @ 12:58)  HR: 81 (11-08-18 @ 20:32) (57 - 97)  BP: 146/88 (11-08-18 @ 15:54) (116/67 - 178/98)  RR: 18 (11-08-18 @ 20:32) (14 - 18)  SpO2: 95% (11-08-18 @ 20:32) (93% - 98%)    Constitutional - NAD, Comfortable  HEENT - NCAT, EOMI  Neck - Supple  Chest - CTAB  Cardiovascular - RRR  Abdomen - BS+, Soft, NTND  Extremities - No C/C/E, No calf tenderness   Neurologic Exam -                    Cognitive - Awake, Alert, AAO to self, place, date, year, situation     Communication - Fluent, No dysarthria     Cranial Nerves - CN 2-12 intact     Motor -                     LEFT    UE - ShAB 5/5, EF 5/5, EE 5/5, WE 5/5,  5/5                    RIGHT UE - ShAB 5/5, EF 5/5, EE 5/5, WE 5/5,  5/5                    LEFT    LE - HF 5/5, KE 5/5, DF 5/5, PF 5/5                    RIGHT LE - HF 5/5, KE 5/5, DF 5/5, PF 5/5        Sensory - Intact to LT     Reflexes - DTR Intact, No primitive reflexive     Coordination - FTN intact  Psychiatric - Mood stable, Affect WNL    RECENT LABS/IMAGING    < from: MR Thoracic Spine w/wo IV Cont (06.11.18 @ 17:20) >  IMPRESSION: Focal area of enhancement with surrounding edema and cord expansion at the level of the T7-8 interspace 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 Metastatic disease involving the T6-T8 vertebral bodies with progression at the T7 level as described above.    < from: MR Lumbar Spine w/wo IV Cont (08.25.18 @ 20:54) >  IMPRESSION: No cord signal abnormality or leptomeningeal enhancement of the cord at the lumbosacral level. Redemonstration of bilateral iliac lesions, most consistent with metastases. Partially visualized right renal mass.    < from: CT Thoracic Spine No Cont (11.07.18 @ 14:23) >  Impression: Lytic lesion in the T8 vertebral body which is of slightly decreased height. The T6 and T7 vertebral bodies are slightly decreased hyper of increased density.    ----------------------------------------------------------------------------------------  ASSESSMENT/PLAN    ROBIN PETTIT is a 59yo Male with PMH HTN HLD DM and metastatic RCC to spine admitted for K8 kyphoplasty with intra-op RT, now with functional, gait, ADL impairments.    Spinal mets s/p kyphoplasty - Pain control  HTN - mgmt per cards  DVT PPx - lovenox  Diet - CC DASH TLC    *** Incomplete *** Patient is a 58y old  Male who presents with a chief complaint of kyphoplasty (08 Nov 2018 11:47)      HPI:  ROBIN PETTIT is a 59yo Male with HTN, T2DM, HLD, metastatic RCC s/p gamma knife, spinal radiation, on chemo, presents w/ worsening back pain and MRI showing cord compression from spinal mets T6-T8 and intramedullary disease T7, s/p T8 kyphoplasty with intra-op radiation therapy on 11/7. Tolerating PO. Cards following for uncontrolled HTN.     REVIEW OF SYSTEMS  Constitutional - Denies fevers, chills  HEENT - Denies changes in vision or hearing  Respiratory - Denies cough, dyspnea  Cardiovascular - Denies chest pain, palpitations  Gastrointestinal - Denies n/v, constipation, bowel incontinence  Genitourinary - +chronic vasquez  Neurological - +L foot weakness  Skin - Denies rashes  Musculoskeletal - +Incisional pain  Psychiatric - Denies depressed mood, anxiety    PAST MEDICAL & SURGICAL HISTORY  Urinary retention  HLD (hyperlipidemia)  S/P radiation therapy  Malignant neoplasm of vertebral column  Hypothyroidism  GERD (gastroesophageal reflux disease)  Elevated TSH  Cervicalgia  Metastatic renal cell carcinoma to brain  Metastatic carcinoma to bone  Coronary artery disease  Hypertension  Metastatic renal cell carcinoma  Diabetes mellitus  Cancer  History of coronary artery stent placement  Status post gamma knife treatment    SOCIAL HISTORY  Lives in  w/ wife, 3STE, only needs to be on 1st flr. Wife is currently hospitalized after MVC. Pt is planning to have brother stay with him at nights and sister stay with him during the day.  PTA mod-I ambulation w/ SAC and ADLs.    CURRENT FUNCTIONAL STATUS  Bed mobility - CG  Transfers - CG  Gait - CG RW 20' x2    FAMILY HISTORY:  Family history of colon cancer  Family history of diabetes mellitus in mother  Family history of diabetes mellitus in father    ALLERGIES  No Known Allergies    MEDICATIONS  (STANDING):  amLODIPine   Tablet 10 milliGRAM(s) Oral daily  atorvastatin 20 milliGRAM(s) Oral at bedtime  ceFAZolin   IVPB 2000 milliGRAM(s) IV Intermittent once  docusate sodium 100 milliGRAM(s) Oral three times a day  enalapril 30 milliGRAM(s) Oral once  enalapril 10 milliGRAM(s) Oral two times a day  enoxaparin Injectable 40 milliGRAM(s) SubCutaneous at bedtime  hydrocortisone 0.5% Cream 1 Application(s) Topical daily  insulin glargine Injectable (LANTUS) 14 Unit(s) SubCutaneous at bedtime  insulin lispro (HumaLOG) corrective regimen sliding scale   SubCutaneous three times a day before meals  levothyroxine 137 MICROGram(s) Oral daily  lidocaine 1% Injectable 0.2 milliLiter(s) Local Injection once  pantoprazole    Tablet 40 milliGRAM(s) Oral daily    MEDICATIONS  (PRN):  acetaminophen   Tablet .. 650 milliGRAM(s) Oral every 6 hours PRN Temp greater or equal to 38C (100.4F), Mild Pain (1 - 3)  benzonatate 100 milliGRAM(s) Oral three times a day PRN Cough  ondansetron Injectable 4 milliGRAM(s) IV Push every 6 hours PRN Nausea and/or Vomiting  oxyCODONE    5 mG/acetaminophen 325 mG 1 Tablet(s) Oral every 6 hours PRN Moderate Pain (4 - 6)  oxyCODONE    5 mG/acetaminophen 325 mG 2 Tablet(s) Oral every 6 hours PRN Severe Pain (7 - 10)  senna 2 Tablet(s) Oral at bedtime PRN Constipation  ----------------------------------------------------------------------------------------  PHYSICAL EXAM  VS  T(C): 36.8 (11-09 @ 13:12)  HR: 80 (11-09 @ 13:12)  BP: 135/88 (11-09 @ 13:12)  RR: 18 (11-09 @ 13:12)  SpO2: 96% (11-09 @ 13:12)    Constitutional - NAD, Comfortable  HEENT - NCAT  Neck - Supple  Chest - CTAB  Cardiovascular - RRR  Abdomen - BS+, Soft, NTND  Neurologic Exam -                    Cognitive - Awake, Alert, AAO to self, place, date, year, situation     Communication - Fluent, No dysarthria     Motor -                     LEFT    UE - 5/5                    RIGHT UE - 5/5                    LEFT    LE - HF 3/5, KE 5/5, DF 2/5, PF 2/5                    RIGHT LE - 5/5        Sensory - Intact to LT     Reflexes - Patellar L3+ R2+     Coordination - FTN intact  Psychiatric - Mood stable, Affect WNL    RECENT LABS/IMAGING    < from: MR Thoracic Spine w/wo IV Cont (06.11.18 @ 17:20) >  IMPRESSION: Focal area of enhancement with surrounding edema and cord expansion at the level of the T7-8 interspace 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 Metastatic disease involving the T6-T8 vertebral bodies with progression at the T7 level as described above.    < from: MR Lumbar Spine w/wo IV Cont (08.25.18 @ 20:54) >  IMPRESSION: No cord signal abnormality or leptomeningeal enhancement of the cord at the lumbosacral level. Redemonstration of bilateral iliac lesions, most consistent with metastases. Partially visualized right renal mass.    < from: CT Thoracic Spine No Cont (11.07.18 @ 14:23) >  Impression: Lytic lesion in the T8 vertebral body which is of slightly decreased height. The T6 and T7 vertebral bodies are slightly decreased hyper of increased density.    ----------------------------------------------------------------------------------------  ASSESSMENT/PLAN    ROBIN PETTIT is a 59yo Male with PMH HTN HLD DM and metastatic RCC to spine admitted for K8 kyphoplasty with intra-op RT, now with functional, gait, ADL impairments.    Spinal mets s/p kyphoplasty - Pain control  HTN - mgmt per cards  DVT PPx - lovenox  Diet - CC DASH TLC    Rehab: Would be candidate for acute rehab, however pt himself chooses discharge home. Pt has necessary support system and 24hr supervision available from family members.  - Recommend DC HOME with HOME CARE Patient is a 58y old  Male who presents with a chief complaint of kyphoplasty (08 Nov 2018 11:47)    HPI:  ROBIN PETTIT is a 57yo Male with HTN, T2DM, HLD, metastatic RCC s/p gamma knife, spinal radiation, on chemo, presents w/ worsening back pain and MRI showing cord compression from spinal mets T6-T8 and intramedullary disease T7, s/p T8 kyphoplasty with intra-op radiation therapy on 11/7. Tolerating PO. Cards following for uncontrolled HTN.     REVIEW OF SYSTEMS  Constitutional - Denies fevers, chills  HEENT - Denies changes in vision or hearing  Respiratory - Denies cough, dyspnea  Cardiovascular - Denies chest pain, palpitations  Gastrointestinal - Denies n/v, constipation, bowel incontinence  Genitourinary - +chronic vasquez  Neurological - +L foot weakness  Skin - Denies rashes  Musculoskeletal - +Incisional pain  Psychiatric - Denies depressed mood, anxiety    PAST MEDICAL & SURGICAL HISTORY  Urinary retention  HLD (hyperlipidemia)  S/P radiation therapy  Malignant neoplasm of vertebral column  Hypothyroidism  GERD (gastroesophageal reflux disease)  Elevated TSH  Cervicalgia  Metastatic renal cell carcinoma to brain  Metastatic carcinoma to bone  Coronary artery disease  Hypertension  Metastatic renal cell carcinoma  Diabetes mellitus  Cancer  History of coronary artery stent placement  Status post gamma knife treatment    SOCIAL HISTORY  Lives in  w/ wife, 3STE, only needs to be on 1st flr. Wife is currently hospitalized after MVC. Pt is planning to have brother stay with him at nights and sister stay with him during the day.  PTA mod-I ambulation w/ SAC and ADLs.    CURRENT FUNCTIONAL STATUS  Bed mobility - CG  Transfers - CG  Gait - CG RW 20' x2    FAMILY HISTORY:  Family history of colon cancer  Family history of diabetes mellitus in mother  Family history of diabetes mellitus in father    ALLERGIES  No Known Allergies    MEDICATIONS  (STANDING):  amLODIPine   Tablet 10 milliGRAM(s) Oral daily  atorvastatin 20 milliGRAM(s) Oral at bedtime  ceFAZolin   IVPB 2000 milliGRAM(s) IV Intermittent once  docusate sodium 100 milliGRAM(s) Oral three times a day  enalapril 30 milliGRAM(s) Oral once  enalapril 10 milliGRAM(s) Oral two times a day  enoxaparin Injectable 40 milliGRAM(s) SubCutaneous at bedtime  hydrocortisone 0.5% Cream 1 Application(s) Topical daily  insulin glargine Injectable (LANTUS) 14 Unit(s) SubCutaneous at bedtime  insulin lispro (HumaLOG) corrective regimen sliding scale   SubCutaneous three times a day before meals  levothyroxine 137 MICROGram(s) Oral daily  lidocaine 1% Injectable 0.2 milliLiter(s) Local Injection once  pantoprazole    Tablet 40 milliGRAM(s) Oral daily    MEDICATIONS  (PRN):  acetaminophen   Tablet .. 650 milliGRAM(s) Oral every 6 hours PRN Temp greater or equal to 38C (100.4F), Mild Pain (1 - 3)  benzonatate 100 milliGRAM(s) Oral three times a day PRN Cough  ondansetron Injectable 4 milliGRAM(s) IV Push every 6 hours PRN Nausea and/or Vomiting  oxyCODONE    5 mG/acetaminophen 325 mG 1 Tablet(s) Oral every 6 hours PRN Moderate Pain (4 - 6)  oxyCODONE    5 mG/acetaminophen 325 mG 2 Tablet(s) Oral every 6 hours PRN Severe Pain (7 - 10)  senna 2 Tablet(s) Oral at bedtime PRN Constipation  ----------------------------------------------------------------------------------------  PHYSICAL EXAM  VS  T(C): 36.8 (11-09 @ 13:12)  HR: 80 (11-09 @ 13:12)  BP: 135/88 (11-09 @ 13:12)  RR: 18 (11-09 @ 13:12)  SpO2: 96% (11-09 @ 13:12)    Constitutional - NAD, Comfortable  HEENT - NCAT  Neck - Supple  Chest - CTAB  Cardiovascular - RRR  Abdomen - BS+, Soft, NTND  Neurologic Exam -                    Cognitive - Awake, Alert, AAO to self, place, date, year, situation     Communication - Fluent, No dysarthria     Motor -                     LEFT    UE - 5/5                    RIGHT UE - 5/5                    LEFT    LE - HF 3/5, KE 5/5, DF 2/5, PF 2/5                    RIGHT LE - 5/5        Sensory - Intact to LT     Reflexes - Patellar L3+ R2+     Coordination - FTN intact  Psychiatric - Mood stable, Affect WNL    RECENT LABS/IMAGING    < from: MR Thoracic Spine w/wo IV Cont (06.11.18 @ 17:20) >  IMPRESSION: Focal area of enhancement with surrounding edema and cord expansion at the level of the T7-8 interspace 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 Metastatic disease involving the T6-T8 vertebral bodies with progression at the T7 level as described above.    < from: MR Lumbar Spine w/wo IV Cont (08.25.18 @ 20:54) >  IMPRESSION: No cord signal abnormality or leptomeningeal enhancement of the cord at the lumbosacral level. Redemonstration of bilateral iliac lesions, most consistent with metastases. Partially visualized right renal mass.    < from: CT Thoracic Spine No Cont (11.07.18 @ 14:23) >  Impression: Lytic lesion in the T8 vertebral body which is of slightly decreased height. The T6 and T7 vertebral bodies are slightly decreased hyper of increased density.    ----------------------------------------------------------------------------------------  ASSESSMENT/PLAN    ROBIN PETTIT is a 57yo Male with PMH HTN HLD DM and metastatic RCC to spine admitted for K8 kyphoplasty with intra-op RT, now with functional, gait, ADL impairments.    Spinal mets s/p kyphoplasty - Pain control  HTN - mgmt per cards  DVT PPx - lovenox  Diet - CC DASH TLC    Rehab: Would be candidate for acute rehab, however pt himself chooses discharge home. Pt has necessary support system and 24hr supervision available from family members.  - Recommend DC HOME with HOME CARE

## 2018-11-08 NOTE — CONSULT NOTE ADULT - ASSESSMENT
HTN  BP better now  cont current meds    DM  Monitor finger stick. Insulin coverage. Diabetic education and Diabetic diet. Consider nutrition consultation.

## 2018-11-08 NOTE — PHYSICAL THERAPY INITIAL EVALUATION ADULT - ADDITIONAL COMMENTS
pt lives in private home with spouse and 3 steps to enter and first floor set-up inside home. prior to admission pt was independent with functional mobility and used quad cane for ambulation. pts wife currently in intensive care unit and is primary caregiver for pt.

## 2018-11-08 NOTE — CONSULT NOTE ADULT - SUBJECTIVE AND OBJECTIVE BOX
CHIEF COMPLAINT:Patient is a 58y old  Male who presents with a chief complaint of kyphoplasty (08 Nov 2018 11:47)      HISTORY OF PRESENT ILLNESS:    58 male with history as below s/p   T8 Kypho-IORT	  No chest pain, dyspnea, palpitation, or dizziness.     PAST MEDICAL & SURGICAL HISTORY:  Urinary retention: Hampton catheter placed - 4 weeks ago  HLD (hyperlipidemia)  S/P radiation therapy: spine, on cabozantinib since Nov 2017  Malignant neoplasm of vertebral column  Hypothyroidism  GERD (gastroesophageal reflux disease)  Elevated TSH  Cervicalgia  Metastatic renal cell carcinoma to brain  Hypertension  Diabetes mellitus: Type 2  Status post gamma knife treatment: Left forehead/temple          MEDICATIONS:  amLODIPine   Tablet 10 milliGRAM(s) Oral daily  enalapril 30 milliGRAM(s) Oral once  enalapril 40 milliGRAM(s) Oral <User Schedule>  enoxaparin Injectable 40 milliGRAM(s) SubCutaneous at bedtime    ceFAZolin   IVPB 2000 milliGRAM(s) IV Intermittent once    benzonatate 100 milliGRAM(s) Oral three times a day PRN    acetaminophen   Tablet .. 650 milliGRAM(s) Oral every 6 hours PRN  ondansetron Injectable 4 milliGRAM(s) IV Push every 6 hours PRN  oxyCODONE    5 mG/acetaminophen 325 mG 1 Tablet(s) Oral every 6 hours PRN  oxyCODONE    5 mG/acetaminophen 325 mG 2 Tablet(s) Oral every 6 hours PRN    docusate sodium 100 milliGRAM(s) Oral three times a day  pantoprazole    Tablet 40 milliGRAM(s) Oral daily  senna 2 Tablet(s) Oral at bedtime PRN    atorvastatin 20 milliGRAM(s) Oral at bedtime  dextrose 40% Gel 15 Gram(s) Oral once PRN  dextrose 50% Injectable 12.5 Gram(s) IV Push once  dextrose 50% Injectable 25 Gram(s) IV Push once  dextrose 50% Injectable 25 Gram(s) IV Push once  glucagon  Injectable 1 milliGRAM(s) IntraMuscular once PRN  insulin glargine Injectable (LANTUS) 14 Unit(s) SubCutaneous at bedtime  insulin lispro (HumaLOG) corrective regimen sliding scale   SubCutaneous three times a day before meals  levothyroxine 137 MICROGram(s) Oral daily    dextrose 5%. 1000 milliLiter(s) IV Continuous <Continuous>  hydrocortisone 0.5% Cream 1 Application(s) Topical daily  influenza   Vaccine 0.5 milliLiter(s) IntraMuscular once      FAMILY HISTORY:  Family history of colon cancer  Family history of diabetes mellitus in mother  Family history of diabetes mellitus in father      Non-contributory    SOCIAL HISTORY:    No tobacco, drugs or etoh    Allergies    No Known Allergies    Intolerances    	    REVIEW OF SYSTEMS:  as above  The rest of the 14 points ROS reviewed and except above they are unremarkable.        PHYSICAL EXAM:  T(C): 36.7 (11-08-18 @ 15:54), Max: 36.8 (11-08-18 @ 12:58)  HR: 97 (11-08-18 @ 15:54) (47 - 97)  BP: 146/88 (11-08-18 @ 15:54) (116/67 - 202/109)  RR: 18 (11-08-18 @ 15:54) (14 - 18)  SpO2: 95% (11-08-18 @ 15:54) (93% - 100%)  Wt(kg): --  I&O's Summary    07 Nov 2018 07:01  -  08 Nov 2018 07:00  --------------------------------------------------------  IN: 1175 mL / OUT: 1950 mL / NET: -775 mL    08 Nov 2018 07:01  -  08 Nov 2018 16:33  --------------------------------------------------------  IN: 740 mL / OUT: 350 mL / NET: 390 mL        Appearance: Normal	  HEENT:   no gross abnormality   Cardiovascular: Normal S1 S2,    Murmur:   Neck: JVP normal  Respiratory: Lungs clear   Gastrointestinal:  Soft, Non-tender  Skin: normal   Neuro: No gross deficits.   Psychiatry:  Mood & affect flat  Ext: No edema    LABS/DATA:    TELEMETRY: 	    ECG:  	   	  CARDIAC MARKERS:                        proBNP:   Lipid Profile:   HgA1c:   TSH:

## 2018-11-08 NOTE — CONSULT NOTE ADULT - SUBJECTIVE AND OBJECTIVE BOX
CHIEF COMPLAINT:    HISTORY OF PRESENT ILLNESS:  58 year old male with stage 4 RCC who developed low back pain with associated LLE weakness.  MRI showed spinal mets.  He presented for surgery.  Found to be hypertensive. States pain is not well controlled when he stands up.   Denies chest pain, shortness of breath or palpitations.     PAST MEDICAL & SURGICAL HISTORY:  Urinary retention: Vasquez catheter placed - 4 weeks ago  HLD (hyperlipidemia)  S/P radiation therapy: spine, on cabozantinib since Nov 2017  Malignant neoplasm of vertebral column  Hypothyroidism  GERD (gastroesophageal reflux disease)  Elevated TSH  Cervicalgia  Metastatic renal cell carcinoma to brain  Hypertension  Diabetes mellitus: Type 2  Status post gamma knife treatment: Left forehead/temple          MEDICATIONS:  amLODIPine   Tablet 10 milliGRAM(s) Oral daily  enalapril 30 milliGRAM(s) Oral once  enalapril 40 milliGRAM(s) Oral <User Schedule>    ceFAZolin   IVPB 2000 milliGRAM(s) IV Intermittent once  ceFAZolin   IVPB 1000 milliGRAM(s) IV Intermittent every 8 hours    benzonatate 100 milliGRAM(s) Oral three times a day PRN    acetaminophen   Tablet .. 650 milliGRAM(s) Oral every 6 hours PRN  ondansetron Injectable 4 milliGRAM(s) IV Push every 6 hours PRN  oxyCODONE    5 mG/acetaminophen 325 mG 1 Tablet(s) Oral every 6 hours PRN  oxyCODONE    5 mG/acetaminophen 325 mG 2 Tablet(s) Oral every 6 hours PRN    docusate sodium 100 milliGRAM(s) Oral three times a day  pantoprazole    Tablet 40 milliGRAM(s) Oral daily  senna 2 Tablet(s) Oral at bedtime PRN    atorvastatin 20 milliGRAM(s) Oral at bedtime  dextrose 40% Gel 15 Gram(s) Oral once PRN  dextrose 50% Injectable 12.5 Gram(s) IV Push once  dextrose 50% Injectable 25 Gram(s) IV Push once  dextrose 50% Injectable 25 Gram(s) IV Push once  glucagon  Injectable 1 milliGRAM(s) IntraMuscular once PRN  insulin lispro (HumaLOG) corrective regimen sliding scale   SubCutaneous three times a day before meals  levothyroxine 137 MICROGram(s) Oral daily    dextrose 5%. 1000 milliLiter(s) IV Continuous <Continuous>  hydrocortisone 0.5% Cream 1 Application(s) Topical daily  influenza   Vaccine 0.5 milliLiter(s) IntraMuscular once      FAMILY HISTORY:  Family history of colon cancer  Family history of diabetes mellitus in mother  Family history of diabetes mellitus in father      SOCIAL HISTORY:    [ ] Non-smoker  [ ] Smoker  [ ] Alcohol    Allergies    No Known Allergies    Intolerances    	    REVIEW OF SYSTEMS:  CONSTITUTIONAL: No fever, weight loss, or fatigue  EYES: No eye pain, visual disturbances, or discharge  ENMT:  No difficulty hearing, tinnitus, vertigo; No sinus or throat pain  NECK: No pain or stiffness  RESPIRATORY: No cough, wheezing, chills or hemoptysis; No Shortness of Breath  CARDIOVASCULAR: No chest pain, palpitations, passing out, dizziness, or leg swelling  GASTROINTESTINAL: No abdominal or epigastric pain. No nausea, vomiting, or hematemesis; No diarrhea or constipation. No melena or hematochezia.  GENITOURINARY: No dysuria, frequency, hematuria, or incontinence  NEUROLOGICAL: No headaches, memory loss, loss of strength, numbness, or tremors  SKIN: No itching, burning, rashes, or lesions   LYMPH Nodes: No enlarged glands  ENDOCRINE: No heat or cold intolerance; No hair loss  MUSCULOSKELETAL: + joint pain or swelling; No muscle, back, or extremity pain  PSYCHIATRIC: No depression, anxiety, mood swings, or difficulty sleeping  HEME/LYMPH: No easy bruising, or bleeding gums  ALLERY AND IMMUNOLOGIC: No hives or eczema	    [ ] All others negative	  [ ] Unable to obtain    PHYSICAL EXAM:  T(C): 36.7 (11-08-18 @ 10:20), Max: 36.7 (11-08-18 @ 10:20)  HR: 91 (11-08-18 @ 10:20) (47 - 94)  BP: 138/96 (11-08-18 @ 10:20) (116/67 - 202/109)  RR: 16 (11-08-18 @ 10:20) (14 - 16)  SpO2: 96% (11-08-18 @ 10:20) (93% - 100%)  Wt(kg): --  I&O's Summary    07 Nov 2018 07:01  -  08 Nov 2018 07:00  --------------------------------------------------------  IN: 1175 mL / OUT: 1950 mL / NET: -775 mL    08 Nov 2018 07:01  -  08 Nov 2018 12:36  --------------------------------------------------------  IN: 420 mL / OUT: 0 mL / NET: 420 mL        Appearance: Normal	  HEENT:   Normal oral mucosa, PERRL, EOMI	  Lymphatic: No lymphadenopathy  Cardiovascular: Normal S1 S2, No JVD, No murmurs, No edema  Respiratory: Lungs clear to auscultation	  Psychiatry: A & O x 3, Mood & affect appropriate  Gastrointestinal:  Soft, Non-tender, + BS	  Skin: No rashes, No ecchymoses, No cyanosis	  Neurologic: Non-focal  Extremities: Normal range of motion, No clubbing, cyanosis or edema  Vascular: Peripheral pulses palpable 2+ bilaterally  +vasquez        TELEMETRY: 	    ECG:  	  RADIOLOGY:  < from: MR Lumbar Spine w/wo IV Cont (08.25.18 @ 20:54) >    EXAM:  MR SPINE LUMBAR WAW IC        PROCEDURE DATE:  08/25/2018           INTERPRETATION:    LUMBOSACRAL SPINE MRI    CLINICAL INFORMATION: Increasing lower back pain with left lower   extremity foot drop and weakness. Renal cell carcinoma with diffuse   metastatic bone metastases.    TECHNIQUE: Multiplanar, multisequence MR imaging was obtained of the   lumbosacral spine with and without the administration of intravenous   contrast. 10 cc of Gadavist were administered intravenously.    COMPARISON: Lumbar spine MRI dated 6/14/2018, PET CT dated 8/25/2018.    FINDINGS:    DISC LEVEL EVALUATION:    L1/L2: No spinal canal or foraminal narrowing.  L2/L3: No spinal canal or foraminal narrowing.  L3/L4: Mild disc bulge and facet joint hypertrophy indenting the thecal   sac. No foraminal narrowing.  L4/L5: Mild disc bulge with bilateral facet joint arthrosis resulting in   indentation on the thecal sac and mild bilateral foraminal narrowing.  L5/S1: Mild disc bulge and facet joint arthrosis resulting in mild   indentation on the thecal sac and mild to moderate right and mild left   foraminal narrowing.    SPINAL ALIGNMENT: There is preservation of the vertebral body heights.   There is no significant loss of intravertebral disc height. Schmorl's   node is visualized within the L2 superior endplate. There is multilevel   anterior and lateral osteophyte formation.  DISTAL CORD AND CONUS: Cord terminates at L1. There is no abnormal cord   signal or leptomeningeal enhancement. There is epidural lipomatosis at   the level of the sacrum.  MARROW: Lobulated enhancing lesion is visualized within the left   posterior iliac bone measuring 2.1 x 1.2 x 2.3 cm and at the level of the   inferior right iliac bone at the sacroiliac articulation measuring 1.9 x   3.5 x 1.4 cm as seen on prior studies, most consistent with metastatic   foci.  PARASPINAL MUSCLE AND SOFT TISSUES: No paraspinal muscle atrophy.  INTRAABDOMINAL/INTRAPELVIC SOFT TISSUES: Partially visualized   heterogeneous right renal mass is again seen.  Partially visualized   distended bladder    IMPRESSION:   No cord signal abnormality or leptomeningeal enhancement of the cord at   the lumbosacral level. Redemonstration of bilateral iliac lesions, most   consistent with metastases. Partially visualized right renal mass.                   HARLEY WILL M.D., ATTENDING RADIOLOGIST  This document has been electronically signed. Aug 29 2018  4:29PM                < from: NM PET/CT Onc FDG Skull to Thigh, Subsq (08.25.18 @ 18:38) >    EXAM:  PETCT [facility] ONC FDG SUBS        PROCEDURE DATE:  08/25/2018           INTERPRETATION:  PROCEDURE:  PET/CT SKULL BASE-MID THIGH IMAGING     CLINICAL INFORMATION: 58-year-old male with right renal cell carcinoma,   retroperitoneal lymphadenopathy, pulmonary nodules, bone metastases and   new low back pain and lower extremity weakness. Evaluate extent of   disease. PET/CT is done as part of the subsequent treatment strategy   evaluation.    RADIOPHARMACEUTICAL:  11.11 mCi F-18, FDG, I.V.    TECHNIQUE:  Fasting blood sugar measured prior to injection of   radiopharmaceutical was 82 mg/dl. Following intravenous injection of the   radiopharmaceutical and an uptake period of approximately 55 minutes,   FDG-PET/CT was obtained on a [employer] 710 from skull base to mid   thigh. Oral contrast was given during the uptake period. CT was performed   during shallow respiration. The CT protocol was optimized for PET   attenuation correction and anatomic localization. The CT protocol was not   designed to produce and cannot replace state-of-the-art diagnostic CT   images with specific imaging protocols for different body parts and   indications. Images were reviewed on a dedicated workstation using   multiplanar reconstruction.    The standardized uptake values (SUV) are normalized to patient body   weight and indicate the highest activity concentration (SUVmax) in a   given disease site. All image numbers refer to axial image number.    COMPARISON:  No prior FDG-PET/CT is available.    OTHER STUDIES USED FOR CORRELATION: CT chest, abdomen, and pelvis dated   8/15/2018.    FINDINGS:     HEAD/NECK: Physiologic FDG activity in visualized brain, head, and neck.      CHEST: There are several enlarged hypermetabolic lymph nodes in the   bilateral anterior mediastinal and right paratracheal regions, not   significantly changed in size as compared to recent diagnostic CT. For   reference, a right lower paratracheal node measures 3.0 x 2.4 cm with SUV   17.2 (image 70).    LUNGS: Few small, minimally FDG-avid bilateral pulmonary nodules.   Reference left lower lobe nodule measures 1.1 x 0.7 cm (SUV 2.3; image   97).    PLEURA/PERICARDIUM: No abnormal FDG activity. No effusion.        HEPATOBILIARY/PANCREAS: A difficult to delineate hypermetabolic focus in   the left hepatic lobe demonstrates SUV 6.3; image 111). Physiologic FDG   activity in remainder of liver (SUV mean 2.6).    SPLEEN: Physiologic FDG activity. Normal in size.        ADRENAL GLANDS: No abnormal FDG activity. No nodule.    KIDNEYS/URINARY BLADDER: Peripherally FDG-avid, centrally photopenic   exophytic right lower pole renal mass measures 6.7 x 6.4 cm (SUV 5.0;   image 151).    REPRODUCTIVE ORGANS: No abnormal FDG activity.        ABDOMINOPELVIC NODES: A small FDG-avid aortocaval lymph node measures 1.5   x 0.8 cm (SUV 4.3; image 135).     BOWEL/PERITONEUM/MESENTERY: No abnormal FDG activity.        BONES/SOFT TISSUES: Few hypermetabolic lytic and mixed lytic and   sclerotic metastases in the axial skeleton, as seen on recent diagnostic   CT. For reference, a mixed lytic and sclerotic lesion in the body of   approximately T6 demonstrates SUV 6.1 (image 85). A difficult to   delineate lesion in posterior left iliac bone demonstrates SUV 5.0 (image   182). A 2 cm lytic lesion in posterolateral aspect of right 8th rib   demonstrates SUV 8.2 (image 101).    IMPRESSION: Abnormal FDG-PET/CT scan.    1. Peripherally hypermetabolic centrally photopenic right renal mass   corresponds to patient's known malignancy.    2. Hypermetabolic bilateral mediastinal and aortocaval lymph nodes are   compatible with metastatic disease.    3. A difficult to delineate hypermetabolic focus in left hepatic lobe is   suspicious for metastasis. If visualized with ultrasound, lesions are   accessible to percutaneous ultrasound-guided needle biopsy. MRI may be   obtained to further delineate the lesion.    4. Small, minimally FDG-avid bilateral pulmonary nodules, as seen on CT   dated 8/15/2018, are compatible with metastatic disease.    5. Few hypermetabolic lytic and mixed lytic and sclerotic metastases in   axial skeleton. These lesions may account for the patient's pain.   Etiology of lower extremity weakness is not delineated on this scan.                    MAE MCGILL M.D., NUCLEAR MEDICINE ATTENDING  This document has been electronically signed. Aug 27 2018  1:50PM                < end of copied text >      OTHER: 	  	  LABS:	 	    CARDIAC MARKERS:                    proBNP:   Lipid Profile:   HgA1c:   TSH:

## 2018-11-08 NOTE — PROGRESS NOTE ADULT - SUBJECTIVE AND OBJECTIVE BOX
HPI:  Patient is a 58 year old male with stage 4 RCC who developed low back pain with associated LLE weakness.  MRI showed spinal mets.  Now presented for surgery.    OVERNIGHT EVENTS:  No acute events overnight.  Blood pressure elevated at baseline, however more elevated this AM then usual.  No other complaints.  Patient states incisional pain well controlled on current regimen.      Vital Signs Last 24 Hrs  T(C): 36.7 (08 Nov 2018 10:20), Max: 36.7 (08 Nov 2018 10:20)  T(F): 98.1 (08 Nov 2018 10:20), Max: 98.1 (08 Nov 2018 10:20)  HR: 91 (08 Nov 2018 10:20) (47 - 94)  BP: 138/96 (08 Nov 2018 10:20) (116/67 - 202/109)  BP(mean): 127 (08 Nov 2018 06:30) (85 - 148)  RR: 16 (08 Nov 2018 10:20) (14 - 16)  SpO2: 96% (08 Nov 2018 10:20) (93% - 100%)        PHYSICAL EXAM:  Neurological: awake, alert, oriented x3, follows commands, speech clear and fluent, moves UE 5/5 strength throughout b/l, moves LLE 4+/5 strength throughout, moves RLE 5/5 strength throughout, sensation present, intact, equal throughout    Cardiovascular: +s1, s2  Respiratory: clear to auscultation b/l  Gastrointestinal: soft, non-distended, non-tender  Genitourinary: +voiding  Incision/Wound: posterior back midline dressing c/d/i    TUBES/LINES:  [x] none    DIET:  [x] regular    LABS:  No new labs    CAPILLARY BLOOD GLUCOSE  POCT Blood Glucose.: 177 mg/dL (08 Nov 2018 09:47)  POCT Blood Glucose.: 234 mg/dL (08 Nov 2018 06:43)  POCT Blood Glucose.: 184 mg/dL (07 Nov 2018 19:50)      Allergies    No Known Allergies          MEDICATIONS:  Antibiotics:  ceFAZolin   IVPB 2000 milliGRAM(s) IV Intermittent once  ceFAZolin   IVPB 1000 milliGRAM(s) IV Intermittent every 8 hours    Neuro:  acetaminophen   Tablet .. 650 milliGRAM(s) Oral every 6 hours PRN  ondansetron Injectable 4 milliGRAM(s) IV Push every 6 hours PRN  oxyCODONE    5 mG/acetaminophen 325 mG 1 Tablet(s) Oral every 6 hours PRN  oxyCODONE    5 mG/acetaminophen 325 mG 2 Tablet(s) Oral every 6 hours PRN    Anticoagulation:  Lovenox 40U SQ at bedtime    OTHER:  amLODIPine   Tablet 10 milliGRAM(s) Oral daily  atorvastatin 20 milliGRAM(s) Oral at bedtime  benzonatate 100 milliGRAM(s) Oral three times a day PRN  dextrose 40% Gel 15 Gram(s) Oral once PRN  dextrose 50% Injectable 12.5 Gram(s) IV Push once  dextrose 50% Injectable 25 Gram(s) IV Push once  dextrose 50% Injectable 25 Gram(s) IV Push once  docusate sodium 100 milliGRAM(s) Oral three times a day  enalapril 10 milliGRAM(s) Oral two times a day  glucagon  Injectable 1 milliGRAM(s) IntraMuscular once PRN  hydrocortisone 0.5% Cream 1 Application(s) Topical daily  influenza   Vaccine 0.5 milliLiter(s) IntraMuscular once  insulin lispro (HumaLOG) corrective regimen sliding scale   SubCutaneous three times a day before meals  levothyroxine 137 MICROGram(s) Oral daily  lidocaine 1% Injectable 0.2 milliLiter(s) Local Injection once  pantoprazole    Tablet 40 milliGRAM(s) Oral daily  senna 2 Tablet(s) Oral at bedtime PRN    IVF:  dextrose 5%. 1000 milliLiter(s) IV Continuous <Continuous>  sodium chloride 0.9% with potassium chloride 20 mEq/L 1000 milliLiter(s) IV Continuous <Continuous>    CULTURES:  none    RADIOLOGY & ADDITIONAL TESTS:  none

## 2018-11-08 NOTE — PHYSICAL THERAPY INITIAL EVALUATION ADULT - PRECAUTIONS/LIMITATIONS, REHAB EVAL
spinal precautions/fall precautions/S/p spinal radiation, now on cabozantinib since Nov 2017 c/o worsening back pain radiating to LLE with associated weakness and numbness and pelvic numbness-s/p MRI revealed osseous spinal mets from T6-8 with cord compression, in addition to intramedullary disease at T7 with surrounding cord edema. Evaluated by Dr. Tobias. Denies HA, blurry vision, chest pain, or sob. Presents to PST for a scheduled T8 Kyphoplasty on 11/7/2018./surgical precautions

## 2018-11-08 NOTE — PROGRESS NOTE ADULT - ASSESSMENT
HPI:  Patient is a 58 year old male with stage 4 RCC who developed low back pain with associated LLE weakness.  MRI showed spinal mets.  Now presented for surgery.    PROCEDURE: s/p T8 kyphoplasty and intra-op radiation therapy on 11/7/2018   POD#1    PLAN:  -periop ancef x3 doses total post op  -cardiology saw, enalapril increased to 40mg daily  -continue other home medications  -oxycodone for pain control  -senna and colace for bowel regimen  -HISS for glucose control, check fingersticks qac and qhs  -regular diet  -out of bed with assistance  -incentive spirometer for lung expansion  -pt eval pending  -dispo planning    Spectra #[id number]

## 2018-11-08 NOTE — CONSULT NOTE ADULT - ATTENDING COMMENTS
Seen and examined with resident. Agree with note.   We recommend he goes to acute rehabilitation- however, patient adamant about return home - discussed with him and he would prefer home so will discharge home with home PT.

## 2018-11-09 ENCOUNTER — TRANSCRIPTION ENCOUNTER (OUTPATIENT)
Age: 58
End: 2018-11-09

## 2018-11-09 ENCOUNTER — APPOINTMENT (OUTPATIENT)
Dept: UROLOGY | Facility: CLINIC | Age: 58
End: 2018-11-09

## 2018-11-09 LAB
ANION GAP SERPL CALC-SCNC: 9 MMOL/L — SIGNIFICANT CHANGE UP (ref 5–17)
BUN SERPL-MCNC: 18 MG/DL — SIGNIFICANT CHANGE UP (ref 7–23)
CALCIUM SERPL-MCNC: 8.4 MG/DL — SIGNIFICANT CHANGE UP (ref 8.4–10.5)
CHLORIDE SERPL-SCNC: 103 MMOL/L — SIGNIFICANT CHANGE UP (ref 96–108)
CO2 SERPL-SCNC: 28 MMOL/L — SIGNIFICANT CHANGE UP (ref 22–31)
CREAT SERPL-MCNC: 0.96 MG/DL — SIGNIFICANT CHANGE UP (ref 0.5–1.3)
GLUCOSE BLDC GLUCOMTR-MCNC: 126 MG/DL — HIGH (ref 70–99)
GLUCOSE BLDC GLUCOMTR-MCNC: 129 MG/DL — HIGH (ref 70–99)
GLUCOSE BLDC GLUCOMTR-MCNC: 162 MG/DL — HIGH (ref 70–99)
GLUCOSE BLDC GLUCOMTR-MCNC: 168 MG/DL — HIGH (ref 70–99)
GLUCOSE BLDC GLUCOMTR-MCNC: 168 MG/DL — HIGH (ref 70–99)
GLUCOSE SERPL-MCNC: 130 MG/DL — HIGH (ref 70–99)
HCT VFR BLD CALC: 35.5 % — LOW (ref 39–50)
HGB BLD-MCNC: 11.5 G/DL — LOW (ref 13–17)
MCHC RBC-ENTMCNC: 29.5 PG — SIGNIFICANT CHANGE UP (ref 27–34)
MCHC RBC-ENTMCNC: 32.3 GM/DL — SIGNIFICANT CHANGE UP (ref 32–36)
MCV RBC AUTO: 91.1 FL — SIGNIFICANT CHANGE UP (ref 80–100)
PLATELET # BLD AUTO: 191 K/UL — SIGNIFICANT CHANGE UP (ref 150–400)
POTASSIUM SERPL-MCNC: 4.1 MMOL/L — SIGNIFICANT CHANGE UP (ref 3.5–5.3)
POTASSIUM SERPL-SCNC: 4.1 MMOL/L — SIGNIFICANT CHANGE UP (ref 3.5–5.3)
RBC # BLD: 3.9 M/UL — LOW (ref 4.2–5.8)
RBC # FLD: 16.7 % — HIGH (ref 10.3–14.5)
SODIUM SERPL-SCNC: 140 MMOL/L — SIGNIFICANT CHANGE UP (ref 135–145)
WBC # BLD: 4.4 K/UL — SIGNIFICANT CHANGE UP (ref 3.8–10.5)
WBC # FLD AUTO: 4.4 K/UL — SIGNIFICANT CHANGE UP (ref 3.8–10.5)

## 2018-11-09 PROCEDURE — 99232 SBSQ HOSP IP/OBS MODERATE 35: CPT

## 2018-11-09 RX ADMIN — OXYCODONE AND ACETAMINOPHEN 2 TABLET(S): 5; 325 TABLET ORAL at 13:40

## 2018-11-09 RX ADMIN — ATORVASTATIN CALCIUM 20 MILLIGRAM(S): 80 TABLET, FILM COATED ORAL at 21:07

## 2018-11-09 RX ADMIN — OXYCODONE AND ACETAMINOPHEN 2 TABLET(S): 5; 325 TABLET ORAL at 21:07

## 2018-11-09 RX ADMIN — Medication 100 MILLIGRAM(S): at 06:17

## 2018-11-09 RX ADMIN — Medication 1: at 17:54

## 2018-11-09 RX ADMIN — OXYCODONE AND ACETAMINOPHEN 2 TABLET(S): 5; 325 TABLET ORAL at 06:46

## 2018-11-09 RX ADMIN — AMLODIPINE BESYLATE 10 MILLIGRAM(S): 2.5 TABLET ORAL at 06:15

## 2018-11-09 RX ADMIN — Medication 10 MILLIGRAM(S): at 17:38

## 2018-11-09 RX ADMIN — INSULIN GLARGINE 14 UNIT(S): 100 INJECTION, SOLUTION SUBCUTANEOUS at 21:06

## 2018-11-09 RX ADMIN — Medication 10 MILLIGRAM(S): at 06:16

## 2018-11-09 RX ADMIN — OXYCODONE AND ACETAMINOPHEN 2 TABLET(S): 5; 325 TABLET ORAL at 06:16

## 2018-11-09 RX ADMIN — Medication 1: at 14:35

## 2018-11-09 RX ADMIN — Medication 100 MILLIGRAM(S): at 13:41

## 2018-11-09 RX ADMIN — Medication 100 MILLIGRAM(S): at 21:07

## 2018-11-09 RX ADMIN — OXYCODONE AND ACETAMINOPHEN 2 TABLET(S): 5; 325 TABLET ORAL at 13:10

## 2018-11-09 RX ADMIN — OXYCODONE AND ACETAMINOPHEN 2 TABLET(S): 5; 325 TABLET ORAL at 22:37

## 2018-11-09 RX ADMIN — Medication 137 MICROGRAM(S): at 06:15

## 2018-11-09 RX ADMIN — PANTOPRAZOLE SODIUM 40 MILLIGRAM(S): 20 TABLET, DELAYED RELEASE ORAL at 13:41

## 2018-11-09 RX ADMIN — Medication 1 APPLICATION(S): at 21:09

## 2018-11-09 RX ADMIN — ENOXAPARIN SODIUM 40 MILLIGRAM(S): 100 INJECTION SUBCUTANEOUS at 21:06

## 2018-11-09 NOTE — DISCHARGE NOTE ADULT - HOSPITAL COURSE
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 S/p gamma knife to brain met, not a candidate for nephrectomy per Urology given brain mets. S/p spinal radiation, now on cabozantinib since Nov 2017 c/o worsening back pain radiating to LLE with associated weakness and numbness and pelvic numbness- He is s/p MRI which revealed osseous spinal mets from T6-8 with cord compression, in addition to intramedullary disease at T7 with surrounding cord edema. Evaluated by Dr. Tobias. Denies HA, blurry vision, chest pain, or sob. Presents to PST for a scheduled T8 Kyphoplasty on 11/7/2018.  Case was aborted in 8/29/2018 due to severe hypertension after induction as per anesthesiology notes    He is Post-op day # 3 s/p T8 Kyphoplasty. Neurologically patient is stable he is stronger as compare to before surgery. He is able to move all 4 with good strength except that his left plantar flexion is 4/5 and left dorsiflexion 2/5. PT/OT/PM&R recommended subacute rehab which patient declined because he wants to go home. He is cleared for discharge by PT and will go home with home services

## 2018-11-09 NOTE — OCCUPATIONAL THERAPY INITIAL EVALUATION ADULT - LIVES WITH, PROFILE
Pt lives in a pvt home with spouse, 3 steps to enter with a handrail. No stairs inside. +Tub with curtain. Owns a rollator and quad cane/spouse

## 2018-11-09 NOTE — OCCUPATIONAL THERAPY INITIAL EVALUATION ADULT - IMPAIRED TRANSFERS: SIT/STAND, REHAB EVAL
impaired balance/impaired coordination/decreased sensation/impaired sensory feedback/decreased ROM/decreased strength

## 2018-11-09 NOTE — DISCHARGE NOTE ADULT - CARE PROVIDERS DIRECT ADDRESSES
[email or web address]

## 2018-11-09 NOTE — OCCUPATIONAL THERAPY INITIAL EVALUATION ADULT - DIAGNOSIS, OT EVAL
Patient with decreased ADL status and impairments with functional mobility due to Patient with decreased ADL status and impairments with functional mobility due to decreased balance, ROM, strength, coordination, sensation, and proprioception

## 2018-11-09 NOTE — DISCHARGE NOTE ADULT - CARE PROVIDER_API CALL
Jennie Tobias (DO), Neurological Surgery  [address]  [place] [address]  Phone: [phone]  Fax: [phone]

## 2018-11-09 NOTE — DISCHARGE NOTE ADULT - CARE PLAN
Principal Discharge DX:	Malignant neoplasm of vertebral column  Goal:	S/p T8 Kyphoplasty  Assessment and plan of treatment:	PT/OT/PM&R are recommending subacute rehab, patient is refusing he is cleared to go home with home PT  Secondary Diagnosis:	HLD (hyperlipidemia)  Goal:	Continue Atorvastatin  Secondary Diagnosis:	Hypertension  Goal:	Normotension  Assessment and plan of treatment:	Continue Amlodipine and Enalapril  Secondary Diagnosis:	GERD (gastroesophageal reflux disease)  Goal:	Continue Pantoprazole  Secondary Diagnosis:	Hypothyroidism  Goal:	Continue Levothyroxine  Secondary Diagnosis:	Metastatic renal cell carcinoma to brain  Assessment and plan of treatment:	Continue follow up with Urology  Secondary Diagnosis:	Urinary retention  Goal:	Continue vasquez

## 2018-11-09 NOTE — OCCUPATIONAL THERAPY INITIAL EVALUATION ADULT - BALANCE DISTURBANCE, IDENTIFIED IMPAIRMENT CONTRIBUTE, REHAB EVAL
decreased strength/impaired sensory feedback/decreased sensation/impaired coordination/decreased ROM

## 2018-11-09 NOTE — PROGRESS NOTE ADULT - SUBJECTIVE AND OBJECTIVE BOX
Subjective: Patient seen and examined. No new events except as noted.   feels ok   pain not completely controlled     REVIEW OF SYSTEMS:    CONSTITUTIONAL: + weakness, fevers or chills  EYES/ENT: No visual changes;  No vertigo or throat pain   NECK: No pain or stiffness  RESPIRATORY: No cough, wheezing, hemoptysis; No shortness of breath  CARDIOVASCULAR: No chest pain or palpitations  GASTROINTESTINAL: No abdominal or epigastric pain. No nausea, vomiting, or hematemesis; No diarrhea or constipation. No melena or hematochezia.  GENITOURINARY: No dysuria, frequency or hematuria  NEUROLOGICAL: No numbness or weakness  SKIN: No itching, burning, rashes, or lesions   All other review of systems is negative unless indicated above.    MEDICATIONS:  MEDICATIONS  (STANDING):  amLODIPine   Tablet 10 milliGRAM(s) Oral daily  atorvastatin 20 milliGRAM(s) Oral at bedtime  ceFAZolin   IVPB 2000 milliGRAM(s) IV Intermittent once  dextrose 5%. 1000 milliLiter(s) (50 mL/Hr) IV Continuous <Continuous>  dextrose 50% Injectable 12.5 Gram(s) IV Push once  dextrose 50% Injectable 25 Gram(s) IV Push once  dextrose 50% Injectable 25 Gram(s) IV Push once  docusate sodium 100 milliGRAM(s) Oral three times a day  enalapril 30 milliGRAM(s) Oral once  enalapril 10 milliGRAM(s) Oral two times a day  enoxaparin Injectable 40 milliGRAM(s) SubCutaneous at bedtime  hydrocortisone 0.5% Cream 1 Application(s) Topical daily  influenza   Vaccine 0.5 milliLiter(s) IntraMuscular once  insulin glargine Injectable (LANTUS) 14 Unit(s) SubCutaneous at bedtime  insulin lispro (HumaLOG) corrective regimen sliding scale   SubCutaneous three times a day before meals  levothyroxine 137 MICROGram(s) Oral daily  lidocaine 1% Injectable 0.2 milliLiter(s) Local Injection once  pantoprazole    Tablet 40 milliGRAM(s) Oral daily      PHYSICAL EXAM:  T(C): 36.5 (11-09-18 @ 09:12), Max: 36.8 (11-08-18 @ 12:58)  HR: 81 (11-09-18 @ 09:12) (60 - 97)  BP: 135/89 (11-09-18 @ 09:12) (122/74 - 150/93)  RR: 18 (11-09-18 @ 09:12) (16 - 18)  SpO2: 97% (11-09-18 @ 09:12) (95% - 97%)  Wt(kg): --  I&O's Summary    08 Nov 2018 07:01  -  09 Nov 2018 07:00  --------------------------------------------------------  IN: 1040 mL / OUT: 950 mL / NET: 90 mL          Appearance: Normal	  HEENT:   Normal oral mucosa, PERRL, EOMI	  Lymphatic: No lymphadenopathy , no edema  Cardiovascular: Normal S1 S2, No JVD, No murmurs , Peripheral pulses palpable 2+ bilaterally  Respiratory: Lungs clear to auscultation, normal effort 	  Gastrointestinal:  Soft, Non-tender, + BS	  Skin: No rashes, No ecchymoses, No cyanosis, warm to touch  Musculoskeletal: Normal range of motion, normal strength  Psychiatry:  Mood & affect appropriate  Ext: No edema  [place]    LABS:    CARDIAC MARKERS:                                11.5   4.4   )-----------( 191      ( 09 Nov 2018 06:51 )             35.5     11-09    140  |  103  |  18  ----------------------------<  130<H>  4.1   |  28  |  0.96    Ca    8.4      09 Nov 2018 06:49      proBNP:   Lipid Profile:   HgA1c:   TSH:             TELEMETRY: 	    ECG:  	  RADIOLOGY:   DIAGNOSTIC TESTING:  [ ] Echocardiogram:  [ ]  Catheterization:  [ ] Stress Test:    OTHER:

## 2018-11-09 NOTE — OCCUPATIONAL THERAPY INITIAL EVALUATION ADULT - PERTINENT HX OF CURRENT PROBLEM, REHAB EVAL
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 S/p gamma knife to brain met, not a candidate for nephrectomy per Urology given brain mets. S/p spinal radiation, now on cabozantinib since Nov 2017 c/o worsening back pain radiating to LLE with associated weakness and numbness and pelvic numbness-s/p MRI revealed osseous spinal mets from T6-8 with cord compression, in addition to intramedullary disease at T7 with surrounding cord edema.

## 2018-11-09 NOTE — DISCHARGE NOTE ADULT - HOME CARE AGENCY
[facility] [place]  for RN visit the day after discharge to evaluate for home health aide, physical and occupational therapy services

## 2018-11-09 NOTE — PROGRESS NOTE ADULT - SUBJECTIVE AND OBJECTIVE BOX
Patient is a 59 y/o male admitted with lower back pain radiating to his LLE associated with weakness and numbness. He was found to have a lytic lesion @ T8 most likely related to mets renal cancer. He is s/p T8 Kyphoplasty  Due to patients deconditioning and general weakness secondary to his recent surgery, he will require a transport chair. Patient is unable to self propel in a standard wheelchair. This is necessary to achieve daily tasks and therapies which can not be achieved with the use of a cane or rolling walker. Patient and family are in agreement with wheelchair use at home and assistance will be provided if needed

## 2018-11-09 NOTE — OCCUPATIONAL THERAPY INITIAL EVALUATION ADULT - PRECAUTIONS/LIMITATIONS, REHAB EVAL
Evaluated by Dr. Tobias. Presents to PST for a scheduled T8 Kyphoplasty on 11/7/2018.Case was aborted in 8/29/2018 due to severe hypertension after induction as per anesthesiology notes (alpha page 14 )/fall precautions/spinal precautions/surgical precautions

## 2018-11-09 NOTE — DISCHARGE NOTE ADULT - NS AS ACTIVITY OBS
Keep wound dry/Stairs allowed/No Heavy lifting/straining/Walking-Outdoors allowed/Do not drive or operate machinery/Bathing allowed/Showering allowed/Walking-Indoors allowed

## 2018-11-09 NOTE — DISCHARGE NOTE ADULT - PATIENT PORTAL LINK FT
You can access the [facility] Patient Portal, offered by [facility], by registering with the following website: [email or web address]

## 2018-11-09 NOTE — OCCUPATIONAL THERAPY INITIAL EVALUATION ADULT - ADDITIONAL COMMENTS
CT Thoracic spine 11/7- Lytic lesion in the T8 vertebral body which is of slightly decreased height. The T6 and T7 vertebral bodies are slightly decreased hyper of increased density.

## 2018-11-09 NOTE — DISCHARGE NOTE ADULT - ADDITIONAL INSTRUCTIONS
Please make an appointment to see Dr Tobias in 0ne week for follow up  Please make a follow up appointment with your Urologist as soon as possible

## 2018-11-09 NOTE — DISCHARGE NOTE ADULT - PLAN OF CARE
S/p T8 Kyphoplasty PT/OT/PM&R are recommending subacute rehab, patient is refusing he is cleared to go home with home PT Continue Atorvastatin Normotension Continue Amlodipine and Enalapril Continue Pantoprazole Continue Levothyroxine Continue follow up with Urology Continue vasquez

## 2018-11-09 NOTE — PROGRESS NOTE ADULT - SUBJECTIVE AND OBJECTIVE BOX
Post-op day # 2s/p T8 Kyphoplasty    OVERNIGHT EVENTS: none    Vital Signs Last 24 Hrs  T(C): 36.5 (09 Nov 2018 09:12), Max: 36.8 (08 Nov 2018 12:58)  T(F): 97.7 (09 Nov 2018 09:12), Max: 98.3 (08 Nov 2018 12:58)  HR: 84 (09 Nov 2018 10:52) (60 - 97)  BP: 117/82 (09 Nov 2018 10:52) (117/82 - 150/93)  BP(mean): --  RR: 18 (09 Nov 2018 09:12) (16 - 18)  SpO2: 96% (09 Nov 2018 10:52) (95% - 97%)    I&O's Detail    08 Nov 2018 07:01  -  09 Nov 2018 07:00  --------------------------------------------------------  IN:    Oral Fluid: 1040 mL  Total IN: 1040 mL    OUT:    Indwelling Catheter - Urethral: 950 mL  Total OUT: 950 mL    Total NET: 90 mL      09 Nov 2018 07:01  -  09 Nov 2018 12:22  --------------------------------------------------------  IN:    Oral Fluid: 480 mL  Total IN: 480 mL    OUT:  Total OUT: 0 mL    Total NET: 480 mL        I&O's Summary    08 Nov 2018 07:01  -  09 Nov 2018 07:00  --------------------------------------------------------  IN: 1040 mL / OUT: 950 mL / NET: 90 mL    09 Nov 2018 07:01  -  09 Nov 2018 12:22  --------------------------------------------------------  IN: 480 mL / OUT: 0 mL / NET: 480 mL        PHYSICAL EXAM:  Neurological:    Motor exam:         [x] Upper extremity                 D             B          T          WE       WF      HI                                               R         5/5        5/5        5/5       5/5     5/5       5/5                                               L          5/5        5/5        5/5       5/5     5/5       5/5         [x] Lower extremity                Ps          Ha        Quad    EHL        FHL                                               R        5/5        5/5        5/5       5/5         5/5                                               L         5/5        5/5      4/5    2/5       4/5                                                          Sensation: [x] intact to light touch  [] decreased:     Gait: not tested    Cardiovascular: regular  Respiratory: clear bilaterally  Gastrointestinal: soft + BS  Genitourinary: vasquez in place  Extremities: no C/C/E  Incision/Wound: intact, dressing removed       DIET: Regular  [] NPO  [] Mechanical  [] Tube feeds    LABS:                        11.5   4.4   )-----------( 191      ( 09 Nov 2018 06:51 )             35.5     11-09    140  |  103  |  18  ----------------------------<  130<H>  4.1   |  28  |  0.96    Ca    8.4      09 Nov 2018 06:49        POCT Blood Glucose.: 126 mg/dL (09 Nov 2018 08:53)  POCT Blood Glucose.: 145 mg/dL (08 Nov 2018 21:21)  POCT Blood Glucose.: 134 mg/dL (08 Nov 2018 17:17)  POCT Blood Glucose.: 158 mg/dL (08 Nov 2018 12:35)        Allergies    No Known Allergies    Intolerances      MEDICATIONS:  Antibiotics:  ceFAZolin   IVPB 2000 milliGRAM(s) IV Intermittent once    Neuro:  acetaminophen   Tablet .. 650 milliGRAM(s) Oral every 6 hours PRN  ondansetron Injectable 4 milliGRAM(s) IV Push every 6 hours PRN  oxyCODONE    5 mG/acetaminophen 325 mG 1 Tablet(s) Oral every 6 hours PRN  oxyCODONE    5 mG/acetaminophen 325 mG 2 Tablet(s) Oral every 6 hours PRN    Anticoagulation:  enoxaparin Injectable 40 milliGRAM(s) SubCutaneous at bedtime    OTHER:  amLODIPine   Tablet 10 milliGRAM(s) Oral daily  atorvastatin 20 milliGRAM(s) Oral at bedtime  benzonatate 100 milliGRAM(s) Oral three times a day PRN  dextrose 40% Gel 15 Gram(s) Oral once PRN  dextrose 50% Injectable 12.5 Gram(s) IV Push once  dextrose 50% Injectable 25 Gram(s) IV Push once  dextrose 50% Injectable 25 Gram(s) IV Push once  docusate sodium 100 milliGRAM(s) Oral three times a day  enalapril 30 milliGRAM(s) Oral once  enalapril 10 milliGRAM(s) Oral two times a day  glucagon  Injectable 1 milliGRAM(s) IntraMuscular once PRN  hydrocortisone 0.5% Cream 1 Application(s) Topical daily  influenza   Vaccine 0.5 milliLiter(s) IntraMuscular once  insulin glargine Injectable (LANTUS) 14 Unit(s) SubCutaneous at bedtime  insulin lispro (HumaLOG) corrective regimen sliding scale   SubCutaneous three times a day before meals  levothyroxine 137 MICROGram(s) Oral daily  lidocaine 1% Injectable 0.2 milliLiter(s) Local Injection once  pantoprazole    Tablet 40 milliGRAM(s) Oral daily  senna 2 Tablet(s) Oral at bedtime PRN    IVF:  dextrose 5%. 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 S/p gamma knife to brain met, not a candidate for nephrectomy per Urology given brain mets. S/p spinal radiation, now on cabozantinib since Nov 2017 c/o worsening back pain radiating to LLE with associated weakness and numbness and pelvic numbness-s/p MRI revealed osseous spinal mets from T6-8 with cord compression, in addition to intramedullary disease at T7 with surrounding cord edema. Evaluated by Dr. Tobias. Denies HA, blurry vision, chest pain, or sob.    Case was aborted in 8/29/2018 due to severe hypertension after induction as per anesthesiology notes    Post-op day # 2 s/p T8 Kyphoplasty    Please Check When Present   [x]  GCS  E4   V5  M6     Heart Failure: []Acute, [] acute on chronic , []chronic  Heart Failure:  [] Diastolic (HFpEF), [] Systolic (HFrEF), []Combined (HFpEF and HFrEF), [] RHF, [] Pulm HTN, [] Other    [] DAVID, [] ATN, [] AIN, [] other  [] CKD1, [] CKD2, [] CKD 3, [] CKD 4, [] CKD 5, []ESRD    Encephalopathy: [] Metabolic, [] Hepatic, [] toxic, [] Neurological, [] Other    Abnormal Nurtitional Status: [] malnurtition (see nutrition note), [ ]underweight: BMI < 19, [] morbid obesity: BMI >40, [] Cachexia    [] Sepsis  [] hypovolemic shock,[] cardiogenic shock, [] hemorrhagic shock, [] neuogenic shock  [] Acute Respiratory Failure  []Cerebral edema, [] Brain compression/ herniation,   [] Functional quadriplegia  [] Acute blood loss anemia      PLAN:  NEURO: Stable, PT to work with patient over the week/end, will d/c home once clear by physical therapy  CARDIOVASCULAR: no issue  PULMONARY: stable  RENAL: Continue vasquez , urology consult pending  GI: Continue regular diet  HEME: H/H stable  ID: Afebrile  ENDO: No issue    DVT PROPHYLAXIS:  [] Venodynes                                [x] Heparin/Lovenox    FALL RISK:  [x] Low Risk                                    [] Impulsive Post-op day # 2s/p T8 Kyphoplasty    OVERNIGHT EVENTS: none    Vital Signs Last 24 Hrs  T(C): 36.5 (09 Nov 2018 09:12), Max: 36.8 (08 Nov 2018 12:58)  T(F): 97.7 (09 Nov 2018 09:12), Max: 98.3 (08 Nov 2018 12:58)  HR: 84 (09 Nov 2018 10:52) (60 - 97)  BP: 117/82 (09 Nov 2018 10:52) (117/82 - 150/93)  BP(mean): --  RR: 18 (09 Nov 2018 09:12) (16 - 18)  SpO2: 96% (09 Nov 2018 10:52) (95% - 97%)    I&O's Detail    08 Nov 2018 07:01  -  09 Nov 2018 07:00  --------------------------------------------------------  IN:    Oral Fluid: 1040 mL  Total IN: 1040 mL    OUT:    Indwelling Catheter - Urethral: 950 mL  Total OUT: 950 mL    Total NET: 90 mL      09 Nov 2018 07:01  -  09 Nov 2018 12:22  --------------------------------------------------------  IN:    Oral Fluid: 480 mL  Total IN: 480 mL    OUT:  Total OUT: 0 mL    Total NET: 480 mL        I&O's Summary    08 Nov 2018 07:01  -  09 Nov 2018 07:00  --------------------------------------------------------  IN: 1040 mL / OUT: 950 mL / NET: 90 mL    09 Nov 2018 07:01  -  09 Nov 2018 12:22  --------------------------------------------------------  IN: 480 mL / OUT: 0 mL / NET: 480 mL        PHYSICAL EXAM:  Neurological:    Motor exam:         [x] Upper extremity                 D             B          T          WE       WF      HI                                               R         5/5        5/5        5/5       5/5     5/5       5/5                                               L          5/5        5/5        5/5       5/5     5/5       5/5         [x] Lower extremity                Ps          Ha        Quad    EHL        FHL                                               R        5/5        5/5        5/5       5/5         5/5                                               L         5/5        5/5      4/5    2/5       4/5                                                          Sensation: [x] intact to light touch  [] decreased:     Gait: not tested    Cardiovascular: regular  Respiratory: clear bilaterally  Gastrointestinal: soft + BS  Genitourinary: vasquez in place  Extremities: no C/C/E  Incision/Wound: intact, dressing removed       DIET: Regular  [] NPO  [] Mechanical  [] Tube feeds    LABS:                        11.5   4.4   )-----------( 191      ( 09 Nov 2018 06:51 )             35.5     11-09    140  |  103  |  18  ----------------------------<  130<H>  4.1   |  28  |  0.96    Ca    8.4      09 Nov 2018 06:49        POCT Blood Glucose.: 126 mg/dL (09 Nov 2018 08:53)  POCT Blood Glucose.: 145 mg/dL (08 Nov 2018 21:21)  POCT Blood Glucose.: 134 mg/dL (08 Nov 2018 17:17)  POCT Blood Glucose.: 158 mg/dL (08 Nov 2018 12:35)        Allergies    No Known Allergies    Intolerances      MEDICATIONS:  Antibiotics:  ceFAZolin   IVPB 2000 milliGRAM(s) IV Intermittent once    Neuro:  acetaminophen   Tablet .. 650 milliGRAM(s) Oral every 6 hours PRN  ondansetron Injectable 4 milliGRAM(s) IV Push every 6 hours PRN  oxyCODONE    5 mG/acetaminophen 325 mG 1 Tablet(s) Oral every 6 hours PRN  oxyCODONE    5 mG/acetaminophen 325 mG 2 Tablet(s) Oral every 6 hours PRN    Anticoagulation:  enoxaparin Injectable 40 milliGRAM(s) SubCutaneous at bedtime    OTHER:  amLODIPine   Tablet 10 milliGRAM(s) Oral daily  atorvastatin 20 milliGRAM(s) Oral at bedtime  benzonatate 100 milliGRAM(s) Oral three times a day PRN  dextrose 40% Gel 15 Gram(s) Oral once PRN  dextrose 50% Injectable 12.5 Gram(s) IV Push once  dextrose 50% Injectable 25 Gram(s) IV Push once  dextrose 50% Injectable 25 Gram(s) IV Push once  docusate sodium 100 milliGRAM(s) Oral three times a day  enalapril 30 milliGRAM(s) Oral once  enalapril 10 milliGRAM(s) Oral two times a day  glucagon  Injectable 1 milliGRAM(s) IntraMuscular once PRN  hydrocortisone 0.5% Cream 1 Application(s) Topical daily  influenza   Vaccine 0.5 milliLiter(s) IntraMuscular once  insulin glargine Injectable (LANTUS) 14 Unit(s) SubCutaneous at bedtime  insulin lispro (HumaLOG) corrective regimen sliding scale   SubCutaneous three times a day before meals  levothyroxine 137 MICROGram(s) Oral daily  lidocaine 1% Injectable 0.2 milliLiter(s) Local Injection once  pantoprazole    Tablet 40 milliGRAM(s) Oral daily  senna 2 Tablet(s) Oral at bedtime PRN    IVF:  dextrose 5%. 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 S/p gamma knife to brain met, not a candidate for nephrectomy per Urology given brain mets. S/p spinal radiation, now on cabozantinib since Nov 2017 c/o worsening back pain radiating to LLE with associated weakness and numbness and pelvic numbness- He is s/p MRI which revealed osseous spinal mets from T6-8 with cord compression, in addition to intramedullary disease at T7 with surrounding cord edema. Evaluated by Dr. Tobias. Denies HA, blurry vision, chest pain, or sob.    Case was aborted in 8/29/2018 due to severe hypertension after induction as per anesthesiology notes    Post-op day # 2 s/p T8 Kyphoplasty    Please Check When Present   [x]  GCS  E4   V5  M6     Heart Failure: []Acute, [] acute on chronic , []chronic  Heart Failure:  [] Diastolic (HFpEF), [] Systolic (HFrEF), []Combined (HFpEF and HFrEF), [] RHF, [] Pulm HTN, [] Other    [] DAVID, [] ATN, [] AIN, [] other  [] CKD1, [] CKD2, [] CKD 3, [] CKD 4, [] CKD 5, []ESRD    Encephalopathy: [] Metabolic, [] Hepatic, [] toxic, [] Neurological, [] Other    Abnormal Nurtitional Status: [] malnurtition (see nutrition note), [ ]underweight: BMI < 19, [] morbid obesity: BMI >40, [] Cachexia    [] Sepsis  [] hypovolemic shock,[] cardiogenic shock, [] hemorrhagic shock, [] neuogenic shock  [] Acute Respiratory Failure  []Cerebral edema, [] Brain compression/ herniation,   [] Functional quadriplegia  [] Acute blood loss anemia      PLAN:  NEURO: Stable, PT to work with patient over the week/end, will d/c home once clear by physical therapy  CARDIOVASCULAR: no issue  PULMONARY: stable  RENAL: Continue vasquez , urology consult pending  GI: Continue regular diet  HEME: H/H stable  ID: Afebrile  ENDO: No issue    DVT PROPHYLAXIS:  [] Venodynes                                [x] Heparin/Lovenox    FALL RISK:  [x] Low Risk                                    [] Impulsive

## 2018-11-09 NOTE — DISCHARGE NOTE ADULT - MEDICATION SUMMARY - MEDICATIONS TO TAKE
I will START or STAY ON the medications listed below when I get home from the hospital:    acetaminophen 325 mg oral tablet  -- 2 tab(s) by mouth every 6 hours, As needed, Temp greater or equal to 38C (100.4F), Mild Pain (1 - 3)  -- Indication: For Fever or mild pain    Percocet 5/325 oral tablet  -- 1 tab(s) by mouth every 8 hours, As Needed -for severe pain MDD:3   -- Caution federal law prohibits the transfer of this drug to any person other  than the person for whom it was prescribed.  May cause drowsiness.  Alcohol may intensify this effect.  Use care when operating dangerous machinery.  This prescription cannot be refilled.  This product contains acetaminophen.  Do not use  with any other product containing acetaminophen to prevent possible liver damage.  Using more of this medication than prescribed may cause serious breathing problems.    -- Indication: For Moderate to severe pain    enalapril 10 mg oral tablet  -- 1 tab(s) by mouth 2 times a day  -- Indication: For Hypertension    Lantus Solostar Pen 100 units/mL subcutaneous solution  -- 14 unit(s) subcutaneous once a day (at bedtime)  -- Indication: For Type 2 diabetes    repaglinide 1 mg oral tablet  -- 1 tab(s) by mouth 3 times a day (before meals)  -- Indication: For Type 2 diabetes    atorvastatin 20 mg oral tablet  -- 1 tab(s) by mouth once a day (at bedtime)  -- Indication: For Hyperlipidemia    benzonatate 100 mg oral capsule  -- 1 cap(s) by mouth 3 times a day, As Needed  -- Indication: For Antitus    amLODIPine 10 mg oral tablet  -- 1 tab(s) by mouth once a day  -- Indication: For Hypertension    hydrocortisone 0.5% topical cream  -- 1 application on skin once a day to the affected area  -- Indication: For Topical ointment    Senna 8.6 mg oral tablet  -- 2 tab(s) by mouth once a day (at bedtime), As Needed  -- Indication: For Stool softener    docusate sodium 100 mg oral capsule  -- 1 cap(s) by mouth 3 times a day  -- Indication: For Stool softener    cabozantinib 60 mg oral tablet  -- 1 tab(s) by mouth once a day (1hr before breakfast)  -- Indication: For Antineoplasm    pantoprazole 40 mg oral delayed release tablet  -- 1 tab(s) by mouth once a day  -- Indication: For Gastritis    levothyroxine 137 mcg (0.137 mg) oral tablet  -- 1 tab(s) by mouth once a day  -- Indication: For Hypothyroidism

## 2018-11-09 NOTE — DISCHARGE NOTE ADULT - SECONDARY DIAGNOSIS.
HLD (hyperlipidemia) Hypertension GERD (gastroesophageal reflux disease) Hypothyroidism Metastatic renal cell carcinoma to brain Urinary retention

## 2018-11-09 NOTE — OCCUPATIONAL THERAPY INITIAL EVALUATION ADULT - IMPAIRED TRANSFERS: BED/CHAIR, REHAB EVAL
impaired balance/impaired coordination/decreased sensation/impaired sensory feedback/decreased strength/decreased ROM

## 2018-11-10 VITALS
OXYGEN SATURATION: 95 % | HEART RATE: 89 BPM | DIASTOLIC BLOOD PRESSURE: 82 MMHG | RESPIRATION RATE: 18 BRPM | TEMPERATURE: 98 F | SYSTOLIC BLOOD PRESSURE: 158 MMHG

## 2018-11-10 LAB — GLUCOSE BLDC GLUCOMTR-MCNC: 118 MG/DL — HIGH (ref 70–99)

## 2018-11-10 PROCEDURE — 76000 FLUOROSCOPY <1 HR PHYS/QHP: CPT

## 2018-11-10 PROCEDURE — 51728 CYSTOMETROGRAM W/VP: CPT

## 2018-11-10 PROCEDURE — 51741 ELECTRO-UROFLOWMETRY FIRST: CPT

## 2018-11-10 PROCEDURE — 51797 INTRAABDOMINAL PRESSURE TEST: CPT

## 2018-11-10 PROCEDURE — C1713: CPT

## 2018-11-10 PROCEDURE — 97166 OT EVAL MOD COMPLEX 45 MIN: CPT

## 2018-11-10 PROCEDURE — 80048 BASIC METABOLIC PNL TOTAL CA: CPT

## 2018-11-10 PROCEDURE — 97110 THERAPEUTIC EXERCISES: CPT

## 2018-11-10 PROCEDURE — 82962 GLUCOSE BLOOD TEST: CPT

## 2018-11-10 PROCEDURE — 97162 PT EVAL MOD COMPLEX 30 MIN: CPT

## 2018-11-10 PROCEDURE — 85027 COMPLETE CBC AUTOMATED: CPT

## 2018-11-10 PROCEDURE — 51784 ANAL/URINARY MUSCLE STUDY: CPT

## 2018-11-10 PROCEDURE — 72128 CT CHEST SPINE W/O DYE: CPT

## 2018-11-10 PROCEDURE — C1726: CPT

## 2018-11-10 PROCEDURE — 97530 THERAPEUTIC ACTIVITIES: CPT

## 2018-11-10 PROCEDURE — 97116 GAIT TRAINING THERAPY: CPT

## 2018-11-10 RX ORDER — DOCUSATE SODIUM 100 MG
1 CAPSULE ORAL
Qty: 0 | Refills: 0 | DISCHARGE
Start: 2018-11-10

## 2018-11-10 RX ORDER — PANTOPRAZOLE SODIUM 20 MG/1
1 TABLET, DELAYED RELEASE ORAL
Qty: 0 | Refills: 0 | DISCHARGE
Start: 2018-11-10

## 2018-11-10 RX ORDER — HYDROCORTISONE 1 %
1 OINTMENT (GRAM) TOPICAL
Qty: 1 | Refills: 0 | OUTPATIENT
Start: 2018-11-10 | End: 2018-11-16

## 2018-11-10 RX ORDER — CYCLOBENZAPRINE HYDROCHLORIDE 10 MG/1
5 TABLET, FILM COATED ORAL ONCE
Qty: 0 | Refills: 0 | Status: COMPLETED | OUTPATIENT
Start: 2018-11-10 | End: 2018-11-10

## 2018-11-10 RX ORDER — ACETAMINOPHEN 500 MG
2 TABLET ORAL
Qty: 0 | Refills: 0 | COMMUNITY
Start: 2018-11-10

## 2018-11-10 RX ADMIN — Medication 10 MILLIGRAM(S): at 05:57

## 2018-11-10 RX ADMIN — OXYCODONE AND ACETAMINOPHEN 2 TABLET(S): 5; 325 TABLET ORAL at 03:49

## 2018-11-10 RX ADMIN — OXYCODONE AND ACETAMINOPHEN 2 TABLET(S): 5; 325 TABLET ORAL at 03:19

## 2018-11-10 RX ADMIN — OXYCODONE AND ACETAMINOPHEN 1 TABLET(S): 5; 325 TABLET ORAL at 10:24

## 2018-11-10 RX ADMIN — CYCLOBENZAPRINE HYDROCHLORIDE 5 MILLIGRAM(S): 10 TABLET, FILM COATED ORAL at 00:51

## 2018-11-10 RX ADMIN — Medication 100 MILLIGRAM(S): at 05:57

## 2018-11-10 RX ADMIN — OXYCODONE AND ACETAMINOPHEN 1 TABLET(S): 5; 325 TABLET ORAL at 11:00

## 2018-11-10 RX ADMIN — Medication 137 MICROGRAM(S): at 05:57

## 2018-11-10 RX ADMIN — AMLODIPINE BESYLATE 10 MILLIGRAM(S): 2.5 TABLET ORAL at 05:57

## 2018-11-10 NOTE — PROGRESS NOTE ADULT - SUBJECTIVE AND OBJECTIVE BOX
Post-op day # 3 s/p T8 Kyphoplasty    OVERNIGHT EVENTS:  Vital Signs Last 24 Hrs  T(C): 37 (10 Nov 2018 05:40), Max: 37.4 (09 Nov 2018 20:49)  T(F): 98.6 (10 Nov 2018 05:40), Max: 99.3 (09 Nov 2018 20:49)  HR: 91 (10 Nov 2018 05:40) (76 - 91)  BP: 136/77 (10 Nov 2018 05:40) (117/82 - 156/85)  BP(mean): --  RR: 18 (10 Nov 2018 05:40) (18 - 18)  SpO2: 93% (10 Nov 2018 05:40) (93% - 97%)    I&O's Detail    09 Nov 2018 07:01  -  10 Nov 2018 07:00  --------------------------------------------------------  IN:    Oral Fluid: 760 mL  Total IN: 760 mL    OUT:    Indwelling Catheter - Urethral: 1000 mL    Stool: 1 mL  Total OUT: 1001 mL    Total NET: -241 mL        I&O's Summary    09 Nov 2018 07:01  -  10 Nov 2018 07:00  --------------------------------------------------------  IN: 760 mL / OUT: 1001 mL / NET: -241 mL        PHYSICAL EXAM:  Neurological:    Motor exam:         [x] Upper extremity                 D             B          T          WE       WF      HI                                               R         5/5        5/5        5/5       5/5     5/5       5/5                                               L          5/5        5/5        5/5       5/5     5/5       5/5         [x] Lower extremity                Ps          Ha        Quad    EHL        FHL                                               R        5/5        5/5        5/5       5/5         5/5                                               L         5/5        5/5       5/5       2/5       4/5                                                      Sensation: [x] intact to light touch  [] decreased:     Gait: not tested    Cardiovascular: regular  Respiratory: clear bilaterally  Gastrointestinal: soft + BS  Genitourinary: vasquez in place  Extremities: no C/C/E  Incision/Wound: intact,    TUBES/LINES:  [] CVC  [] A-line  [] Lumbar Drain  [] Ventriculostomy  [] Other    DIET:  [] NPO  [] Mechanical  [] Tube feeds    LABS:                        11.5   4.4   )-----------( 191      ( 09 Nov 2018 06:51 )             35.5     11-09    140  |  103  |  18  ----------------------------<  130<H>  4.1   |  28  |  0.96    Ca    8.4      09 Nov 2018 06:49    CAPILLARY BLOOD GLUCOSE      POCT Blood Glucose.: 118 mg/dL (10 Nov 2018 09:17)  POCT Blood Glucose.: 129 mg/dL (09 Nov 2018 21:05)  POCT Blood Glucose.: 168 mg/dL (09 Nov 2018 17:48)  POCT Blood Glucose.: 168 mg/dL (09 Nov 2018 14:28)  POCT Blood Glucose.: 162 mg/dL (09 Nov 2018 13:19)          Drug Levels: [] N/A    CSF Analysis: [] N/A      Allergies    No Known Allergies    Intolerances      MEDICATIONS:  Antibiotics:  ceFAZolin   IVPB 2000 milliGRAM(s) IV Intermittent once    Neuro:  acetaminophen   Tablet .. 650 milliGRAM(s) Oral every 6 hours PRN  ondansetron Injectable 4 milliGRAM(s) IV Push every 6 hours PRN  oxyCODONE    5 mG/acetaminophen 325 mG 1 Tablet(s) Oral every 6 hours PRN  oxyCODONE    5 mG/acetaminophen 325 mG 2 Tablet(s) Oral every 6 hours PRN    Anticoagulation:  enoxaparin Injectable 40 milliGRAM(s) SubCutaneous at bedtime    OTHER:  amLODIPine   Tablet 10 milliGRAM(s) Oral daily  atorvastatin 20 milliGRAM(s) Oral at bedtime  benzonatate 100 milliGRAM(s) Oral three times a day PRN  dextrose 40% Gel 15 Gram(s) Oral once PRN  dextrose 50% Injectable 12.5 Gram(s) IV Push once  dextrose 50% Injectable 25 Gram(s) IV Push once  dextrose 50% Injectable 25 Gram(s) IV Push once  docusate sodium 100 milliGRAM(s) Oral three times a day  enalapril 30 milliGRAM(s) Oral once  enalapril 10 milliGRAM(s) Oral two times a day  glucagon  Injectable 1 milliGRAM(s) IntraMuscular once PRN  hydrocortisone 0.5% Cream 1 Application(s) Topical daily  influenza   Vaccine 0.5 milliLiter(s) IntraMuscular once  insulin glargine Injectable (LANTUS) 14 Unit(s) SubCutaneous at bedtime  insulin lispro (HumaLOG) corrective regimen sliding scale   SubCutaneous three times a day before meals  levothyroxine 137 MICROGram(s) Oral daily  lidocaine 1% Injectable 0.2 milliLiter(s) Local Injection once  pantoprazole    Tablet 40 milliGRAM(s) Oral daily  senna 2 Tablet(s) Oral at bedtime PRN    IVF:  dextrose 5%. 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 S/p gamma knife to brain met, not a candidate for nephrectomy per Urology given brain mets. S/p spinal radiation, now on cabozantinib since Nov 2017 c/o worsening back pain radiating to LLE with associated weakness and numbness and pelvic numbness-s/p MRI revealed osseous spinal mets from T6-8 with cord compression, in addition to intramedullary disease at T7 with surrounding cord edema. Evaluated by Dr. Tobias. Denies HA, blurry vision, chest pain, or sob. Presents to PST for a scheduled T8 Kyphoplasty on 11/7/2018.  Case was aborted in 8/29/2018 due to severe hypertension after induction as per anesthesiology notes    Post-op day # 3 s/p T8 Kyphoplasty    Please Check When Present   [x]  GCS  E4   V5  M6     Heart Failure: []Acute, [] acute on chronic , []chronic  Heart Failure:  [] Diastolic (HFpEF), [] Systolic (HFrEF), []Combined (HFpEF and HFrEF), [] RHF, [] Pulm HTN, [] Other    [] DAVID, [] ATN, [] AIN, [] other  [] CKD1, [] CKD2, [] CKD 3, [] CKD 4, [] CKD 5, []ESRD    Encephalopathy: [] Metabolic, [] Hepatic, [] toxic, [] Neurological, [] Other    Abnormal Nurtitional Status: [] malnurtition (see nutrition note), [ ]underweight: BMI < 19, [] morbid obesity: BMI >40, [] Cachexia    [] Sepsis  [] hypovolemic shock,[] cardiogenic shock, [] hemorrhagic shock, [] neuogenic shock  [] Acute Respiratory Failure  []Cerebral edema, [] Brain compression/ herniation,   [] Functional quadriplegia  [] Acute blood loss anemia    PLAN:  NEURO: Stable, will d/c home today, patient is cleared  by physical therapy  CARDIOVASCULAR: no issue  PULMONARY: stable  RENAL: Continue vasquez , urology follow up recommended  GI: Continue regular diet  HEME: H/H stable  ID: Afebrile  ENDO: No issue    DVT PROPHYLAXIS:  [] Venodynes                                [x] Heparin/Lovenox    FALL RISK:  [x] Low Risk                                    [] Impulsive Post-op day # 3 s/p T8 Kyphoplasty    OVERNIGHT EVENTS:  Vital Signs Last 24 Hrs  T(C): 37 (10 Nov 2018 05:40), Max: 37.4 (09 Nov 2018 20:49)  T(F): 98.6 (10 Nov 2018 05:40), Max: 99.3 (09 Nov 2018 20:49)  HR: 91 (10 Nov 2018 05:40) (76 - 91)  BP: 136/77 (10 Nov 2018 05:40) (117/82 - 156/85)  BP(mean): --  RR: 18 (10 Nov 2018 05:40) (18 - 18)  SpO2: 93% (10 Nov 2018 05:40) (93% - 97%)    I&O's Detail    09 Nov 2018 07:01  -  10 Nov 2018 07:00  --------------------------------------------------------  IN:    Oral Fluid: 760 mL  Total IN: 760 mL    OUT:    Indwelling Catheter - Urethral: 1000 mL    Stool: 1 mL  Total OUT: 1001 mL    Total NET: -241 mL        I&O's Summary    09 Nov 2018 07:01  -  10 Nov 2018 07:00  --------------------------------------------------------  IN: 760 mL / OUT: 1001 mL / NET: -241 mL        PHYSICAL EXAM:  Neurological:    Motor exam:         [x] Upper extremity                 D             B          T          WE       WF      HI                                               R         5/5        5/5        5/5       5/5     5/5       5/5                                               L          5/5        5/5        5/5       5/5     5/5       5/5         [x] Lower extremity                Ps          Ha        Quad    EHL        FHL                                               R        5/5        5/5        5/5       5/5         5/5                                               L         5/5        5/5       5/5       2/5       4/5                                                      Sensation: [x] intact to light touch  [] decreased:     Gait: not tested    Cardiovascular: regular  Respiratory: clear bilaterally  Gastrointestinal: soft + BS  Genitourinary: vasquez in place  Extremities: no C/C/E  Incision/Wound: intact,    TUBES/LINES:  [] CVC  [] A-line  [] Lumbar Drain  [] Ventriculostomy  [] Other    DIET:  [] NPO  [] Mechanical  [] Tube feeds    LABS:                        11.5   4.4   )-----------( 191      ( 09 Nov 2018 06:51 )             35.5     11-09    140  |  103  |  18  ----------------------------<  130<H>  4.1   |  28  |  0.96    Ca    8.4      09 Nov 2018 06:49    CAPILLARY BLOOD GLUCOSE      POCT Blood Glucose.: 118 mg/dL (10 Nov 2018 09:17)  POCT Blood Glucose.: 129 mg/dL (09 Nov 2018 21:05)  POCT Blood Glucose.: 168 mg/dL (09 Nov 2018 17:48)  POCT Blood Glucose.: 168 mg/dL (09 Nov 2018 14:28)  POCT Blood Glucose.: 162 mg/dL (09 Nov 2018 13:19)          Drug Levels: [] N/A    CSF Analysis: [] N/A      Allergies    No Known Allergies    Intolerances      MEDICATIONS:  Antibiotics:  ceFAZolin   IVPB 2000 milliGRAM(s) IV Intermittent once    Neuro:  acetaminophen   Tablet .. 650 milliGRAM(s) Oral every 6 hours PRN  ondansetron Injectable 4 milliGRAM(s) IV Push every 6 hours PRN  oxyCODONE    5 mG/acetaminophen 325 mG 1 Tablet(s) Oral every 6 hours PRN  oxyCODONE    5 mG/acetaminophen 325 mG 2 Tablet(s) Oral every 6 hours PRN    Anticoagulation:  enoxaparin Injectable 40 milliGRAM(s) SubCutaneous at bedtime    OTHER:  amLODIPine   Tablet 10 milliGRAM(s) Oral daily  atorvastatin 20 milliGRAM(s) Oral at bedtime  benzonatate 100 milliGRAM(s) Oral three times a day PRN  dextrose 40% Gel 15 Gram(s) Oral once PRN  dextrose 50% Injectable 12.5 Gram(s) IV Push once  dextrose 50% Injectable 25 Gram(s) IV Push once  dextrose 50% Injectable 25 Gram(s) IV Push once  docusate sodium 100 milliGRAM(s) Oral three times a day  enalapril 30 milliGRAM(s) Oral once  enalapril 10 milliGRAM(s) Oral two times a day  glucagon  Injectable 1 milliGRAM(s) IntraMuscular once PRN  hydrocortisone 0.5% Cream 1 Application(s) Topical daily  influenza   Vaccine 0.5 milliLiter(s) IntraMuscular once  insulin glargine Injectable (LANTUS) 14 Unit(s) SubCutaneous at bedtime  insulin lispro (HumaLOG) corrective regimen sliding scale   SubCutaneous three times a day before meals  levothyroxine 137 MICROGram(s) Oral daily  lidocaine 1% Injectable 0.2 milliLiter(s) Local Injection once  pantoprazole    Tablet 40 milliGRAM(s) Oral daily  senna 2 Tablet(s) Oral at bedtime PRN    IVF:  dextrose 5%. 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 S/p gamma knife to brain met, not a candidate for nephrectomy per Urology given brain mets. S/p spinal radiation, now on cabozantinib since Nov 2017 c/o worsening back pain radiating to LLE with associated weakness and numbness and pelvic numbness- He is s/p MRI which revealed osseous spinal mets from T6-8 with cord compression, in addition to intramedullary disease at T7 with surrounding cord edema. Evaluated by Dr. Tobias. Denies HA, blurry vision, chest pain, or sob. Presents to PST for a scheduled T8 Kyphoplasty on 11/7/2018.  Case was aborted in 8/29/2018 due to severe hypertension after induction as per anesthesiology notes    Post-op day # 3 s/p T8 Kyphoplasty    Please Check When Present   [x]  GCS  E4   V5  M6     Heart Failure: []Acute, [] acute on chronic , []chronic  Heart Failure:  [] Diastolic (HFpEF), [] Systolic (HFrEF), []Combined (HFpEF and HFrEF), [] RHF, [] Pulm HTN, [] Other    [] DAVID, [] ATN, [] AIN, [] other  [] CKD1, [] CKD2, [] CKD 3, [] CKD 4, [] CKD 5, []ESRD    Encephalopathy: [] Metabolic, [] Hepatic, [] toxic, [] Neurological, [] Other    Abnormal Nurtitional Status: [] malnurtition (see nutrition note), [ ]underweight: BMI < 19, [] morbid obesity: BMI >40, [] Cachexia    [] Sepsis  [] hypovolemic shock,[] cardiogenic shock, [] hemorrhagic shock, [] neuogenic shock  [] Acute Respiratory Failure  []Cerebral edema, [] Brain compression/ herniation,   [] Functional quadriplegia  [] Acute blood loss anemia    PLAN:  NEURO: Stable, will d/c home today, patient is cleared  by physical therapy  CARDIOVASCULAR: no issue  PULMONARY: stable  RENAL: Continue vasquez , urology follow up recommended  GI: Continue regular diet  HEME: H/H stable  ID: Afebrile  ENDO: No issue    DVT PROPHYLAXIS:  [] Venodynes                                [x] Heparin/Lovenox    FALL RISK:  [x] Low Risk                                    [] Impulsive

## 2018-11-12 DIAGNOSIS — R33.8 OTHER RETENTION OF URINE: ICD-10-CM

## 2018-11-12 DIAGNOSIS — N40.1 BENIGN PROSTATIC HYPERPLASIA WITH LOWER URINARY TRACT SYMPTOMS: ICD-10-CM

## 2018-11-14 ENCOUNTER — APPOINTMENT (OUTPATIENT)
Dept: RADIATION ONCOLOGY | Facility: CLINIC | Age: 58
End: 2018-11-14
Payer: MEDICARE

## 2018-11-14 VITALS
OXYGEN SATURATION: 96 % | SYSTOLIC BLOOD PRESSURE: 139 MMHG | RESPIRATION RATE: 16 BRPM | TEMPERATURE: 98.78 F | DIASTOLIC BLOOD PRESSURE: 85 MMHG | HEART RATE: 91 BPM

## 2018-11-14 PROCEDURE — 99024 POSTOP FOLLOW-UP VISIT: CPT

## 2018-11-14 NOTE — REASON FOR VISIT
[Post-Treatment Evaluation] : post-treatment evaluation for [Bone Metastasis] : bone metastasis [Family Member] : family member

## 2018-11-14 NOTE — REVIEW OF SYSTEMS
[Fatigue: Grade 0] : Fatigue: Grade 0 [Skin Hyperpigmentation: Grade 0] : Skin Hyperpigmentation: Grade 0 [Dermatitis Radiation: Grade 0] : Dermatitis Radiation: Grade 0

## 2018-11-15 ENCOUNTER — APPOINTMENT (OUTPATIENT)
Dept: HEMATOLOGY ONCOLOGY | Facility: CLINIC | Age: 58
End: 2018-11-15

## 2018-11-15 ENCOUNTER — APPOINTMENT (OUTPATIENT)
Dept: UROLOGY | Facility: CLINIC | Age: 58
End: 2018-11-15
Payer: MEDICARE

## 2018-11-15 VITALS
DIASTOLIC BLOOD PRESSURE: 80 MMHG | TEMPERATURE: 98 F | HEART RATE: 91 BPM | OXYGEN SATURATION: 95 % | RESPIRATION RATE: 16 BRPM | BODY MASS INDEX: 30.62 KG/M2 | WEIGHT: 184 LBS | SYSTOLIC BLOOD PRESSURE: 132 MMHG

## 2018-11-15 PROCEDURE — 99214 OFFICE O/P EST MOD 30 MIN: CPT

## 2018-11-15 RX ORDER — SULFAMETHOXAZOLE AND TRIMETHOPRIM 800; 160 MG/1; MG/1
800-160 TABLET ORAL TWICE DAILY
Qty: 6 | Refills: 0 | Status: DISCONTINUED | COMMUNITY
Start: 2018-10-19 | End: 2018-11-15

## 2018-11-19 NOTE — HISTORY OF PRESENT ILLNESS
[Disease: _____________________] : Disease: [unfilled] [AJCC Stage: ____] : AJCC Stage: [unfilled] [Therapy: ___] : Therapy: [unfilled] [Cardiovascular] : Cardiovascular [___________________________________] : Drug: [unfilled] [de-identified] : 56 yo M w/ PMH newly dx DM and HTN, originally presented to [facility] with neuropathy of hands and feet as well as a large R abdominal mass, which he says has been growing for the 5-6 years prior. CT abdomen revealed a 9.8 x 8 x 11.4 cm largely necrotic mass of the lower pole right kidney. Subsequent CT chest showed diffuse pulm mets, bone scan with iliac bone lesion and MRI brain with a cystic lesion concerning for mets. Bone bx done by IR showed inconclusive pathology. S/p Gamma knife to brain met on . Seen by urology Dr Baumann, deemed not a candidate for nephrectomy currently given mets to brain and overall poor prognosis, but could possibly be a candidate in future if response to TKIs. IR bx of kidney on 1/14/15 confirmed RCC with high grade features. Started on Pazopanib on . Held on  after elevation of BP. Restarted on  with increased BP meds. CT scan done in May showed several new spinal mets, yet stable size of kidney mass. No symptoms, but given several lesions with extensive disease of the vertebral body, discussion was had at  [facility] and referred to rad onc for radiation treatment in hopes to prevent any decompensation in the future.  Completed XRT to the spine.  Repeat imaging in 2015 showed POD on Pazopanib, switched to Sutent 50mg PO QD  (2 weeks on/1 week off) on  with POD, now on Nivolumab (started on 16). Chronic cough was noted to worsen a bit in early , while he scans have remain stable, there was a slight increase in hilar fullness noted by Dr Parham. Patient underwent palliative RT to the L hilum from - with improvement in his Sx's.   Patient found to have progressive disease on CT scan from 2017 and was switched to cabozantinib at that point. His course has been complicated by hypertensive urgency, however his blood pressure has stabilized on new BP medications and he is monitoring it at home regularly. \par \par Patient was admitted to the hospital in 2018 due to LLE weakness which had been slowly progressive but then involved numbness in pelvis and urinary retention. 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 Pt also with BP elevation in ED as high as 195/101 after receiving dexamethasone. He was treated for the blood pressure elevation and planned for outpatient RT. Patient was then decided to be a better candidate for possibly kyphoplasty but he had to go to [facility] for his mother's . He missed his scheduled kyphoplasty, but is now back. He has since he returned received SBRT to expanding malignancy in thoracic spine, and had been planned for kyphoplasty with IORT, but instead had SBRT to lower spine at the time. \par \par He had since been complaining of lower pelvic pain and was found with urinary retention and is now s/p indwelling vasquez placement. He has failed several trial of voids, being followed by urology Dr. Philip. He is now s/p kyphoplasty with IORT 2018.  [FreeTextEntry1] : Failed pazopanib, sunitinib, nivolumab [de-identified] : Since patient's procedure a little over a week ago, he has had slight improvements in his ability to walk due to increased strength. He is still complaining of persistent pain in his LLE. He reports that he has pain also in the area of the incision for the surgery. He otherwise if feeling pretty well lately, has the vasquez catheter in place but is seeing them later today. Has failed several TOVs. He reports the rash has greatly improved at this point with hydrocortisone cream.  [de-identified] : Hypertensive urgency [de-identified] : Maculopapular drug rash

## 2018-11-19 NOTE — RESULTS/DATA
[FreeTextEntry1] : CT C/A/P 8/15/18:\par \par IMPRESSION:  \par  \par Renal mass, retroperitoneal adenopathy pulmonary nodules and osseous  \par disease, not significantly changed. \par  \par Interval decrease in size of a few mediastinal nodes. \par \par \par \par \par CT C/A/P 4/30/18:\par \par IMPRESSION: \par \par Decreased size of right renal mass.\par \par Increased size of pathologic right paratracheal lymph nodes.\par \par Unchanged pulmonary metastases, lytic bone metastases, and \par retroperitoneal lymphadenopathy.\par \par \par CT Neck 4/30/18\par \par IMPRESSION:\par Pathologic lymphadenopathy seen along the superior mediastinum.\par Slightly asymmetric subcentimeter nonspecific appearing right \par supraclavicular nodes which may correspond to palpable abnormality.\par Otherwise no lymphadenopathy seen in the neck.\par \par \par MRI Lumbar spine 4/6/2018:\par \par IMPRESSION:\par \par Findings worrisome for osseous metastatic disease in the bilateral \par posterior iliac bones as above.\par \par No significant disc bulge, focal disc herniation, spinal canal or \par foraminal stenosis in the lumbar spine.\par \par Large heterogeneous right renal mass.\par \par \par MRI brain 3/3/18:\par \par Left parietal lobe lesion significantly smaller compared with \par 1/16/2017 with less vasogenic edema consistent with a treated metastasis. \par No change in focus of abnormal signal in the occipital calvarium. No new \par lesions.\par \par \par CT C/A/P 7/13/17:\par  1.  Large right renal mass is mildly decreased in size.\par 2.  Mediastinal and hilar lymphadenopathy shows a mixed response with \par some nodes which are unchanged, one of which is decreased and others of \par which are increased..\par 3.  A retroperitoneal lymph node is increased in size.\par 4.  Stable pulmonary nodules and osseous metastases.\par \par CT C/A/P 10/27/17:\par Increase in size of metastatic disease involving the lungs and \par mediastinum and bones. Lytic lesion involving the right superior \par acetabulum with cortical destruction at increased risk for pathologic \par fracture.\par Large right renal mass and retroperitoneal lymphadenopathy unchanged.\par \par \par CT C/A/P + L hip 2/2/18:\par Right renal mass slightly decreased in size.\par Pulmonary metastases markedly decreased in size.\par Mediastinal and retroperitoneal adenopathy as above with improvement of a few nodes, others are stable.\par Lytic lesion at the posterior superior left iliac bone with cortical erosion and interval increase in size highly suspicious for a focus of osseous metastasis.\par No acute displaced fracture.\par

## 2018-11-19 NOTE — REVIEW OF SYSTEMS
[Skin Rash] : skin rash [Difficulty Walking] : difficulty walking [Negative] : Allergic/Immunologic [Fever] : no fever [Chills] : no chills [Night Sweats] : no night sweats [Fatigue] : no fatigue [Recent Change In Weight] : ~T no recent weight change [Vision Problems] : no vision problems [Dysphagia] : no dysphagia [Nosebleeds] : no nosebleeds [Chest Pain] : no chest pain [Palpitations] : no palpitations [Shortness Of Breath] : no shortness of breath [Wheezing] : no wheezing [Cough] : no cough [SOB on Exertion] : no shortness of breath during exertion [Abdominal Pain] : no abdominal pain [Vomiting] : no vomiting [Constipation] : no constipation [Diarrhea] : no diarrhea [Dysuria] : no dysuria [Joint Pain] : no joint pain [Joint Stiffness] : no joint stiffness [Confused] : no confusion [Dizziness] : no dizziness [Fainting] : no fainting [Depression] : no depression [Easy Bleeding] : no tendency for easy bleeding [Easy Bruising] : no tendency for easy bruising [FreeTextEntry8] : Indwelling urinary catheter.  [de-identified] : face, neck, chest but significantly improved.  [de-identified] : Weakness in LLE

## 2018-11-19 NOTE — PHYSICAL EXAM
[Ambulatory and capable of all self care but unable to carry out any work activities] : Status 2- Ambulatory and capable of all self care but unable to carry out any work activities. Up and about more than 50% of waking hours [Normal] : affect appropriate [Ulcers] : no ulcers [Mucositis] : no mucositis [Thrush] : no thrush [de-identified] : Previous maculopapular rash on face, neck, chest covering around 25% of body surface greatly improved in severity [de-identified] : CN intact, LLE 3-4/5 at hip (limited by pain), 5/5 at knee, RLE 5/5 throughout.

## 2018-11-19 NOTE — ASSESSMENT
[Palliative] : Goals of care discussed with patient: Palliative [Palliative Care Plan] : Patient was apprised of incurable nature of disease.  Hospice and Palliative care options discussed. [FreeTextEntry1] : 56 yo M w/ stage IV RCC and 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 S/p gamma knife to brain met. Not a candidate for nephrectomy per Urology given brain mets.  S/p palliative XRT to the spine. S/p systemic tx with Pazopanib, with POD and Sutent with POD, with progressive disease on Nivolumab in Nov 2017 and now on cabozantinib. \par \par (1) Metastatic RCC \par - s/p 13 months of cabozantinib at this time.\par - CT scan August after RT for spine showing stable disease and slight decrease in size of mediastinal disease. \par - Patient with a history of brain metastasis s/p gamma knife. MRI Jan 2017 showed stable disease, and repeat MRI March 3rd 2018 showed findings as above, consistent with stable treated disease. \par -s/p palliative radiation to R pelvis due to acetabular lesion. No symptoms on the R at this time. \par -s/p palliative radiation to spine previously and now s/p SBRT to spine for progression with cord compression. Now s/p kyphoplasty with IORT for T8 collapse/cord compression. \par -Continue with Udder cream for HFS prophylaxis. \par - Due for repeat disease assessment, CT chest/abdomen/pelvis.\par \par (2) Urinary retention\par Now with indwelling catheter.\par Likely due to cord compression vs. coexisting BPH. \par Continue finasteride and flomax.\par Follow up with urology. Has failed TOVs.\par \par (3) LLE weakness/pain\par Pain partially controlled with oxycodone IR and gabapentin at this time. \par Patient had too much nausea with morphine extended release previously. \par Discussed with patient and will refer to Dr. Cooper for further assistance with pain control. May be a good candidate for methadone.\par \par (4) HTN\par Continue to take antihypertensives as ordered.\par Much better control now.\par Home measurements normal.\par \par (5) Rash\par Persistent grade 2 body rash ~25% of skin. Greatly improved.\par Possibly cabozantinib but would prefer to continue this drug. \par Continue Benadryl QHS and steroid cream, add loratadine.\par \par Case discussed in clinic with Dr. Sharif. Following longitudinally with Dr. Pena.  \par \par Bradley Goldberg M.D. \par Hematology/Oncology Fellow\par [facility]\par [address] [address]\par [phone]\par

## 2018-11-20 ENCOUNTER — APPOINTMENT (OUTPATIENT)
Dept: SPINE | Facility: CLINIC | Age: 58
End: 2018-11-20
Payer: MEDICARE

## 2018-11-20 VITALS
HEIGHT: 65 IN | SYSTOLIC BLOOD PRESSURE: 186 MMHG | BODY MASS INDEX: 30.66 KG/M2 | WEIGHT: 184 LBS | DIASTOLIC BLOOD PRESSURE: 110 MMHG | HEART RATE: 71 BPM | TEMPERATURE: 99 F

## 2018-11-20 PROCEDURE — 99024 POSTOP FOLLOW-UP VISIT: CPT

## 2018-11-25 ENCOUNTER — FORM ENCOUNTER (OUTPATIENT)
Age: 58
End: 2018-11-25

## 2018-11-26 ENCOUNTER — OUTPATIENT (OUTPATIENT)
Dept: OUTPATIENT SERVICES | Facility: HOSPITAL | Age: 58
LOS: 1 days | End: 2018-11-26
Payer: MEDICARE

## 2018-11-26 ENCOUNTER — APPOINTMENT (OUTPATIENT)
Dept: CT IMAGING | Facility: IMAGING CENTER | Age: 58
End: 2018-11-26
Payer: MEDICARE

## 2018-11-26 DIAGNOSIS — C64.9 MALIGNANT NEOPLASM OF UNSPECIFIED KIDNEY, EXCEPT RENAL PELVIS: ICD-10-CM

## 2018-11-26 PROCEDURE — 71260 CT THORAX DX C+: CPT

## 2018-11-26 PROCEDURE — 74177 CT ABD & PELVIS W/CONTRAST: CPT | Mod: 26

## 2018-11-26 PROCEDURE — 74177 CT ABD & PELVIS W/CONTRAST: CPT

## 2018-11-26 PROCEDURE — 71260 CT THORAX DX C+: CPT | Mod: 26

## 2018-11-26 NOTE — HISTORY OF PRESENT ILLNESS
[FreeTextEntry1] : Mr. Davila is a 57 year old man with a stage IV renal cell carcinoma who has previously received multiple courses of radiation for brain and osseous metastases. He most recently was treated to T6-7, 21Gy/3fxns completed 8/22/18. \par \par He was treated to a left frontal lesion with SRS to 18 Gy in January 2015, to T6 and T8 with SBRT to 16 Gy in June 2015, to the right iliac bone to 8 GY in August 2015, to the right skull base to 24 Gy with SBRT in April 2016, to the left lung to 20 Gy in April 2017 prior, and R pelvis 20Gy in Feb ’18. \par \par He most recently saw Dr. Goldberg on 3/1/18. He was previously on pazopanib, then sutent, Nivolumab, and now on cabozatinib.\par \par He underwent brain MRI on 3/3/18 noting his left parietal lesion is smaller compared to previous, now 4 x 5.5 mm, previously 6 x 11 mm without new lesions identified.\par \par He followed up on3/28/18 and reported left leg heaviness/weakness as compared to right leg. HE underwent lumbar MRI on 4/6/18 demonstrating a 1.9 cm lesion in the left iliac bone and a 1.8 cm lesion in the right iliac bone.\par \par CT Neck, Chest, Abdomen and Pelvis were performed on 4/30/18 demonstrating enlarged pathologic lymph nodes in the mediastinum up to 3.5 cm increased from prior.\par \par He followed up with oncologist DR. Goldberg on 6/7/18. He has been on cabozantinib.\par \par He presented to [facility] on 6/11/18 with left lower extremity weakness, urinary retention, and pelvic numbness. MRI of the thoracic spine demonstrated a focal area of enhancement with surrounding edema and cord compression at the level of T7-8 compatible with intramedullary spinal cord metastasis. He was started on steroids, symptoms improved and he was discharged.\par \par CT 8/15/18 showed unchanged disease of the kidneys, bones, and retroperitoneal nodes, in addition to interval decrease in size of mediastinal nodes. \par \par 8/22/18-completed radiation therapy to T6-7.\par 8/29/18-Kypho cancelled due to hypertension. \par No new scans.\par Having pain, 10/10 maximum, originating at the left foot radiating up to his stomach, and then stops. No associated symptoms. Occurring multiple times a day. Oxycodone assists in providing comfort. Small amount of vomiting of saliva in the morning on getting out of bed, daily over the last three day. This has been resolving on its own. Frequent urination, has an appointment with urology after his visit with Dr Dorantes. \par \par plan for kypho RT

## 2018-11-28 ENCOUNTER — INBOUND DOCUMENT (OUTPATIENT)
Age: 58
End: 2018-11-28

## 2018-12-04 ENCOUNTER — MEDICATION RENEWAL (OUTPATIENT)
Age: 58
End: 2018-12-04

## 2018-12-15 ENCOUNTER — OUTPATIENT (OUTPATIENT)
Dept: OUTPATIENT SERVICES | Facility: HOSPITAL | Age: 58
LOS: 1 days | Discharge: HOME | End: 2018-12-15

## 2018-12-15 DIAGNOSIS — C64.9 MALIGNANT NEOPLASM OF UNSPECIFIED KIDNEY, EXCEPT RENAL PELVIS: ICD-10-CM

## 2018-12-17 ENCOUNTER — APPOINTMENT (OUTPATIENT)
Dept: UROLOGY | Facility: CLINIC | Age: 58
End: 2018-12-17
Payer: MEDICARE

## 2018-12-17 VITALS
DIASTOLIC BLOOD PRESSURE: 92 MMHG | RESPIRATION RATE: 17 BRPM | TEMPERATURE: 98.8 F | SYSTOLIC BLOOD PRESSURE: 150 MMHG | HEART RATE: 78 BPM

## 2018-12-17 PROCEDURE — 99214 OFFICE O/P EST MOD 30 MIN: CPT

## 2018-12-20 ENCOUNTER — RESULT REVIEW (OUTPATIENT)
Age: 58
End: 2018-12-20

## 2018-12-20 ENCOUNTER — APPOINTMENT (OUTPATIENT)
Dept: HEMATOLOGY ONCOLOGY | Facility: CLINIC | Age: 58
End: 2018-12-20

## 2018-12-20 VITALS
DIASTOLIC BLOOD PRESSURE: 80 MMHG | WEIGHT: 192.9 LBS | TEMPERATURE: 98 F | HEART RATE: 86 BPM | RESPIRATION RATE: 16 BRPM | SYSTOLIC BLOOD PRESSURE: 140 MMHG | BODY MASS INDEX: 32.1 KG/M2 | OXYGEN SATURATION: 96 %

## 2018-12-20 DIAGNOSIS — R60.0 LOCALIZED EDEMA: ICD-10-CM

## 2018-12-20 LAB
ALBUMIN SERPL ELPH-MCNC: 3.6 G/DL — SIGNIFICANT CHANGE UP (ref 3.3–5)
ALP SERPL-CCNC: 79 U/L — SIGNIFICANT CHANGE UP (ref 40–120)
ALT FLD-CCNC: 35 U/L — SIGNIFICANT CHANGE UP (ref 10–45)
ANION GAP SERPL CALC-SCNC: 12 MMOL/L — SIGNIFICANT CHANGE UP (ref 5–17)
AST SERPL-CCNC: 33 U/L — SIGNIFICANT CHANGE UP (ref 10–40)
BASOPHILS # BLD AUTO: 0 K/UL — SIGNIFICANT CHANGE UP (ref 0–0.2)
BASOPHILS NFR BLD AUTO: 0.3 % — SIGNIFICANT CHANGE UP (ref 0–2)
BILIRUB SERPL-MCNC: 0.3 MG/DL — SIGNIFICANT CHANGE UP (ref 0.2–1.2)
BUN SERPL-MCNC: 11 MG/DL — SIGNIFICANT CHANGE UP (ref 7–23)
CALCIUM SERPL-MCNC: 8.4 MG/DL — SIGNIFICANT CHANGE UP (ref 8.4–10.5)
CHLORIDE SERPL-SCNC: 100 MMOL/L — SIGNIFICANT CHANGE UP (ref 96–108)
CO2 SERPL-SCNC: 27 MMOL/L — SIGNIFICANT CHANGE UP (ref 22–31)
CREAT SERPL-MCNC: 0.93 MG/DL — SIGNIFICANT CHANGE UP (ref 0.5–1.3)
EOSINOPHIL # BLD AUTO: 0.1 K/UL — SIGNIFICANT CHANGE UP (ref 0–0.5)
EOSINOPHIL NFR BLD AUTO: 1.9 % — SIGNIFICANT CHANGE UP (ref 0–6)
GLUCOSE SERPL-MCNC: 93 MG/DL — SIGNIFICANT CHANGE UP (ref 70–99)
HCT VFR BLD CALC: 29.7 % — LOW (ref 39–50)
HGB BLD-MCNC: 10.4 G/DL — LOW (ref 13–17)
LYMPHOCYTES # BLD AUTO: 0.6 K/UL — LOW (ref 1–3.3)
LYMPHOCYTES # BLD AUTO: 15.7 % — SIGNIFICANT CHANGE UP (ref 13–44)
MAGNESIUM SERPL-MCNC: 1.6 MG/DL — SIGNIFICANT CHANGE UP (ref 1.6–2.6)
MCHC RBC-ENTMCNC: 32 PG — SIGNIFICANT CHANGE UP (ref 27–34)
MCHC RBC-ENTMCNC: 35 G/DL — SIGNIFICANT CHANGE UP (ref 32–36)
MCV RBC AUTO: 91.6 FL — SIGNIFICANT CHANGE UP (ref 80–100)
MONOCYTES # BLD AUTO: 0.2 K/UL — SIGNIFICANT CHANGE UP (ref 0–0.9)
MONOCYTES NFR BLD AUTO: 5.6 % — SIGNIFICANT CHANGE UP (ref 2–14)
NEUTROPHILS # BLD AUTO: 3 K/UL — SIGNIFICANT CHANGE UP (ref 1.8–7.4)
NEUTROPHILS NFR BLD AUTO: 76.4 % — SIGNIFICANT CHANGE UP (ref 43–77)
PHOSPHATE SERPL-MCNC: 2.6 MG/DL — SIGNIFICANT CHANGE UP (ref 2.5–4.5)
PLATELET # BLD AUTO: 242 K/UL — SIGNIFICANT CHANGE UP (ref 150–400)
POTASSIUM SERPL-MCNC: 4.5 MMOL/L — SIGNIFICANT CHANGE UP (ref 3.5–5.3)
POTASSIUM SERPL-SCNC: 4.5 MMOL/L — SIGNIFICANT CHANGE UP (ref 3.5–5.3)
PROT SERPL-MCNC: 6.6 G/DL — SIGNIFICANT CHANGE UP (ref 6–8.3)
RBC # BLD: 3.25 M/UL — LOW (ref 4.2–5.8)
RBC # FLD: 16.1 % — HIGH (ref 10.3–14.5)
SODIUM SERPL-SCNC: 139 MMOL/L — SIGNIFICANT CHANGE UP (ref 135–145)
WBC # BLD: 4 K/UL — SIGNIFICANT CHANGE UP (ref 3.8–10.5)
WBC # FLD AUTO: 4 K/UL — SIGNIFICANT CHANGE UP (ref 3.8–10.5)

## 2018-12-24 NOTE — REVIEW OF SYSTEMS
[Skin Rash] : skin rash [Difficulty Walking] : difficulty walking [Negative] : Allergic/Immunologic [Fever] : no fever [Chills] : no chills [Night Sweats] : no night sweats [Fatigue] : no fatigue [Recent Change In Weight] : ~T no recent weight change [Vision Problems] : no vision problems [Dysphagia] : no dysphagia [Nosebleeds] : no nosebleeds [Chest Pain] : no chest pain [Palpitations] : no palpitations [Shortness Of Breath] : no shortness of breath [Wheezing] : no wheezing [Cough] : no cough [SOB on Exertion] : no shortness of breath during exertion [Abdominal Pain] : no abdominal pain [Vomiting] : no vomiting [Constipation] : no constipation [Diarrhea] : no diarrhea [Dysuria] : no dysuria [Joint Pain] : no joint pain [Joint Stiffness] : no joint stiffness [Confused] : no confusion [Dizziness] : no dizziness [Fainting] : no fainting [Depression] : no depression [Easy Bleeding] : no tendency for easy bleeding [Easy Bruising] : no tendency for easy bruising [FreeTextEntry7] : Abdominal tightening [FreeTextEntry8] : Indwelling urinary catheter.  [de-identified] : Rash is improved and almost 100% resolved.  [de-identified] : Weakness in LLE

## 2018-12-24 NOTE — END OF VISIT
[] : Fellow [FreeTextEntry3] : On palliative cabozatinib with stable disease. Continue with supportive care.

## 2018-12-24 NOTE — RESULTS/DATA
[FreeTextEntry1] : CT C/A/P 11/26/18\par IMPRESSION: Relative interval stability of right renal mass and metastatic \par lesions in the lungs, mediastinum, liver, retroperitoneum and bones

## 2018-12-24 NOTE — PHYSICAL EXAM
[Ambulatory and capable of all self care but unable to carry out any work activities] : Status 2- Ambulatory and capable of all self care but unable to carry out any work activities. Up and about more than 50% of waking hours [Normal] : affect appropriate [Ulcers] : no ulcers [Mucositis] : no mucositis [Thrush] : no thrush [de-identified] : Previous maculopapular rash on face, neck, chest covering around 25% of body surface is now resolved.  [de-identified] : CN intact, LLE 3-4/5 at hip (limited by pain), 5/5 at knee, RLE 5/5 throughout.

## 2018-12-24 NOTE — ASSESSMENT
[Palliative] : Goals of care discussed with patient: Palliative [Palliative Care Plan] : Patient was apprised of incurable nature of disease.  Hospice and Palliative care options discussed. [FreeTextEntry1] : 58 yo M w/ stage IV RCC and 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 S/p gamma knife to brain met. Not a candidate for nephrectomy per Urology given brain mets.  S/p palliative XRT to the spine. S/p systemic tx with Pazopanib, with POD and Sutent with POD, with progressive disease on Nivolumab in Nov 2017 and now on cabozantinib since then. \par \par (1) Metastatic RCC \par - s/p 14 months of cabozantinib at this time.\par - CT scan showing persistently stable disease on cabozantinib (full dose). \par - Patient with a history of brain metastasis s/p gamma knife. MRI Jan 2017 showed stable disease, and repeat MRI March 3rd 2018 showed stable findings, consistent with stable treated disease. \par -s/p palliative radiation to R pelvis due to acetabular lesion. No symptoms on the R at this time. \par -s/p palliative radiation to spine previously and now s/p SBRT to spine for progression with cord compression. Now s/p kyphoplasty with IORT for T8 collapse/cord compression. \par -Continue with Udder cream for HFS prophylaxis. \par - gabapentin and oxycodone for pain control, currently well controlled. \par -Upon progression, may be candidate for phase 1 Regeneron trial. \par \par (2) RLE edema\par Ordered Duplex US of RLE. \par Will follow up.\par No symptoms of PE. \par \par (3) Urinary retention\par Now with indwelling catheter.\par Likely due to cord compression vs. coexisting BPH. \par Continue finasteride and flomax.\par Follow up with urology. Has failed TOVs.\par Reportedly planning on procedure to remove obstruction. \par \par \par (4) HTN\par Continue to take antihypertensives as ordered.\par Much better control now.\par Home measurements normal.\par \par (5) Rash\par Resolved. \par \par Case discussed in clinic with Dr. Wren. Following longitudinally with Dr. Pena.  \par \par Bradley Goldberg M.D. \par Hematology/Oncology Fellow\par [facility]\par [address] [address]\par [phone]\par

## 2018-12-24 NOTE — HISTORY OF PRESENT ILLNESS
[Disease: _____________________] : Disease: [unfilled] [AJCC Stage: ____] : AJCC Stage: [unfilled] [Therapy: ___] : Therapy: [unfilled] [Cardiovascular] : Cardiovascular [___________________________________] : Drug: [unfilled] [de-identified] : 58 yo M w/ PMH newly dx DM and HTN, originally presented to [facility] with neuropathy of hands and feet as well as a large R abdominal mass, which he says has been growing for the 5-6 years prior. CT abdomen revealed a 9.8 x 8 x 11.4 cm largely necrotic mass of the lower pole right kidney. Subsequent CT chest showed diffuse pulm mets, bone scan with iliac bone lesion and MRI brain with a cystic lesion concerning for mets. Bone bx done by IR showed inconclusive pathology. S/p Gamma knife to brain met on . Seen by urology Dr Baumann, deemed not a candidate for nephrectomy currently given mets to brain and overall poor prognosis, but could possibly be a candidate in future if response to TKIs. IR bx of kidney on 1/14/15 confirmed RCC with high grade features. Started on Pazopanib on . Held on  after elevation of BP. Restarted on  with increased BP meds. CT scan done in May showed several new spinal mets, yet stable size of kidney mass. No symptoms, but given several lesions with extensive disease of the vertebral body, discussion was had at  [facility] board and referred to rad onc for radiation treatment in hopes to prevent any decompensation in the future.  Completed XRT to the spine.  Repeat imaging in 2015 showed POD on Pazopanib, switched to Sutent 50mg PO QD  (2 weeks on/1 week off) on  with POD, now on Nivolumab (started on 16). Chronic cough was noted to worsen a bit in early , while he scans have remain stable, there was a slight increase in hilar fullness noted by Dr Parham. Patient underwent palliative RT to the L hilum from - with improvement in his Sx's.   Patient found to have progressive disease on CT scan from 2017 and was switched to cabozantinib at that point. His course has been complicated by hypertensive urgency, however his blood pressure has stabilized on new BP medications and he is monitoring it at home regularly. \par \par Patient was admitted to the hospital in 2018 due to LLE weakness which had been slowly progressive but then involved numbness in pelvis and urinary retention. 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 Pt also with BP elevation in ED as high as 195/101 after receiving dexamethasone. He was treated for the blood pressure elevation and planned for outpatient RT. Patient was then decided to be a better candidate for possibly kyphoplasty but he had to go to [facility] for his mother's . He missed his scheduled kyphoplasty, but is now back. He has since he returned received SBRT to expanding malignancy in thoracic spine, and had been planned for kyphoplasty with IORT, but instead had SBRT to lower spine at the time. \par \par He had since been complaining of lower pelvic pain and was found with urinary retention and is now s/p indwelling vasquez placement. He has failed several trial of voids, being followed by urology Dr. Philip. He is now s/p kyphoplasty with IORT 2018.  [de-identified] : Clear cell carcinoma [FreeTextEntry1] : Failed pazopanib, sunitinib, nivolumab [de-identified] : Patient reports that he feels overall very well although he still gets the "tightening" feeling in his abdomen which radiates to the LLE. He reports urologist is evaluating him and he still has catheter in place and procedure planned to "relieve blockage" which is there. His walking is gradually improving although his LLE is still with persistent weakness. His pain is mild and well managed, he thinks the gabapentin helped considerably. Rash has cleared up. No chest pain or dyspnea, no fevers or chills. Appetite is good. Does report increasing RLE edema.  [de-identified] : Hypertensive urgency [de-identified] : Maculopapular drug rash

## 2018-12-28 ENCOUNTER — LABORATORY RESULT (OUTPATIENT)
Age: 58
End: 2018-12-28

## 2018-12-28 ENCOUNTER — OUTPATIENT (OUTPATIENT)
Dept: OUTPATIENT SERVICES | Facility: HOSPITAL | Age: 58
LOS: 1 days | End: 2018-12-28

## 2018-12-28 ENCOUNTER — APPOINTMENT (OUTPATIENT)
Dept: ENDOCRINOLOGY | Facility: HOSPITAL | Age: 58
End: 2018-12-28
Payer: MEDICARE

## 2018-12-28 ENCOUNTER — RESULT CHARGE (OUTPATIENT)
Age: 58
End: 2018-12-28

## 2018-12-28 VITALS
HEART RATE: 90 BPM | BODY MASS INDEX: 31.38 KG/M2 | SYSTOLIC BLOOD PRESSURE: 143 MMHG | DIASTOLIC BLOOD PRESSURE: 87 MMHG | HEIGHT: 65 IN | WEIGHT: 188.31 LBS

## 2018-12-28 DIAGNOSIS — M84.58XA PATHOLOGICAL FRACTURE IN NEOPLASTIC DISEASE, OTHER SPECIFIED SITE, INITIAL ENCOUNTER FOR FRACTURE: ICD-10-CM

## 2018-12-28 LAB
HBA1C BLD-MCNC: 7 % — HIGH (ref 4–5.6)
T4 FREE SERPL-MCNC: 1.21 NG/DL — SIGNIFICANT CHANGE UP (ref 0.9–1.8)
TSH SERPL-MCNC: 18 UIU/ML — HIGH (ref 0.27–4.2)

## 2018-12-28 PROCEDURE — 99214 OFFICE O/P EST MOD 30 MIN: CPT | Mod: GC

## 2018-12-31 DIAGNOSIS — E03.9 HYPOTHYROIDISM, UNSPECIFIED: ICD-10-CM

## 2018-12-31 DIAGNOSIS — M84.58XA PATHOLOGICAL FRACTURE IN NEOPLASTIC DISEASE, OTHER SPECIFIED SITE, INITIAL ENCOUNTER FOR FRACTURE: ICD-10-CM

## 2018-12-31 DIAGNOSIS — E78.5 HYPERLIPIDEMIA, UNSPECIFIED: ICD-10-CM

## 2018-12-31 DIAGNOSIS — I10 ESSENTIAL (PRIMARY) HYPERTENSION: ICD-10-CM

## 2018-12-31 DIAGNOSIS — E11.65 TYPE 2 DIABETES MELLITUS WITH HYPERGLYCEMIA: ICD-10-CM

## 2019-01-01 ENCOUNTER — OUTPATIENT (OUTPATIENT)
Dept: OUTPATIENT SERVICES | Facility: HOSPITAL | Age: 59
LOS: 1 days | End: 2019-01-01
Payer: MEDICARE

## 2019-01-01 ENCOUNTER — APPOINTMENT (OUTPATIENT)
Dept: HEMATOLOGY ONCOLOGY | Facility: CLINIC | Age: 59
End: 2019-01-01
Payer: MEDICARE

## 2019-01-01 ENCOUNTER — LABORATORY RESULT (OUTPATIENT)
Age: 59
End: 2019-01-01

## 2019-01-01 ENCOUNTER — OUTPATIENT (OUTPATIENT)
Dept: OUTPATIENT SERVICES | Facility: HOSPITAL | Age: 59
LOS: 1 days | Discharge: HOME | End: 2019-01-01

## 2019-01-01 ENCOUNTER — OTHER (OUTPATIENT)
Age: 59
End: 2019-01-01

## 2019-01-01 ENCOUNTER — APPOINTMENT (OUTPATIENT)
Dept: UROLOGY | Facility: CLINIC | Age: 59
End: 2019-01-01

## 2019-01-01 ENCOUNTER — APPOINTMENT (OUTPATIENT)
Dept: HEMATOLOGY ONCOLOGY | Facility: CLINIC | Age: 59
End: 2019-01-01

## 2019-01-01 ENCOUNTER — RX RENEWAL (OUTPATIENT)
Age: 59
End: 2019-01-01

## 2019-01-01 ENCOUNTER — APPOINTMENT (OUTPATIENT)
Dept: CT IMAGING | Facility: IMAGING CENTER | Age: 59
End: 2019-01-01
Payer: MEDICARE

## 2019-01-01 ENCOUNTER — OUTPATIENT (OUTPATIENT)
Dept: OUTPATIENT SERVICES | Facility: HOSPITAL | Age: 59
LOS: 1 days | End: 2019-01-01

## 2019-01-01 ENCOUNTER — APPOINTMENT (OUTPATIENT)
Dept: UROLOGY | Facility: CLINIC | Age: 59
End: 2019-01-01
Payer: MEDICARE

## 2019-01-01 ENCOUNTER — APPOINTMENT (OUTPATIENT)
Dept: CT IMAGING | Facility: IMAGING CENTER | Age: 59
End: 2019-01-01

## 2019-01-01 ENCOUNTER — APPOINTMENT (OUTPATIENT)
Dept: INFUSION THERAPY | Facility: HOSPITAL | Age: 59
End: 2019-01-01

## 2019-01-01 ENCOUNTER — APPOINTMENT (OUTPATIENT)
Dept: ENDOCRINOLOGY | Facility: HOSPITAL | Age: 59
End: 2019-01-01

## 2019-01-01 ENCOUNTER — FORM ENCOUNTER (OUTPATIENT)
Age: 59
End: 2019-01-01

## 2019-01-01 ENCOUNTER — RESULT REVIEW (OUTPATIENT)
Age: 59
End: 2019-01-01

## 2019-01-01 ENCOUNTER — INPATIENT (INPATIENT)
Facility: HOSPITAL | Age: 59
LOS: 8 days | Discharge: NURSING, REHAB OR LONG-TERM CARE | End: 2019-12-17
Attending: HOSPITALIST | Admitting: HOSPITALIST
Payer: MEDICARE

## 2019-01-01 ENCOUNTER — TRANSCRIPTION ENCOUNTER (OUTPATIENT)
Age: 59
End: 2019-01-01

## 2019-01-01 ENCOUNTER — INBOUND DOCUMENT (OUTPATIENT)
Age: 59
End: 2019-01-01

## 2019-01-01 ENCOUNTER — APPOINTMENT (OUTPATIENT)
Dept: SPINE | Facility: CLINIC | Age: 59
End: 2019-01-01

## 2019-01-01 ENCOUNTER — INPATIENT (INPATIENT)
Facility: HOSPITAL | Age: 59
LOS: 4 days | Discharge: NURSING, REHAB OR LONG-TERM CARE | DRG: 871 | End: 2019-07-19
Attending: INTERNAL MEDICINE | Admitting: STUDENT IN AN ORGANIZED HEALTH CARE EDUCATION/TRAINING PROGRAM
Payer: MEDICARE

## 2019-01-01 ENCOUNTER — APPOINTMENT (OUTPATIENT)
Dept: MRI IMAGING | Facility: IMAGING CENTER | Age: 59
End: 2019-01-01
Payer: MEDICARE

## 2019-01-01 ENCOUNTER — INPATIENT (INPATIENT)
Facility: HOSPITAL | Age: 59
LOS: 3 days | Discharge: NURSING, REHAB OR LONG-TERM CARE | DRG: 593 | End: 2019-07-12
Attending: INTERNAL MEDICINE | Admitting: INTERNAL MEDICINE
Payer: MEDICARE

## 2019-01-01 ENCOUNTER — APPOINTMENT (OUTPATIENT)
Age: 59
End: 2019-01-01
Payer: MEDICARE

## 2019-01-01 ENCOUNTER — APPOINTMENT (OUTPATIENT)
Dept: ENDOCRINOLOGY | Facility: CLINIC | Age: 59
End: 2019-01-01

## 2019-01-01 ENCOUNTER — CLINICAL ADVICE (OUTPATIENT)
Age: 59
End: 2019-01-01

## 2019-01-01 ENCOUNTER — APPOINTMENT (OUTPATIENT)
Dept: ENDOCRINOLOGY | Facility: CLINIC | Age: 59
End: 2019-01-01
Payer: MEDICARE

## 2019-01-01 ENCOUNTER — EMERGENCY (EMERGENCY)
Facility: HOSPITAL | Age: 59
LOS: 1 days | Discharge: HOME | End: 2019-01-01
Attending: EMERGENCY MEDICINE | Admitting: EMERGENCY MEDICINE
Payer: MEDICARE

## 2019-01-01 ENCOUNTER — EMERGENCY (EMERGENCY)
Facility: HOSPITAL | Age: 59
LOS: 1 days | Discharge: HOME | End: 2019-01-01
Attending: STUDENT IN AN ORGANIZED HEALTH CARE EDUCATION/TRAINING PROGRAM | Admitting: STUDENT IN AN ORGANIZED HEALTH CARE EDUCATION/TRAINING PROGRAM
Payer: MEDICARE

## 2019-01-01 ENCOUNTER — APPOINTMENT (OUTPATIENT)
Dept: UROLOGY | Facility: HOSPITAL | Age: 59
End: 2019-01-01

## 2019-01-01 ENCOUNTER — APPOINTMENT (OUTPATIENT)
Dept: RADIATION ONCOLOGY | Facility: CLINIC | Age: 59
End: 2019-01-01

## 2019-01-01 VITALS
HEIGHT: 66 IN | WEIGHT: 190 LBS | SYSTOLIC BLOOD PRESSURE: 92 MMHG | HEART RATE: 81 BPM | TEMPERATURE: 98 F | RESPIRATION RATE: 16 BRPM | DIASTOLIC BLOOD PRESSURE: 92 MMHG | SYSTOLIC BLOOD PRESSURE: 150 MMHG | BODY MASS INDEX: 31.62 KG/M2 | WEIGHT: 192 LBS | TEMPERATURE: 98.4 F | HEART RATE: 88 BPM | DIASTOLIC BLOOD PRESSURE: 65 MMHG | BODY MASS INDEX: 30.86 KG/M2 | OXYGEN SATURATION: 98 % | RESPIRATION RATE: 16 BRPM

## 2019-01-01 VITALS — WEIGHT: 190.04 LBS

## 2019-01-01 VITALS
BODY MASS INDEX: 31.03 KG/M2 | DIASTOLIC BLOOD PRESSURE: 75 MMHG | OXYGEN SATURATION: 97 % | HEART RATE: 76 BPM | TEMPERATURE: 99 F | SYSTOLIC BLOOD PRESSURE: 117 MMHG | RESPIRATION RATE: 16 BRPM | WEIGHT: 192.24 LBS

## 2019-01-01 VITALS
SYSTOLIC BLOOD PRESSURE: 153 MMHG | HEART RATE: 78 BPM | DIASTOLIC BLOOD PRESSURE: 95 MMHG | TEMPERATURE: 98 F | RESPIRATION RATE: 20 BRPM

## 2019-01-01 VITALS
SYSTOLIC BLOOD PRESSURE: 130 MMHG | WEIGHT: 189.6 LBS | BODY MASS INDEX: 30.6 KG/M2 | OXYGEN SATURATION: 99 % | HEART RATE: 87 BPM | RESPIRATION RATE: 14 BRPM | DIASTOLIC BLOOD PRESSURE: 70 MMHG | TEMPERATURE: 99.1 F

## 2019-01-01 VITALS
RESPIRATION RATE: 16 BRPM | DIASTOLIC BLOOD PRESSURE: 77 MMHG | TEMPERATURE: 100 F | HEART RATE: 96 BPM | OXYGEN SATURATION: 89 % | SYSTOLIC BLOOD PRESSURE: 117 MMHG

## 2019-01-01 VITALS
OXYGEN SATURATION: 99 % | HEIGHT: 66 IN | DIASTOLIC BLOOD PRESSURE: 75 MMHG | HEART RATE: 83 BPM | SYSTOLIC BLOOD PRESSURE: 105 MMHG

## 2019-01-01 VITALS
BODY MASS INDEX: 31.06 KG/M2 | OXYGEN SATURATION: 95 % | SYSTOLIC BLOOD PRESSURE: 104 MMHG | TEMPERATURE: 99.1 F | DIASTOLIC BLOOD PRESSURE: 70 MMHG | RESPIRATION RATE: 16 BRPM | WEIGHT: 192.46 LBS | HEART RATE: 86 BPM

## 2019-01-01 VITALS
SYSTOLIC BLOOD PRESSURE: 95 MMHG | DIASTOLIC BLOOD PRESSURE: 63 MMHG | TEMPERATURE: 98.6 F | HEART RATE: 75 BPM | RESPIRATION RATE: 16 BRPM

## 2019-01-01 VITALS
BODY MASS INDEX: 30.78 KG/M2 | OXYGEN SATURATION: 97 % | WEIGHT: 184.97 LBS | HEIGHT: 65 IN | RESPIRATION RATE: 16 BRPM | WEIGHT: 190.04 LBS | DIASTOLIC BLOOD PRESSURE: 107 MMHG | SYSTOLIC BLOOD PRESSURE: 160 MMHG | RESPIRATION RATE: 16 BRPM | DIASTOLIC BLOOD PRESSURE: 65 MMHG | OXYGEN SATURATION: 96 % | HEART RATE: 86 BPM | TEMPERATURE: 98 F | SYSTOLIC BLOOD PRESSURE: 100 MMHG | HEART RATE: 85 BPM | TEMPERATURE: 98 F

## 2019-01-01 VITALS
OXYGEN SATURATION: 95 % | SYSTOLIC BLOOD PRESSURE: 108 MMHG | HEART RATE: 86 BPM | BODY MASS INDEX: 31.62 KG/M2 | DIASTOLIC BLOOD PRESSURE: 73 MMHG | TEMPERATURE: 98.4 F | RESPIRATION RATE: 18 BRPM | WEIGHT: 189.99 LBS

## 2019-01-01 VITALS
RESPIRATION RATE: 16 BRPM | TEMPERATURE: 98.3 F | SYSTOLIC BLOOD PRESSURE: 153 MMHG | DIASTOLIC BLOOD PRESSURE: 83 MMHG | HEART RATE: 86 BPM

## 2019-01-01 VITALS
TEMPERATURE: 98 F | DIASTOLIC BLOOD PRESSURE: 90 MMHG | BODY MASS INDEX: 31.26 KG/M2 | RESPIRATION RATE: 16 BRPM | SYSTOLIC BLOOD PRESSURE: 135 MMHG | OXYGEN SATURATION: 95 % | HEART RATE: 91 BPM | WEIGHT: 187.83 LBS

## 2019-01-01 VITALS
TEMPERATURE: 97.5 F | HEART RATE: 70 BPM | OXYGEN SATURATION: 100 % | DIASTOLIC BLOOD PRESSURE: 78 MMHG | RESPIRATION RATE: 14 BRPM | SYSTOLIC BLOOD PRESSURE: 145 MMHG

## 2019-01-01 VITALS
BODY MASS INDEX: 30.86 KG/M2 | SYSTOLIC BLOOD PRESSURE: 128 MMHG | RESPIRATION RATE: 16 BRPM | WEIGHT: 192 LBS | TEMPERATURE: 98.3 F | HEART RATE: 92 BPM | HEIGHT: 66 IN | DIASTOLIC BLOOD PRESSURE: 86 MMHG

## 2019-01-01 VITALS
TEMPERATURE: 98.7 F | HEART RATE: 83 BPM | SYSTOLIC BLOOD PRESSURE: 183 MMHG | DIASTOLIC BLOOD PRESSURE: 120 MMHG | RESPIRATION RATE: 16 BRPM

## 2019-01-01 VITALS
DIASTOLIC BLOOD PRESSURE: 91 MMHG | WEIGHT: 190.04 LBS | HEIGHT: 65 IN | HEART RATE: 83 BPM | TEMPERATURE: 99 F | SYSTOLIC BLOOD PRESSURE: 141 MMHG | RESPIRATION RATE: 16 BRPM | OXYGEN SATURATION: 96 %

## 2019-01-01 VITALS
WEIGHT: 190 LBS | OXYGEN SATURATION: 98 % | BODY MASS INDEX: 30.67 KG/M2 | DIASTOLIC BLOOD PRESSURE: 84 MMHG | SYSTOLIC BLOOD PRESSURE: 122 MMHG | RESPIRATION RATE: 16 BRPM | TEMPERATURE: 98.5 F | HEART RATE: 97 BPM

## 2019-01-01 VITALS
BODY MASS INDEX: 30.99 KG/M2 | TEMPERATURE: 98.7 F | HEIGHT: 65 IN | DIASTOLIC BLOOD PRESSURE: 117 MMHG | SYSTOLIC BLOOD PRESSURE: 163 MMHG | WEIGHT: 186 LBS | RESPIRATION RATE: 16 BRPM | HEART RATE: 82 BPM

## 2019-01-01 VITALS — SYSTOLIC BLOOD PRESSURE: 129 MMHG | HEART RATE: 98 BPM | DIASTOLIC BLOOD PRESSURE: 87 MMHG | TEMPERATURE: 97.5 F

## 2019-01-01 VITALS
TEMPERATURE: 98.9 F | SYSTOLIC BLOOD PRESSURE: 113 MMHG | WEIGHT: 182.76 LBS | RESPIRATION RATE: 17 BRPM | BODY MASS INDEX: 29.5 KG/M2 | HEART RATE: 99 BPM | DIASTOLIC BLOOD PRESSURE: 82 MMHG | OXYGEN SATURATION: 88 %

## 2019-01-01 VITALS
RESPIRATION RATE: 16 BRPM | OXYGEN SATURATION: 94 % | HEART RATE: 90 BPM | BODY MASS INDEX: 31.12 KG/M2 | WEIGHT: 187 LBS | DIASTOLIC BLOOD PRESSURE: 95 MMHG | TEMPERATURE: 97.3 F | SYSTOLIC BLOOD PRESSURE: 150 MMHG

## 2019-01-01 VITALS
OXYGEN SATURATION: 96 % | DIASTOLIC BLOOD PRESSURE: 76 MMHG | HEART RATE: 106 BPM | TEMPERATURE: 98.4 F | WEIGHT: 190 LBS | SYSTOLIC BLOOD PRESSURE: 121 MMHG | BODY MASS INDEX: 31.62 KG/M2 | RESPIRATION RATE: 16 BRPM

## 2019-01-01 VITALS
OXYGEN SATURATION: 97 % | SYSTOLIC BLOOD PRESSURE: 120 MMHG | HEART RATE: 89 BPM | TEMPERATURE: 99.1 F | DIASTOLIC BLOOD PRESSURE: 87 MMHG | RESPIRATION RATE: 18 BRPM

## 2019-01-01 VITALS
TEMPERATURE: 98 F | HEART RATE: 77 BPM | OXYGEN SATURATION: 94 % | SYSTOLIC BLOOD PRESSURE: 130 MMHG | DIASTOLIC BLOOD PRESSURE: 82 MMHG | RESPIRATION RATE: 18 BRPM

## 2019-01-01 VITALS
OXYGEN SATURATION: 95 % | DIASTOLIC BLOOD PRESSURE: 80 MMHG | RESPIRATION RATE: 18 BRPM | TEMPERATURE: 98 F | SYSTOLIC BLOOD PRESSURE: 132 MMHG | HEART RATE: 75 BPM

## 2019-01-01 VITALS
OXYGEN SATURATION: 95 % | TEMPERATURE: 98 F | RESPIRATION RATE: 16 BRPM | SYSTOLIC BLOOD PRESSURE: 119 MMHG | DIASTOLIC BLOOD PRESSURE: 78 MMHG | HEART RATE: 71 BPM

## 2019-01-01 VITALS
TEMPERATURE: 98.6 F | WEIGHT: 190 LBS | HEART RATE: 67 BPM | SYSTOLIC BLOOD PRESSURE: 120 MMHG | OXYGEN SATURATION: 95 % | RESPIRATION RATE: 18 BRPM | DIASTOLIC BLOOD PRESSURE: 72 MMHG | BODY MASS INDEX: 31.62 KG/M2

## 2019-01-01 VITALS — HEART RATE: 98 BPM | DIASTOLIC BLOOD PRESSURE: 74 MMHG | SYSTOLIC BLOOD PRESSURE: 111 MMHG

## 2019-01-01 VITALS
DIASTOLIC BLOOD PRESSURE: 90 MMHG | SYSTOLIC BLOOD PRESSURE: 161 MMHG | HEART RATE: 83 BPM | OXYGEN SATURATION: 96 % | TEMPERATURE: 98 F | RESPIRATION RATE: 16 BRPM

## 2019-01-01 VITALS
HEART RATE: 131 BPM | OXYGEN SATURATION: 94 % | RESPIRATION RATE: 20 BRPM | WEIGHT: 186.95 LBS | SYSTOLIC BLOOD PRESSURE: 139 MMHG | DIASTOLIC BLOOD PRESSURE: 82 MMHG | HEIGHT: 66 IN | TEMPERATURE: 101 F

## 2019-01-01 VITALS
TEMPERATURE: 98 F | RESPIRATION RATE: 18 BRPM | SYSTOLIC BLOOD PRESSURE: 166 MMHG | DIASTOLIC BLOOD PRESSURE: 100 MMHG | OXYGEN SATURATION: 98 % | HEART RATE: 94 BPM

## 2019-01-01 VITALS — HEART RATE: 82 BPM | SYSTOLIC BLOOD PRESSURE: 170 MMHG | DIASTOLIC BLOOD PRESSURE: 123 MMHG

## 2019-01-01 VITALS
RESPIRATION RATE: 16 BRPM | OXYGEN SATURATION: 99 % | HEART RATE: 81 BPM | SYSTOLIC BLOOD PRESSURE: 210 MMHG | DIASTOLIC BLOOD PRESSURE: 117 MMHG

## 2019-01-01 DIAGNOSIS — Z98.890 OTHER SPECIFIED POSTPROCEDURAL STATES: Chronic | ICD-10-CM

## 2019-01-01 DIAGNOSIS — K59.00 CONSTIPATION, UNSPECIFIED: ICD-10-CM

## 2019-01-01 DIAGNOSIS — R53.81 OTHER MALAISE: ICD-10-CM

## 2019-01-01 DIAGNOSIS — Z01.818 ENCOUNTER FOR OTHER PREPROCEDURAL EXAMINATION: ICD-10-CM

## 2019-01-01 DIAGNOSIS — R33.9 RETENTION OF URINE, UNSPECIFIED: ICD-10-CM

## 2019-01-01 DIAGNOSIS — C79.31 SECONDARY MALIGNANT NEOPLASM OF BRAIN: ICD-10-CM

## 2019-01-01 DIAGNOSIS — R51 HEADACHE: ICD-10-CM

## 2019-01-01 DIAGNOSIS — C64.9 MALIGNANT NEOPLASM OF UNSPECIFIED KIDNEY, EXCEPT RENAL PELVIS: ICD-10-CM

## 2019-01-01 DIAGNOSIS — I10 ESSENTIAL (PRIMARY) HYPERTENSION: ICD-10-CM

## 2019-01-01 DIAGNOSIS — R14.0 ABDOMINAL DISTENSION (GASEOUS): ICD-10-CM

## 2019-01-01 DIAGNOSIS — R63.0 ANOREXIA: ICD-10-CM

## 2019-01-01 DIAGNOSIS — I26.99 OTHER PULMONARY EMBOLISM WITHOUT ACUTE COR PULMONALE: ICD-10-CM

## 2019-01-01 DIAGNOSIS — Z51.5 ENCOUNTER FOR PALLIATIVE CARE: ICD-10-CM

## 2019-01-01 DIAGNOSIS — L98.1 FACTITIAL DERMATITIS: ICD-10-CM

## 2019-01-01 DIAGNOSIS — E87.70 FLUID OVERLOAD, UNSPECIFIED: ICD-10-CM

## 2019-01-01 DIAGNOSIS — Z79.4 TYPE 2 DIABETES MELLITUS WITH OTHER DIABETIC KIDNEY COMPLICATION: ICD-10-CM

## 2019-01-01 DIAGNOSIS — N13.8 BENIGN PROSTATIC HYPERPLASIA WITH LOWER URINARY TRACT SYMPMS: ICD-10-CM

## 2019-01-01 DIAGNOSIS — R35.0 FREQUENCY OF MICTURITION: ICD-10-CM

## 2019-01-01 DIAGNOSIS — E78.5 HYPERLIPIDEMIA, UNSPECIFIED: ICD-10-CM

## 2019-01-01 DIAGNOSIS — R33.8 OTHER RETENTION OF URINE: ICD-10-CM

## 2019-01-01 DIAGNOSIS — E03.9 HYPOTHYROIDISM, UNSPECIFIED: ICD-10-CM

## 2019-01-01 DIAGNOSIS — E11.69 TYPE 2 DIABETES MELLITUS WITH OTHER SPECIFIED COMPLICATION: ICD-10-CM

## 2019-01-01 DIAGNOSIS — N40.1 BENIGN PROSTATIC HYPERPLASIA WITH LOWER URINARY TRACT SYMPTOMS: ICD-10-CM

## 2019-01-01 DIAGNOSIS — C79.51 SECONDARY MALIGNANT NEOPLASM OF BONE: ICD-10-CM

## 2019-01-01 DIAGNOSIS — N31.9 NEUROMUSCULAR DYSFUNCTION OF BLADDER, UNSPECIFIED: ICD-10-CM

## 2019-01-01 DIAGNOSIS — Z00.00 ENCOUNTER FOR GENERAL ADULT MEDICAL EXAMINATION W/OUT ABNORMAL FINDINGS: ICD-10-CM

## 2019-01-01 DIAGNOSIS — E11.9 TYPE 2 DIABETES MELLITUS WITHOUT COMPLICATIONS: ICD-10-CM

## 2019-01-01 DIAGNOSIS — Z79.899 OTHER LONG TERM (CURRENT) DRUG THERAPY: ICD-10-CM

## 2019-01-01 DIAGNOSIS — L89.103 PRESSURE ULCER OF UNSPECIFIED PART OF BACK, STAGE 3: ICD-10-CM

## 2019-01-01 DIAGNOSIS — F41.9 ANXIETY DISORDER, UNSPECIFIED: ICD-10-CM

## 2019-01-01 DIAGNOSIS — L89.90 PRESSURE ULCER OF UNSPECIFIED SITE, UNSPECIFIED STAGE: ICD-10-CM

## 2019-01-01 DIAGNOSIS — N39.0 URINARY TRACT INFECTION, SITE NOT SPECIFIED: ICD-10-CM

## 2019-01-01 DIAGNOSIS — R60.9 EDEMA, UNSPECIFIED: ICD-10-CM

## 2019-01-01 DIAGNOSIS — Z29.9 ENCOUNTER FOR PROPHYLACTIC MEASURES, UNSPECIFIED: ICD-10-CM

## 2019-01-01 DIAGNOSIS — M62.81 MUSCLE WEAKNESS (GENERALIZED): ICD-10-CM

## 2019-01-01 DIAGNOSIS — M54.9 DORSALGIA, UNSPECIFIED: ICD-10-CM

## 2019-01-01 DIAGNOSIS — N40.1 BENIGN PROSTATIC HYPERPLASIA WITH LOWER URINARY TRACT SYMPMS: ICD-10-CM

## 2019-01-01 DIAGNOSIS — E11.29 TYPE 2 DIABETES MELLITUS WITH OTHER DIABETIC KIDNEY COMPLICATION: ICD-10-CM

## 2019-01-01 DIAGNOSIS — G89.3 NEOPLASM RELATED PAIN (ACUTE) (CHRONIC): ICD-10-CM

## 2019-01-01 DIAGNOSIS — Z00.00 ENCOUNTER FOR GENERAL ADULT MEDICAL EXAMINATION WITHOUT ABNORMAL FINDINGS: ICD-10-CM

## 2019-01-01 DIAGNOSIS — C41.2 MALIGNANT NEOPLASM OF VERTEBRAL COLUMN: ICD-10-CM

## 2019-01-01 DIAGNOSIS — D29.1 BENIGN NEOPLASM OF PROSTATE: ICD-10-CM

## 2019-01-01 DIAGNOSIS — N30.90 CYSTITIS, UNSPECIFIED W/OUT HEMATURIA: ICD-10-CM

## 2019-01-01 DIAGNOSIS — E11.65 TYPE 2 DIABETES MELLITUS WITH HYPERGLYCEMIA: ICD-10-CM

## 2019-01-01 DIAGNOSIS — N13.9 OBSTRUCTIVE AND REFLUX UROPATHY, UNSPECIFIED: ICD-10-CM

## 2019-01-01 DIAGNOSIS — C65.1 MALIGNANT NEOPLASM OF RIGHT RENAL PELVIS: ICD-10-CM

## 2019-01-01 DIAGNOSIS — E87.5 HYPERKALEMIA: ICD-10-CM

## 2019-01-01 DIAGNOSIS — Z71.89 OTHER SPECIFIED COUNSELING: ICD-10-CM

## 2019-01-01 DIAGNOSIS — R06.02 SHORTNESS OF BREATH: ICD-10-CM

## 2019-01-01 DIAGNOSIS — R80.9 TYPE 2 DIABETES MELLITUS WITH OTHER DIABETIC KIDNEY COMPLICATION: ICD-10-CM

## 2019-01-01 DIAGNOSIS — N40.0 BENIGN PROSTATIC HYPERPLASIA WITHOUT LOWER URINARY TRACT SYMPTOMS: ICD-10-CM

## 2019-01-01 DIAGNOSIS — A41.9 SEPSIS, UNSPECIFIED ORGANISM: ICD-10-CM

## 2019-01-01 LAB
ALBUMIN SERPL ELPH-MCNC: 3 G/DL — LOW (ref 3.3–5)
ALBUMIN SERPL ELPH-MCNC: 3 G/DL — LOW (ref 3.3–5)
ALBUMIN SERPL ELPH-MCNC: 3.1 G/DL — LOW (ref 3.3–5)
ALBUMIN SERPL ELPH-MCNC: 3.2 G/DL — LOW (ref 3.3–5)
ALBUMIN SERPL ELPH-MCNC: 3.2 G/DL — LOW (ref 3.3–5)
ALBUMIN SERPL ELPH-MCNC: 3.4 G/DL — SIGNIFICANT CHANGE UP (ref 3.3–5)
ALBUMIN SERPL ELPH-MCNC: 3.54 G/DL — SIGNIFICANT CHANGE UP (ref 3.3–5)
ALBUMIN SERPL ELPH-MCNC: 3.6 G/DL
ALBUMIN SERPL ELPH-MCNC: 3.6 G/DL
ALBUMIN SERPL ELPH-MCNC: 3.6 G/DL — SIGNIFICANT CHANGE UP (ref 3.3–5)
ALBUMIN SERPL ELPH-MCNC: 3.7 G/DL
ALBUMIN SERPL ELPH-MCNC: 3.8 G/DL — SIGNIFICANT CHANGE UP (ref 3.3–5)
ALP BLD-CCNC: 111 U/L
ALP BLD-CCNC: 86 U/L
ALP BLD-CCNC: 93 U/L
ALP SERPL-CCNC: 101 U/L — SIGNIFICANT CHANGE UP (ref 40–120)
ALP SERPL-CCNC: 102 U/L — SIGNIFICANT CHANGE UP (ref 40–120)
ALP SERPL-CCNC: 110 U/L — SIGNIFICANT CHANGE UP (ref 40–120)
ALP SERPL-CCNC: 117 U/L — SIGNIFICANT CHANGE UP (ref 40–120)
ALP SERPL-CCNC: 120 U/L — SIGNIFICANT CHANGE UP (ref 40–120)
ALP SERPL-CCNC: 122 U/L — HIGH (ref 40–120)
ALP SERPL-CCNC: 125 U/L — HIGH (ref 40–120)
ALP SERPL-CCNC: 82 U/L — SIGNIFICANT CHANGE UP (ref 40–120)
ALP SERPL-CCNC: 84 U/L — SIGNIFICANT CHANGE UP (ref 40–120)
ALT FLD-CCNC: 102 U/L — HIGH (ref 4–41)
ALT FLD-CCNC: 120 U/L — HIGH (ref 10–45)
ALT FLD-CCNC: 124 U/L — HIGH (ref 10–45)
ALT FLD-CCNC: 126 U/L — HIGH (ref 10–45)
ALT FLD-CCNC: 141 U/L — HIGH (ref 10–45)
ALT FLD-CCNC: 28 U/L — SIGNIFICANT CHANGE UP (ref 4–41)
ALT FLD-CCNC: 30 U/L — SIGNIFICANT CHANGE UP (ref 10–45)
ALT FLD-CCNC: 75 U/L — HIGH (ref 4–41)
ALT FLD-CCNC: 92 U/L — HIGH (ref 10–45)
ALT SERPL-CCNC: 18 U/L
ALT SERPL-CCNC: 27 U/L
ALT SERPL-CCNC: 54 U/L
ANION GAP SERPL CALC-SCNC: -14 MMO/L — LOW (ref 7–14)
ANION GAP SERPL CALC-SCNC: 10 MMO/L — SIGNIFICANT CHANGE UP (ref 7–14)
ANION GAP SERPL CALC-SCNC: 10 MMOL/L — SIGNIFICANT CHANGE UP (ref 5–17)
ANION GAP SERPL CALC-SCNC: 11 MMO/L — SIGNIFICANT CHANGE UP (ref 7–14)
ANION GAP SERPL CALC-SCNC: 11 MMOL/L — SIGNIFICANT CHANGE UP (ref 5–17)
ANION GAP SERPL CALC-SCNC: 11 MMOL/L — SIGNIFICANT CHANGE UP (ref 5–17)
ANION GAP SERPL CALC-SCNC: 12 MMOL/L
ANION GAP SERPL CALC-SCNC: 12 MMOL/L — SIGNIFICANT CHANGE UP (ref 5–17)
ANION GAP SERPL CALC-SCNC: 13 MMO/L — SIGNIFICANT CHANGE UP (ref 7–14)
ANION GAP SERPL CALC-SCNC: 13 MMO/L — SIGNIFICANT CHANGE UP (ref 7–14)
ANION GAP SERPL CALC-SCNC: 13 MMOL/L
ANION GAP SERPL CALC-SCNC: 13 MMOL/L
ANION GAP SERPL CALC-SCNC: 13 MMOL/L — SIGNIFICANT CHANGE UP (ref 5–17)
ANION GAP SERPL CALC-SCNC: 13 MMOL/L — SIGNIFICANT CHANGE UP (ref 5–17)
ANION GAP SERPL CALC-SCNC: 14 MMO/L — SIGNIFICANT CHANGE UP (ref 7–14)
ANION GAP SERPL CALC-SCNC: 14 MMOL/L
ANION GAP SERPL CALC-SCNC: 14 MMOL/L — SIGNIFICANT CHANGE UP (ref 5–17)
ANION GAP SERPL CALC-SCNC: 14 MMOL/L — SIGNIFICANT CHANGE UP (ref 5–17)
ANION GAP SERPL CALC-SCNC: 15 MMO/L — HIGH (ref 7–14)
ANION GAP SERPL CALC-SCNC: 16 MMO/L — HIGH (ref 7–14)
ANION GAP SERPL CALC-SCNC: 17 MMOL/L
ANION GAP SERPL CALC-SCNC: 9 MMO/L — SIGNIFICANT CHANGE UP (ref 7–14)
ANION GAP SERPL CALC-SCNC: 9 MMO/L — SIGNIFICANT CHANGE UP (ref 7–14)
APPEARANCE UR: ABNORMAL
APPEARANCE UR: CLEAR — SIGNIFICANT CHANGE UP
APPEARANCE UR: SIGNIFICANT CHANGE UP
APPEARANCE UR: SIGNIFICANT CHANGE UP
APTT BLD: 28.5 SEC — SIGNIFICANT CHANGE UP (ref 27.5–36.3)
APTT BLD: 28.8 SEC — SIGNIFICANT CHANGE UP (ref 27.5–36.3)
APTT BLD: 31.9 SEC — SIGNIFICANT CHANGE UP (ref 27.5–36.3)
APTT BLD: 33.2 SEC — SIGNIFICANT CHANGE UP (ref 27.5–36.3)
APTT BLD: 34.2 SEC — SIGNIFICANT CHANGE UP (ref 27.5–36.3)
APTT BLD: 34.6 SEC
APTT BLD: 39.2 SEC — HIGH (ref 27.5–36.3)
APTT BLD: 58.8 SEC — HIGH (ref 27.5–36.3)
APTT BLD: 65.4 SEC — HIGH (ref 27.5–36.3)
APTT BLD: 77.6 SEC — HIGH (ref 27.5–36.3)
APTT BLD: 79.2 SEC — HIGH (ref 27.5–36.3)
APTT BLD: 89.9 SEC — HIGH (ref 27.5–36.3)
APTT BLD: > 200 SEC — CRITICAL HIGH (ref 27.5–36.3)
AST SERPL-CCNC: 101 U/L — HIGH (ref 10–40)
AST SERPL-CCNC: 19 U/L
AST SERPL-CCNC: 21 U/L
AST SERPL-CCNC: 29 U/L — SIGNIFICANT CHANGE UP (ref 10–40)
AST SERPL-CCNC: 46 U/L — HIGH (ref 4–40)
AST SERPL-CCNC: 50 U/L — HIGH (ref 10–40)
AST SERPL-CCNC: 51 U/L
AST SERPL-CCNC: 56 U/L — HIGH (ref 10–40)
AST SERPL-CCNC: 59 U/L — HIGH (ref 4–40)
AST SERPL-CCNC: 72 U/L — HIGH (ref 10–40)
AST SERPL-CCNC: 81 U/L — HIGH (ref 4–40)
AST SERPL-CCNC: 90 U/L — HIGH (ref 10–40)
B PERT DNA SPEC QL NAA+PROBE: NOT DETECTED — SIGNIFICANT CHANGE UP
BACTERIA # UR AUTO: HIGH
BACTERIA # UR AUTO: SIGNIFICANT CHANGE UP
BACTERIA # UR AUTO: SIGNIFICANT CHANGE UP
BACTERIA BLD CULT: SIGNIFICANT CHANGE UP
BACTERIA BLD CULT: SIGNIFICANT CHANGE UP
BACTERIA UR CULT: SIGNIFICANT CHANGE UP
BASE EXCESS BLDV CALC-SCNC: 7.2 MMOL/L — SIGNIFICANT CHANGE UP
BASE EXCESS BLDV CALC-SCNC: 8.1 MMOL/L — SIGNIFICANT CHANGE UP
BASE EXCESS BLDV CALC-SCNC: 9.3 MMOL/L — SIGNIFICANT CHANGE UP
BASOPHILS # BLD AUTO: 0 K/UL — SIGNIFICANT CHANGE UP (ref 0–0.2)
BASOPHILS # BLD AUTO: 0.01 K/UL — SIGNIFICANT CHANGE UP (ref 0–0.2)
BASOPHILS # BLD AUTO: 0.02 K/UL — SIGNIFICANT CHANGE UP (ref 0–0.2)
BASOPHILS # BLD AUTO: 0.03 K/UL
BASOPHILS # BLD AUTO: 0.1 K/UL — SIGNIFICANT CHANGE UP (ref 0–0.2)
BASOPHILS NFR BLD AUTO: 0 % — SIGNIFICANT CHANGE UP (ref 0–2)
BASOPHILS NFR BLD AUTO: 0.1 % — SIGNIFICANT CHANGE UP (ref 0–2)
BASOPHILS NFR BLD AUTO: 0.2 % — SIGNIFICANT CHANGE UP (ref 0–2)
BASOPHILS NFR BLD AUTO: 0.3 % — SIGNIFICANT CHANGE UP (ref 0–2)
BASOPHILS NFR BLD AUTO: 0.5 %
BASOPHILS NFR BLD AUTO: 0.6 % — SIGNIFICANT CHANGE UP (ref 0–2)
BASOPHILS NFR BLD AUTO: 0.7 % — SIGNIFICANT CHANGE UP (ref 0–2)
BASOPHILS NFR BLD AUTO: 1.5 % — SIGNIFICANT CHANGE UP (ref 0–2)
BILIRUB SERPL-MCNC: 0.3 MG/DL — SIGNIFICANT CHANGE UP (ref 0.2–1.2)
BILIRUB SERPL-MCNC: 0.4 MG/DL
BILIRUB SERPL-MCNC: 0.4 MG/DL — SIGNIFICANT CHANGE UP (ref 0.2–1.2)
BILIRUB SERPL-MCNC: 0.4 MG/DL — SIGNIFICANT CHANGE UP (ref 0.2–1.2)
BILIRUB SERPL-MCNC: 0.5 MG/DL — SIGNIFICANT CHANGE UP (ref 0.2–1.2)
BILIRUB SERPL-MCNC: 0.5 MG/DL — SIGNIFICANT CHANGE UP (ref 0.2–1.2)
BILIRUB SERPL-MCNC: 0.6 MG/DL — SIGNIFICANT CHANGE UP (ref 0.2–1.2)
BILIRUB SERPL-MCNC: 0.6 MG/DL — SIGNIFICANT CHANGE UP (ref 0.2–1.2)
BILIRUB UR-MCNC: NEGATIVE — SIGNIFICANT CHANGE UP
BILIRUB UR-MCNC: SIGNIFICANT CHANGE UP
BLD GP AB SCN SERPL QL: NEGATIVE — SIGNIFICANT CHANGE UP
BLD GP AB SCN SERPL QL: NEGATIVE — SIGNIFICANT CHANGE UP
BLOOD GAS VENOUS - CREATININE: 0.7 MG/DL — SIGNIFICANT CHANGE UP (ref 0.5–1.3)
BLOOD GAS VENOUS - CREATININE: 0.75 MG/DL — SIGNIFICANT CHANGE UP (ref 0.5–1.3)
BLOOD GAS VENOUS - CREATININE: 0.83 MG/DL — SIGNIFICANT CHANGE UP (ref 0.5–1.3)
BLOOD GAS VENOUS - FIO2: 40 — SIGNIFICANT CHANGE UP
BLOOD UR QL VISUAL: HIGH
BLOOD UR QL VISUAL: HIGH
BLOOD UR QL VISUAL: SIGNIFICANT CHANGE UP
BUN SERPL-MCNC: 11 MG/DL — SIGNIFICANT CHANGE UP (ref 7–23)
BUN SERPL-MCNC: 12 MG/DL
BUN SERPL-MCNC: 13 MG/DL
BUN SERPL-MCNC: 13 MG/DL — SIGNIFICANT CHANGE UP (ref 7–23)
BUN SERPL-MCNC: 14 MG/DL
BUN SERPL-MCNC: 14 MG/DL — SIGNIFICANT CHANGE UP (ref 7–23)
BUN SERPL-MCNC: 14 MG/DL — SIGNIFICANT CHANGE UP (ref 7–23)
BUN SERPL-MCNC: 15 MG/DL — SIGNIFICANT CHANGE UP (ref 7–23)
BUN SERPL-MCNC: 17 MG/DL
BUN SERPL-MCNC: 18 MG/DL — SIGNIFICANT CHANGE UP (ref 7–23)
BUN SERPL-MCNC: 19 MG/DL — SIGNIFICANT CHANGE UP (ref 7–23)
BUN SERPL-MCNC: 22 MG/DL — SIGNIFICANT CHANGE UP (ref 7–23)
BUN SERPL-MCNC: 25 MG/DL
BUN SERPL-MCNC: 25 MG/DL — HIGH (ref 7–23)
BUN SERPL-MCNC: 29 MG/DL — HIGH (ref 7–23)
BUN SERPL-MCNC: 29 MG/DL — HIGH (ref 7–23)
BUN SERPL-MCNC: 31 MG/DL — HIGH (ref 7–23)
BUN SERPL-MCNC: 31 MG/DL — HIGH (ref 7–23)
BUN SERPL-MCNC: 35 MG/DL — HIGH (ref 7–23)
C PNEUM DNA SPEC QL NAA+PROBE: NOT DETECTED — SIGNIFICANT CHANGE UP
CALCIUM SERPL-MCNC: 10.1 MG/DL — SIGNIFICANT CHANGE UP (ref 8.4–10.5)
CALCIUM SERPL-MCNC: 7.4 MG/DL — LOW (ref 8.4–10.5)
CALCIUM SERPL-MCNC: 8.2 MG/DL — LOW (ref 8.4–10.5)
CALCIUM SERPL-MCNC: 8.2 MG/DL — LOW (ref 8.4–10.5)
CALCIUM SERPL-MCNC: 8.5 MG/DL — SIGNIFICANT CHANGE UP (ref 8.4–10.5)
CALCIUM SERPL-MCNC: 8.6 MG/DL — SIGNIFICANT CHANGE UP (ref 8.4–10.5)
CALCIUM SERPL-MCNC: 8.7 MG/DL
CALCIUM SERPL-MCNC: 8.7 MG/DL — SIGNIFICANT CHANGE UP (ref 8.4–10.5)
CALCIUM SERPL-MCNC: 8.8 MG/DL
CALCIUM SERPL-MCNC: 8.8 MG/DL
CALCIUM SERPL-MCNC: 8.8 MG/DL — SIGNIFICANT CHANGE UP (ref 8.4–10.5)
CALCIUM SERPL-MCNC: 8.8 MG/DL — SIGNIFICANT CHANGE UP (ref 8.4–10.5)
CALCIUM SERPL-MCNC: 8.9 MG/DL
CALCIUM SERPL-MCNC: 8.9 MG/DL — SIGNIFICANT CHANGE UP (ref 8.4–10.5)
CALCIUM SERPL-MCNC: 9 MG/DL — SIGNIFICANT CHANGE UP (ref 8.4–10.5)
CALCIUM SERPL-MCNC: 9 MG/DL — SIGNIFICANT CHANGE UP (ref 8.4–10.5)
CALCIUM SERPL-MCNC: 9.1 MG/DL — SIGNIFICANT CHANGE UP (ref 8.4–10.5)
CALCIUM SERPL-MCNC: 9.2 MG/DL — SIGNIFICANT CHANGE UP (ref 8.4–10.5)
CALCIUM SERPL-MCNC: 9.3 MG/DL — SIGNIFICANT CHANGE UP (ref 8.4–10.5)
CALCIUM SERPL-MCNC: 9.4 MG/DL
CALCIUM SERPL-MCNC: 9.5 MG/DL — SIGNIFICANT CHANGE UP (ref 8.4–10.5)
CALCIUM SERPL-MCNC: 9.6 MG/DL — SIGNIFICANT CHANGE UP (ref 8.4–10.5)
CALCIUM SERPL-MCNC: 9.7 MG/DL — SIGNIFICANT CHANGE UP (ref 8.4–10.5)
CHLORIDE BLDV-SCNC: 91 MMOL/L — LOW (ref 96–108)
CHLORIDE BLDV-SCNC: 94 MMOL/L — LOW (ref 96–108)
CHLORIDE BLDV-SCNC: 97 MMOL/L — SIGNIFICANT CHANGE UP (ref 96–108)
CHLORIDE SERPL-SCNC: 100 MMOL/L
CHLORIDE SERPL-SCNC: 100 MMOL/L — SIGNIFICANT CHANGE UP (ref 96–108)
CHLORIDE SERPL-SCNC: 100 MMOL/L — SIGNIFICANT CHANGE UP (ref 96–108)
CHLORIDE SERPL-SCNC: 85 MMOL/L — LOW (ref 98–107)
CHLORIDE SERPL-SCNC: 85 MMOL/L — LOW (ref 98–107)
CHLORIDE SERPL-SCNC: 86 MMOL/L — LOW (ref 98–107)
CHLORIDE SERPL-SCNC: 86 MMOL/L — LOW (ref 98–107)
CHLORIDE SERPL-SCNC: 87 MMOL/L — LOW (ref 98–107)
CHLORIDE SERPL-SCNC: 88 MMOL/L — LOW (ref 98–107)
CHLORIDE SERPL-SCNC: 89 MMOL/L — LOW (ref 98–107)
CHLORIDE SERPL-SCNC: 91 MMOL/L
CHLORIDE SERPL-SCNC: 92 MMOL/L — LOW (ref 98–107)
CHLORIDE SERPL-SCNC: 94 MMOL/L — LOW (ref 96–108)
CHLORIDE SERPL-SCNC: 94 MMOL/L — LOW (ref 96–108)
CHLORIDE SERPL-SCNC: 95 MMOL/L — LOW (ref 98–107)
CHLORIDE SERPL-SCNC: 96 MMOL/L
CHLORIDE SERPL-SCNC: 96 MMOL/L — LOW (ref 98–107)
CHLORIDE SERPL-SCNC: 96 MMOL/L — SIGNIFICANT CHANGE UP (ref 96–108)
CHLORIDE SERPL-SCNC: 97 MMOL/L — SIGNIFICANT CHANGE UP (ref 96–108)
CHLORIDE SERPL-SCNC: 98 MMOL/L
CHLORIDE SERPL-SCNC: 98 MMOL/L
CHLORIDE SERPL-SCNC: 98 MMOL/L — SIGNIFICANT CHANGE UP (ref 96–108)
CHLORIDE SERPL-SCNC: 98 MMOL/L — SIGNIFICANT CHANGE UP (ref 96–108)
CHLORIDE SERPL-SCNC: 99 MMOL/L — SIGNIFICANT CHANGE UP (ref 98–107)
CHOLEST SERPL-MCNC: 138 MG/DL — SIGNIFICANT CHANGE UP (ref 120–199)
CK MB BLD-MCNC: 1.81 NG/ML — SIGNIFICANT CHANGE UP (ref 1–6.6)
CK SERPL-CCNC: 53 U/L — SIGNIFICANT CHANGE UP (ref 30–200)
CO2 SERPL-SCNC: 24 MMOL/L
CO2 SERPL-SCNC: 25 MMOL/L
CO2 SERPL-SCNC: 25 MMOL/L — SIGNIFICANT CHANGE UP (ref 22–31)
CO2 SERPL-SCNC: 25 MMOL/L — SIGNIFICANT CHANGE UP (ref 22–31)
CO2 SERPL-SCNC: 26 MMOL/L — SIGNIFICANT CHANGE UP (ref 22–31)
CO2 SERPL-SCNC: 27 MMOL/L
CO2 SERPL-SCNC: 27 MMOL/L — SIGNIFICANT CHANGE UP (ref 22–31)
CO2 SERPL-SCNC: 27 MMOL/L — SIGNIFICANT CHANGE UP (ref 22–31)
CO2 SERPL-SCNC: 28 MMOL/L — SIGNIFICANT CHANGE UP (ref 22–31)
CO2 SERPL-SCNC: 29 MMOL/L — SIGNIFICANT CHANGE UP (ref 22–31)
CO2 SERPL-SCNC: 30 MMOL/L — SIGNIFICANT CHANGE UP (ref 22–31)
CO2 SERPL-SCNC: 31 MMOL/L — SIGNIFICANT CHANGE UP (ref 22–31)
CO2 SERPL-SCNC: 31 MMOL/L — SIGNIFICANT CHANGE UP (ref 22–31)
CO2 SERPL-SCNC: 32 MMOL/L — HIGH (ref 22–31)
CO2 SERPL-SCNC: 32 MMOL/L — HIGH (ref 22–31)
CO2 SERPL-SCNC: 33 MMOL/L — HIGH (ref 22–31)
CO2 SERPL-SCNC: 33 MMOL/L — HIGH (ref 22–31)
CO2 SERPL-SCNC: 37 MMOL/L — HIGH (ref 22–31)
COLOR SPEC: ABNORMAL
COLOR SPEC: SIGNIFICANT CHANGE UP
COLOR SPEC: SIGNIFICANT CHANGE UP
COLOR SPEC: YELLOW — SIGNIFICANT CHANGE UP
CREAT SERPL-MCNC: 0.65 MG/DL — SIGNIFICANT CHANGE UP (ref 0.5–1.3)
CREAT SERPL-MCNC: 0.76 MG/DL — SIGNIFICANT CHANGE UP (ref 0.5–1.3)
CREAT SERPL-MCNC: 0.77 MG/DL — SIGNIFICANT CHANGE UP (ref 0.5–1.3)
CREAT SERPL-MCNC: 0.82 MG/DL — SIGNIFICANT CHANGE UP (ref 0.5–1.3)
CREAT SERPL-MCNC: 0.85 MG/DL — SIGNIFICANT CHANGE UP (ref 0.5–1.3)
CREAT SERPL-MCNC: 0.89 MG/DL — SIGNIFICANT CHANGE UP (ref 0.5–1.3)
CREAT SERPL-MCNC: 0.9 MG/DL — SIGNIFICANT CHANGE UP (ref 0.5–1.3)
CREAT SERPL-MCNC: 0.92 MG/DL — SIGNIFICANT CHANGE UP (ref 0.5–1.3)
CREAT SERPL-MCNC: 0.93 MG/DL
CREAT SERPL-MCNC: 0.99 MG/DL — SIGNIFICANT CHANGE UP (ref 0.5–1.3)
CREAT SERPL-MCNC: 1 MG/DL
CREAT SERPL-MCNC: 1 MG/DL — SIGNIFICANT CHANGE UP (ref 0.5–1.3)
CREAT SERPL-MCNC: 1 MG/DL — SIGNIFICANT CHANGE UP (ref 0.5–1.3)
CREAT SERPL-MCNC: 1.02 MG/DL — SIGNIFICANT CHANGE UP (ref 0.5–1.3)
CREAT SERPL-MCNC: 1.02 MG/DL — SIGNIFICANT CHANGE UP (ref 0.5–1.3)
CREAT SERPL-MCNC: 1.09 MG/DL
CREAT SERPL-MCNC: 1.11 MG/DL — SIGNIFICANT CHANGE UP (ref 0.5–1.3)
CREAT SERPL-MCNC: 1.15 MG/DL
CREAT SERPL-MCNC: 1.19 MG/DL — SIGNIFICANT CHANGE UP (ref 0.5–1.3)
CREAT SERPL-MCNC: 1.23 MG/DL
CULTURE RESULTS: SIGNIFICANT CHANGE UP
DIFF PNL FLD: NEGATIVE — SIGNIFICANT CHANGE UP
EOSINOPHIL # BLD AUTO: 0 K/UL — SIGNIFICANT CHANGE UP (ref 0–0.5)
EOSINOPHIL # BLD AUTO: 0 K/UL — SIGNIFICANT CHANGE UP (ref 0–0.5)
EOSINOPHIL # BLD AUTO: 0.01 K/UL — SIGNIFICANT CHANGE UP (ref 0–0.5)
EOSINOPHIL # BLD AUTO: 0.07 K/UL — SIGNIFICANT CHANGE UP (ref 0–0.5)
EOSINOPHIL # BLD AUTO: 0.09 K/UL — SIGNIFICANT CHANGE UP (ref 0–0.5)
EOSINOPHIL # BLD AUTO: 0.09 K/UL — SIGNIFICANT CHANGE UP (ref 0–0.5)
EOSINOPHIL # BLD AUTO: 0.1 K/UL — SIGNIFICANT CHANGE UP (ref 0–0.5)
EOSINOPHIL # BLD AUTO: 0.15 K/UL
EOSINOPHIL # BLD AUTO: 0.2 K/UL — SIGNIFICANT CHANGE UP (ref 0–0.5)
EOSINOPHIL # BLD AUTO: 0.2 K/UL — SIGNIFICANT CHANGE UP (ref 0–0.5)
EOSINOPHIL NFR BLD AUTO: 0.1 % — SIGNIFICANT CHANGE UP (ref 0–6)
EOSINOPHIL NFR BLD AUTO: 0.2 % — SIGNIFICANT CHANGE UP (ref 0–6)
EOSINOPHIL NFR BLD AUTO: 0.9 % — SIGNIFICANT CHANGE UP (ref 0–6)
EOSINOPHIL NFR BLD AUTO: 1 % — SIGNIFICANT CHANGE UP (ref 0–6)
EOSINOPHIL NFR BLD AUTO: 1.1 % — SIGNIFICANT CHANGE UP (ref 0–6)
EOSINOPHIL NFR BLD AUTO: 1.2 % — SIGNIFICANT CHANGE UP (ref 0–6)
EOSINOPHIL NFR BLD AUTO: 1.3 % — SIGNIFICANT CHANGE UP (ref 0–6)
EOSINOPHIL NFR BLD AUTO: 1.4 % — SIGNIFICANT CHANGE UP (ref 0–6)
EOSINOPHIL NFR BLD AUTO: 2.2 % — SIGNIFICANT CHANGE UP (ref 0–6)
EOSINOPHIL NFR BLD AUTO: 2.3 %
EOSINOPHIL NFR BLD AUTO: 2.8 % — SIGNIFICANT CHANGE UP (ref 0–6)
EOSINOPHIL NFR BLD AUTO: 2.8 % — SIGNIFICANT CHANGE UP (ref 0–6)
FLUAV H1 2009 PAND RNA SPEC QL NAA+PROBE: NOT DETECTED — SIGNIFICANT CHANGE UP
FLUAV H1 RNA SPEC QL NAA+PROBE: NOT DETECTED — SIGNIFICANT CHANGE UP
FLUAV H3 RNA SPEC QL NAA+PROBE: NOT DETECTED — SIGNIFICANT CHANGE UP
FLUAV SUBTYP SPEC NAA+PROBE: NOT DETECTED — SIGNIFICANT CHANGE UP
FLUBV RNA SPEC QL NAA+PROBE: NOT DETECTED — SIGNIFICANT CHANGE UP
GAS PNL BLDV: 130 MMOL/L — LOW (ref 136–146)
GAS PNL BLDV: 131 MMOL/L — LOW (ref 136–146)
GAS PNL BLDV: 132 MMOL/L — LOW (ref 136–146)
GAS PNL BLDV: SIGNIFICANT CHANGE UP
GLUCOSE BLDC GLUCOMTR-MCNC: 111 MG/DL — HIGH (ref 70–99)
GLUCOSE BLDC GLUCOMTR-MCNC: 112 MG/DL — HIGH (ref 70–99)
GLUCOSE BLDC GLUCOMTR-MCNC: 114 MG/DL — HIGH (ref 70–99)
GLUCOSE BLDC GLUCOMTR-MCNC: 120 MG/DL — HIGH (ref 70–99)
GLUCOSE BLDC GLUCOMTR-MCNC: 123 MG/DL — HIGH (ref 70–99)
GLUCOSE BLDC GLUCOMTR-MCNC: 126 MG/DL — HIGH (ref 70–99)
GLUCOSE BLDC GLUCOMTR-MCNC: 132 MG/DL — HIGH (ref 70–99)
GLUCOSE BLDC GLUCOMTR-MCNC: 133 MG/DL — HIGH (ref 70–99)
GLUCOSE BLDC GLUCOMTR-MCNC: 136 MG/DL — HIGH (ref 70–99)
GLUCOSE BLDC GLUCOMTR-MCNC: 138 MG/DL — HIGH (ref 70–99)
GLUCOSE BLDC GLUCOMTR-MCNC: 139 MG/DL — HIGH (ref 70–99)
GLUCOSE BLDC GLUCOMTR-MCNC: 143 MG/DL — HIGH (ref 70–99)
GLUCOSE BLDC GLUCOMTR-MCNC: 155 MG/DL — HIGH (ref 70–99)
GLUCOSE BLDC GLUCOMTR-MCNC: 162 MG/DL — HIGH (ref 70–99)
GLUCOSE BLDC GLUCOMTR-MCNC: 165 MG/DL — HIGH (ref 70–99)
GLUCOSE BLDC GLUCOMTR-MCNC: 166 MG/DL — HIGH (ref 70–99)
GLUCOSE BLDC GLUCOMTR-MCNC: 172 MG/DL — HIGH (ref 70–99)
GLUCOSE BLDC GLUCOMTR-MCNC: 176 MG/DL — HIGH (ref 70–99)
GLUCOSE BLDC GLUCOMTR-MCNC: 181 MG/DL — HIGH (ref 70–99)
GLUCOSE BLDC GLUCOMTR-MCNC: 183 MG/DL — HIGH (ref 70–99)
GLUCOSE BLDC GLUCOMTR-MCNC: 188 MG/DL — HIGH (ref 70–99)
GLUCOSE BLDC GLUCOMTR-MCNC: 188 MG/DL — HIGH (ref 70–99)
GLUCOSE BLDC GLUCOMTR-MCNC: 190 MG/DL — HIGH (ref 70–99)
GLUCOSE BLDC GLUCOMTR-MCNC: 190 MG/DL — HIGH (ref 70–99)
GLUCOSE BLDC GLUCOMTR-MCNC: 192 MG/DL — HIGH (ref 70–99)
GLUCOSE BLDC GLUCOMTR-MCNC: 194 MG/DL — HIGH (ref 70–99)
GLUCOSE BLDC GLUCOMTR-MCNC: 197 MG/DL — HIGH (ref 70–99)
GLUCOSE BLDC GLUCOMTR-MCNC: 203 MG/DL — HIGH (ref 70–99)
GLUCOSE BLDC GLUCOMTR-MCNC: 215 MG/DL — HIGH (ref 70–99)
GLUCOSE BLDC GLUCOMTR-MCNC: 216 MG/DL — HIGH (ref 70–99)
GLUCOSE BLDC GLUCOMTR-MCNC: 226 MG/DL — HIGH (ref 70–99)
GLUCOSE BLDC GLUCOMTR-MCNC: 230 MG/DL — HIGH (ref 70–99)
GLUCOSE BLDC GLUCOMTR-MCNC: 237 MG/DL — HIGH (ref 70–99)
GLUCOSE BLDC GLUCOMTR-MCNC: 247 MG/DL — HIGH (ref 70–99)
GLUCOSE BLDC GLUCOMTR-MCNC: 279 MG/DL — HIGH (ref 70–99)
GLUCOSE BLDC GLUCOMTR-MCNC: 303 MG/DL — HIGH (ref 70–99)
GLUCOSE BLDC GLUCOMTR-MCNC: 304 MG/DL — HIGH (ref 70–99)
GLUCOSE BLDV-MCNC: 170 MG/DL — HIGH (ref 70–99)
GLUCOSE BLDV-MCNC: 195 MG/DL — HIGH (ref 70–99)
GLUCOSE BLDV-MCNC: 224 MG/DL — HIGH (ref 70–99)
GLUCOSE SERPL-MCNC: 118 MG/DL — HIGH (ref 70–99)
GLUCOSE SERPL-MCNC: 130 MG/DL — HIGH (ref 70–99)
GLUCOSE SERPL-MCNC: 130 MG/DL — HIGH (ref 70–99)
GLUCOSE SERPL-MCNC: 137 MG/DL — HIGH (ref 70–99)
GLUCOSE SERPL-MCNC: 149 MG/DL — HIGH (ref 70–99)
GLUCOSE SERPL-MCNC: 151 MG/DL — HIGH (ref 70–99)
GLUCOSE SERPL-MCNC: 154 MG/DL — HIGH (ref 70–99)
GLUCOSE SERPL-MCNC: 159 MG/DL — HIGH (ref 70–99)
GLUCOSE SERPL-MCNC: 160 MG/DL — HIGH (ref 70–99)
GLUCOSE SERPL-MCNC: 166 MG/DL — HIGH (ref 70–99)
GLUCOSE SERPL-MCNC: 169 MG/DL — HIGH (ref 70–99)
GLUCOSE SERPL-MCNC: 183 MG/DL — HIGH (ref 70–99)
GLUCOSE SERPL-MCNC: 188 MG/DL — HIGH (ref 70–99)
GLUCOSE SERPL-MCNC: 193 MG/DL
GLUCOSE SERPL-MCNC: 193 MG/DL
GLUCOSE SERPL-MCNC: 194 MG/DL
GLUCOSE SERPL-MCNC: 209 MG/DL — HIGH (ref 70–99)
GLUCOSE SERPL-MCNC: 211 MG/DL — HIGH (ref 70–99)
GLUCOSE SERPL-MCNC: 221 MG/DL — HIGH (ref 70–99)
GLUCOSE SERPL-MCNC: 221 MG/DL — HIGH (ref 70–99)
GLUCOSE SERPL-MCNC: 225 MG/DL — HIGH (ref 70–99)
GLUCOSE SERPL-MCNC: 231 MG/DL
GLUCOSE SERPL-MCNC: 251 MG/DL — HIGH (ref 70–99)
GLUCOSE SERPL-MCNC: 337 MG/DL — HIGH (ref 70–99)
GLUCOSE SERPL-MCNC: 729 MG/DL — CRITICAL HIGH (ref 70–99)
GLUCOSE SERPL-MCNC: 99 MG/DL
GLUCOSE UR QL: ABNORMAL
GLUCOSE UR-MCNC: 100 — SIGNIFICANT CHANGE UP
GLUCOSE UR-MCNC: 150 — HIGH
GLUCOSE UR-MCNC: NEGATIVE — SIGNIFICANT CHANGE UP
HADV DNA SPEC QL NAA+PROBE: NOT DETECTED — SIGNIFICANT CHANGE UP
HBA1C BLD-MCNC: 6.9 % — HIGH (ref 4–5.6)
HBA1C BLD-MCNC: 7.7 % — HIGH (ref 4–5.6)
HBA1C BLD-MCNC: 8.1 % — HIGH (ref 4–5.6)
HCO3 BLDV-SCNC: 29 MMOL/L — HIGH (ref 20–27)
HCO3 BLDV-SCNC: 29 MMOL/L — HIGH (ref 20–27)
HCO3 BLDV-SCNC: 32 MMOL/L — HIGH (ref 20–27)
HCOV PNL SPEC NAA+PROBE: SIGNIFICANT CHANGE UP
HCT VFR BLD CALC: 30 % — LOW (ref 39–50)
HCT VFR BLD CALC: 30.4 % — LOW (ref 39–50)
HCT VFR BLD CALC: 30.4 % — LOW (ref 39–50)
HCT VFR BLD CALC: 30.8 % — LOW (ref 39–50)
HCT VFR BLD CALC: 30.8 % — LOW (ref 39–50)
HCT VFR BLD CALC: 31 % — LOW (ref 39–50)
HCT VFR BLD CALC: 31.3 % — LOW (ref 39–50)
HCT VFR BLD CALC: 31.3 % — LOW (ref 39–50)
HCT VFR BLD CALC: 31.5 % — LOW (ref 39–50)
HCT VFR BLD CALC: 31.7 % — LOW (ref 39–50)
HCT VFR BLD CALC: 31.9 % — LOW (ref 39–50)
HCT VFR BLD CALC: 32 % — LOW (ref 39–50)
HCT VFR BLD CALC: 32.1 % — LOW (ref 39–50)
HCT VFR BLD CALC: 32.7 % — LOW (ref 39–50)
HCT VFR BLD CALC: 33.5 % — LOW (ref 39–50)
HCT VFR BLD CALC: 34.4 % — LOW (ref 39–50)
HCT VFR BLD CALC: 34.7 % — LOW (ref 39–50)
HCT VFR BLD CALC: 34.7 % — LOW (ref 39–50)
HCT VFR BLD CALC: 34.9 % — LOW (ref 39–50)
HCT VFR BLD CALC: 35 % — LOW (ref 39–50)
HCT VFR BLD CALC: 36.3 % — LOW (ref 39–50)
HCT VFR BLD CALC: 37.4 % — LOW (ref 39–50)
HCT VFR BLD CALC: 37.9 % — LOW (ref 39–50)
HCT VFR BLD CALC: 38.3 %
HCT VFR BLD CALC: 38.6 % — LOW (ref 39–50)
HCT VFR BLDV CALC: 30.5 % — LOW (ref 39–51)
HCT VFR BLDV CALC: 32.4 % — LOW (ref 39–51)
HCT VFR BLDV CALC: 35.1 % — LOW (ref 39–51)
HCV AB S/CO SERPL IA: 0.06 S/CO — SIGNIFICANT CHANGE UP (ref 0–0.99)
HCV AB SERPL-IMP: SIGNIFICANT CHANGE UP
HDLC SERPL-MCNC: 48 MG/DL — SIGNIFICANT CHANGE UP (ref 35–55)
HGB BLD-MCNC: 10 G/DL — LOW (ref 13–17)
HGB BLD-MCNC: 10 G/DL — LOW (ref 13–17)
HGB BLD-MCNC: 10.3 G/DL — LOW (ref 13–17)
HGB BLD-MCNC: 10.6 G/DL — LOW (ref 13–17)
HGB BLD-MCNC: 10.6 G/DL — LOW (ref 13–17)
HGB BLD-MCNC: 10.8 G/DL — LOW (ref 13–17)
HGB BLD-MCNC: 11.2 G/DL — LOW (ref 13–17)
HGB BLD-MCNC: 11.4 G/DL — LOW (ref 13–17)
HGB BLD-MCNC: 11.5 G/DL — LOW (ref 13–17)
HGB BLD-MCNC: 11.7 G/DL
HGB BLD-MCNC: 11.7 G/DL — LOW (ref 13–17)
HGB BLD-MCNC: 11.9 G/DL — LOW (ref 13–17)
HGB BLD-MCNC: 12.3 G/DL — LOW (ref 13–17)
HGB BLD-MCNC: 9 G/DL — LOW (ref 13–17)
HGB BLD-MCNC: 9 G/DL — LOW (ref 13–17)
HGB BLD-MCNC: 9.2 G/DL — LOW (ref 13–17)
HGB BLD-MCNC: 9.3 G/DL — LOW (ref 13–17)
HGB BLD-MCNC: 9.3 G/DL — LOW (ref 13–17)
HGB BLD-MCNC: 9.4 G/DL — LOW (ref 13–17)
HGB BLD-MCNC: 9.5 G/DL — LOW (ref 13–17)
HGB BLD-MCNC: 9.6 G/DL — LOW (ref 13–17)
HGB BLD-MCNC: 9.6 G/DL — LOW (ref 13–17)
HGB BLD-MCNC: 9.7 G/DL — LOW (ref 13–17)
HGB BLD-MCNC: 9.8 G/DL — LOW (ref 13–17)
HGB BLD-MCNC: 9.9 G/DL — LOW (ref 13–17)
HGB BLDV-MCNC: 10.5 G/DL — LOW (ref 13–17)
HGB BLDV-MCNC: 11.4 G/DL — LOW (ref 13–17)
HGB BLDV-MCNC: 9.9 G/DL — LOW (ref 13–17)
HMPV RNA SPEC QL NAA+PROBE: NOT DETECTED — SIGNIFICANT CHANGE UP
HPIV1 RNA SPEC QL NAA+PROBE: NOT DETECTED — SIGNIFICANT CHANGE UP
HPIV2 RNA SPEC QL NAA+PROBE: NOT DETECTED — SIGNIFICANT CHANGE UP
HPIV3 RNA SPEC QL NAA+PROBE: NOT DETECTED — SIGNIFICANT CHANGE UP
HPIV4 RNA SPEC QL NAA+PROBE: NOT DETECTED — SIGNIFICANT CHANGE UP
HSV+VZV DNA SPEC QL NAA+PROBE: SIGNIFICANT CHANGE UP
HYALINE CASTS # UR AUTO: NEGATIVE — SIGNIFICANT CHANGE UP
HYALINE CASTS # UR AUTO: NEGATIVE — SIGNIFICANT CHANGE UP
IMM GRANULOCYTES NFR BLD AUTO: 0.2 %
IMM GRANULOCYTES NFR BLD AUTO: 0.3 % — SIGNIFICANT CHANGE UP (ref 0–1.5)
IMM GRANULOCYTES NFR BLD AUTO: 0.3 % — SIGNIFICANT CHANGE UP (ref 0–1.5)
IMM GRANULOCYTES NFR BLD AUTO: 0.4 % — SIGNIFICANT CHANGE UP (ref 0–1.5)
IMM GRANULOCYTES NFR BLD AUTO: 0.5 % — SIGNIFICANT CHANGE UP (ref 0–1.5)
INR BLD: 0.97 — SIGNIFICANT CHANGE UP (ref 0.88–1.17)
INR BLD: 1.1 — SIGNIFICANT CHANGE UP (ref 0.88–1.17)
INR BLD: 1.18 — HIGH (ref 0.88–1.17)
INR BLD: 1.19 RATIO — HIGH (ref 0.88–1.16)
INR BLD: 1.26 — HIGH (ref 0.88–1.17)
INR BLD: 1.27 — HIGH (ref 0.88–1.17)
INR PPP: 1.05 RATIO
KETONES UR-MCNC: 40 — SIGNIFICANT CHANGE UP
KETONES UR-MCNC: NEGATIVE — SIGNIFICANT CHANGE UP
KETONES UR-MCNC: NEGATIVE — SIGNIFICANT CHANGE UP
KETONES UR-MCNC: SIGNIFICANT CHANGE UP
L PNEUMO AG UR QL: NEGATIVE — SIGNIFICANT CHANGE UP
LACTATE BLDV-MCNC: 1.7 MMOL/L — SIGNIFICANT CHANGE UP (ref 0.5–2)
LACTATE BLDV-MCNC: 2.6 MMOL/L — HIGH (ref 0.5–2)
LACTATE BLDV-MCNC: 4.2 MMOL/L — CRITICAL HIGH (ref 0.5–2)
LDH SERPL-CCNC: 178 U/L
LDH SERPL-CCNC: 224 U/L
LDH SERPL-CCNC: 312 U/L
LEUKOCYTE ESTERASE UR-ACNC: ABNORMAL
LEUKOCYTE ESTERASE UR-ACNC: HIGH
LEUKOCYTE ESTERASE UR-ACNC: SIGNIFICANT CHANGE UP
LEUKOCYTE ESTERASE UR-ACNC: SIGNIFICANT CHANGE UP
LIPID PNL WITH DIRECT LDL SERPL: 80 MG/DL — SIGNIFICANT CHANGE UP
LYMPHOCYTES # BLD AUTO: 0.58 K/UL — LOW (ref 1–3.3)
LYMPHOCYTES # BLD AUTO: 0.64 K/UL — LOW (ref 1–3.3)
LYMPHOCYTES # BLD AUTO: 0.78 K/UL — LOW (ref 1–3.3)
LYMPHOCYTES # BLD AUTO: 0.9 K/UL — LOW (ref 1–3.3)
LYMPHOCYTES # BLD AUTO: 0.9 K/UL — LOW (ref 1–3.3)
LYMPHOCYTES # BLD AUTO: 1 K/UL — SIGNIFICANT CHANGE UP (ref 1–3.3)
LYMPHOCYTES # BLD AUTO: 1.1 K/UL — SIGNIFICANT CHANGE UP (ref 1–3.3)
LYMPHOCYTES # BLD AUTO: 1.2 K/UL — SIGNIFICANT CHANGE UP (ref 1–3.3)
LYMPHOCYTES # BLD AUTO: 1.2 K/UL — SIGNIFICANT CHANGE UP (ref 1–3.3)
LYMPHOCYTES # BLD AUTO: 1.3 K/UL — SIGNIFICANT CHANGE UP (ref 1–3.3)
LYMPHOCYTES # BLD AUTO: 1.3 K/UL — SIGNIFICANT CHANGE UP (ref 1–3.3)
LYMPHOCYTES # BLD AUTO: 1.47 K/UL — SIGNIFICANT CHANGE UP (ref 1–3.3)
LYMPHOCYTES # BLD AUTO: 1.69 K/UL
LYMPHOCYTES # BLD AUTO: 1.7 K/UL — SIGNIFICANT CHANGE UP (ref 1–3.3)
LYMPHOCYTES # BLD AUTO: 11.9 % — LOW (ref 13–44)
LYMPHOCYTES # BLD AUTO: 12.3 % — LOW (ref 13–44)
LYMPHOCYTES # BLD AUTO: 14.7 % — SIGNIFICANT CHANGE UP (ref 13–44)
LYMPHOCYTES # BLD AUTO: 15.2 % — SIGNIFICANT CHANGE UP (ref 13–44)
LYMPHOCYTES # BLD AUTO: 17 % — SIGNIFICANT CHANGE UP (ref 13–44)
LYMPHOCYTES # BLD AUTO: 18 % — SIGNIFICANT CHANGE UP (ref 13–44)
LYMPHOCYTES # BLD AUTO: 18.1 % — SIGNIFICANT CHANGE UP (ref 13–44)
LYMPHOCYTES # BLD AUTO: 18.5 % — SIGNIFICANT CHANGE UP (ref 13–44)
LYMPHOCYTES # BLD AUTO: 18.8 % — SIGNIFICANT CHANGE UP (ref 13–44)
LYMPHOCYTES # BLD AUTO: 20.9 % — SIGNIFICANT CHANGE UP (ref 13–44)
LYMPHOCYTES # BLD AUTO: 22.9 % — SIGNIFICANT CHANGE UP (ref 13–44)
LYMPHOCYTES # BLD AUTO: 8.8 % — LOW (ref 13–44)
LYMPHOCYTES # BLD AUTO: 8.9 % — LOW (ref 13–44)
LYMPHOCYTES NFR BLD AUTO: 26.2 %
MAGNESIUM SERPL-MCNC: 1.5 MG/DL — LOW (ref 1.6–2.6)
MAGNESIUM SERPL-MCNC: 1.6 MG/DL — SIGNIFICANT CHANGE UP (ref 1.6–2.6)
MAGNESIUM SERPL-MCNC: 1.6 MG/DL — SIGNIFICANT CHANGE UP (ref 1.6–2.6)
MAGNESIUM SERPL-MCNC: 1.7 MG/DL — SIGNIFICANT CHANGE UP (ref 1.6–2.6)
MAGNESIUM SERPL-MCNC: 1.9 MG/DL — SIGNIFICANT CHANGE UP (ref 1.6–2.6)
MAGNESIUM SERPL-MCNC: 2 MG/DL — SIGNIFICANT CHANGE UP (ref 1.6–2.6)
MAGNESIUM SERPL-MCNC: 2 MG/DL — SIGNIFICANT CHANGE UP (ref 1.6–2.6)
MAGNESIUM SERPL-MCNC: 2.1 MG/DL — SIGNIFICANT CHANGE UP (ref 1.6–2.6)
MAN DIFF?: NORMAL
MCHC RBC-ENTMCNC: 26.2 PG — LOW (ref 27–34)
MCHC RBC-ENTMCNC: 26.2 PG — LOW (ref 27–34)
MCHC RBC-ENTMCNC: 26.5 PG — LOW (ref 27–34)
MCHC RBC-ENTMCNC: 26.6 PG — LOW (ref 27–34)
MCHC RBC-ENTMCNC: 26.6 PG — LOW (ref 27–34)
MCHC RBC-ENTMCNC: 26.8 PG — LOW (ref 27–34)
MCHC RBC-ENTMCNC: 26.9 PG — LOW (ref 27–34)
MCHC RBC-ENTMCNC: 26.9 PG — LOW (ref 27–34)
MCHC RBC-ENTMCNC: 27 PG — SIGNIFICANT CHANGE UP (ref 27–34)
MCHC RBC-ENTMCNC: 27.1 PG — SIGNIFICANT CHANGE UP (ref 27–34)
MCHC RBC-ENTMCNC: 27.3 PG — SIGNIFICANT CHANGE UP (ref 27–34)
MCHC RBC-ENTMCNC: 27.3 PG — SIGNIFICANT CHANGE UP (ref 27–34)
MCHC RBC-ENTMCNC: 28.4 PG — SIGNIFICANT CHANGE UP (ref 27–34)
MCHC RBC-ENTMCNC: 28.5 PG — SIGNIFICANT CHANGE UP (ref 27–34)
MCHC RBC-ENTMCNC: 28.7 PG
MCHC RBC-ENTMCNC: 29 PG — SIGNIFICANT CHANGE UP (ref 27–34)
MCHC RBC-ENTMCNC: 29.1 PG — SIGNIFICANT CHANGE UP (ref 27–34)
MCHC RBC-ENTMCNC: 29.4 % — LOW (ref 32–36)
MCHC RBC-ENTMCNC: 29.6 % — LOW (ref 32–36)
MCHC RBC-ENTMCNC: 29.6 GM/DL — LOW (ref 32–36)
MCHC RBC-ENTMCNC: 29.7 % — LOW (ref 32–36)
MCHC RBC-ENTMCNC: 29.7 % — LOW (ref 32–36)
MCHC RBC-ENTMCNC: 29.8 PG — SIGNIFICANT CHANGE UP (ref 27–34)
MCHC RBC-ENTMCNC: 29.9 % — LOW (ref 32–36)
MCHC RBC-ENTMCNC: 29.9 % — LOW (ref 32–36)
MCHC RBC-ENTMCNC: 29.9 PG — SIGNIFICANT CHANGE UP (ref 27–34)
MCHC RBC-ENTMCNC: 30 % — LOW (ref 32–36)
MCHC RBC-ENTMCNC: 30.1 % — LOW (ref 32–36)
MCHC RBC-ENTMCNC: 30.1 PG — SIGNIFICANT CHANGE UP (ref 27–34)
MCHC RBC-ENTMCNC: 30.2 % — LOW (ref 32–36)
MCHC RBC-ENTMCNC: 30.2 % — LOW (ref 32–36)
MCHC RBC-ENTMCNC: 30.4 GM/DL — LOW (ref 32–36)
MCHC RBC-ENTMCNC: 30.4 PG — SIGNIFICANT CHANGE UP (ref 27–34)
MCHC RBC-ENTMCNC: 30.5 GM/DL
MCHC RBC-ENTMCNC: 30.5 PG — SIGNIFICANT CHANGE UP (ref 27–34)
MCHC RBC-ENTMCNC: 30.5 PG — SIGNIFICANT CHANGE UP (ref 27–34)
MCHC RBC-ENTMCNC: 30.8 % — LOW (ref 32–36)
MCHC RBC-ENTMCNC: 30.8 GM/DL — LOW (ref 32–36)
MCHC RBC-ENTMCNC: 30.9 % — LOW (ref 32–36)
MCHC RBC-ENTMCNC: 30.9 % — LOW (ref 32–36)
MCHC RBC-ENTMCNC: 31.1 G/DL — LOW (ref 32–36)
MCHC RBC-ENTMCNC: 31.3 GM/DL — LOW (ref 32–36)
MCHC RBC-ENTMCNC: 31.3 PG — SIGNIFICANT CHANGE UP (ref 27–34)
MCHC RBC-ENTMCNC: 31.5 G/DL — LOW (ref 32–36)
MCHC RBC-ENTMCNC: 31.6 G/DL — LOW (ref 32–36)
MCHC RBC-ENTMCNC: 31.8 G/DL — LOW (ref 32–36)
MCHC RBC-ENTMCNC: 31.8 GM/DL — LOW (ref 32–36)
MCHC RBC-ENTMCNC: 32.1 G/DL — SIGNIFICANT CHANGE UP (ref 32–36)
MCHC RBC-ENTMCNC: 32.3 PG — SIGNIFICANT CHANGE UP (ref 27–34)
MCHC RBC-ENTMCNC: 32.5 PG — SIGNIFICANT CHANGE UP (ref 27–34)
MCHC RBC-ENTMCNC: 32.9 GM/DL — SIGNIFICANT CHANGE UP (ref 32–36)
MCHC RBC-ENTMCNC: 33.3 GM/DL — SIGNIFICANT CHANGE UP (ref 32–36)
MCV RBC AUTO: 100.3 FL — HIGH (ref 80–100)
MCV RBC AUTO: 100.7 FL — HIGH (ref 80–100)
MCV RBC AUTO: 84.4 FL — SIGNIFICANT CHANGE UP (ref 80–100)
MCV RBC AUTO: 87.1 FL — SIGNIFICANT CHANGE UP (ref 80–100)
MCV RBC AUTO: 88.2 FL — SIGNIFICANT CHANGE UP (ref 80–100)
MCV RBC AUTO: 88.2 FL — SIGNIFICANT CHANGE UP (ref 80–100)
MCV RBC AUTO: 88.5 FL — SIGNIFICANT CHANGE UP (ref 80–100)
MCV RBC AUTO: 88.6 FL — SIGNIFICANT CHANGE UP (ref 80–100)
MCV RBC AUTO: 89.2 FL — SIGNIFICANT CHANGE UP (ref 80–100)
MCV RBC AUTO: 89.3 FL — SIGNIFICANT CHANGE UP (ref 80–100)
MCV RBC AUTO: 89.8 FL — SIGNIFICANT CHANGE UP (ref 80–100)
MCV RBC AUTO: 90 FL — SIGNIFICANT CHANGE UP (ref 80–100)
MCV RBC AUTO: 90.4 FL — SIGNIFICANT CHANGE UP (ref 80–100)
MCV RBC AUTO: 90.9 FL — SIGNIFICANT CHANGE UP (ref 80–100)
MCV RBC AUTO: 91.3 FL — SIGNIFICANT CHANGE UP (ref 80–100)
MCV RBC AUTO: 93.2 FL — SIGNIFICANT CHANGE UP (ref 80–100)
MCV RBC AUTO: 94.1 FL
MCV RBC AUTO: 94.3 FL — SIGNIFICANT CHANGE UP (ref 80–100)
MCV RBC AUTO: 94.4 FL — SIGNIFICANT CHANGE UP (ref 80–100)
MCV RBC AUTO: 94.9 FL — SIGNIFICANT CHANGE UP (ref 80–100)
MCV RBC AUTO: 95.7 FL — SIGNIFICANT CHANGE UP (ref 80–100)
MCV RBC AUTO: 96.9 FL — SIGNIFICANT CHANGE UP (ref 80–100)
MCV RBC AUTO: 97.3 FL — SIGNIFICANT CHANGE UP (ref 80–100)
MCV RBC AUTO: 97.5 FL — SIGNIFICANT CHANGE UP (ref 80–100)
MCV RBC AUTO: 98.1 FL — SIGNIFICANT CHANGE UP (ref 80–100)
MCV RBC AUTO: 98.4 FL — SIGNIFICANT CHANGE UP (ref 80–100)
MONOCYTES # BLD AUTO: 0.1 K/UL — SIGNIFICANT CHANGE UP (ref 0–0.9)
MONOCYTES # BLD AUTO: 0.3 K/UL — SIGNIFICANT CHANGE UP (ref 0–0.9)
MONOCYTES # BLD AUTO: 0.33 K/UL — SIGNIFICANT CHANGE UP (ref 0–0.9)
MONOCYTES # BLD AUTO: 0.36 K/UL — SIGNIFICANT CHANGE UP (ref 0–0.9)
MONOCYTES # BLD AUTO: 0.4 K/UL — SIGNIFICANT CHANGE UP (ref 0–0.9)
MONOCYTES # BLD AUTO: 0.42 K/UL
MONOCYTES # BLD AUTO: 0.5 K/UL — SIGNIFICANT CHANGE UP (ref 0–0.9)
MONOCYTES # BLD AUTO: 0.5 K/UL — SIGNIFICANT CHANGE UP (ref 0–0.9)
MONOCYTES # BLD AUTO: 0.6 K/UL — SIGNIFICANT CHANGE UP (ref 0–0.9)
MONOCYTES # BLD AUTO: 0.68 K/UL — SIGNIFICANT CHANGE UP (ref 0–0.9)
MONOCYTES NFR BLD AUTO: 1.9 % — LOW (ref 2–14)
MONOCYTES NFR BLD AUTO: 4.7 % — SIGNIFICANT CHANGE UP (ref 2–14)
MONOCYTES NFR BLD AUTO: 4.8 % — SIGNIFICANT CHANGE UP (ref 2–14)
MONOCYTES NFR BLD AUTO: 5 % — SIGNIFICANT CHANGE UP (ref 2–14)
MONOCYTES NFR BLD AUTO: 5.6 % — SIGNIFICANT CHANGE UP (ref 2–14)
MONOCYTES NFR BLD AUTO: 5.7 % — SIGNIFICANT CHANGE UP (ref 2–14)
MONOCYTES NFR BLD AUTO: 6.5 %
MONOCYTES NFR BLD AUTO: 6.7 % — SIGNIFICANT CHANGE UP (ref 2–14)
MONOCYTES NFR BLD AUTO: 7.2 % — SIGNIFICANT CHANGE UP (ref 2–14)
MONOCYTES NFR BLD AUTO: 7.3 % — SIGNIFICANT CHANGE UP (ref 2–14)
MONOCYTES NFR BLD AUTO: 7.6 % — SIGNIFICANT CHANGE UP (ref 2–14)
MONOCYTES NFR BLD AUTO: 8.2 % — SIGNIFICANT CHANGE UP (ref 2–14)
MONOCYTES NFR BLD AUTO: 8.6 % — SIGNIFICANT CHANGE UP (ref 2–14)
MONOCYTES NFR BLD AUTO: 9.4 % — SIGNIFICANT CHANGE UP (ref 2–14)
NEUTROPHILS # BLD AUTO: 3.3 K/UL — SIGNIFICANT CHANGE UP (ref 1.8–7.4)
NEUTROPHILS # BLD AUTO: 3.7 K/UL — SIGNIFICANT CHANGE UP (ref 1.8–7.4)
NEUTROPHILS # BLD AUTO: 4 K/UL — SIGNIFICANT CHANGE UP (ref 1.8–7.4)
NEUTROPHILS # BLD AUTO: 4.15 K/UL
NEUTROPHILS # BLD AUTO: 4.46 K/UL — SIGNIFICANT CHANGE UP (ref 1.8–7.4)
NEUTROPHILS # BLD AUTO: 5 K/UL — SIGNIFICANT CHANGE UP (ref 1.8–7.4)
NEUTROPHILS # BLD AUTO: 5.35 K/UL — SIGNIFICANT CHANGE UP (ref 1.8–7.4)
NEUTROPHILS # BLD AUTO: 5.38 K/UL — SIGNIFICANT CHANGE UP (ref 1.8–7.4)
NEUTROPHILS # BLD AUTO: 5.6 K/UL — SIGNIFICANT CHANGE UP (ref 1.8–7.4)
NEUTROPHILS # BLD AUTO: 5.6 K/UL — SIGNIFICANT CHANGE UP (ref 1.8–7.4)
NEUTROPHILS # BLD AUTO: 5.82 K/UL — SIGNIFICANT CHANGE UP (ref 1.8–7.4)
NEUTROPHILS # BLD AUTO: 6 K/UL — SIGNIFICANT CHANGE UP (ref 1.8–7.4)
NEUTROPHILS # BLD AUTO: 6.6 K/UL — SIGNIFICANT CHANGE UP (ref 1.8–7.4)
NEUTROPHILS # BLD AUTO: 7.4 K/UL — SIGNIFICANT CHANGE UP (ref 1.8–7.4)
NEUTROPHILS NFR BLD AUTO: 64.3 %
NEUTROPHILS NFR BLD AUTO: 69.6 % — SIGNIFICANT CHANGE UP (ref 43–77)
NEUTROPHILS NFR BLD AUTO: 69.9 % — SIGNIFICANT CHANGE UP (ref 43–77)
NEUTROPHILS NFR BLD AUTO: 71.2 % — SIGNIFICANT CHANGE UP (ref 43–77)
NEUTROPHILS NFR BLD AUTO: 73.1 % — SIGNIFICANT CHANGE UP (ref 43–77)
NEUTROPHILS NFR BLD AUTO: 75.6 % — SIGNIFICANT CHANGE UP (ref 43–77)
NEUTROPHILS NFR BLD AUTO: 76.1 % — SIGNIFICANT CHANGE UP (ref 43–77)
NEUTROPHILS NFR BLD AUTO: 76.2 % — SIGNIFICANT CHANGE UP (ref 43–77)
NEUTROPHILS NFR BLD AUTO: 77.1 % — HIGH (ref 43–77)
NEUTROPHILS NFR BLD AUTO: 77.2 % — HIGH (ref 43–77)
NEUTROPHILS NFR BLD AUTO: 78.5 % — HIGH (ref 43–77)
NEUTROPHILS NFR BLD AUTO: 80.1 % — HIGH (ref 43–77)
NEUTROPHILS NFR BLD AUTO: 81.8 % — HIGH (ref 43–77)
NEUTROPHILS NFR BLD AUTO: 82.2 % — HIGH (ref 43–77)
NITRITE UR-MCNC: NEGATIVE — SIGNIFICANT CHANGE UP
NITRITE UR-MCNC: NEGATIVE — SIGNIFICANT CHANGE UP
NITRITE UR-MCNC: POSITIVE — HIGH
NITRITE UR-MCNC: POSITIVE — HIGH
NRBC # FLD: 0 K/UL — SIGNIFICANT CHANGE UP (ref 0–0)
NRBC # FLD: 0.02 K/UL — SIGNIFICANT CHANGE UP (ref 0–0)
NT-PROBNP SERPL-SCNC: 103.5 PG/ML — SIGNIFICANT CHANGE UP
NT-PROBNP SERPL-SCNC: 49.37 PG/ML — SIGNIFICANT CHANGE UP
PCO2 BLDV: 57 MMHG — HIGH (ref 41–51)
PCO2 BLDV: 63 MMHG — HIGH (ref 41–51)
PCO2 BLDV: 64 MMHG — HIGH (ref 41–51)
PH BLDV: 7.34 PH — SIGNIFICANT CHANGE UP (ref 7.32–7.43)
PH BLDV: 7.35 PH — SIGNIFICANT CHANGE UP (ref 7.32–7.43)
PH BLDV: 7.4 PH — SIGNIFICANT CHANGE UP (ref 7.32–7.43)
PH UR: 6 — SIGNIFICANT CHANGE UP (ref 5–8)
PH UR: 6.5 — SIGNIFICANT CHANGE UP (ref 5–8)
PH UR: 8.5 — HIGH (ref 5–8)
PH UR: 8.5 — HIGH (ref 5–8)
PHOSPHATE SERPL-MCNC: 1.1 MG/DL — LOW (ref 2.5–4.5)
PHOSPHATE SERPL-MCNC: 2.5 MG/DL — SIGNIFICANT CHANGE UP (ref 2.5–4.5)
PHOSPHATE SERPL-MCNC: 2.8 MG/DL — SIGNIFICANT CHANGE UP (ref 2.5–4.5)
PHOSPHATE SERPL-MCNC: 2.8 MG/DL — SIGNIFICANT CHANGE UP (ref 2.5–4.5)
PHOSPHATE SERPL-MCNC: 2.9 MG/DL — SIGNIFICANT CHANGE UP (ref 2.5–4.5)
PHOSPHATE SERPL-MCNC: 2.9 MG/DL — SIGNIFICANT CHANGE UP (ref 2.5–4.5)
PHOSPHATE SERPL-MCNC: 3.2 MG/DL — SIGNIFICANT CHANGE UP (ref 2.5–4.5)
PHOSPHATE SERPL-MCNC: 3.3 MG/DL — SIGNIFICANT CHANGE UP (ref 2.5–4.5)
PLATELET # BLD AUTO: 182 K/UL — SIGNIFICANT CHANGE UP (ref 150–400)
PLATELET # BLD AUTO: 187 K/UL — SIGNIFICANT CHANGE UP (ref 150–400)
PLATELET # BLD AUTO: 196 K/UL — SIGNIFICANT CHANGE UP (ref 150–400)
PLATELET # BLD AUTO: 200 K/UL — SIGNIFICANT CHANGE UP (ref 150–400)
PLATELET # BLD AUTO: 202 K/UL — SIGNIFICANT CHANGE UP (ref 150–400)
PLATELET # BLD AUTO: 218 K/UL — SIGNIFICANT CHANGE UP (ref 150–400)
PLATELET # BLD AUTO: 220 K/UL — SIGNIFICANT CHANGE UP (ref 150–400)
PLATELET # BLD AUTO: 246 K/UL
PLATELET # BLD AUTO: 246 K/UL — SIGNIFICANT CHANGE UP (ref 150–400)
PLATELET # BLD AUTO: 254 K/UL — SIGNIFICANT CHANGE UP (ref 150–400)
PLATELET # BLD AUTO: 256 K/UL — SIGNIFICANT CHANGE UP (ref 150–400)
PLATELET # BLD AUTO: 299 K/UL — SIGNIFICANT CHANGE UP (ref 150–400)
PLATELET # BLD AUTO: 303 K/UL — SIGNIFICANT CHANGE UP (ref 150–400)
PLATELET # BLD AUTO: 307 K/UL — SIGNIFICANT CHANGE UP (ref 150–400)
PLATELET # BLD AUTO: 312 K/UL — SIGNIFICANT CHANGE UP (ref 150–400)
PLATELET # BLD AUTO: 314 K/UL — SIGNIFICANT CHANGE UP (ref 150–400)
PLATELET # BLD AUTO: 318 K/UL — SIGNIFICANT CHANGE UP (ref 150–400)
PLATELET # BLD AUTO: 327 K/UL — SIGNIFICANT CHANGE UP (ref 150–400)
PLATELET # BLD AUTO: 339 K/UL — SIGNIFICANT CHANGE UP (ref 150–400)
PLATELET # BLD AUTO: 344 K/UL — SIGNIFICANT CHANGE UP (ref 150–400)
PLATELET # BLD AUTO: 347 K/UL — SIGNIFICANT CHANGE UP (ref 150–400)
PLATELET # BLD AUTO: 347 K/UL — SIGNIFICANT CHANGE UP (ref 150–400)
PLATELET # BLD AUTO: 358 K/UL — SIGNIFICANT CHANGE UP (ref 150–400)
PLATELET # BLD AUTO: 383 K/UL — SIGNIFICANT CHANGE UP (ref 150–400)
PMV BLD: 8.5 FL — SIGNIFICANT CHANGE UP (ref 7–13)
PMV BLD: 8.7 FL — SIGNIFICANT CHANGE UP (ref 7–13)
PMV BLD: 8.8 FL — SIGNIFICANT CHANGE UP (ref 7–13)
PMV BLD: 8.8 FL — SIGNIFICANT CHANGE UP (ref 7–13)
PMV BLD: 8.9 FL — SIGNIFICANT CHANGE UP (ref 7–13)
PMV BLD: 9 FL — SIGNIFICANT CHANGE UP (ref 7–13)
PMV BLD: 9.2 FL — SIGNIFICANT CHANGE UP (ref 7–13)
PMV BLD: 9.3 FL — SIGNIFICANT CHANGE UP (ref 7–13)
PMV BLD: 9.4 FL — SIGNIFICANT CHANGE UP (ref 7–13)
PMV BLD: 9.7 FL — SIGNIFICANT CHANGE UP (ref 7–13)
PMV BLD: 9.7 FL — SIGNIFICANT CHANGE UP (ref 7–13)
PO2 BLDV: 32 MMHG — LOW (ref 35–40)
PO2 BLDV: < 24 MMHG — LOW (ref 35–40)
PO2 BLDV: < 24 MMHG — LOW (ref 35–40)
POTASSIUM BLDV-SCNC: 3.5 MMOL/L — SIGNIFICANT CHANGE UP (ref 3.4–4.5)
POTASSIUM BLDV-SCNC: 3.7 MMOL/L — SIGNIFICANT CHANGE UP (ref 3.4–4.5)
POTASSIUM BLDV-SCNC: 4.8 MMOL/L — HIGH (ref 3.4–4.5)
POTASSIUM SERPL-MCNC: 3.2 MMOL/L — LOW (ref 3.5–5.3)
POTASSIUM SERPL-MCNC: 3.3 MMOL/L — LOW (ref 3.5–5.3)
POTASSIUM SERPL-MCNC: 3.5 MMOL/L — SIGNIFICANT CHANGE UP (ref 3.5–5.3)
POTASSIUM SERPL-MCNC: 3.6 MMOL/L — SIGNIFICANT CHANGE UP (ref 3.5–5.3)
POTASSIUM SERPL-MCNC: 3.7 MMOL/L — SIGNIFICANT CHANGE UP (ref 3.5–5.3)
POTASSIUM SERPL-MCNC: 3.7 MMOL/L — SIGNIFICANT CHANGE UP (ref 3.5–5.3)
POTASSIUM SERPL-MCNC: 3.9 MMOL/L — SIGNIFICANT CHANGE UP (ref 3.5–5.3)
POTASSIUM SERPL-MCNC: 4 MMOL/L — SIGNIFICANT CHANGE UP (ref 3.5–5.3)
POTASSIUM SERPL-MCNC: 4.1 MMOL/L — SIGNIFICANT CHANGE UP (ref 3.5–5.3)
POTASSIUM SERPL-MCNC: 4.1 MMOL/L — SIGNIFICANT CHANGE UP (ref 3.5–5.3)
POTASSIUM SERPL-MCNC: 4.2 MMOL/L — SIGNIFICANT CHANGE UP (ref 3.5–5.3)
POTASSIUM SERPL-MCNC: 4.2 MMOL/L — SIGNIFICANT CHANGE UP (ref 3.5–5.3)
POTASSIUM SERPL-MCNC: 4.5 MMOL/L — SIGNIFICANT CHANGE UP (ref 3.5–5.3)
POTASSIUM SERPL-MCNC: 4.6 MMOL/L — SIGNIFICANT CHANGE UP (ref 3.5–5.3)
POTASSIUM SERPL-MCNC: 4.7 MMOL/L — SIGNIFICANT CHANGE UP (ref 3.5–5.3)
POTASSIUM SERPL-MCNC: 5 MMOL/L — SIGNIFICANT CHANGE UP (ref 3.5–5.3)
POTASSIUM SERPL-MCNC: 5 MMOL/L — SIGNIFICANT CHANGE UP (ref 3.5–5.3)
POTASSIUM SERPL-MCNC: 5.5 MMOL/L — HIGH (ref 3.5–5.3)
POTASSIUM SERPL-MCNC: 5.6 MMOL/L — HIGH (ref 3.5–5.3)
POTASSIUM SERPL-SCNC: 3.2 MMOL/L — LOW (ref 3.5–5.3)
POTASSIUM SERPL-SCNC: 3.3 MMOL/L — LOW (ref 3.5–5.3)
POTASSIUM SERPL-SCNC: 3.5 MMOL/L — SIGNIFICANT CHANGE UP (ref 3.5–5.3)
POTASSIUM SERPL-SCNC: 3.6 MMOL/L — SIGNIFICANT CHANGE UP (ref 3.5–5.3)
POTASSIUM SERPL-SCNC: 3.7 MMOL/L — SIGNIFICANT CHANGE UP (ref 3.5–5.3)
POTASSIUM SERPL-SCNC: 3.7 MMOL/L — SIGNIFICANT CHANGE UP (ref 3.5–5.3)
POTASSIUM SERPL-SCNC: 3.9 MMOL/L — SIGNIFICANT CHANGE UP (ref 3.5–5.3)
POTASSIUM SERPL-SCNC: 4 MMOL/L — SIGNIFICANT CHANGE UP (ref 3.5–5.3)
POTASSIUM SERPL-SCNC: 4.1 MMOL/L — SIGNIFICANT CHANGE UP (ref 3.5–5.3)
POTASSIUM SERPL-SCNC: 4.1 MMOL/L — SIGNIFICANT CHANGE UP (ref 3.5–5.3)
POTASSIUM SERPL-SCNC: 4.2 MMOL/L — SIGNIFICANT CHANGE UP (ref 3.5–5.3)
POTASSIUM SERPL-SCNC: 4.2 MMOL/L — SIGNIFICANT CHANGE UP (ref 3.5–5.3)
POTASSIUM SERPL-SCNC: 4.4 MMOL/L
POTASSIUM SERPL-SCNC: 4.5 MMOL/L
POTASSIUM SERPL-SCNC: 4.5 MMOL/L — SIGNIFICANT CHANGE UP (ref 3.5–5.3)
POTASSIUM SERPL-SCNC: 4.6 MMOL/L
POTASSIUM SERPL-SCNC: 4.6 MMOL/L — SIGNIFICANT CHANGE UP (ref 3.5–5.3)
POTASSIUM SERPL-SCNC: 4.7 MMOL/L — SIGNIFICANT CHANGE UP (ref 3.5–5.3)
POTASSIUM SERPL-SCNC: 4.8 MMOL/L
POTASSIUM SERPL-SCNC: 5 MMOL/L — SIGNIFICANT CHANGE UP (ref 3.5–5.3)
POTASSIUM SERPL-SCNC: 5 MMOL/L — SIGNIFICANT CHANGE UP (ref 3.5–5.3)
POTASSIUM SERPL-SCNC: 5.1 MMOL/L
POTASSIUM SERPL-SCNC: 5.5 MMOL/L — HIGH (ref 3.5–5.3)
POTASSIUM SERPL-SCNC: 5.6 MMOL/L — HIGH (ref 3.5–5.3)
PROCALCITONIN SERPL-MCNC: 0.44 NG/ML — HIGH (ref 0.02–0.1)
PROT SERPL-MCNC: 6 G/DL — SIGNIFICANT CHANGE UP (ref 6–8.3)
PROT SERPL-MCNC: 6 G/DL — SIGNIFICANT CHANGE UP (ref 6–8.3)
PROT SERPL-MCNC: 6.4 G/DL — SIGNIFICANT CHANGE UP (ref 6–8.3)
PROT SERPL-MCNC: 6.4 G/DL — SIGNIFICANT CHANGE UP (ref 6–8.3)
PROT SERPL-MCNC: 6.7 G/DL — SIGNIFICANT CHANGE UP (ref 6–8.3)
PROT SERPL-MCNC: 6.7 G/DL — SIGNIFICANT CHANGE UP (ref 6–8.3)
PROT SERPL-MCNC: 6.9 G/DL
PROT SERPL-MCNC: 7 G/DL
PROT SERPL-MCNC: 7 G/DL — SIGNIFICANT CHANGE UP (ref 6–8.3)
PROT SERPL-MCNC: 7 G/DL — SIGNIFICANT CHANGE UP (ref 6–8.3)
PROT SERPL-MCNC: 7.9 G/DL
PROT SERPL-MCNC: 8.5 G/DL — HIGH (ref 6–8.3)
PROT UR-MCNC: 100 — HIGH
PROT UR-MCNC: 20 — SIGNIFICANT CHANGE UP
PROT UR-MCNC: >300 — SIGNIFICANT CHANGE UP
PROT UR-MCNC: ABNORMAL
PROTHROM AB SERPL-ACNC: 11.1 SEC — SIGNIFICANT CHANGE UP (ref 9.8–13.1)
PROTHROM AB SERPL-ACNC: 12.2 SEC — SIGNIFICANT CHANGE UP (ref 9.8–13.1)
PROTHROM AB SERPL-ACNC: 13.5 SEC — HIGH (ref 9.8–13.1)
PROTHROM AB SERPL-ACNC: 13.7 SEC — HIGH (ref 10–12.9)
PROTHROM AB SERPL-ACNC: 14.2 SEC — HIGH (ref 9.8–13.1)
PROTHROM AB SERPL-ACNC: 14.5 SEC — HIGH (ref 9.8–13.1)
PT BLD: 12 SEC
RAPID RVP RESULT: DETECTED
RBC # BLD: 3.02 M/UL — LOW (ref 4.2–5.8)
RBC # BLD: 3.16 M/UL — LOW (ref 4.2–5.8)
RBC # BLD: 3.23 M/UL — LOW (ref 4.2–5.8)
RBC # BLD: 3.32 M/UL — LOW (ref 4.2–5.8)
RBC # BLD: 3.38 M/UL — LOW (ref 4.2–5.8)
RBC # BLD: 3.41 M/UL — LOW (ref 4.2–5.8)
RBC # BLD: 3.43 M/UL — LOW (ref 4.2–5.8)
RBC # BLD: 3.48 M/UL — LOW (ref 4.2–5.8)
RBC # BLD: 3.48 M/UL — LOW (ref 4.2–5.8)
RBC # BLD: 3.51 M/UL — LOW (ref 4.2–5.8)
RBC # BLD: 3.54 M/UL — LOW (ref 4.2–5.8)
RBC # BLD: 3.55 M/UL — LOW (ref 4.2–5.8)
RBC # BLD: 3.55 M/UL — LOW (ref 4.2–5.8)
RBC # BLD: 3.57 M/UL — LOW (ref 4.2–5.8)
RBC # BLD: 3.57 M/UL — LOW (ref 4.2–5.8)
RBC # BLD: 3.66 M/UL — LOW (ref 4.2–5.8)
RBC # BLD: 3.66 M/UL — LOW (ref 4.2–5.8)
RBC # BLD: 3.73 M/UL — LOW (ref 4.2–5.8)
RBC # BLD: 3.92 M/UL — LOW (ref 4.2–5.8)
RBC # BLD: 4.02 M/UL — LOW (ref 4.2–5.8)
RBC # BLD: 4.02 M/UL — LOW (ref 4.2–5.8)
RBC # BLD: 4.03 M/UL — LOW (ref 4.2–5.8)
RBC # BLD: 4.07 M/UL
RBC # BLD: 4.44 M/UL — SIGNIFICANT CHANGE UP (ref 4.2–5.8)
RBC # FLD: 16.3 % — HIGH (ref 10.3–14.5)
RBC # FLD: 16.6 % — HIGH (ref 10.3–14.5)
RBC # FLD: 16.7 %
RBC # FLD: 17.1 % — HIGH (ref 10.3–14.5)
RBC # FLD: 17.2 % — HIGH (ref 10.3–14.5)
RBC # FLD: 17.4 % — HIGH (ref 10.3–14.5)
RBC # FLD: 17.5 % — HIGH (ref 10.3–14.5)
RBC # FLD: 18 % — HIGH (ref 10.3–14.5)
RBC # FLD: 18.1 % — HIGH (ref 10.3–14.5)
RBC # FLD: 18.4 % — HIGH (ref 10.3–14.5)
RBC # FLD: 18.6 % — HIGH (ref 10.3–14.5)
RBC # FLD: 18.7 % — HIGH (ref 10.3–14.5)
RBC # FLD: 19.5 % — HIGH (ref 10.3–14.5)
RBC # FLD: 19.6 % — HIGH (ref 10.3–14.5)
RBC # FLD: 19.6 % — HIGH (ref 10.3–14.5)
RBC # FLD: 19.9 % — HIGH (ref 10.3–14.5)
RBC # FLD: 20.2 % — HIGH (ref 10.3–14.5)
RBC # FLD: 20.3 % — HIGH (ref 10.3–14.5)
RBC # FLD: 20.9 % — HIGH (ref 10.3–14.5)
RBC # FLD: 21.2 % — HIGH (ref 10.3–14.5)
RBC # FLD: 21.2 % — HIGH (ref 10.3–14.5)
RBC # FLD: 21.4 % — HIGH (ref 10.3–14.5)
RBC CASTS # UR COMP ASSIST: >50 — HIGH (ref 0–?)
RBC CASTS # UR COMP ASSIST: >50 — HIGH (ref 0–?)
RBC CASTS # UR COMP ASSIST: HIGH (ref 0–?)
RH IG SCN BLD-IMP: POSITIVE — SIGNIFICANT CHANGE UP
RSV RNA SPEC QL NAA+PROBE: NOT DETECTED — SIGNIFICANT CHANGE UP
RV+EV RNA SPEC QL NAA+PROBE: DETECTED
RV+EV RNA SPEC QL NAA+PROBE: NOT DETECTED — SIGNIFICANT CHANGE UP
SAO2 % BLDV: 19.4 % — LOW (ref 60–85)
SAO2 % BLDV: 27.5 % — LOW (ref 60–85)
SAO2 % BLDV: 49.5 % — LOW (ref 60–85)
SODIUM SERPL-SCNC: 106 MMOL/L — CRITICAL LOW (ref 135–145)
SODIUM SERPL-SCNC: 129 MMOL/L — LOW (ref 135–145)
SODIUM SERPL-SCNC: 130 MMOL/L — LOW (ref 135–145)
SODIUM SERPL-SCNC: 130 MMOL/L — LOW (ref 135–145)
SODIUM SERPL-SCNC: 132 MMOL/L — LOW (ref 135–145)
SODIUM SERPL-SCNC: 133 MMOL/L
SODIUM SERPL-SCNC: 133 MMOL/L — LOW (ref 135–145)
SODIUM SERPL-SCNC: 134 MMOL/L — LOW (ref 135–145)
SODIUM SERPL-SCNC: 135 MMOL/L
SODIUM SERPL-SCNC: 135 MMOL/L — SIGNIFICANT CHANGE UP (ref 135–145)
SODIUM SERPL-SCNC: 135 MMOL/L — SIGNIFICANT CHANGE UP (ref 135–145)
SODIUM SERPL-SCNC: 136 MMOL/L — SIGNIFICANT CHANGE UP (ref 135–145)
SODIUM SERPL-SCNC: 137 MMOL/L
SODIUM SERPL-SCNC: 137 MMOL/L — SIGNIFICANT CHANGE UP (ref 135–145)
SODIUM SERPL-SCNC: 138 MMOL/L
SODIUM SERPL-SCNC: 138 MMOL/L — SIGNIFICANT CHANGE UP (ref 135–145)
SODIUM SERPL-SCNC: 138 MMOL/L — SIGNIFICANT CHANGE UP (ref 135–145)
SODIUM SERPL-SCNC: 139 MMOL/L
SODIUM SERPL-SCNC: 139 MMOL/L — SIGNIFICANT CHANGE UP (ref 135–145)
SP GR SPEC: 1.01 — SIGNIFICANT CHANGE UP (ref 1–1.04)
SP GR SPEC: 1.03 — HIGH (ref 1.01–1.02)
SP GR SPEC: 1.03 — SIGNIFICANT CHANGE UP (ref 1–1.04)
SP GR SPEC: > 1.04 — HIGH (ref 1–1.04)
SPECIMEN SOURCE: SIGNIFICANT CHANGE UP
SQUAMOUS # UR AUTO: SIGNIFICANT CHANGE UP
SQUAMOUS # UR AUTO: SIGNIFICANT CHANGE UP
T4 FREE SERPL-MCNC: 0.74 NG/DL — LOW (ref 0.9–1.8)
TRIGL SERPL-MCNC: 93 MG/DL — SIGNIFICANT CHANGE UP (ref 10–149)
TROPONIN T, HIGH SENSITIVITY: 15 NG/L — SIGNIFICANT CHANGE UP (ref ?–14)
TROPONIN T, HIGH SENSITIVITY: 19 NG/L — SIGNIFICANT CHANGE UP (ref ?–14)
TROPONIN T, HIGH SENSITIVITY: 20 NG/L — SIGNIFICANT CHANGE UP (ref ?–14)
TROPONIN T, HIGH SENSITIVITY: 22 NG/L — SIGNIFICANT CHANGE UP (ref ?–14)
TSH SERPL-ACNC: 10.4 UIU/ML
TSH SERPL-MCNC: 32.28 UIU/ML — HIGH (ref 0.27–4.2)
UROBILINOGEN FLD QL: 8 — SIGNIFICANT CHANGE UP
UROBILINOGEN FLD QL: ABNORMAL
UROBILINOGEN FLD QL: NORMAL — SIGNIFICANT CHANGE UP
UROBILINOGEN FLD QL: NORMAL — SIGNIFICANT CHANGE UP
VANCOMYCIN TROUGH SERPL-MCNC: 10 UG/ML — SIGNIFICANT CHANGE UP (ref 10–20)
WBC # BLD: 4.13 K/UL — SIGNIFICANT CHANGE UP (ref 3.8–10.5)
WBC # BLD: 4.7 K/UL — SIGNIFICANT CHANGE UP (ref 3.8–10.5)
WBC # BLD: 4.74 K/UL — SIGNIFICANT CHANGE UP (ref 3.8–10.5)
WBC # BLD: 5.1 K/UL — SIGNIFICANT CHANGE UP (ref 3.8–10.5)
WBC # BLD: 5.26 K/UL — SIGNIFICANT CHANGE UP (ref 3.8–10.5)
WBC # BLD: 5.3 K/UL — SIGNIFICANT CHANGE UP (ref 3.8–10.5)
WBC # BLD: 6.06 K/UL — SIGNIFICANT CHANGE UP (ref 3.8–10.5)
WBC # BLD: 6.41 K/UL — SIGNIFICANT CHANGE UP (ref 3.8–10.5)
WBC # BLD: 6.54 K/UL — SIGNIFICANT CHANGE UP (ref 3.8–10.5)
WBC # BLD: 6.54 K/UL — SIGNIFICANT CHANGE UP (ref 3.8–10.5)
WBC # BLD: 6.61 K/UL — SIGNIFICANT CHANGE UP (ref 3.8–10.5)
WBC # BLD: 7.2 K/UL — SIGNIFICANT CHANGE UP (ref 3.8–10.5)
WBC # BLD: 7.2 K/UL — SIGNIFICANT CHANGE UP (ref 3.8–10.5)
WBC # BLD: 7.25 K/UL — SIGNIFICANT CHANGE UP (ref 3.8–10.5)
WBC # BLD: 7.27 K/UL — SIGNIFICANT CHANGE UP (ref 3.8–10.5)
WBC # BLD: 7.27 K/UL — SIGNIFICANT CHANGE UP (ref 3.8–10.5)
WBC # BLD: 7.3 K/UL — SIGNIFICANT CHANGE UP (ref 3.8–10.5)
WBC # BLD: 7.44 K/UL — SIGNIFICANT CHANGE UP (ref 3.8–10.5)
WBC # BLD: 7.58 K/UL — SIGNIFICANT CHANGE UP (ref 3.8–10.5)
WBC # BLD: 7.65 K/UL — SIGNIFICANT CHANGE UP (ref 3.8–10.5)
WBC # BLD: 7.75 K/UL — SIGNIFICANT CHANGE UP (ref 3.8–10.5)
WBC # BLD: 7.8 K/UL — SIGNIFICANT CHANGE UP (ref 3.8–10.5)
WBC # BLD: 7.94 K/UL — SIGNIFICANT CHANGE UP (ref 3.8–10.5)
WBC # BLD: 8.8 K/UL — SIGNIFICANT CHANGE UP (ref 3.8–10.5)
WBC # BLD: 9.6 K/UL — SIGNIFICANT CHANGE UP (ref 3.8–10.5)
WBC # FLD AUTO: 4.13 K/UL — SIGNIFICANT CHANGE UP (ref 3.8–10.5)
WBC # FLD AUTO: 4.7 K/UL — SIGNIFICANT CHANGE UP (ref 3.8–10.5)
WBC # FLD AUTO: 4.74 K/UL — SIGNIFICANT CHANGE UP (ref 3.8–10.5)
WBC # FLD AUTO: 5.1 K/UL — SIGNIFICANT CHANGE UP (ref 3.8–10.5)
WBC # FLD AUTO: 5.26 K/UL — SIGNIFICANT CHANGE UP (ref 3.8–10.5)
WBC # FLD AUTO: 5.3 K/UL — SIGNIFICANT CHANGE UP (ref 3.8–10.5)
WBC # FLD AUTO: 6.06 K/UL — SIGNIFICANT CHANGE UP (ref 3.8–10.5)
WBC # FLD AUTO: 6.41 K/UL — SIGNIFICANT CHANGE UP (ref 3.8–10.5)
WBC # FLD AUTO: 6.45 K/UL
WBC # FLD AUTO: 6.54 K/UL — SIGNIFICANT CHANGE UP (ref 3.8–10.5)
WBC # FLD AUTO: 6.54 K/UL — SIGNIFICANT CHANGE UP (ref 3.8–10.5)
WBC # FLD AUTO: 6.61 K/UL — SIGNIFICANT CHANGE UP (ref 3.8–10.5)
WBC # FLD AUTO: 7.2 K/UL — SIGNIFICANT CHANGE UP (ref 3.8–10.5)
WBC # FLD AUTO: 7.2 K/UL — SIGNIFICANT CHANGE UP (ref 3.8–10.5)
WBC # FLD AUTO: 7.25 K/UL — SIGNIFICANT CHANGE UP (ref 3.8–10.5)
WBC # FLD AUTO: 7.27 K/UL — SIGNIFICANT CHANGE UP (ref 3.8–10.5)
WBC # FLD AUTO: 7.27 K/UL — SIGNIFICANT CHANGE UP (ref 3.8–10.5)
WBC # FLD AUTO: 7.3 K/UL — SIGNIFICANT CHANGE UP (ref 3.8–10.5)
WBC # FLD AUTO: 7.44 K/UL — SIGNIFICANT CHANGE UP (ref 3.8–10.5)
WBC # FLD AUTO: 7.58 K/UL — SIGNIFICANT CHANGE UP (ref 3.8–10.5)
WBC # FLD AUTO: 7.65 K/UL — SIGNIFICANT CHANGE UP (ref 3.8–10.5)
WBC # FLD AUTO: 7.75 K/UL — SIGNIFICANT CHANGE UP (ref 3.8–10.5)
WBC # FLD AUTO: 7.8 K/UL — SIGNIFICANT CHANGE UP (ref 3.8–10.5)
WBC # FLD AUTO: 7.94 K/UL — SIGNIFICANT CHANGE UP (ref 3.8–10.5)
WBC # FLD AUTO: 8.8 K/UL — SIGNIFICANT CHANGE UP (ref 3.8–10.5)
WBC # FLD AUTO: 9.6 K/UL — SIGNIFICANT CHANGE UP (ref 3.8–10.5)
WBC UR QL: >50 — HIGH (ref 0–?)

## 2019-01-01 PROCEDURE — 51702 INSERT TEMP BLADDER CATH: CPT | Mod: 58

## 2019-01-01 PROCEDURE — 99223 1ST HOSP IP/OBS HIGH 75: CPT | Mod: GC

## 2019-01-01 PROCEDURE — 83605 ASSAY OF LACTIC ACID: CPT

## 2019-01-01 PROCEDURE — 99223 1ST HOSP IP/OBS HIGH 75: CPT

## 2019-01-01 PROCEDURE — 83036 HEMOGLOBIN GLYCOSYLATED A1C: CPT

## 2019-01-01 PROCEDURE — 86900 BLOOD TYPING SEROLOGIC ABO: CPT

## 2019-01-01 PROCEDURE — 99024 POSTOP FOLLOW-UP VISIT: CPT

## 2019-01-01 PROCEDURE — 71260 CT THORAX DX C+: CPT

## 2019-01-01 PROCEDURE — 82962 GLUCOSE BLOOD TEST: CPT

## 2019-01-01 PROCEDURE — 71046 X-RAY EXAM CHEST 2 VIEWS: CPT | Mod: 26

## 2019-01-01 PROCEDURE — 51703 INSERT BLADDER CATH COMPLEX: CPT | Mod: 58

## 2019-01-01 PROCEDURE — 84100 ASSAY OF PHOSPHORUS: CPT

## 2019-01-01 PROCEDURE — 97162 PT EVAL MOD COMPLEX 30 MIN: CPT

## 2019-01-01 PROCEDURE — 99233 SBSQ HOSP IP/OBS HIGH 50: CPT

## 2019-01-01 PROCEDURE — 99232 SBSQ HOSP IP/OBS MODERATE 35: CPT

## 2019-01-01 PROCEDURE — 87581 M.PNEUMON DNA AMP PROBE: CPT

## 2019-01-01 PROCEDURE — 72157 MRI CHEST SPINE W/O & W/DYE: CPT

## 2019-01-01 PROCEDURE — 99214 OFFICE O/P EST MOD 30 MIN: CPT | Mod: 25

## 2019-01-01 PROCEDURE — 87633 RESP VIRUS 12-25 TARGETS: CPT

## 2019-01-01 PROCEDURE — 51702 INSERT TEMP BLADDER CATH: CPT

## 2019-01-01 PROCEDURE — 82565 ASSAY OF CREATININE: CPT

## 2019-01-01 PROCEDURE — 51701 INSERT BLADDER CATHETER: CPT

## 2019-01-01 PROCEDURE — 99215 OFFICE O/P EST HI 40 MIN: CPT

## 2019-01-01 PROCEDURE — 99239 HOSP IP/OBS DSCHRG MGMT >30: CPT

## 2019-01-01 PROCEDURE — 71260 CT THORAX DX C+: CPT | Mod: 26

## 2019-01-01 PROCEDURE — C1889: CPT

## 2019-01-01 PROCEDURE — 73630 X-RAY EXAM OF FOOT: CPT

## 2019-01-01 PROCEDURE — 84145 PROCALCITONIN (PCT): CPT

## 2019-01-01 PROCEDURE — 74177 CT ABD & PELVIS W/CONTRAST: CPT | Mod: 26

## 2019-01-01 PROCEDURE — 85014 HEMATOCRIT: CPT

## 2019-01-01 PROCEDURE — 80053 COMPREHEN METABOLIC PANEL: CPT

## 2019-01-01 PROCEDURE — 99498 ADVNCD CARE PLAN ADDL 30 MIN: CPT | Mod: 25

## 2019-01-01 PROCEDURE — 99284 EMERGENCY DEPT VISIT MOD MDM: CPT | Mod: GC

## 2019-01-01 PROCEDURE — 51703 INSERT BLADDER CATH COMPLEX: CPT

## 2019-01-01 PROCEDURE — 99214 OFFICE O/P EST MOD 30 MIN: CPT

## 2019-01-01 PROCEDURE — 72129 CT CHEST SPINE W/DYE: CPT

## 2019-01-01 PROCEDURE — 93306 TTE W/DOPPLER COMPLETE: CPT | Mod: 26

## 2019-01-01 PROCEDURE — 87798 DETECT AGENT NOS DNA AMP: CPT

## 2019-01-01 PROCEDURE — 70450 CT HEAD/BRAIN W/O DYE: CPT | Mod: 26

## 2019-01-01 PROCEDURE — 74178 CT ABD&PLV WO CNTR FLWD CNTR: CPT | Mod: 26

## 2019-01-01 PROCEDURE — A9585: CPT

## 2019-01-01 PROCEDURE — 85730 THROMBOPLASTIN TIME PARTIAL: CPT

## 2019-01-01 PROCEDURE — 93970 EXTREMITY STUDY: CPT | Mod: 26

## 2019-01-01 PROCEDURE — 99214 OFFICE O/P EST MOD 30 MIN: CPT | Mod: GC

## 2019-01-01 PROCEDURE — 70553 MRI BRAIN STEM W/O & W/DYE: CPT | Mod: 26

## 2019-01-01 PROCEDURE — 72157 MRI CHEST SPINE W/O & W/DYE: CPT | Mod: 26

## 2019-01-01 PROCEDURE — 99231 SBSQ HOSP IP/OBS SF/LOW 25: CPT

## 2019-01-01 PROCEDURE — 73630 X-RAY EXAM OF FOOT: CPT | Mod: 26,LT

## 2019-01-01 PROCEDURE — 99215 OFFICE O/P EST HI 40 MIN: CPT | Mod: 25

## 2019-01-01 PROCEDURE — 99215 OFFICE O/P EST HI 40 MIN: CPT | Mod: PD

## 2019-01-01 PROCEDURE — 87529 HSV DNA AMP PROBE: CPT

## 2019-01-01 PROCEDURE — 99497 ADVNCD CARE PLAN 30 MIN: CPT | Mod: 25

## 2019-01-01 PROCEDURE — 87086 URINE CULTURE/COLONY COUNT: CPT

## 2019-01-01 PROCEDURE — 72129 CT CHEST SPINE W/DYE: CPT | Mod: 26

## 2019-01-01 PROCEDURE — 86803 HEPATITIS C AB TEST: CPT

## 2019-01-01 PROCEDURE — 99285 EMERGENCY DEPT VISIT HI MDM: CPT | Mod: GC

## 2019-01-01 PROCEDURE — 82803 BLOOD GASES ANY COMBINATION: CPT

## 2019-01-01 PROCEDURE — 82435 ASSAY OF BLOOD CHLORIDE: CPT

## 2019-01-01 PROCEDURE — 52648 LASER SURGERY OF PROSTATE: CPT

## 2019-01-01 PROCEDURE — 96375 TX/PRO/DX INJ NEW DRUG ADDON: CPT | Mod: XU

## 2019-01-01 PROCEDURE — 80202 ASSAY OF VANCOMYCIN: CPT

## 2019-01-01 PROCEDURE — 93010 ELECTROCARDIOGRAM REPORT: CPT | Mod: GC

## 2019-01-01 PROCEDURE — 99213 OFFICE O/P EST LOW 20 MIN: CPT | Mod: 25

## 2019-01-01 PROCEDURE — 74177 CT ABD & PELVIS W/CONTRAST: CPT

## 2019-01-01 PROCEDURE — 93005 ELECTROCARDIOGRAM TRACING: CPT | Mod: XU

## 2019-01-01 PROCEDURE — 37191 INS ENDOVAS VENA CAVA FILTR: CPT

## 2019-01-01 PROCEDURE — 71275 CT ANGIOGRAPHY CHEST: CPT | Mod: 26

## 2019-01-01 PROCEDURE — 86901 BLOOD TYPING SEROLOGIC RH(D): CPT

## 2019-01-01 PROCEDURE — 76770 US EXAM ABDO BACK WALL COMP: CPT | Mod: 26

## 2019-01-01 PROCEDURE — G0463: CPT

## 2019-01-01 PROCEDURE — 96374 THER/PROPH/DIAG INJ IV PUSH: CPT | Mod: XU

## 2019-01-01 PROCEDURE — 70553 MRI BRAIN STEM W/O & W/DYE: CPT

## 2019-01-01 PROCEDURE — G9001: CPT

## 2019-01-01 PROCEDURE — 85027 COMPLETE CBC AUTOMATED: CPT

## 2019-01-01 PROCEDURE — 83735 ASSAY OF MAGNESIUM: CPT

## 2019-01-01 PROCEDURE — 76705 ECHO EXAM OF ABDOMEN: CPT | Mod: 26

## 2019-01-01 PROCEDURE — 87040 BLOOD CULTURE FOR BACTERIA: CPT

## 2019-01-01 PROCEDURE — 87486 CHLMYD PNEUM DNA AMP PROBE: CPT

## 2019-01-01 PROCEDURE — 99233 SBSQ HOSP IP/OBS HIGH 50: CPT | Mod: GC

## 2019-01-01 PROCEDURE — 82330 ASSAY OF CALCIUM: CPT

## 2019-01-01 PROCEDURE — 12345: CPT | Mod: NC

## 2019-01-01 PROCEDURE — 71250 CT THORAX DX C-: CPT

## 2019-01-01 PROCEDURE — 99222 1ST HOSP IP/OBS MODERATE 55: CPT | Mod: GC

## 2019-01-01 PROCEDURE — 80048 BASIC METABOLIC PNL TOTAL CA: CPT

## 2019-01-01 PROCEDURE — 99284 EMERGENCY DEPT VISIT MOD MDM: CPT

## 2019-01-01 PROCEDURE — 74176 CT ABD & PELVIS W/O CONTRAST: CPT | Mod: 26

## 2019-01-01 PROCEDURE — 86850 RBC ANTIBODY SCREEN: CPT

## 2019-01-01 PROCEDURE — 85610 PROTHROMBIN TIME: CPT

## 2019-01-01 PROCEDURE — 74176 CT ABD & PELVIS W/O CONTRAST: CPT

## 2019-01-01 PROCEDURE — 74178 CT ABD&PLV WO CNTR FLWD CNTR: CPT

## 2019-01-01 PROCEDURE — 84132 ASSAY OF SERUM POTASSIUM: CPT

## 2019-01-01 PROCEDURE — 99221 1ST HOSP IP/OBS SF/LOW 40: CPT | Mod: GC

## 2019-01-01 PROCEDURE — 81001 URINALYSIS AUTO W/SCOPE: CPT

## 2019-01-01 PROCEDURE — 99285 EMERGENCY DEPT VISIT HI MDM: CPT | Mod: 25

## 2019-01-01 PROCEDURE — 71045 X-RAY EXAM CHEST 1 VIEW: CPT

## 2019-01-01 PROCEDURE — 82947 ASSAY GLUCOSE BLOOD QUANT: CPT

## 2019-01-01 PROCEDURE — 99285 EMERGENCY DEPT VISIT HI MDM: CPT

## 2019-01-01 PROCEDURE — 71045 X-RAY EXAM CHEST 1 VIEW: CPT | Mod: 26

## 2019-01-01 PROCEDURE — 84295 ASSAY OF SERUM SODIUM: CPT

## 2019-01-01 PROCEDURE — 71250 CT THORAX DX C-: CPT | Mod: 26

## 2019-01-01 RX ORDER — DOCUSATE SODIUM 100 MG
1 CAPSULE ORAL
Qty: 0 | Refills: 0 | DISCHARGE
Start: 2019-01-01

## 2019-01-01 RX ORDER — NYSTATIN CREAM 100000 [USP'U]/G
1 CREAM TOPICAL
Qty: 0 | Refills: 0 | DISCHARGE
Start: 2019-01-01

## 2019-01-01 RX ORDER — METHADONE HYDROCHLORIDE 40 MG/1
1 TABLET ORAL
Qty: 0 | Refills: 0 | DISCHARGE

## 2019-01-01 RX ORDER — METHADONE HYDROCHLORIDE 40 MG/1
1 TABLET ORAL
Qty: 0 | Refills: 0 | DISCHARGE
Start: 2019-01-01

## 2019-01-01 RX ORDER — PANTOPRAZOLE SODIUM 20 MG/1
40 TABLET, DELAYED RELEASE ORAL
Refills: 0 | Status: DISCONTINUED | OUTPATIENT
Start: 2019-01-01 | End: 2019-01-01

## 2019-01-01 RX ORDER — VANCOMYCIN HCL 1 G
1000 VIAL (EA) INTRAVENOUS ONCE
Refills: 0 | Status: COMPLETED | OUTPATIENT
Start: 2019-01-01 | End: 2019-01-01

## 2019-01-01 RX ORDER — LEVOTHYROXINE SODIUM 125 MCG
1 TABLET ORAL
Qty: 0 | Refills: 0 | DISCHARGE

## 2019-01-01 RX ORDER — SODIUM CHLORIDE 9 MG/ML
500 INJECTION INTRAMUSCULAR; INTRAVENOUS; SUBCUTANEOUS ONCE
Refills: 0 | Status: COMPLETED | OUTPATIENT
Start: 2019-01-01 | End: 2019-01-01

## 2019-01-01 RX ORDER — AMPICILLIN SODIUM AND SULBACTAM SODIUM 250; 125 MG/ML; MG/ML
3 INJECTION, POWDER, FOR SUSPENSION INTRAMUSCULAR; INTRAVENOUS ONCE
Refills: 0 | Status: COMPLETED | OUTPATIENT
Start: 2019-01-01 | End: 2019-01-01

## 2019-01-01 RX ORDER — DEXTROSE 50 % IN WATER 50 %
25 SYRINGE (ML) INTRAVENOUS ONCE
Refills: 0 | Status: DISCONTINUED | OUTPATIENT
Start: 2019-01-01 | End: 2019-01-01

## 2019-01-01 RX ORDER — LIDOCAINE HCL 20 MG/ML
0.2 VIAL (ML) INJECTION ONCE
Qty: 0 | Refills: 0 | Status: DISCONTINUED | OUTPATIENT
Start: 2019-01-01 | End: 2019-01-01

## 2019-01-01 RX ORDER — INSULIN LISPRO 100/ML
VIAL (ML) SUBCUTANEOUS
Refills: 0 | Status: DISCONTINUED | OUTPATIENT
Start: 2019-01-01 | End: 2019-01-01

## 2019-01-01 RX ORDER — INSULIN GLARGINE 100 [IU]/ML
8 INJECTION, SOLUTION SUBCUTANEOUS
Qty: 1 | Refills: 0
Start: 2019-01-01

## 2019-01-01 RX ORDER — ACETAMINOPHEN 500 MG
650 TABLET ORAL EVERY 6 HOURS
Refills: 0 | Status: DISCONTINUED | OUTPATIENT
Start: 2019-01-01 | End: 2019-01-01

## 2019-01-01 RX ORDER — CEFEPIME 1 G/1
2000 INJECTION, POWDER, FOR SOLUTION INTRAMUSCULAR; INTRAVENOUS ONCE
Refills: 0 | Status: COMPLETED | OUTPATIENT
Start: 2019-01-01 | End: 2019-01-01

## 2019-01-01 RX ORDER — SENNA PLUS 8.6 MG/1
2 TABLET ORAL AT BEDTIME
Refills: 0 | Status: DISCONTINUED | OUTPATIENT
Start: 2019-01-01 | End: 2019-01-01

## 2019-01-01 RX ORDER — SENNA PLUS 8.6 MG/1
2 TABLET ORAL
Qty: 0 | Refills: 0 | DISCHARGE
Start: 2019-01-01

## 2019-01-01 RX ORDER — FINASTERIDE 5 MG/1
5 TABLET, FILM COATED ORAL DAILY
Refills: 0 | Status: DISCONTINUED | OUTPATIENT
Start: 2019-01-01 | End: 2019-01-01

## 2019-01-01 RX ORDER — OXYCODONE HYDROCHLORIDE 5 MG/1
1 TABLET ORAL
Qty: 0 | Refills: 0 | DISCHARGE
Start: 2019-01-01

## 2019-01-01 RX ORDER — METHADONE HYDROCHLORIDE 40 MG/1
5 TABLET ORAL THREE TIMES A DAY
Refills: 0 | Status: DISCONTINUED | OUTPATIENT
Start: 2019-01-01 | End: 2019-01-01

## 2019-01-01 RX ORDER — SODIUM CHLORIDE 9 MG/ML
1000 INJECTION, SOLUTION INTRAVENOUS
Refills: 0 | Status: DISCONTINUED | OUTPATIENT
Start: 2019-01-01 | End: 2019-01-01

## 2019-01-01 RX ORDER — HYDROMORPHONE HYDROCHLORIDE 2 MG/ML
0.25 INJECTION INTRAMUSCULAR; INTRAVENOUS; SUBCUTANEOUS
Qty: 0 | Refills: 0 | Status: DISCONTINUED | OUTPATIENT
Start: 2019-01-01 | End: 2019-01-01

## 2019-01-01 RX ORDER — LEVOTHYROXINE SODIUM 125 MCG
175 TABLET ORAL DAILY
Refills: 0 | Status: DISCONTINUED | OUTPATIENT
Start: 2019-01-01 | End: 2019-01-01

## 2019-01-01 RX ORDER — ACETAMINOPHEN 500 MG
2 TABLET ORAL
Qty: 0 | Refills: 0 | DISCHARGE
Start: 2019-01-01

## 2019-01-01 RX ORDER — FINASTERIDE 5 MG/1
1 TABLET, FILM COATED ORAL
Qty: 30 | Refills: 0
Start: 2019-01-01 | End: 2019-01-01

## 2019-01-01 RX ORDER — CEFTRIAXONE 500 MG/1
1 INJECTION, POWDER, FOR SOLUTION INTRAMUSCULAR; INTRAVENOUS ONCE
Refills: 0 | Status: COMPLETED | OUTPATIENT
Start: 2019-01-01 | End: 2019-01-01

## 2019-01-01 RX ORDER — COLLAGENASE CLOSTRIDIUM HIST. 250 UNIT/G
1 OINTMENT (GRAM) TOPICAL DAILY
Refills: 0 | Status: DISCONTINUED | OUTPATIENT
Start: 2019-01-01 | End: 2019-01-01

## 2019-01-01 RX ORDER — CHLORHEXIDINE GLUCONATE 213 G/1000ML
1 SOLUTION TOPICAL DAILY
Refills: 0 | Status: DISCONTINUED | OUTPATIENT
Start: 2019-01-01 | End: 2019-01-01

## 2019-01-01 RX ORDER — POLYETHYLENE GLYCOL 3350 17 G/17G
17 POWDER, FOR SOLUTION ORAL DAILY
Refills: 0 | Status: DISCONTINUED | OUTPATIENT
Start: 2019-01-01 | End: 2019-01-01

## 2019-01-01 RX ORDER — LEVOTHYROXINE SODIUM 125 MCG
1 TABLET ORAL
Qty: 0 | Refills: 0 | DISCHARGE
Start: 2019-01-01

## 2019-01-01 RX ORDER — INSULIN LISPRO 100/ML
VIAL (ML) SUBCUTANEOUS AT BEDTIME
Refills: 0 | Status: DISCONTINUED | OUTPATIENT
Start: 2019-01-01 | End: 2019-01-01

## 2019-01-01 RX ORDER — LEVOTHYROXINE SODIUM 125 MCG
137 TABLET ORAL DAILY
Refills: 0 | Status: DISCONTINUED | OUTPATIENT
Start: 2019-01-01 | End: 2019-01-01

## 2019-01-01 RX ORDER — CEFDINIR 250 MG/5ML
1 POWDER, FOR SUSPENSION ORAL
Qty: 12 | Refills: 0
Start: 2019-01-01 | End: 2019-01-01

## 2019-01-01 RX ORDER — OXYCODONE HYDROCHLORIDE 5 MG/1
10 TABLET ORAL EVERY 6 HOURS
Refills: 0 | Status: DISCONTINUED | OUTPATIENT
Start: 2019-01-01 | End: 2019-01-01

## 2019-01-01 RX ORDER — ENOXAPARIN SODIUM 100 MG/ML
40 INJECTION SUBCUTANEOUS
Qty: 0 | Refills: 0 | DISCHARGE
Start: 2019-01-01

## 2019-01-01 RX ORDER — COLLAGENASE CLOSTRIDIUM HIST. 250 UNIT/G
1 OINTMENT (GRAM) TOPICAL
Qty: 0 | Refills: 0 | DISCHARGE
Start: 2019-01-01

## 2019-01-01 RX ORDER — PANTOPRAZOLE SODIUM 20 MG/1
1 TABLET, DELAYED RELEASE ORAL
Qty: 0 | Refills: 0 | DISCHARGE
Start: 2019-01-01

## 2019-01-01 RX ORDER — AMLODIPINE BESYLATE 2.5 MG/1
10 TABLET ORAL DAILY
Refills: 0 | Status: DISCONTINUED | OUTPATIENT
Start: 2019-01-01 | End: 2019-01-01

## 2019-01-01 RX ORDER — FUROSEMIDE 40 MG
1 TABLET ORAL
Qty: 0 | Refills: 0 | DISCHARGE

## 2019-01-01 RX ORDER — ATORVASTATIN CALCIUM 80 MG/1
20 TABLET, FILM COATED ORAL AT BEDTIME
Refills: 0 | Status: DISCONTINUED | OUTPATIENT
Start: 2019-01-01 | End: 2019-01-01

## 2019-01-01 RX ORDER — AZITHROMYCIN 500 MG/1
500 TABLET, FILM COATED ORAL EVERY 24 HOURS
Refills: 0 | Status: DISCONTINUED | OUTPATIENT
Start: 2019-01-01 | End: 2019-01-01

## 2019-01-01 RX ORDER — POTASSIUM CHLORIDE 20 MEQ
40 PACKET (EA) ORAL ONCE
Refills: 0 | Status: COMPLETED | OUTPATIENT
Start: 2019-01-01 | End: 2019-01-01

## 2019-01-01 RX ORDER — DEXTROSE 50 % IN WATER 50 %
12.5 SYRINGE (ML) INTRAVENOUS ONCE
Refills: 0 | Status: DISCONTINUED | OUTPATIENT
Start: 2019-01-01 | End: 2019-01-01

## 2019-01-01 RX ORDER — VANCOMYCIN HCL 1 G
1000 VIAL (EA) INTRAVENOUS EVERY 12 HOURS
Refills: 0 | Status: DISCONTINUED | OUTPATIENT
Start: 2019-01-01 | End: 2019-01-01

## 2019-01-01 RX ORDER — HEPARIN SODIUM 5000 [USP'U]/ML
3000 INJECTION INTRAVENOUS; SUBCUTANEOUS EVERY 6 HOURS
Refills: 0 | Status: DISCONTINUED | OUTPATIENT
Start: 2019-01-01 | End: 2019-01-01

## 2019-01-01 RX ORDER — OXYCODONE HYDROCHLORIDE 5 MG/1
0 TABLET ORAL
Qty: 0 | Refills: 0 | DISCHARGE

## 2019-01-01 RX ORDER — CIPROFLOXACIN HYDROCHLORIDE 500 MG/1
500 TABLET, FILM COATED ORAL
Qty: 2 | Refills: 0 | Status: ACTIVE | COMMUNITY
Start: 2019-01-01

## 2019-01-01 RX ORDER — INSULIN LISPRO 100/ML
0 VIAL (ML) SUBCUTANEOUS
Qty: 0 | Refills: 0 | DISCHARGE

## 2019-01-01 RX ORDER — OXYCODONE AND ACETAMINOPHEN 5; 325 MG/1; MG/1
1 TABLET ORAL EVERY 4 HOURS
Refills: 0 | Status: DISCONTINUED | OUTPATIENT
Start: 2019-01-01 | End: 2019-01-01

## 2019-01-01 RX ORDER — AMPICILLIN SODIUM AND SULBACTAM SODIUM 250; 125 MG/ML; MG/ML
3 INJECTION, POWDER, FOR SUSPENSION INTRAMUSCULAR; INTRAVENOUS EVERY 6 HOURS
Refills: 0 | Status: DISCONTINUED | OUTPATIENT
Start: 2019-01-01 | End: 2019-01-01

## 2019-01-01 RX ORDER — HEPARIN SODIUM 5000 [USP'U]/ML
6500 INJECTION INTRAVENOUS; SUBCUTANEOUS EVERY 6 HOURS
Refills: 0 | Status: DISCONTINUED | OUTPATIENT
Start: 2019-01-01 | End: 2019-01-01

## 2019-01-01 RX ORDER — HEPARIN SODIUM 5000 [USP'U]/ML
INJECTION INTRAVENOUS; SUBCUTANEOUS
Qty: 25000 | Refills: 0 | Status: DISCONTINUED | OUTPATIENT
Start: 2019-01-01 | End: 2019-01-01

## 2019-01-01 RX ORDER — ONDANSETRON 4 MG/1
4 TABLET, ORALLY DISINTEGRATING ORAL 3 TIMES DAILY
Qty: 1 | Refills: 4 | Status: ACTIVE | COMMUNITY
Start: 2019-01-01 | End: 1900-01-01

## 2019-01-01 RX ORDER — CIPROFLOXACIN HYDROCHLORIDE 500 MG/1
500 TABLET, FILM COATED ORAL
Refills: 0 | Status: COMPLETED | OUTPATIENT
Start: 2019-01-01

## 2019-01-01 RX ORDER — LORAZEPAM 0.5 MG/1
0.5 TABLET ORAL
Qty: 90 | Refills: 0 | Status: DISCONTINUED | COMMUNITY
Start: 2019-01-01 | End: 2019-01-01

## 2019-01-01 RX ORDER — POLYETHYLENE GLYCOL 3350 17 G/17G
17 POWDER, FOR SOLUTION ORAL
Qty: 0 | Refills: 0 | DISCHARGE
Start: 2019-01-01

## 2019-01-01 RX ORDER — METHADONE HYDROCHLORIDE 5 MG/1
5 TABLET ORAL 3 TIMES DAILY
Qty: 90 | Refills: 0 | Status: ACTIVE | COMMUNITY
Start: 2019-01-01 | End: 1900-01-01

## 2019-01-01 RX ORDER — IPRATROPIUM/ALBUTEROL SULFATE 18-103MCG
3 AEROSOL WITH ADAPTER (GRAM) INHALATION ONCE
Refills: 0 | Status: COMPLETED | OUTPATIENT
Start: 2019-01-01 | End: 2019-01-01

## 2019-01-01 RX ORDER — CEFEPIME 1 G/1
2000 INJECTION, POWDER, FOR SOLUTION INTRAMUSCULAR; INTRAVENOUS EVERY 8 HOURS
Refills: 0 | Status: DISCONTINUED | OUTPATIENT
Start: 2019-01-01 | End: 2019-01-01

## 2019-01-01 RX ORDER — HEPARIN SODIUM 5000 [USP'U]/ML
6500 INJECTION INTRAVENOUS; SUBCUTANEOUS ONCE
Refills: 0 | Status: COMPLETED | OUTPATIENT
Start: 2019-01-01 | End: 2019-01-01

## 2019-01-01 RX ORDER — DEXTROSE 50 % IN WATER 50 %
15 SYRINGE (ML) INTRAVENOUS ONCE
Refills: 0 | Status: DISCONTINUED | OUTPATIENT
Start: 2019-01-01 | End: 2019-01-01

## 2019-01-01 RX ORDER — CEFAZOLIN SODIUM 1 G
2000 VIAL (EA) INJECTION ONCE
Qty: 0 | Refills: 0 | Status: DISCONTINUED | OUTPATIENT
Start: 2019-01-01 | End: 2019-01-01

## 2019-01-01 RX ORDER — INSULIN GLARGINE 100 [IU]/ML
100 INJECTION, SOLUTION SUBCUTANEOUS AT BEDTIME
Qty: 1 | Refills: 0 | Status: DISCONTINUED | COMMUNITY
Start: 2018-06-15 | End: 2019-01-01

## 2019-01-01 RX ORDER — ATORVASTATIN CALCIUM 80 MG/1
1 TABLET, FILM COATED ORAL
Qty: 0 | Refills: 0 | DISCHARGE
Start: 2019-01-01

## 2019-01-01 RX ORDER — POLYETHYLENE GLYCOL 3350 17 G/17G
17 POWDER, FOR SOLUTION ORAL ONCE
Refills: 0 | Status: COMPLETED | OUTPATIENT
Start: 2019-01-01 | End: 2019-01-01

## 2019-01-01 RX ORDER — AMLODIPINE BESYLATE 10 MG/1
10 TABLET ORAL DAILY
Qty: 30 | Refills: 2 | Status: ACTIVE | COMMUNITY
Start: 2019-01-01 | End: 1900-01-01

## 2019-01-01 RX ORDER — INSULIN GLARGINE 100 [IU]/ML
10 INJECTION, SOLUTION SUBCUTANEOUS AT BEDTIME
Refills: 0 | Status: COMPLETED | OUTPATIENT
Start: 2019-01-01 | End: 2019-01-01

## 2019-01-01 RX ORDER — AMLODIPINE BESYLATE 2.5 MG/1
1 TABLET ORAL
Qty: 0 | Refills: 0 | DISCHARGE
Start: 2019-01-01

## 2019-01-01 RX ORDER — IBUPROFEN 200 MG
600 TABLET ORAL ONCE
Refills: 0 | Status: COMPLETED | OUTPATIENT
Start: 2019-01-01 | End: 2019-01-01

## 2019-01-01 RX ORDER — MORPHINE SULFATE 50 MG/1
2 CAPSULE, EXTENDED RELEASE ORAL EVERY 4 HOURS
Refills: 0 | Status: DISCONTINUED | OUTPATIENT
Start: 2019-01-01 | End: 2019-01-01

## 2019-01-01 RX ORDER — MORPHINE SULFATE 50 MG/1
2 CAPSULE, EXTENDED RELEASE ORAL ONCE
Refills: 0 | Status: DISCONTINUED | OUTPATIENT
Start: 2019-01-01 | End: 2019-01-01

## 2019-01-01 RX ORDER — FINASTERIDE 5 MG/1
1 TABLET, FILM COATED ORAL
Qty: 0 | Refills: 0 | DISCHARGE
Start: 2019-01-01

## 2019-01-01 RX ORDER — SODIUM CHLORIDE 9 MG/ML
1000 INJECTION, SOLUTION INTRAVENOUS
Qty: 0 | Refills: 0 | Status: DISCONTINUED | OUTPATIENT
Start: 2019-01-01 | End: 2019-01-01

## 2019-01-01 RX ORDER — SODIUM CHLORIDE 9 MG/ML
3 INJECTION INTRAMUSCULAR; INTRAVENOUS; SUBCUTANEOUS EVERY 8 HOURS
Qty: 0 | Refills: 0 | Status: DISCONTINUED | OUTPATIENT
Start: 2019-01-01 | End: 2019-01-01

## 2019-01-01 RX ORDER — SILVER SULFADIAZINE 10 MG/G
1 CREAM TOPICAL TWICE DAILY
Qty: 1 | Refills: 2 | Status: ACTIVE | COMMUNITY
Start: 2019-01-01 | End: 1900-01-01

## 2019-01-01 RX ORDER — FUROSEMIDE 40 MG
1 TABLET ORAL
Qty: 0 | Refills: 0 | DISCHARGE
Start: 2019-01-01

## 2019-01-01 RX ORDER — REPAGLINIDE 1 MG/1
1 TABLET ORAL
Qty: 0 | Refills: 0 | DISCHARGE

## 2019-01-01 RX ORDER — CABOZANTINIB 140 MG/DAY
1 KIT ORAL
Qty: 0 | Refills: 0 | DISCHARGE

## 2019-01-01 RX ORDER — FUROSEMIDE 40 MG/1
40 TABLET ORAL
Qty: 30 | Refills: 1 | Status: ACTIVE | COMMUNITY
Start: 2019-01-01 | End: 1900-01-01

## 2019-01-01 RX ORDER — ENOXAPARIN SODIUM 100 MG/ML
40 INJECTION SUBCUTANEOUS DAILY
Refills: 0 | Status: DISCONTINUED | OUTPATIENT
Start: 2019-01-01 | End: 2019-01-01

## 2019-01-01 RX ORDER — ENOXAPARIN SODIUM 100 MG/ML
14 INJECTION SUBCUTANEOUS
Qty: 0 | Refills: 0 | DISCHARGE

## 2019-01-01 RX ORDER — HYDROMORPHONE HYDROCHLORIDE 2 MG/ML
1 INJECTION INTRAMUSCULAR; INTRAVENOUS; SUBCUTANEOUS
Refills: 0 | Status: DISCONTINUED | OUTPATIENT
Start: 2019-01-01 | End: 2019-01-01

## 2019-01-01 RX ORDER — SENNA PLUS 8.6 MG/1
2 TABLET ORAL
Qty: 0 | Refills: 0 | DISCHARGE

## 2019-01-01 RX ORDER — SODIUM POLYSTYRENE SULFONATE 15 G/60ML
15 SUSPENSION ORAL; RECTAL
Qty: 1 | Refills: 0 | Status: DISCONTINUED | COMMUNITY
Start: 2019-01-01 | End: 2019-01-01

## 2019-01-01 RX ORDER — INSULIN GLARGINE 100 [IU]/ML
14 INJECTION, SOLUTION SUBCUTANEOUS AT BEDTIME
Refills: 0 | Status: DISCONTINUED | OUTPATIENT
Start: 2019-01-01 | End: 2019-01-01

## 2019-01-01 RX ORDER — ACETAMINOPHEN 500 MG
975 TABLET ORAL ONCE
Refills: 0 | Status: COMPLETED | OUTPATIENT
Start: 2019-01-01 | End: 2019-01-01

## 2019-01-01 RX ORDER — SODIUM CHLORIDE 9 MG/ML
2000 INJECTION INTRAMUSCULAR; INTRAVENOUS; SUBCUTANEOUS ONCE
Refills: 0 | Status: DISCONTINUED | OUTPATIENT
Start: 2019-01-01 | End: 2019-01-01

## 2019-01-01 RX ORDER — GLUCAGON INJECTION, SOLUTION 0.5 MG/.1ML
1 INJECTION, SOLUTION SUBCUTANEOUS ONCE
Refills: 0 | Status: DISCONTINUED | OUTPATIENT
Start: 2019-01-01 | End: 2019-01-01

## 2019-01-01 RX ORDER — CHLORHEXIDINE GLUCONATE 1.2 MG/ML
0.12 RINSE ORAL
Qty: 1 | Refills: 2 | Status: ACTIVE | COMMUNITY
Start: 2017-12-14 | End: 1900-01-01

## 2019-01-01 RX ORDER — REPAGLINIDE 1 MG/1
1 TABLET ORAL 3 TIMES DAILY
Qty: 90 | Refills: 0 | Status: ACTIVE | COMMUNITY
Start: 2017-10-13 | End: 1900-01-01

## 2019-01-01 RX ORDER — LEVOTHYROXINE SODIUM 0.17 MG/1
175 TABLET ORAL DAILY
Qty: 30 | Refills: 5 | Status: ACTIVE | COMMUNITY
Start: 2018-01-22 | End: 1900-01-01

## 2019-01-01 RX ORDER — CEFEPIME 1 G/1
1000 INJECTION, POWDER, FOR SOLUTION INTRAMUSCULAR; INTRAVENOUS ONCE
Refills: 0 | Status: COMPLETED | OUTPATIENT
Start: 2019-01-01 | End: 2019-01-01

## 2019-01-01 RX ORDER — ATORVASTATIN CALCIUM 20 MG/1
20 TABLET, FILM COATED ORAL
Qty: 30 | Refills: 5 | Status: ACTIVE | COMMUNITY
Start: 2017-10-13

## 2019-01-01 RX ORDER — GABAPENTIN 300 MG/1
300 CAPSULE ORAL
Qty: 90 | Refills: 0 | Status: ACTIVE | COMMUNITY
Start: 2018-10-11 | End: 1900-01-01

## 2019-01-01 RX ORDER — INSULIN GLARGINE 100 [IU]/ML
100 INJECTION, SOLUTION SUBCUTANEOUS
Qty: 4 | Refills: 3 | Status: ACTIVE | COMMUNITY
Start: 2019-01-01 | End: 1900-01-01

## 2019-01-01 RX ORDER — FUROSEMIDE 40 MG
40 TABLET ORAL DAILY
Refills: 0 | Status: DISCONTINUED | OUTPATIENT
Start: 2019-01-01 | End: 2019-01-01

## 2019-01-01 RX ORDER — CEPHALEXIN 500 MG
1 CAPSULE ORAL
Qty: 6 | Refills: 0
Start: 2019-01-01 | End: 2019-01-01

## 2019-01-01 RX ORDER — OXYCODONE HYDROCHLORIDE 5 MG/1
10 TABLET ORAL ONCE
Refills: 0 | Status: DISCONTINUED | OUTPATIENT
Start: 2019-01-01 | End: 2019-01-01

## 2019-01-01 RX ORDER — SODIUM CHLORIDE 9 MG/ML
1000 INJECTION INTRAMUSCULAR; INTRAVENOUS; SUBCUTANEOUS ONCE
Refills: 0 | Status: COMPLETED | OUTPATIENT
Start: 2019-01-01 | End: 2019-01-01

## 2019-01-01 RX ORDER — DOCUSATE SODIUM 100 MG
100 CAPSULE ORAL THREE TIMES A DAY
Refills: 0 | Status: DISCONTINUED | OUTPATIENT
Start: 2019-01-01 | End: 2019-01-01

## 2019-01-01 RX ORDER — ONDANSETRON 8 MG/1
4 TABLET, FILM COATED ORAL ONCE
Refills: 0 | Status: COMPLETED | OUTPATIENT
Start: 2019-01-01 | End: 2019-01-01

## 2019-01-01 RX ORDER — NYSTATIN CREAM 100000 [USP'U]/G
1 CREAM TOPICAL
Refills: 0 | Status: DISCONTINUED | OUTPATIENT
Start: 2019-01-01 | End: 2019-01-01

## 2019-01-01 RX ORDER — FUROSEMIDE 40 MG
40 TABLET ORAL
Refills: 0 | Status: DISCONTINUED | OUTPATIENT
Start: 2019-01-01 | End: 2019-01-01

## 2019-01-01 RX ORDER — ENOXAPARIN SODIUM 100 MG/ML
80 INJECTION SUBCUTANEOUS
Qty: 60 | Refills: 0
Start: 2019-01-01 | End: 2020-01-01

## 2019-01-01 RX ORDER — INSULIN GLARGINE 100 [IU]/ML
14 INJECTION, SOLUTION SUBCUTANEOUS
Qty: 0 | Refills: 0 | DISCHARGE
Start: 2019-01-01

## 2019-01-01 RX ORDER — CEPHALEXIN 500 MG/1
500 CAPSULE ORAL 3 TIMES DAILY
Qty: 15 | Refills: 0 | Status: DISCONTINUED | COMMUNITY
Start: 2019-01-01 | End: 2019-01-01

## 2019-01-01 RX ORDER — ONDANSETRON 8 MG/1
4 TABLET, FILM COATED ORAL ONCE
Qty: 0 | Refills: 0 | Status: DISCONTINUED | OUTPATIENT
Start: 2019-01-01 | End: 2019-01-01

## 2019-01-01 RX ORDER — ENOXAPARIN SODIUM 100 MG/ML
80 INJECTION SUBCUTANEOUS
Refills: 0 | Status: DISCONTINUED | OUTPATIENT
Start: 2019-01-01 | End: 2019-01-01

## 2019-01-01 RX ORDER — SODIUM CHLORIDE 9 MG/ML
2000 INJECTION, SOLUTION INTRAVENOUS ONCE
Refills: 0 | Status: COMPLETED | OUTPATIENT
Start: 2019-01-01 | End: 2019-01-01

## 2019-01-01 RX ORDER — CEPHALEXIN 500 MG
1 CAPSULE ORAL
Qty: 21 | Refills: 0
Start: 2019-01-01 | End: 2019-01-01

## 2019-01-01 RX ORDER — INSULIN GLARGINE 100 [IU]/ML
14 INJECTION, SOLUTION SUBCUTANEOUS ONCE
Refills: 0 | Status: COMPLETED | OUTPATIENT
Start: 2019-01-01 | End: 2019-01-01

## 2019-01-01 RX ORDER — HYDROCORTISONE 10 MG/G
1 CREAM TOPICAL TWICE DAILY
Qty: 1 | Refills: 2 | Status: DISCONTINUED | COMMUNITY
Start: 2017-12-14 | End: 2019-01-01

## 2019-01-01 RX ORDER — AMPICILLIN SODIUM AND SULBACTAM SODIUM 250; 125 MG/ML; MG/ML
INJECTION, POWDER, FOR SUSPENSION INTRAMUSCULAR; INTRAVENOUS
Refills: 0 | Status: DISCONTINUED | OUTPATIENT
Start: 2019-01-01 | End: 2019-01-01

## 2019-01-01 RX ADMIN — CEFEPIME 100 MILLIGRAM(S): 1 INJECTION, POWDER, FOR SOLUTION INTRAMUSCULAR; INTRAVENOUS at 05:17

## 2019-01-01 RX ADMIN — METHADONE HYDROCHLORIDE 5 MILLIGRAM(S): 40 TABLET ORAL at 05:14

## 2019-01-01 RX ADMIN — Medication 1 APPLICATION(S): at 12:46

## 2019-01-01 RX ADMIN — Medication 650 MILLIGRAM(S): at 21:04

## 2019-01-01 RX ADMIN — NYSTATIN CREAM 1 APPLICATION(S): 100000 CREAM TOPICAL at 18:46

## 2019-01-01 RX ADMIN — Medication 40 MILLIGRAM(S): at 06:17

## 2019-01-01 RX ADMIN — METHADONE HYDROCHLORIDE 5 MILLIGRAM(S): 40 TABLET ORAL at 21:46

## 2019-01-01 RX ADMIN — OXYCODONE HYDROCHLORIDE 10 MILLIGRAM(S): 5 TABLET ORAL at 09:40

## 2019-01-01 RX ADMIN — PANTOPRAZOLE SODIUM 40 MILLIGRAM(S): 20 TABLET, DELAYED RELEASE ORAL at 05:53

## 2019-01-01 RX ADMIN — Medication 100 MILLIGRAM(S): at 04:05

## 2019-01-01 RX ADMIN — INSULIN GLARGINE 14 UNIT(S): 100 INJECTION, SOLUTION SUBCUTANEOUS at 23:07

## 2019-01-01 RX ADMIN — AZITHROMYCIN 255 MILLIGRAM(S): 500 TABLET, FILM COATED ORAL at 05:37

## 2019-01-01 RX ADMIN — OXYCODONE HYDROCHLORIDE 10 MILLIGRAM(S): 5 TABLET ORAL at 19:14

## 2019-01-01 RX ADMIN — OXYCODONE HYDROCHLORIDE 10 MILLIGRAM(S): 5 TABLET ORAL at 18:17

## 2019-01-01 RX ADMIN — NYSTATIN CREAM 1 APPLICATION(S): 100000 CREAM TOPICAL at 08:56

## 2019-01-01 RX ADMIN — ATORVASTATIN CALCIUM 20 MILLIGRAM(S): 80 TABLET, FILM COATED ORAL at 21:40

## 2019-01-01 RX ADMIN — FINASTERIDE 5 MILLIGRAM(S): 5 TABLET, FILM COATED ORAL at 17:49

## 2019-01-01 RX ADMIN — AMLODIPINE BESYLATE 10 MILLIGRAM(S): 2.5 TABLET ORAL at 06:02

## 2019-01-01 RX ADMIN — Medication 1 APPLICATION(S): at 17:15

## 2019-01-01 RX ADMIN — Medication 100 MILLIGRAM(S): at 05:04

## 2019-01-01 RX ADMIN — Medication 100 MILLIGRAM(S): at 12:22

## 2019-01-01 RX ADMIN — PANTOPRAZOLE SODIUM 40 MILLIGRAM(S): 20 TABLET, DELAYED RELEASE ORAL at 05:34

## 2019-01-01 RX ADMIN — Medication 650 MILLIGRAM(S): at 21:50

## 2019-01-01 RX ADMIN — PANTOPRAZOLE SODIUM 40 MILLIGRAM(S): 20 TABLET, DELAYED RELEASE ORAL at 05:05

## 2019-01-01 RX ADMIN — AMLODIPINE BESYLATE 10 MILLIGRAM(S): 2.5 TABLET ORAL at 06:48

## 2019-01-01 RX ADMIN — Medication 137 MICROGRAM(S): at 05:02

## 2019-01-01 RX ADMIN — METHADONE HYDROCHLORIDE 5 MILLIGRAM(S): 40 TABLET ORAL at 13:44

## 2019-01-01 RX ADMIN — CEFEPIME 100 MILLIGRAM(S): 1 INJECTION, POWDER, FOR SOLUTION INTRAMUSCULAR; INTRAVENOUS at 11:08

## 2019-01-01 RX ADMIN — AMLODIPINE BESYLATE 10 MILLIGRAM(S): 2.5 TABLET ORAL at 05:05

## 2019-01-01 RX ADMIN — HEPARIN SODIUM 1500 UNIT(S)/HR: 5000 INJECTION INTRAVENOUS; SUBCUTANEOUS at 23:18

## 2019-01-01 RX ADMIN — ATORVASTATIN CALCIUM 20 MILLIGRAM(S): 80 TABLET, FILM COATED ORAL at 22:31

## 2019-01-01 RX ADMIN — Medication 250 MILLIGRAM(S): at 19:41

## 2019-01-01 RX ADMIN — CEFEPIME 100 MILLIGRAM(S): 1 INJECTION, POWDER, FOR SOLUTION INTRAMUSCULAR; INTRAVENOUS at 21:20

## 2019-01-01 RX ADMIN — AZITHROMYCIN 255 MILLIGRAM(S): 500 TABLET, FILM COATED ORAL at 05:06

## 2019-01-01 RX ADMIN — Medication 1: at 17:17

## 2019-01-01 RX ADMIN — ENOXAPARIN SODIUM 80 MILLIGRAM(S): 100 INJECTION SUBCUTANEOUS at 18:38

## 2019-01-01 RX ADMIN — OXYCODONE HYDROCHLORIDE 10 MILLIGRAM(S): 5 TABLET ORAL at 22:03

## 2019-01-01 RX ADMIN — FINASTERIDE 5 MILLIGRAM(S): 5 TABLET, FILM COATED ORAL at 11:09

## 2019-01-01 RX ADMIN — ATORVASTATIN CALCIUM 20 MILLIGRAM(S): 80 TABLET, FILM COATED ORAL at 21:46

## 2019-01-01 RX ADMIN — Medication 250 MILLIGRAM(S): at 05:02

## 2019-01-01 RX ADMIN — Medication 100 MILLIGRAM(S): at 05:33

## 2019-01-01 RX ADMIN — CEFEPIME 100 MILLIGRAM(S): 1 INJECTION, POWDER, FOR SOLUTION INTRAMUSCULAR; INTRAVENOUS at 04:59

## 2019-01-01 RX ADMIN — NYSTATIN CREAM 1 APPLICATION(S): 100000 CREAM TOPICAL at 17:15

## 2019-01-01 RX ADMIN — Medication 100 MILLIGRAM(S): at 12:40

## 2019-01-01 RX ADMIN — SODIUM CHLORIDE 1000 MILLILITER(S): 9 INJECTION INTRAMUSCULAR; INTRAVENOUS; SUBCUTANEOUS at 13:19

## 2019-01-01 RX ADMIN — OXYCODONE AND ACETAMINOPHEN 1 TABLET(S): 5; 325 TABLET ORAL at 10:22

## 2019-01-01 RX ADMIN — Medication 175 MICROGRAM(S): at 05:16

## 2019-01-01 RX ADMIN — Medication 175 MICROGRAM(S): at 06:17

## 2019-01-01 RX ADMIN — Medication 10 MILLIGRAM(S): at 05:02

## 2019-01-01 RX ADMIN — Medication 10 MILLIGRAM(S): at 05:24

## 2019-01-01 RX ADMIN — Medication 40 MILLIEQUIVALENT(S): at 09:12

## 2019-01-01 RX ADMIN — NYSTATIN CREAM 1 APPLICATION(S): 100000 CREAM TOPICAL at 18:10

## 2019-01-01 RX ADMIN — Medication 1: at 19:04

## 2019-01-01 RX ADMIN — CEFEPIME 100 MILLIGRAM(S): 1 INJECTION, POWDER, FOR SOLUTION INTRAMUSCULAR; INTRAVENOUS at 23:07

## 2019-01-01 RX ADMIN — OXYCODONE HYDROCHLORIDE 10 MILLIGRAM(S): 5 TABLET ORAL at 05:57

## 2019-01-01 RX ADMIN — AMLODIPINE BESYLATE 10 MILLIGRAM(S): 2.5 TABLET ORAL at 05:53

## 2019-01-01 RX ADMIN — Medication 1: at 18:17

## 2019-01-01 RX ADMIN — ATORVASTATIN CALCIUM 20 MILLIGRAM(S): 80 TABLET, FILM COATED ORAL at 21:20

## 2019-01-01 RX ADMIN — METHADONE HYDROCHLORIDE 5 MILLIGRAM(S): 40 TABLET ORAL at 13:14

## 2019-01-01 RX ADMIN — OXYCODONE HYDROCHLORIDE 10 MILLIGRAM(S): 5 TABLET ORAL at 06:21

## 2019-01-01 RX ADMIN — Medication 175 MICROGRAM(S): at 05:05

## 2019-01-01 RX ADMIN — Medication 1: at 10:28

## 2019-01-01 RX ADMIN — ATORVASTATIN CALCIUM 20 MILLIGRAM(S): 80 TABLET, FILM COATED ORAL at 21:52

## 2019-01-01 RX ADMIN — OXYCODONE AND ACETAMINOPHEN 1 TABLET(S): 5; 325 TABLET ORAL at 18:00

## 2019-01-01 RX ADMIN — OXYCODONE HYDROCHLORIDE 10 MILLIGRAM(S): 5 TABLET ORAL at 04:57

## 2019-01-01 RX ADMIN — HYDROMORPHONE HYDROCHLORIDE 1 MILLIGRAM(S): 2 INJECTION INTRAMUSCULAR; INTRAVENOUS; SUBCUTANEOUS at 23:00

## 2019-01-01 RX ADMIN — FINASTERIDE 5 MILLIGRAM(S): 5 TABLET, FILM COATED ORAL at 12:43

## 2019-01-01 RX ADMIN — Medication 2: at 13:24

## 2019-01-01 RX ADMIN — Medication 100 MILLIGRAM(S): at 13:41

## 2019-01-01 RX ADMIN — Medication 1 APPLICATION(S): at 12:42

## 2019-01-01 RX ADMIN — ATORVASTATIN CALCIUM 20 MILLIGRAM(S): 80 TABLET, FILM COATED ORAL at 21:08

## 2019-01-01 RX ADMIN — CEFEPIME 100 MILLIGRAM(S): 1 INJECTION, POWDER, FOR SOLUTION INTRAMUSCULAR; INTRAVENOUS at 13:12

## 2019-01-01 RX ADMIN — OXYCODONE HYDROCHLORIDE 10 MILLIGRAM(S): 5 TABLET ORAL at 07:55

## 2019-01-01 RX ADMIN — PANTOPRAZOLE SODIUM 40 MILLIGRAM(S): 20 TABLET, DELAYED RELEASE ORAL at 05:24

## 2019-01-01 RX ADMIN — ENOXAPARIN SODIUM 80 MILLIGRAM(S): 100 INJECTION SUBCUTANEOUS at 18:47

## 2019-01-01 RX ADMIN — HEPARIN SODIUM 1600 UNIT(S)/HR: 5000 INJECTION INTRAVENOUS; SUBCUTANEOUS at 00:07

## 2019-01-01 RX ADMIN — OXYCODONE AND ACETAMINOPHEN 1 TABLET(S): 5; 325 TABLET ORAL at 21:25

## 2019-01-01 RX ADMIN — OXYCODONE AND ACETAMINOPHEN 1 TABLET(S): 5; 325 TABLET ORAL at 20:13

## 2019-01-01 RX ADMIN — NYSTATIN CREAM 1 APPLICATION(S): 100000 CREAM TOPICAL at 05:03

## 2019-01-01 RX ADMIN — ENOXAPARIN SODIUM 40 MILLIGRAM(S): 100 INJECTION SUBCUTANEOUS at 12:43

## 2019-01-01 RX ADMIN — Medication 2: at 12:44

## 2019-01-01 RX ADMIN — METHADONE HYDROCHLORIDE 5 MILLIGRAM(S): 40 TABLET ORAL at 21:49

## 2019-01-01 RX ADMIN — OXYCODONE AND ACETAMINOPHEN 1 TABLET(S): 5; 325 TABLET ORAL at 06:59

## 2019-01-01 RX ADMIN — METHADONE HYDROCHLORIDE 5 MILLIGRAM(S): 40 TABLET ORAL at 15:19

## 2019-01-01 RX ADMIN — CEFTRIAXONE 100 GRAM(S): 500 INJECTION, POWDER, FOR SOLUTION INTRAMUSCULAR; INTRAVENOUS at 21:34

## 2019-01-01 RX ADMIN — INSULIN GLARGINE 14 UNIT(S): 100 INJECTION, SOLUTION SUBCUTANEOUS at 22:32

## 2019-01-01 RX ADMIN — ATORVASTATIN CALCIUM 20 MILLIGRAM(S): 80 TABLET, FILM COATED ORAL at 21:50

## 2019-01-01 RX ADMIN — Medication 1: at 12:25

## 2019-01-01 RX ADMIN — OXYCODONE HYDROCHLORIDE 10 MILLIGRAM(S): 5 TABLET ORAL at 03:11

## 2019-01-01 RX ADMIN — PANTOPRAZOLE SODIUM 40 MILLIGRAM(S): 20 TABLET, DELAYED RELEASE ORAL at 09:08

## 2019-01-01 RX ADMIN — Medication 10 MILLIGRAM(S): at 05:25

## 2019-01-01 RX ADMIN — HYDROMORPHONE HYDROCHLORIDE 1 MILLIGRAM(S): 2 INJECTION INTRAMUSCULAR; INTRAVENOUS; SUBCUTANEOUS at 22:24

## 2019-01-01 RX ADMIN — HYDROMORPHONE HYDROCHLORIDE 1 MILLIGRAM(S): 2 INJECTION INTRAMUSCULAR; INTRAVENOUS; SUBCUTANEOUS at 04:41

## 2019-01-01 RX ADMIN — Medication 0: at 21:52

## 2019-01-01 RX ADMIN — Medication 250 MILLIGRAM(S): at 17:16

## 2019-01-01 RX ADMIN — OXYCODONE HYDROCHLORIDE 10 MILLIGRAM(S): 5 TABLET ORAL at 13:15

## 2019-01-01 RX ADMIN — NYSTATIN CREAM 1 APPLICATION(S): 100000 CREAM TOPICAL at 06:03

## 2019-01-01 RX ADMIN — INSULIN GLARGINE 14 UNIT(S): 100 INJECTION, SOLUTION SUBCUTANEOUS at 22:30

## 2019-01-01 RX ADMIN — SODIUM CHLORIDE 3 MILLILITER(S): 9 INJECTION INTRAMUSCULAR; INTRAVENOUS; SUBCUTANEOUS at 10:52

## 2019-01-01 RX ADMIN — OXYCODONE HYDROCHLORIDE 10 MILLIGRAM(S): 5 TABLET ORAL at 09:23

## 2019-01-01 RX ADMIN — OXYCODONE AND ACETAMINOPHEN 1 TABLET(S): 5; 325 TABLET ORAL at 20:43

## 2019-01-01 RX ADMIN — FINASTERIDE 5 MILLIGRAM(S): 5 TABLET, FILM COATED ORAL at 11:34

## 2019-01-01 RX ADMIN — CEFEPIME 100 MILLIGRAM(S): 1 INJECTION, POWDER, FOR SOLUTION INTRAMUSCULAR; INTRAVENOUS at 07:25

## 2019-01-01 RX ADMIN — Medication 62.5 MILLIMOLE(S): at 04:50

## 2019-01-01 RX ADMIN — OXYCODONE HYDROCHLORIDE 10 MILLIGRAM(S): 5 TABLET ORAL at 13:39

## 2019-01-01 RX ADMIN — Medication 10 MILLIGRAM(S): at 06:48

## 2019-01-01 RX ADMIN — OXYCODONE HYDROCHLORIDE 10 MILLIGRAM(S): 5 TABLET ORAL at 02:56

## 2019-01-01 RX ADMIN — FINASTERIDE 5 MILLIGRAM(S): 5 TABLET, FILM COATED ORAL at 12:46

## 2019-01-01 RX ADMIN — OXYCODONE HYDROCHLORIDE 10 MILLIGRAM(S): 5 TABLET ORAL at 10:15

## 2019-01-01 RX ADMIN — OXYCODONE HYDROCHLORIDE 10 MILLIGRAM(S): 5 TABLET ORAL at 16:45

## 2019-01-01 RX ADMIN — OXYCODONE AND ACETAMINOPHEN 1 TABLET(S): 5; 325 TABLET ORAL at 11:15

## 2019-01-01 RX ADMIN — Medication 40 MILLIGRAM(S): at 05:16

## 2019-01-01 RX ADMIN — HEPARIN SODIUM 1600 UNIT(S)/HR: 5000 INJECTION INTRAVENOUS; SUBCUTANEOUS at 13:41

## 2019-01-01 RX ADMIN — CHLORHEXIDINE GLUCONATE 1 APPLICATION(S): 213 SOLUTION TOPICAL at 14:00

## 2019-01-01 RX ADMIN — CEFEPIME 100 MILLIGRAM(S): 1 INJECTION, POWDER, FOR SOLUTION INTRAMUSCULAR; INTRAVENOUS at 05:05

## 2019-01-01 RX ADMIN — Medication 100 MILLIGRAM(S): at 05:02

## 2019-01-01 RX ADMIN — METHADONE HYDROCHLORIDE 5 MILLIGRAM(S): 40 TABLET ORAL at 13:20

## 2019-01-01 RX ADMIN — Medication 0: at 22:17

## 2019-01-01 RX ADMIN — Medication 175 MICROGRAM(S): at 05:33

## 2019-01-01 RX ADMIN — Medication 1: at 12:42

## 2019-01-01 RX ADMIN — Medication 40 MILLIGRAM(S): at 05:33

## 2019-01-01 RX ADMIN — CEFEPIME 100 MILLIGRAM(S): 1 INJECTION, POWDER, FOR SOLUTION INTRAMUSCULAR; INTRAVENOUS at 14:40

## 2019-01-01 RX ADMIN — METHADONE HYDROCHLORIDE 5 MILLIGRAM(S): 40 TABLET ORAL at 21:40

## 2019-01-01 RX ADMIN — FINASTERIDE 5 MILLIGRAM(S): 5 TABLET, FILM COATED ORAL at 11:03

## 2019-01-01 RX ADMIN — FINASTERIDE 5 MILLIGRAM(S): 5 TABLET, FILM COATED ORAL at 13:00

## 2019-01-01 RX ADMIN — ATORVASTATIN CALCIUM 20 MILLIGRAM(S): 80 TABLET, FILM COATED ORAL at 21:38

## 2019-01-01 RX ADMIN — Medication 137 MICROGRAM(S): at 05:24

## 2019-01-01 RX ADMIN — OXYCODONE HYDROCHLORIDE 10 MILLIGRAM(S): 5 TABLET ORAL at 19:54

## 2019-01-01 RX ADMIN — OXYCODONE HYDROCHLORIDE 10 MILLIGRAM(S): 5 TABLET ORAL at 17:58

## 2019-01-01 RX ADMIN — OXYCODONE HYDROCHLORIDE 10 MILLIGRAM(S): 5 TABLET ORAL at 05:48

## 2019-01-01 RX ADMIN — Medication 137 MICROGRAM(S): at 06:03

## 2019-01-01 RX ADMIN — Medication 137 MICROGRAM(S): at 05:04

## 2019-01-01 RX ADMIN — Medication 100 MILLIGRAM(S): at 04:20

## 2019-01-01 RX ADMIN — Medication 3 MILLILITER(S): at 17:07

## 2019-01-01 RX ADMIN — Medication 1: at 18:10

## 2019-01-01 RX ADMIN — ENOXAPARIN SODIUM 40 MILLIGRAM(S): 100 INJECTION SUBCUTANEOUS at 12:40

## 2019-01-01 RX ADMIN — CEFEPIME 100 MILLIGRAM(S): 1 INJECTION, POWDER, FOR SOLUTION INTRAMUSCULAR; INTRAVENOUS at 21:50

## 2019-01-01 RX ADMIN — ATORVASTATIN CALCIUM 20 MILLIGRAM(S): 80 TABLET, FILM COATED ORAL at 21:09

## 2019-01-01 RX ADMIN — Medication 2: at 18:14

## 2019-01-01 RX ADMIN — OXYCODONE AND ACETAMINOPHEN 1 TABLET(S): 5; 325 TABLET ORAL at 05:59

## 2019-01-01 RX ADMIN — INSULIN GLARGINE 14 UNIT(S): 100 INJECTION, SOLUTION SUBCUTANEOUS at 21:08

## 2019-01-01 RX ADMIN — Medication 100 MILLIGRAM(S): at 06:48

## 2019-01-01 RX ADMIN — Medication 1 APPLICATION(S): at 12:44

## 2019-01-01 RX ADMIN — AMLODIPINE BESYLATE 10 MILLIGRAM(S): 2.5 TABLET ORAL at 06:17

## 2019-01-01 RX ADMIN — Medication 1: at 18:47

## 2019-01-01 RX ADMIN — Medication 175 MICROGRAM(S): at 05:10

## 2019-01-01 RX ADMIN — HEPARIN SODIUM 1600 UNIT(S)/HR: 5000 INJECTION INTRAVENOUS; SUBCUTANEOUS at 07:52

## 2019-01-01 RX ADMIN — OXYCODONE HYDROCHLORIDE 10 MILLIGRAM(S): 5 TABLET ORAL at 18:28

## 2019-01-01 RX ADMIN — Medication 250 MILLIGRAM(S): at 23:38

## 2019-01-01 RX ADMIN — NYSTATIN CREAM 1 APPLICATION(S): 100000 CREAM TOPICAL at 05:59

## 2019-01-01 RX ADMIN — INSULIN GLARGINE 14 UNIT(S): 100 INJECTION, SOLUTION SUBCUTANEOUS at 21:59

## 2019-01-01 RX ADMIN — ENOXAPARIN SODIUM 40 MILLIGRAM(S): 100 INJECTION SUBCUTANEOUS at 13:00

## 2019-01-01 RX ADMIN — OXYCODONE HYDROCHLORIDE 10 MILLIGRAM(S): 5 TABLET ORAL at 19:24

## 2019-01-01 RX ADMIN — INSULIN GLARGINE 14 UNIT(S): 100 INJECTION, SOLUTION SUBCUTANEOUS at 00:50

## 2019-01-01 RX ADMIN — INSULIN GLARGINE 14 UNIT(S): 100 INJECTION, SOLUTION SUBCUTANEOUS at 22:08

## 2019-01-01 RX ADMIN — NYSTATIN CREAM 1 APPLICATION(S): 100000 CREAM TOPICAL at 18:16

## 2019-01-01 RX ADMIN — Medication 975 MILLIGRAM(S): at 01:53

## 2019-01-01 RX ADMIN — AMLODIPINE BESYLATE 10 MILLIGRAM(S): 2.5 TABLET ORAL at 05:24

## 2019-01-01 RX ADMIN — CEFEPIME 100 MILLIGRAM(S): 1 INJECTION, POWDER, FOR SOLUTION INTRAMUSCULAR; INTRAVENOUS at 11:03

## 2019-01-01 RX ADMIN — FINASTERIDE 5 MILLIGRAM(S): 5 TABLET, FILM COATED ORAL at 12:25

## 2019-01-01 RX ADMIN — SODIUM CHLORIDE 2000 MILLILITER(S): 9 INJECTION, SOLUTION INTRAVENOUS at 22:59

## 2019-01-01 RX ADMIN — Medication 40 MILLIGRAM(S): at 05:56

## 2019-01-01 RX ADMIN — Medication 1: at 18:38

## 2019-01-01 RX ADMIN — OXYCODONE HYDROCHLORIDE 10 MILLIGRAM(S): 5 TABLET ORAL at 09:57

## 2019-01-01 RX ADMIN — FINASTERIDE 5 MILLIGRAM(S): 5 TABLET, FILM COATED ORAL at 12:32

## 2019-01-01 RX ADMIN — POLYETHYLENE GLYCOL 3350 17 GRAM(S): 17 POWDER, FOR SOLUTION ORAL at 11:37

## 2019-01-01 RX ADMIN — CEFEPIME 100 MILLIGRAM(S): 1 INJECTION, POWDER, FOR SOLUTION INTRAMUSCULAR; INTRAVENOUS at 05:02

## 2019-01-01 RX ADMIN — OXYCODONE AND ACETAMINOPHEN 1 TABLET(S): 5; 325 TABLET ORAL at 12:46

## 2019-01-01 RX ADMIN — METHADONE HYDROCHLORIDE 5 MILLIGRAM(S): 40 TABLET ORAL at 06:20

## 2019-01-01 RX ADMIN — METHADONE HYDROCHLORIDE 5 MILLIGRAM(S): 40 TABLET ORAL at 14:52

## 2019-01-01 RX ADMIN — FINASTERIDE 5 MILLIGRAM(S): 5 TABLET, FILM COATED ORAL at 11:36

## 2019-01-01 RX ADMIN — Medication 650 MILLIGRAM(S): at 13:56

## 2019-01-01 RX ADMIN — METHADONE HYDROCHLORIDE 5 MILLIGRAM(S): 40 TABLET ORAL at 21:30

## 2019-01-01 RX ADMIN — HEPARIN SODIUM 6500 UNIT(S): 5000 INJECTION INTRAVENOUS; SUBCUTANEOUS at 15:34

## 2019-01-01 RX ADMIN — NYSTATIN CREAM 1 APPLICATION(S): 100000 CREAM TOPICAL at 18:15

## 2019-01-01 RX ADMIN — AMPICILLIN SODIUM AND SULBACTAM SODIUM 200 GRAM(S): 250; 125 INJECTION, POWDER, FOR SUSPENSION INTRAMUSCULAR; INTRAVENOUS at 05:26

## 2019-01-01 RX ADMIN — PANTOPRAZOLE SODIUM 40 MILLIGRAM(S): 20 TABLET, DELAYED RELEASE ORAL at 05:10

## 2019-01-01 RX ADMIN — SODIUM CHLORIDE 1000 MILLILITER(S): 9 INJECTION INTRAMUSCULAR; INTRAVENOUS; SUBCUTANEOUS at 09:58

## 2019-01-01 RX ADMIN — METHADONE HYDROCHLORIDE 5 MILLIGRAM(S): 40 TABLET ORAL at 05:56

## 2019-01-01 RX ADMIN — PANTOPRAZOLE SODIUM 40 MILLIGRAM(S): 20 TABLET, DELAYED RELEASE ORAL at 05:25

## 2019-01-01 RX ADMIN — Medication 1: at 18:14

## 2019-01-01 RX ADMIN — Medication 1: at 12:20

## 2019-01-01 RX ADMIN — ENOXAPARIN SODIUM 40 MILLIGRAM(S): 100 INJECTION SUBCUTANEOUS at 12:20

## 2019-01-01 RX ADMIN — OXYCODONE HYDROCHLORIDE 10 MILLIGRAM(S): 5 TABLET ORAL at 21:33

## 2019-01-01 RX ADMIN — OXYCODONE HYDROCHLORIDE 10 MILLIGRAM(S): 5 TABLET ORAL at 12:45

## 2019-01-01 RX ADMIN — ENOXAPARIN SODIUM 40 MILLIGRAM(S): 100 INJECTION SUBCUTANEOUS at 11:56

## 2019-01-01 RX ADMIN — METHADONE HYDROCHLORIDE 5 MILLIGRAM(S): 40 TABLET ORAL at 21:38

## 2019-01-01 RX ADMIN — NYSTATIN CREAM 1 APPLICATION(S): 100000 CREAM TOPICAL at 05:04

## 2019-01-01 RX ADMIN — Medication 137 MICROGRAM(S): at 05:30

## 2019-01-01 RX ADMIN — INSULIN GLARGINE 14 UNIT(S): 100 INJECTION, SOLUTION SUBCUTANEOUS at 21:51

## 2019-01-01 RX ADMIN — Medication 100 MILLIGRAM(S): at 13:07

## 2019-01-01 RX ADMIN — METHADONE HYDROCHLORIDE 5 MILLIGRAM(S): 40 TABLET ORAL at 14:02

## 2019-01-01 RX ADMIN — SENNA PLUS 2 TABLET(S): 8.6 TABLET ORAL at 21:12

## 2019-01-01 RX ADMIN — Medication 10 MILLIGRAM(S): at 06:02

## 2019-01-01 RX ADMIN — ATORVASTATIN CALCIUM 20 MILLIGRAM(S): 80 TABLET, FILM COATED ORAL at 22:24

## 2019-01-01 RX ADMIN — CEFEPIME 100 MILLIGRAM(S): 1 INJECTION, POWDER, FOR SOLUTION INTRAMUSCULAR; INTRAVENOUS at 13:45

## 2019-01-01 RX ADMIN — CEFTRIAXONE 100 GRAM(S): 500 INJECTION, POWDER, FOR SOLUTION INTRAMUSCULAR; INTRAVENOUS at 16:34

## 2019-01-01 RX ADMIN — Medication 100 MILLIGRAM(S): at 21:52

## 2019-01-01 RX ADMIN — AMLODIPINE BESYLATE 10 MILLIGRAM(S): 2.5 TABLET ORAL at 05:30

## 2019-01-01 RX ADMIN — METHADONE HYDROCHLORIDE 5 MILLIGRAM(S): 40 TABLET ORAL at 05:04

## 2019-01-01 RX ADMIN — METHADONE HYDROCHLORIDE 5 MILLIGRAM(S): 40 TABLET ORAL at 16:56

## 2019-01-01 RX ADMIN — PANTOPRAZOLE SODIUM 40 MILLIGRAM(S): 20 TABLET, DELAYED RELEASE ORAL at 10:20

## 2019-01-01 RX ADMIN — PANTOPRAZOLE SODIUM 40 MILLIGRAM(S): 20 TABLET, DELAYED RELEASE ORAL at 06:34

## 2019-01-01 RX ADMIN — Medication 40 MILLIGRAM(S): at 18:38

## 2019-01-01 RX ADMIN — CEFEPIME 100 MILLIGRAM(S): 1 INJECTION, POWDER, FOR SOLUTION INTRAMUSCULAR; INTRAVENOUS at 14:06

## 2019-01-01 RX ADMIN — Medication 1: at 09:17

## 2019-01-01 RX ADMIN — INSULIN GLARGINE 14 UNIT(S): 100 INJECTION, SOLUTION SUBCUTANEOUS at 22:25

## 2019-01-01 RX ADMIN — AMPICILLIN SODIUM AND SULBACTAM SODIUM 200 GRAM(S): 250; 125 INJECTION, POWDER, FOR SUSPENSION INTRAMUSCULAR; INTRAVENOUS at 11:56

## 2019-01-01 RX ADMIN — Medication 1: at 18:19

## 2019-01-01 RX ADMIN — CEFEPIME 100 MILLIGRAM(S): 1 INJECTION, POWDER, FOR SOLUTION INTRAMUSCULAR; INTRAVENOUS at 11:36

## 2019-01-01 RX ADMIN — CEFEPIME 100 MILLIGRAM(S): 1 INJECTION, POWDER, FOR SOLUTION INTRAMUSCULAR; INTRAVENOUS at 22:24

## 2019-01-01 RX ADMIN — CEFEPIME 100 MILLIGRAM(S): 1 INJECTION, POWDER, FOR SOLUTION INTRAMUSCULAR; INTRAVENOUS at 18:49

## 2019-01-01 RX ADMIN — OXYCODONE HYDROCHLORIDE 10 MILLIGRAM(S): 5 TABLET ORAL at 14:00

## 2019-01-01 RX ADMIN — CEFEPIME 100 MILLIGRAM(S): 1 INJECTION, POWDER, FOR SOLUTION INTRAMUSCULAR; INTRAVENOUS at 22:31

## 2019-01-01 RX ADMIN — Medication 1: at 09:06

## 2019-01-01 RX ADMIN — PANTOPRAZOLE SODIUM 40 MILLIGRAM(S): 20 TABLET, DELAYED RELEASE ORAL at 06:17

## 2019-01-01 RX ADMIN — Medication 10 MILLIGRAM(S): at 05:30

## 2019-01-01 RX ADMIN — Medication 1: at 18:29

## 2019-01-01 RX ADMIN — Medication 1: at 17:52

## 2019-01-01 RX ADMIN — METHADONE HYDROCHLORIDE 5 MILLIGRAM(S): 40 TABLET ORAL at 05:42

## 2019-01-01 RX ADMIN — AMLODIPINE BESYLATE 10 MILLIGRAM(S): 2.5 TABLET ORAL at 05:34

## 2019-01-01 RX ADMIN — PANTOPRAZOLE SODIUM 40 MILLIGRAM(S): 20 TABLET, DELAYED RELEASE ORAL at 05:03

## 2019-01-01 RX ADMIN — CEFEPIME 100 MILLIGRAM(S): 1 INJECTION, POWDER, FOR SOLUTION INTRAMUSCULAR; INTRAVENOUS at 22:59

## 2019-01-01 RX ADMIN — Medication 40 MILLIGRAM(S): at 18:16

## 2019-01-01 RX ADMIN — HYDROMORPHONE HYDROCHLORIDE 1 MILLIGRAM(S): 2 INJECTION INTRAMUSCULAR; INTRAVENOUS; SUBCUTANEOUS at 06:05

## 2019-01-01 RX ADMIN — Medication 40 MILLIGRAM(S): at 17:51

## 2019-01-01 RX ADMIN — OXYCODONE HYDROCHLORIDE 10 MILLIGRAM(S): 5 TABLET ORAL at 22:47

## 2019-01-01 RX ADMIN — CHLORHEXIDINE GLUCONATE 1 APPLICATION(S): 213 SOLUTION TOPICAL at 12:20

## 2019-01-01 RX ADMIN — Medication 40 MILLIGRAM(S): at 18:45

## 2019-01-01 RX ADMIN — Medication 1: at 12:58

## 2019-01-01 RX ADMIN — AMLODIPINE BESYLATE 10 MILLIGRAM(S): 2.5 TABLET ORAL at 05:02

## 2019-01-01 RX ADMIN — METHADONE HYDROCHLORIDE 5 MILLIGRAM(S): 40 TABLET ORAL at 05:05

## 2019-01-01 RX ADMIN — Medication 600 MILLIGRAM(S): at 02:14

## 2019-01-01 RX ADMIN — OXYCODONE HYDROCHLORIDE 10 MILLIGRAM(S): 5 TABLET ORAL at 07:25

## 2019-01-01 RX ADMIN — Medication 250 MILLIGRAM(S): at 06:01

## 2019-01-01 RX ADMIN — CEFEPIME 100 MILLIGRAM(S): 1 INJECTION, POWDER, FOR SOLUTION INTRAMUSCULAR; INTRAVENOUS at 14:50

## 2019-01-01 RX ADMIN — AMPICILLIN SODIUM AND SULBACTAM SODIUM 200 GRAM(S): 250; 125 INJECTION, POWDER, FOR SUSPENSION INTRAMUSCULAR; INTRAVENOUS at 23:55

## 2019-01-01 RX ADMIN — CEFEPIME 100 MILLIGRAM(S): 1 INJECTION, POWDER, FOR SOLUTION INTRAMUSCULAR; INTRAVENOUS at 12:24

## 2019-01-01 RX ADMIN — HEPARIN SODIUM 1500 UNIT(S)/HR: 5000 INJECTION INTRAVENOUS; SUBCUTANEOUS at 06:23

## 2019-01-01 RX ADMIN — CEFEPIME 100 MILLIGRAM(S): 1 INJECTION, POWDER, FOR SOLUTION INTRAMUSCULAR; INTRAVENOUS at 04:17

## 2019-01-01 RX ADMIN — FINASTERIDE 5 MILLIGRAM(S): 5 TABLET, FILM COATED ORAL at 11:57

## 2019-01-01 RX ADMIN — AMLODIPINE BESYLATE 10 MILLIGRAM(S): 2.5 TABLET ORAL at 05:10

## 2019-01-01 RX ADMIN — Medication 0.5 MILLIGRAM(S): at 15:02

## 2019-01-01 RX ADMIN — FINASTERIDE 5 MILLIGRAM(S): 5 TABLET, FILM COATED ORAL at 11:25

## 2019-01-01 RX ADMIN — CEFEPIME 100 MILLIGRAM(S): 1 INJECTION, POWDER, FOR SOLUTION INTRAMUSCULAR; INTRAVENOUS at 21:38

## 2019-01-01 RX ADMIN — METHADONE HYDROCHLORIDE 5 MILLIGRAM(S): 40 TABLET ORAL at 22:32

## 2019-01-01 RX ADMIN — Medication 40 MILLIGRAM(S): at 05:34

## 2019-01-01 RX ADMIN — Medication 40 MILLIGRAM(S): at 18:07

## 2019-01-01 RX ADMIN — Medication 100 MILLIGRAM(S): at 18:46

## 2019-01-01 RX ADMIN — HYDROMORPHONE HYDROCHLORIDE 1 MILLIGRAM(S): 2 INJECTION INTRAMUSCULAR; INTRAVENOUS; SUBCUTANEOUS at 05:27

## 2019-01-01 RX ADMIN — AMLODIPINE BESYLATE 10 MILLIGRAM(S): 2.5 TABLET ORAL at 05:04

## 2019-01-01 RX ADMIN — HEPARIN SODIUM 1600 UNIT(S)/HR: 5000 INJECTION INTRAVENOUS; SUBCUTANEOUS at 06:54

## 2019-01-01 RX ADMIN — Medication 1 APPLICATION(S): at 12:59

## 2019-01-01 RX ADMIN — CEFEPIME 100 MILLIGRAM(S): 1 INJECTION, POWDER, FOR SOLUTION INTRAMUSCULAR; INTRAVENOUS at 21:40

## 2019-01-01 RX ADMIN — AMPICILLIN SODIUM AND SULBACTAM SODIUM 200 GRAM(S): 250; 125 INJECTION, POWDER, FOR SUSPENSION INTRAMUSCULAR; INTRAVENOUS at 17:55

## 2019-01-01 RX ADMIN — FINASTERIDE 5 MILLIGRAM(S): 5 TABLET, FILM COATED ORAL at 13:10

## 2019-01-01 RX ADMIN — AMLODIPINE BESYLATE 10 MILLIGRAM(S): 2.5 TABLET ORAL at 05:25

## 2019-01-01 RX ADMIN — ENOXAPARIN SODIUM 80 MILLIGRAM(S): 100 INJECTION SUBCUTANEOUS at 05:05

## 2019-01-01 RX ADMIN — OXYCODONE HYDROCHLORIDE 10 MILLIGRAM(S): 5 TABLET ORAL at 04:20

## 2019-01-01 RX ADMIN — CIPROFLOXACIN HYDROCHLORIDE 0 MG: 500 TABLET, FILM COATED ORAL at 00:00

## 2019-01-01 RX ADMIN — INSULIN GLARGINE 10 UNIT(S): 100 INJECTION, SOLUTION SUBCUTANEOUS at 22:31

## 2019-01-01 RX ADMIN — INSULIN GLARGINE 14 UNIT(S): 100 INJECTION, SOLUTION SUBCUTANEOUS at 22:20

## 2019-01-01 RX ADMIN — AZITHROMYCIN 255 MILLIGRAM(S): 500 TABLET, FILM COATED ORAL at 06:20

## 2019-01-01 RX ADMIN — AMLODIPINE BESYLATE 10 MILLIGRAM(S): 2.5 TABLET ORAL at 05:56

## 2019-01-01 RX ADMIN — Medication 1: at 09:02

## 2019-01-01 RX ADMIN — Medication 3 MILLILITER(S): at 00:40

## 2019-01-01 RX ADMIN — Medication 2: at 21:09

## 2019-01-01 RX ADMIN — ENOXAPARIN SODIUM 40 MILLIGRAM(S): 100 INJECTION SUBCUTANEOUS at 11:34

## 2019-01-01 RX ADMIN — Medication 2: at 09:40

## 2019-01-01 RX ADMIN — PANTOPRAZOLE SODIUM 40 MILLIGRAM(S): 20 TABLET, DELAYED RELEASE ORAL at 06:03

## 2019-01-01 RX ADMIN — NYSTATIN CREAM 1 APPLICATION(S): 100000 CREAM TOPICAL at 18:00

## 2019-01-01 RX ADMIN — OXYCODONE AND ACETAMINOPHEN 1 TABLET(S): 5; 325 TABLET ORAL at 22:25

## 2019-01-01 RX ADMIN — PANTOPRAZOLE SODIUM 40 MILLIGRAM(S): 20 TABLET, DELAYED RELEASE ORAL at 06:02

## 2019-01-01 RX ADMIN — METHADONE HYDROCHLORIDE 5 MILLIGRAM(S): 40 TABLET ORAL at 05:33

## 2019-01-01 RX ADMIN — OXYCODONE HYDROCHLORIDE 10 MILLIGRAM(S): 5 TABLET ORAL at 13:05

## 2019-01-01 RX ADMIN — Medication 2: at 13:00

## 2019-01-01 RX ADMIN — Medication 137 MICROGRAM(S): at 06:48

## 2019-01-01 RX ADMIN — CEFEPIME 100 MILLIGRAM(S): 1 INJECTION, POWDER, FOR SOLUTION INTRAMUSCULAR; INTRAVENOUS at 22:54

## 2019-01-01 RX ADMIN — FINASTERIDE 5 MILLIGRAM(S): 5 TABLET, FILM COATED ORAL at 14:00

## 2019-01-01 RX ADMIN — AMLODIPINE BESYLATE 10 MILLIGRAM(S): 2.5 TABLET ORAL at 05:37

## 2019-01-01 RX ADMIN — MORPHINE SULFATE 2 MILLIGRAM(S): 50 CAPSULE, EXTENDED RELEASE ORAL at 23:28

## 2019-01-01 RX ADMIN — HYDROMORPHONE HYDROCHLORIDE 1 MILLIGRAM(S): 2 INJECTION INTRAMUSCULAR; INTRAVENOUS; SUBCUTANEOUS at 22:50

## 2019-01-01 RX ADMIN — Medication 3: at 17:50

## 2019-01-01 RX ADMIN — ENOXAPARIN SODIUM 40 MILLIGRAM(S): 100 INJECTION SUBCUTANEOUS at 12:46

## 2019-01-01 RX ADMIN — METHADONE HYDROCHLORIDE 5 MILLIGRAM(S): 40 TABLET ORAL at 05:35

## 2019-01-01 RX ADMIN — HEPARIN SODIUM 6500 UNIT(S): 5000 INJECTION INTRAVENOUS; SUBCUTANEOUS at 23:17

## 2019-01-01 RX ADMIN — Medication 40 MILLIGRAM(S): at 05:06

## 2019-01-01 RX ADMIN — CEFEPIME 100 MILLIGRAM(S): 1 INJECTION, POWDER, FOR SOLUTION INTRAMUSCULAR; INTRAVENOUS at 21:12

## 2019-01-01 RX ADMIN — CEFEPIME 100 MILLIGRAM(S): 1 INJECTION, POWDER, FOR SOLUTION INTRAMUSCULAR; INTRAVENOUS at 05:32

## 2019-01-01 RX ADMIN — NYSTATIN CREAM 1 APPLICATION(S): 100000 CREAM TOPICAL at 06:49

## 2019-01-01 RX ADMIN — Medication 100 MILLIGRAM(S): at 22:41

## 2019-01-01 RX ADMIN — METHADONE HYDROCHLORIDE 5 MILLIGRAM(S): 40 TABLET ORAL at 10:13

## 2019-01-01 RX ADMIN — SODIUM CHLORIDE 125 MILLILITER(S): 9 INJECTION, SOLUTION INTRAVENOUS at 14:32

## 2019-01-01 RX ADMIN — Medication 40 MILLIGRAM(S): at 16:57

## 2019-01-01 RX ADMIN — NYSTATIN CREAM 1 APPLICATION(S): 100000 CREAM TOPICAL at 05:02

## 2019-01-01 RX ADMIN — SENNA PLUS 2 TABLET(S): 8.6 TABLET ORAL at 21:52

## 2019-01-01 RX ADMIN — INSULIN GLARGINE 14 UNIT(S): 100 INJECTION, SOLUTION SUBCUTANEOUS at 21:50

## 2019-01-01 RX ADMIN — CEFEPIME 100 MILLIGRAM(S): 1 INJECTION, POWDER, FOR SOLUTION INTRAMUSCULAR; INTRAVENOUS at 05:42

## 2019-01-01 RX ADMIN — Medication 10 MILLIGRAM(S): at 05:04

## 2019-01-01 RX ADMIN — Medication 100 MILLIGRAM(S): at 21:38

## 2019-01-01 RX ADMIN — Medication 100 MILLIGRAM(S): at 21:09

## 2019-01-01 RX ADMIN — Medication 137 MICROGRAM(S): at 05:53

## 2019-01-01 RX ADMIN — Medication 40 MILLIGRAM(S): at 05:38

## 2019-01-01 RX ADMIN — INSULIN GLARGINE 14 UNIT(S): 100 INJECTION, SOLUTION SUBCUTANEOUS at 22:16

## 2019-01-01 RX ADMIN — Medication 1: at 18:06

## 2019-01-01 RX ADMIN — AMLODIPINE BESYLATE 10 MILLIGRAM(S): 2.5 TABLET ORAL at 05:33

## 2019-01-01 RX ADMIN — Medication 1: at 07:32

## 2019-01-01 RX ADMIN — Medication 175 MICROGRAM(S): at 05:37

## 2019-01-01 RX ADMIN — Medication 40 MILLIGRAM(S): at 05:05

## 2019-01-01 RX ADMIN — Medication 975 MILLIGRAM(S): at 23:15

## 2019-01-01 RX ADMIN — ENOXAPARIN SODIUM 80 MILLIGRAM(S): 100 INJECTION SUBCUTANEOUS at 05:16

## 2019-01-01 RX ADMIN — OXYCODONE HYDROCHLORIDE 10 MILLIGRAM(S): 5 TABLET ORAL at 14:30

## 2019-01-01 RX ADMIN — ENOXAPARIN SODIUM 40 MILLIGRAM(S): 100 INJECTION SUBCUTANEOUS at 13:59

## 2019-01-01 RX ADMIN — CEFEPIME 1000 MILLIGRAM(S): 1 INJECTION, POWDER, FOR SOLUTION INTRAMUSCULAR; INTRAVENOUS at 19:40

## 2019-01-01 RX ADMIN — METHADONE HYDROCHLORIDE 5 MILLIGRAM(S): 40 TABLET ORAL at 01:37

## 2019-01-01 RX ADMIN — INSULIN GLARGINE 14 UNIT(S): 100 INJECTION, SOLUTION SUBCUTANEOUS at 21:54

## 2019-01-01 RX ADMIN — HYDROMORPHONE HYDROCHLORIDE 1 MILLIGRAM(S): 2 INJECTION INTRAMUSCULAR; INTRAVENOUS; SUBCUTANEOUS at 20:27

## 2019-01-01 RX ADMIN — OXYCODONE HYDROCHLORIDE 10 MILLIGRAM(S): 5 TABLET ORAL at 05:33

## 2019-01-01 RX ADMIN — Medication 175 MICROGRAM(S): at 05:56

## 2019-01-01 RX ADMIN — HEPARIN SODIUM 0 UNIT(S)/HR: 5000 INJECTION INTRAVENOUS; SUBCUTANEOUS at 22:54

## 2019-01-01 RX ADMIN — Medication 1: at 09:11

## 2019-01-01 RX ADMIN — Medication 650 MILLIGRAM(S): at 18:48

## 2019-01-01 RX ADMIN — Medication 40 MILLIGRAM(S): at 05:10

## 2019-01-01 RX ADMIN — FINASTERIDE 5 MILLIGRAM(S): 5 TABLET, FILM COATED ORAL at 12:20

## 2019-01-01 RX ADMIN — HYDROMORPHONE HYDROCHLORIDE 1 MILLIGRAM(S): 2 INJECTION INTRAMUSCULAR; INTRAVENOUS; SUBCUTANEOUS at 22:29

## 2019-01-01 RX ADMIN — METHADONE HYDROCHLORIDE 5 MILLIGRAM(S): 40 TABLET ORAL at 22:24

## 2019-01-01 RX ADMIN — PANTOPRAZOLE SODIUM 40 MILLIGRAM(S): 20 TABLET, DELAYED RELEASE ORAL at 05:31

## 2019-01-01 RX ADMIN — Medication 40 MILLIGRAM(S): at 18:19

## 2019-01-01 RX ADMIN — HEPARIN SODIUM 1900 UNIT(S)/HR: 5000 INJECTION INTRAVENOUS; SUBCUTANEOUS at 15:28

## 2019-01-01 RX ADMIN — ATORVASTATIN CALCIUM 20 MILLIGRAM(S): 80 TABLET, FILM COATED ORAL at 21:12

## 2019-01-01 RX ADMIN — OXYCODONE HYDROCHLORIDE 10 MILLIGRAM(S): 5 TABLET ORAL at 10:10

## 2019-01-01 RX ADMIN — Medication 2: at 13:08

## 2019-01-01 RX ADMIN — PANTOPRAZOLE SODIUM 40 MILLIGRAM(S): 20 TABLET, DELAYED RELEASE ORAL at 05:56

## 2019-01-01 RX ADMIN — INSULIN GLARGINE 14 UNIT(S): 100 INJECTION, SOLUTION SUBCUTANEOUS at 21:25

## 2019-01-01 RX ADMIN — Medication 1 APPLICATION(S): at 12:20

## 2019-01-01 RX ADMIN — Medication 10 MILLIGRAM(S): at 05:53

## 2019-01-01 RX ADMIN — HYDROMORPHONE HYDROCHLORIDE 1 MILLIGRAM(S): 2 INJECTION INTRAMUSCULAR; INTRAVENOUS; SUBCUTANEOUS at 20:54

## 2019-01-01 RX ADMIN — Medication 250 MILLIGRAM(S): at 18:20

## 2019-01-01 RX ADMIN — FINASTERIDE 5 MILLIGRAM(S): 5 TABLET, FILM COATED ORAL at 13:42

## 2019-01-01 RX ADMIN — ENOXAPARIN SODIUM 40 MILLIGRAM(S): 100 INJECTION SUBCUTANEOUS at 13:43

## 2019-01-01 RX ADMIN — METHADONE HYDROCHLORIDE 5 MILLIGRAM(S): 40 TABLET ORAL at 13:10

## 2019-01-01 RX ADMIN — FINASTERIDE 5 MILLIGRAM(S): 5 TABLET, FILM COATED ORAL at 12:40

## 2019-01-01 RX ADMIN — Medication 1 APPLICATION(S): at 18:15

## 2019-01-01 RX ADMIN — OXYCODONE HYDROCHLORIDE 10 MILLIGRAM(S): 5 TABLET ORAL at 22:32

## 2019-01-01 RX ADMIN — Medication 1: at 18:18

## 2019-01-01 RX ADMIN — Medication 650 MILLIGRAM(S): at 12:56

## 2019-01-01 RX ADMIN — Medication 137 MICROGRAM(S): at 05:25

## 2019-01-01 RX ADMIN — Medication 100 MILLIGRAM(S): at 21:12

## 2019-01-01 RX ADMIN — Medication 1 APPLICATION(S): at 13:59

## 2019-01-01 RX ADMIN — ATORVASTATIN CALCIUM 20 MILLIGRAM(S): 80 TABLET, FILM COATED ORAL at 21:30

## 2019-01-01 RX ADMIN — OXYCODONE HYDROCHLORIDE 10 MILLIGRAM(S): 5 TABLET ORAL at 13:19

## 2019-01-01 RX ADMIN — Medication 1: at 13:09

## 2019-01-01 RX ADMIN — AMLODIPINE BESYLATE 10 MILLIGRAM(S): 2.5 TABLET ORAL at 05:16

## 2019-01-01 RX ADMIN — SODIUM CHLORIDE 500 MILLILITER(S): 9 INJECTION INTRAMUSCULAR; INTRAVENOUS; SUBCUTANEOUS at 20:00

## 2019-01-04 LAB — GLUCOSE BLDC GLUCOMTR-MCNC: 243

## 2019-01-06 ENCOUNTER — FORM ENCOUNTER (OUTPATIENT)
Age: 59
End: 2019-01-06

## 2019-01-07 ENCOUNTER — APPOINTMENT (OUTPATIENT)
Dept: ULTRASOUND IMAGING | Facility: IMAGING CENTER | Age: 59
End: 2019-01-07
Payer: MEDICARE

## 2019-01-07 ENCOUNTER — OUTPATIENT (OUTPATIENT)
Dept: OUTPATIENT SERVICES | Facility: HOSPITAL | Age: 59
LOS: 1 days | End: 2019-01-07
Payer: MEDICARE

## 2019-01-07 DIAGNOSIS — R60.0 LOCALIZED EDEMA: ICD-10-CM

## 2019-01-07 PROCEDURE — 93971 EXTREMITY STUDY: CPT | Mod: 26,RT

## 2019-01-07 PROCEDURE — 93971 EXTREMITY STUDY: CPT

## 2019-01-12 ENCOUNTER — OUTPATIENT (OUTPATIENT)
Dept: OUTPATIENT SERVICES | Facility: HOSPITAL | Age: 59
LOS: 1 days | Discharge: HOME | End: 2019-01-12

## 2019-01-12 DIAGNOSIS — C64.9 MALIGNANT NEOPLASM OF UNSPECIFIED KIDNEY, EXCEPT RENAL PELVIS: ICD-10-CM

## 2019-01-14 ENCOUNTER — APPOINTMENT (OUTPATIENT)
Dept: UROLOGY | Facility: CLINIC | Age: 59
End: 2019-01-14
Payer: MEDICARE

## 2019-01-14 ENCOUNTER — OUTPATIENT (OUTPATIENT)
Dept: OUTPATIENT SERVICES | Facility: HOSPITAL | Age: 59
LOS: 1 days | End: 2019-01-14
Payer: MEDICARE

## 2019-01-14 VITALS
DIASTOLIC BLOOD PRESSURE: 107 MMHG | HEART RATE: 87 BPM | SYSTOLIC BLOOD PRESSURE: 165 MMHG | TEMPERATURE: 98.4 F | RESPIRATION RATE: 16 BRPM

## 2019-01-14 DIAGNOSIS — R35.0 FREQUENCY OF MICTURITION: ICD-10-CM

## 2019-01-14 PROCEDURE — 51702 INSERT TEMP BLADDER CATH: CPT

## 2019-01-15 DIAGNOSIS — R33.8 OTHER RETENTION OF URINE: ICD-10-CM

## 2019-01-17 ENCOUNTER — APPOINTMENT (OUTPATIENT)
Dept: HEMATOLOGY ONCOLOGY | Facility: CLINIC | Age: 59
End: 2019-01-17

## 2019-01-17 ENCOUNTER — APPOINTMENT (OUTPATIENT)
Dept: UROLOGY | Facility: CLINIC | Age: 59
End: 2019-01-17

## 2019-01-17 VITALS
WEIGHT: 187.39 LBS | HEART RATE: 85 BPM | BODY MASS INDEX: 31.18 KG/M2 | TEMPERATURE: 98.6 F | DIASTOLIC BLOOD PRESSURE: 90 MMHG | RESPIRATION RATE: 16 BRPM | SYSTOLIC BLOOD PRESSURE: 148 MMHG | OXYGEN SATURATION: 95 %

## 2019-01-17 DIAGNOSIS — M54.10 RADICULOPATHY, SITE UNSPECIFIED: ICD-10-CM

## 2019-01-28 NOTE — HISTORY OF PRESENT ILLNESS
[Disease: _____________________] : Disease: [unfilled] [AJCC Stage: ____] : AJCC Stage: [unfilled] [Therapy: ___] : Therapy: [unfilled] [Cardiovascular] : Cardiovascular [___________________________________] : Drug: [unfilled] [de-identified] : 56 yo M w/ PMH newly dx DM and HTN, originally presented to [facility] with neuropathy of hands and feet as well as a large R abdominal mass, which he says has been growing for the 5-6 years prior. CT abdomen revealed a 9.8 x 8 x 11.4 cm largely necrotic mass of the lower pole right kidney. Subsequent CT chest showed diffuse pulm mets, bone scan with iliac bone lesion and MRI brain with a cystic lesion concerning for mets. Bone bx done by IR showed inconclusive pathology. S/p Gamma knife to brain met on . Seen by urology Dr Baumann, deemed not a candidate for nephrectomy currently given mets to brain and overall poor prognosis, but could possibly be a candidate in future if response to TKIs. IR bx of kidney on 1/14/15 confirmed RCC with high grade features. Started on Pazopanib on . Held on  after elevation of BP. Restarted on  with increased BP meds. CT scan done in May showed several new spinal mets, yet stable size of kidney mass. No symptoms, but given several lesions with extensive disease of the vertebral body, discussion was had at  Tumor board and referred to rad onc for radiation treatment in hopes to prevent any decompensation in the future.  Completed XRT to the spine.  Repeat imaging in 2015 showed POD on Pazopanib, switched to Sutent 50mg PO QD  (2 weeks on/1 week off) on  with POD, now on Nivolumab (started on 16). Chronic cough was noted to worsen a bit in early , while he scans have remain stable, there was a slight increase in hilar fullness noted by Dr Parham. Patient underwent palliative RT to the L hilum from - with improvement in his Sx's.   Patient found to have progressive disease on CT scan from 2017 and was switched to cabozantinib at that point. His course has been complicated by hypertensive urgency, however his blood pressure has stabilized on new BP medications and he is monitoring it at home regularly. \par \par Patient was admitted to the hospital in 2018 due to LLE weakness which had been slowly progressive but then involved numbness in pelvis and urinary retention. 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 Pt also with BP elevation in ED as high as 195/101 after receiving dexamethasone. He was treated for the blood pressure elevation and planned for outpatient RT. Patient was then decided to be a better candidate for possibly kyphoplasty but he had to go to [facility] for his mother's . He missed his scheduled kyphoplasty, but is now back. He has since he returned received SBRT to expanding malignancy in thoracic spine, and had been planned for kyphoplasty with IORT, but instead had SBRT to lower spine at the time. \par \par He had since been complaining of lower pelvic pain and was found with urinary retention and is now s/p indwelling vasquez placement. He has failed several trial of voids, being followed by urology Dr. Philip. He is now s/p kyphoplasty with IORT 2018.  [de-identified] : Clear cell carcinoma [FreeTextEntry1] : Failed pazopanib, sunitinib, nivolumab [de-identified] : Patient with persistent complaints of LLE pain and tightening up to his abdomen, which has not changed. The pain starts in his back and radiates down his left leg and then he describes a spasm like sensation which involves his abdomen. This is a 7/10 at its worst and oxycodone decreases is to 4/10. He reports his ambulation is not great but he can get around with a cane. He otherwise feels with his normal energy, normal appetite, no dyspnea or chest pain, normal bowel habits, and otherwise doing very well. Still has urinary catheter in, is pending laser procedure with urology.  [de-identified] : Hypertensive urgency [de-identified] : Maculopapular drug rash

## 2019-01-28 NOTE — REVIEW OF SYSTEMS
[Skin Rash] : skin rash [Difficulty Walking] : difficulty walking [Negative] : Allergic/Immunologic [Fever] : no fever [Chills] : no chills [Night Sweats] : no night sweats [Fatigue] : no fatigue [Recent Change In Weight] : ~T no recent weight change [Vision Problems] : no vision problems [Dysphagia] : no dysphagia [Nosebleeds] : no nosebleeds [Chest Pain] : no chest pain [Palpitations] : no palpitations [Shortness Of Breath] : no shortness of breath [Wheezing] : no wheezing [Cough] : no cough [SOB on Exertion] : no shortness of breath during exertion [Abdominal Pain] : no abdominal pain [Vomiting] : no vomiting [Constipation] : no constipation [Diarrhea] : no diarrhea [Dysuria] : no dysuria [Joint Pain] : no joint pain [Joint Stiffness] : no joint stiffness [Confused] : no confusion [Dizziness] : no dizziness [Fainting] : no fainting [Depression] : no depression [Easy Bleeding] : no tendency for easy bleeding [Easy Bruising] : no tendency for easy bruising [FreeTextEntry7] : Abdominal tightening [FreeTextEntry8] : Indwelling urinary catheter.  [de-identified] : Rash is improved and almost 100% resolved.  [de-identified] : Weakness in LLE

## 2019-01-28 NOTE — PHYSICAL EXAM
[Ambulatory and capable of all self care but unable to carry out any work activities] : Status 2- Ambulatory and capable of all self care but unable to carry out any work activities. Up and about more than 50% of waking hours [Normal] : affect appropriate [Ulcers] : no ulcers [Mucositis] : no mucositis [Thrush] : no thrush [de-identified] : Previous maculopapular rash on face, neck, chest covering around 25% of body surface is now resolved.  [de-identified] : CN intact, LLE 3-4/5 at hip (limited by pain), 5/5 at knee, RLE 5/5 throughout.

## 2019-01-28 NOTE — ASSESSMENT
[Palliative] : Goals of care discussed with patient: Palliative [Palliative Care Plan] : Patient was apprised of incurable nature of disease.  Hospice and Palliative care options discussed. [FreeTextEntry1] : 58 yo M w/ stage IV RCC and 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 S/p gamma knife to brain met. Not a candidate for nephrectomy per Urology given brain mets.  S/p palliative XRT to the spine. S/p systemic tx with Pazopanib, with POD and Sutent with POD, with progressive disease on Nivolumab in Nov 2017 and now on cabozantinib since then. \par \par (1) Metastatic RCC \par - s/p 15 months of cabozantinib at this time.\par - CT scan at end of November showing persistently stable disease on cabozantinib (full dose). \par - Patient with a history of brain metastasis s/p gamma knife. MRI Jan 2017 showed stable disease, and repeat MRI March 3rd 2018 showed stable findings, consistent with stable treated disease. \par -s/p palliative radiation to R pelvis due to acetabular lesion. No symptoms on the R at this time. \par -s/p palliative radiation to spine previously and now s/p SBRT to spine for progression with cord compression. Now additionally s/p kyphoplasty with IORT for T8 collapse/cord compression. \par -Continue with Udder cream for HFS prophylaxis. \par - gabapentin and oxycodone for pain control, currently under OK control but still interfering with ADLs. \par - Will refer to Dr. Cooper for assistance with pain control. \par -Upon progression, may be candidate for phase 1 Regeneron trial. \par \par (2) Urinary retention\par Now with indwelling catheter.\par Likely due to cord compression vs. coexisting BPH. \par Continue finasteride and flomax.\par Follow up with urology. Has failed TOVs.\par Reportedly planning on laser procedure to remove obstruction. \par \par \par (3) HTN\par Continue to take antihypertensives as ordered.\par Much better control now.\par Home measurements normal.\par \par (4) Rash\par Resolved. \par \par (5) Debilitation\par Pt is performing most of his ADL’s in bed such as feeding, grooming, and medication taking.\par He requires head elevation above 30 degrees to perform these ADL’s.\par He requires positioning changes not feasible in standard bed to alleviate pain associated with metastatic cancer\par Wedges and pillows will not work for patient\par Requires variable height to allow for safe transfer to wheelchair\par Ordering a variable height hospital bed because without it, the patient will be bedbound and physically deteriorate.\par \par Case discussed in clinic with Dr. Louis. Following longitudinally with Dr. Pena.  \par \par Bradley Goldberg M.D. \par Hematology/Oncology Fellow\par [facility]\par [address] [address]\par [phone]\par

## 2019-02-12 ENCOUNTER — OUTPATIENT (OUTPATIENT)
Dept: OUTPATIENT SERVICES | Facility: HOSPITAL | Age: 59
LOS: 1 days | Discharge: HOME | End: 2019-02-12

## 2019-02-12 DIAGNOSIS — C64.9 MALIGNANT NEOPLASM OF UNSPECIFIED KIDNEY, EXCEPT RENAL PELVIS: ICD-10-CM

## 2019-02-25 NOTE — REVIEW OF SYSTEMS
[Skin Rash] : skin rash [Difficulty Walking] : difficulty walking [Negative] : Allergic/Immunologic [Fever] : no fever [Chills] : no chills [Night Sweats] : no night sweats [Fatigue] : no fatigue [Recent Change In Weight] : ~T no recent weight change [Vision Problems] : no vision problems [Dysphagia] : no dysphagia [Nosebleeds] : no nosebleeds [Chest Pain] : no chest pain [Palpitations] : no palpitations [Shortness Of Breath] : no shortness of breath [Wheezing] : no wheezing [Cough] : no cough [SOB on Exertion] : no shortness of breath during exertion [Abdominal Pain] : no abdominal pain [Vomiting] : no vomiting [Constipation] : no constipation [Diarrhea] : no diarrhea [Dysuria] : no dysuria [Joint Pain] : no joint pain [Joint Stiffness] : no joint stiffness [Confused] : no confusion [Dizziness] : no dizziness [Fainting] : no fainting [Depression] : no depression [Easy Bleeding] : no tendency for easy bleeding [Easy Bruising] : no tendency for easy bruising [FreeTextEntry7] : Abdominal tightening [FreeTextEntry8] : Indwelling urinary catheter.  [de-identified] : Rash is improved and almost 100% resolved.  [de-identified] : Weakness in LLE

## 2019-02-25 NOTE — PHYSICAL EXAM
[Ambulatory and capable of all self care but unable to carry out any work activities] : Status 2- Ambulatory and capable of all self care but unable to carry out any work activities. Up and about more than 50% of waking hours [Normal] : affect appropriate [Ulcers] : no ulcers [Mucositis] : no mucositis [Thrush] : no thrush [de-identified] : Previous maculopapular rash on face, neck, chest covering around 25% of body surface is now resolved.  [de-identified] : CN intact, LLE 3-4/5 at hip (limited by pain), 5/5 at knee, RLE 5/5 throughout.

## 2019-02-25 NOTE — HISTORY OF PRESENT ILLNESS
[Disease: _____________________] : Disease: [unfilled] [AJCC Stage: ____] : AJCC Stage: [unfilled] [Therapy: ___] : Therapy: [unfilled] [Cardiovascular] : Cardiovascular [___________________________________] : Drug: [unfilled] [de-identified] : 56 yo M w/ PMH newly dx DM and HTN, originally presented to [facility] with neuropathy of hands and feet as well as a large R abdominal mass, which he says has been growing for the 5-6 years prior. CT abdomen revealed a 9.8 x 8 x 11.4 cm largely necrotic mass of the lower pole right kidney. Subsequent CT chest showed diffuse pulm mets, bone scan with iliac bone lesion and MRI brain with a cystic lesion concerning for mets. Bone bx done by IR showed inconclusive pathology. S/p Gamma knife to brain met on . Seen by urology Dr Baumann, deemed not a candidate for nephrectomy currently given mets to brain and overall poor prognosis, but could possibly be a candidate in future if response to TKIs. IR bx of kidney on 1/14/15 confirmed RCC with high grade features. Started on Pazopanib on . Held on  after elevation of BP. Restarted on  with increased BP meds. CT scan done in May showed several new spinal mets, yet stable size of kidney mass. No symptoms, but given several lesions with extensive disease of the vertebral body, discussion was had at  Tumor board and referred to rad onc for radiation treatment in hopes to prevent any decompensation in the future.  Completed XRT to the spine.  Repeat imaging in 2015 showed POD on Pazopanib, switched to Sutent 50mg PO QD  (2 weeks on/1 week off) on  with POD, now on Nivolumab (started on 16). Chronic cough was noted to worsen a bit in early , while he scans have remain stable, there was a slight increase in hilar fullness noted by Dr Parham. Patient underwent palliative RT to the L hilum from - with improvement in his Sx's.   Patient found to have progressive disease on CT scan from 2017 and was switched to cabozantinib at that point. His course has been complicated by hypertensive urgency, however his blood pressure has stabilized on new BP medications and he is monitoring it at home regularly. \par \par Patient was admitted to the hospital in 2018 due to LLE weakness which had been slowly progressive but then involved numbness in pelvis and urinary retention. 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 Pt also with BP elevation in ED as high as 195/101 after receiving dexamethasone. He was treated for the blood pressure elevation and planned for outpatient RT. Patient was then decided to be a better candidate for possibly kyphoplasty but he had to go to [facility] for his mother's . He missed his scheduled kyphoplasty, but is now back. He has since he returned received SBRT to expanding malignancy in thoracic spine, and had been planned for kyphoplasty with IORT, but instead had SBRT to lower spine at the time. \par \par He had since been complaining of lower pelvic pain and was found with urinary retention and is now s/p indwelling vasquez placement. He has failed several trial of voids, being followed by urology Dr. Philip. He is now s/p kyphoplasty with IORT 2018.  [de-identified] : Clear cell carcinoma [FreeTextEntry1] : Failed pazopanib, sunitinib, nivolumab [de-identified] : Patient remains with persistent complaints of LLE pain and tightening up to his abdomen, which has not changed. He is planning to see urologist this Friday to discuss further plans for catheter. He reports otherwise he has no other complaints. His walking is unchanged, he has made an appointment for physical therapy which will start soon. No fevers or chills, SOB or chest pain, N/V/D. Appetite has been stable.  [de-identified] : Hypertensive urgency [de-identified] : Maculopapular drug rash

## 2019-02-25 NOTE — ASSESSMENT
[Palliative] : Goals of care discussed with patient: Palliative [Palliative Care Plan] : Patient was apprised of incurable nature of disease.  Hospice and Palliative care options discussed. [FreeTextEntry1] : 58 yo M w/ stage IV RCC and 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 S/p gamma knife to brain met. Not a candidate for nephrectomy per Urology given brain mets.  S/p palliative XRT to the spine. S/p systemic tx with Pazopanib, with POD and Sutent with POD, with progressive disease on Nivolumab in Nov 2017 and now on cabozantinib since then. \par \par (1) Metastatic RCC \par - s/p 16 months of cabozantinib at this time.\par - CT scan at end of November showing persistently stable disease on cabozantinib (full dose). Due for repeat CT scans, which he will schedule this week. \par - Patient with a history of brain metastasis s/p gamma knife. MRI Jan 2017 showed stable disease, and repeat MRI March 3rd 2018 showed stable findings, consistent with stable treated disease. \par -s/p palliative radiation to R pelvis due to acetabular lesion. No symptoms on the R at this time. \par -s/p palliative radiation to spine previously and now s/p SBRT to spine for progression with cord compression. Now additionally s/p kyphoplasty with IORT for T8 collapse/cord compression. \par -Continue with Udder cream for HFS prophylaxis. \par - gabapentin and oxycodone for pain control, currently under OK control but still interfering with ADLs. \par - Will start 2.5 mg methadone BID for baseline pain control, discussed with Dr. Cooper, who will then follow up with him. \par -Upon progression, may be candidate for phase 1 Regeneron trial. \par \par (2) Urinary retention\par Now with indwelling catheter.\par Likely due to cord compression vs. coexisting BPH (or combo of both). \par Continue finasteride and flomax.\par Follow up with urology. Has failed TOVs.\par Reportedly planning on laser procedure to remove obstruction. \par \par \par (3) HTN\par Continue to take antihypertensives as ordered.\par Much better control now.\par Home measurements normal.\par \par (4) Debilitation\par Pt is still performing most of his ADL’s in bed such as feeding, grooming, and medication taking.\par He requires head elevation above 30 degrees to perform these ADL’s.\par He requires positioning changes not feasible in standard bed to alleviate pain associated with metastatic cancer\par Wedges and pillows will not work for patient\par Requires variable height to allow for safe transfer to wheelchair\par Ordering a variable height hospital bed because without it, the patient will be bedbound and physically deteriorate.\par \par Case discussed in clinic with Dr. Irene. Following longitudinally with Dr. Pena.  \par \par Bradley Goldberg M.D. \par Hematology/Oncology Fellow\par [facility]\par [address] [address]\par [phone]\par

## 2019-03-27 PROBLEM — R33.9 INCOMPLETE BLADDER EMPTYING: Status: ACTIVE | Noted: 2018-10-18

## 2019-03-28 PROBLEM — N40.1 BPH WITH OBSTRUCTION/LOWER URINARY TRACT SYMPTOMS: Status: ACTIVE | Noted: 2018-10-18

## 2019-03-28 PROBLEM — M54.9 BACK PAIN: Status: ACTIVE | Noted: 2017-09-28

## 2019-03-28 NOTE — ADDENDUM
[FreeTextEntry1] : Entered by Trudi Reid, acting as scribe for Dr. Soriano. \par \par The documentation recorded by the scribe accurately reflects the service I personally performed and the decisions made by me.\par

## 2019-03-28 NOTE — LETTER BODY
[FreeTextEntry1] : Jeri Mata MD. \par [address]\[place] [address]\par \par Dear Dr. Mata, \par \par Reason for Visit: Renal cell carcinoma, stage IV.  Urinary retention. \par \par This is a 58 -year-old gentleman with RCC stage IV with metastasis to lung, brain, and bone, presenting with urinary retention. UDS testing with Dr. Philip in November 2018 demonstrated bladder outlet obstruction and persistent urinary retention. Patient returns for follow up, accompanied by a family member. Since the patient's last visit, he continues to have a catheter. He last changed his catheter yesterday. Patient denies any difficulties with the catheter but prefers to remove it. Patient shares he also has difficulty walking and is able to walk several feet. He is unsure if his RCC metastasized to his spine. His family reports he had obtained a spine biopsy recently to evaluate. Patient also continues to take Flomax and Proscar daily. He takes aspirin 3x per week. Patient denies any changes in health. The past medical history, family history and social history are unchanged. All other review of systems are negative. Patient denies any changes in medications. Medication list was reconciled.\par \par On examination, the patient is an older-appearing wheelchair-bound gentleman in no acute distress. He is alert and oriented follows commands. He has normal mood and affect. He is normocephalic. Neck is supple. Respirations are unlabored. His abdomen is soft and nontender. Bladder is nonpalpable. No CVA tenderness. Neurologically he is able to stand with assistance. No peripheral edema. Skin without gross abnormality. He has an 18' Fr catheter. \par \par CMP demonstrated normal renal function, creatinine 1.11. \par \par Assessment: Renal cell carcinoma, stage IV. Urinary retention. \par \par I counseled the patient. Patient continues to change his catheter monthly. However, he wishes to proceed with surgery. I recommended he obtain Greenlight laser vaporization of prostate. I counseled the patient regarding the procedure. The risks and benefits were discussed. The risk of persistent retention was discussed given his limited mobility. Patient understands the risks and wishes to proceed with the surgery. Alternatives were given. I answered the patient questions. The patient will take the necessary preparations for the procedure. He will obtain PTT, BMP, CBC, PT and INR, and urine culture today to ensure stability. The patient will follow-up as directed and will contact me with any questions or concerns. Thank you for the opportunity to participate in the care of this patient. I'll keep you updated on his progress.\par \par Plan: PTT. BMP. CBC. PT and INR. Urine culture. Greenlight laser vaporization of prostate. Follow up as directed.  \par \par

## 2019-04-01 NOTE — PHYSICAL EXAM
[General Appearance - Alert] : alert [General Appearance - In No Acute Distress] : in no acute distress [Sclera] : the sclera and conjunctiva were normal [Normal Oral Mucosa] : normal oral mucosa [Neck Appearance] : the appearance of the neck was normal [Auscultation Breath Sounds / Voice Sounds] : lungs were clear to auscultation bilaterally [Heart Rate And Rhythm] : heart rate was normal and rhythm regular [Heart Sounds] : normal S1 and S2 [Murmurs] : no murmurs [Edema] : there was no peripheral edema [Bowel Sounds] : normal bowel sounds [Abdomen Soft] : soft [Abnormal Walk] : normal gait [] : no rash [No Focal Deficits] : no focal deficits [Oriented To Time, Place, And Person] : oriented to person, place, and time [FreeTextEntry1] : dimished BS R apex

## 2019-04-01 NOTE — HISTORY OF PRESENT ILLNESS
[FreeTextEntry1] : 58yoM with metastatic RCC dx 12/2014 (mets to lung, bone, brain) with large, necrotic renal mass s/p gamma knife to brain met, s/p palliative XRT to spine, s/p SBRT to R occipital condyle met on Nivolumab presents for follow up visit today.  PMH also significant for DM2, hypothyroidism, HTN. Patient last seen by me 7/2017. \par \par Since our last visit 7/2017 much has transpired.  He was found to have metastatic disease to the spinal cord 6/2018. He is s/p kyphoplasty with IORT 11/7/18 (treatment delayed due to the passing of his mother and having to travel to [facility]. He underwent SBRT to the spine prior to traveling). He wears a chronic vasquez due to chronic urinary retention.  Wears a diaper for fecal incontinence. \par \par Pain in the abdomen and back have been progressively worsening, last month was initiated on Methadone 2.5mg BID. He at times takes 5mg at bedtime due to high pain levels.  He uses Oxy IR 10mg PRN, takes about 2-4 times daily. \par \par Spends majority of the day in bed, only gets out of bed with the assistance of his wife. \par \par ROS: \par +LE weakness - started PT recently \par +occasional constipation - uses senna, this helps\par +depressed mood 2/2 physical condition\par \par Denies n/v, trouble sleeping, appetite loss, \par \par Is awaiting a hospital bed. Wife was hit by a car in November and suffered traumatic hand injury which has left her limited in her ability to help patient. Is awaiting Medicaid to kick in.  \par \par I-Stop Ref#: [id number]

## 2019-04-01 NOTE — REVIEW OF SYSTEMS
[Abdominal Pain] : abdominal pain [Constipation] : constipation [As Noted in HPI] : as noted in HPI [Limb Pain] : limb pain [Negative] : Heme/Lymph [Vomiting] : no vomiting [Diarrhea] : no diarrhea

## 2019-04-01 NOTE — DATA REVIEWED
[FreeTextEntry1] : CT C/A/P (3/2019) - \par No change in size of right renal mass and numerous mediastinal lymph nodes. Numerous lung nodules are unchanged in size and no new lung nodules are present, however one nodule in the left lower lobe, although the same size, demonstrates increased internal density since the previous exam. Lytic bone \par lesions are unchanged. Continued follow-up is recommended.

## 2019-04-01 NOTE — ASSESSMENT
[FreeTextEntry1] : 55yoM with:\par \par 1. Pain secondary to neoplasm - Increase Methadone to 5mg BID, increase Gabapentin to 300mg TID. C/w Oxy IR 10mg PRN. \par \par 2. Metastatic RCC - Oncology f/u, patient is on palliative therapy with Nivolumab.\par \par 3. Constipation - Controlled on Senna \par \par 4. DM2 - Regular follow up with Endo clinic. On Repaglinide.\par \par 5. Physical debility - Pt requires assistance with most ADLs 2/2 to his LE weakness from spinal mets.  Medicaid specialist to meet with patient and wife today to ensure Medicaid process is on track.  Home care referral to be made today; patient requires closer monitoring in his home and PT at home is preferred.  Pt requires a hospital bed and other medical supplies such as adult diapers. \par \par 6. Encounter for Palliative Care - Emotional support provided. \par \par RTO 1 month, call sooner with any issues

## 2019-04-09 NOTE — PHYSICAL EXAM
[Capable of only limited self care, confined to bed or chair more than 50% of waking hours] : Status 3- Capable of only limited self care, confined to bed or chair more than 50% of waking hours [Normal] : affect appropriate [Ulcers] : no ulcers [Mucositis] : no mucositis [Thrush] : no thrush [de-identified] : CN intact, LLE 1/5 at hip (limited by pain), 5/5 at knee, RLE 5/5 throughout.

## 2019-04-09 NOTE — HISTORY OF PRESENT ILLNESS
[Disease: _____________________] : Disease: [unfilled] [AJCC Stage: ____] : AJCC Stage: [unfilled] [Therapy: ___] : Therapy: [unfilled] [Cardiovascular] : Cardiovascular [___________________________________] : Drug: [unfilled] [de-identified] : Clear cell carcinoma [de-identified] : 58 yo M w/ PMH newly dx DM and HTN, originally presented to [facility] with neuropathy of hands and feet as well as a large R abdominal mass, which he says has been growing for the 5-6 years prior. CT abdomen revealed a 9.8 x 8 x 11.4 cm largely necrotic mass of the lower pole right kidney. Subsequent CT chest showed diffuse pulm mets, bone scan with iliac bone lesion and MRI brain with a cystic lesion concerning for mets. Bone bx done by IR showed inconclusive pathology. S/p Gamma knife to brain met on . Seen by urology Dr Baumann, deemed not a candidate for nephrectomy currently given mets to brain and overall poor prognosis, but could possibly be a candidate in future if response to TKIs. IR bx of kidney on 1/14/15 confirmed RCC with high grade features. Started on Pazopanib on . Held on  after elevation of BP. Restarted on  with increased BP meds. CT scan done in May showed several new spinal mets, yet stable size of kidney mass. No symptoms, but given several lesions with extensive disease of the vertebral body, discussion was had at  [facility] and referred to rad onc for radiation treatment in hopes to prevent any decompensation in the future.  Completed XRT to the spine.  Repeat imaging in 2015 showed POD on Pazopanib, switched to Sutent 50mg PO QD  (2 weeks on/1 week off) on  with POD, now on Nivolumab (started on 16). Chronic cough was noted to worsen a bit in early , while he scans have remain stable, there was a slight increase in hilar fullness noted by Dr Parham. Patient underwent palliative RT to the L hilum from - with improvement in his Sx's.   Patient found to have progressive disease on CT scan from 2017 and was switched to cabozantinib at that point. His course has been complicated by hypertensive urgency, however his blood pressure has stabilized on new BP medications and he is monitoring it at home regularly. \par \par Patient was admitted to the hospital in 2018 due to LLE weakness which had been slowly progressive but then involved numbness in pelvis and urinary retention. 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 Pt also with BP elevation in ED as high as 195/101 after receiving dexamethasone. He was treated for the blood pressure elevation and planned for outpatient RT. Patient was then decided to be a better candidate for possibly kyphoplasty but he had to go to [facility] for his mother's . He missed his scheduled kyphoplasty, but is now back. He has since he returned received SBRT to expanding malignancy in thoracic spine, and had been planned for kyphoplasty with IORT, but instead had SBRT to lower spine at the time. \par \par He had since been complaining of lower pelvic pain and was found with urinary retention and is now s/p indwelling vasquez placement. He has failed several trial of voids, being followed by urology Dr. Philip. He is now s/p kyphoplasty with IORT 2018.  [de-identified] : Hypertensive urgency [FreeTextEntry1] : Failed pazopanib, sunitinib, nivolumab [de-identified] : Patient remains with persistent complaints of LLE pain and tightening up to his abdomen, which has not changed and he even reports today may be getting worse overall. However, his overall pain control is now improved with methadone /oxycodone.  He is planning a laser procedure with urologist for urinary retention. He reports otherwise he has no other complaints. His walking is unchanged, he has started physical therapy which he reports may be helping slightly. No fevers or chills, SOB or chest pain, N/V/D. Appetite has been stable. Blood pressure at home has been in 130s/80s, but no record with him.  [de-identified] : Maculopapular drug rash

## 2019-04-09 NOTE — REVIEW OF SYSTEMS
[Skin Rash] : skin rash [Difficulty Walking] : difficulty walking [Negative] : Allergic/Immunologic [Chills] : no chills [Night Sweats] : no night sweats [Fever] : no fever [Fatigue] : no fatigue [Recent Change In Weight] : ~T no recent weight change [Vision Problems] : no vision problems [Nosebleeds] : no nosebleeds [Chest Pain] : no chest pain [Dysphagia] : no dysphagia [Wheezing] : no wheezing [Shortness Of Breath] : no shortness of breath [Palpitations] : no palpitations [Abdominal Pain] : no abdominal pain [SOB on Exertion] : no shortness of breath during exertion [Cough] : no cough [Vomiting] : no vomiting [Diarrhea] : no diarrhea [Constipation] : no constipation [Dysuria] : no dysuria [Joint Pain] : no joint pain [Joint Stiffness] : no joint stiffness [Fainting] : no fainting [Confused] : no confusion [Dizziness] : no dizziness [Depression] : no depression [Easy Bleeding] : no tendency for easy bleeding [Easy Bruising] : no tendency for easy bruising [FreeTextEntry7] : Abdominal tightening [FreeTextEntry8] : Indwelling urinary catheter.  [de-identified] : Rash is improved and almost 100% resolved.  [de-identified] : Weakness in LLE

## 2019-04-09 NOTE — ASSESSMENT
[Palliative] : Goals of care discussed with patient: Palliative [Palliative Care Plan] : Patient was apprised of incurable nature of disease.  Hospice and Palliative care options discussed. [FreeTextEntry1] : 56 yo M w/ stage IV RCC and 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 S/p gamma knife to brain met. Not a candidate for nephrectomy per Urology given brain mets.  S/p palliative XRT to the spine. S/p systemic tx with Pazopanib, with POD and Sutent with POD, with progressive disease on Nivolumab in Nov 2017 and now on cabozantinib since then. \par \par (1) Metastatic RCC \par - s/p 17 months of cabozantinib at this time.\par - CT scan last month with stable disease, next due in June. \par - Patient with hypertensive urgency, but asymtpomatic. Increase enalapril to 40 mg daily and will hold cabozantinib for 1 week and will re-evaluate next Monday in office. \par - Patient to bring in home BP device and a log. \par - Patient with a history of brain metastasis s/p gamma knife. MRI Jan 2017 showed stable disease, and repeat MRI March 3rd 2018 showed stable findings, consistent with stable treated disease. \par - s/p palliative radiation to R pelvis due to acetabular lesion. No symptoms on the R at this time. \par - s/p palliative radiation to spine previously and now s/p SBRT to spine for progression with cord compression. Now additionally s/p kyphoplasty with IORT for T8 collapse/cord compression. Was supposed to follow up with Dr. Tobias in February but hadn't, will order MRI/CT thoracic spine and then he will schedule follow up after scans complete. \par - Continue with Udder cream for HFS prophylaxis. \par - methadone, gabapentin and oxycodone for pain control, currently under OK control but still interfering with ADLs. \par - Continue follow up with Dr. Cooper for pain control. \par - Set up for hospital bed due to debility and will also arrange for cane and home care/ physical therapy. \par \par (2) Urinary retention\par Now with indwelling catheter.\par Likely due to cord compression vs. coexisting BPH (or combo of both). \par Continue finasteride and flomax.\par Planning on laser procedure to remove obstruction later this month. \par \par \par (3) HTN\par Continue to take antihypertensives as ordered outside of enalapril which was increased to 40 mg. \par Home measurements normal.\par Hold cabozantinb for 1 week and will re-evaluate. \par \par Case discussed in clinic with Dr. San. Following longitudinally with Dr. Pena.  \par \par Bradley Goldberg M.D. \par Hematology/Oncology Fellow\par [facility]\par [address] [address]\par [phone]\par

## 2019-04-10 NOTE — H&P PST ADULT - ANESTHESIA, PREVIOUS REACTION, PROFILE
none Kyphoplasty was aborted in 8/29/2018 due to severe hypertension after induction as per anesthesiology notes ( alpha page 14 ), no issues with 11/2018 kyphoplasty.

## 2019-04-10 NOTE — H&P PST ADULT - NSICDXPASTSURGICALHX_GEN_ALL_CORE_FT
PAST SURGICAL HISTORY:  H/O kyphoplasty T8, 11/2018    Status post gamma knife treatment Left forehead/temple

## 2019-04-10 NOTE — H&P PST ADULT - ASSESSMENT
CAPRINI SCORE [CLOT]    AGE RELATED RISK FACTORS                                                       MOBILITY RELATED FACTORS  [x] Age 41-60 years                                            (1 Point)                  [ ] Bed rest                                                        (1 Point)  [ ] Age: 61-74 years                                           (2 Points)                 [ ] Plaster cast                                                   (2 Points)  [ ] Age= 75 years                                              (3 Points)                 [ ] Bed bound for more than 72 hours                 (2 Points)    DISEASE RELATED RISK FACTORS                                               GENDER SPECIFIC FACTORS  [x] Edema in the lower extremities                       (1 Point)                  [ ] Pregnancy                                                     (1 Point)  [ ] Varicose veins                                               (1 Point)                  [ ] Post-partum < 6 weeks                                   (1 Point)             [x] BMI > 25 Kg/m2                                            (1 Point)                  [ ] Hormonal therapy  or oral contraception          (1 Point)                 [ ] Sepsis (in the previous month)                        (1 Point)                  [ ] History of pregnancy complications                 (1 point)  [ ] Pneumonia or serious lung disease                                               [ ] Unexplained or recurrent                     (1 Point)           (in the previous month)                               (1 Point)  [ ] Abnormal pulmonary function test                     (1 Point)                 SURGERY RELATED RISK FACTORS  [ ] Acute myocardial infarction                              (1 Point)                 [ ]  Section                                             (1 Point)  [ ] Congestive heart failure (in the previous month)  (1 Point)               [ ] Minor surgery                                                  (1 Point)   [ ] Inflammatory bowel disease                             (1 Point)                 [ ] Arthroscopic surgery                                        (2 Points)  [ ] Central venous access                                      (2 Points)                [x] General surgery lasting more than 45 minutes   (2 Points)       [ ] Stroke (in the previous month)                          (5 Points)               [ ] Elective arthroplasty                                         (5 Points)                                                                                                                                               HEMATOLOGY RELATED FACTORS                                                 TRAUMA RELATED RISK FACTORS  [ ] Prior episodes of VTE                                     (3 Points)                 [ ] Fracture of the hip, pelvis, or leg                       (5 Points)  [ ] Positive family history for VTE                         (3 Points)                 [ ] Acute spinal cord injury (in the previous month)  (5 Points)  [ ] Prothrombin 49471 A                                     (3 Points)                 [ ] Paralysis  (less than 1 month)                             (5 Points)  [ ] Factor V Leiden                                             (3 Points)                  [ ] Multiple Trauma within 1 month                        (5 Points)  [ ] Lupus anticoagulants                                     (3 Points)                                                           [ ] Anticardiolipin antibodies                               (3 Points)                                                       [ ] High homocysteine in the blood                      (3 Points)                                             [ ] Other congenital or acquired thrombophilia      (3 Points)                                                [ ] Heparin induced thrombocytopenia                  (3 Points)                                          Total Score [    5      ]

## 2019-04-10 NOTE — H&P PST ADULT - NSICDXPROBLEM_GEN_ALL_CORE_FT
PROBLEM DIAGNOSES  Problem: Urinary retention due to benign prostatic hyperplasia  Assessment and Plan: planned for green-light laser vaporization of prostate on 4/17/19.    recent labs on file, Ucx, T&S send  preprocedure instructions discussed   will obtain recent medical eval     Problem: DM2 (diabetes mellitus, type 2)  Assessment and Plan: hga1c send  FS the day of procedure  Hold Prandin am of procedure  Lantus decrease to 11 units night before the procedure     Problem: Hypertension  Assessment and Plan: continue enalapril, amlodipine

## 2019-04-10 NOTE — H&P PST ADULT - GENERAL GENITOURINARY SYMPTOMS
increased urinary frequency/vasquez catheter since 10/2018/nocturia nocturia/Hampton catheter since 10/2018/increased urinary frequency

## 2019-04-10 NOTE — H&P PST ADULT - GAIT/BALANCE
unsteady, ambulates with cane unsteady, ambulates with cane, uses wheelchair for long distance walking

## 2019-04-10 NOTE — H&P PST ADULT - ACTIVITY
ambulates with cane, able to do light housework, limited due to lower extremity weakness ambulates with cane, limited due to lower extremity weakness

## 2019-04-10 NOTE — H&P PST ADULT - NSICDXPASTMEDICALHX_GEN_ALL_CORE_FT
PAST MEDICAL HISTORY:  BPH without obstruction/lower urinary tract symptoms     Cervicalgia     Diabetes mellitus Type 2    Elevated TSH     GERD (gastroesophageal reflux disease)     HLD (hyperlipidemia)     Hypertension     Hypothyroidism     Malignant neoplasm of vertebral column     Metastatic renal cell carcinoma to brain     S/P radiation therapy spine, on cabozantinib since Nov 2017    Urinary retention Hampton catheter placed since 10/2018 PAST MEDICAL HISTORY:  BPH without obstruction/lower urinary tract symptoms     Cervicalgia     Diabetes mellitus Type 2    GERD (gastroesophageal reflux disease)     HLD (hyperlipidemia)     Hypertension     Hypothyroidism     Malignant neoplasm of vertebral column     Metastatic renal cell carcinoma to brain lung/spine    S/P radiation therapy spine, on cabozantinib since Nov 2017    Urinary retention Hampton catheter placed since 10/2018

## 2019-04-10 NOTE — H&P PST ADULT - NSICDXFAMILYHX_GEN_ALL_CORE_FT
FAMILY HISTORY:  Family history of colon cancer  Family history of diabetes mellitus in father  Family history of diabetes mellitus in mother

## 2019-04-10 NOTE — H&P PST ADULT - MUSCULOSKELETAL
details… detailed exam diminished strength/no calf tenderness/decreased ROM due to pain diminished strength/no calf tenderness

## 2019-04-10 NOTE — H&P PST ADULT - NEUROLOGICAL SYMPTOMS
left leg and foot, uses catheter/paresthesias left leg and foot, uses cane/difficulty walking/paresthesias/weakness

## 2019-04-10 NOTE — H&P PST ADULT - ATTENDING COMMENTS
65year old gentleman with advanced renal cell carcinoma, BPH with persistent urinary symptoms despite medical therapy. Patient wishes to proceed with Greenlight laser vaporization of prostate possible transurethral resection of prostate to treat his condition. Alternatives were given. Risks including but not limited to bleeding, need for blood transfusion, infection, risk of anesthesia, pain, failure to cure, persistent urinary symptoms, bladder neck contractures, erectile dysfunction, urinary incontinence, need for future procedure, and injury to surrounding structures were discussed. Patient wishes to proceed with procedure.

## 2019-04-10 NOTE — H&P PST ADULT - HISTORY OF PRESENT ILLNESS
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 S/p gamma knife to brain met, not a candidate for nephrectomy per Urology given brain mets. S/p spinal radiation, now on cabozantinib since Nov 2017 c/o worsening back pain radiating to LLE with associated weakness and numbness and pelvic numbness-s/p MRI revealed osseous spinal mets from T6-8 with cord compression, in addition to intramedullary disease at T7 with surrounding cord edema. Evaluated by Dr. Tobias. Denies HA, blurry vision, chest pain, or sob. Presents to PST for a scheduled T8 Kyphoplasty on 11/7/2018.     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 S/p gamma knife to brain met, S/p spinal radiation, now on cabozantinib since Nov 2017 c/o worsening back pain radiating to LLE with associated weakness and numbness/spinal mets from T6-8 with cord compression/ intramedullary disease at T7 with surrounding cord edema s/p T8 Kyphoplasty on 11/7/2018 with complaints of BPH/ urinary retention with vasquez catheter since 10/2018 planned for green-light laser vaporization of prostate on 4/17/19.

## 2019-04-10 NOTE — H&P PST ADULT - PRIMARY CARE PROVIDER
[facility] [phone] Lori Sommer [facility] [phone] Lori Sommer

## 2019-04-11 PROBLEM — R33.9 RETENTION OF URINE, UNSPECIFIED: Chronic | Status: ACTIVE | Noted: 2018-11-02

## 2019-04-17 NOTE — REVIEW OF SYSTEMS
[Skin Rash] : skin rash [Difficulty Walking] : difficulty walking [Negative] : Allergic/Immunologic [Fever] : no fever [Chills] : no chills [Night Sweats] : no night sweats [Fatigue] : no fatigue [Recent Change In Weight] : ~T no recent weight change [Vision Problems] : no vision problems [Dysphagia] : no dysphagia [Nosebleeds] : no nosebleeds [Chest Pain] : no chest pain [Palpitations] : no palpitations [Wheezing] : no wheezing [Shortness Of Breath] : no shortness of breath [Cough] : no cough [SOB on Exertion] : no shortness of breath during exertion [Abdominal Pain] : no abdominal pain [Vomiting] : no vomiting [Constipation] : no constipation [Diarrhea] : no diarrhea [Dysuria] : no dysuria [Joint Pain] : no joint pain [Joint Stiffness] : no joint stiffness [Confused] : no confusion [Dizziness] : no dizziness [Fainting] : no fainting [Depression] : no depression [Easy Bleeding] : no tendency for easy bleeding [FreeTextEntry7] : Abdominal tightening [Easy Bruising] : no tendency for easy bruising [FreeTextEntry8] : Indwelling urinary catheter.  [de-identified] : Rash is improved and almost 100% resolved.  [de-identified] : Weakness in LLE

## 2019-04-17 NOTE — PRE-ANESTHESIA EVALUATION ADULT - ANESTHESIA, PREVIOUS REACTION, PROFILE
Kyphoplasty was aborted in 8/29/2018 due to severe hypertension after induction as per anesthesiology notes ( alpha page 14 ), no issues with 11/2018 kyphoplasty.

## 2019-04-17 NOTE — END OF VISIT
[] : Fellow [FreeTextEntry3] : 60yo man with metastatic RCC (to bone, lymph nodes, lung) s/p surgery/XRT for cord compression on 4th line cabozantinib x 17 months, doing well. Recently with therapy-related hypertension, on anti-hypertensives, cabo held this past week while titrating lisinopril.\par Check labs today, resume cabozantinib 40mg PO daily - to discuss with Dr. Pena

## 2019-04-17 NOTE — ASSESSMENT
[Palliative] : Goals of care discussed with patient: Palliative [Palliative Care Plan] : Patient was apprised of incurable nature of disease.  Hospice and Palliative care options discussed. [FreeTextEntry1] : 58 yo M w/ stage IV RCC and 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 S/p gamma knife to brain met. Not a candidate for nephrectomy per Urology given brain mets.  S/p palliative XRT to the spine. S/p systemic tx with Pazopanib, with POD and Sutent with POD, with progressive disease on Nivolumab in Nov 2017 and now on cabozantinib since then. \par \par (1) Metastatic RCC \par - s/p 17 months of cabozantinib at this time.\par - CT scan last month with stable disease, next due in June. \par - Patient with hypertensive urgency on previous visit but asymptomatic. Had increased enalapril to 40 mg daily and held cabozantinib for 1 week. \par - Patient BP better in office today and home log shows better control.\par - Restarted cabozantinib at one level dose reduction: down to 40 mg daily.  \par - Patient with a history of brain metastasis s/p gamma knife. MRI Jan 2017 showed stable disease, and repeat MRI March 3rd 2018 showed stable findings, consistent with stable treated disease. \par - s/p palliative radiation to R pelvis due to acetabular lesion. No symptoms on the R at this time. \par - s/p palliative radiation to spine previously and now s/p SBRT to spine for progression with cord compression. Now additionally s/p kyphoplasty with IORT for T8 collapse/cord compression. Was supposed to follow up with Dr. Tobias in February but hadn't, ordered MRI/CT thoracic spine and then he will schedule follow up after scans complete. \par - Continue with Udder cream for HFS prophylaxis. \par - methadone, gabapentin and oxycodone for pain control, currently under OK control but still interfering with ADLs. \par - Continue follow up with Dr. Cooper for pain control. \par - Set up for hospital bed due to debility and will also arrange for cane and home care/ physical therapy. \par \par (2) Urinary retention\par Now with indwelling catheter.\par Likely due to cord compression vs. coexisting BPH (or combo of both). \par Continue finasteride and flomax.\par Planning on laser procedure to remove obstruction later this week. \par \par \par (3) HTN\par Continue to take antihypertensives as ordered (with increased enalapril). \par Home measurements improved.  \par \par Case discussed in clinic with Dr. Conde. Following longitudinally with Dr. Pena.  \par \par Bradley Goldberg M.D. \par Hematology/Oncology Fellow\par [facility]\par [address] [address]\par [phone]\par

## 2019-04-17 NOTE — ASU DISCHARGE PLAN (ADULT/PEDIATRIC) - ASU DC SPECIAL INSTRUCTIONSFT
No straining or heavy lifting.  Take stool softeners to prevent constipation.  Follow up tomorrow morning in Dr. Soriano's office to have the catheter removed.

## 2019-04-17 NOTE — RESULTS/DATA
[FreeTextEntry1] : CT C/A/P 3/7/19\par IMPRESSION:\par \par No change in size of right renal mass and numerous mediastinal lymph \par nodes. Numerous lung nodules are unchanged in size and no new lung \par nodules are present, however one nodule in the left lower lobe, although \par the same size, demonstrates increased internal density since the previous \par exam. Lytic bone lesions are unchanged. Continued follow-up is \par recommended.\par \par \par CT C/A/P 11/26/18\par IMPRESSION: Relative interval stability of right renal mass and metastatic \par lesions in the lungs, mediastinum, liver, retroperitoneum and bones

## 2019-04-17 NOTE — HISTORY OF PRESENT ILLNESS
[Disease: _____________________] : Disease: [unfilled] [AJCC Stage: ____] : AJCC Stage: [unfilled] [Therapy: ___] : Therapy: [unfilled] [Cardiovascular] : Cardiovascular [___________________________________] : Drug: [unfilled] [de-identified] : 56 yo M w/ PMH newly dx DM and HTN, originally presented to [facility] with neuropathy of hands and feet as well as a large R abdominal mass, which he says has been growing for the 5-6 years prior. CT abdomen revealed a 9.8 x 8 x 11.4 cm largely necrotic mass of the lower pole right kidney. Subsequent CT chest showed diffuse pulm mets, bone scan with iliac bone lesion and MRI brain with a cystic lesion concerning for mets. Bone bx done by IR showed inconclusive pathology. S/p Gamma knife to brain met on . Seen by urology Dr Baumann, deemed not a candidate for nephrectomy currently given mets to brain and overall poor prognosis, but could possibly be a candidate in future if response to TKIs. IR bx of kidney on 1/14/15 confirmed RCC with high grade features. Started on Pazopanib on . Held on  after elevation of BP. Restarted on  with increased BP meds. CT scan done in May showed several new spinal mets, yet stable size of kidney mass. No symptoms, but given several lesions with extensive disease of the vertebral body, discussion was had at  [facility] and referred to rad onc for radiation treatment in hopes to prevent any decompensation in the future.  Completed XRT to the spine.  Repeat imaging in 2015 showed POD on Pazopanib, switched to Sutent 50mg PO QD  (2 weeks on/1 week off) on  with POD, now on Nivolumab (started on 16). Chronic cough was noted to worsen a bit in early , while he scans have remain stable, there was a slight increase in hilar fullness noted by Dr Parham. Patient underwent palliative RT to the L hilum from - with improvement in his Sx's.   Patient found to have progressive disease on CT scan from 2017 and was switched to cabozantinib at that point. His course has been complicated by hypertensive urgency, however his blood pressure has stabilized on new BP medications and he is monitoring it at home regularly. \par \par Patient was admitted to the hospital in 2018 due to LLE weakness which had been slowly progressive but then involved numbness in pelvis and urinary retention. 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 Pt also with BP elevation in ED as high as 195/101 after receiving dexamethasone. He was treated for the blood pressure elevation and planned for outpatient RT. Patient was then decided to be a better candidate for possibly kyphoplasty but he had to go to [facility] for his mother's . He missed his scheduled kyphoplasty, but is now back. He has since he returned received SBRT to expanding malignancy in thoracic spine, and had been planned for kyphoplasty with IORT, but instead had SBRT to lower spine at the time. \par \par He had since been complaining of lower pelvic pain and was found with urinary retention and is now s/p indwelling vasquez placement. He has failed several trial of voids, being followed by urology Dr. Philip. He is now s/p kyphoplasty with IORT 2018.  [de-identified] : Clear cell carcinoma [FreeTextEntry1] : Failed pazopanib, sunitinib, nivolumab [de-identified] : Coming in today for a repeat BP check now that he has increased BP meds and held cabozantinib for 1 week. BP in office is well controlled and home records show 130s-140s/80s. He feels the same as when I saw him a week ago, same abdominal discomfort. He is due for laser procedure on Wednesday of this week.  [de-identified] : Maculopapular drug rash [de-identified] : Hypertensive urgency

## 2019-04-17 NOTE — ASU DISCHARGE PLAN (ADULT/PEDIATRIC) - CARE PROVIDER_API CALL
Baldomero Soriano)  Urology  [address] [place] [address], [place] [address]  Phone: [phone]  Fax: [phone]  Follow Up Time:

## 2019-04-17 NOTE — PHYSICAL EXAM
[Capable of only limited self care, confined to bed or chair more than 50% of waking hours] : Status 3- Capable of only limited self care, confined to bed or chair more than 50% of waking hours [Normal] : affect appropriate [Mucositis] : no mucositis [Ulcers] : no ulcers [Thrush] : no thrush [de-identified] : CN intact, LLE 1/5 at hip (limited by pain), 5/5 at knee, RLE 5/5 throughout.

## 2019-04-18 PROBLEM — N30.90 CYSTITIS: Status: ACTIVE | Noted: 2019-01-01

## 2019-04-18 NOTE — PHYSICAL EXAM
[General Appearance - Well Developed] : well developed [General Appearance - Well Nourished] : well nourished [Normal Appearance] : normal appearance [Well Groomed] : well groomed [General Appearance - In No Acute Distress] : no acute distress [Abdomen Tenderness] : non-tender [Abdomen Soft] : soft [Costovertebral Angle Tenderness] : no ~M costovertebral angle tenderness [Urethral Meatus] : meatus normal [Scrotum] : the scrotum was normal [Urinary Bladder Findings] : the bladder was normal on palpation [Testes Mass (___cm)] : there were no testicular masses [No Prostate Nodules] : no prostate nodules [Edema] : no peripheral edema [] : no respiratory distress [Respiration, Rhythm And Depth] : normal respiratory rhythm and effort [Exaggerated Use Of Accessory Muscles For Inspiration] : no accessory muscle use [Oriented To Time, Place, And Person] : oriented to person, place, and time [Affect] : the affect was normal [Mood] : the mood was normal [Not Anxious] : not anxious [Normal Station and Gait] : the gait and station were normal for the patient's age [No Focal Deficits] : no focal deficits [No Palpable Adenopathy] : no palpable adenopathy

## 2019-04-24 NOTE — ASSESSMENT
[FreeTextEntry1] : 55yoM with:\par \par 1. 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 C/w Oxy IR 10mg PRN. \par \par 2. Metastatic RCC - Oncology f/u, on Cabometyx. \par \par 3. Constipation - Controlled on Senna \par \par 4. DM2 - Regular follow up with Endo clinic. On Repaglinide, Lantus. \par \par 5. Physical debility - Pt requires assistance with most ADLs 2/2 to his LE weakness from spinal mets.  Medicaid specialist to meet with patient and wife today to ensure Medicaid process is on track.  \par \par 6. Encounter for Palliative Care - Emotional support provided. \par \par RTO 1 month, call sooner with any issues

## 2019-04-24 NOTE — PHYSICAL EXAM
[General Appearance - Alert] : alert [General Appearance - In No Acute Distress] : in no acute distress [Normal Oral Mucosa] : normal oral mucosa [Sclera] : the sclera and conjunctiva were normal [Neck Appearance] : the appearance of the neck was normal [Auscultation Breath Sounds / Voice Sounds] : lungs were clear to auscultation bilaterally [Heart Rate And Rhythm] : heart rate was normal and rhythm regular [Heart Sounds] : normal S1 and S2 [Murmurs] : no murmurs [Edema] : there was no peripheral edema [Bowel Sounds] : normal bowel sounds [Abdomen Soft] : soft [Abnormal Walk] : normal gait [] : no rash [No Focal Deficits] : no focal deficits [Oriented To Time, Place, And Person] : oriented to person, place, and time [FreeTextEntry1] : dimished BS R apex

## 2019-04-24 NOTE — HISTORY OF PRESENT ILLNESS
[FreeTextEntry1] : 58yoM with metastatic RCC dx 12/2014 (mets to lung, bone, brain) with large, necrotic renal mass s/p gamma knife to brain met, s/p palliative XRT to spine, s/p SBRT to R occipital condyle met on Nivolumab presents for follow up visit today.  PMH also significant for DM2, hypothyroidism, HTN. Patient last seen by me 7/2017. \par \par Patient was found to have metastatic disease to the spinal cord 6/2018. He is s/p kyphoplasty with IORT 11/7/18 (treatment delayed due to the passing of his mother and having to travel to [facility]. He underwent SBRT to the spine prior to traveling). He wears a chronic vasquez due to chronic urinary retention.  Wears a diaper for fecal incontinence. \par \par Since our last visit patient had a laser surgery of the prostate on 4/17/19, this relieved his urinary obstruction and he had the urethral catheter removed. \par \par Pain controlled on regimen of Methadone 5mg BID, Gabapentin 300mg TID, Oxy IR 10mg PRN (takes about twice daily). \par \par ROS: \par +LLE weakness \par +occasional constipation - uses senna, this helps\par +depressed mood 2/2 physical condition\par Denies constipation, diarrhea, \par Denies n/v, trouble sleeping, appetite loss, \par \par [facility] now in place.  Will be initiating home PT soon. \par \par [employer]-Stop Ref#: [id number]

## 2019-05-02 NOTE — ASSESSMENT
[Palliative] : Goals of care discussed with patient: Palliative [Palliative Care Plan] : Patient was apprised of incurable nature of disease.  Hospice and Palliative care options discussed. [FreeTextEntry1] : 58 yo M w/ stage IV RCC and 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 S/p gamma knife to brain met. Not a candidate for nephrectomy per Urology given brain mets.  S/p palliative XRT to the spine. S/p systemic tx with Pazopanib, with POD and Sutent with POD, with progressive disease on Nivolumab in Nov 2017 and now on cabozantinib since then. \par \par (1) Metastatic RCC \par - s/p 18 months of cabozantinib at this time.\par - CT scan with stable disease on 3/7, next due in June. \par - Patient with hypertensive urgency on previous visit but asymptomatic. Had increased enalapril to 40 mg daily and held cabozantinib for 1 week, now on 40 mg daily of cabozantinib and doing well. \par - Patient BP better in office today again.\par - Restarted cabozantinib at one level dose reduction, down to 40 mg daily 2 weeks ago  \par - Patient with a history of brain metastasis s/p gamma knife. MRI Jan 2017 showed stable disease, and repeat MRI March 3rd 2018 showed stable findings, consistent with stable treated disease. Will likely need repeat MRI but will be getting repeat spine study first. Will reach out for patient to make appointment with financial auth team.\par - s/p palliative radiation to R pelvis due to acetabular lesion. No symptoms on the R at this time. \par - s/p palliative radiation to spine previously and now s/p SBRT to spine for progression with cord compression. Now additionally s/p kyphoplasty with IORT for T8 collapse/cord compression. Was supposed to follow up with Dr. Tobias in February but hadn't, after  MRI/CT thoracic spine and then he will schedule follow up.\par - Encouraged Udder cream for HFS prophylaxis. \par - methadone, gabapentin and oxycodone for pain control, currently under [place] control but still interfering with ADLs. \par - Continue follow up with Dr. Cooper for pain control. \par - Set up for hospital bed due to debility and will also arrange for cane and home care/ physical therapy. \par \par (2) Urinary retention\par Likely due to cord compression vs. coexisting BPH (or combo of both). \par Continue finasteride and flomax.\par s/p laser procedure to remove prostate obstruction, now with condom catheter and improved.  \par \par (3) HTN\par Continue to take antihypertensives as ordered (with increased enalapril). \par Home measurements improved.  \par \par Case discussed in clinic with Dr. Hernandez. Following longitudinally with Dr. Pena.  \par \par Bradley Goldberg M.D. \par Hematology/Oncology Fellow\par [facility]\par [address] [address]\par [phone]\par

## 2019-05-02 NOTE — REVIEW OF SYSTEMS
[Skin Rash] : skin rash [Difficulty Walking] : difficulty walking [Negative] : Allergic/Immunologic [Fever] : no fever [Chills] : no chills [Night Sweats] : no night sweats [Fatigue] : no fatigue [Recent Change In Weight] : ~T no recent weight change [Vision Problems] : no vision problems [Dysphagia] : no dysphagia [Nosebleeds] : no nosebleeds [Chest Pain] : no chest pain [Palpitations] : no palpitations [Shortness Of Breath] : no shortness of breath [Wheezing] : no wheezing [Cough] : no cough [SOB on Exertion] : no shortness of breath during exertion [Abdominal Pain] : no abdominal pain [Vomiting] : no vomiting [Constipation] : no constipation [Diarrhea] : no diarrhea [Dysuria] : no dysuria [Joint Pain] : no joint pain [Joint Stiffness] : no joint stiffness [Confused] : no confusion [Dizziness] : no dizziness [Fainting] : no fainting [Depression] : no depression [Easy Bleeding] : no tendency for easy bleeding [Easy Bruising] : no tendency for easy bruising [FreeTextEntry7] : Abdominal tightening [FreeTextEntry8] : Indwelling urinary catheter.  [de-identified] : Rash is improved and almost 100% resolved.  [de-identified] : Weakness in LLE

## 2019-05-02 NOTE — HISTORY OF PRESENT ILLNESS
[Disease: _____________________] : Disease: [unfilled] [AJCC Stage: ____] : AJCC Stage: [unfilled] [Therapy: ___] : Therapy: [unfilled] [Cardiovascular] : Cardiovascular [___________________________________] : Drug: [unfilled] [de-identified] : 56 yo M w/ PMH newly dx DM and HTN, originally presented to [facility] with neuropathy of hands and feet as well as a large R abdominal mass, which he says has been growing for the 5-6 years prior. CT abdomen revealed a 9.8 x 8 x 11.4 cm largely necrotic mass of the lower pole right kidney. Subsequent CT chest showed diffuse pulm mets, bone scan with iliac bone lesion and MRI brain with a cystic lesion concerning for mets. Bone bx done by IR showed inconclusive pathology. S/p Gamma knife to brain met on . Seen by urology Dr Baumann, deemed not a candidate for nephrectomy currently given mets to brain and overall poor prognosis, but could possibly be a candidate in future if response to TKIs. IR bx of kidney on 1/14/15 confirmed RCC with high grade features. Started on Pazopanib on . Held on  after elevation of BP. Restarted on  with increased BP meds. CT scan done in May showed several new spinal mets, yet stable size of kidney mass. No symptoms, but given several lesions with extensive disease of the vertebral body, discussion was had at  [facility] and referred to rad onc for radiation treatment in hopes to prevent any decompensation in the future.  Completed XRT to the spine.  Repeat imaging in 2015 showed POD on Pazopanib, switched to Sutent 50mg PO QD  (2 weeks on/1 week off) on  with POD, now on Nivolumab (started on 16). Chronic cough was noted to worsen a bit in early , while he scans have remain stable, there was a slight increase in hilar fullness noted by Dr Parham. Patient underwent palliative RT to the L hilum from - with improvement in his Sx's.   Patient found to have progressive disease on CT scan from 2017 and was switched to cabozantinib at that point. His course has been complicated by hypertensive urgency, however his blood pressure has stabilized on new BP medications and he is monitoring it at home regularly. \par \par Patient was admitted to the hospital in 2018 due to LLE weakness which had been slowly progressive but then involved numbness in pelvis and urinary retention. 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 Pt also with BP elevation in ED as high as 195/101 after receiving dexamethasone. He was treated for the blood pressure elevation and planned for outpatient RT. Patient was then decided to be a better candidate for possibly kyphoplasty but he had to go to [facility] for his mother's . He missed his scheduled kyphoplasty, but is now back. He has since he returned received SBRT to expanding malignancy in thoracic spine, and had been planned for kyphoplasty with IORT, but instead had SBRT to lower spine at the time. \par \par He had since been complaining of lower pelvic pain and was found with urinary retention and is now s/p indwelling vasquez placement. He has failed several trial of voids, being followed by urology Dr. Philip. He is now s/p kyphoplasty with IORT 2018. \par \par Had been on cabozantinib since 2017 with stable systemic disease, and in 2019 presented with hypertensive urgency. Cabozantinib was held and enalapril was increased, and then placed back on cabozantinib at lower dose (40 mg). Now on 40 mg Cabozantinib daily.  [de-identified] : Clear cell carcinoma [FreeTextEntry1] : Failed pazopanib, sunitinib, nivolumab [de-identified] : Feels ok since starting lower dose of cabozantinib. Has since received laser procedure for prostate blockage. Has condom catheter now.  [de-identified] : Hypertensive urgency [de-identified] : Maculopapular drug rash

## 2019-05-13 NOTE — ED ADULT NURSE REASSESSMENT NOTE - NS ED NURSE REASSESS COMMENT FT1
Pt received from [facility] at approx 20:20. Pt A&Ox3, noticeable BLE edema and abdominal distention. Pt also has approx 10in beefy red scratch dale to buttocks/lower back. Pt denies any SOB or CP at this time. Respirations even and unlabored. Pt has leg bag in place, draining well at this time. Awaiting CT scan. Will continue to monitor.

## 2019-05-13 NOTE — ED ADULT TRIAGE NOTE - CHIEF COMPLAINT QUOTE
pt c/o b/l pedal edema x3days, and hematuria started this morning PMH: kidney cancer-on PO chemo, urinary retention (has condom catheter on), BPH, HLD, HTN, DM2

## 2019-05-13 NOTE — ED PROVIDER NOTE - ATTENDING CONTRIBUTION TO CARE
DR. BLOCH, ATTENDING MD-  I performed a face to face bedside interview with patient regarding history of present illness, review of symptoms and past medical history. I completed an independent physical exam.  I have discussed patient's plan of care with the resident.  Patient examined, mildly chronically ill, heent nml heart sounds nml lungs clear, abd distended, BS pos mild RLQ tenderness, , no CVA tenderness, urine brownish, ext left edema, pulses present. motor 5/5, sensation intact.

## 2019-05-13 NOTE — ED ADULT NURSE NOTE - OBJECTIVE STATEMENT
p/t is a 59y old male received awake and responsive, c/o of pain and swelling to ble for few days, cardiac monitor is on vss, bloods drawn and sent to lab, no further c/o noted will continue to monitor

## 2019-05-13 NOTE — ED PROVIDER NOTE - PROGRESS NOTE DETAILS
Patient voiding freely; UA suggestive of hemorrhagic cystitis. s/p CTX will discharge with course of cefdinir and urology follow up.

## 2019-05-13 NOTE — ED PROVIDER NOTE - PMH
BPH without obstruction/lower urinary tract symptoms    Cervicalgia    Diabetes mellitus  Type 2  GERD (gastroesophageal reflux disease)    HLD (hyperlipidemia)    Hypertension    Hypothyroidism    Malignant neoplasm of vertebral column    Metastatic renal cell carcinoma to brain  lung/spine  S/P radiation therapy  spine, on cabozantinib since Nov 2017  Urinary retention  Hampton catheter placed since 10/2018

## 2019-05-13 NOTE — CONSULT NOTE ADULT - PROBLEM SELECTOR RECOMMENDATION 9
Recommend duplex of lower extremites  No urological intervention needed at this time Recommend duplex of lower extremities  No urological intervention needed at this time Recommend duplex of lower extremities  No urological intervention needed at this time  Case discussed with Dr Soriano Recommend duplex of lower extremities  Elevation of extremities  Pain management  No urological intervention needed at this time  F/U with Dr Soriano as outpatient  [phone]  Case discussed with Dr Soriano

## 2019-05-13 NOTE — CONSULT NOTE ADULT - SUBJECTIVE AND OBJECTIVE BOX
HPI  This is a 59 y.o male who was sent to the ER from the urology office due to swelling of the lower extremities. He  states that the bottom of his feet feel really hot and he has pain and swelling of his feet for  about a year.  Patient is complaining of incontinence and wears an external catheter for comfort. His PVR in the office is about 170. He denies fever, retention,  dysuria, hematuria sometimes.    PAST MEDICAL & SURGICAL HISTORY:  BPH without obstruction/lower urinary tract symptoms  Urinary retention: Hampton catheter placed since 10/2018  HLD (hyperlipidemia)  S/P radiation therapy: spine, on cabozantinib since 2017  Malignant neoplasm of vertebral column  Hypothyroidism  GERD (gastroesophageal reflux disease)  Cervicalgia  Metastatic renal cell carcinoma to brain: lung/spine  Hypertension  Diabetes mellitus: Type 2  H/O kyphoplasty: T8, 2018  Status post gamma knife treatment: Left forehead/temple      MEDICATIONS  (STANDING):    MEDICATIONS  (PRN):      FAMILY HISTORY:  Family history of colon cancer  Family history of diabetes mellitus in mother  Family history of diabetes mellitus in father      Allergies    No Known Allergies    Intolerances        SOCIAL HISTORY:    REVIEW OF SYSTEMS: Otherwise negative as stated in HPI    Physical Exam  Vital signs  T(F): 98 (19 @ 20:08), Max: 98.4 (19 @ 17:01)  HR: 86 (19 @ 20:08)  BP: 186/97 (19 @ 20:08)  SpO2: 100% (19 @ 20:08)    Output    UOP    Gen:  [x] NAD [] toxic    Pulm:  [x] no resp distress [x] no substernal retractions  	  CV:    [x] RRR    GI:  [x] Soft  [x] NT    :  Glans Circumcised []Y  []N, []lesions:  Meatus Discharge []Y  [x] N,  Blood []Y [x] N  + external catheter with yesenia urine  Testes  Descended []Y  []N,    Tender []Y  []N,   Epididymis Tender  []Y []N,    Cremasteric []Y  []N                        	  MSK:  Edema [x]Y []N, Feet are warm, palpable bilateral DP    LABS:       @ 19:05    WBC 6.41  / Hct 37.9  / SCr 1.00     PT/INR - ( 13 May 2019 19:05 )   PT: 11.1 SEC;   INR: 0.97          PTT - ( 13 May 2019 19:05 )  PTT:28.5 SEC  Urinalysis Basic - ( 13 May 2019 19:05 )    Color: LIGHT ORANGE / Appearance: Lt TURBID / S.026 / pH: 6.0  Gluc: 150 / Ketone: NEGATIVE  / Bili: NEGATIVE / Urobili: NORMAL   Blood: LARGE / Protein: 100 / Nitrite: POSITIVE   Leuk Esterase: LARGE / RBC: >50 / WBC >50   Sq Epi: OCC / Non Sq Epi: x / Bacteria: FEW        Urine Cx:  Blood Cx:    RADIOLOGY: HPI  This is a 59 y.o male who was sent to the ER from the urology office due to swelling of the lower extremities. He  states that the bottom of his feet feel really hot and he has pain and intermittent swelling of his feet for  about a year.  Patient is complaining of incontinence and wears an external catheter for comfort. His PVR in the office is about 170. He denies fever, retention,  dysuria, hematuria sometimes.    PAST MEDICAL & SURGICAL HISTORY:  BPH without obstruction/lower urinary tract symptoms  Urinary retention: Hampton catheter placed since 10/2018  HLD (hyperlipidemia)  S/P radiation therapy: spine, on cabozantinib since 2017  Malignant neoplasm of vertebral column  Hypothyroidism  GERD (gastroesophageal reflux disease)  Cervicalgia  Metastatic renal cell carcinoma to brain: lung/spine  Hypertension  Diabetes mellitus: Type 2  H/O kyphoplasty: T8, 2018  Status post gamma knife treatment: Left forehead/temple      MEDICATIONS  (STANDING):    MEDICATIONS  (PRN):      FAMILY HISTORY:  Family history of colon cancer  Family history of diabetes mellitus in mother  Family history of diabetes mellitus in father      Allergies    No Known Allergies    Intolerances        SOCIAL HISTORY:    REVIEW OF SYSTEMS: Otherwise negative as stated in HPI    Physical Exam  Vital signs  T(F): 98 (19 @ 20:08), Max: 98.4 (19 @ 17:01)  HR: 86 (19 @ 20:08)  BP: 186/97 (19 @ 20:08)  SpO2: 100% (19 @ 20:08)    Output    UOP    Gen:  [x] NAD [] toxic    Pulm:  [x] no resp distress [x] no substernal retractions  	  CV:    [x] RRR    GI:  [x] Soft  [x] NT    :  Glans Circumcised []Y  []N, []lesions:  Meatus Discharge []Y  [x] N,  Blood []Y [x] N  + external catheter with yesenia urine  Testes  Descended []Y  []N,    Tender []Y  []N,   Epididymis Tender  []Y []N,    Cremasteric []Y  []N                        	  MSK:  Edema [x]Y []N, Feet are warm, palpable bilateral DP    LABS:       @ 19:05    WBC 6.41  / Hct 37.9  / SCr 1.00     PT/INR - ( 13 May 2019 19:05 )   PT: 11.1 SEC;   INR: 0.97          PTT - ( 13 May 2019 19:05 )  PTT:28.5 SEC  Urinalysis Basic - ( 13 May 2019 19:05 )    Color: LIGHT ORANGE / Appearance: Lt TURBID / S.026 / pH: 6.0  Gluc: 150 / Ketone: NEGATIVE  / Bili: NEGATIVE / Urobili: NORMAL   Blood: LARGE / Protein: 100 / Nitrite: POSITIVE   Leuk Esterase: LARGE / RBC: >50 / WBC >50   Sq Epi: OCC / Non Sq Epi: x / Bacteria: FEW        Urine Cx:  Blood Cx:    RADIOLOGY: HPI  This is a 59 y.o male who was sent to the ER from the urology office due to swelling of the lower extremities. He  states that the bottom of his feet feel really hot and he has pain and intermittent swelling of his feet for  about a year.  Patient is complaining of incontinence and wears an external catheter for comfort. His PVR in the office is about 170. He denies fever, retention,  dysuria, hematuria sometimes.    PAST MEDICAL & SURGICAL HISTORY:  BPH without obstruction/lower urinary tract symptoms  Urinary retention: Hampton catheter placed since 10/2018  HLD (hyperlipidemia)  S/P radiation therapy: spine, on cabozantinib since 2017  Malignant neoplasm of vertebral column  Hypothyroidism  GERD (gastroesophageal reflux disease)  Cervicalgia  Metastatic renal cell carcinoma to brain: lung/spine  Hypertension  Diabetes mellitus: Type 2  H/O kyphoplasty: T8, 2018  Status post gamma knife treatment: Left forehead/temple      MEDICATIONS  (STANDING):    MEDICATIONS  (PRN):      FAMILY HISTORY:  Family history of colon cancer  Family history of diabetes mellitus in mother  Family history of diabetes mellitus in father      Allergies    No Known Allergies    Intolerances        SOCIAL HISTORY:    REVIEW OF SYSTEMS: Otherwise negative as stated in HPI    Physical Exam  Vital signs  T(F): 98 (19 @ 20:08), Max: 98.4 (19 @ 17:01)  HR: 86 (19 @ 20:08)  BP: 186/97 (19 @ 20:08)  SpO2: 100% (19 @ 20:08)    Output    UOP    Gen:  [x] NAD [] toxic    Pulm:  [x] no resp distress [x] no substernal retractions  	  CV:    [x] RRR    GI:  [x] Soft  [x] NT    :  Glans Circumcised []Y  []N, []lesions:  Meatus Discharge []Y  [x] N,  Blood []Y [x] N  + external catheter with yesenia urine  Testes  Descended []Y  []N,    Tender []Y  []N,   Epididymis Tender  []Y []N,    Cremasteric []Y  []N                        	  MSK:  Edema [x]Y []N, Feet are warm to touch, palpable bilateral DP    LABS:       @ 19:05    WBC 6.41  / Hct 37.9  / SCr 1.00     PT/INR - ( 13 May 2019 19:05 )   PT: 11.1 SEC;   INR: 0.97          PTT - ( 13 May 2019 19:05 )  PTT:28.5 SEC  Urinalysis Basic - ( 13 May 2019 19:05 )    Color: LIGHT ORANGE / Appearance: Lt TURBID / S.026 / pH: 6.0  Gluc: 150 / Ketone: NEGATIVE  / Bili: NEGATIVE / Urobili: NORMAL   Blood: LARGE / Protein: 100 / Nitrite: POSITIVE   Leuk Esterase: LARGE / RBC: >50 / WBC >50   Sq Epi: OCC / Non Sq Epi: x / Bacteria: FEW        Urine Cx:  Blood Cx:    RADIOLOGY: < from: US Duplex Venous Lower Ext Complete, Bilateral (19 @ 22:05) >  EXAM:  US DPLX LWR EXT VEINS COMPL BI        PROCEDURE DATE:  May 13 2019         INTERPRETATION:  CLINICAL INFORMATION: Bilateral lower extremity swelling   for 2 weeks    COMPARISON: 2019. 2018.    TECHNIQUE: Duplex sonography of the BILATERAL LOWER extremities with   color and spectral Doppler, with and without compression.      FINDINGS: There is normal compressibility of bilateral common femoral,   femoral and popliteal veins. No calf vein thrombosis is detected. Doppler   examination shows normal spontaneous and phasic flow.    IMPRESSION:     No evidence of bilateral lower extremity deep venous thrombosis.              GURINDER CHAPIN M.D., RADIOLOGY RESIDENT  This document has been electronically signed.  NAY LAMB M.D., ATTENDING RADIOLOGIST  This document has been electronically signed. May 13 2019 10:14PM          < end of copied text >

## 2019-05-13 NOTE — ED PROVIDER NOTE - CLINICAL SUMMARY MEDICAL DECISION MAKING FREE TEXT BOX
Pt w/ RCC p/w hematuria b/l LE edema, was seen at his urologist this AM and sent in for Pt w/ RCC p/w hematuria b/l LE edema, was seen at his urologist this AM and sent in for evaluation. Also complaining of abd distention. Will obtain CT noncon of abd/ pelvis given known disease and abd symptoms, will consult urology for evaluation of urine, assess labs and urine -[place]

## 2019-05-13 NOTE — ED PROVIDER NOTE - NSFOLLOWUPINSTRUCTIONS_ED_ALL_ED_FT
1. Follow up with your urologist within 48-72 hours  2. Complete course of antiobiotics (cefdinir) for 6 more days  3. Return to the emergency department for any high fevers/chills or passing clots of blood. 1. Follow up with your urologist within 48-72 hours  2. Complete course of antibiotics (cefdinir) for 6 more days  3. Return to the emergency department for any high fevers/chills or passing clots of blood.

## 2019-05-13 NOTE — ED PROVIDER NOTE - OBJECTIVE STATEMENT
HTN, T2DM, HLD, stage 4 right-sided RCC with 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 S/p gamma knife to brain met, not a candidate for nephrectomy per Urology given brain mets. S/p spinal radiation, now on cabozantinib since Nov 2017, p/w hematuria since this AM and b/l LE edema worsening over the last 3 days. Pt denies cardiac history HTN, T2DM, HLD, stage 4 right-sided RCC with 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 S/p gamma knife to brain met, not a candidate for nephrectomy per Urology given brain mets. S/p spinal radiation, now on cabozantinib since Nov 2017, p/w hematuria since this AM and b/l LE edema worsening over the last 3 days. Pt denies cardiac history, denies CP or SOB. Sent in by urology clinic today for further evaluation

## 2019-05-13 NOTE — CONSULT NOTE ADULT - ASSESSMENT
59 y.o male who was sent to the ER due lower extremity edema 59 y.o male who was sent to the ER for evaluation of bilateral lower extremity edema 59 y.o male who was sent to the ER for evaluation of bilateral lower extremity edema, no fever, no dysuria, no hematuria

## 2019-05-16 PROBLEM — F41.9 ANXIETY: Status: ACTIVE | Noted: 2019-01-01

## 2019-05-19 NOTE — ED POST DISCHARGE NOTE - RESULT SUMMARY
culture grew 3 or more types of organisms  which indicate collection contamination, consider recollection only if clinically indicated. Patient contact # [phone] Discussed with patient needs to follow up with MD for repeat UA/UCX.

## 2019-05-31 PROBLEM — E78.5 HYPERLIPIDEMIA: Status: ACTIVE | Noted: 2017-10-13

## 2019-05-31 NOTE — PHYSICAL EXAM
[Alert] : alert [No Acute Distress] : no acute distress [Normal Sclera/Conjunctiva] : normal sclera/conjunctiva [No Proptosis] : no proptosis [No Neck Mass] : no neck mass was observed [Supple] : the neck was supple [Thyroid Not Enlarged] : the thyroid was not enlarged [No Respiratory Distress] : no respiratory distress [Normal Rate and Effort] : normal respiratory rhythm and effort [No Accessory Muscle Use] : no accessory muscle use [Clear to Auscultation] : lungs were clear to auscultation bilaterally [Normal Rate] : heart rate was normal  [Normal S1, S2] : normal S1 and S2 [Regular Rhythm] : with a regular rhythm [Normal Bowel Sounds] : normal bowel sounds [Not Tender] : non-tender [Soft] : abdomen soft [No Masses] : no abdominal mass palpated [No Clubbing, Cyanosis] : no clubbing  or cyanosis of the fingernails [No Involuntary Movements] : no involuntary movements were seen [Oriented x3] : oriented to person, place, and time [Normal Insight/Judgement] : insight and judgment were intact [Normal Affect] : the affect was normal [Normal Mood] : the mood was normal [de-identified] : +2 pitting edema lower extremities [de-identified] : + distended abdomen but soft [de-identified] : ambulates with cane [de-identified] : no ulcer on right foot, unable to examine left foot due to presence of bandage

## 2019-05-31 NOTE — HISTORY OF PRESENT ILLNESS
[FreeTextEntry1] : 60 y/o M with PMH DM2, HTN, HLD, hypothyroidism, metastatic RCC to brain, lung and bone, here for follow up of DM2 and hypothyroidism. He is s/p gamma knife RT to brain, also s/p chemotherapy and s/p palliative RT to the pelvis and spine. He was hospitalized several times last year, most recently at [facility] in November 2018 for cord compression due to spinal mets. He is now s/p kyphoplasty with neurosurgery. He is now uses a cane to ambulate. HbA1c in 4/2019 was 6.9%.\par \par 1. DM2 - He is currently on Lantus 14 unit qhs, Prandin 1 mg before meals. Adherent to therapy. Checks FS twice a day - AM BG ranges from 100-140 mg/dL. Checks Bg either before dinner or 10 minutes after dinner and notes that BG is in the low 100's. No hypoglycemia. \par \par Eats three meals a day. For breakfast has biscuits, bread, cereal. For lunch will have rice, soup. For dinner - soup, bread, baked chicken. Occasionally drinks soda, no juice. Has neuropathy in the feet, takes Gabapentin 300 mg daily (in the evening). Neuropathy is 2/2 chemotherapy and cord compression. Saw optho in the spring of 2018, no retinopathy. Due for visit. Has microalbuminuria, on enalapril. No recent steroid use. No blurry vision. Has some polydipsia and dry mouth. Has a chronic vasquez and has noted increased urine output. \par \par 2. Hypothyroidism - Diagnosed in 2016 by heme/onc and attributed to Nivolumab. Nivolumab was discontinued in November 2017. Now on cabozantinib. Now on levothyroxine 137 mcg daily from Monday to Saturday with 2 pills on Sunday. Takes it in the morning, no missed doses. Takes cabometyx with it. No pain in neck, has some dysphagia, denies dysphonia. His wife thinks he has some dysphonia. No chest pain or palpitations. Has chronic lower abdominal pain. No nausea, vomiting, diarrhea, constipation. Reports alternating heat and cold intolerance. Thinks he has been gaining weight. \par \par 3. HTN: On Norvasc 10 mg daily and Enalapril 20 mg daily. BP has been at goal. \par \par 4. Hyperlipidemia: Last LDL 63 in January 2018. On Lipitor 20 mg daily.

## 2019-05-31 NOTE — ASSESSMENT
[Carbohydrate Consistent Diet] : carbohydrate consistent diet [Self Monitoring of Blood Glucose] : self monitoring of blood glucose [Levothyroxine] : The patient was instructed to take Levothyroxine on an empty stomach, separate from vitamins, and wait at least 30 minutes before eating [FreeTextEntry1] : 59 year old man with controlled DM2, hypothyroidism, HTN, HLD\par \par 1. DM2, now controlled:\par - discussed with patient that given his diagnosis of metastatic renal cell carcinoma, goals of therapy for diabetes mellitus is to prevent hypoglycemia and severe hyperglycemia\par - will continue with Lantus 14 units qhs and Prandin 1 mg before meals\par - to follow up with optho for retinopathy screening\par - has known nephropathy, on Enalapril. Recheck urine microalbumin/creatinine at next visit\par \par 2. Hypothyroidism: presumed to be secondary to immunotherapy, now off immunotherapy\par - appears overall clinically euthyroid but was biochemically hypothyroid in the past\par - will check TSH and free T4 today and adjust dose as needed\par - c/w LT4 137 mcg daily with two pills on Sundays pending results of TFTs\par \par 3. HTN: BP at goal. C/w with Enalapril and Norvasc.\par \par 4. Hyperlipidemia: Last LDL 63 in January 2018. Recheck lipid panel now.\par \par Return visit in 3 months\par

## 2019-05-31 NOTE — REVIEW OF SYSTEMS
[Recent Weight Gain (___ Lbs)] : recent [unfilled] ~Ulb weight gain [Dysphonia] : dysphonia [Lower Ext Edema] : lower extremity edema [Abdominal Pain] : abdominal pain [Polyuria] : polyuria [Back Pain] : back pain [Hair Loss] : hair loss [Dry Skin] : dry skin [Poor Balance] : poor balance [Difficulty Walking] : difficulty walking [Polydipsia] : polydipsia [Recent Weight Loss (___ Lbs)] : no recent weight loss [Fever] : no fever [Chills] : no chills [Blurry Vision] : no blurred vision [Dysphagia] : no dysphagia [Neck Pain] : no neck pain [Chest Pain] : no chest pain [Palpitations] : no palpitations [Shortness Of Breath] : no shortness of breath [Cough] : no cough [Nausea] : no nausea [Vomiting] : no vomiting was observed [Constipation] : no constipation [Diarrhea] : no diarrhea [Cold Intolerance] : cold tolerant [Heat Intolerance] : heat tolerant

## 2019-06-05 NOTE — ED ADULT NURSE NOTE - INTERVENTIONS DEFINITIONS
Stretcher in lowest position, wheels locked, appropriate side rails in place/Call bell, personal items and telephone within reach/Non-slip footwear when patient is off stretcher/Physically safe environment: no spills, clutter or unnecessary equipment

## 2019-06-05 NOTE — ED PROVIDER NOTE - ATTENDING CONTRIBUTION TO CARE
59 y male with PMH RCC, indwelling vasquez catheter x 3 weeks, presents to ED for evaluation of hematuria since last night.   Urology: Dr. Soriano

## 2019-06-05 NOTE — ED PROVIDER NOTE - NOTES
States patient has had longstanding chronic back condition as well and prostate enlargement. Agrees that there is no significant utility in CT AP or other abd imaging considering his medical history, recent imaging, and current complaint. Will reassess for any further need for imaging during his course in ED. Patient is well followed with Dr. Goldberg. hematuria since last night - Urology recommends bedside/formal US to see if bladder is emptying appropriately.

## 2019-06-05 NOTE — ED PROVIDER NOTE - OBJECTIVE STATEMENT
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 patient states that he woke up this morning with blood in his vasquez catheter. Patient states that he had a similar episode a few weeks prior but only had a few drops of blood. Patient states that he has been having associated lower pelvic pain associated with the hematuria. Patient denies having any nausea, vomiting, diarrhea, fever, chills, headaches, dizziness, CP, SOB, or BENITEZ. Patient currently follows with Dr. Goldberg and Dr. Giles

## 2019-06-05 NOTE — ED PROVIDER NOTE - NSFOLLOWUPCLINICS_GEN_ALL_ED_FT
[facility]  Wound Care  [address] [place] [address]  Phone: [phone]  Fax: [phone]  Follow Up Time:

## 2019-06-05 NOTE — CONSULT NOTE ADULT - ASSESSMENT
58 yo M with hx metastatic RCC and BPH s/p greenlight April 2019 with chronic vasquez presenting with new-onset hematuria    - Hematuria likely 2/2 inadvertent trauma while sleeping  - Irrigated to clear with minimal clot  - 1g CTX in ED for prophylaxis  - Urine culture pending  - Keep vasquez catheter in place  - Followup with Dr. Soriano in 1-2 weeks  - ED evaluating abdominal distention and right hip ulcer

## 2019-06-05 NOTE — ED ADULT NURSE NOTE - OBJECTIVE STATEMENT
Pt presents to rm 12, A&Ox4, ambulatory w/ cane, pmhx of kidney CA, HTN, DM, urinary retention, here for hematuria that started today, pt has chronic vasquez in place w/ red colored urine in leg bag. Pt c/o chronic intermittent abd pain, BP elevated at this time, MD aware. Denies chest pain, shortness of breath, palpitations, diaphoresis, headaches, fevers, dizziness, nausea, vomiting, diarrhea at this time. Stage 2 pressure ulcer observed to left ankle- no drainage observed at this time. IV established in right AC with a 20G, labs drawn and sent, call bell in reach, warm blanket provided, bed in lowest position, side rails up x2, MD evaluation in progress. Will continue to monitor. Pt presents to rm 12, A&Ox4, ambulatory w/ cane, pmhx of kidney CA, HTN, DM, urinary retention, here for hematuria that started today, pt has chronic vasquez in place w/ red colored urine in leg bag. Pt c/o chronic intermittent abd pain, BP elevated at this time, MD aware. Denies chest pain, shortness of breath, palpitations, diaphoresis, headaches, fevers, dizziness, nausea, vomiting, diarrhea at this time. Stage 2 pressure ulcer observed to left ankle- no drainage observed at this time. 4cm x 4cm stage 2 pressure ulcer observed to left heel, stage 4 pressure ulcer observed to right buttock w/ nonblanchable redness observed to sacral area and left buttock, two 3cm x 3cm pressure ulcer observed to left thigh- MD Castillo made aware of all wounds. IV established in right AC with a 20G, labs drawn and sent, call bell in reach, warm blanket provided, bed in lowest position, side rails up x2, MD evaluation in progress. Will continue to monitor.

## 2019-06-05 NOTE — ED ADULT TRIAGE NOTE - CHIEF COMPLAINT QUOTE
Ambulatory via hospital wheelchair complaining of hematuria worsening since yesterday. Patient has Kidney CA, on oral chemo daily, also has PMH BPH with a catheter in place for the last 3 weeks. VSS. Afebrile.

## 2019-06-21 NOTE — REVIEW OF SYSTEMS
[Muscle Pain] : muscle pain [Difficulty Walking] : difficulty walking [Negative] : Heme/Lymph [Fever] : no fever [Chills] : no chills [Night Sweats] : no night sweats [Fatigue] : no fatigue [Recent Change In Weight] : ~T no recent weight change [Dysphagia] : no dysphagia [Vision Problems] : no vision problems [Nosebleeds] : no nosebleeds [Chest Pain] : no chest pain [Shortness Of Breath] : no shortness of breath [Palpitations] : no palpitations [Wheezing] : no wheezing [Cough] : no cough [SOB on Exertion] : no shortness of breath during exertion [Abdominal Pain] : no abdominal pain [Vomiting] : no vomiting [Constipation] : no constipation [Diarrhea] : no diarrhea [Dysuria] : no dysuria [Joint Stiffness] : no joint stiffness [Joint Pain] : no joint pain [Skin Rash] : no skin rash [Confused] : no confusion [Dizziness] : no dizziness [Depression] : no depression [Fainting] : no fainting [Easy Bleeding] : no tendency for easy bleeding [Easy Bruising] : no tendency for easy bruising [FreeTextEntry2] : B/l lower extrmity edema.  [FreeTextEntry7] : Abdominal tightening persists [FreeTextEntry8] : Condom catheter in place, urinating OK.  [FreeTextEntry9] : cramping in both hands [de-identified] : Weakness in LLE

## 2019-06-21 NOTE — RESULTS/DATA
[FreeTextEntry1] : MRI Thoracic Spine 5/26/19\par \par Impression: \par \par Redemonstration of marrow signal alteration within the T6-T8 vertebral \par bodies with interval kyphoplasty at the T8 vertebral body. Marrow signal \par alteration is consistent with a combination of posttreatment change and \par residual metastatic disease. No new osseous lesions are demonstrated. \par Resolution of previously noted enhancement in cord edema within the thoracic \par cord at the level of T7/T8. \par Moderate bilateral neural foraminal narrowing and moderate spinal canal \par narrowing at the level of T7/T8. \par Mediastinal adenopathy. \par \par \par \par CT Abdomen:6/5/19\par IMPRESSION: \par Right renal mass measuring up to 7.0 cm, unchanged dating back to 3/7/2019. \par Unchanged partially visualized bilateral lower lobe pulmonary nodules and \par mediastinal lymphadenopathy. \par Indeterminate left hepatic lesion, suspicious for metastasis. \par Cystitis. \par \par \par CT C/A/P 3/7/19\par IMPRESSION:\par \par No change in size of right renal mass and numerous mediastinal lymph \par nodes. Numerous lung nodules are unchanged in size and no new lung \par nodules are present, however one nodule in the left lower lobe, although \par the same size, demonstrates increased internal density since the previous \par exam. Lytic bone lesions are unchanged. Continued follow-up is \par recommended.\par \par \par CT C/A/P 11/26/18\par IMPRESSION: Relative interval stability of right renal mass and metastatic \par lesions in the lungs, mediastinum, liver, retroperitoneum and bones

## 2019-06-21 NOTE — PHYSICAL EXAM
[Capable of only limited self care, confined to bed or chair more than 50% of waking hours] : Status 3- Capable of only limited self care, confined to bed or chair more than 50% of waking hours [Normal] : full range of motion and no deformities appreciated [Ulcers] : no ulcers [Mucositis] : no mucositis [Thrush] : no thrush [de-identified] : trace pitting edema to level of lower calf bilaterally.  [de-identified] : stage III ulcer of the R buttock in linear fashion, no granulation tissue with visible fat tissue.  [de-identified] : CN intact, LLE 1/5 at hip (limited by pain), 5/5 at knee, RLE 5/5 throughout.

## 2019-06-21 NOTE — ASSESSMENT
[Palliative] : Goals of care discussed with patient: Palliative [Palliative Care Plan] : Patient was apprised of incurable nature of disease.  Hospice and Palliative care options discussed. [FreeTextEntry1] : 58 yo M w/ stage IV RCC and 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 S/p gamma knife to brain met. Not a candidate for nephrectomy per Urology given brain mets.  S/p palliative XRT to the spine. S/p systemic tx with Pazopanib, with POD and Sutent with POD, with progressive disease on Nivolumab in Nov 2017 and now on cabozantinib since then. \par \par (1) Metastatic RCC \par - Stable on cabozantinib since Nov 2017.\par - CT scan with stable disease in abdomen earlier this month although with small new lesion in liver which is indeterminate, will repeat in 3 months.\par - Will also obtain CT Chest to complete imaging to evaluate mediastinal disease. \par - MRI spine shows stable findings but evidence of possible residual disease, will follow up with Dr. Tobias.  \par - BP controlled now on antihypertensive regimen and dose reduction. \par - Patient with improvement in lower extremity edema ever since starting lasix. \par - Patient with a history of brain metastasis s/p gamma knife. MRI Jan 2017 showed stable disease, and repeat MRI March 3rd 2018 showed stable findings, consistent with stable treated disease.\par - Repeat MRI brain.\par - s/p palliative radiation to R pelvis due to acetabular lesion. No symptoms on the R at this time. \par - s/p palliative radiation to spine previously and now s/p SBRT to spine for progression with cord compression. Now additionally s/p kyphoplasty with IORT for T8 collapse/cord compression.\par - Encouraged Udder cream for HFS prophylaxis. \par - methadone, gabapentin and oxycodone for pain control, currently under OK control but still interfering with ADLs.\par - Ativan improved agitation/ anxiety. \par - Continue follow up with Dr. Cooper for pain control. \par \par (2) Pressure Ulcer\par Prescribed silver sulfadiazine ointment. \par Will reach out to wound care for counselling and will need home care for wound management at that point. \par \par (3) Urinary retention\par Improved\par Likely due to cord compression vs. coexisting BPH (or combo of both). \par Recent cystitis with hemorrhage, which now has resolved s/p abx, also needed evacuation of blood clots. \par Continue finasteride and flomax.\par s/p laser procedure to remove prostate obstruction, now with condom catheter and improved.  \par Follow up with urology.\par \par (4) HTN\par Continue to take antihypertensives as orderedl). \par Home measurements improved.  \par \par Case discussed in clinic with Dr. Bear. Following longitudinally with Dr. Pena.  \par \par \par \par Bradley Goldberg M.D. \par Hematology/Oncology Fellow\par [facility]\par [address] [address]\par [phone]\par

## 2019-06-21 NOTE — HISTORY OF PRESENT ILLNESS
[Disease: _____________________] : Disease: [unfilled] [AJCC Stage: ____] : AJCC Stage: [unfilled] [Therapy: ___] : Therapy: [unfilled] [C] : possible [2] : 2, Moderate [Cardiovascular] : Cardiovascular [___________________________________] : Drug: [unfilled] [de-identified] : Clear cell carcinoma [de-identified] : 56 yo M w/ PMH newly dx DM and HTN, originally presented to [facility] with neuropathy of hands and feet as well as a large R abdominal mass, which he says has been growing for the 5-6 years prior. CT abdomen revealed a 9.8 x 8 x 11.4 cm largely necrotic mass of the lower pole right kidney. Subsequent CT chest showed diffuse pulm mets, bone scan with iliac bone lesion and MRI brain with a cystic lesion concerning for mets. Bone bx done by IR showed inconclusive pathology. S/p Gamma knife to brain met on . Seen by urology Dr Baumann, deemed not a candidate for nephrectomy currently given mets to brain and overall poor prognosis, but could possibly be a candidate in future if response to TKIs. IR bx of kidney on 1/14/15 confirmed RCC with high grade features. Started on Pazopanib on . Held on  after elevation of BP. Restarted on  with increased BP meds. CT scan done in May showed several new spinal mets, yet stable size of kidney mass. No symptoms, but given several lesions with extensive disease of the vertebral body, discussion was had at  [facility] and referred to rad onc for radiation treatment in hopes to prevent any decompensation in the future.  Completed XRT to the spine.  Repeat imaging in 2015 showed POD on Pazopanib, switched to Sutent 50mg PO QD  (2 weeks on/1 week off) on  with POD, now on Nivolumab (started on 16). Chronic cough was noted to worsen a bit in early , while he scans have remain stable, there was a slight increase in hilar fullness noted by Dr Parham. Patient underwent palliative RT to the L hilum from - with improvement in his Sx's.   Patient found to have progressive disease on CT scan from 2017 and was switched to cabozantinib at that point. His course has been complicated by hypertensive urgency, however his blood pressure has stabilized on new BP medications and he is monitoring it at home regularly. \par \par Patient was admitted to the hospital in 2018 due to LLE weakness which had been slowly progressive but then involved numbness in pelvis and urinary retention. 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 Pt also with BP elevation in ED as high as 195/101 after receiving dexamethasone. He was treated for the blood pressure elevation and planned for outpatient RT. Patient was then decided to be a better candidate for possibly kyphoplasty but he had to go to [facility] for his mother's . He missed his scheduled kyphoplasty, but is now back. He has since he returned received SBRT to expanding malignancy in thoracic spine, and had been planned for kyphoplasty with IORT, but instead had SBRT to lower spine at the time. \par \par He had since been complaining of lower pelvic pain and was found with urinary retention and is now s/p indwelling vasquez placement. He has failed several trial of voids, being followed by urology Dr. Philip. He is now s/p kyphoplasty with IORT 2018. \par \par Had been on cabozantinib since 2017 with stable systemic disease, and in 2019 presented with hypertensive urgency. Cabozantinib was held and enalapril was increased, and then placed back on cabozantinib at lower dose (40 mg). Now on 40 mg Cabozantinib daily.  [FreeTextEntry1] : Failed pazopanib, sunitinib, nivolumab [de-identified] : Patient had problems with decreased urinary output and bloody output and painful lower abdomen. Went to urologist who evacuated two large clots from bladder (had hemorrhagic cystitis) and now feels much improved. He still has some aches and pains but improved since started methadone. Patient also describes "rash" under buttocks from moisture of urine.  [FreeTextEntry3] : Lower extremity edema [FreeTextEntry5] : Cabozantinib [de-identified] : Hypertensive urgency [de-identified] : Maculopapular drug rash

## 2019-06-24 PROBLEM — R33.8 ACUTE URINARY RETENTION: Status: ACTIVE | Noted: 2018-10-12

## 2019-06-28 PROBLEM — E87.5 HYPERKALEMIA: Status: ACTIVE | Noted: 2019-01-01

## 2019-07-08 NOTE — H&P ADULT - NSICDXPASTMEDICALHX_GEN_ALL_CORE_FT
PAST MEDICAL HISTORY:  BPH without obstruction/lower urinary tract symptoms     Cervicalgia     Diabetes mellitus Type 2    GERD (gastroesophageal reflux disease)     HLD (hyperlipidemia)     Hypertension     Hypothyroidism     Malignant neoplasm of vertebral column     Metastatic renal cell carcinoma to brain lung/spine    S/P radiation therapy spine, on cabozantinib since Nov 2017    Urinary retention Hampton catheter placed since 10/2018

## 2019-07-08 NOTE — ED ADULT NURSE NOTE - OBJECTIVE STATEMENT
pt 60 yo male sent from [facility] for increaeded discomfort to right hip buttock wound pt hx kidney cancer with mets on oral chemo pt states wound came from him wetting bed pt hx diabetes presents with vasquez cath to leg bag yesenia urine also left foort wound being tx by podiatry per patient pt med with fentanyl by ems 5omcg/x 2 with pain relied at [facility],e pt on oxycodone 10mg pt afebrile on arrival pt examined by Dr Handley pending further orders

## 2019-07-08 NOTE — ASSESSMENT
[Palliative] : Goals of care discussed with patient: Palliative [Palliative Care Plan] : Patient was apprised of incurable nature of disease.  Hospice and Palliative care options discussed. [FreeTextEntry1] : 58 yo M w/ stage IV RCC and 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 S/p gamma knife to brain met. MRI Jan 2017 showed stable disease, and repeat MRI March 3rd 2018 showed stable findings, consistent with stable treated disease. Not a candidate for nephrectomy per Urology given brain mets.  S/p palliative XRT to the spine. S/p systemic tx with Pazopanib, with POD and Sutent with POD, with progressive disease on Nivolumab about 17 months in Nov 2017 and cabozantinib since Nov 2017 on 60mg daily until April 2019, reduced to 40mg daily due to hypertensive crisis. s/p palliative radiation to R pelvis due to acetabular lesion. s/p palliative radiation to spine previously and now s/p SBRT to spine for progression with cord compression. - s/p kyphoplasty with IORT for T8 collapse/cord compression.   However, now develops a 2 feet ulcerative wound in the right buttock, pain in 10/10, right flank pain and lower abdominal pain 10/10.\par \par Plan\par \par send to ED at [facility] for treating his ulcerative wound and pain management\par please check CT chest/abdomen/pelvis w/con to understand current cancer status given cabozantinib dose reduction since April, 2019\par please hold off cabozantinib now for wound care\par repeat MRI brain when f/u as outpt given brain met history \par continue lasix for leg edema\par continue current BP meds for controllable BP\par continue methadone and oxycodone for pain control, follow up with Dr. Cooper for pain control. \par follow up with Urology for urine retention, now on condom catheter\par \par

## 2019-07-08 NOTE — REVIEW OF SYSTEMS
[Chills] : chills [Fatigue] : fatigue [Lower Ext Edema] : lower extremity edema [Abdominal Pain] : abdominal pain [Muscle Pain] : muscle pain [Skin Wound] : skin wound [Difficulty Walking] : difficulty walking [Anxiety] : anxiety [Depression] : depression [Negative] : Endocrine [Fever] : no fever [Night Sweats] : no night sweats [Recent Change In Weight] : ~T no recent weight change [Vision Problems] : no vision problems [Dysphagia] : no dysphagia [Nosebleeds] : no nosebleeds [Chest Pain] : no chest pain [Palpitations] : no palpitations [Shortness Of Breath] : no shortness of breath [Wheezing] : no wheezing [Cough] : no cough [SOB on Exertion] : no shortness of breath during exertion [Vomiting] : no vomiting [Diarrhea] : no diarrhea [Constipation] : no constipation [Dysuria] : no dysuria [Joint Pain] : no joint pain [Joint Stiffness] : no joint stiffness [Skin Rash] : no skin rash [Confused] : no confusion [Dizziness] : no dizziness [Fainting] : no fainting [Insomnia] : no insomnia [Easy Bleeding] : no tendency for easy bleeding [Easy Bruising] : no tendency for easy bruising [FreeTextEntry2] : B/l lower extrmity edema.  [FreeTextEntry6] : r [FreeTextEntry7] : right flank pain and lower abdominal pain [FreeTextEntry8] : Condom catheter in place, urinating OK.  [FreeTextEntry9] : both legs, knees, hips and shoulders  [de-identified] : left heel, right buttock  [de-identified] : Weakness in LLE

## 2019-07-08 NOTE — ED PROVIDER NOTE - PHYSICAL EXAMINATION
PHYSICAL EXAM:  GENERAL: NAD, well-developed, resting in bed  HEAD:  Atraumatic, Normocephalic  EYES: EOMI, PERRLA, conjunctiva and sclera clear  NECK: Supple, No JVD  CHEST/LUNG: Clear to auscultation bilaterally; No wheeze  HEART: Regular rate and rhythm; No murmurs, rubs, or gallops  ABDOMEN: Soft, Nontender, Nondistended; Bowel sounds present  EXTREMITIES:  2+ Peripheral Pulses, No clubbing, cyanosis, or edema  PSYCH: AAOx3  NEUROLOGY: non-focal  SKIN: large rash on ulcer on left wound, developing wound culture, no purulence

## 2019-07-08 NOTE — ED PROVIDER NOTE - OBJECTIVE STATEMENT
Pt is a 58 yo male with a hx of renal cell carcinoma, DM (on chemo who was sent to the ED via his oncologist after seeing a large decubitus ulcer. Patient states that the ulcer has been present for quite sometime. He has had trouble moving and frequently wets bed. Pt is a 60 yo male with a hx of renal cell carcinoma, DM (on chemo who was sent to the ED via his oncologist after seeing a large decubitus ulcer. Patient states that the ulcer has been present for quite sometime. He has had trouble moving and frequently wets bed. Patient denies f/c/n/v/d. Was sent in by outpatient oncologist for concern of worsening of diease.

## 2019-07-08 NOTE — ED PROVIDER NOTE - FAMILY HISTORY
Family history of diabetes mellitus in mother     Father  Still living? Unknown  Family history of diabetes mellitus in father, Age at diagnosis: Age Unknown  Family history of colon cancer, Age at diagnosis: Age Unknown

## 2019-07-08 NOTE — HISTORY OF PRESENT ILLNESS
[Disease: _____________________] : Disease: [unfilled] [AJCC Stage: ____] : AJCC Stage: [unfilled] [Therapy: ___] : Therapy: [unfilled] [2] : 2, Moderate [C] : possible [Cardiovascular] : Cardiovascular [___________________________________] : Drug: [unfilled] [de-identified] : 58 yo M w/ PMH newly dx DM and HTN, originally presented to [facility] with neuropathy of hands and feet as well as a large R abdominal mass, which he says has been growing for the 5-6 years prior. CT abdomen revealed a 9.8 x 8 x 11.4 cm largely necrotic mass of the lower pole right kidney. Subsequent CT chest showed diffuse pulm mets, bone scan with iliac bone lesion and MRI brain with a cystic lesion concerning for mets. Bone bx done by IR showed inconclusive pathology. S/p Gamma knife to brain met on . Seen by urology Dr Baumann, deemed not a candidate for nephrectomy currently given mets to brain and overall poor prognosis, but could possibly be a candidate in future if response to TKIs. IR bx of kidney on 1/14/15 confirmed RCC with high grade features. Started on Pazopanib on . Held on  after elevation of BP. Restarted on  with increased BP meds. CT scan done in May showed several new spinal mets, yet stable size of kidney mass. No symptoms, but given several lesions with extensive disease of the vertebral body, discussion was had at  Tumor board and referred to rad onc for radiation treatment in hopes to prevent any decompensation in the future.  Completed XRT to the spine.  Repeat imaging in 2015 showed POD on Pazopanib, switched to Sutent 50mg PO QD  (2 weeks on/1 week off) on  with POD, now on Nivolumab (started on 16). Chronic cough was noted to worsen a bit in early , while he scans have remain stable, there was a slight increase in hilar fullness noted by Dr Parham. Patient underwent palliative RT to the L hilum from - with improvement in his Sx's.   Patient found to have progressive disease on CT scan from 2017 and was switched to cabozantinib at that point. His course has been complicated by hypertensive urgency, however his blood pressure has stabilized on new BP medications and he is monitoring it at home regularly. \par \par Patient was admitted to the hospital in 2018 due to LLE weakness which had been slowly progressive but then involved numbness in pelvis and urinary retention. 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 Pt also with BP elevation in ED as high as 195/101 after receiving dexamethasone. He was treated for the blood pressure elevation and planned for outpatient RT. Patient was then decided to be a better candidate for possibly kyphoplasty but he had to go to [facility] for his mother's . He missed his scheduled kyphoplasty, but is now back. He has since he returned received SBRT to expanding malignancy in thoracic spine, and had been planned for kyphoplasty with IORT, but instead had SBRT to lower spine at the time. \par \par He had since been complaining of lower pelvic pain and was found with urinary retention and is now s/p indwelling vasquez placement. He has failed several trial of voids, being followed by urology Dr. Philip. He is now s/p kyphoplasty with IORT 2018. \par \par Had been on cabozantinib since 2017 with stable systemic disease, and in 2019 presented with hypertensive urgency. Cabozantinib was held and enalapril was increased, and then placed back on cabozantinib at lower dose (40 mg). Now on 40 mg Cabozantinib daily.  [de-identified] : Clear cell carcinoma [FreeTextEntry1] : Failed pazopanib, sunitinib, nivolumab [de-identified] : Patient had problems with decreased urinary output and bloody output and painful lower abdomen. Went to urologist who evacuated two large clots from bladder (had hemorrhagic cystitis) and now feels much improved. He still has some aches and pains but improved since started methadone. Patient also describes "rash" under buttocks from moisture of urine. \par \par He has a 2 feet long ulcerative wound in the right buttock, pain 10/10 about 6 weeks, feels nauseous, has poor appetite, right flank pain and lower abdominal pain requiring 3 oxycodon 10mg and 2 methadone in 24 hours. He also has left heel wound and generalized body pain,  alternating loose stool 3 times in 4 days, normal bowel movement in a couple of days.   [FreeTextEntry3] : Lower extremity edema [FreeTextEntry5] : Cabozantinib [de-identified] : Maculopapular drug rash [de-identified] : Hypertensive urgency

## 2019-07-08 NOTE — PHYSICAL EXAM
[Capable of only limited self care, confined to bed or chair more than 50% of waking hours] : Status 3- Capable of only limited self care, confined to bed or chair more than 50% of waking hours [Normal] : affect appropriate [Ulcers] : no ulcers [Mucositis] : no mucositis [Thrush] : no thrush [de-identified] : trace pitting edema to level of lower calf bilaterally.  [de-identified] : stage III ulcer of the R buttock in linear fashion, no granulation tissue with visible fat tissue.  [de-identified] : CN intact, LLE 1/5 at hip (limited by pain), 5/5 at knee, RLE 5/5 throughout.

## 2019-07-08 NOTE — ED PROVIDER NOTE - PROGRESS NOTE DETAILS
Case d/w Heme fellow. Oncology was concerned with worsening of disease, which prompted the reason for patient to be transferred to [facility]. Case d/w Heme fellow. Oncology was concerned with worsening of disease, which prompted the reason for patient to be transferred to [facility]. Will order CT Chest/Abdomen/Pelvis.

## 2019-07-08 NOTE — H&P ADULT - HISTORY OF PRESENT ILLNESS
Pt is a 60 yo male with a hx of renal cell carcinoma, DM (on chemo who was sent to the ED via his oncologist after seeing a large decubitus ulcer. Patient states that the ulcer has been present for quite sometime. He has had trouble moving and frequently wets bed. Patient denies f/c/n/v/d. Was sent in by outpatient oncologist for concern of worsening of diease.

## 2019-07-08 NOTE — H&P ADULT - NSHPPHYSICALEXAM_GEN_ALL_CORE
Vital Signs Last 24 Hrs  T(C): 36.8 (08 Jul 2019 20:22), Max: 36.8 (08 Jul 2019 17:34)  T(F): 98.3 (08 Jul 2019 20:22), Max: 98.3 (08 Jul 2019 20:22)  HR: 73 (08 Jul 2019 20:54) (73 - 76)  BP: 137/82 (08 Jul 2019 20:54) (137/82 - 150/100)  BP(mean): --  RR: 20 (08 Jul 2019 20:54) (20 - 20)  SpO2: 96% (08 Jul 2019 20:54) (96% - 97%) pt. seen and examined    Vital Signs Last 24 Hrs  T(C): 36.8 (08 Jul 2019 20:22), Max: 36.8 (08 Jul 2019 17:34)  T(F): 98.3 (08 Jul 2019 20:22), Max: 98.3 (08 Jul 2019 20:22)  HR: 73 (08 Jul 2019 20:54) (73 - 76)  BP: 137/82 (08 Jul 2019 20:54) (137/82 - 150/100)  BP(mean): --  RR: 20 (08 Jul 2019 20:54) (20 - 20)  SpO2: 96% (08 Jul 2019 20:54) (96% - 97%)    heent: nc/at  neck: supple, no JVD  Lungs: B/L clear, basilar crackles   abd : soft, Obese , no oranomegly ext: no c/c

## 2019-07-08 NOTE — H&P ADULT - NSHPLABSRESULTS_GEN_ALL_CORE
12.3   5.3   )-----------( 200      ( 08 Jul 2019 18:00 )             38.6   07-08    135  |  94<L>  |  14  ----------------------------<  251<H>  5.0   |  27  |  0.85    Ca    9.1      08 Jul 2019 18:00    TPro  7.0  /  Alb  3.6  /  TBili  0.5  /  DBili  x   /  AST  72<H>  /  ALT  141<H>  /  AlkPhos  120  07-08

## 2019-07-08 NOTE — ED PROVIDER NOTE - ATTENDING CONTRIBUTION TO CARE
Patient with metastatic renal cell carcinoma having uncontrolled pain at home despite oxycodone as well as a new sacral/gluteal ulcer, seen at [facility] today and sent to Emergency Department for same.  Patient reporting ulcer possibly secondary to urine on skin (wears diapers).  Reporting pain is generalized.  Reportedly plan to stop chemo/radiation due to high disease burden and non responsiveness to treatment per patient.  Received 100mcg fentanyl IVP via EMS on transport with significant pain relief.    A 14 point review of systems is negative except as in HPI or otherwise documented.    Exam:  General: Patient ill appearing, vital signs within normal limits  HEENT: airway patent with moist mucous membranes  Cardiac: RRR S1/S2 with strong peripheral pulses  Respiratory: lungs clear without respiratory distress  GI: abdomen distended, diffuse mild tenderness without rebound or guarding  Neuro: no gross neurologic deficits  Skin: shallow linear ulcer from decubitus region down into R buttock  Psych: normal mood and affect    Patient with significant metastatic disease burden sent from [facility].  Ulcer on buttock/sacrum does not appear acutely infected, pain now improved with EMS fentanyl will order standing PRNs while in Emergency Department for continued pain control, plan to discuss with oncology regarding further workup but will likely require admission for pain management.

## 2019-07-08 NOTE — CHART NOTE - NSCHARTNOTEFT_GEN_A_CORE
Interval events    CC: HTN  Notified by RN that patient's /108 mm of hg. Evaluated the patient at bedside. Patient c/o pain on lower abdomen and back. otherwise asymptomatic  patient with hx of RCC, Ct findings with renal mass, lytic lesions on skeleton. patient was sent here with pressure ulcera and concern for progression of disease  Patient on home regimen of Enalapril 10mg Po and Amlodipine 5mg PO, received both medications today.    Vital Signs Last 24 Hrs  T(C): 36.4 (08 Jul 2019 21:20), Max: 36.8 (08 Jul 2019 17:34)  T(F): 97.6 (08 Jul 2019 21:20), Max: 98.3 (08 Jul 2019 20:22)  HR: 77 (08 Jul 2019 21:20) (73 - 77)  BP: 158/105 (08 Jul 2019 22:00) (137/82 - 161/100)  BP(mean): --  RR: 20 (08 Jul 2019 21:20) (20 - 20)  SpO2: 97% (08 Jul 2019 21:20) (96% - 97%)    HTN/abdominal and back pain  Elevated BP likely r/t pain  patient on Dilaudid 1mg IV PRN q2 hourly and Oxycodone 10mg PO [id number] PRN   Administer due dose of Dilaudid now  Repeat BP in an hour  C/W amlodipine, and Enalapril  H7P in progress  Will follow    Rachele Vazquez [facility]  [id number]

## 2019-07-09 NOTE — CONSULT NOTE ADULT - SUBJECTIVE AND OBJECTIVE BOX
60 yo M w/ stage IV RCC and 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 S/p gamma knife to brain met. Not a candidate for nephrectomy per Urology given brain mets. S/p palliative XRT to the spine. S/p systemic tx with Pazopanib, with POD and Sutent with POD, with progressive disease on Nivolumab in Nov 2017 and now on cabozantinib since then. Now with worsening sacral wound and abdominal pain, sent in from [email or web address] for further management.  Cabozantinib is currently on hold.    Allergies  No Known Allergies    PAST MEDICAL & SURGICAL HISTORY:  BPH without obstruction/lower urinary tract symptoms  Urinary retention: Hampton catheter placed since 10/2018  HLD (hyperlipidemia)  S/P radiation therapy: spine, on cabozantinib since Nov 2017  Malignant neoplasm of vertebral column  Hypothyroidism  GERD (gastroesophageal reflux disease)  Cervicalgia  Metastatic renal cell carcinoma to brain: lung/spine  Hypertension  Diabetes mellitus: Type 2  H/O kyphoplasty: T8, 11/2018  Status post gamma knife treatment: Left forehead/temple    FAMILY HISTORY:  Family history of colon cancer  Family history of diabetes mellitus in mother  Family history of diabetes mellitus in father      Social History:  hx of alcohol abuse, never smoker, no drug use    REVIEW OF SYSTEMS:  CONSTITUTIONAL: No weakness, fevers or chills  EYES/ENT: No visual changes, no throat pain   RESPIRATORY: No cough, wheezing, hemoptysis; No shortness of breath  CARDIOVASCULAR: No chest pain or palpitations  GASTROINTESTINAL: No abdominal pain, nausea, vomiting, or hematemesis; No diarrhea or constipation. No melena or hematochezia.  GENITOURINARY: unable to control unrine  MUSCULOSKELETAL: No joint pain.  NEUROLOGICAL: No dizziness, numbness, or weakness  SKIN: +groin, sacral, thigh rash, ulcer  All other review of systems is negative unless indicated above.    VITAL SIGNS:  Vital Signs Last 24 Hrs  T(C): 36.7 (09 Jul 2019 16:12), Max: 36.8 (08 Jul 2019 20:22)  T(F): 98.1 (09 Jul 2019 16:12), Max: 98.3 (08 Jul 2019 20:22)  HR: 76 (09 Jul 2019 16:12) (70 - 85)  BP: 129/76 (09 Jul 2019 16:12) (129/76 - 161/100)  BP(mean): --  RR: 18 (09 Jul 2019 16:12) (18 - 20)  SpO2: 97% (09 Jul 2019 16:12) (94% - 97%)      PHYSICAL EXAM:     GENERAL: no acute distress  HEENT: EOMI, neck supple  RESPIRATORY: LCTAB/L, no rhonchi, rales, or wheezing  CARDIOVASCULAR: RRR, no murmurs, gallops, rubs  ABDOMINAL: soft, non-tender, non-distended, positive bowel sounds   EXTREMITIES: no clubbing, cyanosis, or edema  NEUROLOGICAL: alert and oriented x 3, non-focal  SKIN: + groin rash, sacral rash, ulcer  < from: CT Abdomen and Pelvis No Cont (07.08.19 @ 20:09) >  IMPRESSION:     Mediastinal and retroperitoneal lymphadenopathy, relatively stable since   6/27/2019.    Bilateral pulmonary nodules, not significantly changed since6/27/2019.    A 7.1 cm right renal mass is unchanged.    Hampton balloon is inflated in the prostatic urethra.    Lytic osseous lesions, unchanged.    Nonspecific infiltration of the subcutaneous tissues overlying the right   buttock.    Findings were discussed with NP Melissa Martinez on 7/9/2019 at 10:58 AM by   Dr. Mckenzie with read back confirmation.    < end of copied text >  MUSCULOSKELETAL: no gross joint deformity                          10.6   4.74  )-----------( 182      ( 09 Jul 2019 08:46 )             34.4     07-09    135  |  94<L>  |  15  ----------------------------<  225<H>  4.7   |  28  |  0.77    Ca    9.0      09 Jul 2019 07:15    TPro  6.4  /  Alb  3.2<L>  /  TBili  0.4  /  DBili  x   /  AST  56<H>  /  ALT  124<H>  /  AlkPhos  102  07-09      MEDICATIONS  (STANDING):  amLODIPine   Tablet 10 milliGRAM(s) Oral daily  ampicillin/sulbactam  IVPB 3 Gram(s) IV Intermittent every 6 hours  ampicillin/sulbactam  IVPB      dextrose 5%. 1000 milliLiter(s) (50 mL/Hr) IV Continuous <Continuous>  dextrose 50% Injectable 12.5 Gram(s) IV Push once  dextrose 50% Injectable 25 Gram(s) IV Push once  dextrose 50% Injectable 25 Gram(s) IV Push once  enalapril 10 milliGRAM(s) Oral daily  enoxaparin Injectable 40 milliGRAM(s) SubCutaneous daily  finasteride 5 milliGRAM(s) Oral daily  insulin glargine Injectable (LANTUS) 14 Unit(s) SubCutaneous at bedtime  levothyroxine 137 MICROGram(s) Oral daily  pantoprazole    Tablet 40 milliGRAM(s) Oral before breakfast    < from: CT Abdomen and Pelvis No Cont (07.08.19 @ 20:09) >  IMPRESSION:     Mediastinal and retroperitoneal lymphadenopathy, relatively stable since   6/27/2019.    Bilateral pulmonary nodules, not significantly changed since6/27/2019.    A 7.1 cm right renal mass is unchanged.    Hampton balloon is inflated in the prostatic urethra.    Lytic osseous lesions, unchanged.    Nonspecific infiltration of the subcutaneous tissues overlying the right   buttock.    Findings were discussed with PEACE Martinez on 7/9/2019 at 10:58 AM by   Dr. Mckenzie with read back confirmation.    < end of copied text >

## 2019-07-09 NOTE — ADVANCED PRACTICE NURSE CONSULT - REASON FOR CONSULT
Requested by nursing staff to assess skin status on right buttock.  Patient is a Pt is a 60 yo male with a hx of renal cell carcinoma, DM (on chemo who was sent to the ED via his oncologist after seeing a large decubitus ulcer. Patient states that the ulcer has been present for quite sometime. He has had trouble moving and frequently wets bed. Patient denies f/c/n/v/d. Was sent in by outpatient oncologist for concern of worsening of diease.     As per patient he has had this ulceration/wound for a long period of time. Unable to discuss original etiology. Requested by nursing staff to assess skin status on right buttock.  Patient is a Pt is a 60 yo male with a hx of renal cell carcinoma, DM (on chemo who was sent to the ED via his oncologist after seeing a large decubitus ulcer. Patient states that the ulcer has been present for quite sometime. He has had trouble moving and frequently wets bed. Patient denies f/c/n/v/d. Was sent in by outpatient oncologist for concern of worsening of diease. Patient has wound on left foot and podiatry is to manage that wound.    As per patient he has had this ulceration/wound for a long period of time. Unable to discuss original etiology.

## 2019-07-09 NOTE — ADVANCED PRACTICE NURSE CONSULT - ASSESSMENT
Patient is on a [employer] P 500 low airloss surface and is able to roll on his side for assessment of his wound.    On the tight buttock  there is a unusually shaped wound that presents in the shape of a upside down comma.  The wound measures approximately 24 cm in length X 3.0 cm at the widest point and 0.25 cm depth.  The wound bed is a mixture of dry non viable tissue and dry viable tissue.  The area was cleansed with sterile water and patted dry.  To support wound healing a Medihoney colloid was cut into strips and applied along the length of the wound.  Warming up the Medihoney in one's hand allows the product to be more flexible and easily fit into the curve of the wound.  There was no drainage noted and no odor.  The periwound skin was intact and wound presentation alluded to a wider wound at one time.  Medihoney should support wound debridement and will support wound healing.  Covered with gauze and thin film strips for dressing retention.   Will recommend a nutritional consult to assess nutritional state to support wound healing.

## 2019-07-09 NOTE — ADVANCED PRACTICE NURSE CONSULT - RECOMMEDATIONS
1.  Cut Medihoney colloid dressing into thin strips to fit in the valley of this wound and match the contours of the wound by warding the Medihoney in your hand to support more flexibility of the product.  Fill in the length of the wound and then cover with gauze and thin strips of paper tape or thin film.  Change daily and prn  2.  Nutritional consult to assess current nutritional state.  3.  Assist with movement in bed as needed  Remain available as requested by staff

## 2019-07-09 NOTE — CONSULT NOTE ADULT - ATTENDING COMMENTS
Rash/Ulcers  -seen by wound care  -derm consult for bx of skin lesions see if this is cutaneous met of RCC  -pt will need to follow up with wound care after discharge  -ID following - appreciate input    # RCC  -mets to brain, bones, lungs  -progressed on pazopanib, sutent, Nivolumab and was on cabozantinib  -CT a/p (7/9) showed mediastinal and RP LAd stable, b/l pulmonary nodules stable. 7.1 cm R renal mass is unchanged.    -discussed with primary oncologist Dr. Pena. Will hold cabozantinib for now
59 year old male with Renal Cell Carcinoma, DM sent from Oncologist office for large decubitus ulcer.    Remains afebrile  WBC WNL  Large right decub ulcer does not appear infected  Heel ulcer does not appear to be infected  Per wife, has new skin rash/ lesion on left buttock and left inner thigh    CT A/P with Stable mediastinal and retroperitoneal lymphadenopathy. Bilateral pulmonary nodules. Right renal mass unchanged. Lytic osseous lesions unchanged. Nonspecific infiltration of the subcutaneous tissues overlying the right buttock.     Recommend:  -Wounds do not appear to be infected. No systemic signs of infection.  -Can monitor off antibiotics.  -Derm evaluation for new lesion on left inner leg and left buttock  -Podiatry evaluation for heel ulcer    Merlin Moya MD  Pager [phone]  After 5pm/weekends call [phone]

## 2019-07-09 NOTE — CONSULT NOTE ADULT - ASSESSMENT
59M w/ h/o DM, renal cell CA stage 4 w/ mets to lung, brain, and spine, on cabozantinib since 2017, and vasquez placed since 10/2018 p/w ulcer of the right buttock that has been present for 5 weeks. Pt is often bedbound at baseline. Denies fever/chills, n/v, or diarrhea. Pt is afebrile, wbc is wnl. CT abd/pelvis demonstrates nonspecific infiltration of subcutaneous tissues overlying R buttock, but the ulcer does not appear to be purulent or inflamed. Pt is also presenting with ulcer of left heel and left posterior thigh. Of note, pt has rashes on the posterior left buttock and right thigh that he states have been present for a few days.      -d/c antibiotics, pt's ulcer on right buttock does not appear to be infected, and pt does not have constitutional symptoms.  -consult dermatology for rash on left buttock and right thigh.  -consult podiatry for ulcer on left heel. 59M w/ h/o DM, renal cell CA stage 4 w/ mets to lung, brain, and spine, on cabozantinib since 2017, and vasquez placed since 10/2018 p/w ulcer of the right buttock that has been present for 5 weeks. Pt is often bedbound at baseline. Denies fever/chills, n/v, or diarrhea. Pt is afebrile, wbc is wnl. CT abd/pelvis demonstrates nonspecific infiltration of subcutaneous tissues overlying R buttock, but the ulcer does not appear to be purulent or inflamed. Pt is also presenting with ulcer of left heel and left posterior thigh. Of note, pt has rashes on the posterior left buttock and right thigh that he states have been present for a few days.      -d/c antibiotics, pt's ulcer on right buttock does not appear to be infected, and pt does not have constitutional symptoms.  -consult dermatology for rash on left buttock and right thigh.  -consult podiatry for ulcer on heel.

## 2019-07-09 NOTE — CONSULT NOTE ADULT - SUBJECTIVE AND OBJECTIVE BOX
Patient is a 59y old  Male who presents with a chief complaint of large buttock / sacrum decub (09 Jul 2019 12:23)    HPI:  Pt is a 58 yo male with a hx of renal cell carcinoma, DM (on chemo who was sent to the ED via his oncologist after seeing a large decubitus ulcer. Patient states that the ulcer has been present for quite sometime. He has had trouble moving and frequently wets bed. Patient denies f/c/n/v/d. Was sent in by outpatient oncologist for concern of worsening of diease. (08 Jul 2019 21:59)     prior hospital charts reviewed [  ]  primary team notes reviewed [  ]  other consultant notes reviewed [  ]  PAST MEDICAL & SURGICAL HISTORY:  BPH without obstruction/lower urinary tract symptoms  Urinary retention: Hampton catheter placed since 10/2018  HLD (hyperlipidemia)  S/P radiation therapy: spine, on cabozantinib since Nov 2017  Malignant neoplasm of vertebral column  Hypothyroidism  GERD (gastroesophageal reflux disease)  Cervicalgia  Metastatic renal cell carcinoma to brain: lung/spine  Hypertension  Diabetes mellitus: Type 2  H/O kyphoplasty: T8, 11/2018  Status post gamma knife treatment: Left forehead/temple    Allergies  No Known Allergies    ANTIMICROBIALS (past 90 days)  MEDICATIONS  (STANDING):  ampicillin/sulbactam  IVPB   200 mL/Hr IV Intermittent (07-08-19 @ 23:55)    ampicillin/sulbactam  IVPB   200 mL/Hr IV Intermittent (07-09-19 @ 11:56)   200 mL/Hr IV Intermittent (07-09-19 @ 05:26)      ANTIMICROBIALS:    ampicillin/sulbactam  IVPB 3 every 6 hours  ampicillin/sulbactam  IVPB      OTHER MEDS: MEDICATIONS  (STANDING):  amLODIPine   Tablet 10 daily  dextrose 40% Gel 15 once PRN  dextrose 50% Injectable 12.5 once  dextrose 50% Injectable 25 once  dextrose 50% Injectable 25 once  enalapril 10 daily  enoxaparin Injectable 40 daily  finasteride 5 daily  glucagon  Injectable 1 once PRN  HYDROmorphone  Injectable 1 every 2 hours PRN  insulin glargine Injectable (LANTUS) 14 at bedtime  levothyroxine 137 daily  oxyCODONE    IR 10 every 6 hours PRN  pantoprazole    Tablet 40 before breakfast    SOCIAL HISTORY:   hx smoking  non-smoker    FAMILY HISTORY:  Family history of colon cancer  Family history of diabetes mellitus in mother  Family history of diabetes mellitus in father    REVIEW OF SYSTEMS  [  ] ROS unobtainable because:    [  ] All other systems negative except as noted below:	    Constitutional:  [ ] fever [ ] chills  [ ] weight loss  [ ] weakness  Skin:  [ ] rash [ ] phlebitis	  Eyes: [ ] icterus [ ] pain  [ ] discharge	  ENMT: [ ] sore throat  [ ] thrush [ ] ulcers [ ] exudates  Respiratory: [ ] dyspnea [ ] hemoptysis [ ] cough [ ] sputum	  Cardiovascular:  [ ] chest pain [ ] palpitations [ ] edema	  Gastrointestinal:  [ ] nausea [ ] vomiting [ ] diarrhea [ ] constipation [x] pain	  Genitourinary:  [ ] dysuria [ ] frequency [ ] hematuria [ ] discharge [x] flank pain  [ ] incontinence  Musculoskeletal:  [ ] myalgias [ ] arthralgias [ ] arthritis  [ ] back pain  Neurological:  [ ] headache [ ] seizures  [ ] confusion/altered mental status  Psychiatric:  [ ] anxiety [ ] depression	  Hematology/Lymphatics:  [ ] lymphadenopathy  Endocrine:  [ ] adrenal [ ] thyroid  Allergic/Immunologic:	 [ ] transplant [ ] seasonal    Vital Signs Last 24 Hrs  T(F): 97.6 (07-09-19 @ 08:13), Max: 98.3 (07-08-19 @ 20:22)  Vital Signs Last 24 Hrs  HR: 74 (07-09-19 @ 08:13) (70 - 85)  BP: 155/86 (07-09-19 @ 08:13) (135/88 - 161/100)  RR: 18 (07-09-19 @ 08:13)  SpO2: 95% (07-09-19 @ 08:13) (94% - 97%)  Wt(kg): --    PHYSICAL EXAM:  General: non-toxic  HEAD/EYES: anicteric, PERRL  ENT:  supple  Cardiovascular:   S1, S2  Respiratory:  clear bilaterally  GI:  soft, non-tender, normal bowel sounds  :  no CVA tenderness   Musculoskeletal:  no synovitis  Neurologic:  grossly non-focal  Skin:  no rash  Lymph: no lymphadenopathy  Psychiatric:  appropriate affect  Vascular:  no phlebitis                            10.6   4.74  )-----------( 182      ( 09 Jul 2019 08:46 )             34.4     07-09    135  |  94<L>  |  15  ----------------------------<  225<H>  4.7   |  28  |  0.77    Ca    9.0      09 Jul 2019 07:15    TPro  6.4  /  Alb  3.2<L>  /  TBili  0.4  /  DBili  x   /  AST  56<H>  /  ALT  124<H>  /  AlkPhos  102  07-09    MICROBIOLOGY:              RADIOLOGY:  imaging below personally reviewed Patient is a 59y old  Male who presents with a chief complaint of large buttock / sacrum decub (09 Jul 2019 12:23)    HPI:  Pt is a 60 yo male with a hx of renal cell carcinoma, DM (on chemo who was sent to the ED via his oncologist after seeing a large decubitus ulcer. Patient states that the ulcer has been present for quite sometime. He has had trouble moving and frequently wets bed. Patient denies f/c/n/v/d. Was sent in by outpatient oncologist for concern of worsening of diease. (08 Jul 2019 21:59)     prior hospital charts reviewed [  ]  primary team notes reviewed [  ]  other consultant notes reviewed [  ]  PAST MEDICAL & SURGICAL HISTORY:  BPH without obstruction/lower urinary tract symptoms  Urinary retention: Hampton catheter placed since 10/2018  HLD (hyperlipidemia)  S/P radiation therapy: spine, on cabozantinib since Nov 2017  Malignant neoplasm of vertebral column  Hypothyroidism  GERD (gastroesophageal reflux disease)  Cervicalgia  Metastatic renal cell carcinoma to brain: lung/spine  Hypertension  Diabetes mellitus: Type 2  H/O kyphoplasty: T8, 11/2018  Status post gamma knife treatment: Left forehead/temple    Allergies  No Known Allergies    ANTIMICROBIALS (past 90 days)  MEDICATIONS  (STANDING):  ampicillin/sulbactam  IVPB   200 mL/Hr IV Intermittent (07-08-19 @ 23:55)    ampicillin/sulbactam  IVPB   200 mL/Hr IV Intermittent (07-09-19 @ 11:56)   200 mL/Hr IV Intermittent (07-09-19 @ 05:26)      ANTIMICROBIALS:    ampicillin/sulbactam  IVPB 3 every 6 hours  ampicillin/sulbactam  IVPB      OTHER MEDS: MEDICATIONS  (STANDING):  amLODIPine   Tablet 10 daily  dextrose 40% Gel 15 once PRN  dextrose 50% Injectable 12.5 once  dextrose 50% Injectable 25 once  dextrose 50% Injectable 25 once  enalapril 10 daily  enoxaparin Injectable 40 daily  finasteride 5 daily  glucagon  Injectable 1 once PRN  HYDROmorphone  Injectable 1 every 2 hours PRN  insulin glargine Injectable (LANTUS) 14 at bedtime  levothyroxine 137 daily  oxyCODONE    IR 10 every 6 hours PRN  pantoprazole    Tablet 40 before breakfast    SOCIAL HISTORY:   hx smoking  non-smoker    FAMILY HISTORY:  Family history of colon cancer  Family history of diabetes mellitus in mother  Family history of diabetes mellitus in father    REVIEW OF SYSTEMS  [  ] ROS unobtainable because:    [  ] All other systems negative except as noted below:	    Constitutional:  [ ] fever [ ] chills  [ ] weight loss  [ ] weakness  Skin:  [x] rash [ ] phlebitis	  Eyes: [ ] icterus [ ] pain  [ ] discharge	  ENMT: [ ] sore throat  [ ] thrush [ ] ulcers [ ] exudates  Respiratory: [ ] dyspnea [ ] hemoptysis [ ] cough [ ] sputum	  Cardiovascular:  [ ] chest pain [ ] palpitations [ ] edema	  Gastrointestinal:  [ ] nausea [ ] vomiting [ ] diarrhea [ ] constipation [x] pain	  Genitourinary:  [ ] dysuria [ ] frequency [ ] hematuria [ ] discharge [x] flank pain  [ ] incontinence  Musculoskeletal:  [ ] myalgias [ ] arthralgias [ ] arthritis  [ ] back pain  Neurological:  [ ] headache [ ] seizures  [ ] confusion/altered mental status  Psychiatric:  [ ] anxiety [ ] depression	  Hematology/Lymphatics:  [ ] lymphadenopathy  Endocrine:  [ ] adrenal [ ] thyroid  Allergic/Immunologic:	 [ ] transplant [ ] seasonal    Vital Signs Last 24 Hrs  T(F): 97.6 (07-09-19 @ 08:13), Max: 98.3 (07-08-19 @ 20:22)  Vital Signs Last 24 Hrs  HR: 74 (07-09-19 @ 08:13) (70 - 85)  BP: 155/86 (07-09-19 @ 08:13) (135/88 - 161/100)  RR: 18 (07-09-19 @ 08:13)  SpO2: 95% (07-09-19 @ 08:13) (94% - 97%)  Wt(kg): --    PHYSICAL EXAM:  General: non-toxic  HEAD/EYES: anicteric, PERRL  ENT:  supple  Cardiovascular:   S1, S2  Respiratory:  clear bilaterally  GI:  soft, non-tender, normal bowel sounds  :  no CVA tenderness   Musculoskeletal:  no synovitis  Neurologic:  grossly non-focal  Skin:  no rash  Lymph: no lymphadenopathy  Psychiatric:  appropriate affect  Vascular:  no phlebitis                            10.6   4.74  )-----------( 182      ( 09 Jul 2019 08:46 )             34.4     07-09    135  |  94<L>  |  15  ----------------------------<  225<H>  4.7   |  28  |  0.77    Ca    9.0      09 Jul 2019 07:15    TPro  6.4  /  Alb  3.2<L>  /  TBili  0.4  /  DBili  x   /  AST  56<H>  /  ALT  124<H>  /  AlkPhos  102  07-09    MICROBIOLOGY:        RADIOLOGY:  imaging below personally reviewed    < from: CT Abdomen and Pelvis No Cont (07.08.19 @ 20:09) >  IMPRESSION:     Mediastinal and retroperitoneal lymphadenopathy, relatively stable since   6/27/2019.    Bilateral pulmonary nodules, not significantly changed since6/27/2019.    A 7.1 cm right renal mass is unchanged.    Hampton balloon is inflated in the prostatic urethra.    Lytic osseous lesions, unchanged.    Nonspecific infiltration of the subcutaneous tissues overlying the right   buttock.    < from: CT Chest No Cont (07.08.19 @ 20:09) >  IMPRESSION:     Mediastinal and retroperitoneal lymphadenopathy, relatively stable since   6/27/2019.    Bilateral pulmonary nodules, not significantly changed since6/27/2019.    A 7.1 cm right renal mass is unchanged.    Hampton balloon is inflated in the prostatic urethra.    Lytic osseous lesions, unchanged.    Nonspecific infiltration of the subcutaneous tissues overlying the right   buttock. Patient is a 59y old  Male who presents with a chief complaint of large buttock / sacrum decub (09 Jul 2019 12:23)    HPI:  Pt is a 60 yo male with a hx of renal cell carcinoma, DM (on chemo who was sent to the ED via his oncologist after seeing a large decubitus ulcer. Patient states that the ulcer has been present for quite sometime. He has had trouble moving and frequently wets bed. Patient denies f/c/n/v/d. Was sent in by outpatient oncologist for concern of worsening of diease. Patient remains afebrile. WBC has remained WNL. CT A/P with Stable mediastinal and retroperitoneal lymphadenopathy. Bilateral pulmonary nodules. Right renal mass unchanged. Lytic osseous lesions unchanged. Nonspecific infiltration of the subcutaneous tissues overlying the right buttock.      prior hospital charts reviewed [  ]  primary team notes reviewed [  ]  other consultant notes reviewed [  ]  PAST MEDICAL & SURGICAL HISTORY:  BPH without obstruction/lower urinary tract symptoms  Urinary retention: Hampton catheter placed since 10/2018  HLD (hyperlipidemia)  S/P radiation therapy: spine, on cabozantinib since Nov 2017  Malignant neoplasm of vertebral column  Hypothyroidism  GERD (gastroesophageal reflux disease)  Cervicalgia  Metastatic renal cell carcinoma to brain: lung/spine  Hypertension  Diabetes mellitus: Type 2  H/O kyphoplasty: T8, 11/2018  Status post gamma knife treatment: Left forehead/temple    Allergies  No Known Allergies    ANTIMICROBIALS (past 90 days)  MEDICATIONS  (STANDING):  ampicillin/sulbactam  IVPB   200 mL/Hr IV Intermittent (07-08-19 @ 23:55)    ampicillin/sulbactam  IVPB   200 mL/Hr IV Intermittent (07-09-19 @ 11:56)   200 mL/Hr IV Intermittent (07-09-19 @ 05:26)      ANTIMICROBIALS:    ampicillin/sulbactam  IVPB 3 every 6 hours  ampicillin/sulbactam  IVPB      OTHER MEDS: MEDICATIONS  (STANDING):  amLODIPine   Tablet 10 daily  dextrose 40% Gel 15 once PRN  dextrose 50% Injectable 12.5 once  dextrose 50% Injectable 25 once  dextrose 50% Injectable 25 once  enalapril 10 daily  enoxaparin Injectable 40 daily  finasteride 5 daily  glucagon  Injectable 1 once PRN  HYDROmorphone  Injectable 1 every 2 hours PRN  insulin glargine Injectable (LANTUS) 14 at bedtime  levothyroxine 137 daily  oxyCODONE    IR 10 every 6 hours PRN  pantoprazole    Tablet 40 before breakfast    SOCIAL HISTORY:  non-smoker    FAMILY HISTORY:  Family history of colon cancer  Family history of diabetes mellitus in mother  Family history of diabetes mellitus in father    REVIEW OF SYSTEMS  [  ] ROS unobtainable because:    [X] All other systems negative except as noted below:	    Constitutional:  [ ] fever [ ] chills  [ ] weight loss  [ ] weakness  Skin:  [x] rash [ ] phlebitis	  Eyes: [ ] icterus [ ] pain  [ ] discharge	  ENMT: [ ] sore throat  [ ] thrush [ ] ulcers [ ] exudates  Respiratory: [ ] dyspnea [ ] hemoptysis [ ] cough [ ] sputum	  Cardiovascular:  [ ] chest pain [ ] palpitations [ ] edema	  Gastrointestinal:  [ ] nausea [ ] vomiting [ ] diarrhea [ ] constipation [x] pain	  Genitourinary:  [ ] dysuria [ ] frequency [ ] hematuria [ ] discharge [x] flank pain  [ ] incontinence  Musculoskeletal:  [ ] myalgias [ ] arthralgias [ ] arthritis  [ ] back pain  Neurological:  [ ] headache [ ] seizures  [ ] confusion/altered mental status  Psychiatric:  [ ] anxiety [ ] depression	  Hematology/Lymphatics:  [ ] lymphadenopathy  Endocrine:  [ ] adrenal [ ] thyroid  Allergic/Immunologic:	 [ ] transplant [ ] seasonal    Vital Signs Last 24 Hrs  T(F): 97.6 (07-09-19 @ 08:13), Max: 98.3 (07-08-19 @ 20:22)  Vital Signs Last 24 Hrs  HR: 74 (07-09-19 @ 08:13) (70 - 85)  BP: 155/86 (07-09-19 @ 08:13) (135/88 - 161/100)  RR: 18 (07-09-19 @ 08:13)  SpO2: 95% (07-09-19 @ 08:13) (94% - 97%)  Wt(kg): --    PHYSICAL EXAM:  General: non-toxic  HEAD/EYES: anicteric, PERRL  ENT:  supple  Cardiovascular:   S1, S2  Respiratory:  clear bilaterally  GI:  soft, non-tender, normal bowel sounds  :  no CVA tenderness   Musculoskeletal:  no synovitis  Neurologic:  grossly non-focal  Skin:  Heel ucler, does not appear infected, right large sacral decub no purulence, no surrounding erythema, does not appear infected, blanching skin lesion to left buttock and inner leg  Lymph: no lymphadenopathy  Psychiatric:  appropriate affect  Vascular:  no phlebitis                            10.6   4.74  )-----------( 182      ( 09 Jul 2019 08:46 )             34.4     07-09    135  |  94<L>  |  15  ----------------------------<  225<H>  4.7   |  28  |  0.77    Ca    9.0      09 Jul 2019 07:15    TPro  6.4  /  Alb  3.2<L>  /  TBili  0.4  /  DBili  x   /  AST  56<H>  /  ALT  124<H>  /  AlkPhos  102  07-09    MICROBIOLOGY:        RADIOLOGY:  imaging below personally reviewed    < from: CT Abdomen and Pelvis No Cont (07.08.19 @ 20:09) >  IMPRESSION:     Mediastinal and retroperitoneal lymphadenopathy, relatively stable since   6/27/2019.    Bilateral pulmonary nodules, not significantly changed since6/27/2019.    A 7.1 cm right renal mass is unchanged.    Hampton balloon is inflated in the prostatic urethra.    Lytic osseous lesions, unchanged.    Nonspecific infiltration of the subcutaneous tissues overlying the right   buttock.    < from: CT Chest No Cont (07.08.19 @ 20:09) >  IMPRESSION:     Mediastinal and retroperitoneal lymphadenopathy, relatively stable since   6/27/2019.    Bilateral pulmonary nodules, not significantly changed since6/27/2019.    A 7.1 cm right renal mass is unchanged.    Hampton balloon is inflated in the prostatic urethra.    Lytic osseous lesions, unchanged.    Nonspecific infiltration of the subcutaneous tissues overlying the right   buttock.

## 2019-07-09 NOTE — CONSULT NOTE ADULT - ASSESSMENT
58 yo M w/ stage IV RCC and 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 S/p gamma knife to brain met. Not a candidate for nephrectomy per Urology given brain mets. S/p palliative XRT to the spine. S/p systemic tx with Pazopanib, with POD and Sutent with POD, with progressive disease on Nivolumab in Nov 2017 and now on cabozantinib sent in from [facility] due to worsening sacral, groin rash/ulcers.    # Rash/Ulcers  -seen by wound care  -derm consult for bx of skin lesions see if this is cutaneous met of RCC  -pt will need to follow up with wound care after discharge  -ID following - appreciate input    # RCC  -mets to brain, bones, lungs  -progressed on pazopanib, sutent, Nivolumab and was on cabozantinib  -CT a/p (7/9) showed mediastinal and RP LAd stable, b/l pulmonary nodules stable. 7.1 cm R renal mass is unchanged.    -discussed with primary oncologist Dr. Pena. Will hold cabozantinib for now  -pt can follow up with Dr. Pena at [facility] after discharge

## 2019-07-10 NOTE — DISCHARGE NOTE PROVIDER - NSDCFUADDINST_GEN_ALL_CORE_FT
Podiatry Discharge Instructions:  Follow up: Please follow up with Dr. Everett within 1 week of discharge from the hospital, please call [phone] for appointment and discuss that you recently were seen in the hospital.  Wound Care: Please apply santyl to the left heel wound then dress with 4x4 gauze and armani daily. Apply Z-flow boot while patient is in bed.   Weight bearing: Please weight bear as tolerated in a surgical shoe.  Antibiotics: Please continue as instructed.

## 2019-07-10 NOTE — DISCHARGE NOTE PROVIDER - NSDCCPCAREPLAN_GEN_ALL_CORE_FT
PRINCIPAL DISCHARGE DIAGNOSIS  Diagnosis: Decubitus skin ulcer  Assessment and Plan of Treatment: Right buttock      SECONDARY DISCHARGE DIAGNOSES  Diagnosis: Diabetes mellitus  Assessment and Plan of Treatment: Diabetes mellitus    Diagnosis: Metastatic renal cell carcinoma to brain  Assessment and Plan of Treatment: Metastatic renal cell carcinoma to brain PRINCIPAL DISCHARGE DIAGNOSIS  Diagnosis: Decubitus skin ulcer  Assessment and Plan of Treatment: Right buttock. Seen by ID: no need for Abx at this time. Seen by dermatology: do not recommend biopsy at this time as findings will be nonspecific and unlikely to aid in determining cause. Pt to follow up as OP.      SECONDARY DISCHARGE DIAGNOSES  Diagnosis: Diabetes mellitus  Assessment and Plan of Treatment: Continue with home meds    Diagnosis: Dermatitis artefacta  Assessment and Plan of Treatment: Seen by dermatology: do not recommend biopsy at this time as findings will be nonspecific and unlikely to aid in determining cause. Pt to follow up as OP.    Diagnosis: Metastatic renal cell carcinoma to brain  Assessment and Plan of Treatment: Continue to follow up as OP PRINCIPAL DISCHARGE DIAGNOSIS  Diagnosis: Decubitus skin ulcer  Assessment and Plan of Treatment: Right buttock. Seen by ID: no need for Abx at this time. Seen by dermatology: do not recommend biopsy at this time as findings will be nonspecific and unlikely to aid in determining cause. Pt to follow up as OP.      SECONDARY DISCHARGE DIAGNOSES  Diagnosis: Ulcer of left foot  Assessment and Plan of Treatment: Seen by podiatry for left foot postero-lateral heel ulcer down to subQ, stable, serous drainage and no purulence, no acute signs of infection. Wound care recommendations given. No podiatry surgical intervention at this time.  Follow up as OP.    Diagnosis: Diabetes mellitus  Assessment and Plan of Treatment: Continue with home meds    Diagnosis: Dermatitis artefacta  Assessment and Plan of Treatment: Seen by dermatology: do not recommend biopsy at this time as findings will be nonspecific and unlikely to aid in determining cause. Pt to follow up as OP.    Diagnosis: Metastatic renal cell carcinoma to brain  Assessment and Plan of Treatment: Continue to follow up as OP PRINCIPAL DISCHARGE DIAGNOSIS  Diagnosis: Decubitus skin ulcer  Assessment and Plan of Treatment: Right buttock. Seen by ID: no need for Abx at this time. Seen by dermatology: do not recommend biopsy at this time as findings will be nonspecific and unlikely to aid in determining cause. Pt to follow up as OP.      SECONDARY DISCHARGE DIAGNOSES  Diagnosis: Ulcer of left foot  Assessment and Plan of Treatment: Seen by podiatry for left foot postero-lateral heel ulcer down to subQ, stable, serous drainage and no purulence, no acute signs of infection. Wound care recommendations given. No podiatry surgical intervention at this time.  Follow up as OP.    Diagnosis: Diabetes mellitus  Assessment and Plan of Treatment: Continue with home meds    Diagnosis: Dermatitis artefacta  Assessment and Plan of Treatment: Seen by dermatology: do not recommend biopsy at this time as findings will be nonspecific and unlikely to aid in determining cause. Pt to follow up as OP.    Diagnosis: Metastatic renal cell carcinoma to brain  Assessment and Plan of Treatment: Seen by Hem/Onc: Will continue to hold Cabozantinib. No further chemo treatment until patient sees Dr. Pena at [facility].

## 2019-07-10 NOTE — DIETITIAN INITIAL EVALUATION ADULT. - REASON INDICATOR FOR ASSESSMENT
"Assessment".   Information obtained from patient and wife at bedside. "Assessment".   Information obtained from EMR, patient, wife at bedside and RN.

## 2019-07-10 NOTE — DISCHARGE NOTE PROVIDER - CARE PROVIDER_API CALL
Goldberg, Bradley H (MD)  Internal Medicine  [address] [place], [place] [address]  Phone: [phone]  Fax: [phone]  Follow Up Time: Goldberg, Bradley H (MD)  Internal Medicine  [address] [place] [address]  Phone: [phone]  Fax: [phone]  Follow Up Time:     Nazia Pena; PhD)  Hematology; Internal Medicine; Medical Oncology  [address] [place] [address]  Phone: [phone]  Fax: [phone]  Follow Up Time:

## 2019-07-10 NOTE — DISCHARGE NOTE PROVIDER - NSFOLLOWUPCLINICS_GEN_ALL_ED_FT
[facility]  Podiatry  [address] [place] [address] - [address]rd [place] [address]  Phone: [phone]  Fax:   Follow Up Time: Routine [facility]  Podiatry  [address] [place] [address] - [address]rd [place] [address]  Phone: [phone]  Fax:   Follow Up Time: Routine    [facility] [place]  Dermatology  [address] [place] [address], [facility] [address]  [place] [address]  Phone: [phone]  Fax: [phone]  Follow Up Time:

## 2019-07-10 NOTE — PHYSICAL THERAPY INITIAL EVALUATION ADULT - PERTINENT HX OF CURRENT PROBLEM, REHAB EVAL
58yo M with a hx of renal cell carcinoma, DM (on chemo who was sent to the ED via his oncologist after seeing a large decubitus ulcer. Patient states that the ulcer has been present for quite sometime. Pt also with L heel ulcer. He has had trouble moving. CT abdomen/chest: Mediastinal and retroperitoneal lymphadenopathy, relatively stable since 6/27/2019. Bilateral pulmonary nodules, not significantly changed since 6/27/2019. A 7.1 cm right renal mass is unchanged. Cont'd below

## 2019-07-10 NOTE — PHYSICAL THERAPY INITIAL EVALUATION ADULT - NS ASR WT BEARING DETAIL LLE
weight-bearing as tolerated/in surgical shoe. Order place during PT initial eval seen in MD Lucas's D/C note, pt treated as WBAT during initial eval, LEIGHTON peng
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
## 2019-07-10 NOTE — DISCHARGE NOTE PROVIDER - CARE PROVIDERS DIRECT ADDRESSES
,DirectAddress_Unknown ,DirectAddress_Unknown,[email or web address]

## 2019-07-10 NOTE — DIETITIAN INITIAL EVALUATION ADULT. - DIET TYPE
consistent carbohydrate (evening snack)/2 Prince packets daily/DASH/TLC (sodium and cholesterol restricted diet) DASH/TLC

## 2019-07-10 NOTE — DIETITIAN INITIAL EVALUATION ADULT. - ENERGY NEEDS
Ht: 66 inches, Wt: 187.1 pounds (7/9), BMI: 30.2 kg/m2, IBW: 142 pounds (+/- 10%), %IBW: 131%  Skin per nursing documentation: stage _ decubitus ulcer on buttocks, wound on left foot, groin rash/ulcer  Edema per nursing documentation: 2+ bilateral legs, 3+ left foot    Pt is a 59 year old male with PMH of stage IV renal cell carcinoma and mets to lung, brain, bone, on chemo, HTN, HLD, hypothyroidism, GERD, T2DM. Pt sent to ED from oncologist office for concern of worsening of disease and large decubitus ulcer. Per chart, derm consult placed for biopsy of skin lesions see if groin rash/ulcer is cutaneous met of RCC. Ht: 66 inches, Wt: 187.1 pounds (7/9), BMI: 30.2 kg/m2, IBW: 142 pounds (+/- 10%), %IBW: 131%  Skin per nursing documentation: large decubitus ulcer of unknown etiology on buttocks (currently unstaged), wound on left foot (Per RN, unstageable), groin rash/ulcer  Edema per nursing documentation: 2+ bilateral legs, 3+ left foot    Pt is a 59 year old male with PMH of stage IV renal cell carcinoma and mets to lung, brain, bone, on chemo, HTN, HLD, hypothyroidism, GERD, T2DM. Pt sent to ED from oncologist office for concern of worsening of disease and large decubitus ulcer. Per chart, derm consult placed for biopsy of skin lesions see if groin rash/ulcer is cutaneous met of RCC.

## 2019-07-10 NOTE — DISCHARGE NOTE PROVIDER - HOSPITAL COURSE
Pt is a 58 yo male with a hx of renal cell carcinoma, DM (on chemo who was sent to the ED via his oncologist after seeing a large decubitus ulcer worsening over 5 weeks. He has had trouble moving and frequently wets bed. Patient denies f/c/n/v/d. Was sent in by outpatient oncologist for concern of worsening of diease. (08 Jul 2019 21:59) Pt is a 58 yo male with a PMHx of renal cell carcinoma, DM (on chemo who was sent to the ED via his oncologist after seeing a large decubitus ulcer worsening over 5 weeks. He has had trouble moving and frequently wets bed.  Seen by ID: no need for Abx at this time. Seen by dermatology: do not recommend biopsy at this time as findings will be nonspecific and unlikely to aid in determining cause. Pt to follow up as OP.     Seen by podiatry for left foot postero-lateral heel ulcer down to subQ, stable, serous drainage and no purulence, no acute signs of infection. Wound care recommendations given. No podiatry surgical intervention at this time. Pt is a 60 y/o M with a PMHx of renal cell carcinoma, DM (on chemo who was sent to the ED via his oncologist after seeing a large decubitus ulcer worsening over 5 weeks. He has had trouble moving and frequently wets bed.  Seen by ID: no need for Abx at this time. Seen by dermatology: do not recommend biopsy at this time as findings will be nonspecific and unlikely to aid in determining cause. Pt to follow up as OP.     Seen by podiatry for left foot postero-lateral heel ulcer down to subQ, stable, serous drainage and no purulence, no acute signs of infection. Wound care recommendations given. No podiatry surgical intervention at this time.    Seen by Hem/Onc: Will continue to hold Cabozantinib. No further chemo treatment until patient sees Dr. Pena at [facility].

## 2019-07-10 NOTE — CONSULT NOTE ADULT - SUBJECTIVE AND OBJECTIVE BOX
Podiatry pager #: [phone]/ [phone]    Patient is a 59y old  Male who presents with a chief complaint of large buttock / sacrum decub (10 Jul 2019 10:30)      HPI:  Pt is a 58 yo male with a hx of renal cell carcinoma, DM (on chemo who was sent to the ED via his oncologist after seeing a large decubitus ulcer. Patient states that the ulcer has been present for quite sometime. He has had trouble moving and frequently wets bed. Patient denies f/c/n/v/d. Was sent in by outpatient oncologist for concern of worsening of diease. (08 Jul 2019 21:59)      PAST MEDICAL & SURGICAL HISTORY:  BPH without obstruction/lower urinary tract symptoms  Urinary retention: Hampton catheter placed since 10/2018  HLD (hyperlipidemia)  S/P radiation therapy: spine, on cabozantinib since Nov 2017  Malignant neoplasm of vertebral column  Hypothyroidism  GERD (gastroesophageal reflux disease)  Cervicalgia  Metastatic renal cell carcinoma to brain: lung/spine  Hypertension  Diabetes mellitus: Type 2  H/O kyphoplasty: T8, 11/2018  Status post gamma knife treatment: Left forehead/temple      MEDICATIONS  (STANDING):  amLODIPine   Tablet 10 milliGRAM(s) Oral daily  dextrose 5%. 1000 milliLiter(s) (50 mL/Hr) IV Continuous <Continuous>  dextrose 50% Injectable 12.5 Gram(s) IV Push once  dextrose 50% Injectable 25 Gram(s) IV Push once  dextrose 50% Injectable 25 Gram(s) IV Push once  enalapril 10 milliGRAM(s) Oral daily  enoxaparin Injectable 40 milliGRAM(s) SubCutaneous daily  finasteride 5 milliGRAM(s) Oral daily  insulin glargine Injectable (LANTUS) 14 Unit(s) SubCutaneous at bedtime  levothyroxine 137 MICROGram(s) Oral daily  nystatin Powder 1 Application(s) Topical two times a day  pantoprazole    Tablet 40 milliGRAM(s) Oral before breakfast    MEDICATIONS  (PRN):  dextrose 40% Gel 15 Gram(s) Oral once PRN Blood Glucose LESS THAN 70 milliGRAM(s)/deciliter  glucagon  Injectable 1 milliGRAM(s) IntraMuscular once PRN Glucose LESS THAN 70 milligrams/deciliter  HYDROmorphone  Injectable 1 milliGRAM(s) IV Push every 2 hours PRN Severe Pain (7 - 10)  oxyCODONE    IR 10 milliGRAM(s) Oral every 6 hours PRN Moderate Pain (4 - 6)      Allergies    No Known Allergies    Intolerances        VITALS:    Vital Signs Last 24 Hrs  T(C): 36.7 (09 Jul 2019 16:12), Max: 36.8 (09 Jul 2019 13:48)  T(F): 98.1 (09 Jul 2019 16:12), Max: 98.2 (09 Jul 2019 13:48)  HR: 76 (09 Jul 2019 16:12) (76 - 88)  BP: 129/76 (09 Jul 2019 16:12) (117/70 - 129/76)  BP(mean): --  RR: 18 (09 Jul 2019 16:12) (18 - 18)  SpO2: 97% (09 Jul 2019 16:12) (97% - 97%)    LABS:                          10.6   4.74  )-----------( 182      ( 09 Jul 2019 08:46 )             34.4       07-09    135  |  94<L>  |  15  ----------------------------<  225<H>  4.7   |  28  |  0.77    Ca    9.0      09 Jul 2019 07:15    TPro  6.4  /  Alb  3.2<L>  /  TBili  0.4  /  DBili  x   /  AST  56<H>  /  ALT  124<H>  /  AlkPhos  102  07-09      CAPILLARY BLOOD GLUCOSE      POCT Blood Glucose.: 138 mg/dL (10 Jul 2019 09:55)  POCT Blood Glucose.: 216 mg/dL (09 Jul 2019 21:33)  POCT Blood Glucose.: 181 mg/dL (09 Jul 2019 19:29)  POCT Blood Glucose.: 188 mg/dL (09 Jul 2019 13:38)          LOWER EXTREMITY PHYSICAL EXAM:    Vasular: DP/PT 2/4, B/L, CFT < 4 seconds B/L, Temperature gradient WNL B/L.   Neuro: Epicritic sensation diminished to the level of toes B/L.  Musculoskeletal/Ortho: No gross deformities, patient was guarding with lower extremity contracture  Skin: LF Postero-lateral heel ulcer w/ no purulence, no acute signs of local infection, not tracking  Wound #1:   Location: Left postero-lateral heel  Size: 5 x 2.5 cm  Depth: Down to subQ  Wound bed: Fibrogranular  Drainage: Serous drainage  Odor: None  Periwound: HPK  Etiology: Pressure    RADIOLOGY & ADDITIONAL STUDIES:

## 2019-07-10 NOTE — DIETITIAN INITIAL EVALUATION ADULT. - ETIOLOGY
lack of retention of nutrition therapy for DM; no prior exposure to education for increased protein needs to promote wound healing

## 2019-07-10 NOTE — DIETITIAN INITIAL EVALUATION ADULT. - ENERGY INTAKE
Pt reports baseline appetite in-house. Consumed scrambled eggs and oatmeal for breakfast. Fair (>50%) Pt reports baseline appetite in-house. Consumed scrambled eggs and oatmeal for breakfast. RN confirms pt with fair appetite.

## 2019-07-10 NOTE — PROGRESS NOTE ADULT - ASSESSMENT
59 year old male with Renal Cell Carcinoma, DM sent from Oncologist office for large decubitus ulcer.    Remains afebrile  WBC WNL  Large right decub ulcer does not appear infected  Heel ulcer does not appear to be infected  Per wife, has new skin rash/ lesion on left buttock and left inner thigh    CT A/P with Stable mediastinal and retroperitoneal lymphadenopathy. Bilateral pulmonary nodules. Right renal mass unchanged. Lytic osseous lesions unchanged. Nonspecific infiltration of the subcutaneous tissues overlying the right buttock.     Recommend:  -Wounds do not appear to be infected. No systemic signs of infection.  -Can monitor off antibiotics.  -Appreciate Derm evaluation for new lesion on left inner leg and left buttock  -Appreciate Podiatry evaluation for heel ulcer    Merlin Moya MD  Pager [phone]  After 5pm/weekends call [phone]

## 2019-07-10 NOTE — DIETITIAN INITIAL EVALUATION ADULT. - OTHER INFO
Diet PTA: Of note, pt with PMH of HTN and T2DM. Pt reports following low sodium diet at home; denies following a consistent carbohydrate diet or watching portion sizes of carbohydrates. States appetite has decreased in last 2-3 months. Reports consumes 3 small meals per day consisting of bread, crackers, sometimes eggs or fish. Pt reports PTA checks finger sticks twice daily (morning/night), with usual range being 110-130 mg/dL. Per chart, PTA pt takes 14 units Lantus at bedtime and 1 mg repaglinide three times daily before meals. Question if current hyperglycemia (finger sticks 181-303 mg/dL on 7/9) is related to lack of pre-meal repaglinide or fast-acting insulin in-house.    Weight history: Pt reports UBW of 180-190 pounds and denies recent weight changes. Reported UBW is consistent with in-house dosing weight of 187.1 pounds (7/8). Per review of chart, previous H&P states pt weight 190 pounds (4/10), indicating weight maintenance over the last 4 months.     Pt denies any food allergies. Endorses micronutrient supplementation with B12. Denies nausea/vomiting, diarrhea/constipation, or chewing/swallowing issues. Last BM 7/9.    Pt and wife at bedside agreeable to verbal and written education on Heart Healthy Consistent Carbohydrate diet and increased needs for protein to promote wound healing. Discussed sources of and idea of pairing carbohydrates and proteins. Reviewed limiting sugar-sweetened beverages and proper portion sizes for proteins and carbohydrates. Reinforced sources of high sodium foods and alternative options. Pt agreeable to receive Prince twice daily in order to increase protein intake and promote wound healing; discussed uses and ways to consume. Pt and wife receptive to information and verbalized understanding. Provided with Heart Healthy Consistent Carbohydrate Nutrition Therapy handout.

## 2019-07-10 NOTE — CONSULT NOTE ADULT - ASSESSMENT
58 yo male w/ Left foot heel ulcer  - Pt seen and evaluated  - Left foot postero-lateral heel ulcer down to subQ, stable, serous drainage and no purulence, no acute signs of infection  - Vitals signs stable, no leukocytosis, afebrile  - Ordered santyl and Z flow boots  - Daily local wound care w/ santyl and DSD  - Z flow boots at all times while in bed  - No podiatry surgical intervention at this time  - Discussed w/ attending

## 2019-07-10 NOTE — CONSULT NOTE ADULT - REASON FOR ADMISSION
large buttock / sacrum decub

## 2019-07-10 NOTE — CONSULT NOTE ADULT - ASSESSMENT
Patient staffed with Dr. Carlisle    Please page [phone] with questions.    Radha Neal MD PGY-3  [facility] Dermatology  Pager: [phone]  Office: [phone] Dermatitis artefacta   2/2 outside trauma vs pressure related.   Geometric appearing lesions unlikely 2/2 other processes such as medication side effect however will rule out viral process.   Does not appear infected.   Do not recommend biopsy at this time as findings will be nonspecific and unlikely to aid in determining cause.    -HSV PCR sent  -please send CMV IgM/IgG  -continue with wound care recommendations    Patient staffed with Dr. Maylin Neal MD PGY-3  [facility] Dermatology  Pager: [phone]  Office: [phone]

## 2019-07-10 NOTE — DIETITIAN INITIAL EVALUATION ADULT. - ADD RECOMMEND
Consider changing diet to DASH/TLC, Consistent Carbohydrate in setting of in-house hyperglycemia and A1c of 8.1%. Recommend add 2 packets of Prince daily (160 calories, 28 grams of additional amino acids) in setting of large decubitus ulcer in order to promote wound healing. Monitor tolerance to diet prescription, nutritional intake, weight trends, skin integrity and bowel movements.

## 2019-07-10 NOTE — PHYSICAL THERAPY INITIAL EVALUATION ADULT - PRECAUTIONS/LIMITATIONS, REHAB EVAL
Hampton balloon is inflated in the prostatic urethra. Lytic osseous lesions, unchanged. Nonspecific infiltration of the subcutaneous tissues overlying the right buttock. BONES: Lytic lesions in the right eighth rib, unchanged since 6/27/2019.  Lytic lesions in the pelvis, particularly in the right acetabulum are unchanged since 6/5/2019./fall precautions

## 2019-07-10 NOTE — DISCHARGE NOTE PROVIDER - PROVIDER TOKENS
PROVIDER:[TOKEN:[[id number]]] PROVIDER:[TOKEN:[[id number]]],PROVIDER:[TOKEN:[[id number]]]

## 2019-07-10 NOTE — DIETITIAN INITIAL EVALUATION ADULT. - SIGNS/SYMPTOMS
HgbA1c 8.1%, stage _ decubitus ulcer large stage _ decubitus ulcer, heel ulcer, groin ulcer/rash HgbA1c 8.1%, decubitus ulcers large decubitus ulcer on buttocks, unstageable heel ulcer, groin ulcer/rash

## 2019-07-10 NOTE — PHYSICAL THERAPY INITIAL EVALUATION ADULT - PLANNED THERAPY INTERVENTIONS, PT EVAL
Stair training... GOAL: In 4 weeks pt will negotiate 3 stairs with Mod Ax1 & least restrictive device./transfer training/bed mobility training/gait training/strengthening/balance training

## 2019-07-10 NOTE — PHYSICAL THERAPY INITIAL EVALUATION ADULT - GAIT DEVIATIONS NOTED, PT EVAL
increased time in double stance/decreased velocity of limb motion/decreased step length/increased stride width/decreased swing-to-stance ratio/decreased veronika

## 2019-07-10 NOTE — PHYSICAL THERAPY INITIAL EVALUATION ADULT - GENERAL OBSERVATIONS, REHAB EVAL
Pt tolerated 45min PT initial evaluation fairly well. Pt rec'd in bed in NAD, +dressing L heel, WBAT at time of initial eval.

## 2019-07-10 NOTE — CONSULT NOTE ADULT - SUBJECTIVE AND OBJECTIVE BOX
HPI:  Pt is a 60 yo male with a hx of renal cell carcinoma, DM (on chemo who was sent to the ED via his oncologist after seeing a large decubitus ulcer worsening over 5 weeks. He has had trouble moving and frequently wets bed. Patient denies f/c/n/v/d. Was sent in by outpatient oncologist for concern of worsening of diease. (08 Jul 2019 21:59)      PAST MEDICAL & SURGICAL HISTORY:  BPH without obstruction/lower urinary tract symptoms  Urinary retention: Hampton catheter placed since 10/2018  HLD (hyperlipidemia)  S/P radiation therapy: spine, on cabozantinib since Nov 2017  Malignant neoplasm of vertebral column  Hypothyroidism  GERD (gastroesophageal reflux disease)  Cervicalgia  Metastatic renal cell carcinoma to brain: lung/spine  Hypertension  Diabetes mellitus: Type 2  H/O kyphoplasty: T8, 11/2018  Status post gamma knife treatment: Left forehead/temple      REVIEW OF SYSTEMS      General: no fevers/chills, no lethargy	    Skin/Breast: see HPI  	  Ophthalmologic: no eye pain or change in vision  	  ENMT: no dysphagia or change in hearing    Respiratory and Thorax: no SOB or cough  	  Cardiovascular: no palpitations or chest pain    Gastrointestinal: no abdominal pain or blood in stool     Genitourinary: no dysuria or frequency    Musculoskeletal: no joint pains or weakness	    Neurological:no weakness, numbness , or tingling    MEDICATIONS  (STANDING):  amLODIPine   Tablet 10 milliGRAM(s) Oral daily  dextrose 5%. 1000 milliLiter(s) (50 mL/Hr) IV Continuous <Continuous>  dextrose 50% Injectable 12.5 Gram(s) IV Push once  dextrose 50% Injectable 25 Gram(s) IV Push once  dextrose 50% Injectable 25 Gram(s) IV Push once  enalapril 10 milliGRAM(s) Oral daily  enoxaparin Injectable 40 milliGRAM(s) SubCutaneous daily  finasteride 5 milliGRAM(s) Oral daily  insulin glargine Injectable (LANTUS) 14 Unit(s) SubCutaneous at bedtime  levothyroxine 137 MICROGram(s) Oral daily  nystatin Powder 1 Application(s) Topical two times a day  pantoprazole    Tablet 40 milliGRAM(s) Oral before breakfast    MEDICATIONS  (PRN):  dextrose 40% Gel 15 Gram(s) Oral once PRN Blood Glucose LESS THAN 70 milliGRAM(s)/deciliter  glucagon  Injectable 1 milliGRAM(s) IntraMuscular once PRN Glucose LESS THAN 70 milligrams/deciliter  HYDROmorphone  Injectable 1 milliGRAM(s) IV Push every 2 hours PRN Severe Pain (7 - 10)  oxyCODONE    IR 10 milliGRAM(s) Oral every 6 hours PRN Moderate Pain (4 - 6)      Allergies    No Known Allergies    Intolerances        SOCIAL HISTORY:    FAMILY HISTORY:  Family history of colon cancer  Family history of diabetes mellitus in mother  Family history of diabetes mellitus in father      Vital Signs Last 24 Hrs  T(C): 36.7 (09 Jul 2019 16:12), Max: 36.8 (09 Jul 2019 13:48)  T(F): 98.1 (09 Jul 2019 16:12), Max: 98.2 (09 Jul 2019 13:48)  HR: 76 (09 Jul 2019 16:12) (76 - 88)  BP: 129/76 (09 Jul 2019 16:12) (117/70 - 129/76)  BP(mean): --  RR: 18 (09 Jul 2019 16:12) (18 - 18)  SpO2: 97% (09 Jul 2019 16:12) (97% - 97%)    PHYSICAL EXAM:     The patient was alert and oriented X 3, well nourished, and in no  apparent distress.  OP showed no ulcerations  There was no visible lymphadenopathy.  Conjunctiva were non injected  There was no clubbing or edema of extremities.  The scalp, hair, face, eyebrows, lips, OP, neck, chest, back,   extremities X 4, nails were examined.  There was no hyperhidrosis or bromhidrosis.    Of note on skin exam:       LABS:                        10.6   4.74  )-----------( 182      ( 09 Jul 2019 08:46 )             34.4     07-09    135  |  94<L>  |  15  ----------------------------<  225<H>  4.7   |  28  |  0.77    Ca    9.0      09 Jul 2019 07:15    TPro  6.4  /  Alb  3.2<L>  /  TBili  0.4  /  DBili  x   /  AST  56<H>  /  ALT  124<H>  /  AlkPhos  102  07-09          RADIOLOGY & ADDITIONAL STUDIES: HPI:  Pt is a 60 yo male with a hx of renal cell carcinoma, DM (on cabozantinib) who was sent to the ED via his oncologist after seeing a large somewhat painful decubitus ulcer worsening over 5 weeks. He has had trouble moving and frequently wets bed.  Was sent in by outpatient oncologist for concern of worsening of diease. Patient denies picking, scratching or trauma to area. Patient in bed ~20 hours per day. Transfers to chair for 3-4 hours. Patient denies vasquez catheter from compressing skin on buttocks for long period. Patient denies f/c/n/v/d.      PAST MEDICAL & SURGICAL HISTORY:  BPH without obstruction/lower urinary tract symptoms  Urinary retention: Vasquez catheter placed since 10/2018  HLD (hyperlipidemia)  S/P radiation therapy: spine, on cabozantinib since Nov 2017  Malignant neoplasm of vertebral column  Hypothyroidism  GERD (gastroesophageal reflux disease)  Cervicalgia  Metastatic renal cell carcinoma to brain: lung/spine  Hypertension  Diabetes mellitus: Type 2  H/O kyphoplasty: T8, 11/2018  Status post gamma knife treatment: Left forehead/temple      REVIEW OF SYSTEMS      General: no fevers/chills, no lethargy	    Skin/Breast: see HPI  	  Ophthalmologic: no eye pain or change in vision  	  ENMT: no dysphagia or change in hearing    Respiratory and Thorax: no SOB or cough  	  Cardiovascular: no palpitations or chest pain    Gastrointestinal: no abdominal pain or blood in stool     Genitourinary: no dysuria or frequency    Musculoskeletal: no joint pains or weakness	    Neurological:no weakness, numbness , or tingling    MEDICATIONS  (STANDING):  amLODIPine   Tablet 10 milliGRAM(s) Oral daily  dextrose 5%. 1000 milliLiter(s) (50 mL/Hr) IV Continuous <Continuous>  dextrose 50% Injectable 12.5 Gram(s) IV Push once  dextrose 50% Injectable 25 Gram(s) IV Push once  dextrose 50% Injectable 25 Gram(s) IV Push once  enalapril 10 milliGRAM(s) Oral daily  enoxaparin Injectable 40 milliGRAM(s) SubCutaneous daily  finasteride 5 milliGRAM(s) Oral daily  insulin glargine Injectable (LANTUS) 14 Unit(s) SubCutaneous at bedtime  levothyroxine 137 MICROGram(s) Oral daily  nystatin Powder 1 Application(s) Topical two times a day  pantoprazole    Tablet 40 milliGRAM(s) Oral before breakfast    MEDICATIONS  (PRN):  dextrose 40% Gel 15 Gram(s) Oral once PRN Blood Glucose LESS THAN 70 milliGRAM(s)/deciliter  glucagon  Injectable 1 milliGRAM(s) IntraMuscular once PRN Glucose LESS THAN 70 milligrams/deciliter  HYDROmorphone  Injectable 1 milliGRAM(s) IV Push every 2 hours PRN Severe Pain (7 - 10)  oxyCODONE    IR 10 milliGRAM(s) Oral every 6 hours PRN Moderate Pain (4 - 6)      Allergies    No Known Allergies    Intolerances        SOCIAL HISTORY:    FAMILY HISTORY:  Family history of colon cancer  Family history of diabetes mellitus in mother  Family history of diabetes mellitus in father      Vital Signs Last 24 Hrs  T(C): 36.7 (09 Jul 2019 16:12), Max: 36.8 (09 Jul 2019 13:48)  T(F): 98.1 (09 Jul 2019 16:12), Max: 98.2 (09 Jul 2019 13:48)  HR: 76 (09 Jul 2019 16:12) (76 - 88)  BP: 129/76 (09 Jul 2019 16:12) (117/70 - 129/76)  BP(mean): --  RR: 18 (09 Jul 2019 16:12) (18 - 18)  SpO2: 97% (09 Jul 2019 16:12) (97% - 97%)    PHYSICAL EXAM:     The patient was alert and oriented X 3, well nourished, and in no  apparent distress.  OP showed no ulcerations  There was no visible lymphadenopathy.  Conjunctiva were non injected  There was no clubbing or edema of extremities.  The scalp, hair, face, eyebrows, lips, OP, neck, chest, back,   extremities X 4, nails were examined.  There was no hyperhidrosis or bromhidrosis.    Of note on skin exam:   large linear/geometric ulceration on right buttocks and smaller lesiosn on left leg with surrounding hyperpigmentation, base with granulation tissue, no surrounding erythema, significant drainage    LABS:                        10.6   4.74  )-----------( 182      ( 09 Jul 2019 08:46 )             34.4     07-09    135  |  94<L>  |  15  ----------------------------<  225<H>  4.7   |  28  |  0.77    Ca    9.0      09 Jul 2019 07:15    TPro  6.4  /  Alb  3.2<L>  /  TBili  0.4  /  DBili  x   /  AST  56<H>  /  ALT  124<H>  /  AlkPhos  102  07-09          RADIOLOGY & ADDITIONAL STUDIES:

## 2019-07-11 NOTE — PROGRESS NOTE ADULT - PROBLEM SELECTOR PROBLEM 4
Malignant neoplasm of vertebral column

## 2019-07-11 NOTE — PROGRESS NOTE ADULT - PROBLEM SELECTOR PLAN 1
pain meds  -wound care team seen : f/u recommendation
pain meds  -wound care team seen : f/u recommendation  -seen by ID : no need for any IV abx   -recommend derm consult to r/o any derm mets : can be done as out pt
pain meds  -wound care team seen : f/u recommendation  -seen by ID : no need for any IV abx   -recommend derm consult to r/o any derm mets : can be done as out pt

## 2019-07-11 NOTE — PROGRESS NOTE ADULT - ATTENDING COMMENTS
d/c planning
plan for d/c to STR
poor prognosis , will wait for onchology consult and then depending on there recommendation will consider palliative care team

## 2019-07-11 NOTE — PROGRESS NOTE ADULT - PROBLEM SELECTOR PLAN 2
pt. follows with house onchology team : seen by them   -gaurang   -f/u with onchology as out pt
pt. follows with house onchology team : will call them
pt. follows with house onchology team : seen by them   -gaurang   -f/u with onchology as out pt

## 2019-07-11 NOTE — PROGRESS NOTE ADULT - PROBLEM SELECTOR PLAN 3
has ch vasquez cath from last 5 months   -c/w vasquez

## 2019-07-11 NOTE — PROGRESS NOTE ADULT - PROBLEM SELECTOR PROBLEM 2
Metastatic renal cell carcinoma to brain

## 2019-07-12 NOTE — PROGRESS NOTE ADULT - ASSESSMENT
59 year old male with Renal Cell Carcinoma, DM sent from Oncologist office for large decubitus ulcer.    Remains afebrile  WBC WNL  Large right decub ulcer does not appear infected  Heel ulcer does not appear to be infected  Per wife, has new skin rash/ lesion on left buttock and left inner thigh - HSV PCR negative.     CT A/P with Stable mediastinal and retroperitoneal lymphadenopathy. Bilateral pulmonary nodules. Right renal mass unchanged. Lytic osseous lesions unchanged. Nonspecific infiltration of the subcutaneous tissues overlying the right buttock.     Recommend:  -Wounds do not appear to be infected. No systemic signs of infection.  -Can monitor off antibiotics.  -Appreciate Derm evaluation for new lesion on left inner leg and left buttock - Dermatitis artefacta   -Appreciate Podiatry evaluation for heel ulcer    Will sign off. Please call with questions.    Merlin Moya MD  Pager [phone]  After 5pm/weekends call [phone]

## 2019-07-12 NOTE — DISCHARGE NOTE NURSING/CASE MANAGEMENT/SOCIAL WORK - NSDCDPATPORTLINK_GEN_ALL_CORE
You can access the [facility] Patient Portal, offered by [facility], by registering with the following website: [email or web address]

## 2019-07-12 NOTE — PROGRESS NOTE ADULT - PROVIDER SPECIALTY LIST ADULT
Infectious Disease
Infectious Disease
Internal Medicine
Internal Medicine
Podiatry
Internal Medicine

## 2019-07-12 NOTE — PROGRESS NOTE ADULT - SUBJECTIVE AND OBJECTIVE BOX
CC: Patient is a 59y old  Male who presents with a chief complaint of large buttock / sacrum decub (10 Jul 2019 10:58)    ID following for sacral wound, foot wound    Interval History/ROS: Patient has no new complaints. Pain around ulcer sites. No fevers.    Rest of ROS negative.    Allergies  No Known Allergies    ANTIMICROBIALS:      OTHER MEDS:  amLODIPine   Tablet 10 milliGRAM(s) Oral daily  collagenase Ointment 1 Application(s) Topical daily  dextrose 40% Gel 15 Gram(s) Oral once PRN  dextrose 5%. 1000 milliLiter(s) IV Continuous <Continuous>  dextrose 50% Injectable 12.5 Gram(s) IV Push once  dextrose 50% Injectable 25 Gram(s) IV Push once  dextrose 50% Injectable 25 Gram(s) IV Push once  enalapril 10 milliGRAM(s) Oral daily  enoxaparin Injectable 40 milliGRAM(s) SubCutaneous daily  finasteride 5 milliGRAM(s) Oral daily  glucagon  Injectable 1 milliGRAM(s) IntraMuscular once PRN  HYDROmorphone  Injectable 1 milliGRAM(s) IV Push every 2 hours PRN  insulin glargine Injectable (LANTUS) 14 Unit(s) SubCutaneous at bedtime  levothyroxine 137 MICROGram(s) Oral daily  nystatin Powder 1 Application(s) Topical two times a day  oxyCODONE    IR 10 milliGRAM(s) Oral every 6 hours PRN  pantoprazole    Tablet 40 milliGRAM(s) Oral before breakfast      PE:    Vital Signs Last 24 Hrs  T(C): 36.7 (09 Jul 2019 16:12), Max: 36.8 (09 Jul 2019 13:48)  T(F): 98.1 (09 Jul 2019 16:12), Max: 98.2 (09 Jul 2019 13:48)  HR: 78 (10 Jul 2019 10:40) (76 - 88)  BP: 119/79 (10 Jul 2019 10:40) (117/70 - 129/76)  BP(mean): --  RR: 18 (09 Jul 2019 16:12) (18 - 18)  SpO2: 97% (09 Jul 2019 16:12) (97% - 97%)    Gen: AOx3, NAD, non-toxic  CV: S1+S2 normal, no murmurs  Resp: Clear bilat, no resp distress  Abd: Soft, nontender, +BS  Ext: No LE edema, no wounds  : No [place]  Skin:  Left Heel ucler, does not appear infected, right large sacral decub no purulence, no surrounding erythema, does not appear infected, blanching skin lesion to left buttock and inner leg  Neuro: no focal deficits    LABS:                          10.6   4.74  )-----------( 182      ( 09 Jul 2019 08:46 )             34.4       07-09    135  |  94<L>  |  15  ----------------------------<  225<H>  4.7   |  28  |  0.77    Ca    9.0      09 Jul 2019 07:15    TPro  6.4  /  Alb  3.2<L>  /  TBili  0.4  /  DBili  x   /  AST  56<H>  /  ALT  124<H>  /  AlkPhos  102  07-09    MICROBIOLOGY:  v    RADIOLOGY:    < from: CT Abdomen and Pelvis No Cont (07.08.19 @ 20:09) >  IMPRESSION:     Mediastinal and retroperitoneal lymphadenopathy, relatively stable since   6/27/2019.    Bilateral pulmonary nodules, not significantly changed since6/27/2019.    A 7.1 cm right renal mass is unchanged.    Hampton balloon is inflated in the prostatic urethra.    Lytic osseous lesions, unchanged.    Nonspecific infiltration of the subcutaneous tissues overlying the right   buttock.    < end of copied text >
CC: Patient is a 59y old  Male who presents with a chief complaint of large buttock / sacrum decub (12 Jul 2019 12:44)    ID following for sacral decub, foot ulcer    Interval History/ROS: Patient has no new complaints. No fevers.    Rest of ROS negative.    Rest of ROS negative    Allergies  No Known Allergies    ANTIMICROBIALS:      OTHER MEDS:  amLODIPine   Tablet 10 milliGRAM(s) Oral daily  chlorhexidine 2% Cloths 1 Application(s) Topical daily  collagenase Ointment 1 Application(s) Topical daily  dextrose 40% Gel 15 Gram(s) Oral once PRN  dextrose 5%. 1000 milliLiter(s) IV Continuous <Continuous>  dextrose 50% Injectable 12.5 Gram(s) IV Push once  dextrose 50% Injectable 25 Gram(s) IV Push once  dextrose 50% Injectable 25 Gram(s) IV Push once  enalapril 10 milliGRAM(s) Oral daily  enoxaparin Injectable 40 milliGRAM(s) SubCutaneous daily  finasteride 5 milliGRAM(s) Oral daily  glucagon  Injectable 1 milliGRAM(s) IntraMuscular once PRN  insulin glargine Injectable (LANTUS) 14 Unit(s) SubCutaneous at bedtime  insulin lispro (HumaLOG) corrective regimen sliding scale   SubCutaneous three times a day before meals  insulin lispro (HumaLOG) corrective regimen sliding scale   SubCutaneous at bedtime  levothyroxine 137 MICROGram(s) Oral daily  nystatin Powder 1 Application(s) Topical two times a day  oxyCODONE    5 mG/acetaminophen 325 mG 1 Tablet(s) Oral every 4 hours PRN  oxyCODONE    IR 10 milliGRAM(s) Oral every 6 hours PRN  pantoprazole    Tablet 40 milliGRAM(s) Oral before breakfast      PE:    Vital Signs Last 24 Hrs  T(C): 36.8 (12 Jul 2019 07:45), Max: 36.9 (11 Jul 2019 15:59)  T(F): 98.3 (12 Jul 2019 07:45), Max: 98.5 (12 Jul 2019 00:42)  HR: 75 (12 Jul 2019 07:45) (75 - 77)  BP: 132/80 (12 Jul 2019 07:45) (126/74 - 136/79)  BP(mean): --  RR: 18 (12 Jul 2019 07:45) (18 - 18)  SpO2: 95% (12 Jul 2019 07:45) (95% - 97%)    Gen: AOx3, NAD, non-toxic  CV: S1+S2 normal, no murmurs  Resp: Clear bilat, no resp distress  Abd: Soft, nontender, +BS  Ext: No LE edema, no wounds  : No [place]  Skin:  Left Heel ulcer, does not appear infected, right large sacral decub no purulence, no surrounding erythema, does not appear infected, blanching skin lesion to left buttock and inner leg  Neuro: no focal deficits      LABS:                          10.6   4.13  )-----------( 187      ( 12 Jul 2019 09:23 )             34.9       07-12    138  |  100  |  18  ----------------------------<  151<H>  4.1   |  27  |  0.92    Ca    8.5      12 Jul 2019 07:19    TPro  6.0  /  Alb  3.1<L>  /  TBili  0.3  /  DBili  x   /  AST  50<H>  /  ALT  92<H>  /  AlkPhos  101  07-12    MICROBIOLOGY:  v    RADIOLOGY:    < from: CT Abdomen and Pelvis No Cont (07.08.19 @ 20:09) >  IMPRESSION:     Mediastinal and retroperitoneal lymphadenopathy, relatively stable since   6/27/2019.    Bilateral pulmonary nodules, not significantly changed since6/27/2019.    A 7.1 cm right renal mass is unchanged.    Hampton balloon is inflated in the prostatic urethra.    Lytic osseous lesions, unchanged.    Nonspecific infiltration of the subcutaneous tissues overlying the right   buttock.      < end of copied text >
Patient is a 59y old  Male who presents with a chief complaint of large buttock / sacrum decub (08 Jul 2019 21:59)    pt. seen and examined, pain controlled     INTERVAL HPI/OVERNIGHT EVENTS:  T(C): 36.4 (07-09-19 @ 08:13), Max: 36.8 (07-08-19 @ 17:34)  HR: 74 (07-09-19 @ 08:13) (70 - 85)  BP: 155/86 (07-09-19 @ 08:13) (135/88 - 161/100)  RR: 18 (07-09-19 @ 08:13) (18 - 20)  SpO2: 95% (07-09-19 @ 08:13) (94% - 97%)  Wt(kg): --  I&O's Summary    08 Jul 2019 07:01  -  09 Jul 2019 07:00  --------------------------------------------------------  IN: 0 mL / OUT: 850 mL / NET: -850 mL        PAST MEDICAL & SURGICAL HISTORY:  BPH without obstruction/lower urinary tract symptoms  Urinary retention: Hampton catheter placed since 10/2018  HLD (hyperlipidemia)  S/P radiation therapy: spine, on cabozantinib since Nov 2017  Malignant neoplasm of vertebral column  Hypothyroidism  GERD (gastroesophageal reflux disease)  Cervicalgia  Metastatic renal cell carcinoma to brain: lung/spine  Hypertension  Diabetes mellitus: Type 2  H/O kyphoplasty: T8, 11/2018  Status post gamma knife treatment: Left forehead/temple      SOCIAL HISTORY  Alcohol:  Tobacco:  Illicit substance use:    FAMILY HISTORY:    REVIEW OF SYSTEMS:  CONSTITUTIONAL: No fever, weight loss, or fatigue  EYES: No eye pain, visual disturbances, or discharge  ENMT:  No difficulty hearing, tinnitus, vertigo; No sinus or throat pain  NECK: No pain or stiffness  RESPIRATORY: No cough, wheezing, chills or hemoptysis; No shortness of breath  CARDIOVASCULAR: No chest pain, palpitations, dizziness, or leg swelling  GASTROINTESTINAL: No abdominal or epigastric pain. No nausea, vomiting, or hematemesis; No diarrhea or constipation. No melena or hematochezia.  GENITOURINARY: No dysuria, frequency, hematuria, or incontinence  NEUROLOGICAL: No headaches, memory loss, loss of strength, numbness, or tremors  SKIN: No itching, burning, rashes, or lesions   LYMPH NODES: No enlarged glands  ENDOCRINE: No heat or cold intolerance; No hair loss  MUSCULOSKELETAL: No joint pain or swelling; No muscle, back, or extremity pain  PSYCHIATRIC: No depression, anxiety, mood swings, or difficulty sleeping  HEME/LYMPH: No easy bruising, or bleeding gums  ALLERY AND IMMUNOLOGIC: No hives or eczema    RADIOLOGY & ADDITIONAL TESTS:    Imaging Personally Reviewed:  [ ] YES  [ ] NO    Consultant(s) Notes Reviewed:  [ ] YES  [ ] NO    PHYSICAL EXAM:  GENERAL: NAD, well-groomed, well-developed  HEAD:  Atraumatic, Normocephalic  EYES: EOMI, PERRLA, conjunctiva and sclera clear  ENMT: No tonsillar erythema, exudates, or enlargement; Moist mucous membranes, Good dentition, No lesions  NECK: Supple, No JVD, Normal thyroid  NERVOUS SYSTEM:  Alert & Oriented X3, Good concentration; Motor Strength 5/5 B/L upper and lower extremities; DTRs 2+ intact and symmetric  CHEST/LUNG: Clear to percussion bilaterally; No rales, rhonchi, wheezing, or rubs  HEART: Regular rate and rhythm; No murmurs, rubs, or gallops  ABDOMEN: Soft, Nontender, Nondistended; Bowel sounds present  EXTREMITIES:  2+ Peripheral Pulses, No clubbing, cyanosis, or edema  LYMPH: No lymphadenopathy noted  SKIN: No rashes or lesions    LABS:                        10.6   4.74  )-----------( 182      ( 09 Jul 2019 08:46 )             34.4     07-09    135  |  94<L>  |  15  ----------------------------<  225<H>  4.7   |  28  |  0.77    Ca    9.0      09 Jul 2019 07:15    TPro  6.4  /  Alb  3.2<L>  /  TBili  0.4  /  DBili  x   /  AST  56<H>  /  ALT  124<H>  /  AlkPhos  102  07-09        CAPILLARY BLOOD GLUCOSE      POCT Blood Glucose.: 303 mg/dL (09 Jul 2019 00:25)            MEDICATIONS  (STANDING):  amLODIPine   Tablet 10 milliGRAM(s) Oral daily  ampicillin/sulbactam  IVPB 3 Gram(s) IV Intermittent every 6 hours  ampicillin/sulbactam  IVPB      dextrose 5%. 1000 milliLiter(s) (50 mL/Hr) IV Continuous <Continuous>  dextrose 50% Injectable 12.5 Gram(s) IV Push once  dextrose 50% Injectable 25 Gram(s) IV Push once  dextrose 50% Injectable 25 Gram(s) IV Push once  enalapril 10 milliGRAM(s) Oral daily  enoxaparin Injectable 40 milliGRAM(s) SubCutaneous daily  finasteride 5 milliGRAM(s) Oral daily  insulin glargine Injectable (LANTUS) 14 Unit(s) SubCutaneous at bedtime  levothyroxine 137 MICROGram(s) Oral daily  pantoprazole    Tablet 40 milliGRAM(s) Oral before breakfast    MEDICATIONS  (PRN):  dextrose 40% Gel 15 Gram(s) Oral once PRN Blood Glucose LESS THAN 70 milliGRAM(s)/deciliter  glucagon  Injectable 1 milliGRAM(s) IntraMuscular once PRN Glucose LESS THAN 70 milligrams/deciliter  HYDROmorphone  Injectable 1 milliGRAM(s) IV Push every 2 hours PRN Severe Pain (7 - 10)  oxyCODONE    IR 10 milliGRAM(s) Oral every 6 hours PRN Moderate Pain (4 - 6)      Care Discussed with Consultants/Other Providers [ ] YES  [ ] NO
Patient is a 59y old  Male who presents with a chief complaint of large buttock / sacrum decub (10 Jul 2019 22:23)    pt. seen and examined, NAD    INTERVAL HPI/OVERNIGHT EVENTS:  T(C): 36.9 (07-12-19 @ 00:42), Max: 36.9 (07-11-19 @ 15:59)  HR: 77 (07-12-19 @ 00:42) (73 - 77)  BP: 134/79 (07-12-19 @ 00:42) (126/74 - 153/88)  RR: 18 (07-12-19 @ 00:42) (18 - 18)  SpO2: 97% (07-12-19 @ 00:42) (95% - 97%)  Wt(kg): --  I&O's Summary    10 Jul 2019 07:01  -  11 Jul 2019 07:00  --------------------------------------------------------  IN: 0 mL / OUT: 1500 mL / NET: -1500 mL    11 Jul 2019 07:01  -  12 Jul 2019 00:55  --------------------------------------------------------  IN: 0 mL / OUT: 1275 mL / NET: -1275 mL        PAST MEDICAL & SURGICAL HISTORY:  BPH without obstruction/lower urinary tract symptoms  Urinary retention: Hampton catheter placed since 10/2018  HLD (hyperlipidemia)  S/P radiation therapy: spine, on cabozantinib since Nov 2017  Malignant neoplasm of vertebral column  Hypothyroidism  GERD (gastroesophageal reflux disease)  Cervicalgia  Metastatic renal cell carcinoma to brain: lung/spine  Hypertension  Diabetes mellitus: Type 2  H/O kyphoplasty: T8, 11/2018  Status post gamma knife treatment: Left forehead/temple      SOCIAL HISTORY  Alcohol:  Tobacco:  Illicit substance use:    FAMILY HISTORY:    REVIEW OF SYSTEMS:  CONSTITUTIONAL: No fever, weight loss, or fatigue  EYES: No eye pain, visual disturbances, or discharge  ENMT:  No difficulty hearing, tinnitus, vertigo; No sinus or throat pain  NECK: No pain or stiffness  RESPIRATORY: No cough, wheezing, chills or hemoptysis; No shortness of breath  CARDIOVASCULAR: No chest pain, palpitations, dizziness, or leg swelling  GASTROINTESTINAL: No abdominal or epigastric pain. No nausea, vomiting, or hematemesis; No diarrhea or constipation. No melena or hematochezia.  GENITOURINARY: No dysuria, frequency, hematuria, or incontinence  NEUROLOGICAL: No headaches, memory loss, loss of strength, numbness, or tremors  SKIN: No itching, burning, rashes, or lesions   LYMPH NODES: No enlarged glands  ENDOCRINE: No heat or cold intolerance; No hair loss  MUSCULOSKELETAL: No joint pain or swelling; No muscle, back, or extremity pain  PSYCHIATRIC: No depression, anxiety, mood swings, or difficulty sleeping  HEME/LYMPH: No easy bruising, or bleeding gums  ALLERY AND IMMUNOLOGIC: No hives or eczema    RADIOLOGY & ADDITIONAL TESTS:    Imaging Personally Reviewed:  [ ] YES  [ ] NO    Consultant(s) Notes Reviewed:  [ ] YES  [ ] NO    PHYSICAL EXAM:  GENERAL: NAD, well-groomed, well-developed  HEAD:  Atraumatic, Normocephalic  EYES: EOMI, PERRLA, conjunctiva and sclera clear  ENMT: No tonsillar erythema, exudates, or enlargement; Moist mucous membranes, Good dentition, No lesions  NECK: Supple, No JVD, Normal thyroid  NERVOUS SYSTEM:  Alert & Oriented X3, Good concentration; Motor Strength 5/5 B/L upper and lower extremities; DTRs 2+ intact and symmetric  CHEST/LUNG: Clear to percussion bilaterally; No rales, rhonchi, wheezing, or rubs  HEART: Regular rate and rhythm; No murmurs, rubs, or gallops  ABDOMEN: Soft, Nontender, Nondistended; Bowel sounds present  EXTREMITIES:  2+ Peripheral Pulses, No clubbing, cyanosis, or edema  LYMPH: No lymphadenopathy noted  SKIN: No rashes or lesions    LABS:              CAPILLARY BLOOD GLUCOSE      POCT Blood Glucose.: 194 mg/dL (11 Jul 2019 21:22)  POCT Blood Glucose.: 194 mg/dL (11 Jul 2019 18:59)  POCT Blood Glucose.: 194 mg/dL (11 Jul 2019 17:18)  POCT Blood Glucose.: 139 mg/dL (11 Jul 2019 14:22)  POCT Blood Glucose.: 114 mg/dL (11 Jul 2019 10:32)            MEDICATIONS  (STANDING):  amLODIPine   Tablet 10 milliGRAM(s) Oral daily  chlorhexidine 2% Cloths 1 Application(s) Topical daily  collagenase Ointment 1 Application(s) Topical daily  dextrose 5%. 1000 milliLiter(s) (50 mL/Hr) IV Continuous <Continuous>  dextrose 50% Injectable 12.5 Gram(s) IV Push once  dextrose 50% Injectable 25 Gram(s) IV Push once  dextrose 50% Injectable 25 Gram(s) IV Push once  enalapril 10 milliGRAM(s) Oral daily  enoxaparin Injectable 40 milliGRAM(s) SubCutaneous daily  finasteride 5 milliGRAM(s) Oral daily  insulin glargine Injectable (LANTUS) 14 Unit(s) SubCutaneous at bedtime  insulin lispro (HumaLOG) corrective regimen sliding scale   SubCutaneous three times a day before meals  insulin lispro (HumaLOG) corrective regimen sliding scale   SubCutaneous at bedtime  levothyroxine 137 MICROGram(s) Oral daily  nystatin Powder 1 Application(s) Topical two times a day  pantoprazole    Tablet 40 milliGRAM(s) Oral before breakfast    MEDICATIONS  (PRN):  dextrose 40% Gel 15 Gram(s) Oral once PRN Blood Glucose LESS THAN 70 milliGRAM(s)/deciliter  glucagon  Injectable 1 milliGRAM(s) IntraMuscular once PRN Glucose LESS THAN 70 milligrams/deciliter  oxyCODONE    5 mG/acetaminophen 325 mG 1 Tablet(s) Oral every 4 hours PRN Moderate Pain (4 - 6)  oxyCODONE    IR 10 milliGRAM(s) Oral every 6 hours PRN Severe Pain (7 - 10)      Care Discussed with Consultants/Other Providers [ ] YES  [ ] NO
Podiatry pager #: [place] Pager:  [phone], [facility] Pager: [phone]    Patient is a 59y old  Male who presents with a chief complaint of large buttock / sacrum decub (11 Jul 2019 20:54)       INTERVAL HPI/OVERNIGHT EVENTS:  Patient seen and evaluated at bedside.  Pt is resting comfortable in NAD. Denies N/V/F/C.    Allergies    No Known Allergies    Intolerances        Vital Signs Last 24 Hrs  T(C): 36.8 (12 Jul 2019 07:45), Max: 36.9 (11 Jul 2019 15:59)  T(F): 98.3 (12 Jul 2019 07:45), Max: 98.5 (12 Jul 2019 00:42)  HR: 75 (12 Jul 2019 07:45) (75 - 77)  BP: 132/80 (12 Jul 2019 07:45) (126/74 - 136/79)  BP(mean): --  RR: 18 (12 Jul 2019 07:45) (18 - 18)  SpO2: 95% (12 Jul 2019 07:45) (95% - 97%)    LABS:                        10.6   4.13  )-----------( 187      ( 12 Jul 2019 09:23 )             34.9     07-12    138  |  100  |  18  ----------------------------<  151<H>  4.1   |  27  |  0.92    Ca    8.5      12 Jul 2019 07:19    TPro  6.0  /  Alb  3.1<L>  /  TBili  0.3  /  DBili  x   /  AST  50<H>  /  ALT  92<H>  /  AlkPhos  101  07-12        CAPILLARY BLOOD GLUCOSE      POCT Blood Glucose.: 203 mg/dL (12 Jul 2019 12:34)  POCT Blood Glucose.: 132 mg/dL (12 Jul 2019 09:16)  POCT Blood Glucose.: 194 mg/dL (11 Jul 2019 21:22)  POCT Blood Glucose.: 194 mg/dL (11 Jul 2019 18:59)  POCT Blood Glucose.: 194 mg/dL (11 Jul 2019 17:18)  POCT Blood Glucose.: 139 mg/dL (11 Jul 2019 14:22)      Lower Extremity Physical Exam:  Vasular: DP/PT 2/4, B/L, CFT < 4 seconds B/L, Temperature gradient WNL B/L.   Neuro: Epicritic sensation diminished to the level of toes B/L.  Musculoskeletal/Ortho: No gross deformities, patient was guarding with lower extremity contracture  Skin: LF Postero-lateral heel ulcer w/ no purulence, no acute signs of local infection, not tracking  Wound #1:   Location: Left postero-lateral heel  Size: 5 x 2.5 cm  Depth: Down to subQ, fibrogranular wound bed   Wound bed: Fibrogranular  Drainage: None  Odor: None  Periwound: HPK  Etiology: Pressure    RADIOLOGY & ADDITIONAL TESTS:  < from: Xray Foot AP + Lateral + Oblique, Left (07.10.19 @ 22:40) >    EXAM:  FOOT COMPLETE LEFT (MIN 3 VIEWS)                            PROCEDURE DATE:  07/10/2019            INTERPRETATION:  EXAMINATION: 3 views of the left foot    CLINICAL INFORMATION: left heel wound    IMPRESSION:     No areas of cortical destruction or periosteal reaction to suggest   osteomyelitis. Suspected cutaneous ulceration along the posterior aspect   of the calcaneus.    No fractures or dislocations. Prominent dorsal calcaneal enthesophyte.   Mild first metatarsophalangeal joint arthrosis.    Vascular consultations. Diffuse soft tissue swelling of the forefoot.                        ÓSCAR GARCIA M.D., ATTENDING RADIOLOGIST  This document has been electronically signed. Jul 11 2019 10:34AM        < end of copied text >
Patient is a 59y old  Male who presents with a chief complaint of large buttock / sacrum decub (10 Jul 2019 12:14)    pt. seen and examined, doing fair, pain controlled     INTERVAL HPI/OVERNIGHT EVENTS:  T(C): 36.8 (07-10-19 @ 21:35), Max: 36.8 (07-10-19 @ 21:35)  HR: 69 (07-10-19 @ 21:35) (69 - 78)  BP: 105/68 (07-10-19 @ 21:35) (105/68 - 119/79)  RR: 18 (07-10-19 @ 21:35) (18 - 18)  SpO2: 98% (07-10-19 @ 21:35) (97% - 98%)  Wt(kg): --  I&O's Summary    09 Jul 2019 07:01  -  10 Jul 2019 07:00  --------------------------------------------------------  IN: 240 mL / OUT: 400 mL / NET: -160 mL    10 Jul 2019 07:01  -  10 Jul 2019 22:23  --------------------------------------------------------  IN: 0 mL / OUT: 900 mL / NET: -900 mL        PAST MEDICAL & SURGICAL HISTORY:  BPH without obstruction/lower urinary tract symptoms  Urinary retention: Hampton catheter placed since 10/2018  HLD (hyperlipidemia)  S/P radiation therapy: spine, on cabozantinib since Nov 2017  Malignant neoplasm of vertebral column  Hypothyroidism  GERD (gastroesophageal reflux disease)  Cervicalgia  Metastatic renal cell carcinoma to brain: lung/spine  Hypertension  Diabetes mellitus: Type 2  H/O kyphoplasty: T8, 11/2018  Status post gamma knife treatment: Left forehead/temple      SOCIAL HISTORY  Alcohol:  Tobacco:  Illicit substance use:    FAMILY HISTORY:    REVIEW OF SYSTEMS:  CONSTITUTIONAL: No fever, weight loss, or fatigue  EYES: No eye pain, visual disturbances, or discharge  ENMT:  No difficulty hearing, tinnitus, vertigo; No sinus or throat pain  NECK: No pain or stiffness  RESPIRATORY: No cough, wheezing, chills or hemoptysis; No shortness of breath  CARDIOVASCULAR: No chest pain, palpitations, dizziness, or leg swelling  GASTROINTESTINAL: No abdominal or epigastric pain. No nausea, vomiting, or hematemesis; No diarrhea or constipation. No melena or hematochezia.  GENITOURINARY: No dysuria, frequency, hematuria, or incontinence  NEUROLOGICAL: No headaches, memory loss, loss of strength, numbness, or tremors  SKIN: No itching, burning, rashes, or lesions   LYMPH NODES: No enlarged glands  ENDOCRINE: No heat or cold intolerance; No hair loss  MUSCULOSKELETAL: No joint pain or swelling; No muscle, back, or extremity pain  PSYCHIATRIC: No depression, anxiety, mood swings, or difficulty sleeping  HEME/LYMPH: No easy bruising, or bleeding gums  ALLERY AND IMMUNOLOGIC: No hives or eczema    RADIOLOGY & ADDITIONAL TESTS:    Imaging Personally Reviewed:  [ ] YES  [ ] NO    Consultant(s) Notes Reviewed:  [ ] YES  [ ] NO    PHYSICAL EXAM:  GENERAL: NAD, well-groomed, well-developed  HEAD:  Atraumatic, Normocephalic  EYES: EOMI, PERRLA, conjunctiva and sclera clear  ENMT: No tonsillar erythema, exudates, or enlargement; Moist mucous membranes, Good dentition, No lesions  NECK: Supple, No JVD, Normal thyroid  NERVOUS SYSTEM:  Alert & Oriented X3, Good concentration; Motor Strength 5/5 B/L upper and lower extremities; DTRs 2+ intact and symmetric  CHEST/LUNG: Clear to percussion bilaterally; No rales, rhonchi, wheezing, or rubs  HEART: Regular rate and rhythm; No murmurs, rubs, or gallops  ABDOMEN: Soft, Nontender, Nondistended; Bowel sounds present  EXTREMITIES:  2+ Peripheral Pulses, No clubbing, cyanosis, or edema  LYMPH: No lymphadenopathy noted  SKIN: No rashes or lesions    LABS:                        10.6   4.74  )-----------( 182      ( 09 Jul 2019 08:46 )             34.4     07-09    135  |  94<L>  |  15  ----------------------------<  225<H>  4.7   |  28  |  0.77    Ca    9.0      09 Jul 2019 07:15    TPro  6.4  /  Alb  3.2<L>  /  TBili  0.4  /  DBili  x   /  AST  56<H>  /  ALT  124<H>  /  AlkPhos  102  07-09        CAPILLARY BLOOD GLUCOSE      POCT Blood Glucose.: 155 mg/dL (10 Jul 2019 21:29)  POCT Blood Glucose.: 226 mg/dL (10 Jul 2019 17:54)  POCT Blood Glucose.: 237 mg/dL (10 Jul 2019 13:26)  POCT Blood Glucose.: 138 mg/dL (10 Jul 2019 09:55)            MEDICATIONS  (STANDING):  amLODIPine   Tablet 10 milliGRAM(s) Oral daily  chlorhexidine 2% Cloths 1 Application(s) Topical daily  collagenase Ointment 1 Application(s) Topical daily  dextrose 5%. 1000 milliLiter(s) (50 mL/Hr) IV Continuous <Continuous>  dextrose 50% Injectable 12.5 Gram(s) IV Push once  dextrose 50% Injectable 25 Gram(s) IV Push once  dextrose 50% Injectable 25 Gram(s) IV Push once  enalapril 10 milliGRAM(s) Oral daily  enoxaparin Injectable 40 milliGRAM(s) SubCutaneous daily  finasteride 5 milliGRAM(s) Oral daily  insulin glargine Injectable (LANTUS) 14 Unit(s) SubCutaneous at bedtime  insulin lispro (HumaLOG) corrective regimen sliding scale   SubCutaneous three times a day before meals  insulin lispro (HumaLOG) corrective regimen sliding scale   SubCutaneous at bedtime  levothyroxine 137 MICROGram(s) Oral daily  nystatin Powder 1 Application(s) Topical two times a day  pantoprazole    Tablet 40 milliGRAM(s) Oral before breakfast    MEDICATIONS  (PRN):  dextrose 40% Gel 15 Gram(s) Oral once PRN Blood Glucose LESS THAN 70 milliGRAM(s)/deciliter  glucagon  Injectable 1 milliGRAM(s) IntraMuscular once PRN Glucose LESS THAN 70 milligrams/deciliter  HYDROmorphone  Injectable 1 milliGRAM(s) IV Push every 2 hours PRN Severe Pain (7 - 10)  oxyCODONE    IR 10 milliGRAM(s) Oral every 6 hours PRN Moderate Pain (4 - 6)      Care Discussed with Consultants/Other Providers [ ] YES  [ ] NO

## 2019-07-12 NOTE — PROGRESS NOTE ADULT - REASON FOR ADMISSION
large buttock / sacrum decub

## 2019-07-12 NOTE — PROGRESS NOTE ADULT - ASSESSMENT
58 yo male w/ Left foot heel ulcer  - Pt seen and evaluated  - Left foot postero-lateral heel ulcer down to subQ, fibrogranular wound base stable, serous drainage and no purulence, no acute signs of infection  - XR neg for gas or osteomyelitis   - Vitals signs stable, no leukocytosis, afebrile  - Daily local wound care w/ santyl and DSD  -Z flows were not bedside, Z flow boots at all times while in bed  - No podiatry surgical intervention at this time  - Stable for discharge from podiatry standpoint  - Discussed w/ attending 58 yo male w/ Left foot heel ulcer  - Pt seen and evaluated  - Left foot postero-lateral heel ulcer down to subQ, fibrogranular wound base stable, serous drainage and no purulence, no acute signs of infection  - XR neg for gas or osteomyelitis   - Vitals signs stable, no leukocytosis, afebrile  - Daily local wound care w/ santyl and DSD  -Z flows were not bedside, Z flow boots at all times while in bed  - No podiatry surgical intervention at this time  - Stable for discharge from podiatry standpoint  - Podiatry to sign off please reconsult if needed   - Discussed w/ attending

## 2019-07-14 NOTE — ED ADULT NURSE NOTE - OBJECTIVE STATEMENT
60 yo pmh of HLD, HTN, BPH, DM2, malignant neoplasm of the vertebral BIBA to ED from rehab c/o fevers/chills.  Pt was seen here two days ago, and was dc home.  Denies CP, back pain, SOB, fevers/chills, n/v/d, lightheadedness, dizziness, changes in urinary or bowel habits.  Pt has vasquez placed, with cloudy urine noted in vasquez tube.  A&Ox4, abdomen soft, nondistended, nontender, Sacral wound noted, stage III.  Pt tachycardic and febrile on assessment, placed on CM, EKG done.  Skin w/d.  Safety and comfort maintained.  18G IV established in right AC, patent, blood specimens sent to lab.  Safety and comfort maintained.  Will  continue to monitor.

## 2019-07-14 NOTE — ED PROVIDER NOTE - OBJECTIVE STATEMENT
59M, pmh of renal cell carcinoma, DM, HTN, BPH presenting with fever. patient reports chills and nausea starting tonight. denies headache, changes in vision, vomiting, abdominal pain. reports swelling in legs has decreased.l denies any seepage from wounds. was recently hospitalized for decubitus ulcers, no antibiotics given. patient believes ulcers have improved and denies any drainage from wounds.

## 2019-07-14 NOTE — ED PROVIDER NOTE - CLINICAL SUMMARY MEDICAL DECISION MAKING FREE TEXT BOX
59M presenting with fever. last chemo over 1 week ago. urine in bag appears purulent. sepsis, urine vs skin source. plan for labs, abx. will admit.

## 2019-07-14 NOTE — ED ADULT NURSE NOTE - NSIMPLEMENTINTERV_GEN_ALL_ED
Implemented All Fall Risk Interventions:  [place] to call system. Call bell, personal items and telephone within reach. Instruct patient to call for assistance. Room bathroom lighting operational. Non-slip footwear when patient is off stretcher. Physically safe environment: no spills, clutter or unnecessary equipment. Stretcher in lowest position, wheels locked, appropriate side rails in place. Provide visual cue, wrist band, yellow gown, etc. Monitor gait and stability. Monitor for mental status changes and reorient to person, place, and time. Review medications for side effects contributing to fall risk. Reinforce activity limits and safety measures with patient and family.

## 2019-07-14 NOTE — ED PROVIDER NOTE - PHYSICAL EXAMINATION
general: uncomfortable appearing male, no acute distress   heent: normocephalic, atraumatic   respiratory: normal work of breathing, lungs clear to auscultation bilaterally   cardiac: tachycardic, regular rate and rhythm   abdomen: soft, non-tender   msk: no swelling or tenderness of lower extremities   skin:   neuro: A&Ox3 general: uncomfortable appearing male, no acute distress   heent: normocephalic, atraumatic   respiratory: normal work of breathing, lungs clear to auscultation bilaterally   cardiac: tachycardic, regular rate and rhythm   abdomen: soft, non-tender   msk: no swelling or tenderness of lower extremities   skin: stage 1-2 R sacral decubitus ulcer, no erythema, warmth or purulent drainage  neuro: A&Ox3

## 2019-07-14 NOTE — ED PROVIDER NOTE - ATTENDING CONTRIBUTION TO CARE
Pt is a 60 yo male with a hx of renal cell carcinoma, BPH,Gerd,HLD,HTN,Hypothyrodism,RCC with mets to brain/lung/spine,DM admit for decubitus ulcer and dc two days ago to rehab now comes to ed complains of fever/chills.

## 2019-07-15 NOTE — CONSULT NOTE ADULT - ASSESSMENT
58 yo M w/ PMH of DM, HTN, metastatic RCC (Dx Jan 2015) with mets to brain, lung ,and iliac bone on chemo and RT, who came back from Rehab for 1 day history of fever, malaise, and chills.  buttock and heel wound do not appear grossly infected  No pneumonia  low level pyuria - chronic vasquez  hepatic steatosis    I suspect presenting fever and chill related to Entero/Rhinovirus infection    Recent Antibiotics  Cefazolin 4/17 -->5/2  Ceftriaxone 5/13, 6/5  Unasyn 7/8-->9  Vanco 7/14-->  Cefepime 7/14-->    Suggest  Monitor cultures  ProCalcitonin in am  Continue Cefepime/Vanco for now - will likely discontinue in 1-2 days     primary team paged

## 2019-07-15 NOTE — H&P ADULT - PROBLEM SELECTOR PLAN 1
Fever and Tachycardia. Infectious vs. malignancy. If Infectious, most likely source is urinary given chronic urinary retention w/ vasquez placement.  - No urinary symptom (yet patient on vasquez). No leukocytosis, U/A is positive yet patient is chronic vasquez user. CXR clean.  - UCx and BCx pending.  - s/p Vanc/Cefepime in ED.  - c/w Cefepime 2g q8h for empiric coverage for now  - vasquez replaced.  - montior I/O and VS  - CBC daily.  - ID consult in the AM Fever and Tachycardia. Infectious vs. malignancy. If Infectious, most likely source is urinary given chronic urinary retention w/ vasquez placement. However, decubitus and heel ulcer could be the port of entry.  - No urinary symptom (yet patient on vasquez). No leukocytosis, U/A is positive yet patient is chronic vasquez user. CXR clean.  - UCx and BCx pending.  - s/p Vanc/Cefepime in ED.  - c/w Vancomycin 1000mg BID and Cefepime 2g q8h for empiric coverage for now  - vasquez replaced.  - montior I/O and VS  - CBC daily.  - ID consult in the AM Fever and Tachycardia. Infectious vs. malignancy. If Infectious, most likely source is urinary given chronic urinary retention w/ vasquez placement. However, decubitus and heel ulcer could be the port of entry.  - No urinary symptom (yet patient on vasquez). No leukocytosis, U/A is positive yet patient is chronic vasquez user. CXR clean.  - UCx and BCx pending.  - s/p Vanc/Cefepime in ED.  - c/w Vancomycin 1000mg BID and Cefepime 2g q8h for empiric coverage for now  - vasquez replaced.  - montior I/O and VS  - CBC daily.  - F/u RVP  - ID consult in the AM  - Wound care consult

## 2019-07-15 NOTE — H&P ADULT - PROBLEM SELECTOR PLAN 3
RCC mets to bone, lung, and brain, on chemo s/p multiple radiation therapies.  - Heme/onc consult in the AM.  - now holding chemo until seen by Dr. Pena at [facility] [place].

## 2019-07-15 NOTE — H&P ADULT - PROBLEM SELECTOR PLAN 5
- On Lantus 14u at bedtime with repaglinide premeal.  - c/w Lantus 14u qHS  - FS + ISS  - hold oral diabetic.

## 2019-07-15 NOTE — ED ADULT NURSE REASSESSMENT NOTE - NS ED NURSE REASSESS COMMENT FT1
[place] catheter inserted using sterile technique. Second RN present to confirm sterility. Bedside drainage to gravity. Stat lock in place.

## 2019-07-15 NOTE — H&P ADULT - NSHPPHYSICALEXAM_GEN_ALL_CORE
Vital Signs Last 24 Hrs  T(C): 37.6 (15 Jul 2019 04:05), Max: 39.1 (15 Jul 2019 02:53)  T(F): 99.7 (15 Jul 2019 04:05), Max: 102.4 (15 Jul 2019 02:53)  HR: 93 (15 Jul 2019 04:05) (93 - 131)  BP: 108/71 (15 Jul 2019 04:05) (108/71 - 161/92)  BP(mean): 94 (15 Jul 2019 01:42) (94 - 94)  RR: 16 (15 Jul 2019 04:05) (16 - 23)  SpO2: 97% (15 Jul 2019 04:05) (94% - 98%)    PHYSICAL EXAM:    Constitutional: NAD. well-developed; well-groomed; well-nourished;  HEENT: AT/NC, PERRLA; EOMI, MMM, no oropharyngeal lesions, no erythema, no exudates, Supple neck; normal thyroid gland, no cervical lymphadenopathy  Respiratory: CTAB. equal aeration bilaterally. no wheezing, no crackes, no rhonchi. no increase in WOB  Cardiovascular: RRR, no M/R/G. 2+ distal pulses. Cap refill < 2 seconds. no JVD  Gastrointestinal: soft; NT/ND, +BS, no rebounding tenderness / guarding / HSM / mass / ascites.  Genitourinary: not examined.  Extremities: no clubbing; no cyanosis; no pedal edema, non-tender to palpation, DP and Radial pulses intact.  Skin: warm and dry; color normal: no rash: no ulcers  Neurological: A&Ox 3; responds to pain; responds to verbal commands; epicritic and protopathic sensation intact: CN nerves grossly intact.   MSK/Back: no deformities. Active and passive ROM intact; strength intact, no CVA tenderness, No joint tenderness, swelling, erythema  Psychiatric: normal mood/affect. Denies SI/HI Vital Signs Last 24 Hrs  T(C): 37.6 (15 Jul 2019 04:05), Max: 39.1 (15 Jul 2019 02:53)  T(F): 99.7 (15 Jul 2019 04:05), Max: 102.4 (15 Jul 2019 02:53)  HR: 93 (15 Jul 2019 04:05) (93 - 131)  BP: 108/71 (15 Jul 2019 04:05) (108/71 - 161/92)  BP(mean): 94 (15 Jul 2019 01:42) (94 - 94)  RR: 16 (15 Jul 2019 04:05) (16 - 23)  SpO2: 97% (15 Jul 2019 04:05) (94% - 98%)    PHYSICAL EXAM:    Constitutional: NAD. middle aged male lying on bed on RA.  HEENT: AT/NC, PERRLA; EOMI, MMM  Respiratory: CTAB. equal aeration bilaterally. no wheezing, no crackes, no rhonchi. no increase in WOB  Cardiovascular: RRR, no M/R/G. 2+ distal pulses. Cap refill ~2 seconds.  Gastrointestinal: soft; Distended. Diffusely Tender to palpation, +BS, no rebounding tenderness or guarding.  Genitourinary: not examined.  Extremities: no cyanosis; no pedal edema, non-tender to palpation, DP and Radial pulses intact. Ulcer of heel covered c/d/i.  Neurological: A&Ox 3; responds to pain; responds to verbal commands; soft touch sensation diminished on bilateral foot L>R. Unable to move his LLE. CN nerves grossly intact.   MSK/Back: no CVA tenderness, Decubitus ulcer covered. C/D/I. Serosangunous drain.  Psychiatric: normal mood/affect.

## 2019-07-15 NOTE — ED ADULT NURSE REASSESSMENT NOTE - NS ED NURSE REASSESS COMMENT FT1
Pt still febrile as per flowsheet.  MD Purcell made aware, pt given ice packs in axillary and groin regions.  Safety and comfort maintained.  will continue to monitor.

## 2019-07-15 NOTE — H&P ADULT - NSHPLABSRESULTS_GEN_ALL_CORE
11.4   5.1   )-----------( 220      ( 2019 23:13 )             34.7       -    136  |  96  |  13  ----------------------------<  211<H>  4.2   |  28  |  0.90    Ca    8.2<L>      2019 23:13  Phos  1.1       Mg     1.6         TPro  6.7  /  Alb  3.4  /  TBili  0.6  /  DBili  x   /  AST  101<H>  /  ALT  126<H>  /  AlkPhos  125<H>                Urinalysis Basic - ( 15 Jul 2019 00:13 )    Color: Light Orange / Appearance: Turbid / S.028 / pH: x  Gluc: x / Ketone: Trace  / Bili: Negative / Urobili: 2 mg/dL   Blood: x / Protein: 100 mg/dL / Nitrite: Negative   Leuk Esterase: Large / RBC: 2-5 /hpf / WBC 6-10   Sq Epi: x / Non Sq Epi: 0 / Bacteria: Moderate        PT/INR - ( 2019 23:13 )   PT: 13.7 sec;   INR: 1.19 ratio         PTT - ( 2019 23:13 )  PTT:34.2 sec    Lactate Trend            CAPILLARY BLOOD GLUCOSE                RADIOLOGY, EKG & ADDITIONAL TESTS: Reviewed.     EKG: NSR, , L axis deviation, L ant fascicular block, poor RwP, no acute ischemic changes. grossly consistent with prior EKG. 11.4   5.1   )-----------( 220      ( 2019 23:13 )             34.7     -    136  |  96  |  13  ----------------------------<  211<H>  4.2   |  28  |  0.90    Ca    8.2<L>      2019 23:13  Phos  1.1       Mg     1.6         TPro  6.7  /  Alb  3.4  /  TBili  0.6  /  DBili  x   /  AST  101<H>  /  ALT  126<H>  /  AlkPhos  125<H>          Urinalysis Basic - ( 15 Jul 2019 00:13 )    Color: Light Orange / Appearance: Turbid / S.028 / pH: x  Gluc: x / Ketone: Trace  / Bili: Negative / Urobili: 2 mg/dL   Blood: x / Protein: 100 mg/dL / Nitrite: Negative   Leuk Esterase: Large / RBC: 2-5 /hpf / WBC 6-10   Sq Epi: x / Non Sq Epi: 0 / Bacteria: Moderate    PT/INR - ( 2019 23:13 )   PT: 13.7 sec;   INR: 1.19 ratio      PTT - ( 2019 23:13 )  PTT:34.2 sec    Lactate Trend    CAPILLARY BLOOD GLUCOSE    RADIOLOGY, EKG & ADDITIONAL TESTS: Reviewed.     EKG: NSR, , L axis deviation, L ant fascicular block, poor RwP, no acute ischemic changes. grossly consistent with prior EKG.    I have reviewed the labs, imaging and ekg.

## 2019-07-15 NOTE — H&P ADULT - NSHPSOCIALHISTORY_GEN_ALL_CORE
Lives with wife.   Now doesn't work. Ambulates with walker at baseline.  Never Smoked. Social EtOH, no recreational drug use.

## 2019-07-15 NOTE — CONSULT NOTE ADULT - SUBJECTIVE AND OBJECTIVE BOX
Patient is a 59y old  Male who presents with a chief complaint of Fevers and chills (15 Jul 2019 05:10)    HPI:  Mr. Jack is a 58 yo M w/ PMH of DM, HTN, metastatic RCC (Dx Jan 2015) with mets to brain, lung ,and iliac bone on chemo and RT, who came back from Rehab for 1 day history of fever, malaise, and chills.  He was recently admitted to the hospital from Jul 8 to Jul 12 due to progressive worsening of large decubitus ulcer in setting of immobility and urinary incontinence. ID was consulted, and the site did not show signs of infection. ABx was deemed not necessary. Derm consult was called and biopsy was also not seen as necessity. Heme/onc decided to hold off chemo until [place] [facility] [place] Podiatry was consulted for Left foot heel ulcer, and no intervention was needed other than wound care. Patient was stable over the course, and was sent to the rehab.  2 Days later, patient developed acute fever, chills, and nausea in the rehab. He vicki any sick contact at the rehab place. He denies any CP, palpitation, SOB, lightheadedness, visual disturbance, and emesis/diarrhea/constipation. No dysuria, and his vasquez was working fine. He still has pain on the abdomen, which is chronic. His Left lower extremity became progressively less responsive over time and now he cannot move his left leg. Epicritic sensation is diminished bilaterally. his ulcer seems to be healing well per patient, and he did not observe and purulent discharge. His skin lesions in perineum 2/2 urinary incontinece has gotten better and not pruritic.    In ED, Temp 102.4, , /81, RR 20, SpO2 96% on RA.  Received Vanc/Cefepime, 2L LR bolus, Zofran/tylenol/ibuprofen.  Patient is subjectively feeling better now and wishes to go back to rehab. (15 Jul 2019 05:10)    Patient has marked weakness. He had severe headache, now improved. He notes some cough occasional productive of white phlegm      PAST MEDICAL & SURGICAL HISTORY:  BPH without obstruction/lower urinary tract symptoms  Urinary retention: Vasquez catheter placed since 10/2018 - green light laser vaporization of Prostate on 4/17/19  HLD (hyperlipidemia)  S/P radiation therapy: spine, on cabozantinib since Nov 2017 - currently held  Nets to vertebral column  Hypothyroidism  GERD (gastroesophageal reflux disease)  Cervicalgia  Metastatic renal cell carcinoma to brain: lung/spine  Hypertension  Diabetes mellitus: Type 2  H/O kyphoplasty: T8, 11/2018  Status post gamma knife treatment: Left forehead/temple    Social history: , disabled, worked for [employer], lost son after trauam    FAMILY HISTORY:  Family history of colon cancer  Family history of diabetes mellitus in mother  Family history of diabetes mellitus in father    REVIEW OF SYSTEMS:  CONSTITUTIONAL: marked weakness, + fevers or chills  EYES/ENT: No visual changes;  No vertigo or throat pain   NECK: No pain or stiffness  RESPIRATORY: + cough, No wheezing, hemoptysis; No shortness of breath  CARDIOVASCULAR: No chest pain or palpitations  GASTROINTESTINAL: No abdominal or epigastric pain. No nausea, vomiting, or hematemesis; No diarrhea or constipation. No melena or hematochezia.  GENITOURINARY: No dysuria, frequency or hematuria  NEUROLOGICAL: No numbness or weakness  SKIN: No itching, burning, rashes, or lesions   All other review of systems is negative unless indicated above    Allergies  No Known Allergies    Antimicrobials:  cefepime   IVPB 2000 milliGRAM(s) IV Intermittent every 8 hours  vancomycin  IVPB 1000 milliGRAM(s) IV Intermittent every 12 hours      Vital Signs Last 24 Hrs  T(C): 37.1 (15 Jul 2019 08:30), Max: 39.1 (15 Jul 2019 02:53)  T(F): 98.7 (15 Jul 2019 08:30), Max: 102.4 (15 Jul 2019 02:53)  HR: 82 (15 Jul 2019 08:30) (79 - 131)  BP: 105/68 (15 Jul 2019 08:30) (100/64 - 161/92)  BP(mean): 94 (15 Jul 2019 01:42) (94 - 94)  RR: 18 (15 Jul 2019 08:30) (16 - 23)  SpO2: 98% (15 Jul 2019 08:30) (94% - 98%)    PHYSICAL EXAM:  General: chronically ill appearing, NAD,   moon shaped face  Neurology: A&Ox3,   Respiratory: Clear to auscultation bilaterally  CV: RRR, S1S2, no murmurs, rubs or gallops  Abdominal: Soft, Non-tender, non-distended, normal bowel sounds  linear superficial wound right buttock with slight fibrinous exudate over granulating base  right heel ulcer is superficial without reactive cellulitis  Extremities: No edema,  Line Sites: Clear  Skin: No rash                        10.3   7.2   )-----------( 196      ( 15 Jul 2019 06:59 )             30.8   WBC Count: 7.2 (07-15 @ 06:59)  WBC Count: 5.1 (07-14 @ 23:13)  WBC Count: 4.13 (07-12 @ 09:23)     07-15    137  |  100  |  13  ----------------------------<  118<H>  3.7   |  26  |  1.02    Ca    8.2<L>      15 Jul 2019 06:59  Phos  2.5     07-15  Mg     1.6     07-15    TPro  6.0  /  Alb  3.0<L>  /  TBili  0.6  /  DBili  x   /  AST  90<H>  /  ALT  120<H>  /  AlkPhos  110  07-15    White Blood Cell - Urine: 6-10 (07.15.19 @ 00:13)    Urine Microscopic-Add On (NC) (07.15.19 @ 00:13)    Uric Acid Crystals: Negative    Red Blood Cell - Urine: 2-5 /hpf    Triple Phosphate Crystals: Moderate    White Blood Cell - Urine: 6-10    Calcium Oxalate Crystals: Negative    Bacteria: Moderate    Hyaline Casts: 0 /LPF    Granular Cast: 0 /LPF    Comment - Urine: TYROSINE CRYSTALS FEW SEEN    Epithelial Cells: 0    MICROBIOLOGY:  Culture - Urine (06.05.19 @ 09:25)    Culture - Urine:   Culture grew 3 or more types of organisms which indicate  collection contamination; consider recollection only if  clinically indicated.    Specimen Source: URINE MIDSTREAM    Rapid Respiratory Viral Panel (07.15.19 @ 06:59)    Rapid RVP Result: Detected: .    Entero/Rhinovirus (RapRVP): Detected    Radiology:  < from: Xray Chest 1 View AP/PA (07.14.19 @ 23:24) >  IMPRESSION:   Left midlung linear atelectasis/scarring.    < end of copied text >      Pj Schwarz MD; Division of Infectious Disease; Pager: [phone]; nights and weekends: [phone]

## 2019-07-15 NOTE — H&P ADULT - PROBLEM SELECTOR PLAN 4
On chronic vasquez. Failed multiple TOV attempts in the past.  - c/w vasquez catheter.  - monitor UOP.

## 2019-07-15 NOTE — CHART NOTE - NSCHARTNOTEFT_GEN_A_CORE
TO BE COMPLETED WITHIN 6 HOURS OF INITIAL ASSESSMENT:    For use in patients that have 2 sepsis criteria and new organ dysfunction   •	New or increased oxygen requirement  •	Creatinine >2mg/dL  •	Bilirubin>2mg/dL  •	Platelet <100,00/mm3  •	INR >1.5, PTT>60  •	Lactate >2    If patient persistent hypotension (SBP<90) or any lactate >4 then provider evaluation (Physician/PA/NP) within 30 minutes of bolus completion is required.    Vital Signs Last 24 Hrs  T(C): 38.7 (15 Jul 2019 01:42), Max: 38.7 (15 Jul 2019 01:42)  T(F): 101.7 (15 Jul 2019 01:42), Max: 101.7 (15 Jul 2019 01:42)  HR: 106 (15 Jul 2019 01:42) (106 - 131)  BP: 137/76 (15 Jul 2019 01:42) (136/81 - 161/92)  BP(mean): 94 (15 Jul 2019 01:42) (94 - 94)  RR: 23 (15 Jul 2019 01:42) (20 - 23)  SpO2: 95% (15 Jul 2019 01:42) (94% - 98%)  		  LUNGS:  [ x ]Clear bilaterally [  ] Wheeze [  ] Rhonchi [  ] Rales [  ] Crackles; Other:  HEART: [ x ]RRR [ x ] No murmur[ x ]  Normal S1S2[  ] Tachycardia;  Other:  CAPILLARY REFULL:  	Fingers: [ x ] less than 2 seconds [  ] more than 2 seconds                                           Toes: [ x ]  less than 2 seconds [  ] more than 2 seconds   PERIPHERAL PULSES:  Radial: [ x ] Palpable  [  ]  non-palpable                                         Dorsalis Pedis: [ x ] Palpable  [  ] non-palpable                                         Posterior Tibial: [ x ] Palpable  [  ] non-palpable                                          Other:  SKIN:   [  ]  Diaphoretic  [  ]  mottling  [  ]  Cold extremities  [ x ]  Warm [  ]  Dry                      Other:    BEDSIDE ULTRASOUND FINDINGS (IF APPLICABLE):    Labs:  14 Jul 2019 23:13    136    |  96     |  13     ----------------------------<  211    4.2     |  28     |  0.90     Ca    8.2        14 Jul 2019 23:13  Phos  1.1       14 Jul 2019 23:13  Mg     1.6       14 Jul 2019 23:13    TPro  6.7    /  Alb  3.4    /  TBili  0.6    /  DBili  x      /  AST  101    /  ALT  126    /  AlkPhos  125    14 Jul 2019 23:13                          11.4   5.1   )-----------( 220      ( 14 Jul 2019 23:13 )             34.7     PT/INR - ( 14 Jul 2019 23:13 )   PT: 13.7 sec;   INR: 1.19 ratio         PTT - ( 14 Jul 2019 23:13 )  PTT:34.2 sec  Lactate:    Plan (orders must be placed in EMR):     [ x ]  Check Repeat Lactate   [ x ]  No change in current plan  [  ]  Start Vasopressors:  [  ]  Repeat Fluid Bolus:  [  ] other:    Care Discussed with Consultants/Other Providers [ ] YES  [x] NO

## 2019-07-15 NOTE — H&P ADULT - ATTENDING COMMENTS
I have reviewed the labs, imaging and ekg. I have edited the above note as appropriate and agree with above unless otherwise indicated. Briefly, 59M w/ metastatic renal cell cancer, DM2 and multiple soft tissue wounds with significant fevers/chills and sepsis. At this point, still more suspicious for infectious etiology. More likely sources include urine vs soft tissue vs blood. Will cover empirically with IV antibiotics until additional work up returns. Of note, pt has sinus congestion x2 days and night in facility has cough.  -Cont. IV cefepime and vancomycin  -F/u BCx  -F/u UCx  -ID consult in AM  -F/u RVP  -Wound care consult  -Consider RUQ US  -Other meds as above  -DVT PPx, Lovenox  -Consider Hem/onc consult.

## 2019-07-15 NOTE — PROVIDER CONTACT NOTE (OTHER) - ACTION/TREATMENT ORDERED:
NP aware. rectal temp done. 101.9. 2 sets BC sent 7/15. pt on IV abx. per NP will order tylenol. will continue to monitor

## 2019-07-15 NOTE — H&P ADULT - HISTORY OF PRESENT ILLNESS
59M, pmh of renal cell carcinoma, DM, HTN, BPH presenting with fever. patient reports chills and nausea starting tonight. denies headache, changes in vision, vomiting, abdominal pain. reports swelling in legs has decreased.l denies any seepage from wounds. was recently hospitalized for decubitus ulcers, no antibiotics given. patient believes ulcers have improved and denies any drainage from wounds. Mr. Jack is a 58 yo M w/ PMH of DM, HTN, RCC (Dx Jan 2015) mets to brain, lung ,and iliac bone on chemo and RT, who came back from Rehab for 1 day history of fever, malaise, and chills.  He was recently admitted to the hospital from Jul 8 to Jul 12 due to progressive worsening of large decubitus ulcer in setting of immobility and urinary incontinence. ID was consulted, and the site did not show signs of infection. ABx was deemed not necessary. Derm consult was calloed and biopsy was also not seen as necessity. Heme/onc decided to hold off chemo until [place] [facility] [place]iatry was consulted for Left foot heel ulcer, and no intervention was needed other than wound care. Patient was stable over the course, and was sent to the rehab.  2 Days later, patient developed acute fever, chills, and nausea in the rehab. He vicki any sick contact at the rehab place. He denies any CP, palpitation, SOB, lightheadedness, visual disturbance, and emesis/diarrhea/constipation. No dysuria, and his vasquez was working fine. He still has pain on the abdomen, which is chronic. His Left lower extremity became progressively less responsive over time and now he cannot move his left leg. Epicritic sensation is diminished bilaterally. his ulcer seems to be healing well per patient, and he did not observe and purulent discharge. His skin lesions in perineum 2/2 urinary incontinece has gotten better and not pruritic.  In ED, Temp 102.4, , /81, RR 20, SpO2 96% on RA.  Received Vanc/Cefepime, 2L LR bolus, Zofran/tylenol/ibuprofen.  Patient is subjectively feeling better now and wishes to go back to rehab. Mr. Jack is a 60 yo M w/ PMH of DM, HTN, RCC (Dx Jan 2015) mets to brain, lung ,and iliac bone on chemo and RT, who came back from Rehab for 1 day history of fever, malaise, and chills.  He was recently admitted to the hospital from Jul 8 to Jul 12 due to progressive worsening of large decubitus ulcer in setting of immobility and urinary incontinence. ID was consulted, and the site did not show signs of infection. ABx was deemed not necessary. Derm consult was calloed and biopsy was also not seen as necessity. Heme/onc decided to hold off chemo until patient is [place]. Pena [place] [facility] [place] Podiatry was consulted for Left foot heel ulcer, and no intervention was needed other than wound care. Patient was stable over the course, and was sent to the rehab.  2 Days later, patient developed acute fever, chills, and nausea in the rehab. He vicki any sick contact at the rehab place. He denies any CP, palpitation, SOB, lightheadedness, visual disturbance, and emesis/diarrhea/constipation. No dysuria, and his vasquez was working fine. He still has pain on the abdomen, which is chronic. His Left lower extremity became progressively less responsive over time and now he cannot move his left leg. Epicritic sensation is diminished bilaterally. his ulcer seems to be healing well per patient, and he did not observe and purulent discharge. His skin lesions in perineum 2/2 urinary incontinece has gotten better and not pruritic.      In ED, Temp 102.4, , /81, RR 20, SpO2 96% on RA.  Received Vanc/Cefepime, 2L LR bolus, Zofran/tylenol/ibuprofen.  Patient is subjectively feeling better now and wishes to go back to rehab.

## 2019-07-15 NOTE — H&P ADULT - ASSESSMENT
Mr. Jack is a 60 yo M w/ PMH of DM, HTN, RCC (Dx Jan 2015) mets to brain, lung ,and iliac bone on chemo and RT, who came back from Rehab for 1 day history of fever, malaise, and chills.

## 2019-07-16 NOTE — PROGRESS NOTE ADULT - PROBLEM SELECTOR PLAN 1
seen by ID   -fever/ tachycardia likely from RVP ( viral )   -blood c/s pending : so far no growth  -c/w IV abx for today, if c/s neg by tomorrow , d/c abx as per ID note

## 2019-07-16 NOTE — PROGRESS NOTE ADULT - SUBJECTIVE AND OBJECTIVE BOX
Follow Up:  fever    Interval History/ROS: feeling better this am, notes some cough, productive of clear sputum    Allergies  No Known Allergies    ANTIMICROBIALS:  cefepime   IVPB 2000 every 8 hours  vancomycin  IVPB 1000 every 12 hours    OTHER MEDS:  MEDICATIONS  (STANDING):  acetaminophen   Tablet .. 650 every 6 hours PRN  amLODIPine   Tablet 10 daily  atorvastatin 20 at bedtime  dextrose 40% Gel 15 once PRN  dextrose 50% Injectable 12.5 once  dextrose 50% Injectable 25 once  dextrose 50% Injectable 25 once  docusate sodium 100 three times a day  enalapril 10 daily  enoxaparin Injectable 40 daily  finasteride 5 daily  glucagon  Injectable 1 once PRN  insulin glargine Injectable (LANTUS) 14 at bedtime  insulin lispro (HumaLOG) corrective regimen sliding scale  three times a day before meals  insulin lispro (HumaLOG) corrective regimen sliding scale  at bedtime  levothyroxine 137 daily  oxyCODONE    5 mG/acetaminophen 325 mG 1 every 4 hours PRN  oxyCODONE    IR 10 every 6 hours PRN  pantoprazole    Tablet 40 before breakfast  senna 2 at bedtime    Vital Signs Last 24 Hrs  T(C): 36.6 (2019 12:52), Max: 38.8 (15 Jul 2019 20:50)  T(F): 97.9 (2019 12:52), Max: 101.9 (15 Jul 2019 20:50)  HR: 86 (2019 12:52) (77 - 101)  BP: 115/75 (2019 12:52) (99/60 - 129/80)  BP(mean): --  RR: 18 (2019 12:52) (18 - 20)  SpO2: 97% (2019 12:52) (95% - 97%)    PHYSICAL EXAM:  General:  NAD, Non-toxic  Neurology: A&Ox3, nonfocal  Respiratory: Clear to auscultation bilaterally  CV: RRR, S1S2, no murmurs, rubs or gallops  Abdominal: Soft, Non-tender, non-distended  Extremities: No edema  Line Sites: Clear  Skin: No rash                        10.3   7.2   )-----------( 196      ( 15 Jul 2019 06:59 )             30.8   WBC Count: 7.2 (-15 @ 06:59)  WBC Count: 5.1 (07-14 @ 23:13)  WBC Count: 4.13 ( @ 09:23)    07-15    137  |  100  |  13  ----------------------------<  118<H>  3.7   |  26  |  1.02    Ca    8.2<L>      15 Jul 2019 06:59  Phos  2.5     07-15  Mg     1.6     -15    TPro  6.0  /  Alb  3.0<L>  /  TBili  0.6  /  DBili  x   /  AST  90<H>  /  ALT  120<H>  /  AlkPhos  110  07-15    Procalcitonin, Serum (19 @ 07:03)    Procalcitonin, Serum: 0.44 ng/mL    Urinalysis Basic - ( 15 Jul 2019 00:13 )    Color: Light Orange / Appearance: Turbid / S.028 / pH: x  Gluc: x / Ketone: Trace  / Bili: Negative / Urobili: 2 mg/dL   Blood: x / Protein: 100 mg/dL / Nitrite: Negative   Leuk Esterase: Large / RBC: 2-5 /hpf / WBC 6-10   Sq Epi: x / Non Sq Epi: 0 / Bacteria: Moderate        MICROBIOLOGY:  .Blood  07-15-19   No growth to date.  --  --      .Urine  07-15-19   >=3 organisms. Probable collection contamination.  --  --    Rapid RVP Result: Detected (07-15 @ 06:59)    RADIOLOGY:  < from: Xray Chest 1 View AP/PA (19 @ 23:24) >  IMPRESSION:   Left midlung linear atelectasis/scarring.    < end of copied text >      Pj Schwarz MD; Division of Infectious Disease; Pager: [phone]; nights and weekends: [phone]    07-15    137  |  100  |  13  ----------------------------<  118<H>  3.7   |  26  |  1.02    Ca    8.2<L>      15 Jul 2019 06:59  Phos  2.5     07-15  Mg     1.6     -15    TPro  6.0  /  Alb  3.0<L>  /  TBili  0.6  /  DBili  x   /  AST  90<H>  /  ALT  120<H>  /  AlkPhos  110  07-15      Urinalysis Basic - ( 15 Jul 2019 00:13 )    Color: Light Orange / Appearance: Turbid / S.028 / pH: x  Gluc: x / Ketone: Trace  / Bili: Negative / Urobili: 2 mg/dL   Blood: x / Protein: 100 mg/dL / Nitrite: Negative   Leuk Esterase: Large / RBC: 2-5 /hpf / WBC 6-10   Sq Epi: x / Non Sq Epi: 0 / Bacteria: Moderate        MICROBIOLOGY:  .Blood  07-15-19   No growth to date.  --  --      .Urine  07-15-19   >=3 organisms. Probable collection contamination.  --  --        .Blood  .Urine    v    Rapid RVP Result: Detected (07-15 @ 06:59)        RADIOLOGY:    Pj Schwarz MD; Division of Infectious Disease; Pager: [phone]; nights and weekends: [phone]

## 2019-07-16 NOTE — PROGRESS NOTE ADULT - SUBJECTIVE AND OBJECTIVE BOX
Patient is a 59y old  Male who presents with a chief complaint of Fevers and chills (2019 13:36)    pt. seen and examined, afebrile , pain controlled     INTERVAL HPI/OVERNIGHT EVENTS:  T(C): 36.8 (19 @ 19:47), Max: 37.6 (07-15-19 @ 23:13)  HR: 78 (19 @ 19:47) (77 - 87)  BP: 130/75 (19 @ 19:47) (99/60 - 130/75)  RR: 18 (19 @ 19:47) (18 - 18)  SpO2: 95% (19 @ 19:47) (95% - 97%)  Wt(kg): --  I&O's Summary    15 Jul 2019 07:  -  2019 07:00  --------------------------------------------------------  IN: 1690 mL / OUT: 2800 mL / NET: -1110 mL    2019 07:  -  2019 22:57  --------------------------------------------------------  IN: 600 mL / OUT: 1350 mL / NET: -750 mL        PAST MEDICAL & SURGICAL HISTORY:  BPH without obstruction/lower urinary tract symptoms  Urinary retention: Hampton catheter placed since 10/2018  HLD (hyperlipidemia)  S/P radiation therapy: spine, on cabozantinib since 2017  Malignant neoplasm of vertebral column  Hypothyroidism  GERD (gastroesophageal reflux disease)  Cervicalgia  Metastatic renal cell carcinoma to brain: lung/spine  Hypertension  Diabetes mellitus: Type 2  H/O kyphoplasty: T8, 2018  Status post gamma knife treatment: Left forehead/temple      SOCIAL HISTORY  Alcohol:  Tobacco:  Illicit substance use:    FAMILY HISTORY:    REVIEW OF SYSTEMS:  CONSTITUTIONAL: No fever, weight loss, or fatigue  EYES: No eye pain, visual disturbances, or discharge  ENMT:  No difficulty hearing, tinnitus, vertigo; No sinus or throat pain  NECK: No pain or stiffness  RESPIRATORY: No cough, wheezing, chills or hemoptysis; No shortness of breath  CARDIOVASCULAR: No chest pain, palpitations, dizziness, or leg swelling  GASTROINTESTINAL: No abdominal or epigastric pain. No nausea, vomiting, or hematemesis; No diarrhea or constipation. No melena or hematochezia.  GENITOURINARY: No dysuria, frequency, hematuria, or incontinence  NEUROLOGICAL: No headaches, memory loss, loss of strength, numbness, or tremors  SKIN: No itching, burning, rashes, or lesions   LYMPH NODES: No enlarged glands  ENDOCRINE: No heat or cold intolerance; No hair loss  MUSCULOSKELETAL: No joint pain or swelling; No muscle, back, or extremity pain  PSYCHIATRIC: No depression, anxiety, mood swings, or difficulty sleeping  HEME/LYMPH: No easy bruising, or bleeding gums  ALLERY AND IMMUNOLOGIC: No hives or eczema    RADIOLOGY & ADDITIONAL TESTS:    Imaging Personally Reviewed:  [ ] YES  [ ] NO    Consultant(s) Notes Reviewed:  [ ] YES  [ ] NO    PHYSICAL EXAM:  GENERAL: NAD, well-groomed, well-developed  HEAD:  Atraumatic, Normocephalic  EYES: EOMI, PERRLA, conjunctiva and sclera clear  ENMT: No tonsillar erythema, exudates, or enlargement; Moist mucous membranes, Good dentition, No lesions  NECK: Supple, No JVD, Normal thyroid  NERVOUS SYSTEM:  Alert & Oriented X3, Good concentration; Motor Strength 5/5 B/L upper and lower extremities; DTRs 2+ intact and symmetric  CHEST/LUNG: Clear to percussion bilaterally; No rales, rhonchi, wheezing, or rubs  HEART: Regular rate and rhythm; No murmurs, rubs, or gallops  ABDOMEN: Soft, Nontender, Nondistended; Bowel sounds present  EXTREMITIES:  2+ Peripheral Pulses, No clubbing, cyanosis, or edema  LYMPH: No lymphadenopathy noted  SKIN: No rashes or lesions    LABS:                        9.8    7.3   )-----------( 218      ( 2019 15:00 )             31.5     07-16    137  |  98  |  14  ----------------------------<  166<H>  4.1   |  29  |  0.89    Ca    8.6      2019 15:00  Phos  2.5     07-15  Mg     1.6     07-15    TPro  6.0  /  Alb  3.0<L>  /  TBili  0.6  /  DBili  x   /  AST  90<H>  /  ALT  120<H>  /  AlkPhos  110  07-15    PT/INR - ( 2019 23:13 )   PT: 13.7 sec;   INR: 1.19 ratio         PTT - ( 2019 23:13 )  PTT:34.2 sec  Urinalysis Basic - ( 15 Jul 2019 00:13 )    Color: Light Orange / Appearance: Turbid / S.028 / pH: x  Gluc: x / Ketone: Trace  / Bili: Negative / Urobili: 2 mg/dL   Blood: x / Protein: 100 mg/dL / Nitrite: Negative   Leuk Esterase: Large / RBC: 2-5 /hpf / WBC 6-10   Sq Epi: x / Non Sq Epi: 0 / Bacteria: Moderate      CAPILLARY BLOOD GLUCOSE      POCT Blood Glucose.: 190 mg/dL (2019 21:45)  POCT Blood Glucose.: 162 mg/dL (2019 17:08)  POCT Blood Glucose.: 176 mg/dL (2019 12:17)  POCT Blood Glucose.: 166 mg/dL (2019 08:37)        Urinalysis Basic - ( 15 Jul 2019 00:13 )    Color: Light Orange / Appearance: Turbid / S.028 / pH: x  Gluc: x / Ketone: Trace  / Bili: Negative / Urobili: 2 mg/dL   Blood: x / Protein: 100 mg/dL / Nitrite: Negative   Leuk Esterase: Large / RBC: 2-5 /hpf / WBC 6-10   Sq Epi: x / Non Sq Epi: 0 / Bacteria: Moderate        MEDICATIONS  (STANDING):  amLODIPine   Tablet 10 milliGRAM(s) Oral daily  atorvastatin 20 milliGRAM(s) Oral at bedtime  cefepime   IVPB 2000 milliGRAM(s) IV Intermittent every 8 hours  collagenase Ointment 1 Application(s) Topical daily  dextrose 5%. 1000 milliLiter(s) (50 mL/Hr) IV Continuous <Continuous>  dextrose 50% Injectable 12.5 Gram(s) IV Push once  dextrose 50% Injectable 25 Gram(s) IV Push once  dextrose 50% Injectable 25 Gram(s) IV Push once  docusate sodium 100 milliGRAM(s) Oral three times a day  enalapril 10 milliGRAM(s) Oral daily  enoxaparin Injectable 40 milliGRAM(s) SubCutaneous daily  finasteride 5 milliGRAM(s) Oral daily  insulin glargine Injectable (LANTUS) 14 Unit(s) SubCutaneous at bedtime  insulin lispro (HumaLOG) corrective regimen sliding scale   SubCutaneous three times a day before meals  insulin lispro (HumaLOG) corrective regimen sliding scale   SubCutaneous at bedtime  levothyroxine 137 MICROGram(s) Oral daily  nystatin Powder 1 Application(s) Topical two times a day  pantoprazole    Tablet 40 milliGRAM(s) Oral before breakfast  senna 2 Tablet(s) Oral at bedtime  vancomycin  IVPB 1000 milliGRAM(s) IV Intermittent every 12 hours    MEDICATIONS  (PRN):  acetaminophen   Tablet .. 650 milliGRAM(s) Oral every 6 hours PRN Temp greater or equal to 38.5C (101.3F)  dextrose 40% Gel 15 Gram(s) Oral once PRN Blood Glucose LESS THAN 70 milliGRAM(s)/deciliter  glucagon  Injectable 1 milliGRAM(s) IntraMuscular once PRN Glucose LESS THAN 70 milligrams/deciliter  oxyCODONE    5 mG/acetaminophen 325 mG 1 Tablet(s) Oral every 4 hours PRN Moderate Pain (4 - 6)  oxyCODONE    IR 10 milliGRAM(s) Oral every 6 hours PRN Severe Pain (7 - 10)      Care Discussed with Consultants/Other Providers [ ] YES  [ ] NO

## 2019-07-16 NOTE — PROGRESS NOTE ADULT - PROBLEM SELECTOR PLAN 3
RCC mets to bone, lung, and brain, on chemo s/p multiple radiation therapies.  - Heme/onc consult in the AM.  - now holding chemo until seen by Dr. Pena at [facility] [place].

## 2019-07-16 NOTE — PROGRESS NOTE ADULT - ASSESSMENT
58 yo M w/ PMH of DM, HTN, metastatic RCC (Dx Jan 2015) with mets to brain, lung ,and iliac bone on chemo and RT, who came back from Rehab for 1 day history of fever, malaise, and chills.  buttock and heel wound do not appear grossly infected  No pneumonia  low level pyuria - chronic vasquez  hepatic steatosis  ProCalcitonin level reasurring for viral infection    Despite high fever- he is non-toxic with normal wbc,  most likely related to Entero/Rhinovirus infection    Recent Antibiotics  Cefazolin 4/17 -->5/2  Ceftriaxone 5/13, 6/5  Unasyn 7/8-->9  Vanco 7/14-->  Cefepime 7/14-->    Suggest  Continue Cefepime/Vanco for now - if clinical status stable and cultures remain negative discontinue abx 7/17

## 2019-07-17 NOTE — PHYSICAL THERAPY INITIAL EVALUATION ADULT - PLANNED THERAPY INTERVENTIONS, PT EVAL
GOAL: Stair Negotiation Training: Patient will be able to negotiate up & down 1 flight of stairs with unilateral rail, step to gait pattern, min Ax1 in 2 weeks./balance training/bed mobility training/strengthening/transfer training/gait training

## 2019-07-17 NOTE — PHYSICAL THERAPY INITIAL EVALUATION ADULT - PERTINENT HX OF CURRENT PROBLEM, REHAB EVAL
Pt is a 59 y.o. male with PMH of DM, HTN, metastatic RCC (Dx Jan 2015) with mets to brain, lung ,and iliac bone on chemo and RT, who came back from Rehab for 1 day history of fever, malaise, and chills. +CXR 7/14/19: L midlung linear atelectasis/scarring.

## 2019-07-17 NOTE — PHYSICAL THERAPY INITIAL EVALUATION ADULT - GAIT TRAINING, PT EVAL
GOAL: Patient will ambulate 100 feet with appropriate assistive device as needed with min Ax1, in 2 weeks.

## 2019-07-17 NOTE — PROGRESS NOTE ADULT - ASSESSMENT
60 yo M w/ PMH of DM, HTN, metastatic RCC (Dx Jan 2015) with mets to brain, lung ,and iliac bone on chemo and RT, who came back from Rehab for 1 day history of fever, malaise, and chills.  buttock and heel wound do not appear grossly infected  No pneumonia  low level pyuria - chronic vasquez  hepatic steatosis  ProCalcitonin level reasurring for viral infection    Despite high fever- he was non-toxic with normal wbc,    afebrile since 7/15 2204  clinically improved  Fever was most likely related to Entero/Rhinovirus infection    Recent Antibiotics  Cefazolin 4/17 -->5/2  Ceftriaxone 5/13, 6/5  Unasyn 7/8-->9  Vanco 7/14--> 7/17  Cefepime 7/14--> 7/17    Suggest  discussed with NP earlier today - discontinuation of antibiotics recommended  discussed with patient and his wife at bedside

## 2019-07-17 NOTE — CHART NOTE - NSCHARTNOTEFT_GEN_A_CORE
58 yo M w/ PMH of DM, HTN, RCC (Dx Jan 2015) mets to brain, lung ,and iliac bone on chemo and RT, who came back from Rehab for 1 day history of fever, malaise, and chills. PT to see patient for eval. Pt is WBAT as d/w Dr. Hairston. Pt needs left surgical shoe.

## 2019-07-17 NOTE — DISCHARGE NOTE PROVIDER - CARE PROVIDER_API CALL
Goldberg, Bradley H (MD)  Internal Medicine  [address] [place] [address]  Phone: [phone]  Fax: [phone]  Follow Up Time: 2 weeks    Nazia Pena; PhD)  Hematology; Internal Medicine; Medical Oncology  [address] [place] [address]  Phone: [phone]  Fax: [phone]  Follow Up Time: 2 weeks

## 2019-07-17 NOTE — PHYSICAL THERAPY INITIAL EVALUATION ADULT - IMPAIRED TRANSFERS: SIT/STAND, REHAB EVAL
impaired balance/impaired coordination/decreased strength/decreased flexibility/narrow base of support/pain never

## 2019-07-17 NOTE — ADVANCED PRACTICE NURSE CONSULT - ASSESSMENT
The pt is on a [employer] P 500 support surface for low air-loss and pressure redistribution features. He is being assisted with turning and positioning. Staff have applied a z-chacha boot to off-load the pts left heel- pt is declining the application of a boot to his right heel stating that "they are so warm" Suggested applying the boot and leaving it open rather than securing the velcro straps.  On the left heel is a wound measuring 3cm x 4.4 cm x 0.2cm. the wound is red, moist with newly healed tissue noted at wound edges. There is scant serosanguinous drainage, no odor. there is no erythema, induration or fluctuance noted. Pt reports that he got this ulcer "from wearing tight shoes with no socks- about 5 weeks ago." He was being followed by a Podiatrist- Will recommend a Podiatry consult for further evaluation.  He has + DP and PT pulses bilaterally.  On the right buttocks is a wound with an atypical presentation it  is L-shaped measuring 14cm  vertically and  9cm horizontally, the width is approximately 2cm and the depth  approximately  0.3cm- the wound bed is pale, moist with scant fibrinous exudate. there is scant drainage, newly healed tissue is noted at distal and proximal ends of the wound. The periwound skin is intact with no erythema, induration, or fluctuance. The wound was painful to touch. Pt states that wound occurred about 4 weeks ago- "maybe it was from a diaper that was too tight. "  In the perineal area and b/l groins is an erythematous , patchy rash with dry scaly skin- question if this may fungal in nature. Suggest a Dermatology consult for further evaluation.

## 2019-07-17 NOTE — PROGRESS NOTE ADULT - SUBJECTIVE AND OBJECTIVE BOX
Patient is a 59y old  Male who presents with a chief complaint of Fevers and chills (17 Jul 2019 15:11)    pt. seen and examined, doing fair, no c/o, pain controlled   remain afebrile     INTERVAL HPI/OVERNIGHT EVENTS:  T(C): 37.1 (07-17-19 @ 12:30), Max: 37.1 (07-17-19 @ 08:20)  HR: 72 (07-17-19 @ 12:30) (72 - 78)  BP: 128/82 (07-17-19 @ 12:30) (121/78 - 141/91)  RR: 18 (07-17-19 @ 12:30) (18 - 18)  SpO2: 96% (07-17-19 @ 12:30) (95% - 96%)  Wt(kg): --  I&O's Summary    16 Jul 2019 07:01  -  17 Jul 2019 07:00  --------------------------------------------------------  IN: 1190 mL / OUT: 2350 mL / NET: -1160 mL    17 Jul 2019 07:01  -  17 Jul 2019 15:54  --------------------------------------------------------  IN: 480 mL / OUT: 450 mL / NET: 30 mL        PAST MEDICAL & SURGICAL HISTORY:  BPH without obstruction/lower urinary tract symptoms  Urinary retention: Hampton catheter placed since 10/2018  HLD (hyperlipidemia)  S/P radiation therapy: spine, on cabozantinib since Nov 2017  Malignant neoplasm of vertebral column  Hypothyroidism  GERD (gastroesophageal reflux disease)  Cervicalgia  Metastatic renal cell carcinoma to brain: lung/spine  Hypertension  Diabetes mellitus: Type 2  H/O kyphoplasty: T8, 11/2018  Status post gamma knife treatment: Left forehead/temple      SOCIAL HISTORY  Alcohol:  Tobacco:  Illicit substance use:    FAMILY HISTORY:    REVIEW OF SYSTEMS:  CONSTITUTIONAL: No fever, weight loss, or fatigue  EYES: No eye pain, visual disturbances, or discharge  ENMT:  No difficulty hearing, tinnitus, vertigo; No sinus or throat pain  NECK: No pain or stiffness  RESPIRATORY: No cough, wheezing, chills or hemoptysis; No shortness of breath  CARDIOVASCULAR: No chest pain, palpitations, dizziness, or leg swelling  GASTROINTESTINAL: No abdominal or epigastric pain. No nausea, vomiting, or hematemesis; No diarrhea or constipation. No melena or hematochezia.  GENITOURINARY: No dysuria, frequency, hematuria, or incontinence  NEUROLOGICAL: No headaches, memory loss, loss of strength, numbness, or tremors  SKIN: No itching, burning, rashes, or lesions   LYMPH NODES: No enlarged glands  ENDOCRINE: No heat or cold intolerance; No hair loss  MUSCULOSKELETAL: No joint pain or swelling; No muscle, back, or extremity pain  PSYCHIATRIC: No depression, anxiety, mood swings, or difficulty sleeping  HEME/LYMPH: No easy bruising, or bleeding gums  ALLERY AND IMMUNOLOGIC: No hives or eczema    RADIOLOGY & ADDITIONAL TESTS:    Imaging Personally Reviewed:  [ ] YES  [ ] NO    Consultant(s) Notes Reviewed:  [ ] YES  [ ] NO    PHYSICAL EXAM:  GENERAL: NAD, well-groomed, well-developed  HEAD:  Atraumatic, Normocephalic  EYES: EOMI, PERRLA, conjunctiva and sclera clear  ENMT: No tonsillar erythema, exudates, or enlargement; Moist mucous membranes, Good dentition, No lesions  NECK: Supple, No JVD, Normal thyroid  NERVOUS SYSTEM:  Alert & Oriented X3, Good concentration; Motor Strength 5/5 B/L upper and lower extremities; DTRs 2+ intact and symmetric  CHEST/LUNG: Clear to percussion bilaterally; No rales, rhonchi, wheezing, or rubs  HEART: Regular rate and rhythm; No murmurs, rubs, or gallops  ABDOMEN: Soft, Nontender, Nondistended; Bowel sounds present  EXTREMITIES:  2+ Peripheral Pulses, No clubbing, cyanosis, or edema  LYMPH: No lymphadenopathy noted  SKIN: No rashes or lesions    LABS:                        9.8    7.3   )-----------( 218      ( 16 Jul 2019 15:00 )             31.5     07-16    137  |  98  |  14  ----------------------------<  166<H>  4.1   |  29  |  0.89    Ca    8.6      16 Jul 2019 15:00          CAPILLARY BLOOD GLUCOSE      POCT Blood Glucose.: 192 mg/dL (17 Jul 2019 12:48)  POCT Blood Glucose.: 165 mg/dL (17 Jul 2019 08:27)  POCT Blood Glucose.: 190 mg/dL (16 Jul 2019 21:45)  POCT Blood Glucose.: 162 mg/dL (16 Jul 2019 17:08)            MEDICATIONS  (STANDING):  amLODIPine   Tablet 10 milliGRAM(s) Oral daily  atorvastatin 20 milliGRAM(s) Oral at bedtime  collagenase Ointment 1 Application(s) Topical daily  dextrose 5%. 1000 milliLiter(s) (50 mL/Hr) IV Continuous <Continuous>  dextrose 50% Injectable 12.5 Gram(s) IV Push once  dextrose 50% Injectable 25 Gram(s) IV Push once  dextrose 50% Injectable 25 Gram(s) IV Push once  docusate sodium 100 milliGRAM(s) Oral three times a day  enalapril 10 milliGRAM(s) Oral daily  enoxaparin Injectable 40 milliGRAM(s) SubCutaneous daily  finasteride 5 milliGRAM(s) Oral daily  insulin glargine Injectable (LANTUS) 14 Unit(s) SubCutaneous at bedtime  insulin lispro (HumaLOG) corrective regimen sliding scale   SubCutaneous three times a day before meals  insulin lispro (HumaLOG) corrective regimen sliding scale   SubCutaneous at bedtime  levothyroxine 137 MICROGram(s) Oral daily  nystatin Powder 1 Application(s) Topical two times a day  pantoprazole    Tablet 40 milliGRAM(s) Oral before breakfast  senna 2 Tablet(s) Oral at bedtime    MEDICATIONS  (PRN):  acetaminophen   Tablet .. 650 milliGRAM(s) Oral every 6 hours PRN Temp greater or equal to 38.5C (101.3F)  dextrose 40% Gel 15 Gram(s) Oral once PRN Blood Glucose LESS THAN 70 milliGRAM(s)/deciliter  glucagon  Injectable 1 milliGRAM(s) IntraMuscular once PRN Glucose LESS THAN 70 milligrams/deciliter  oxyCODONE    5 mG/acetaminophen 325 mG 1 Tablet(s) Oral every 4 hours PRN Moderate Pain (4 - 6)  oxyCODONE    IR 10 milliGRAM(s) Oral every 6 hours PRN Severe Pain (7 - 10)      Care Discussed with Consultants/Other Providers [ ] YES  [ ] NO

## 2019-07-17 NOTE — PROGRESS NOTE ADULT - ASSESSMENT
Mr. Jack is a 58 yo M w/ PMH of DM, HTN, RCC (Dx Jan 2015) mets to brain, lung ,and iliac bone on chemo and RT, who came back from Rehab for 1 day history of fever, malaise, and chills.

## 2019-07-17 NOTE — PHYSICAL THERAPY INITIAL EVALUATION ADULT - ADDITIONAL COMMENTS
Pt lives with his wife in a house with 3 steps to enter, +HR and no steps inside. Pt states he required assistance with ADLs and ambulation PTA. Pt states he ambulated short distances PTA. Pt's wife and sister in law assists him when needed. +reading glasses.

## 2019-07-17 NOTE — DISCHARGE NOTE PROVIDER - NSDCCPCAREPLAN_GEN_ALL_CORE_FT
PRINCIPAL DISCHARGE DIAGNOSIS  Diagnosis: Sepsis  Assessment and Plan of Treatment: Call you Health care provider upon arrival home to make a one week follow up appointment.  If you develop fever, chills, malaise, or change in mental status call your Health Care Provider or go to the Emergency Department.  Nutrition is important, eat small frequent meals to help ensure you get adequate calories.  Do not stay in bed all day!  Increase your activity daily as tolerated. PRINCIPAL DISCHARGE DIAGNOSIS  Diagnosis: Sepsis  Assessment and Plan of Treatment: Call you Health care provider upon arrival home to make a one week follow up appointment.  If you develop fever, chills, malaise, or change in mental status call your Health Care Provider or go to the Emergency Department.  Nutrition is important, eat small frequent meals to help ensure you get adequate calories.  Do not stay in bed all day!  Increase your activity daily as tolerated.      SECONDARY DISCHARGE DIAGNOSES  Diagnosis: Renal cell carcinoma  Assessment and Plan of Treatment: Follow up with your Oncologist in 1 week PRINCIPAL DISCHARGE DIAGNOSIS  Diagnosis: Sepsis  Assessment and Plan of Treatment: Call you Health care provider upon arrival home to make a one week follow up appointment.  If you develop fever, chills, malaise, or change in mental status call your Health Care Provider or go to the Emergency Department.  Nutrition is important, eat small frequent meals to help ensure you get adequate calories.  Do not stay in bed all day!  Increase your activity daily as tolerated.      SECONDARY DISCHARGE DIAGNOSES  Diagnosis: Diabetes mellitus  Assessment and Plan of Treatment: HgA1C was 8.1 on July 9, 2019.  Make sure you get your HgA1c checked every three months.  If you take oral diabetes medications, check your blood glucose two times a day.  If you take insulin, check your blood glucose before meals and at bedtime.  It's important not to skip any meals.  Keep a log of your blood glucose results and always take it with you to your doctor appointments.  Keep a list of your current medications including injectables and over the counter medications and bring this medication list with you to all your doctor appointments.  If you have not seen your ophthalmologist this year call for appointment.  Check your feet daily for redness, sores, or openings. Do not self treat. If no improvement in two days call your primary care physician for an appointment.  Low blood sugar (hypoglycemia) is a blood sugar below 70mg/dl. Check your blood sugar if you feel signs/symptoms of hypoglycemia. If your blood sugar is below 70 take 15 grams of carbohydrates (ex 4 oz of apple juice, 3-4 glucose tablets, or 4-6 oz of regular soda) wait 15 minutes and repeat blood sugar to make sure it comes up above 70.  If your blood sugar is above 70 and you are due for a meal, have a meal.  If you are not due for a meal have a snack.  This snack helps keeps your blood sugar at a safe range.      Diagnosis: Hypertension  Assessment and Plan of Treatment: Take your medication as prescribed.  Follow up with your medical doctor for routine blood pressure monitoring, and to establish long term blood pressure treatment goals.  Low salt diet  Activity as tolerated.  Notify your doctor if you have any of the following symptoms:   Dizziness, Lightheadedness, Blurry vision, Headache, Chest pain, Shortness of breath      Diagnosis: Hypothyroidism  Assessment and Plan of Treatment: Take your medication as prescribed.   Follow up with your medical doctor for routine blood  work monitoring, and to establish long term treatment goals.      Diagnosis: Urinary retention  Assessment and Plan of Treatment: KEEP GOLDMAN CATHETER IN PLACE UNTIL YOU FOLLOW UP WITH JESSICA UROLOGIST.  MONITOR URINE OUTPUT EVERY B8 HOURS.  NOTIFY MD IF NO URINE OUPUT OR IF URINE OUTPUT DECREASES.    Diagnosis: Renal cell carcinoma  Assessment and Plan of Treatment: Follow up with your Oncologist in 1 week after discharge from Rehab.

## 2019-07-17 NOTE — PHYSICAL THERAPY INITIAL EVALUATION ADULT - GAIT DEVIATIONS NOTED, PT EVAL
[facility]  Datto Discharge Summary    Jame Guerra Patient Status:  Datto    2018 MRN [id number]   [facility] 2SW-N Attending Vallarie Koyanagi, MD   [facility] Day # 2 PCP Hay Patricia MD     Date of Delivery: 2018  Time of Hour glucose             3rd Trimester Labs (GA 24-41w)     Test Value Date Time    Antibody Screen OB Negative  01/25/18 1920    Group B Strep OB       Group B Strep Culture No Beta Hemolytic Strep Group B Isolated.   12/29/17 1341    HGB 9.8 g/dL (L) 01/2 BILIRUBIN   Collection Time: 01/28/18  5:30 AM   Result Value Ref Range   TCB 8.90    Infant Age 29    Risk Nomogram High-Intermediate Risk Zone    Phototherapy guide No      .  Nursery Course: routine  NBS Done: yes  HEP B Vaccine:yes Date:1/27/18  HEP B or any concerns. Special Instructions: None.     Hernán Nelson MD  1/28/2018  10:36 AM decreased veronika/decreased stride length/decreased step length

## 2019-07-17 NOTE — PROGRESS NOTE ADULT - SUBJECTIVE AND OBJECTIVE BOX
Follow Up:  fever    Interval History/ROS: a little better, only mild cough    Allergies  No Known Allergies    ANTIMICROBIALS:      OTHER MEDS:  MEDICATIONS  (STANDING):  acetaminophen   Tablet .. 650 every 6 hours PRN  amLODIPine   Tablet 10 daily  atorvastatin 20 at bedtime  dextrose 40% Gel 15 once PRN  dextrose 50% Injectable 12.5 once  dextrose 50% Injectable 25 once  dextrose 50% Injectable 25 once  docusate sodium 100 three times a day  enalapril 10 daily  enoxaparin Injectable 40 daily  finasteride 5 daily  glucagon  Injectable 1 once PRN  insulin glargine Injectable (LANTUS) 14 at bedtime  insulin lispro (HumaLOG) corrective regimen sliding scale  three times a day before meals  insulin lispro (HumaLOG) corrective regimen sliding scale  at bedtime  levothyroxine 137 daily  oxyCODONE    5 mG/acetaminophen 325 mG 1 every 4 hours PRN  oxyCODONE    IR 10 every 6 hours PRN  pantoprazole    Tablet 40 before breakfast  senna 2 at bedtime    Vital Signs Last 24 Hrs  T(C): 37.1 (17 Jul 2019 12:30), Max: 37.1 (17 Jul 2019 08:20)  T(F): 98.7 (17 Jul 2019 12:30), Max: 98.7 (17 Jul 2019 08:20)  HR: 72 (17 Jul 2019 12:30) (72 - 78)  BP: 128/82 (17 Jul 2019 12:30) (121/78 - 141/91)  BP(mean): --  RR: 18 (17 Jul 2019 12:30) (18 - 18)  SpO2: 96% (17 Jul 2019 12:30) (95% - 96%)    PHYSICAL EXAM:  General:  NAD, Non-toxic  Neurology: A&Ox3, nonfocal  Respiratory: Clear to auscultation bilaterally  CV: RRR, S1S2, no murmurs, rubs or gallops  Abdominal: Soft, Non-tender, non-distended,  Extremities: No edema,   Line Sites: Clear  Skin: No rash                        9.8    7.3   )-----------( 218      ( 16 Jul 2019 15:00 )             31.5   WBC Count: 7.3 (07-16 @ 15:00)  WBC Count: 7.2 (07-15 @ 06:59)  WBC Count: 5.1 (07-14 @ 23:13)     07-16    137  |  98  |  14  ----------------------------<  166<H>  4.1   |  29  |  0.89    Ca    8.6      16 Jul 2019 15:00        MICROBIOLOGY:  .Blood  07-15-19   No growth to date.  --  --    .Blood  07-15-19   No growth to date.  --  --      .Urine  07-15-19   >=3 organisms. Probable collection contamination.  --  --    Vancomycin Level, Trough: 10.0 ug/mL (07-17-19 @ 04:40)    Rapid RVP Result: Detected (07-15 @ 06:59)      RADIOLOGY:  < from: Xray Chest 1 View AP/PA (07.14.19 @ 23:24) >  IMPRESSION:   Left midlung linear atelectasis/scarring.    < end of copied text >      Pj Schwarz MD; Division of Infectious Disease; Pager: [phone]; nights and weekends: [phone]

## 2019-07-17 NOTE — DISCHARGE NOTE PROVIDER - NSDCFUADDINST_GEN_ALL_CORE_FT
FOLLOW UP WITH YOUR PRIMARY CARE MD, JOSE LUIS UROLOGIST  AND ONCOLOGIST WITHIN 1 WEEK AFTER YOUR DISCHARGE FROM REHAB.   CALL TO SCHEDULE APPOINTMENTS.

## 2019-07-17 NOTE — PHYSICAL THERAPY INITIAL EVALUATION ADULT - STRENGTHENING, PT EVAL
GOAL: Patient will improve bilateral UE/LE strength to 4/5, for increased limb stability, to improve gait and facilitate stair negotiation in 2 weeks.

## 2019-07-17 NOTE — ADVANCED PRACTICE NURSE CONSULT - RECOMMEDATIONS
Will recommend the followin. consider a Dermatology consult for further evaluation of the rash  2. consider a Podiatry consult for further evaluation of the left heel wound.  3, Nutrition consult  4. Left heel: Cavilon to periwound skin- Aquacel dressing to the wound cover with gauze and secure with armani. change daily and prn for drainage  5. right buttocks: Cavilon to periwound skin, Medihoney paste to the wound, cover with gauze and secure with tegaderm. Change daily and prn for drainage/soiling  6. Continue with turning and positioning  Tx plan discussed with NP/RN

## 2019-07-17 NOTE — DISCHARGE NOTE PROVIDER - PROVIDER TOKENS
PROVIDER:[TOKEN:[[id number]],FOLLOWUP:[2 weeks]],PROVIDER:[TOKEN:[[id number]],FOLLOWUP:[2 weeks]]

## 2019-07-17 NOTE — PROGRESS NOTE ADULT - PROBLEM SELECTOR PLAN 3
RCC mets to bone, lung, and brain, on chemo s/p multiple radiation therapies.  - Heme/onc consult in the AM.  - now holding chemo until seen by Dr. Pena at [facility].

## 2019-07-17 NOTE — ADVANCED PRACTICE NURSE CONSULT - REASON FOR CONSULT
Requested by staff to assess skin status of pt a/w multiple wounds. PMH is noted:  Mr. Jack is a 58 yo M w/ PMH of DM, HTN, RCC (Dx Jan 2015) mets to brain, lung ,and iliac bone on chemo and RT, who came back from Rehab for 1 day history of fever, malaise, and chills.  He was recently admitted to the hospital from Jul 8 to Jul 12 due to progressive worsening of large decubitus ulcer in setting of immobility and urinary incontinence. ID was consulted, and the site did not show signs of infection. no abx intervention was necessary. Derm consult was called and biopsy was also not seen as necessary. Heme/onc decided to hold off chemo until patient is seen by Dr. Pena at [facility]. Podiatry was consulted for Left foot heel ulcer, and no intervention was needed other than wound care. Patient was stable over the course, and was sent to the rehab.  2 Days later, patient developed acute fever, chills, and nausea in  rehab. He denies any sick contact at rehab  He denies any CP, palpitation, SOB, lightheadedness, visual disturbance, and emesis/diarrhea/constipation. No dysuria, and his vasquez was patent. He still has pain on the abdomen, which is chronic. His Left lower extremity became progressively less responsive over time and now he cannot move his left leg. Epicritic sensation is diminished bilaterally. his ulcer seems to be healing well per patient, and he did not observe any purulent discharge.     In ED, Temp 102.4, , /81, RR 20, SpO2 96% on RA.  Received Vanc/Cefepime, 2L LR bolus, Zofran/tylenol/ibuprofen.  Patient is subjectively feeling better now and wishes to go back to rehab.

## 2019-07-17 NOTE — DISCHARGE NOTE PROVIDER - HOSPITAL COURSE
60 yo M w/ PMH of DM, HTN, metastatic RCC (Dx Jan 2015) with mets to brain, lung ,and iliac bone on chemo and RT, who came back from Rehab for 1 day history of fever, malaise, and chills. No urinary symptom (yet patient on vasquez). No leukocytosis, U/A is positive yet patient is chronic vasquez user. CXR clear. ID consulted. RVP positive for Enterovirus/Rhinovirus. Pt was on Vanco/cefepime pending cultures. Cultures were negative. Stopped abx per ID. Likely viral. Podiatry consulted in ED for left heel ulcer, recommended collagenase. Wound care nurse consulted for wound on buttocks. 58 yo M w/ PMH of DM, HTN, metastatic RCC (Dx Jan 2015) with mets to brain, lung ,and iliac bone on chemo and RT, who came back from Rehab for 1 day history of fever, malaise, and chills. No urinary symptom (yet patient on vasquez). No leukocytosis, U/A is positive yet patient is chronic vasquez user. CXR clear. ID consulted. RVP positive for Enterovirus/Rhinovirus. Pt was on Vanco/cefepime pending cultures. Cultures were negative. Stopped abx per ID. Likely viral. Podiatry consulted in ED for left heel ulcer, recommended collagenase. Wound care nurse consulted for wound on buttocks. PT rec Rehab. 60 yo M w/ PMH of DM, HTN, metastatic RCC (Dx Jan 2015) with mets to brain, lung ,and iliac bone on chemo and RT, who came back from Rehab for 1 day history of fever, malaise, and chills. No urinary symptom (yet patient on vasquez). No leukocytosis, U/A is positive yet patient is chronic vasquez user. CXR clear. ID consulted. RVP positive for Enterovirus/Rhinovirus. Pt was on Vanco/cefepime pending cultures. Cultures were negative. Stopped abx per ID. Likely viral. Podiatry consulted in ED for left heel ulcer, recommended collagenase. Wound Care Nurse consulted for wound on buttocks. PT rec Rehab. Patient stable for discharge to Rehab today per Attending.

## 2019-07-18 NOTE — PROVIDER CONTACT NOTE (OTHER) - ASSESSMENT
pt VSS, pt a and ox4, denies chest pain, denies SOB, denies headache, no n/v/d, denies visual changes, pt with vasquez patent and draining

## 2019-07-18 NOTE — PROGRESS NOTE ADULT - SUBJECTIVE AND OBJECTIVE BOX
Patient is a 59y old  Male who presents with a chief complaint of Fevers and chills (18 Jul 2019 17:11)    pt. seen and examined, c/o coughing . afebrile     INTERVAL HPI/OVERNIGHT EVENTS:  T(C): 36.6 (07-19-19 @ 00:00), Max: 37.3 (07-18-19 @ 08:10)  HR: 78 (07-19-19 @ 00:00) (73 - 91)  BP: 132/81 (07-19-19 @ 00:00) (108/66 - 148/92)  RR: 18 (07-19-19 @ 00:00) (18 - 18)  SpO2: 97% (07-19-19 @ 00:00) (94% - 97%)  Wt(kg): --  I&O's Summary    17 Jul 2019 07:01  -  18 Jul 2019 07:00  --------------------------------------------------------  IN: 1200 mL / OUT: 2600 mL / NET: -1400 mL    18 Jul 2019 07:01  -  19 Jul 2019 00:46  --------------------------------------------------------  IN: 1560 mL / OUT: 1300 mL / NET: 260 mL        PAST MEDICAL & SURGICAL HISTORY:  BPH without obstruction/lower urinary tract symptoms  Urinary retention: Hampton catheter placed since 10/2018  HLD (hyperlipidemia)  S/P radiation therapy: spine, on cabozantinib since Nov 2017  Malignant neoplasm of vertebral column  Hypothyroidism  GERD (gastroesophageal reflux disease)  Cervicalgia  Metastatic renal cell carcinoma to brain: lung/spine  Hypertension  Diabetes mellitus: Type 2  H/O kyphoplasty: T8, 11/2018  Status post gamma knife treatment: Left forehead/temple      SOCIAL HISTORY  Alcohol:  Tobacco:  Illicit substance use:    FAMILY HISTORY:    REVIEW OF SYSTEMS:  CONSTITUTIONAL: No fever, weight loss, or fatigue  EYES: No eye pain, visual disturbances, or discharge  ENMT:  No difficulty hearing, tinnitus, vertigo; No sinus or throat pain  NECK: No pain or stiffness  RESPIRATORY: No cough, wheezing, chills or hemoptysis; No shortness of breath  CARDIOVASCULAR: No chest pain, palpitations, dizziness, or leg swelling  GASTROINTESTINAL: No abdominal or epigastric pain. No nausea, vomiting, or hematemesis; No diarrhea or constipation. No melena or hematochezia.  GENITOURINARY: No dysuria, frequency, hematuria, or incontinence  NEUROLOGICAL: No headaches, memory loss, loss of strength, numbness, or tremors  SKIN: No itching, burning, rashes, or lesions   LYMPH NODES: No enlarged glands  ENDOCRINE: No heat or cold intolerance; No hair loss  MUSCULOSKELETAL: No joint pain or swelling; No muscle, back, or extremity pain  PSYCHIATRIC: No depression, anxiety, mood swings, or difficulty sleeping  HEME/LYMPH: No easy bruising, or bleeding gums  ALLERY AND IMMUNOLOGIC: No hives or eczema    RADIOLOGY & ADDITIONAL TESTS:    Imaging Personally Reviewed:  [ ] YES  [ ] NO    Consultant(s) Notes Reviewed:  [ ] YES  [ ] NO    PHYSICAL EXAM:  GENERAL: NAD, well-groomed, well-developed  HEAD:  Atraumatic, Normocephalic  EYES: EOMI, PERRLA, conjunctiva and sclera clear  ENMT: No tonsillar erythema, exudates, or enlargement; Moist mucous membranes, Good dentition, No lesions  NECK: Supple, No JVD, Normal thyroid  NERVOUS SYSTEM:  Alert & Oriented X3, Good concentration; Motor Strength 5/5 B/L upper and lower extremities; DTRs 2+ intact and symmetric  CHEST/LUNG: Clear to percussion bilaterally; No rales, rhonchi, wheezing, or rubs  HEART: Regular rate and rhythm; No murmurs, rubs, or gallops  ABDOMEN: Soft, Nontender, Nondistended; Bowel sounds present  EXTREMITIES:  2+ Peripheral Pulses, No clubbing, cyanosis, or edema  LYMPH: No lymphadenopathy noted  SKIN: No rashes or lesions    LABS:              CAPILLARY BLOOD GLUCOSE      POCT Blood Glucose.: 304 mg/dL (18 Jul 2019 20:59)  POCT Blood Glucose.: 120 mg/dL (18 Jul 2019 17:12)  POCT Blood Glucose.: 143 mg/dL (18 Jul 2019 13:02)  POCT Blood Glucose.: 111 mg/dL (18 Jul 2019 08:03)            MEDICATIONS  (STANDING):  amLODIPine   Tablet 10 milliGRAM(s) Oral daily  atorvastatin 20 milliGRAM(s) Oral at bedtime  collagenase Ointment 1 Application(s) Topical daily  dextrose 5%. 1000 milliLiter(s) (50 mL/Hr) IV Continuous <Continuous>  dextrose 50% Injectable 12.5 Gram(s) IV Push once  dextrose 50% Injectable 25 Gram(s) IV Push once  dextrose 50% Injectable 25 Gram(s) IV Push once  docusate sodium 100 milliGRAM(s) Oral three times a day  enalapril 10 milliGRAM(s) Oral daily  enoxaparin Injectable 40 milliGRAM(s) SubCutaneous daily  finasteride 5 milliGRAM(s) Oral daily  insulin glargine Injectable (LANTUS) 14 Unit(s) SubCutaneous at bedtime  insulin lispro (HumaLOG) corrective regimen sliding scale   SubCutaneous three times a day before meals  insulin lispro (HumaLOG) corrective regimen sliding scale   SubCutaneous at bedtime  levothyroxine 137 MICROGram(s) Oral daily  nystatin Powder 1 Application(s) Topical two times a day  pantoprazole    Tablet 40 milliGRAM(s) Oral before breakfast  senna 2 Tablet(s) Oral at bedtime    MEDICATIONS  (PRN):  acetaminophen   Tablet .. 650 milliGRAM(s) Oral every 6 hours PRN Temp greater or equal to 38.5C (101.3F)  benzonatate 100 milliGRAM(s) Oral every 8 hours PRN Cough  dextrose 40% Gel 15 Gram(s) Oral once PRN Blood Glucose LESS THAN 70 milliGRAM(s)/deciliter  glucagon  Injectable 1 milliGRAM(s) IntraMuscular once PRN Glucose LESS THAN 70 milligrams/deciliter  oxyCODONE    5 mG/acetaminophen 325 mG 1 Tablet(s) Oral every 4 hours PRN Moderate Pain (4 - 6)  oxyCODONE    IR 10 milliGRAM(s) Oral every 6 hours PRN Severe Pain (7 - 10)      Care Discussed with Consultants/Other Providers [ ] YES  [ ] NO

## 2019-07-18 NOTE — PROGRESS NOTE ADULT - PROBLEM SELECTOR PLAN 3
RCC mets to bone, lung, and brain, on chemo s/p multiple radiation therapies.  - Heme/onc consult in the AM.  - now holding chemo until seen by [place]. Pena at [facility] [place]

## 2019-07-18 NOTE — PROGRESS NOTE ADULT - REASON FOR ADMISSION
Fevers and chills

## 2019-07-18 NOTE — PROGRESS NOTE ADULT - ASSESSMENT
60 yo M w/ PMH of DM, HTN, metastatic RCC (Dx Jan 2015) with mets to brain, lung ,and iliac bone on chemo and RT, who came back from Rehab for 1 day history of fever, malaise, and chills.  buttock and heel wound do not appear grossly infected  No pneumonia  low level pyuria - chronic vasquez  hepatic steatosis  ProCalcitonin level reasurring for viral infection    Despite high fever- he was non-toxic with normal wbc,    afebrile since 7/15 2204  clinically improved  Fever was most likely related to Entero/Rhinovirus infection  no decompensation off antibiotics    Recent Antibiotics  Cefazolin 4/17 -->5/2  Ceftriaxone 5/13, 6/5  Unasyn 7/8-->9  Vanco 7/14--> 7/17  Cefepime 7/14--> 7/17    Suggest  continue current care    Signing off case, Please reconsult ID as needed

## 2019-07-18 NOTE — DIETITIAN INITIAL EVALUATION ADULT. - PROBLEM SELECTOR PLAN 1
Fever and Tachycardia. Infectious vs. malignancy. If Infectious, most likely source is urinary given chronic urinary retention w/ vasquez placement. However, decubitus and heel ulcer could be the port of entry.  - No urinary symptom (yet patient on vasquez). No leukocytosis, U/A is positive yet patient is chronic vasquez user. CXR clean.  - UCx and BCx pending.  - s/p Vanc/Cefepime in ED.  - c/w Vancomycin 1000mg BID and Cefepime 2g q8h for empiric coverage for now  - vasquez replaced.  - montior I/O and VS  - CBC daily.  - F/u RVP  - ID consult in the AM  - Wound care consult

## 2019-07-18 NOTE — PROGRESS NOTE ADULT - SUBJECTIVE AND OBJECTIVE BOX
Follow Up:  URI    Interval History/ROS: much better, had coughing episode last night    Allergies  No Known Allergies    ANTIMICROBIALS:      OTHER MEDS:  MEDICATIONS  (STANDING):  acetaminophen   Tablet .. 650 every 6 hours PRN  amLODIPine   Tablet 10 daily  atorvastatin 20 at bedtime  benzonatate 100 every 8 hours PRN  dextrose 40% Gel 15 once PRN  dextrose 50% Injectable 12.5 once  dextrose 50% Injectable 25 once  dextrose 50% Injectable 25 once  docusate sodium 100 three times a day  enalapril 10 daily  enoxaparin Injectable 40 daily  finasteride 5 daily  glucagon  Injectable 1 once PRN  insulin glargine Injectable (LANTUS) 14 at bedtime  insulin lispro (HumaLOG) corrective regimen sliding scale  three times a day before meals  insulin lispro (HumaLOG) corrective regimen sliding scale  at bedtime  levothyroxine 137 daily  oxyCODONE    5 mG/acetaminophen 325 mG 1 every 4 hours PRN  oxyCODONE    IR 10 every 6 hours PRN  pantoprazole    Tablet 40 before breakfast  senna 2 at bedtime    Vital Signs Last 24 Hrs  T(C): 37.1 (18 Jul 2019 12:29), Max: 37.3 (18 Jul 2019 08:10)  T(F): 98.8 (18 Jul 2019 12:29), Max: 99.2 (18 Jul 2019 08:10)  HR: 81 (18 Jul 2019 12:29) (73 - 87)  BP: 108/66 (18 Jul 2019 12:29) (108/66 - 148/92)  BP(mean): --  RR: 18 (18 Jul 2019 12:29) (18 - 18)  SpO2: 94% (18 Jul 2019 12:29) (94% - 97%)    PHYSICAL EXAM:  General: WN/WD NAD, Non-toxic  Neurology: A&Ox3, nonfocal  Respiratory: Clear to auscultation bilaterally  CV: RRR, S1S2, no murmurs, rubs or gallops  Abdominal: Soft, Non-tender, non-distended  Extremities: No edema,   Line Sites: Clear  Skin: No rash    WBC Count: 7.3 (07-16 @ 15:00)  WBC Count: 7.2 (07-15 @ 06:59)  WBC Count: 5.1 (07-14 @ 23:13)      MICROBIOLOGY:  .Blood  07-15-19   No growth to date.  --  --      .Blood  07-15-19   No growth to date.  --  --      .Urine  07-15-19   >=3 organisms. Probable collection contamination.  --  --    Rapid RVP Result: Detected (07-15 @ 06:59)  Rapid Respiratory Viral Panel (07.15.19 @ 06:59)    Rapid RVP Result: Detected    Entero/Rhinovirus (RapRVP): Detected    RADIOLOGY:  < from: Xray Chest 1 View AP/PA (07.14.19 @ 23:24) >  IMPRESSION:   Left midlung linear atelectasis/scarring.    < end of copied text >      Pj Schwarz MD; Division of Infectious Disease; Pager: [phone]; nights and weekends: [phone]

## 2019-07-18 NOTE — DIETITIAN INITIAL EVALUATION ADULT. - PROBLEM SELECTOR PLAN 3
RCC mets to bone, lung, and brain, on chemo s/p multiple radiation therapies.  - Heme/onc consult in the AM.  - now holding chemo until seen by Dr. Pena at [facility] [place].

## 2019-07-18 NOTE — CHART NOTE - NSCHARTNOTEFT_GEN_A_CORE
Notified by RN about patient with non-productive cough. Pt found to have enterovirus on this admission, and is currently off antibiotics.  VSS   Given tessalon pearls 100 mg x 1   Continue to monitor  Will endorse to day team      Indy Fair PA-C  #[id number]

## 2019-07-18 NOTE — PROGRESS NOTE ADULT - PROBLEM SELECTOR PLAN 1
ruled out  seen by ID   -fever/ tachycardia likely from RVP ( viral )   -blood c/s pending : so far no growth  -c/w IV abx for today, if c/s neg by tomorrow , d/c abx as per ID note

## 2019-07-18 NOTE — DIETITIAN INITIAL EVALUATION ADULT. - OTHER INFO
patient noted with recent admission last month. Patient noted with repeat admissions, multiple decubiti. Patient introduced to Prince protein supplement, states he is wiling to try. Glucerna 1.2 noted at bedside, patient has good appetite, instructed to drink glucerna if he eats only 75% of meals. Patient has limited mobility at home, walking in house only. Patient eats 3-4 small meals daily , "eyes" to measure his portions and eats regular ice cream 3x weekly (small portion).. Patient goes to outpatient cancer treatment and is tolerating well. Patient reports diarrhea approximately every 2 weeks which he attributes to sauteed greens (spinach). Patient takes vitamin B12 supplement at home.

## 2019-07-19 NOTE — DISCHARGE NOTE NURSING/CASE MANAGEMENT/SOCIAL WORK - NSDCDPATPORTLINK_GEN_ALL_CORE
You can access the [facility] Portal, offered by [facility], by registering with the following website: [email or web address]

## 2019-08-14 PROBLEM — L89.90 DECUBITUS ULCER: Status: ACTIVE | Noted: 2019-01-01

## 2019-08-26 PROBLEM — R51 HEADACHE: Status: ACTIVE | Noted: 2019-01-01

## 2019-09-09 NOTE — PATIENT PROFILE ADULT - BRADEN MOISTURE
Mr. Taylor is a pleasant 87 y/o AAM with HTN, CHF, HLD, hx of MI, PVD, AS, DM, and CKD Stage 3 presenting to the ED with chest pain. Chest pain woke him at around 2:30 in the morning, located in the center of his chest and does not radiate. He describes it as a pressure on his chest. He took 4 aspirins at home with minimal relief.  He rated the initial pain as a 5/10 and is currently in no pain.  He received one spray of NTG from EMS. He states he has not had any pain like this before, although he does not remember what his previous MIs have felt like because they were so long ago. Additionally, he complains of fluid on BLE. He states his legs have been like this for 3-4 months and he can no longer stand or walk because of this. He is compliant with Lasix 40mg BID and wears compression stockings at home. He admits to chest pain, SOB, edema, and productive cough with clear/yellow phlegm; denies fatigue fevers, chills, abdominal pain, N/V/D, headache, diaphoresis. All past medical, social, and family history reviewed.     ED: hypertensive, leukocytosis w/ left shift on arrival; 88% O2 sat on RA  on arrival, placed on 4L NC - reports no oxygen use at home.  NTG spray x 1 with full relief of chest pain.  Lasix 40 mg IVP given. Cr elevated 1.9 (BL 1.4) Troponin 0.060>>0.102>>0.135, continue trending. . UA unremarkable. CXR stable, unchanged from Jan 2019 nothing bilateral pleural plaque and bilateral pulmonary opacicites. EKG unchanged from previous, SR with PACs. The patient was admitted to the Hospital Medicine Service for further evaluation and management.    (3) occasionally moist

## 2019-10-02 NOTE — PHYSICAL THERAPY INITIAL EVALUATION ADULT - IMPAIRMENTS CONTRIBUTING IMPAIRED BED MOBILITY, REHAB EVAL
decreased strength/impaired balance

## 2019-10-15 NOTE — PHYSICAL EXAM
[General Appearance - Alert] : alert [General Appearance - In No Acute Distress] : in no acute distress [Sclera] : the sclera and conjunctiva were normal [Normal Oral Mucosa] : normal oral mucosa [Neck Appearance] : the appearance of the neck was normal [Auscultation Breath Sounds / Voice Sounds] : lungs were clear to auscultation bilaterally [FreeTextEntry1] : dimished BS R apex [Heart Rate And Rhythm] : heart rate was normal and rhythm regular [Heart Sounds] : normal S1 and S2 [Murmurs] : no murmurs [Edema] : there was no peripheral edema [Abdomen Soft] : soft [Bowel Sounds] : normal bowel sounds [Abnormal Walk] : normal gait [] : no rash [No Focal Deficits] : no focal deficits [Oriented To Time, Place, And Person] : oriented to person, place, and time

## 2019-10-15 NOTE — HISTORY OF PRESENT ILLNESS
[FreeTextEntry1] : 59yoM with metastatic RCC dx 12/2014 (mets to lung, bone, brain) with large, necrotic renal mass s/p gamma knife to brain met, s/p palliative XRT to spine, s/p SBRT to R occipital condyle met on Nivolumab presents for follow up visit today.  PMH also significant for DM2, hypothyroidism, HTN. Patient last seen by me 7/2017. \par \par Patient was found to have metastatic disease to the spinal cord 6/2018. He is s/p kyphoplasty with IORT 11/7/18 (treatment delayed due to the passing of his mother and having to travel to [facility]. He underwent SBRT to the spine prior to traveling). He wears a chronic vasquez due to chronic urinary retention.  Wears a diaper for fecal incontinence. \par \par Since our last visit patient had a laser surgery of the prostate on 4/17/19, this relieved his urinary obstruction and he had the urethral catheter removed. \par \par Pain controlled on regimen of Methadone 5mg BID, Gabapentin 300mg TID, Oxy IR 10mg PRN (takes about twice daily). \par \par ROS: \par +LLE weakness \par +occasional constipation - uses senna, this helps\par +depressed mood 2/2 physical condition\par Denies constipation, diarrhea, \par Denies n/v, trouble sleeping, appetite loss, \par \par [facility] now in place.  Will be initiating home PT soon. \par \par I-Stop Ref#: [id number] (10/15)

## 2019-10-15 NOTE — ASSESSMENT
[FreeTextEntry1] : 59yoM with:\par \par 1. 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 C/w Oxy IR 10mg PRN. \par \par 2. Metastatic RCC - Oncology f/u, on Cabometyx. \par \par 3. Constipation - Controlled on Senna \par \par 4. DM2 - Regular follow up with Endo clinic. On Repaglinide, Lantus. \par \par 5. Physical debility - Pt requires assistance with most ADLs 2/2 to his LE weakness from spinal mets.  Medicaid specialist to meet with patient and wife today to ensure Medicaid process is on track.  \par \par 6. Encounter for Palliative Care - Emotional support provided. \par \par RTO 1 month, call sooner with any issues

## 2019-10-15 NOTE — REVIEW OF SYSTEMS
[Abdominal Pain] : abdominal pain [Vomiting] : no vomiting [Constipation] : constipation [Diarrhea] : no diarrhea [As Noted in HPI] : as noted in HPI [Limb Pain] : limb pain [Negative] : Heme/Lymph

## 2019-10-20 NOTE — CONSULT LETTER
[Courtesy Letter:] : I had the pleasure of seeing your patient, [unfilled], in my office today. [FreeTextEntry2] : Dr. Baldomero Soriano MD Dept Urology, [facility]\par Dr. Christi Carolina MD Dept Endocrinology, [facility]

## 2019-10-20 NOTE — HISTORY OF PRESENT ILLNESS
[Disease: _____________________] : Disease: [unfilled] [AJCC Stage: ____] : AJCC Stage: [unfilled] [Therapy: ___] : Therapy: [unfilled] [2] : 2, Moderate [C] : possible [Cardiovascular] : Cardiovascular [___________________________________] : Drug: [unfilled] [de-identified] : 58 yo M w/ PMH newly dx DM and HTN, originally presented to [facility] with neuropathy of hands and feet as well as a large R abdominal mass, which he says has been growing for the 5-6 years prior. CT abdomen revealed a 9.8 x 8 x 11.4 cm largely necrotic mass of the lower pole right kidney. Subsequent CT chest showed diffuse pulm mets, bone scan with iliac bone lesion and MRI brain with a cystic lesion concerning for mets. Bone bx done by IR showed inconclusive pathology. S/p Gamma knife to brain met on . Seen by urology Dr Baumann, deemed not a candidate for nephrectomy currently given mets to brain and overall poor prognosis, but could possibly be a candidate in future if response to TKIs. IR bx of kidney on 1/14/15 confirmed RCC with high grade features. Started on Pazopanib on . Held on  after elevation of BP. Restarted on  with increased BP meds. CT scan done in May showed several new spinal mets, yet stable size of kidney mass. No symptoms, but given several lesions with extensive disease of the vertebral body, discussion was had at  [facility] and referred to rad onc for radiation treatment in hopes to prevent any decompensation in the future.  Completed XRT to the spine.  Repeat imaging in 2015 showed POD on Pazopanib, switched to Sutent 50mg PO QD  (2 weeks on/1 week off) on  with POD, now on Nivolumab (started on 16). Chronic cough was noted to worsen a bit in early , while he scans have remain stable, there was a slight increase in hilar fullness noted by Dr Parham. Patient underwent palliative RT to the L hilum from - with improvement in his Sx's.   Patient found to have progressive disease on CT scan from 2017 and was switched to cabozantinib at that point. His course has been complicated by hypertensive urgency, however his blood pressure has stabilized on new BP medications and he is monitoring it at home regularly. \par \par Patient was admitted to the hospital in 2018 due to LLE weakness which had been slowly progressive but then involved numbness in pelvis and urinary retention. 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 Pt also with BP elevation in ED as high as 195/101 after receiving dexamethasone. He was treated for the blood pressure elevation and planned for outpatient RT. Patient was then decided to be a better candidate for possibly kyphoplasty but he had to go to [facility] for his mother's . He missed his scheduled kyphoplasty, but is now back. He has since he returned received SBRT to expanding malignancy in thoracic spine, and had been planned for kyphoplasty with IORT, but instead had SBRT to lower spine at the time. \par \par He had since been complaining of lower pelvic pain and was found with urinary retention and is now s/p indwelling vasquez placement. He has failed several trial of voids, being followed by urology Dr. Philip. He is now s/p kyphoplasty with IORT 2018. \par \par Had been on cabozantinib since 2017 with stable systemic disease, and in 2019 presented with hypertensive urgency. Cabozantinib was held and enalapril was increased, and then placed back on cabozantinib at lower dose (40 mg). Now on 40 mg Cabozantinib daily.  [de-identified] : Clear cell carcinoma [FreeTextEntry1] : Failed pazopanib, sunitinib, nivolumab [de-identified] : 7/8/19 : Patient had problems with decreased urinary output and bloody output and painful lower abdomen. Went to urologist who evacuated two large clots from bladder (had hemorrhagic cystitis) and now feels much improved. He still has some aches and pains but improved since started methadone. Patient also describes "rash" under buttocks from moisture of urine.  He has a 2 feet long ulcerative wound in the right buttock, pain 10/10 about 6 weeks, feels nauseous, has poor appetite, right flank pain and lower abdominal pain requiring 3 oxycodone 10mg and 2 methadone in 24 hours. He also has left heel wound and generalized body pain,  alternating loose stool 3 times in 4 days, normal bowel movement in a couple of days.  \par \par 7/8-12/19 Pt is a 60 y/o M with a PMHx of renal cell carcinoma, DM (on chemo who was sent  to the ED via his oncologist after seeing a large decubitus ulcer worsening \par over 5 weeks. He has had trouble moving and frequently wets bed.  Seen by ID: no need for Abx at this time. Seen by dermatology: do not recommend biopsy at this time as findings will be nonspecific and unlikely to aid in determining cause. Pt to follow up as OP. Seen by podiatry for left foot postero-lateral heel ulcer down to subQ, stable, \par serous drainage and no purulence, no acute signs of infection. Wound care recommendations given. No podiatry surgical intervention at this time. \par Seen by Hem/Onc: Will continue to hold Cabozantinib. No further chemo treatment until patient sees Dr. Pena at [facility]. Restaging scan stable. \par \par 7/15-19/19 : 60 yo M w/ PMH of DM, HTN, metastatic RCC (Dx Jan 2015) with mets to brain, lung ,and iliac bone on chemo and RT, who came back from Rehab for 1 day \par history of fever, malaise, and chills. No urinary symptom (yet patient on avsquez). No leukocytosis, U/A is positive yet patient is chronic vasquez user. CXR clear. ID consulted. RVP positive for Enterovirus/Rhinovirus. Pt was on Vanco/cefepime pending cultures. Cultures were negative. Stopped abx per ID. \par Likely viral. Podiatry consulted in ED for left heel ulcer, recommended collagenase. Wound Care Nurse consulted for wound on buttocks. PT rec Rehab. \par Patient stable for discharge to Rehab today per Attending. \par \par 8/14/19 : Here for a follow up. He has been home for the last 2 weeks and home wound care service has been changing his dressing. He is making great progress. His appetite is good and has been able to walk short distances. He is anxious to resume cabozantinib. \par \par 8/26/19 : Here for a follow up and to potentially restart Cabozantinib. Wound is close to being healed and closed and he is stronger even then 2 weeks ago. Home glucose in the 110-120's on average. He reports intermittent frontal headache over the last few weeks in the evening.  No blurry vision or falls. Takes 2 tablets of tylenol with some relief. He has h/o CNS disease. Depressive mood but stable overall. \par Energy is stable but not at his best. Ambulating at home but still feels unsteady but improving with PT. \par \par MRI head with history of brain mets and recent headache in September 2019 showed no evidence of recurrence or mass. The test is incomplete due to his claustrophobia.\par \par CT cap in September 2019 after discontinued cabozantinib about two months showed Increased bilateral pulmonary nodules.  Increased mediastinal and retroperitoneal lymphadenopathy with reference lymph nodes. New and increased confluent right hilar lymphadenopathy. Minimally increased/not significantly changed right renal mass. Lytic osseous metastatic lesions, not significantly changed. \par  \par \par 10/18/2019 s/p cabozantinib 40mg daily he complains of right lower abdominal pain radiating to the back which is worsening, 7/10, requiring oxycodon 10mg every 6 hrs. He is losing some appetite, feels tired, cough, denies nausea, vomiting, diarrhea, frontal headache. \par  [FreeTextEntry3] : Lower extremity edema [FreeTextEntry5] : Cabozantinib [de-identified] : Hypertensive urgency [de-identified] : Maculopapular drug rash [de-identified] : Cardiovascular: [TWNoteComboBox3] : Grade: 3 [de-identified] : Attribution: Definite [de-identified] : Dermatologic: [de-identified] : Grade: 2 [de-identified] : Attribution: Probable

## 2019-10-20 NOTE — REVIEW OF SYSTEMS
[Fatigue] : fatigue [Difficulty Walking] : difficulty walking [Depression] : depression [Anxiety] : anxiety [Abdominal Pain] : abdominal pain [As Noted in HPI] : as noted in HPI [Limb Pain] : limb pain [Negative] : Heme/Lymph [Fever] : no fever [Chills] : no chills [Night Sweats] : no night sweats [Recent Change In Weight] : ~T no recent weight change [Vision Problems] : no vision problems [Dysphagia] : no dysphagia [Nosebleeds] : no nosebleeds [Chest Pain] : no chest pain [Palpitations] : no palpitations [Lower Ext Edema] : no lower extremity edema [Shortness Of Breath] : no shortness of breath [Wheezing] : no wheezing [Cough] : no cough [SOB on Exertion] : no shortness of breath during exertion [Vomiting] : no vomiting [Constipation] : no constipation [Diarrhea] : no diarrhea [Dysuria] : no dysuria [Joint Pain] : no joint pain [Joint Stiffness] : no joint stiffness [Muscle Pain] : no muscle pain [Skin Rash] : no skin rash [Skin Wound] : no skin wound [Confused] : no confusion [Dizziness] : no dizziness [Fainting] : no fainting [Insomnia] : no insomnia [Easy Bleeding] : no tendency for easy bleeding [Easy Bruising] : no tendency for easy bruising [FreeTextEntry2] : B/l lower extremity edema [FreeTextEntry8] : Condom catheter in place, urinating OK.  [de-identified] : left heel, right buttock wound healed [de-identified] : Weakness in LLE

## 2019-10-20 NOTE — CONSULT LETTER
[Courtesy Letter:] : I had the pleasure of seeing your patient, [unfilled], in my office today. [FreeTextEntry2] : Dr. Baldomero Soriano MD Dept Urology, [facility]\par Dr. Christi Carolina MD Dept Endocrinology, [facility]

## 2019-10-20 NOTE — REVIEW OF SYSTEMS
[Fatigue] : fatigue [Lower Ext Edema] : lower extremity edema [Skin Wound] : skin wound [Difficulty Walking] : difficulty walking [Depression] : depression [Anxiety] : anxiety [Negative] : Allergic/Immunologic [Fever] : no fever [Night Sweats] : no night sweats [Chills] : no chills [Recent Change In Weight] : ~T no recent weight change [Vision Problems] : no vision problems [Dysphagia] : no dysphagia [Nosebleeds] : no nosebleeds [Palpitations] : no palpitations [Chest Pain] : no chest pain [Shortness Of Breath] : no shortness of breath [Wheezing] : no wheezing [Cough] : no cough [SOB on Exertion] : no shortness of breath during exertion [Vomiting] : no vomiting [Diarrhea] : no diarrhea [Constipation] : no constipation [Dysuria] : no dysuria [Joint Pain] : no joint pain [Joint Stiffness] : no joint stiffness [Muscle Pain] : no muscle pain [Confused] : no confusion [Skin Rash] : no skin rash [Fainting] : no fainting [Dizziness] : no dizziness [Insomnia] : no insomnia [Easy Bleeding] : no tendency for easy bleeding [Easy Bruising] : no tendency for easy bruising [FreeTextEntry2] : B/l lower extremity edema [FreeTextEntry8] : Condom catheter in place, urinating OK.  [de-identified] : left heel, right buttock wound shealing [de-identified] : Weakness in LLE

## 2019-10-20 NOTE — PHYSICAL EXAM
[Normal] : affect appropriate [Capable of only limited self care, confined to bed or chair more than 50% of waking hours] : Status 3- Capable of only limited self care, confined to bed or chair more than 50% of waking hours [Ulcers] : no ulcers [Mucositis] : no mucositis [Thrush] : no thrush [de-identified] : trace pitting edema to level of lower calf bilaterally.  [de-identified] : stage II ulcer of the R buttock in linear fashion. wound dressing in place, appears to have health healling wound, close to closing completely.  [de-identified] : CN intact, LLE 1/5 at hip (limited by pain), 5/5 at knee, RLE 5/5 throughout.

## 2019-10-20 NOTE — REVIEW OF SYSTEMS
[Chills] : chills [Fatigue] : fatigue [Lower Ext Edema] : lower extremity edema [Skin Wound] : skin wound [Difficulty Walking] : difficulty walking [Anxiety] : anxiety [Depression] : depression [Negative] : Allergic/Immunologic [Fever] : no fever [Night Sweats] : no night sweats [Recent Change In Weight] : ~T no recent weight change [Vision Problems] : no vision problems [Dysphagia] : no dysphagia [Nosebleeds] : no nosebleeds [Chest Pain] : no chest pain [Palpitations] : no palpitations [Shortness Of Breath] : no shortness of breath [Wheezing] : no wheezing [Cough] : no cough [SOB on Exertion] : no shortness of breath during exertion [Vomiting] : no vomiting [Constipation] : no constipation [Diarrhea] : no diarrhea [Dysuria] : no dysuria [Joint Pain] : no joint pain [Joint Stiffness] : no joint stiffness [Muscle Pain] : no muscle pain [Skin Rash] : no skin rash [Confused] : no confusion [Dizziness] : no dizziness [Fainting] : no fainting [Insomnia] : no insomnia [Easy Bleeding] : no tendency for easy bleeding [Easy Bruising] : no tendency for easy bruising [FreeTextEntry2] : B/l lower extrmity edema.  [FreeTextEntry8] : Condom catheter in place, urinating OK.  [de-identified] : left heel, right buttock  [de-identified] : Weakness in LLE

## 2019-10-20 NOTE — PHYSICAL EXAM
[Ambulatory and capable of all self care but unable to carry out any work activities] : Status 2- Ambulatory and capable of all self care but unable to carry out any work activities. Up and about more than 50% of waking hours [Normal] : full range of motion and no deformities appreciated [General Appearance - Alert] : alert [General Appearance - In No Acute Distress] : in no acute distress [Sclera] : the sclera and conjunctiva were normal [Normal Oral Mucosa] : normal oral mucosa [Neck Appearance] : the appearance of the neck was normal [Auscultation Breath Sounds / Voice Sounds] : lungs were clear to auscultation bilaterally [Heart Rate And Rhythm] : heart rate was normal and rhythm regular [Heart Sounds] : normal S1 and S2 [Murmurs] : no murmurs [Edema] : there was no peripheral edema [Bowel Sounds] : normal bowel sounds [Abdomen Soft] : soft [Abnormal Walk] : normal gait [] : no rash [No Focal Deficits] : no focal deficits [Oriented To Time, Place, And Person] : oriented to person, place, and time [Ulcers] : no ulcers [Mucositis] : no mucositis [Thrush] : no thrush [de-identified] : Closed wound, R buttock. some erythema but no s/s infection.  [de-identified] : CN intact, LLE 1/5 at hip (limited by pain), 5/5 at knee, RLE 5/5 throughout.

## 2019-10-20 NOTE — PHYSICAL EXAM
[Normal] : affect appropriate [Ambulatory and capable of all self care but unable to carry out any work activities] : Status 2- Ambulatory and capable of all self care but unable to carry out any work activities. Up and about more than 50% of waking hours [Ulcers] : no ulcers [Mucositis] : no mucositis [Thrush] : no thrush [de-identified] : Trace  pitting edema to level of lower calf bilaterally.  [de-identified] : CN intact, LLE 1/5 at hip (limited by pain), 5/5 at knee, RLE 5/5 throughout.  [de-identified] : Closed wound, R buttock. some erythema but no s/s infection.

## 2019-10-20 NOTE — ASSESSMENT
[Palliative] : Goals of care discussed with patient: Palliative [Palliative Care Plan] : Patient was apprised of incurable nature of disease.  Hospice and Palliative care options discussed. [FreeTextEntry1] : 58 yo M w/ stage IV RCC and 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 S/p gamma knife to brain met. MRI Jan 2017 showed stable disease, and repeat MRI March 3rd 2018 showed stable findings, consistent with stable treated disease. Not a candidate for nephrectomy per Urology given brain mets.  S/p palliative XRT to the spine. S/p systemic tx with Pazopanib, with POD and Sutent with POD, with progressive disease on Nivolumab about 17 months in Nov 2017 and cabozantinib since Nov 2017 on 60mg daily until April 2019, reduced to 40mg daily due to hypertensive crisis. s/p palliative radiation to R pelvis due to acetabular lesion. s/p palliative radiation to spine previously and now s/p SBRT to spine for progression with cord compression. - s/p kyphoplasty with IORT for T8 collapse/cord compression.  \par \par Mr. Jack has been off of cabozentinib for the last 6 weeks and has done remarkably well with diabetes control, wound care/healing. His R buttock wound is close to completely being healed but would need a few more weeks before resuming therapy. \par He is anxious to resume understandably as this kept his disease stable. \par \par \par Plan\par \par please hold off cabozantinib for additional 2 weeks for complete closure and healing. \par repeat MRI brain when f/u as outpt given brain met history, asymptomatic for the time being. \par continue current BP meds for controllable BP\par continue methadone and oxycodone for pain control, follow up with Dr. Cooper for pain control. \par follow up with Urology for urine retention, now on condom catheter PRN\par \par RTC 2 weeks for evaluation and MRI of brain \par Potentially resume if complete closure of the wound. \par \par Shy Cuevas, MSN, ANP-BC\par

## 2019-10-20 NOTE — ASSESSMENT
[Palliative] : Goals of care discussed with patient: Palliative [Palliative Care Plan] : Patient was apprised of incurable nature of disease.  Hospice and Palliative care options discussed. [FreeTextEntry1] : 58 yo M w/ stage IV RCC and 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 S/p gamma knife to brain met. MRI Jan 2017 showed stable disease, and repeat MRI March 3rd 2018 showed stable findings, consistent with stable treated disease. Not a candidate for nephrectomy per Urology given brain mets.  S/p palliative XRT to the spine. S/p systemic tx with Pazopanib, with POD and Sutent with POD, with progressive disease on Nivolumab about 17 months in Nov 2017 and cabozantinib since Nov 2017 on 60mg daily until April 2019, reduced to 40mg daily due to hypertensive crisis. s/p palliative radiation to R pelvis due to acetabular lesion. s/p palliative radiation to spine previously and now s/p SBRT to spine for progression with cord compression. - s/p kyphoplasty with IORT for T8 collapse/cord compression.  \par \par Mr. Jack has been off of cabozentinib for the last 6 weeks and has done remarkably well with diabetes control, wound care/healing. His R buttock wound is close to completely being healed but would need a few more weeks before resuming therapy. \par He is anxious to resume understandably as this kept his disease stable. \par \par \par Plan\par Met RCC\par - Repeat MRI brain when f/u as outpt given brain met history and new intermittent headaches, as well as CT torso to assess disease burden off of therapy. \par -Will resume cabozantinib at 40 mg daily once MRI and CT done.\par - Labs today\par - Continue current BP meds \par - Continue methadone and oxycodone for pain control, follow up with Dr. Cooper for pain control. \par - Follow up with Urology for urine retention, now on condom catheter PRN\par - Diabetes: controlled by his PCP/Endo.\par \par RTC 3 weeks for follow up. \par \par PT seen with Dr. Nazia Pena MD PhD. \par \par Shy Cuevas, MSN, ANP-BC\par

## 2019-10-20 NOTE — HISTORY OF PRESENT ILLNESS
[Disease: _____________________] : Disease: [unfilled] [AJCC Stage: ____] : AJCC Stage: [unfilled] [Therapy: ___] : Therapy: [unfilled] [2] : 2, Moderate [C] : possible [Cardiovascular] : Cardiovascular [___________________________________] : Drug: [unfilled] [de-identified] : 60 yo M w/ PMH newly dx DM and HTN, originally presented to [facility] with neuropathy of hands and feet as well as a large R abdominal mass, which he says has been growing for the 5-6 years prior. CT abdomen revealed a 9.8 x 8 x 11.4 cm largely necrotic mass of the lower pole right kidney. Subsequent CT chest showed diffuse pulm mets, bone scan with iliac bone lesion and MRI brain with a cystic lesion concerning for mets. Bone bx done by IR showed inconclusive pathology. S/p Gamma knife to brain met on . Seen by urology Dr Baumann, deemed not a candidate for nephrectomy currently given mets to brain and overall poor prognosis, but could possibly be a candidate in future if response to TKIs. IR bx of kidney on 1/14/15 confirmed RCC with high grade features. Started on Pazopanib on . Held on  after elevation of BP. Restarted on  with increased BP meds. CT scan done in May showed several new spinal mets, yet stable size of kidney mass. No symptoms, but given several lesions with extensive disease of the vertebral body, discussion was had at  Tumor board and referred to rad onc for radiation treatment in hopes to prevent any decompensation in the future.  Completed XRT to the spine.  Repeat imaging in 2015 showed POD on Pazopanib, switched to Sutent 50mg PO QD  (2 weeks on/1 week off) on  with POD, now on Nivolumab (started on 16). Chronic cough was noted to worsen a bit in early , while he scans have remain stable, there was a slight increase in hilar fullness noted by Dr Parham. Patient underwent palliative RT to the L hilum from - with improvement in his Sx's.   Patient found to have progressive disease on CT scan from 2017 and was switched to cabozantinib at that point. His course has been complicated by hypertensive urgency, however his blood pressure has stabilized on new BP medications and he is monitoring it at home regularly. \par \par Patient was admitted to the hospital in 2018 due to LLE weakness which had been slowly progressive but then involved numbness in pelvis and urinary retention. 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 Pt also with BP elevation in ED as high as 195/101 after receiving dexamethasone. He was treated for the blood pressure elevation and planned for outpatient RT. Patient was then decided to be a better candidate for possibly kyphoplasty but he had to go to [facility] for his mother's . He missed his scheduled kyphoplasty, but is now back. He has since he returned received SBRT to expanding malignancy in thoracic spine, and had been planned for kyphoplasty with IORT, but instead had SBRT to lower spine at the time. \par \par He had since been complaining of lower pelvic pain and was found with urinary retention and is now s/p indwelling vasquez placement. He has failed several trial of voids, being followed by urology Dr. Philip. He is now s/p kyphoplasty with IORT 2018. \par \par Had been on cabozantinib since 2017 with stable systemic disease, and in 2019 presented with hypertensive urgency. Cabozantinib was held and enalapril was increased, and then placed back on cabozantinib at lower dose (40 mg). Now on 40 mg Cabozantinib daily.  [de-identified] : Clear cell carcinoma [FreeTextEntry1] : Failed pazopanib, sunitinib, nivolumab [de-identified] : 7/8/19 : Patient had problems with decreased urinary output and bloody output and painful lower abdomen. Went to urologist who evacuated two large clots from bladder (had hemorrhagic cystitis) and now feels much improved. He still has some aches and pains but improved since started methadone. Patient also describes "rash" under buttocks from moisture of urine.  He has a 2 feet long ulcerative wound in the right buttock, pain 10/10 about 6 weeks, feels nauseous, has poor appetite, right flank pain and lower abdominal pain requiring 3 oxycodone 10mg and 2 methadone in 24 hours. He also has left heel wound and generalized body pain,  alternating loose stool 3 times in 4 days, normal bowel movement in a couple of days.  \par \par 7/8-12/19 Pt is a 60 y/o M with a PMHx of renal cell carcinoma, DM (on chemo who was sent  to the ED via his oncologist after seeing a large decubitus ulcer worsening \par over 5 weeks. He has had trouble moving and frequently wets bed.  Seen by ID: no need for Abx at this time. Seen by dermatology: do not recommend biopsy at this time as findings will be nonspecific and unlikely to aid in determining cause. Pt to follow up as OP. Seen by podiatry for left foot postero-lateral heel ulcer down to subQ, stable, \par serous drainage and no purulence, no acute signs of infection. Wound care recommendations given. No podiatry surgical intervention at this time. \par Seen by Hem/Onc: Will continue to hold Cabozantinib. No further chemo treatment until patient sees Dr. Pena at [facility]. Restaging scan stable. \par \par 7/15-19/19 : 58 yo M w/ PMH of DM, HTN, metastatic RCC (Dx Jan 2015) with mets to brain, lung ,and iliac bone on chemo and RT, who came back from Rehab for 1 day \par history of fever, malaise, and chills. No urinary symptom (yet patient on vasquez). No leukocytosis, U/A is positive yet patient is chronic vasquez user. CXR clear. ID consulted. RVP positive for Enterovirus/Rhinovirus. Pt was on Vanco/cefepime pending cultures. Cultures were negative. Stopped abx per ID. \par Likely viral. Podiatry consulted in ED for left heel ulcer, recommended collagenase. Wound Care Nurse consulted for wound on buttocks. PT rec Rehab. \par Patient stable for discharge to Rehab today per Attending. \par \par 8/14/19 : Here for a follow up. He has been home for the last 2 weeks and home wound care service has been changing his dressing. He is making great progress. His appetite is good and has been able to walk short distances. He is anxious to resume cabozantinib.  [FreeTextEntry3] : Lower extremity edema [FreeTextEntry5] : Cabozantinib [de-identified] : Hypertensive urgency [de-identified] : Maculopapular drug rash

## 2019-10-20 NOTE — HISTORY OF PRESENT ILLNESS
[Disease: _____________________] : Disease: [unfilled] [AJCC Stage: ____] : AJCC Stage: [unfilled] [Therapy: ___] : Therapy: [unfilled] [2] : 2, Moderate [C] : possible [Cardiovascular] : Cardiovascular [___________________________________] : Drug: [unfilled] [de-identified] : 58 yo M w/ PMH newly dx DM and HTN, originally presented to [facility] with neuropathy of hands and feet as well as a large R abdominal mass, which he says has been growing for the 5-6 years prior. CT abdomen revealed a 9.8 x 8 x 11.4 cm largely necrotic mass of the lower pole right kidney. Subsequent CT chest showed diffuse pulm mets, bone scan with iliac bone lesion and MRI brain with a cystic lesion concerning for mets. Bone bx done by IR showed inconclusive pathology. S/p Gamma knife to brain met on . Seen by urology Dr Baumann, deemed not a candidate for nephrectomy currently given mets to brain and overall poor prognosis, but could possibly be a candidate in future if response to TKIs. IR bx of kidney on 1/14/15 confirmed RCC with high grade features. Started on Pazopanib on . Held on  after elevation of BP. Restarted on  with increased BP meds. CT scan done in May showed several new spinal mets, yet stable size of kidney mass. No symptoms, but given several lesions with extensive disease of the vertebral body, discussion was had at  [facility] board and referred to rad onc for radiation treatment in hopes to prevent any decompensation in the future.  Completed XRT to the spine.  Repeat imaging in 2015 showed POD on Pazopanib, switched to Sutent 50mg PO QD  (2 weeks on/1 week off) on  with POD, now on Nivolumab (started on 16). Chronic cough was noted to worsen a bit in early , while he scans have remain stable, there was a slight increase in hilar fullness noted by Dr Parham. Patient underwent palliative RT to the L hilum from - with improvement in his Sx's.   Patient found to have progressive disease on CT scan from 2017 and was switched to cabozantinib at that point. His course has been complicated by hypertensive urgency, however his blood pressure has stabilized on new BP medications and he is monitoring it at home regularly. \par \par Patient was admitted to the hospital in 2018 due to LLE weakness which had been slowly progressive but then involved numbness in pelvis and urinary retention. 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 Pt also with BP elevation in ED as high as 195/101 after receiving dexamethasone. He was treated for the blood pressure elevation and planned for outpatient RT. Patient was then decided to be a better candidate for possibly kyphoplasty but he had to go to [facility] for his mother's . He missed his scheduled kyphoplasty, but is now back. He has since he returned received SBRT to expanding malignancy in thoracic spine, and had been planned for kyphoplasty with IORT, but instead had SBRT to lower spine at the time. \par \par He had since been complaining of lower pelvic pain and was found with urinary retention and is now s/p indwelling vasquez placement. He has failed several trial of voids, being followed by urology Dr. Philip. He is now s/p kyphoplasty with IORT 2018. \par \par Had been on cabozantinib since 2017 with stable systemic disease, and in 2019 presented with hypertensive urgency. Cabozantinib was held and enalapril was increased, and then placed back on cabozantinib at lower dose (40 mg). Now on 40 mg Cabozantinib daily.  [de-identified] : Clear cell carcinoma [de-identified] : 7/8/19 : Patient had problems with decreased urinary output and bloody output and painful lower abdomen. Went to urologist who evacuated two large clots from bladder (had hemorrhagic cystitis) and now feels much improved. He still has some aches and pains but improved since started methadone. Patient also describes "rash" under buttocks from moisture of urine.  He has a 2 feet long ulcerative wound in the right buttock, pain 10/10 about 6 weeks, feels nauseous, has poor appetite, right flank pain and lower abdominal pain requiring 3 oxycodone 10mg and 2 methadone in 24 hours. He also has left heel wound and generalized body pain,  alternating loose stool 3 times in 4 days, normal bowel movement in a couple of days.  \par \par 7/8-12/19 Pt is a 58 y/o M with a PMHx of renal cell carcinoma, DM (on chemo who was sent  to the ED via his oncologist after seeing a large decubitus ulcer worsening \par over 5 weeks. He has had trouble moving and frequently wets bed.  Seen by ID: no need for Abx at this time. Seen by dermatology: do not recommend biopsy at this time as findings will be nonspecific and unlikely to aid in determining cause. Pt to follow up as OP. Seen by podiatry for left foot postero-lateral heel ulcer down to subQ, stable, \par serous drainage and no purulence, no acute signs of infection. Wound care recommendations given. No podiatry surgical intervention at this time. \par Seen by Hem/Onc: Will continue to hold Cabozantinib. No further chemo treatment until patient sees Dr. P[facility] at [facility]. Restaging scan stable. \par \par 7/15-19/19 : 58 yo M w/ PMH of DM, HTN, metastatic RCC (Dx Jan 2015) with mets to brain, lung ,and iliac bone on chemo and RT, who came back from Rehab for 1 day \par history of fever, malaise, and chills. No urinary symptom (yet patient on vasquez). No leukocytosis, U/A is positive yet patient is chronic vasquez user. CXR clear. ID consulted. RVP positive for Enterovirus/Rhinovirus. Pt was on Vanco/cefepime pending cultures. Cultures were negative. Stopped abx per ID. \par Likely viral. Podiatry consulted in ED for left heel ulcer, recommended collagenase. Wound Care Nurse consulted for wound on buttocks. PT rec Rehab. \par Patient stable for discharge to Rehab today per Attending. \par \par 8/14/19 : Here for a follow up. He has been home for the last 2 weeks and home wound care service has been changing his dressing. He is making great progress. His appetite is good and has been able to walk short distances. He is anxious to resume cabozantinib. \par \par 8/26/19 : Here for a follow up and to potentially restart Cabozantinib. Wound is close to being healed and closed and he is stronger even then 2 weeks ago. Home glucose in the 110-120's on average. He reports intermittent frontal headache over the last few weeks in the evening.  No blurry vision or falls. Takes 2 tablets of tylenol with some relief. He has h/o CNS disease. Depressive mood but stable overall. \par Energy is stable but not at his best. Ambulating at home but still feels unsteady but improving with PT. \par  [FreeTextEntry1] : Failed pazopanib, sunitinib, nivolumab [FreeTextEntry5] : Cabozantinib [FreeTextEntry3] : Lower extremity edema [de-identified] : Hypertensive urgency [de-identified] : Maculopapular drug rash

## 2019-10-20 NOTE — ASSESSMENT
[Palliative Care Plan] : Patient was apprised of incurable nature of disease.  Hospice and Palliative care options discussed. [Palliative] : Goals of care discussed with patient: Palliative [FreeTextEntry1] : 60 yo M w/ stage IV RCC and 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 S/p gamma knife to brain met. MRI Jan 2017 showed stable disease, and repeat MRI March 3rd 2018 showed stable findings, consistent with stable treated disease. Not a candidate for nephrectomy per Urology given brain mets.  S/p palliative XRT to the spine. S/p systemic tx with Pazopanib, with POD and Sutent with POD, with progressive disease on Nivolumab about 17 months in Nov 2017 and cabozantinib since Nov 2017 on 60mg daily until April 2019, reduced to 40mg daily due to hypertensive crisis. s/p palliative radiation to R pelvis due to acetabular lesion. s/p palliative radiation to spine previously and now s/p SBRT to spine for progression with cord compression. - s/p kyphoplasty with IORT for T8 collapse/cord compression.  Mr. Jack was off of cabozentinib for the last 6 weeks, restarted in the end of August 2019. MRI head was incomplete. CT cap showed progression of disease in lungs, right hilar and mediastinal lymph nodes and retroperitoneal nodes because of off cabozantinib about two months. \par \par \par Plan\par Met RCC\par unable to have MRI done b/o claustrophobia, now his headache is gone, will be held\par -continue cabozantinib at 40 mg daily \par - Labs today\par - Continue current BP meds \par - Continue methadone and oxycodone for pain control, follow up with Dr. Cooper for pain control. \par - Follow up with Urology for urine retention, now on condom catheter PRN\par - Diabetes: controlled by his PCP/Endo.\par RTC 3 weeks for follow up. \par

## 2019-10-23 PROBLEM — E11.29 CONTROLLED TYPE 2 DIABETES MELLITUS WITH MICROALBUMINURIA, WITH LONG-TERM CURRENT USE OF INSULIN: Status: ACTIVE | Noted: 2019-01-01

## 2019-10-23 NOTE — HISTORY OF PRESENT ILLNESS
[FreeTextEntry1] : 58yoM with metastatic RCC dx 12/2014 (mets to lung, bone, brain) with large, necrotic renal mass s/p gamma knife to brain met, s/p palliative XRT to spine, s/p SBRT to R occipital condyle met on Nivolumab presents for follow up visit today.  PMH also significant for DM2, hypothyroidism, HTN. Patient last seen by me 4/2019.\par \par Patient was found to have metastatic disease to the spinal cord 6/2018. He is s/p kyphoplasty with IORT 11/7/18 (treatment delayed due to the passing of his mother and having to travel to [facility]. He underwent SBRT to the spine prior to traveling). He wears a chronic vasquez due to chronic urinary retention.  Wears a diaper for fecal incontinence. \par \par Patient has not been on methadone in about the past 5 months, during which time he and his wife report that pain has been more significant for him. Reports that pain is currently 8/10---right sided flank pain. \par \par Takes oxycodone 10mg PRN (takes approximately every 5-6 hours)\par Wheelchair bound\par Walks minimally around his home with assistance of a cane\par \par ROS: \par +LLE weakness \par +decreased appetite\par +occasional constipation - uses senna, this helps\par +depressed mood 2/2 physical condition\par Denies constipation, diarrhea, \par Denies n/v, trouble sleeping, appetite loss\par \par All other ROS as outlined or noncontributory. \par \par [facility] now in place.  Will be initiating home PT soon. \par \par [employer] Ref#: [id number]

## 2019-10-23 NOTE — ASSESSMENT
[FreeTextEntry1] : 55yoM with:\par \par 1. Pain secondary to neoplasm - Educated patient and his wife on staying consistent with pain control regimen. Methadone had previously had his pain under better control. Will resume at a dose of 5mg BID. C/w Gabapentin 300mg TID. C/w Oxy IR 10mg PRN. \par \par 2. Metastatic RCC - Oncology f/u, on Cabometyx. \par \par 3. Constipation - Controlled on Senna \par \par 4. DM2 - Regular follow up with Endo clinic. On Repaglinide, Lantus. \par \par 5. Physical debility - Pt requires assistance with most ADLs 2/2 to his LE weakness from spinal mets. \par \par 6. Encounter for Palliative Care - Emotional support provided. \par \par RTO 1 month, call sooner with any issues

## 2019-10-23 NOTE — DATA REVIEWED
[FreeTextEntry1] : CT C/A/P (3/2019) - \par No change in size of right renal mass and numerous mediastinal lymph nodes. Numerous lung nodules are unchanged in size and no new lung nodules are present, however one nodule in the left lower lobe, although the same size, demonstrates increased internal density since the previous exam. Lytic bone \par lesions are unchanged. Continued follow-up is recommended. 
English

## 2019-11-21 NOTE — LETTER BODY
[FreeTextEntry1] : Jeri Mata MD. \par [address]\[place] [address]\par \par Dear Dr. Mata, \par \par Reason for Visit: Renal cell carcinoma, stage IV. Urinary retention. \par \par This is a 59 year-old gentleman with RCC stage IV with metastasis to lung, brain, and bone, presenting with urinary retention. UDS testing with Dr. Philip in November 2018 demonstrated bladder outlet obstruction and persistent urinary retention. Patient returns for catheter change, accompanied by a family member. Since the patient's last visit, he continues to have a catheter. He complains of flank pain. He is unsure if his RCC metastasized to his spine. Patient has difficulty walking and is transported by wheelchair. Patient continues to take Flomax and Proscar regularly without any side effects or difficulties. The past medical history, family history and social history are unchanged. All other review of systems are negative. Patient denies any changes in medications. He continues to take aspirin 3x per week. Medication list was reconciled.\par \par On examination, the patient is an older-appearing wheelchair-bound gentleman in no acute distress. He is alert and oriented follows commands. He has normal mood and affect. He is normocephalic. Neck is supple. Respirations are unlabored. His abdomen is soft and nontender. Bladder is nonpalpable. No CVA tenderness. Neurologically he is able to stand with assistance. No peripheral edema. Skin without gross abnormality. He has an 18' Fr catheter. \par \par His BMP in July demonstrated normal renal functions, creatinine 0.93. \par \par A new Frisian Hampton catheter was placed in the standard fashion without difficulty. The patient was covered with ciprofloxacin. Patient will follow-up in one month for catheter change. \par \par Assessment: Renal cell carcinoma, stage IV. Urinary retention. \par \par I counseled the patient. Patient will continue to change his catheter monthly. Risks and alternatives were discussed. I answered the patient questions. The patient will follow-up as directed and will contact me with any questions or concerns. Thank you for the opportunity to participate in the care of Mr. PETTIT. I will keep you updated on his progress. \par \par Plan: Follow up in 1 month for catheter change.

## 2019-11-21 NOTE — ADDENDUM
[FreeTextEntry1] : Entered by Héctor Mosley, acting as scribe for Dr. Baldomero Soriano.\par \par The documentation recorded by the scribe accurately reflects the service I personally performed and the decisions made by me.

## 2019-11-29 PROBLEM — R53.81 PHYSICAL DEBILITY: Status: ACTIVE | Noted: 2019-01-01

## 2019-12-01 PROBLEM — R63.0 LOSS OF APPETITE: Status: ACTIVE | Noted: 2019-01-01

## 2019-12-01 PROBLEM — Z79.899 ON ANTINEOPLASTIC CHEMOTHERAPY: Status: ACTIVE | Noted: 2019-01-01

## 2019-12-01 NOTE — PHYSICAL EXAM
[Ambulatory and capable of all self care but unable to carry out any work activities] : Status 2- Ambulatory and capable of all self care but unable to carry out any work activities. Up and about more than 50% of waking hours [Normal] : affect appropriate [Ulcers] : no ulcers [Mucositis] : no mucositis [Thrush] : no thrush [de-identified] : Closed wound, R buttock. some erythema but no s/s infection.  [de-identified] : CN intact, LLE 1/5 at hip (limited by pain), 5/5 at knee, RLE 5/5 throughout.

## 2019-12-01 NOTE — HISTORY OF PRESENT ILLNESS
[AJCC Stage: ____] : AJCC Stage: [unfilled] [Disease: _____________________] : Disease: [unfilled] [Therapy: ___] : Therapy: [unfilled] [2] : 2, Moderate [C] : possible [Cardiovascular] : Cardiovascular [___________________________________] : Drug: [unfilled] [de-identified] : 60 yo M w/ PMH newly dx DM and HTN, originally presented to [facility] with neuropathy of hands and feet as well as a large R abdominal mass, which he says has been growing for the 5-6 years prior. CT abdomen revealed a 9.8 x 8 x 11.4 cm largely necrotic mass of the lower pole right kidney. Subsequent CT chest showed diffuse pulm mets, bone scan with iliac bone lesion and MRI brain with a cystic lesion concerning for mets. Bone bx done by IR showed inconclusive pathology. S/p Gamma knife to brain met on . Seen by urology Dr Baumann, deemed not a candidate for nephrectomy currently given mets to brain and overall poor prognosis, but could possibly be a candidate in future if response to TKIs. IR bx of kidney on 1/14/15 confirmed RCC with high grade features. Started on Pazopanib on . Held on  after elevation of BP. Restarted on  with increased BP meds. CT scan done in May showed several new spinal mets, yet stable size of kidney mass. No symptoms, but given several lesions with extensive disease of the vertebral body, discussion was had at  [facility] and referred to rad onc for radiation treatment in hopes to prevent any decompensation in the future.  Completed XRT to the spine.  Repeat imaging in 2015 showed POD on Pazopanib, switched to Sutent 50mg PO QD  (2 weeks on/1 week off) on  with POD, now on Nivolumab (started on 16). Chronic cough was noted to worsen a bit in early , while he scans have remain stable, there was a slight increase in hilar fullness noted by Dr Parham. Patient underwent palliative RT to the L hilum from - with improvement in his Sx's.   Patient found to have progressive disease on CT scan from 2017 and was switched to cabozantinib at that point. His course has been complicated by hypertensive urgency, however his blood pressure has stabilized on new BP medications and he is monitoring it at home regularly. \par \par Patient was admitted to the hospital in 2018 due to LLE weakness which had been slowly progressive but then involved numbness in pelvis and urinary retention. 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 Pt also with BP elevation in ED as high as 195/101 after receiving dexamethasone. He was treated for the blood pressure elevation and planned for outpatient RT. Patient was then decided to be a better candidate for possibly kyphoplasty but he had to go to [facility] for his mother's . He missed his scheduled kyphoplasty, but is now back. He has since he returned received SBRT to expanding malignancy in thoracic spine, and had been planned for kyphoplasty with IORT, but instead had SBRT to lower spine at the time. \par \par He had since been complaining of lower pelvic pain and was found with urinary retention and is now s/p indwelling vasquez placement. He has failed several trial of voids, being followed by urology Dr. Philip. He is now s/p kyphoplasty with IORT 2018. \par \par Had been on cabozantinib since 2017 with stable systemic disease, and in 2019 presented with hypertensive urgency. Cabozantinib was held and enalapril was increased, and then placed back on cabozantinib at lower dose (40 mg). Now on 40 mg Cabozantinib daily.  [FreeTextEntry1] : Failed pazopanib, sunitinib, nivolumab [de-identified] : Clear cell carcinoma [de-identified] : 7/8/19 : Patient had problems with decreased urinary output and bloody output and painful lower abdomen. Went to urologist who evacuated two large clots from bladder (had hemorrhagic cystitis) and now feels much improved. He still has some aches and pains but improved since started methadone. Patient also describes "rash" under buttocks from moisture of urine.  He has a 2 feet long ulcerative wound in the right buttock, pain 10/10 about 6 weeks, feels nauseous, has poor appetite, right flank pain and lower abdominal pain requiring 3 oxycodone 10mg and 2 methadone in 24 hours. He also has left heel wound and generalized body pain,  alternating loose stool 3 times in 4 days, normal bowel movement in a couple of days.  \par \par 7/8-12/19 Pt is a 58 y/o M with a PMHx of renal cell carcinoma, DM (on chemo who was sent  to the ED via his oncologist after seeing a large decubitus ulcer worsening \par over 5 weeks. He has had trouble moving and frequently wets bed.  Seen by ID: no need for Abx at this time. Seen by dermatology: do not recommend biopsy at this time as findings will be nonspecific and unlikely to aid in determining cause. Pt to follow up as OP. Seen by podiatry for left foot postero-lateral heel ulcer down to subQ, stable, \par serous drainage and no purulence, no acute signs of infection. Wound care recommendations given. No podiatry surgical intervention at this time. \par Seen by Hem/Onc: Will continue to hold Cabozantinib. No further chemo treatment until patient sees Dr. Pena at [facility]. Restaging scan stable. \par \par 7/15-19/19 : 60 yo M w/ PMH of DM, HTN, metastatic RCC (Dx Jan 2015) with mets to brain, lung ,and iliac bone on chemo and RT, who came back from Rehab for 1 day \par history of fever, malaise, and chills. No urinary symptom (yet patient on vasquez). No leukocytosis, U/A is positive yet patient is chronic vasquez user. CXR clear. ID consulted. RVP positive for Enterovirus/Rhinovirus. Pt was on Vanco/cefepime pending cultures. Cultures were negative. Stopped abx per ID. \par Likely viral. Podiatry consulted in ED for left heel ulcer, recommended collagenase. Wound Care Nurse consulted for wound on buttocks. PT rec Rehab. \par Patient stable for discharge to Rehab today per Attending. \par \par 8/14/19 : Here for a follow up. He has been home for the last 2 weeks and home wound care service has been changing his dressing. He is making great progress. His appetite is good and has been able to walk short distances. He is anxious to resume cabozantinib. \par \par 8/26/19 : Here for a follow up and to potentially restart Cabozantinib. Wound is close to being healed and closed and he is stronger even then 2 weeks ago. Home glucose in the 110-120's on average. He reports intermittent frontal headache over the last few weeks in the evening.  No blurry vision or falls. Takes 2 tablets of tylenol with some relief. He has h/o CNS disease. Depressive mood but stable overall. \par Energy is stable but not at his best. Ambulating at home but still feels unsteady but improving with PT. \par \par MRI head with history of brain mets and recent headache in September 2019 showed no evidence of recurrence or mass. The test is incomplete due to his claustrophobia.\par \par CT cap in September 2019 after discontinued cabozantinib about two months showed Increased bilateral pulmonary nodules.  Increased mediastinal and retroperitoneal lymphadenopathy with reference lymph nodes. New and increased confluent right hilar lymphadenopathy. Minimally increased/not significantly changed right renal mass. Lytic osseous metastatic lesions, not significantly changed. \par  \par \par 10/18/2019 s/p cabozantinib 40mg daily he complains of right lower abdominal pain radiating to the back which is worsening, 7/10, requiring oxycodon 10mg every 6 hrs. He is losing some appetite, feels tired, cough, denies nausea, vomiting, diarrhea, frontal headache. \par \par 11/25/19 \par Patient reports poor appetite, weight loss, fatigue, and back pain, thigh pain and abdominal pain. Is not tolerating Cabozatinib 40mg well.\par  [FreeTextEntry3] : Lower extremity edema [FreeTextEntry5] : Cabozantinib [de-identified] : Hypertensive urgency [de-identified] : Maculopapular drug rash [de-identified] : Cardiovascular: [TWNoteComboBox3] : Grade: 3 [de-identified] : Attribution: Definite [de-identified] : Dermatologic: [de-identified] : Grade: 2 [de-identified] : Attribution: Probable

## 2019-12-01 NOTE — REVIEW OF SYSTEMS
[Fatigue] : fatigue [Difficulty Walking] : difficulty walking [Anxiety] : anxiety [Depression] : depression [Negative] : Allergic/Immunologic [Fever] : no fever [Night Sweats] : no night sweats [Chills] : no chills [Recent Change In Weight] : ~T no recent weight change [Vision Problems] : no vision problems [Dysphagia] : no dysphagia [Nosebleeds] : no nosebleeds [Chest Pain] : no chest pain [Lower Ext Edema] : no lower extremity edema [Palpitations] : no palpitations [Shortness Of Breath] : no shortness of breath [Wheezing] : no wheezing [SOB on Exertion] : no shortness of breath during exertion [Cough] : no cough [Vomiting] : no vomiting [Constipation] : no constipation [Diarrhea] : no diarrhea [Dysuria] : no dysuria [Joint Stiffness] : no joint stiffness [Joint Pain] : no joint pain [Muscle Pain] : no muscle pain [Skin Rash] : no skin rash [Skin Wound] : no skin wound [Confused] : no confusion [Fainting] : no fainting [Insomnia] : no insomnia [Dizziness] : no dizziness [Easy Bleeding] : no tendency for easy bleeding [Easy Bruising] : no tendency for easy bruising [FreeTextEntry2] : B/l lower extremity edema [FreeTextEntry8] : Condom catheter in place, urinating OK.  [de-identified] : left heel, right buttock wound healed [de-identified] : Weakness in LLE

## 2019-12-01 NOTE — CONSULT LETTER
[Courtesy Letter:] : I had the pleasure of seeing your patient, [unfilled], in my office today. [FreeTextEntry2] : Dr. Baldomero Soriano MD Dept Urology, [facility]\par Dr. Christi Carolina MD Dept Endocrinology, [facility]

## 2019-12-01 NOTE — ASSESSMENT
[Palliative Care Plan] : Patient was apprised of incurable nature of disease.  Hospice and Palliative care options discussed. [Palliative] : Goals of care discussed with patient: Palliative [FreeTextEntry1] : 60 yo M w/ stage IV RCC and 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 S/p gamma knife to brain met. MRI Jan 2017 showed stable disease, and repeat MRI March 3rd 2018 showed stable findings, consistent with stable treated disease. Not a candidate for nephrectomy per Urology given brain mets.  S/p palliative XRT to the spine. S/p systemic tx with Pazopanib, with POD and Sutent with POD, with progressive disease on Nivolumab about 17 months in Nov 2017 and cabozantinib since Nov 2017 on 60mg daily until April 2019, reduced to 40mg daily due to hypertensive crisis. s/p palliative radiation to R pelvis due to acetabular lesion. s/p palliative radiation to spine previously and now s/p SBRT to spine for progression with cord compression. - s/p kyphoplasty with IORT for T8 collapse/cord compression.  Mr. Jack was off of cabozentinib for the last 6 weeks, restarted in the end of August 2019. MRI head was incomplete. CT cap showed progression of disease in lungs, right hilar and mediastinal lymph nodes and retroperitoneal nodes because of off cabozantinib about two months. Restarted cabozantinib at 40 mg daily but is not doing well on it. His energy level is very low, loss of appetite and weight loss. \par \par Plan\par Will discontinue cabozatinib and will start Nivolumab/Ipilimumab every 3 weeks for 4 cycle (Nivo every 3 weeks)\par CT chest/abd/pelvis\par CT head w/con given his extreme claustrophobia for MRI\par RTC 3 weeks\par

## 2019-12-08 NOTE — ED PROVIDER NOTE - NS ED ROS FT
ROS:  GENERAL: No fever, no chills  EYES: no change in vision  HEENT: no trouble swallowing, no trouble speaking  CARDIAC: no chest pain  PULMONARY: +SOB/cough  GI: no abdominal pain, no nausea, no vomiting, no diarrhea, no constipation  : No dysuria, no frequency, no change in appearance, or odor of urine  SKIN: LE edema   Neuro: no headache, no weakness  MSK: No joint pain    Biju Magana PGY2

## 2019-12-08 NOTE — ED ADULT NURSE NOTE - OBJECTIVE STATEMENT
Pt presents to room 19, A&Ox3, ambulatory at baseline with a walker, pmhx: renal cell carcinoma with possible mets, last oral chemo 3 weeks ago, started radiation 1 week ago, here for evaluation of sob, bilateral lower extremity edema, (left worse than right), abdominal pain and distention x 2 days. denies any dizziness, nausea, vomiting, palpitations, diarrhea, fever, constipation, or chills. IV established in right with a 20g, labs drawn and sent, call bell in reach, side rails up, bed in locked position, md evaluation in progress, pt on telemetry-NSR @ 86 noted, will continue to monitor. pt placed on droplet isolation, pt and family educated on proper precautions, pending rvp results Pt presents to room 19, A&Ox3, ambulatory at baseline with a walker, pmhx: renal cell carcinoma with possible mets, last oral chemo 3 weeks ago, due to start radiation in 1 week, here for evaluation of sob, bilateral lower extremity edema, (left worse than right), abdominal pain and distention x 2 days. denies any dizziness, nausea, vomiting, palpitations, diarrhea, fever, constipation, or chills. pt has an indwelling urinary catheter in place due to bph. IV established in right with a 20g, labs drawn and sent, call bell in reach, side rails up, bed in locked position, md evaluation in progress, pt on telemetry-NSR @ 86 noted, will continue to monitor. pt placed on droplet isolation, pt and family educated on proper precautions, pending rvp results

## 2019-12-08 NOTE — ED ADULT TRIAGE NOTE - CHIEF COMPLAINT QUOTE
C/o sob x 3 days with cough. Also endorses chest/abd pain radiating to back. Pt states B/L feet have been swollen x 2 days. Pt with renal CA, due to start radiation on 12/16. H/o HTN, DM, Hampton catheter 2/2 BPH. C/o sob x 3 days with cough. Also endorses chest/abd pain radiating to back. Pt states B/L feet have been swollen x 2 days. Pt with renal CA, due to start radiation on 12/16. H/o HTN, DM, Hampton catheter 2/2 BPH. Pt o2 89%, placed on 2L NC in triage.

## 2019-12-08 NOTE — ED PROVIDER NOTE - CLINICAL SUMMARY MEDICAL DECISION MAKING FREE TEXT BOX
SOB/cough/new edema in setting of metastatic renal cancer. New hypoxia. Pt well appearing, not in respiratory distress. Will assess for PE, PNA, PTX, CHF, ACS and admit.

## 2019-12-08 NOTE — ED PROVIDER NOTE - ATTENDING CONTRIBUTION TO CARE
Naida: I performed a face to face bedside interview with patient regarding history of present illness, review of symptoms and past medical history. I completed an independent physical exam.  I have discussed patient's plan of care with resident.   I agree with note as stated above including HISTORY OF PRESENT ILLNESS, HIV, PAST MEDICAL/SURGICAL/FAMILY/SOCIAL HISTORY, ALLERGIES AND HOME MEDICATIONS, REVIEW OF SYSTEMS, PHYSICAL EXAM, MEDICAL DECISION MAKING and any PROGRESS NOTES during the time I functioned as the attending physician for this patient unless otherwise noted. My brief assessment is as follows: 59M h/o HTN, HLD, DM, metastatic RCC with mets to lungs and brain p/w LE edema, SOB, BENITEZ and orthopnea. +cough.    Gen: Well appearing in NAD  Head: NC/AT  Neck: trachea midline  Resp:  No distress, CTAB  CV: RRR  GI: soft, NTND  Ext: nbilateral 2+ LE edema, no calf ttp. Pulses/sensation intact  Neuro:  A&O appears non focal  Skin:  Warm and dry as visualized  Psych:  Normal affect and mood  New onset LE edema, BENITEZ, orthopnea, concerning for fluid overload (CHF vs renal failure) vs PE. Also with cough and borderline febrile orally concerning for PNA vs viral illness. Plan for labs, RVP, CXR, CTPA, reassess.

## 2019-12-08 NOTE — ED PROVIDER NOTE - OBJECTIVE STATEMENT
59M with PMH of HTN, HLD, DM, renal cell carcinoma s/p chemo p/w exertional SOB and pleuritis L-sided chest pain that radiates to back over past 2 days. Also reports new bilateral lower extremity edema over the same time course. Denies any other symptoms.

## 2019-12-08 NOTE — ED ADULT NURSE NOTE - CHIEF COMPLAINT QUOTE
C/o sob x 3 days with cough. Also endorses chest/abd pain radiating to back. Pt states B/L feet have been swollen x 2 days. Pt with renal CA, due to start radiation on 12/16. H/o HTN, DM, Hampton catheter 2/2 BPH. Pt o2 89%, placed on 2L NC in triage.

## 2019-12-08 NOTE — ED ADULT NURSE NOTE - NSIMPLEMENTINTERV_GEN_ALL_ED
Implemented All Fall Risk Interventions:  [place] to call system. Call bell, personal items and telephone within reach. Instruct patient to call for assistance. Room bathroom lighting operational. Non-slip footwear when patient is off stretcher. Physically safe environment: no spills, clutter or unnecessary equipment. Stretcher in lowest position, wheels locked, appropriate side rails in place. Provide visual cue, wrist band, yellow gown, etc. Monitor gait and stability. Monitor for mental status changes and reorient to person, place, and time. Review medications for side effects contributing to fall risk. Reinforce activity limits and safety measures with patient and family.

## 2019-12-08 NOTE — ED PROVIDER NOTE - PHYSICAL EXAMINATION
Gen: AAOx3, non-toxic  Head: NCAT  HEENT: EOMI, oral mucosa moist, normal conjunctiva  Lung: CTAB, no respiratory distress, no wheezes/rhonchi/rales B/L, speaking in full sentences  CV: RRR, no murmurs, rubs or gallops  Abd: soft, NTND, no guarding, no CVA tenderness  MSK: no visible deformities  Neuro: No focal sensory or motor deficits, normal CN exam   Skin: bl le edema   Psych: normal affect.     ~Biju Magana PGY2

## 2019-12-09 NOTE — H&P ADULT - NEGATIVE NEUROLOGICAL SYMPTOMS
no vertigo/no loss of sensation/no tremors/no transient paralysis/no paresthesias/no weakness/no syncope/no generalized seizures/no focal seizures/no loss of consciousness/no hemiparesis/no difficulty walking/no confusion/no headache

## 2019-12-09 NOTE — H&P ADULT - NEGATIVE OPHTHALMOLOGIC SYMPTOMS
no lacrimation L/no photophobia/no blurred vision L/no diplopia/no lacrimation R/no discharge L/no discharge R/no blurred vision R

## 2019-12-09 NOTE — H&P ADULT - PROBLEM SELECTOR PROBLEM 4
Type 2 diabetes mellitus with hyperglycemia, with long-term current use of insulin Abdominal distension

## 2019-12-09 NOTE — H&P ADULT - NEGATIVE ENMT SYMPTOMS
no sinus symptoms/no nasal discharge/no tinnitus/no vertigo/no hearing difficulty/no ear pain/no nasal congestion

## 2019-12-09 NOTE — H&P ADULT - PROBLEM SELECTOR PLAN 9
Already therapeutic as patient is on heparin drip for acute PE seen on CTA chest Outpatient f/u with Oncology     I-STOP REFERENCE NUMBER: [id number] for methadone and Oxycodone Outpatient f/u with Oncology     I-STOP REFERENCE NUMBER: [id number] for methadone and Oxycodone

## 2019-12-09 NOTE — PROGRESS NOTE ADULT - SUBJECTIVE AND OBJECTIVE BOX
[facility] Division of Hospital Medicine  Laureano Padilla MD  Pager (JIE-PAOLA, 1D-5P): [phone]  Other Times:  [phone]    Patient is a 59y old  Male who presents with a chief complaint of Left sided chest pain and SOB/BENITEZ (09 Dec 2019 01:51)    SUBJECTIVE / OVERNIGHT EVENTS:  Patient states that his left sided chest pain is improved since hospitalization.  Continuing to utilize Oxy IR and methadone.  No F/C, N/V, SOB, lightheadedness, dizziness, abdominal pain, diarrhea, dysuria.    MEDICATIONS  (STANDING):  amLODIPine   Tablet 10 milliGRAM(s) Oral daily  atorvastatin 20 milliGRAM(s) Oral at bedtime  azithromycin  IVPB 500 milliGRAM(s) IV Intermittent every 24 hours  cefepime   IVPB 2000 milliGRAM(s) IV Intermittent every 8 hours  dextrose 5%. 1000 milliLiter(s) (50 mL/Hr) IV Continuous <Continuous>  dextrose 50% Injectable 12.5 Gram(s) IV Push once  dextrose 50% Injectable 25 Gram(s) IV Push once  dextrose 50% Injectable 25 Gram(s) IV Push once  finasteride 5 milliGRAM(s) Oral daily  furosemide    Tablet 40 milliGRAM(s) Oral daily  heparin  Infusion.  Unit(s)/Hr (15 mL/Hr) IV Continuous <Continuous>  insulin glargine Injectable (LANTUS) 14 Unit(s) SubCutaneous at bedtime  insulin lispro (HumaLOG) corrective regimen sliding scale   SubCutaneous three times a day before meals  insulin lispro (HumaLOG) corrective regimen sliding scale   SubCutaneous at bedtime  levothyroxine 175 MICROGram(s) Oral daily  methadone    Tablet 5 milliGRAM(s) Oral three times a day  pantoprazole    Tablet 40 milliGRAM(s) Oral before breakfast    MEDICATIONS  (PRN):  benzonatate 100 milliGRAM(s) Oral three times a day PRN Cough  dextrose 40% Gel 15 Gram(s) Oral once PRN Blood Glucose LESS THAN 70 milliGRAM(s)/deciliter  glucagon  Injectable 1 milliGRAM(s) IntraMuscular once PRN Glucose LESS THAN 70 milligrams/deciliter  heparin  Injectable 6500 Unit(s) IV Push every 6 hours PRN For aPTT less than 40  heparin  Injectable 3000 Unit(s) IV Push every 6 hours PRN For aPTT between 40 - 57  oxyCODONE    IR 10 milliGRAM(s) Oral every 6 hours PRN Severe Pain (7 - 10)  senna 2 Tablet(s) Oral at bedtime PRN Constipation      Vital Signs Last 24 Hrs  T(C): 37.1 (09 Dec 2019 05:39), Max: 37.6 (08 Dec 2019 16:05)  T(F): 98.7 (09 Dec 2019 05:39), Max: 99.6 (08 Dec 2019 16:05)  HR: 89 (09 Dec 2019 05:39) (84 - 96)  BP: 154/93 (09 Dec 2019 05:39) (117/77 - 166/96)  BP(mean): --  RR: 16 (09 Dec 2019 05:39) (16 - 16)  SpO2: 100% (09 Dec 2019 05:39) (89% - 100%)  CAPILLARY BLOOD GLUCOSE      POCT Blood Glucose.: 195 mg/dL (09 Dec 2019 10:11)    I&O's Summary      PHYSICAL EXAM:  GENERAL: NAD, well-developed  HEAD:  Atraumatic, Normocephalic  EYES: EOMI, PERRLA, conjunctiva and sclera clear  NECK: Supple, No JVD  CHEST/LUNG: decreased BS to Rt base; No wheeze  HEART: Regular rate and rhythm; No murmurs, rubs, or gallops  ABDOMEN: Soft, obese, Nontender, Nondistended; Bowel sounds present  EXTREMITIES:  2+ Peripheral Pulses, No clubbing, cyanosis, or edema  PSYCH: Calm  NEUROLOGY: A/Ox3, non-focal  SKIN: No rashes or lesions    LABS:                        9.7    6.61  )-----------( 383      ( 09 Dec 2019 05:10 )             32.7     12-09    132<L>  |  92<L>  |  11  ----------------------------<  183<H>  4.2   |  31  |  0.77    Ca    8.8      09 Dec 2019 05:10  Phos  3.3     12-09  Mg     1.7     12-09    TPro  8.5<H>  /  Alb  3.2<L>  /  TBili  0.5  /  DBili  x   /  AST  81<H>  /  ALT  28  /  AlkPhos  84  12-08    PT/INR - ( 09 Dec 2019 13:58 )   PT: 14.5 SEC;   INR: 1.26          PTT - ( 09 Dec 2019 14:43 )  PTT:39.2 SEC  CARDIAC MARKERS ( 09 Dec 2019 00:20 )  x     / x     / 53 u/L / 1.81 ng/mL / x          Urinalysis Basic - ( 09 Dec 2019 02:33 )    Color: YELLOW / Appearance: CLEAR / SG: > 1.040 / pH: 6.5  Gluc: NEGATIVE / Ketone: NEGATIVE  / Bili: NEGATIVE / Urobili: NORMAL   Blood: MODERATE / Protein: 20 / Nitrite: POSITIVE   Leuk Esterase: LARGE / RBC: 6-10 / WBC >50   Sq Epi: OCC / Non Sq Epi: x / Bacteria: FEW        RADIOLOGY & ADDITIONAL TESTS:  < from: CT Angio Chest w/ IV Cont (12.08.19 @ 20:37) >  IMPRESSION:     Acute pulmonary embolus in the left lower lobe segmental pulmonary   arteries. No evidence of right heart strain.    New multifocal pneumonia of the right lung, likely pneumonia. New small   right pleural effusion.    Stable metastatic disease to the lungs.    < end of copied text >    Imaging Personally Reviewed:    Care Discussed with Consultants/Other Providers: [facility] of Hospital Medicine  Laureano Padilla MD  Pager (JIE-PAOLA, 0R-5P): [phone]  Other Times:  [phone]    Patient is a 59y old  Male who presents with a chief complaint of Left sided chest pain and SOB/BENITEZ (09 Dec 2019 01:51)    SUBJECTIVE / OVERNIGHT EVENTS:  Patient states that his left sided chest pain is improved since hospitalization.  Continuing to utilize Oxy IR and methadone.  No F/C, N/V, SOB, lightheadedness, dizziness, abdominal pain, diarrhea, dysuria.    MEDICATIONS  (STANDING):  amLODIPine   Tablet 10 milliGRAM(s) Oral daily  atorvastatin 20 milliGRAM(s) Oral at bedtime  azithromycin  IVPB 500 milliGRAM(s) IV Intermittent every 24 hours  cefepime   IVPB 2000 milliGRAM(s) IV Intermittent every 8 hours  dextrose 5%. 1000 milliLiter(s) (50 mL/Hr) IV Continuous <Continuous>  dextrose 50% Injectable 12.5 Gram(s) IV Push once  dextrose 50% Injectable 25 Gram(s) IV Push once  dextrose 50% Injectable 25 Gram(s) IV Push once  finasteride 5 milliGRAM(s) Oral daily  furosemide    Tablet 40 milliGRAM(s) Oral daily  heparin  Infusion.  Unit(s)/Hr (15 mL/Hr) IV Continuous <Continuous>  insulin glargine Injectable (LANTUS) 14 Unit(s) SubCutaneous at bedtime  insulin lispro (HumaLOG) corrective regimen sliding scale   SubCutaneous three times a day before meals  insulin lispro (HumaLOG) corrective regimen sliding scale   SubCutaneous at bedtime  levothyroxine 175 MICROGram(s) Oral daily  methadone    Tablet 5 milliGRAM(s) Oral three times a day  pantoprazole    Tablet 40 milliGRAM(s) Oral before breakfast    MEDICATIONS  (PRN):  benzonatate 100 milliGRAM(s) Oral three times a day PRN Cough  dextrose 40% Gel 15 Gram(s) Oral once PRN Blood Glucose LESS THAN 70 milliGRAM(s)/deciliter  glucagon  Injectable 1 milliGRAM(s) IntraMuscular once PRN Glucose LESS THAN 70 milligrams/deciliter  heparin  Injectable 6500 Unit(s) IV Push every 6 hours PRN For aPTT less than 40  heparin  Injectable 3000 Unit(s) IV Push every 6 hours PRN For aPTT between 40 - 57  oxyCODONE    IR 10 milliGRAM(s) Oral every 6 hours PRN Severe Pain (7 - 10)  senna 2 Tablet(s) Oral at bedtime PRN Constipation      Vital Signs Last 24 Hrs  T(C): 37.1 (09 Dec 2019 05:39), Max: 37.6 (08 Dec 2019 16:05)  T(F): 98.7 (09 Dec 2019 05:39), Max: 99.6 (08 Dec 2019 16:05)  HR: 89 (09 Dec 2019 05:39) (84 - 96)  BP: 154/93 (09 Dec 2019 05:39) (117/77 - 166/96)  BP(mean): --  RR: 16 (09 Dec 2019 05:39) (16 - 16)  SpO2: 100% (09 Dec 2019 05:39) (89% - 100%)  CAPILLARY BLOOD GLUCOSE      POCT Blood Glucose.: 195 mg/dL (09 Dec 2019 10:11)    I&O's Summary      PHYSICAL EXAM:  GENERAL: NAD, well-developed  HEAD:  Atraumatic, Normocephalic  EYES: EOMI, PERRLA, conjunctiva and sclera clear  NECK: Supple, No JVD  CHEST/LUNG: decreased BS to Rt base; No wheeze  HEART: Regular rate and rhythm; No murmurs, rubs, or gallops  ABDOMEN: Soft, obese, Nontender, Nondistended; Bowel sounds present  EXTREMITIES:  2+ Peripheral Pulses, No clubbing, cyanosis, or edema  PSYCH: Calm  NEUROLOGY: A/Ox3, non-focal  SKIN: No rashes or lesions    LABS:                        9.7    6.61  )-----------( 383      ( 09 Dec 2019 05:10 )             32.7     12-09    132<L>  |  92<L>  |  11  ----------------------------<  183<H>  4.2   |  31  |  0.77    Ca    8.8      09 Dec 2019 05:10  Phos  3.3     12-09  Mg     1.7     12-09    TPro  8.5<H>  /  Alb  3.2<L>  /  TBili  0.5  /  DBili  x   /  AST  81<H>  /  ALT  28  /  AlkPhos  84  12-08    PT/INR - ( 09 Dec 2019 13:58 )   PT: 14.5 SEC;   INR: 1.26          PTT - ( 09 Dec 2019 14:43 )  PTT:39.2 SEC  CARDIAC MARKERS ( 09 Dec 2019 00:20 )  x     / x     / 53 u/L / 1.81 ng/mL / x          Urinalysis Basic - ( 09 Dec 2019 02:33 )    Color: YELLOW / Appearance: CLEAR / SG: > 1.040 / pH: 6.5  Gluc: NEGATIVE / Ketone: NEGATIVE  / Bili: NEGATIVE / Urobili: NORMAL   Blood: MODERATE / Protein: 20 / Nitrite: POSITIVE   Leuk Esterase: LARGE / RBC: 6-10 / WBC >50   Sq Epi: OCC / Non Sq Epi: x / Bacteria: FEW        RADIOLOGY & ADDITIONAL TESTS:  < from: CT Angio Chest w/ IV Cont (12.08.19 @ 20:37) >  IMPRESSION:     Acute pulmonary embolus in the left lower lobe segmental pulmonary   arteries. No evidence of right heart strain.    New multifocal pneumonia of the right lung, likely pneumonia. New small   right pleural effusion.    Stable metastatic disease to the lungs.    < end of copied text >    Imaging Personally Reviewed:    Care Discussed with Consultants/Other Providers: Oncology - Dr. Henryarian - A/C in a setting of brain mets.

## 2019-12-09 NOTE — H&P ADULT - PROBLEM SELECTOR PLAN 1
CTPA revealed "Acute pulmonary embolus in the left lower lobe segmental pulmonary arteries" and patient started on heparin drip  Will monitor on telemetry   TTE ordered   Bilateral LE dopplers ordered  NC at 4L, continuous pulse oxygen   Vital signs q4hrs

## 2019-12-09 NOTE — H&P ADULT - PROBLEM SELECTOR PLAN 3
Left sided chest pain is reproducible and pleuritic. 2/2 to underline acute PE. HsT on admission was negative x 3(no delta troponin). EKG with no dynamic ischemic changes  Will monitor on telemetry, serial EKG and CE prn for any more episodes of chest pain   TTE ordered

## 2019-12-09 NOTE — H&P ADULT - NSICDXPASTMEDICALHX_GEN_ALL_CORE_FT
PAST MEDICAL HISTORY:  BPH with urinary obstruction     Cervicalgia     Diabetes mellitus Type 2    GERD (gastroesophageal reflux disease)     HLD (hyperlipidemia)     Hypertension     Hypothyroidism     Malignant neoplasm of vertebral column     Metastatic renal cell carcinoma to brain lung/spine    S/P radiation therapy spine, on cabozantinib since Nov 2017    Urinary retention Hampton catheter placed since 10/2018

## 2019-12-09 NOTE — H&P ADULT - ASSESSMENT
60 y/o M with PMH of Right renal cell carcinoma with mets to the spine(s/p recently stopped Cabometyx 3 weeks ago), DM type II, BPH with chronic vasquez, GERD, HTN, HLD. Hypothyroidism presented with the complaint of pleuritic chest pain and SOB/BENITEZ.     +Acute pulmonary embolus in the left lower lobe segmental pulmonary arteries-On heparin gtt   +New multifocal pneumonia of the right lung, likely pneumonia-On IV Cefepime and Vancomycin 58 y/o M with PMH of Right renal cell carcinoma with mets to the spine(s/p recently stopped Cabometyx 3 weeks ago), DM type II, BPH with chronic vasquez, GERD, HTN, HLD. Hypothyroidism presented with the complaint of pleuritic chest pain and SOB/BENITEZ.     +Acute pulmonary embolus in the left lower lobe segmental pulmonary   arteries-On heparin gtt   +New multifocal pneumonia of the right lung, likely pneumonia-On IV Cefepime and Azithromycin

## 2019-12-09 NOTE — H&P ADULT - NSHPSOCIALHISTORY_GEN_ALL_CORE
, lives with wife, retired   Never smoked/used any illicit drugs/quit drinking 7-8 years ago and used to drink on weekends 2 shot of vodka

## 2019-12-09 NOTE — PROGRESS NOTE ADULT - PROBLEM SELECTOR PLAN 1
Awaiting TTE to r/o Rt heart strain.  Continue hep gtt.  Will discuss with primary heme/onc whether Lovenox SC vs DOAC would be ideal for his PE given his hypercoagulable status.  Monitor O2 sats. Awaiting TTE to r/o Rt heart strain.  Continue hep gtt - however A/C is relative contraindication for his known brain mets.  Heme/Onc consult for management in a setting of brain mass.  Monitor O2 sats. Awaiting TTE to r/o Rt heart strain.  Continue hep gtt - however A/C is relative contraindication for his known brain mets. Spoke with  [facility] - Oncology - brain lesion has been stable for last 2-3 years.  OK with hep gtt with transition to lovenox BID for home - if patient has objection to self-injection, can consider DOACs.  Monitor O2 sats.

## 2019-12-09 NOTE — H&P ADULT - ATTENDING COMMENTS
60 y/o M with PMH of right renal cell carcinoma with mets to the spine(s/p recently stopped Cabometyx 3 weeks ago), DM type II, BPH with chronic vasquez, GERD, HTN, HLD. Hypothyroidism presents with pleuritic chest pain and SOB/BENITEZ 2/2 to acute PE and multifocal PNA. Continue with cefepime and azithro pending urine legionella, sputum and sputum cultures. No evidence of right heart strain, HD stable. F/u TTE. UA reviewed, likely chronic colonization in the setting of chronic vasquez.

## 2019-12-09 NOTE — H&P ADULT - HISTORY OF PRESENT ILLNESS
58 y/o M with PMH of Right renal cell carcinoma with mets to the spine, DM type II, BPH with chronic Hampton, GERD, HTN, HLD. Hypothyroidism presented with the complaint of pleuritic chest pain and SOB/BENITEZ. As per the patient he was in his usual state of health 58 y/o M with PMH of right renal cell carcinoma with mets to the spine(s/p recently stopped Cabometyx 3 weeks ago), DM type II, BPH with chronic vasquez, GERD, HTN, HLD. Hypothyroidism presented with the complaint of pleuritic chest pain and SOB/BENITEZ. As per the patient he was in his usual state of health and developed left sided chest pain about 3-4 days ago. Patient stated that he was moving around in the house when the chest pain started. Patient described the chest pain as a pressure like sensation, constant, aggravated with deep inspiration, without any alleviating factors, with radiation of the pain to the left side of the neck, with a severity of 8/10. Patient also endorsed of BENITEZ and SOB and endorsed of bilateral LE swelling that started 3 days ago. Patient also endorsed of abdominal distention that also started 3-4 days ago. Patient stated that any minimal exertion he would feel winded and SOB. Patient endorsed of productive cough with white sputum production for the past 2 weeks. Patient denied any fevers or chills with the cough. Patient stated that he has been urinating without any issues and has been compliant with all his medications. Patient denied any N/V/D/C, melena, hematochezia, recent travel, sick contact, dysuria, pleuritic or positional chest pain.      On ED admission EKG revealed NSR at a rate of 94 with LAD and QTc of 442, CE x 1: Trop: 22, CE x 2: Trop: 20, H&H: 10.8/35.0, K: 5.5->severely hemolyzed, Gluc: 221, Protein: 8.5, Alb: 3.2, AST: 81, BNP: 103.5, RVP: Negative. CTPA: Acute pulmonary embolus in the left lower lobe segmental pulmonary arteries. No evidence of right heart strain. New multifocal pneumonia of the right lung, likely pneumonia. New small right pleural effusion. Stable metastatic disease to the lungs. Prelim CXR: Large patchy opacity throughout the right lung field, diff includes multifocal pneumonia, CT chest can be performed for further characterization and evaluation. In the ED patient was started on heparin gtt. When examined patient still endorsed of a 7-8/10 chest pain and SOB.

## 2019-12-09 NOTE — H&P ADULT - NSHPLABSRESULTS_GEN_ALL_CORE
10.8   6.54  )-----------( 347      ( 08 Dec 2019 17:06 )             35.0     12-08    130<L>  |  87<L>  |  11  ----------------------------<  221<H>  5.5<H>   |  29  |  0.76    Ca    9.2      08 Dec 2019 17:08    TPro  8.5<H>  /  Alb  3.2<L>  /  TBili  0.5  /  DBili  x   /  AST  81<H>  /  ALT  28  /  AlkPhos  84  12-08    CTPA: Acute pulmonary embolus in the left lower lobe segmental pulmonary arteries. No evidence of right heart strain. New multifocal pneumonia of the right lung, likely pneumonia. New small right pleural effusion. Stable metastatic disease to the lungs.    Prelim CXR: Large patchy opacity throughout the right lung field diff includes multifocal pneumonia, CT chest can be performed for further characterization and evaluation     EKG: NSR at a rate of 94 with LAD and QTc of 442

## 2019-12-09 NOTE — H&P ADULT - PROBLEM SELECTOR PLAN 6
Continue with antihypertensive medications   DASH diet recommended Continue with Synthroid daily   TSH level in am

## 2019-12-09 NOTE — H&P ADULT - PROBLEM SELECTOR PLAN 5
Continue with Synthroid daily   TSH level in am On insulin sliding scale(Humalog), Lantus 14U QHS  FS TID at bedtime, HgbA1C in am

## 2019-12-09 NOTE — PROGRESS NOTE ADULT - PROBLEM SELECTOR PLAN 2
Immunocompromised - concern for gram neg organisms.  Contineu cefepime and Azithromycin.  Awaiting Urine legionella.  Anticipate 7 day course of treatment.

## 2019-12-09 NOTE — H&P ADULT - PROBLEM SELECTOR PLAN 2
CTPA also revealed "New multifocal pneumonia of the right lung, likely pneumonia". Patient s/p IV Cefepime and Vancomycin  Will continue with broad spectrum antibiotics for now due to immunocompetent  Blood cultures ordered CTPA also revealed "New multifocal pneumonia of the right lung, likely pneumonia". Patient s/p IV Cefepime and Vancomycin  Will continue with IV Cefepime and Azithromycin   Blood cultures ordered  Sputum cultures ordered   Urine legionella ordered CTPA also revealed "New multifocal pneumonia of the right lung, likely pneumonia". Patient s/p IV Cefepime and Vancomycin however pt without risks factors for MDR organisms  Will continue with IV Cefepime and Azithromycin   Blood cultures ordered  Sputum cultures ordered   Urine legionella ordered

## 2019-12-09 NOTE — H&P ADULT - PROBLEM SELECTOR PLAN 4
On insulin sliding scale(Humalog), Lantus 14U QHS  FS TID at bedtime, HgbA1C in am Patient with abdominal distention on physical examination.   Will check Abdominal US Patient with abdominal distention on physical examination.   Will check Abdominal US to r/o ascites

## 2019-12-09 NOTE — PROGRESS NOTE ADULT - ASSESSMENT
59M Rt RCC mets to spine s/p chemo, DM type 2, BPH w/ chronic vasquez, GERD, HTN, hypothyroid, p/w acute PE and multifocal Rt gram-neg PNA w/ pleural effusion. 59M Rt RCC mets to spine and brain s/p chemo, DM type 2, BPH w/ chronic vasquez, GERD, HTN, hypothyroid, p/w acute PE and multifocal Rt gram-neg PNA w/ pleural effusion.

## 2019-12-09 NOTE — H&P ADULT - PROBLEM SELECTOR PLAN 8
Outpatient f/u with Oncology     I-STOP REFERENCE NUMBER: [id number] for methadone and Oxycodone Continue with Finasteride daily

## 2019-12-09 NOTE — H&P ADULT - RS GEN PE MLT RESP DETAILS PC
normal/chest wall tenderness/rales/wheezes/airway patent/respirations non-labored/no rhonchi/no intercostal retractions

## 2019-12-09 NOTE — H&P ADULT - NEGATIVE MUSCULOSKELETAL SYMPTOMS
no arthritis/no myalgia/no stiffness/no neck pain/no joint swelling/no muscle weakness/no arthralgia/no muscle cramps

## 2019-12-09 NOTE — ED ADULT NURSE REASSESSMENT NOTE - NS ED NURSE REASSESS COMMENT FT1
Er pt Adm. to med. on Hep. drip at 15ml/hr. to right Arm 20 g. area no swelling, no noted pt AOX 3 some body aches, medicated for pain with some relief, chronic vasquez at bed side, pt stated had placed recently at [facility] office approx 800cc out put yesenia urine DEANGELO by previous shift.  on Cm with NSR Hr 94 /tachypneic 20-22b/hr.    11:30AM repeat pt/PTT .     Amarilys Hicks RN

## 2019-12-09 NOTE — H&P ADULT - NSICDXFAMILYHX_GEN_ALL_CORE_FT
FAMILY HISTORY:  Family history of colon cancer, Mother  Family history of diabetes mellitus in father  Family history of diabetes mellitus in mother

## 2019-12-09 NOTE — H&P ADULT - PROBLEM SELECTOR PLAN 10
1.  Name of PCP: Dr. John  2.  PCP Contacted on Admission: [ ] Y    [X] N    3.  PCP contacted at Discharge: [ ] Y    [ ] N    [ ] N/A  4.  Post-Discharge Appointment Date and Location:  5.  Summary of Handoff given to PCP: Already therapeutic as patient is on heparin drip for acute PE seen on CTA chest

## 2019-12-09 NOTE — PROGRESS NOTE ADULT - PROBLEM SELECTOR PLAN 3
s/p chemo  WIll consult onc for management. s/p chemo  WIll consult onc for management.  CT Head of ordered for monitoring of the brain mets.

## 2019-12-10 NOTE — ASSESSMENT
[______] : HCP: [unfilled] [FreeTextEntry1] : 59yoM with:\par \par 1. Pain secondary to neoplasm - Patient continues to report uncontrolled pain.  Will increase methadone to 5mg TID.  C/w Oxycodone IR 10mg PRN. C/w Gabapentin 300mg TID. C/w Oxy IR 10mg PRN. Re-enforced importance of staying consistent with pain regimen.\par \par 2. Metastatic RCC - will be starting new treatment-- nivolumab and ipilimumab.  Med Onc follow up.\par \par 3. Constipation - Controlled on Senna \par \par 4. DM2 - Regular follow up with Endo clinic. On Repaglinide, Lantus. \par \par 5. Physical debility - Pt requires assistance with most ADLs 2/2 to his LE weakness from spinal mets. \par \par 6. Encounter for Palliative Care - HCP completed in office today.  Patient designated spouse as HCP.   Patient has original and copies.   Additional to be scanned into EMR.  DIscussed availability of hospice services at length.\par \par \par RTO 1 month, call sooner with any issues

## 2019-12-10 NOTE — PROGRESS NOTE ADULT - PROBLEM SELECTOR PLAN 1
Awaiting TTE to r/o Rt heart strain.  Continue hep gtt - however A/C is relative contraindication for his known brain mets. Spoke with  [facility] - Oncology - brain lesion has been stable for last 2-3 years.  OK with hep gtt with transition to lovenox BID for home - if patient has objection to self-injection, can consider DOACs.  Consider changing to Lovenox tomorrow.  Monitor O2 sats.

## 2019-12-10 NOTE — PROVIDER CONTACT NOTE (OTHER) - ACTION/TREATMENT ORDERED:
Give duoneb treatment, monitor patient Give duoneb treatment, monitor patient  addendum: pt recievied duoneb, spo2 98%, RR22 and shallow, provider notified and will come assess patient

## 2019-12-10 NOTE — HISTORY OF PRESENT ILLNESS
[Opioids for treatment of cancer not in remission, and/or  hospice, palliative care] : Opioids for treatment of cancer not in remission, and/or  hospice, palliative care [FreeTextEntry1] : 59yoM with metastatic RCC dx 12/2014 (mets to lung, bone, brain) with large, necrotic renal mass s/p gamma knife to brain met, s/p palliative XRT to spine, s/p SBRT to R occipital condyle met on Nivolumab presents for follow up visit today.  PMH also significant for DM2, hypothyroidism, HTN. \par \par Patient was found to have metastatic disease to the spinal cord 6/2018. He is s/p kyphoplasty with IORT 11/7/18 (treatment delayed due to the passing of his mother and having to travel to [facility]. He underwent SBRT to the spine prior to traveling). He wears a chronic vasquez due to chronic urinary retention.  Wears a diaper for fecal incontinence. \par \par Patient has been maintained on methadone 5mg BID and PRN oxycodone.   Pain is not well controlled.  Reports that pain is currently 8/10. Endorse poor appetite, weight loss, fatigue, back, thigh, and abdominal pain.  Has not been tolerating cabozatinib well.\par \par Current pain regimen:\par Methadone 5mg BID\par Oxycodone 10mg PRN (takes approximately 3 times a day)\par \par ROS: \par +10 lb weight loss\par +decreased appetite\par +cough- clear phelgm\par +LLE weakness \par +occasional constipation - uses senna, this helps\par +depressed mood 2/2 physical condition\par Denies constipation, diarrhea, \par Denies n/v, trouble sleeping\par All other ROS as outlined or noncontributory. \par \par Patient is , lives with spouse.   Son passed away many years ago. Patient and spouse are estranged (2/2 family dynamics) from grandson whom they raised for years. This brings them both a great deal of sadness. Extended family is involved but patient prefers to be by himself. He is predominantly wheelchair bound.  Is able to ambulate minimally with walker.  Patient has a HHA 6 hours a day/7 days a week.  He enjoys watching cricket on TV.\par \par I-Stop Ref#: [id number]

## 2019-12-10 NOTE — PROGRESS NOTE ADULT - ASSESSMENT
59M Rt RCC mets to spine and brain s/p chemo, DM type 2, BPH w/ chronic vasquez, GERD, HTN, hypothyroid, p/w acute PE and multifocal Rt gram-neg PNA w/ pleural effusion.

## 2019-12-10 NOTE — CONSULT NOTE ADULT - SUBJECTIVE AND OBJECTIVE BOX
Patient is a 59y old  Male who presents with a chief complaint of Left sided chest pain and SOB/BENITEZ (09 Dec 2019 15:18)      HPI:  60 yo M w/ stage IV RCC and mets to lung, brain and bone, S/p gamma knife to brain met. MRI Jan 2017 showed stable disease, and repeat MRI March 3rd 2018 and 9/2019 showed stable findings. S/p palliative XRT to the spine. S/p systemic tx with Pazopanib, with POD and Sutent with POD, with progressive disease on Nivolumab about 17 months in Nov 2017 and cabozantinib since Nov 2017 on 60mg daily until April 2019, reduced to 40mg daily due to hypertensive crisis. s/p palliative radiation to R pelvis due to acetabular lesion. s/p palliative radiation to spine previously and now s/p SBRT to spine for progression with cord compression. - s/p kyphoplasty with IORT for T8 collapse/cord compression, Found to have progression of disease off cabozantinib, he is not able to tlerate cabo, so plan was to start immunotherapy with ipilimumab/nivoluman, DM type II, BPH with chronic vasquez, GERD, HTN, HLD. Hypothyroidism presented with the complaint of pleuritic chest pain and SOB/BENITEZ. As per the patient he was in his usual state of health and developed left sided chest pain about 3-4 days ago. Patient stated that he was moving around in the house when the chest pain started. Patient described the chest pain as a pressure like sensation, constant, aggravated with deep inspiration, without any alleviating factors, with radiation of the pain to the left side of the neck, with a severity of 8/10. Patient also endorsed of BENITEZ and SOB and endorsed of bilateral LE swelling that started 3 days ago. Patient also endorsed of abdominal distention that also started 3-4 days ago. Patient stated that any minimal exertion he would feel winded and SOB. Patient endorsed of productive cough with white sputum production for the past 2 weeks. Patient denied any fevers or chills with the cough. Patient stated that he has been urinating without any issues and has been compliant with all his medications. Patient denied any N/V/D/C, melena, hematochezia, recent travel, sick contact, dysuria, pleuritic or positional chest pain.      On ED admission EKG revealed NSR at a rate of 94 with LAD and QTc of 442, CE x 1: Trop: 22, CE x 2: Trop: 20, H&H: 10.8/35.0, K: 5.5->severely hemolyzed, Gluc: 221, Protein: 8.5, Alb: 3.2, AST: 81, BNP: 103.5, RVP: Negative. CTPA: Acute pulmonary embolus in the left lower lobe segmental pulmonary arteries. No evidence of right heart strain. New multifocal pneumonia of the right lung, likely pneumonia. New small right pleural effusion. Stable metastatic disease to the lungs. Prelim CXR: Large patchy opacity throughout the right lung field, diff includes multifocal pneumonia, CT chest can be performed for further characterization and evaluation. In the ED patient was started on heparin gtt. When examined patient still endorsed of a 7-8/10 chest pain and SOB. (09 Dec 2019 01:51)       ROS: as above     PAST MEDICAL & SURGICAL HISTORY:  BPH with urinary obstruction  Urinary retention: Vasquez catheter placed since 10/2018  HLD (hyperlipidemia)  S/P radiation therapy: spine, on cabozantinib since Nov 2017  Malignant neoplasm of vertebral column  Hypothyroidism  GERD (gastroesophageal reflux disease)  Cervicalgia  Metastatic renal cell carcinoma to brain: lung/spine  Hypertension  Diabetes mellitus: Type 2  H/O kyphoplasty: T8, 11/2018  Status post gamma knife treatment: Left forehead/temple      SOCIAL HISTORY:    FAMILY HISTORY:  Family history of colon cancer: Mother  Family history of diabetes mellitus in mother  Family history of diabetes mellitus in father      MEDICATIONS  (STANDING):  amLODIPine   Tablet 10 milliGRAM(s) Oral daily  atorvastatin 20 milliGRAM(s) Oral at bedtime  azithromycin  IVPB 500 milliGRAM(s) IV Intermittent every 24 hours  cefepime   IVPB 2000 milliGRAM(s) IV Intermittent every 8 hours  dextrose 5%. 1000 milliLiter(s) (50 mL/Hr) IV Continuous <Continuous>  dextrose 50% Injectable 12.5 Gram(s) IV Push once  dextrose 50% Injectable 25 Gram(s) IV Push once  dextrose 50% Injectable 25 Gram(s) IV Push once  finasteride 5 milliGRAM(s) Oral daily  furosemide    Tablet 40 milliGRAM(s) Oral daily  heparin  Infusion.  Unit(s)/Hr (15 mL/Hr) IV Continuous <Continuous>  insulin glargine Injectable (LANTUS) 14 Unit(s) SubCutaneous at bedtime  insulin lispro (HumaLOG) corrective regimen sliding scale   SubCutaneous three times a day before meals  insulin lispro (HumaLOG) corrective regimen sliding scale   SubCutaneous at bedtime  levothyroxine 175 MICROGram(s) Oral daily  methadone    Tablet 5 milliGRAM(s) Oral three times a day  pantoprazole    Tablet 40 milliGRAM(s) Oral before breakfast    MEDICATIONS  (PRN):  benzonatate 100 milliGRAM(s) Oral three times a day PRN Cough  dextrose 40% Gel 15 Gram(s) Oral once PRN Blood Glucose LESS THAN 70 milliGRAM(s)/deciliter  glucagon  Injectable 1 milliGRAM(s) IntraMuscular once PRN Glucose LESS THAN 70 milligrams/deciliter  heparin  Injectable 6500 Unit(s) IV Push every 6 hours PRN For aPTT less than 40  heparin  Injectable 3000 Unit(s) IV Push every 6 hours PRN For aPTT between 40 - 57  oxyCODONE    IR 10 milliGRAM(s) Oral every 6 hours PRN Severe Pain (7 - 10)  senna 2 Tablet(s) Oral at bedtime PRN Constipation      Allergies    No Known Allergies    Intolerances        Vital Signs Last 24 Hrs  T(C): 36.8 (10 Dec 2019 08:44), Max: 37 (09 Dec 2019 21:26)  T(F): 98.2 (10 Dec 2019 08:44), Max: 98.6 (09 Dec 2019 21:26)  HR: 82 (10 Dec 2019 08:44) (79 - 93)  BP: 128/84 (10 Dec 2019 08:44) (125/88 - 143/88)  BP(mean): --  RR: 16 (10 Dec 2019 08:44) (16 - 20)  SpO2: 98% (10 Dec 2019 08:44) (96% - 99%)    PHYSICAL EXAM  General: adult in NAD  HEENT: clear oropharynx, anicteric sclera, pink conjunctiva  Neck: supple  CV: normal S1/S2 with no murmur rubs or gallops  Lungs: positive air movement b/l ant lungs, clear to auscultation, no wheezes, no rales  Abdomen: soft non-tender non-distended, no hepatosplenomegaly  Ext: no clubbing cyanosis or edema  Skin: no rashes and no petechiae  Neuro: alert and oriented X 3, none focal    LABS:                          9.5    6.06  )-----------( 299      ( 10 Dec 2019 05:50 )             30.8         Mean Cell Volume : 88.5 fL  Mean Cell Hemoglobin : 27.3 pg  Mean Cell Hemoglobin Concentration : 30.8 %  Auto Neutrophil # : x  Auto Lymphocyte # : x  Auto Monocyte # : x  Auto Eosinophil # : x  Auto Basophil # : x  Auto Neutrophil % : x  Auto Lymphocyte % : x  Auto Monocyte % : x  Auto Eosinophil % : x  Auto Basophil % : x      Serial CBC's  12-10 @ 05:50  Hct-30.8 / Hgb-9.5 / Plat-299 / RBC-3.48 / WBC-6.06  Serial CBC's  12-09 @ 05:10  Hct-32.7 / Hgb-9.7 / Plat-383 / RBC-3.66 / WBC-6.61  Serial CBC's  12-08 @ 17:06  Hct-35.0 / Hgb-10.8 / Plat-347 / RBC-4.02 / WBC-6.54      12-10    129<L>  |  85<L>  |  13  ----------------------------<  221<H>  3.9   |  29  |  0.77    Ca    9.2      10 Dec 2019 08:00  Phos  3.3     12-09  Mg     1.5     12-10    TPro  8.5<H>  /  Alb  3.2<L>  /  TBili  0.5  /  DBili  x   /  AST  81<H>  /  ALT  28  /  AlkPhos  84  12-08      PT/INR - ( 09 Dec 2019 13:58 )   PT: 14.5 SEC;   INR: 1.26          PTT - ( 10 Dec 2019 05:50 )  PTT:89.9 SEC                RADIOLOGY & ADDITIONAL STUDIES:

## 2019-12-10 NOTE — REVIEW OF SYSTEMS
[Abdominal Pain] : abdominal pain [Constipation] : constipation [Limb Pain] : limb pain [As Noted in HPI] : as noted in HPI [Negative] : Heme/Lymph [Vomiting] : no vomiting [Diarrhea] : no diarrhea

## 2019-12-10 NOTE — PROGRESS NOTE ADULT - SUBJECTIVE AND OBJECTIVE BOX
[facility] Division of Hospital Medicine  Laureano Padilla MD  Pager (JIE-PAOLA, 8G-5P): [phone]  Other Times:  [phone]    Patient is a 59y old  Male who presents with a chief complaint of Left sided chest pain and SOB/BENITEZ (10 Dec 2019 10:49)    SUBJECTIVE / OVERNIGHT EVENTS:  Patient states that his ESS[facility] room is very cold.  Stretcher is very uncomfortable.  Unable to complete CT Head because he had difficulty breathing when lying flat.    No F/C, N/V, CP, lightheadedness, dizziness, abdominal pain, diarrhea, dysuria.    MEDICATIONS  (STANDING):  amLODIPine   Tablet 10 milliGRAM(s) Oral daily  atorvastatin 20 milliGRAM(s) Oral at bedtime  azithromycin  IVPB 500 milliGRAM(s) IV Intermittent every 24 hours  cefepime   IVPB 2000 milliGRAM(s) IV Intermittent every 8 hours  dextrose 5%. 1000 milliLiter(s) (50 mL/Hr) IV Continuous <Continuous>  dextrose 50% Injectable 12.5 Gram(s) IV Push once  dextrose 50% Injectable 25 Gram(s) IV Push once  dextrose 50% Injectable 25 Gram(s) IV Push once  finasteride 5 milliGRAM(s) Oral daily  furosemide    Tablet 40 milliGRAM(s) Oral daily  heparin  Infusion.  Unit(s)/Hr (15 mL/Hr) IV Continuous <Continuous>  insulin glargine Injectable (LANTUS) 14 Unit(s) SubCutaneous at bedtime  insulin lispro (HumaLOG) corrective regimen sliding scale   SubCutaneous three times a day before meals  insulin lispro (HumaLOG) corrective regimen sliding scale   SubCutaneous at bedtime  levothyroxine 175 MICROGram(s) Oral daily  methadone    Tablet 5 milliGRAM(s) Oral three times a day  pantoprazole    Tablet 40 milliGRAM(s) Oral before breakfast    MEDICATIONS  (PRN):  benzonatate 100 milliGRAM(s) Oral three times a day PRN Cough  dextrose 40% Gel 15 Gram(s) Oral once PRN Blood Glucose LESS THAN 70 milliGRAM(s)/deciliter  glucagon  Injectable 1 milliGRAM(s) IntraMuscular once PRN Glucose LESS THAN 70 milligrams/deciliter  heparin  Injectable 6500 Unit(s) IV Push every 6 hours PRN For aPTT less than 40  heparin  Injectable 3000 Unit(s) IV Push every 6 hours PRN For aPTT between 40 - 57  oxyCODONE    IR 10 milliGRAM(s) Oral every 6 hours PRN Severe Pain (7 - 10)  senna 2 Tablet(s) Oral at bedtime PRN Constipation      Vital Signs Last 24 Hrs  T(C): 36.9 (10 Dec 2019 14:22), Max: 37 (09 Dec 2019 21:26)  T(F): 98.4 (10 Dec 2019 14:22), Max: 98.6 (09 Dec 2019 21:26)  HR: 88 (10 Dec 2019 14:22) (79 - 93)  BP: 114/76 (10 Dec 2019 14:22) (114/76 - 143/88)  BP(mean): --  RR: 14 (10 Dec 2019 14:22) (14 - 20)  SpO2: 98% (10 Dec 2019 14:22) (96% - 99%)  CAPILLARY BLOOD GLUCOSE      POCT Blood Glucose.: 204 mg/dL (10 Dec 2019 13:19)  POCT Blood Glucose.: 218 mg/dL (10 Dec 2019 09:09)  POCT Blood Glucose.: 231 mg/dL (10 Dec 2019 07:33)  POCT Blood Glucose.: 189 mg/dL (09 Dec 2019 22:00)  POCT Blood Glucose.: 192 mg/dL (09 Dec 2019 17:19)    I&O's Summary    09 Dec 2019 07:01  -  10 Dec 2019 07:00  --------------------------------------------------------  IN: 0 mL / OUT: 2000 mL / NET: -2000 mL        PHYSICAL EXAM:  GENERAL: NAD, well-developed  HEAD:  Atraumatic, Normocephalic  EYES: EOMI, PERRLA, conjunctiva and sclera clear  NECK: Supple, No JVD  CHEST/LUNG: decreased BS to Rt base; No wheeze  HEART: Regular rate and rhythm; No murmurs, rubs, or gallops  ABDOMEN: Soft, obese, Nontender, Nondistended; Bowel sounds present  EXTREMITIES:  2+ Peripheral Pulses, No clubbing, cyanosis, or edema  PSYCH: Calm  NEUROLOGY: A/Ox3, non-focal  SKIN: No rashes or lesions    LABS:                        9.5    6.06  )-----------( 299      ( 10 Dec 2019 05:50 )             30.8     12-10    129<L>  |  85<L>  |  13  ----------------------------<  221<H>  3.9   |  29  |  0.77    Ca    9.2      10 Dec 2019 08:00  Phos  3.3     12-09  Mg     1.5     12-10    TPro  8.5<H>  /  Alb  3.2<L>  /  TBili  0.5  /  DBili  x   /  AST  81<H>  /  ALT  28  /  AlkPhos  84  12-08    PT/INR - ( 09 Dec 2019 13:58 )   PT: 14.5 SEC;   INR: 1.26          PTT - ( 10 Dec 2019 12:43 )  PTT:77.6 SEC  CARDIAC MARKERS ( 09 Dec 2019 00:20 )  x     / x     / 53 u/L / 1.81 ng/mL / x          Urinalysis Basic - ( 09 Dec 2019 02:33 )    Color: YELLOW / Appearance: CLEAR / SG: > 1.040 / pH: 6.5  Gluc: NEGATIVE / Ketone: NEGATIVE  / Bili: NEGATIVE / Urobili: NORMAL   Blood: MODERATE / Protein: 20 / Nitrite: POSITIVE   Leuk Esterase: LARGE / RBC: 6-10 / WBC >50   Sq Epi: OCC / Non Sq Epi: x / Bacteria: FEW        RADIOLOGY & ADDITIONAL TESTS:    Imaging Personally Reviewed:    Care Discussed with Consultants/Other Providers: Oncology - Dr. Hood - RCC management.

## 2019-12-10 NOTE — PHYSICAL EXAM
[General Appearance - Alert] : alert [General Appearance - In No Acute Distress] : in no acute distress [Sclera] : the sclera and conjunctiva were normal [Normal Oral Mucosa] : normal oral mucosa [Neck Appearance] : the appearance of the neck was normal [Heart Rate And Rhythm] : heart rate was normal and rhythm regular [Auscultation Breath Sounds / Voice Sounds] : lungs were clear to auscultation bilaterally [Heart Sounds] : normal S1 and S2 [Murmurs] : no murmurs [Edema] : there was no peripheral edema [Bowel Sounds] : normal bowel sounds [Abdomen Soft] : soft [Abnormal Walk] : normal gait [] : no rash [No Focal Deficits] : no focal deficits [Oriented To Time, Place, And Person] : oriented to person, place, and time [FreeTextEntry1] : RLL armaan

## 2019-12-10 NOTE — CHART NOTE - NSCHARTNOTEFT_GEN_A_CORE
Pt on heparin drip and BMP this morning not accurate as blood work was drawn above the site of where the heparin drip is running. reordered STAT BMP and will follow up results. d/w RN

## 2019-12-10 NOTE — PROGRESS NOTE ADULT - PROBLEM SELECTOR PLAN 3
s/p chemo  WIll consult onc for management.  CT Head of ordered for monitoring of the brain mets - unable to tolerate.  Will attempt once clinically improved.  No clinical concern for ICH at this time.

## 2019-12-10 NOTE — CHART NOTE - NSCHARTNOTEFT_GEN_A_CORE
Called by RN that pt complaining of "SOB & Chest pain". Went to evaluate this 58 y/o M with PMH of Right renal cell carcinoma with mets to the spine(s/p recently stopped Cabometyx 3 weeks ago), DM type II, BPH with chronic vasquez, GERD, HTN, HLD. Hypothyroidism who presented to ED with the complaint of pleuritic chest pain and SOB/BENITEZ.     +Acute pulmonary embolus in the left lower lobe segmental pulmonary arteries-On heparin gtt   +New multifocal pneumonia of the right lung, likely pneumonia-On IV Cefepime and Azithromycin for total of 7 days. Now pt continues to complain of SOB and +wheezing.     When evaluating patient, he complained of SOB and chest tightness and rib soreness.     Cardiac- EKG performed- NSR HR in the 90's normal S12S2  Respiratory- LUNGS clear to auscultation    Duoneb ordered for the chest tightness    Vital Signs Last 24 Hrs  T(C): 36.9 (10 Dec 2019 16:20), Max: 37 (09 Dec 2019 21:26)  T(F): 98.4 (10 Dec 2019 16:20), Max: 98.6 (09 Dec 2019 21:26)  HR: 105 (10 Dec 2019 16:20) (79 - 105)  BP: 132/86 (10 Dec 2019 16:20) (114/76 - 143/88)  BP(mean): --  RR: 28 (10 Dec 2019 16:20) (14 - 28)  SpO2: 92% (10 Dec 2019 16:20) (92% - 99%) on 2L NC    notified attending Dr. sorensen. Agrees with duoneb and will continue to monitor patient. d/w RN and patient at bedside.

## 2019-12-10 NOTE — CHART NOTE - NSCHARTNOTEFT_GEN_A_CORE
Called to evaluate this 58 y/o M with PMH of Right renal cell carcinoma with mets to the spine(s/p recently stopped Cabometyx 3 weeks ago), DM type II, BPH with chronic vasquez, GERD, HTN, HLD. Hypothyroidism who presented to ED with the complaint of pleuritic chest pain and SOB/BENITEZ. +Acute pulmonary embolus in the left lower lobe segmental pulmonary arteries-On heparin gtt   +New multifocal pneumonia of the right lung, likely pneumonia-On IV Cefepime and Azithromycin for total of 7 days. Now pt continues to complain of SOB and +wheezing.     ROS: Pt stated that he thinks the cold air in the room is making him short of breath.     PE;  Neuro: A&O  CV: +distal pulses, normal heart sounds,   Pulm: Lungs clear to auscultation, non-productive cough noted      -extra blankets given to patient  -will continue to monitor

## 2019-12-10 NOTE — PATIENT PROFILE ADULT - HAS THE PATIENT EXPERIENCED ANY OF THE FOLLOWING WITHIN THE WEEK PRIOR TO ADMISSION?
Pt reports to ED with CC of ETOH intox and suicidal ideation. Pt reports having taken her blood pressure medication and drinking vodka tonight because she is depressed she doesn't have her kids. During assessment with Dr. Sahntel Dodson Pt denies suicidal ideation. Charge RN notified and Level A paged. VS and assessment completed on arrival. Will continue to monitor.
no

## 2019-12-10 NOTE — CONSULT NOTE ADULT - ASSESSMENT
58 yo M w/ stage IV RCC and mets to lung, brain and bone, S/p gamma knife to brain met. MRI Jan 2017 showed stable disease, and repeat MRI March 3rd 2018 and 9/2019 showed stable findings. S/p palliative XRT to the spine. S/p systemic tx with Pazopanib, with POD and Sutent with POD, with progressive disease on Nivolumab about 17 months in Nov 2017 and cabozantinib since Nov 2017 on 60mg daily until April 2019, reduced to 40mg daily due to hypertensive crisis. s/p palliative radiation to R pelvis due to acetabular lesion. s/p palliative radiation to spine previously and now s/p SBRT to spine for progression with cord compression. - s/p kyphoplasty with IORT for T8 collapse/cord compression, Found to have progression of disease off cabozantinib, he is not able to tlerate cabo, so plan was to start immunotherapy with ipilimumab/nivoluman, DM type II, BPH with chronic vasquez, GERD, HTN, HLD. Hypothyroidism presented with the complaint of pleuritic chest pain and SOB/BENITEZ. Found to have acute PE and PNA.     -Lovenox for PE  -c/w abx for pna  -Plan was to obtain MRI brain but pt claustrophobic and he was not able to tolerate procedure, however no acute findings on MRI 9/2019. 60 yo M w/ stage IV RCC and mets to lung, brain and bone, S/p gamma knife to brain met. MRI Jan 2017 showed stable disease, and repeat MRI March 3rd 2018 and 9/2019 showed stable findings. S/p palliative XRT to the spine. S/p systemic tx with Pazopanib, with POD and Sutent with POD, with progressive disease on Nivolumab about 17 months in Nov 2017 and cabozantinib since Nov 2017 on 60mg daily until April 2019, reduced to 40mg daily due to hypertensive crisis. s/p palliative radiation to R pelvis due to acetabular lesion. s/p palliative radiation to spine previously and now s/p SBRT to spine for progression with cord compression. - s/p kyphoplasty with IORT for T8 collapse/cord compression, Found to have progression of disease off cabozantinib, he is not able to tlerate cabo, so plan was to start immunotherapy with ipilimumab/nivoluman, DM type II, BPH with chronic vasquez, GERD, HTN, HLD. Hypothyroidism presented with the complaint of pleuritic chest pain and SOB/BENITEZ. Found to have acute PE and PNA.     -Lovenox for PE (brain mets treated with SRS and stable on imaging since 2017)  -c/w abx for pna  -Plan was to obtain MRI brain but pt claustrophobic and he was not able to tolerate procedure, however no acute findings on MRI 9/2019.  -Please obtain MRI brain or CT brain with contrast prior to discharge  -Supportive care, pain control, Nutrition, PT, DVT ppx  -Outpatient oncology f/u    Will follow. Please do not hesitate to call back with questions.     Aminta Hood MD  Medical Oncology Attending  C: [phone]

## 2019-12-11 NOTE — PROGRESS NOTE ADULT - SUBJECTIVE AND OBJECTIVE BOX
[facility] Division of Hospital Medicine  Laureano Padilla MD  Pager (JS, 2M-5P): [phone]  Other Times:  [phone]    Patient is a 59y old  Male who presents with a chief complaint of Left sided chest pain and SOB/BENITEZ (10 Dec 2019 15:22)    SUBJECTIVE / OVERNIGHT EVENTS:  Patient refused CT Head this morning because he says "I still get short of breath when lying flat on my back." Events from previous 24 hours noted.  No F/C, N/V, lightheadedness, dizziness, abdominal pain, diarrhea, dysuria.    MEDICATIONS  (STANDING):  amLODIPine   Tablet 10 milliGRAM(s) Oral daily  atorvastatin 20 milliGRAM(s) Oral at bedtime  azithromycin  IVPB 500 milliGRAM(s) IV Intermittent every 24 hours  cefepime   IVPB 2000 milliGRAM(s) IV Intermittent every 8 hours  dextrose 5%. 1000 milliLiter(s) (50 mL/Hr) IV Continuous <Continuous>  dextrose 50% Injectable 12.5 Gram(s) IV Push once  dextrose 50% Injectable 25 Gram(s) IV Push once  dextrose 50% Injectable 25 Gram(s) IV Push once  finasteride 5 milliGRAM(s) Oral daily  furosemide    Tablet 40 milliGRAM(s) Oral daily  heparin  Infusion.  Unit(s)/Hr (15 mL/Hr) IV Continuous <Continuous>  insulin glargine Injectable (LANTUS) 14 Unit(s) SubCutaneous at bedtime  insulin lispro (HumaLOG) corrective regimen sliding scale   SubCutaneous three times a day before meals  insulin lispro (HumaLOG) corrective regimen sliding scale   SubCutaneous at bedtime  levothyroxine 175 MICROGram(s) Oral daily  methadone    Tablet 5 milliGRAM(s) Oral three times a day  pantoprazole    Tablet 40 milliGRAM(s) Oral before breakfast    MEDICATIONS  (PRN):  benzonatate 100 milliGRAM(s) Oral three times a day PRN Cough  dextrose 40% Gel 15 Gram(s) Oral once PRN Blood Glucose LESS THAN 70 milliGRAM(s)/deciliter  glucagon  Injectable 1 milliGRAM(s) IntraMuscular once PRN Glucose LESS THAN 70 milligrams/deciliter  heparin  Injectable 6500 Unit(s) IV Push every 6 hours PRN For aPTT less than 40  heparin  Injectable 3000 Unit(s) IV Push every 6 hours PRN For aPTT between 40 - 57  oxyCODONE    IR 10 milliGRAM(s) Oral every 6 hours PRN Severe Pain (7 - 10)  senna 2 Tablet(s) Oral at bedtime PRN Constipation      Vital Signs Last 24 Hrs  T(C): 37.1 (11 Dec 2019 05:00), Max: 37.1 (11 Dec 2019 05:00)  T(F): 98.7 (11 Dec 2019 05:00), Max: 98.7 (11 Dec 2019 05:00)  HR: 91 (11 Dec 2019 05:00) (88 - 105)  BP: 123/78 (11 Dec 2019 05:00) (114/76 - 132/86)  BP(mean): 87 (11 Dec 2019 05:00) (86 - 87)  RR: 20 (11 Dec 2019 05:00) (14 - 28)  SpO2: 96% (11 Dec 2019 05:00) (92% - 98%)  CAPILLARY BLOOD GLUCOSE      POCT Blood Glucose.: 170 mg/dL (11 Dec 2019 08:51)  POCT Blood Glucose.: 203 mg/dL (10 Dec 2019 22:18)  POCT Blood Glucose.: 194 mg/dL (10 Dec 2019 18:27)  POCT Blood Glucose.: 204 mg/dL (10 Dec 2019 13:19)    I&O's Summary    10 Dec 2019 07:01  -  11 Dec 2019 07:00  --------------------------------------------------------  IN: 0 mL / OUT: 1000 mL / NET: -1000 mL        PHYSICAL EXAM:  GENERAL: NAD, well-developed  HEAD:  Atraumatic, Normocephalic  EYES: EOMI, PERRLA, conjunctiva and sclera clear  NECK: Supple, No JVD  CHEST/LUNG: decreased BS to Rt base; No wheeze  HEART: Regular rate and rhythm; No murmurs, rubs, or gallops  ABDOMEN: Soft, obese, Nontender, Nondistended; Bowel sounds present  EXTREMITIES:  2+ Peripheral Pulses, No clubbing, cyanosis. 3+ LE edema B/L.  PSYCH: Calm  NEUROLOGY: A/Ox3, non-focal  SKIN: No rashes or lesions    LABS:                        9.5    7.27  )-----------( 327      ( 11 Dec 2019 05:52 )             31.5     12-11    130<L>  |  88<L>  |  15  ----------------------------<  160<H>  4.0   |  29  |  0.82    Ca    9.3      11 Dec 2019 05:52  Mg     1.5     12-10      PT/INR - ( 09 Dec 2019 13:58 )   PT: 14.5 SEC;   INR: 1.26          PTT - ( 11 Dec 2019 05:52 )  PTT:79.2 SEC          RADIOLOGY & ADDITIONAL TESTS:    Imaging Personally Reviewed:    Care Discussed with Consultants/Other Providers:

## 2019-12-11 NOTE — PHYSICAL THERAPY INITIAL EVALUATION ADULT - PERTINENT HX OF CURRENT PROBLEM, REHAB EVAL
58 y/o M with PMH of Right renal cell carcinoma with mets to the spine(s/p recently stopped Cabometyx 3 weeks ago), DM type II, BPH with chronic vasquez, GERD, HTN, HLD. Hypothyroidism presented with the complaint of pleuritic chest pain and SOB/BENITEZ

## 2019-12-11 NOTE — PHYSICAL THERAPY INITIAL EVALUATION ADULT - PATIENT PROFILE REVIEW, REHAB EVAL
ACTIVITY: Ambulate with Assistance; spoke with Anette Angeles prior to PT evaluation--> Pt OK for PT consult/ OOB activity/yes

## 2019-12-11 NOTE — PROGRESS NOTE ADULT - PROBLEM SELECTOR PLAN 9
1) PCP Contacted on Admission: [ x ] Y     [  ] N     [  ] N/A - [facility] - Dr. Hood  2) Date of Contact with PCP: 12/9  3) PCP Contacted at Discharge:  [  ] Y    [  ] N    [  ] N/A -   4) Summary of Handoff Given to PCP:   5) Post-Discharge Appointment Date and Location:

## 2019-12-11 NOTE — PHYSICAL THERAPY INITIAL EVALUATION ADULT - GAIT DEVIATIONS NOTED, PT EVAL
decreased veronika/decreased step length/decreased stride length/decreased weight-shifting ability/increased time in double stance

## 2019-12-11 NOTE — PHYSICAL THERAPY INITIAL EVALUATION ADULT - PRECAUTIONS/LIMITATIONS, REHAB EVAL
fall precautions/spinal precautions/cardiac precautions/oxygen therapy device and L/min/+3L of O2 through nasal cannula; *SPINE METS

## 2019-12-11 NOTE — PHYSICAL THERAPY INITIAL EVALUATION ADULT - ADDITIONAL COMMENTS
Pt reports that he lives in a private house with his wife with ~3 steps to enter; (+)1 handrail; bedroom/bathroom on first floor. Prior to hospital admission pt was ambulating short distance with assistance using a rolling walker. Pt also owns a wheelchair. Pt has a home health aide 7days/week; Monday->Friday 6hours/day; Saturday and Sunday 5 hours/day. Pt denies any recent falls of use of home O2.    Pt left comfortable in bed, NAD, all lines intact, all precautions maintained, with call bell in reach, bed alarm on, wife @bedside, and RN aware of PT evaluation. Pt reports that he lives in a private house with his wife with ~3 steps to enter; (+)1 handrail; bedroom/bathroom on first floor. Prior to hospital admission pt was ambulating short distances with assistance using a rolling walker. Pt also owns a wheelchair. Pt has a home health aide 7days/week; Monday->Friday 6hours/day; Saturday and Sunday 5 hours/day. Pt denies any recent falls of use of home O2.    Pt left comfortable in bed, NAD, all lines intact, all precautions maintained, with call bell in reach, bed alarm on, wife @bedside, and RN aware of PT evaluation.

## 2019-12-11 NOTE — PROGRESS NOTE ADULT - PROBLEM SELECTOR PLAN 1
Awaiting TTE to r/o Rt heart strain.  Continue hep gtt - however A/C is relative contraindication for his known brain mets. Spoke with  [facility] - Oncology - brain lesion has been stable for last 2-3 years.  OK with hep gtt with transition to lovenox BID for home.  Change to Lovenox  Monitor O2 sats.

## 2019-12-11 NOTE — PHYSICAL THERAPY INITIAL EVALUATION ADULT - PLANNED THERAPY INTERVENTIONS, PT EVAL
strengthening/transfer training/neuromuscular re-education/bed mobility training/balance training/gait training/motor coordination training/postural re-education

## 2019-12-11 NOTE — PHYSICAL THERAPY INITIAL EVALUATION ADULT - MANUAL MUSCLE TESTING RESULTS, REHAB EVAL
cardiac/spinal precautions; bilateral UE at least 3+/5, right LE 3-/5, Left quad trace, left hamstring and anterior tib 0/5/grossly assessed due to

## 2019-12-11 NOTE — PHYSICAL THERAPY INITIAL EVALUATION ADULT - DIAGNOSIS, PT EVAL
Pt admitted for +PE and +PNA; pt presents with decreased strength, decreased balance, decreased aerobic capacity/endurance, and difficulty with ambulation.

## 2019-12-11 NOTE — PHYSICAL THERAPY INITIAL EVALUATION ADULT - GENERAL OBSERVATIONS, REHAB EVAL
Pt encountered in semisupine position, no distress, AxOx4, with +IV, +cardiac monitor, +3L of O2 through nasal cannula, and wife @bedside.

## 2019-12-12 PROBLEM — N40.1 BENIGN PROSTATIC HYPERPLASIA WITH LOWER URINARY TRACT SYMPTOMS: Chronic | Status: ACTIVE | Noted: 2019-01-01

## 2019-12-12 NOTE — PROGRESS NOTE ADULT - PROBLEM SELECTOR PLAN 9
1) PCP Contacted on Admission: [ x ] Y     [  ] N     [  ] N/A - [facility] - Dr. Hood  2) Date of Contact with PCP: 12/9  3) PCP Contacted at Discharge:  [  ] Y    [  ] N    [  ] N/A -   4) Summary of Handoff Given to PCP:   5) Post-Discharge Appointment Date and Location:

## 2019-12-12 NOTE — CHART NOTE - NSCHARTNOTEFT_GEN_A_CORE
Called to see pt by RN to evaluate pt having twitching of his right arm, No other neurologic complaints at this time.  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 No mass or midline shift.     ICU Vital Signs Last 24 Hrs  T(C): 37.2 (11 Dec 2019 22:14), Max: 37.2 (11 Dec 2019 22:14)  T(F): 98.9 (11 Dec 2019 22:14), Max: 98.9 (11 Dec 2019 22:14)  HR: 95 (11 Dec 2019 22:14) (91 - 95)  BP: 116/69 (11 Dec 2019 22:22) (109/67 - 123/76)  BP(mean): --  ABP: --  ABP(mean): --  RR: 18 (11 Dec 2019 22:14) (18 - 21)  SpO2: 96% (11 Dec 2019 22:14) (94% - 96%)    On exam Pt lying in bed, notable twitching of right arm from shoulder   Pt able to raise right arm from the shoulder in front of him with decrease in the tremor    A/P:  May Neuro evaluation for cause of tremor  CTH to be done if Attending agrees, will make Dr. Padilla aware   Stable for now, continue to monitor

## 2019-12-12 NOTE — PROGRESS NOTE ADULT - SUBJECTIVE AND OBJECTIVE BOX
[facility] Division of Hospital Medicine  Laureano Padilla MD  Pager (JS, 1E-5P): [phone]  Other Times:  [phone]    Patient is a 59y old  Male who presents with a chief complaint of Left sided chest pain and SOB/BENITEZ (11 Dec 2019 11:32)    SUBJECTIVE / OVERNIGHT EVENTS:  Patient states that his breathing is better.  He feels that tips of his right finger is slightly numb but no problem with mobility, dexterity.  Feels that he might has slept on it wrong.  No F/C, N/V, CP, SOB, Cough, lightheadedness, dizziness, abdominal pain, diarrhea, dysuria.    MEDICATIONS  (STANDING):  amLODIPine   Tablet 10 milliGRAM(s) Oral daily  atorvastatin 20 milliGRAM(s) Oral at bedtime  cefepime   IVPB 2000 milliGRAM(s) IV Intermittent every 8 hours  dextrose 5%. 1000 milliLiter(s) (50 mL/Hr) IV Continuous <Continuous>  dextrose 50% Injectable 12.5 Gram(s) IV Push once  dextrose 50% Injectable 25 Gram(s) IV Push once  dextrose 50% Injectable 25 Gram(s) IV Push once  enoxaparin Injectable 80 milliGRAM(s) SubCutaneous two times a day  finasteride 5 milliGRAM(s) Oral daily  furosemide    Tablet 40 milliGRAM(s) Oral two times a day  insulin glargine Injectable (LANTUS) 14 Unit(s) SubCutaneous at bedtime  insulin lispro (HumaLOG) corrective regimen sliding scale   SubCutaneous three times a day before meals  insulin lispro (HumaLOG) corrective regimen sliding scale   SubCutaneous at bedtime  levothyroxine 175 MICROGram(s) Oral daily  methadone    Tablet 5 milliGRAM(s) Oral three times a day  pantoprazole    Tablet 40 milliGRAM(s) Oral before breakfast    MEDICATIONS  (PRN):  benzonatate 100 milliGRAM(s) Oral three times a day PRN Cough  dextrose 40% Gel 15 Gram(s) Oral once PRN Blood Glucose LESS THAN 70 milliGRAM(s)/deciliter  glucagon  Injectable 1 milliGRAM(s) IntraMuscular once PRN Glucose LESS THAN 70 milligrams/deciliter  morphine  - Injectable 2 milliGRAM(s) IV Push every 4 hours PRN Severe Pain (7 - 10)  oxyCODONE    IR 10 milliGRAM(s) Oral every 6 hours PRN Severe Pain (7 - 10)  senna 2 Tablet(s) Oral at bedtime PRN Constipation      Vital Signs Last 24 Hrs  T(C): 37.4 (12 Dec 2019 05:03), Max: 37.4 (12 Dec 2019 05:03)  T(F): 99.3 (12 Dec 2019 05:03), Max: 99.3 (12 Dec 2019 05:03)  HR: 84 (12 Dec 2019 05:03) (84 - 95)  BP: 115/61 (12 Dec 2019 05:03) (109/67 - 123/76)  BP(mean): --  RR: 18 (12 Dec 2019 05:03) (18 - 21)  SpO2: 96% (12 Dec 2019 05:03) (94% - 96%)  CAPILLARY BLOOD GLUCOSE      POCT Blood Glucose.: 127 mg/dL (12 Dec 2019 08:32)  POCT Blood Glucose.: 146 mg/dL (11 Dec 2019 22:16)  POCT Blood Glucose.: 181 mg/dL (11 Dec 2019 18:44)    I&O's Summary    11 Dec 2019 07:01  -  12 Dec 2019 07:00  --------------------------------------------------------  IN: 240 mL / OUT: 2800 mL / NET: -2560 mL    12 Dec 2019 07:01  -  12 Dec 2019 12:24  --------------------------------------------------------  IN: 0 mL / OUT: 550 mL / NET: -550 mL        PHYSICAL EXAM:  GENERAL: NAD, well-developed  HEAD:  Atraumatic, Normocephalic  EYES: EOMI, PERRLA, conjunctiva and sclera clear  NECK: Supple, No JVD  CHEST/LUNG: decreased BS to Rt base; No wheeze  HEART: Regular rate and rhythm; No murmurs, rubs, or gallops  ABDOMEN: Soft, obese, Nontender, Nondistended; Bowel sounds present  EXTREMITIES:  2+ Peripheral Pulses, No clubbing, cyanosis. 2+ LE edema B/L.  PSYCH: Calm  NEUROLOGY: A/Ox3, full strength to all 4 limbs.  SKIN: No rashes or lesions    LABS:                        9.3    7.44  )-----------( 327      ( 12 Dec 2019 04:38 )             31.0     12-12    132<L>  |  85<L>  |  19  ----------------------------<  137<H>  3.7   |  32<H>  |  0.92    Ca    9.2      12 Dec 2019 04:38  Phos  2.8     12-12  Mg     1.9     12-12      PTT - ( 12 Dec 2019 04:38 )  PTT:33.2 SEC          RADIOLOGY & ADDITIONAL TESTS:  < from: Transthoracic Echocardiogram (12.11.19 @ 17:20) >  ------------------------------------------------------------------------  CONCLUSIONS:  1. Normal left ventricular internal dimensions and wall  thicknesses.  2. Hyperdynamic left ventricle.  3. The right ventricle is not well visualized; grossly  normal right ventricular systolic function. TV S' = 17  cm/sec.  4. Estimated right ventricular systolic pressure equals 35  mm Hg, assuming right atrial pressure equals 10 mm Hg,  consistent with borderline pulmonary hypertension.  *** No previous Echo exam.  ------------------------------------------------------------------------  Confirmed on  12/11/2019 - 20:32:50 by James Pettit M.D.  ------------------------------------------------------------------------    < end of copied text >    Imaging Personally Reviewed:    Care Discussed with Consultants/Other Providers: Oncology - Dr. Hood - management of cancer

## 2019-12-12 NOTE — PROGRESS NOTE ADULT - PROBLEM SELECTOR PLAN 2
Immunocompromised - concern for gram neg organisms.  Contineu cefepime  Neg Urine legionella.  Anticipate 7 day course of treatment.

## 2019-12-12 NOTE — PROGRESS NOTE ADULT - PROBLEM SELECTOR PLAN 1
A/C is relative contraindication for his known brain mets. Spoke with Dr. Henryarian - Oncology - brain lesion has been stable for last 2-3 years.  OK with hep gtt with transition to lovenox BID for home.  On Lovenox  Monitor O2 sats.

## 2019-12-13 NOTE — PROGRESS NOTE ADULT - PROBLEM SELECTOR PLAN 3
Called and spoke with patient regarding message below.  Patient stated he would try recommendations below and call with any concerns or questions.  Kary Swift CMA ([facility])     s/p chemo  onc for management.  CT Head of ordered for monitoring of the brain mets - but No clinical concern for ICH at this time.

## 2019-12-13 NOTE — PROGRESS NOTE ADULT - PROBLEM SELECTOR PLAN 1
D/w Oncology - brain lesion has been stable for last 2-3 years.  OK with hep gtt with transition to lovenox BID for home.  On Lovenox BID  Monitor O2 sats.

## 2019-12-13 NOTE — CHART NOTE - NSCHARTNOTEFT_GEN_A_CORE
Patient complaining of right hand twitching. CTH notable for "punctate focus of hyperdensity in the right precentral gyrus not seen previously which may represent a small focus of hemorrhage or hemorrhagic   metastasis." MR brain ordered for further evaluation. Therapeutic lovenox for acute PE discontinued until further brain imaging obtained- will call to expedite MRI. Dr. Padilla aware.

## 2019-12-13 NOTE — PROGRESS NOTE ADULT - PROBLEM SELECTOR PLAN 9
Patient wishes to go home instead of [facility] - however wife involved with MVA, may need assistance. [facility] on 12/16.    1) PCP Contacted on Admission: [ x ] Y     [  ] N     [  ] N/A - [facility] - Dr. Hood  2) Date of Contact with PCP: 12/9  3) PCP Contacted at Discharge:  [  ] Y    [  ] N    [  ] N/A -   4) Summary of Handoff Given to PCP:   5) Post-Discharge Appointment Date and Location:

## 2019-12-13 NOTE — PROGRESS NOTE ADULT - PROBLEM SELECTOR PLAN 2
Immunocompromised - concern for gram neg organisms.  Contineu cefepime  Neg Urine legionella.  Anticipate 7 day course of treatment until 12/15

## 2019-12-13 NOTE — PROGRESS NOTE ADULT - SUBJECTIVE AND OBJECTIVE BOX
[facility] Division of Hospital Medicine  Laureano Padilla MD  Pager (JS, 1M-5P): [phone]  Other Times:  [phone]    Patient is a 59y old  Male who presents with a chief complaint of Left sided chest pain and SOB/BENITEZ (12 Dec 2019 12:23)    SUBJECTIVE / OVERNIGHT EVENTS:  Feels that twitch to Rt hand is improved but still present.  Also feels well-enough to lie flat for CT Head - patient agreeable.  SOB is improved.  Pain controlled with OxyIR PRN.  No F/C, N/V, CP, SOB, Cough, lightheadedness, dizziness, abdominal pain, diarrhea, dysuria.    MEDICATIONS  (STANDING):  amLODIPine   Tablet 10 milliGRAM(s) Oral daily  atorvastatin 20 milliGRAM(s) Oral at bedtime  cefepime   IVPB 2000 milliGRAM(s) IV Intermittent every 8 hours  dextrose 5%. 1000 milliLiter(s) (50 mL/Hr) IV Continuous <Continuous>  dextrose 50% Injectable 12.5 Gram(s) IV Push once  dextrose 50% Injectable 25 Gram(s) IV Push once  dextrose 50% Injectable 25 Gram(s) IV Push once  enoxaparin Injectable 80 milliGRAM(s) SubCutaneous two times a day  finasteride 5 milliGRAM(s) Oral daily  furosemide    Tablet 40 milliGRAM(s) Oral two times a day  insulin glargine Injectable (LANTUS) 14 Unit(s) SubCutaneous at bedtime  insulin lispro (HumaLOG) corrective regimen sliding scale   SubCutaneous three times a day before meals  insulin lispro (HumaLOG) corrective regimen sliding scale   SubCutaneous at bedtime  levothyroxine 175 MICROGram(s) Oral daily  methadone    Tablet 5 milliGRAM(s) Oral three times a day  pantoprazole    Tablet 40 milliGRAM(s) Oral before breakfast    MEDICATIONS  (PRN):  acetaminophen   Tablet .. 650 milliGRAM(s) Oral every 6 hours PRN Temp greater or equal to 38C (100.4F)  benzonatate 100 milliGRAM(s) Oral three times a day PRN Cough  dextrose 40% Gel 15 Gram(s) Oral once PRN Blood Glucose LESS THAN 70 milliGRAM(s)/deciliter  glucagon  Injectable 1 milliGRAM(s) IntraMuscular once PRN Glucose LESS THAN 70 milligrams/deciliter  morphine  - Injectable 2 milliGRAM(s) IV Push every 4 hours PRN Severe Pain (7 - 10)  oxyCODONE    IR 10 milliGRAM(s) Oral every 6 hours PRN Severe Pain (7 - 10)  senna 2 Tablet(s) Oral at bedtime PRN Constipation      Vital Signs Last 24 Hrs  T(C): 37.1 (13 Dec 2019 09:22), Max: 38.2 (12 Dec 2019 18:35)  T(F): 98.8 (13 Dec 2019 09:22), Max: 100.7 (12 Dec 2019 18:35)  HR: 88 (13 Dec 2019 09:22) (78 - 88)  BP: 105/71 (13 Dec 2019 09:22) (105/71 - 125/72)  BP(mean): --  RR: 18 (13 Dec 2019 09:22) (18 - 18)  SpO2: 95% (13 Dec 2019 09:22) (95% - 99%)  CAPILLARY BLOOD GLUCOSE      POCT Blood Glucose.: 135 mg/dL (13 Dec 2019 08:51)  POCT Blood Glucose.: 144 mg/dL (12 Dec 2019 22:21)  POCT Blood Glucose.: 184 mg/dL (12 Dec 2019 17:46)  POCT Blood Glucose.: 208 mg/dL (12 Dec 2019 12:33)    I&O's Summary    12 Dec 2019 07:01  -  13 Dec 2019 07:00  --------------------------------------------------------  IN: 100 mL / OUT: 550 mL / NET: -450 mL    13 Dec 2019 07:01  -  13 Dec 2019 11:00  --------------------------------------------------------  IN: 0 mL / OUT: 900 mL / NET: -900 mL        PHYSICAL EXAM:  GENERAL: NAD, well-developed  HEAD:  Atraumatic, Normocephalic  EYES: EOMI, PERRLA, conjunctiva and sclera clear  NECK: Supple, No JVD  CHEST/LUNG: decreased BS to Rt base; No wheeze  HEART: Regular rate and rhythm; No murmurs, rubs, or gallops  ABDOMEN: Soft, obese, Nontender, Nondistended; Bowel sounds present  EXTREMITIES:  2+ Peripheral Pulses, No clubbing, cyanosis. 2+ LE edema B/L.  PSYCH: Calm  NEUROLOGY: A/Ox3, full strength to all 4 limbs.  SKIN: No rashes or lesions    LABS:                        9.0    7.94  )-----------( 318      ( 13 Dec 2019 06:30 )             30.4     12-13    132<L>  |  89<L>  |  22  ----------------------------<  149<H>  3.5   |  30  |  0.92    Ca    9.0      13 Dec 2019 06:30  Phos  2.8     12-12  Mg     1.9     12-13      PTT - ( 12 Dec 2019 04:38 )  PTT:33.2 SEC          RADIOLOGY & ADDITIONAL TESTS:    Imaging Personally Reviewed:    Care Discussed with Consultants/Other Providers:

## 2019-12-14 NOTE — PROGRESS NOTE ADULT - ASSESSMENT
59M Rt RCC mets to spine and brain s/p chemo, DM type 2, BPH w/ chronic vasquez, GERD, HTN, hypothyroid, p/w acute PE and multifocal Rt gram-neg PNA w/ pleural effusion. 07-Feb-2019

## 2019-12-14 NOTE — CONSULT NOTE ADULT - SUBJECTIVE AND OBJECTIVE BOX
ROBIN PETTIT 59y ,Male  HPI:  60 y/o M with PMH of right renal cell carcinoma with mets to the spine(s/p recently stopped Cabometyx 3 weeks ago), DM type II, BPH with chronic vasquez, GERD, HTN, HLD. Hypothyroidism presented with the complaint of pleuritic chest pain and SOB/BENITEZ. As per the patient he was in his usual state of health and developed left sided chest pain about 3-4 days ago. Patient stated that he was moving around in the house when the chest pain started. Patient described the chest pain as a pressure like sensation, constant, aggravated with deep inspiration, without any alleviating factors, with radiation of the pain to the left side of the neck, with a severity of 8/10. Patient also endorsed of BENITEZ and SOB and endorsed of bilateral LE swelling that started 3 days ago. Patient also endorsed of abdominal distention that also started 3-4 days ago. Patient stated that any minimal exertion he would feel winded and SOB. Patient endorsed of productive cough with white sputum production for the past 2 weeks. Patient denied any fevers or chills with the cough. Patient stated that he has been urinating without any issues and has been compliant with all his medications. Patient denied any N/V/D/C, melena, hematochezia, recent travel, sick contact, dysuria, pleuritic or positional chest pain.      On ED admission EKG revealed NSR at a rate of 94 with LAD and QTc of 442, CE x 1: Trop: 22, CE x 2: Trop: 20, H&H: 10.8/35.0, K: 5.5->severely hemolyzed, Gluc: 221, Protein: 8.5, Alb: 3.2, AST: 81, BNP: 103.5, RVP: Negative. CTPA: Acute pulmonary embolus in the left lower lobe segmental pulmonary arteries. No evidence of right heart strain. New multifocal pneumonia of the right lung, likely pneumonia. New small right pleural effusion. Stable metastatic disease to the lungs. Prelim CXR: Large patchy opacity throughout the right lung field, diff includes multifocal pneumonia, CT chest can be performed for further characterization and evaluation. In the ED patient was started on heparin gtt. When examined patient still endorsed of a 7-8/10 chest pain and SOB. (09 Dec 2019 01:51)    MRI brain showed 2 new metastatic foci along the left precentral gyrus and right superior cerebellar hemisphere.  Susceptibility along the left precentral gyrus may represent a punctate hemorrhage versus small hemorrhagic metastatic focus.  Treated metastatic lesion with minimal associated enhancement in the left postcentral gyrus and associated encephalomalacia.  Small focus of intraparenchymal hemorrhage within the right middle   frontal gyrus.    PAST MEDICAL & SURGICAL HISTORY:  BPH with urinary obstruction  Urinary retention: Vasquez catheter placed since 10/2018  HLD (hyperlipidemia)  S/P radiation therapy: spine, on cabozantinib since Nov 2017  Malignant neoplasm of vertebral column  Hypothyroidism  GERD (gastroesophageal reflux disease)  Cervicalgia  Metastatic renal cell carcinoma to brain: lung/spine  Hypertension  Diabetes mellitus: Type 2  H/O kyphoplasty: T8, 11/2018  Status post gamma knife treatment: Left forehead/temple    Allergies  No Known Allergies    MEDICATIONS  (STANDING):  amLODIPine   Tablet 10 milliGRAM(s) Oral daily  atorvastatin 20 milliGRAM(s) Oral at bedtime  cefepime   IVPB 2000 milliGRAM(s) IV Intermittent every 8 hours  dextrose 5%. 1000 milliLiter(s) (50 mL/Hr) IV Continuous <Continuous>  dextrose 50% Injectable 12.5 Gram(s) IV Push once  dextrose 50% Injectable 25 Gram(s) IV Push once  dextrose 50% Injectable 25 Gram(s) IV Push once  finasteride 5 milliGRAM(s) Oral daily  furosemide    Tablet 40 milliGRAM(s) Oral two times a day  insulin glargine Injectable (LANTUS) 14 Unit(s) SubCutaneous at bedtime  insulin lispro (HumaLOG) corrective regimen sliding scale   SubCutaneous three times a day before meals  insulin lispro (HumaLOG) corrective regimen sliding scale   SubCutaneous at bedtime  levothyroxine 175 MICROGram(s) Oral daily  methadone    Tablet 5 milliGRAM(s) Oral three times a day  pantoprazole    Tablet 40 milliGRAM(s) Oral before breakfast    MEDICATIONS  (PRN):  acetaminophen   Tablet .. 650 milliGRAM(s) Oral every 6 hours PRN Temp greater or equal to 38C (100.4F)  benzonatate 100 milliGRAM(s) Oral three times a day PRN Cough  dextrose 40% Gel 15 Gram(s) Oral once PRN Blood Glucose LESS THAN 70 milliGRAM(s)/deciliter  glucagon  Injectable 1 milliGRAM(s) IntraMuscular once PRN Glucose LESS THAN 70 milligrams/deciliter  morphine  - Injectable 2 milliGRAM(s) IV Push every 4 hours PRN Severe Pain (7 - 10)  oxyCODONE    IR 10 milliGRAM(s) Oral every 6 hours PRN Severe Pain (7 - 10)  senna 2 Tablet(s) Oral at bedtime PRN Constipation    Vital Signs Last 24 Hrs  T(C): 36.9 (14 Dec 2019 06:41), Max: 37.4 (13 Dec 2019 21:20)  T(F): 98.5 (14 Dec 2019 06:41), Max: 99.3 (13 Dec 2019 21:20)  HR: 80 (14 Dec 2019 06:41) (80 - 94)  BP: 131/79 (14 Dec 2019 06:41) (120/79 - 131/79)  BP(mean): --  RR: 18 (14 Dec 2019 06:41) (18 - 18)  SpO2: 100% (14 Dec 2019 06:41) (99% - 100%)    PE:  AA&0 x   Motor: strength : BUE  Sensory:       LABS:                        9.2    7.25  )-----------( 344      ( 14 Dec 2019 07:00 )             31.3     12-14    136  |  88<L>  |  29<H>  ----------------------------<  130<H>  3.6   |  33<H>  |  1.02    Ca    9.6      14 Dec 2019 07:00  Phos  2.8     12-14  Mg     2.0     12-14 12-13-19 @ 07:01  -  12-14-19 @ 07:00  --------------------------------------------------------  IN: 240 mL / OUT: 2150 mL / NET: -1910 mL    12-14-19 @ 07:01  -  12-14-19 @ 12:04  --------------------------------------------------------  IN: 350 mL / OUT: 650 mL / NET: -300 mL VIRGIEROBIN LAYTON 59y ,Male  HPI:  58 y/o M with PMH of right renal cell carcinoma with mets to the spine(s/p recently stopped Cabometyx 3 weeks ago), XRT to brain 2016- Dr. Deal, DM type II, BPH with chronic vasquez, GERD, HTN, HLD. Hypothyroidism presented with the complaint of pleuritic chest pain and SOB/BENITEZ. As per the patient he was in his usual state of health and developed left sided chest pain about 3-4 days ago. Patient stated that he was moving around in the house when the chest pain started. Patient described the chest pain as a pressure like sensation, constant, aggravated with deep inspiration, without any alleviating factors, with radiation of the pain to the left side of the neck, with a severity of 8/10. Patient also endorsed of BENITEZ and SOB and endorsed of bilateral LE swelling that started 3 days ago. Patient also endorsed of abdominal distention that also started 3-4 days ago. Patient stated that any minimal exertion he would feel winded and SOB. Patient endorsed of productive cough with white sputum production for the past 2 weeks. Patient denied any fevers or chills with the cough. Patient stated that he has been urinating without any issues and has been compliant with all his medications. Patient denied any N/V/D/C, melena, hematochezia, recent travel, sick contact, dysuria, pleuritic or positional chest pain.      On ED admission EKG revealed NSR at a rate of 94 with LAD and QTc of 442, CE x 1: Trop: 22, CE x 2: Trop: 20, H&H: 10.8/35.0, K: 5.5->severely hemolyzed, Gluc: 221, Protein: 8.5, Alb: 3.2, AST: 81, BNP: 103.5, RVP: Negative. CTPA: Acute pulmonary embolus in the left lower lobe segmental pulmonary arteries. No evidence of right heart strain. New multifocal pneumonia of the right lung, likely pneumonia. New small right pleural effusion. Stable metastatic disease to the lungs. Prelim CXR: Large patchy opacity throughout the right lung field, diff includes multifocal pneumonia, CT chest can be performed for further characterization and evaluation. In the ED patient was started on heparin gtt. When examined patient still endorsed of a 7-8/10 chest pain and SOB. (09 Dec 2019 01:51)    MRI brain showed 2 new metastatic foci along the left precentral gyrus and right superior cerebellar hemisphere.  Susceptibility along the left precentral gyrus may represent a punctate hemorrhage versus small hemorrhagic metastatic focus.  Treated metastatic lesion with minimal associated enhancement in the left postcentral gyrus and associated encephalomalacia.  Small focus of intraparenchymal hemorrhage within the right middle   frontal gyrus.    PAST MEDICAL & SURGICAL HISTORY:  BPH with urinary obstruction  Urinary retention: Vasquez catheter placed since 10/2018  HLD (hyperlipidemia)  S/P radiation therapy: spine, on cabozantinib since Nov 2017  Malignant neoplasm of vertebral column  Hypothyroidism  GERD (gastroesophageal reflux disease)  Cervicalgia  Metastatic renal cell carcinoma to brain: lung/spine  Hypertension  Diabetes mellitus: Type 2  H/O kyphoplasty: T8, 11/2018  Status post gamma knife treatment: Left forehead/temple    Allergies  No Known Allergies    MEDICATIONS  (STANDING):  amLODIPine   Tablet 10 milliGRAM(s) Oral daily  atorvastatin 20 milliGRAM(s) Oral at bedtime  cefepime   IVPB 2000 milliGRAM(s) IV Intermittent every 8 hours  dextrose 5%. 1000 milliLiter(s) (50 mL/Hr) IV Continuous <Continuous>  dextrose 50% Injectable 12.5 Gram(s) IV Push once  dextrose 50% Injectable 25 Gram(s) IV Push once  dextrose 50% Injectable 25 Gram(s) IV Push once  finasteride 5 milliGRAM(s) Oral daily  furosemide    Tablet 40 milliGRAM(s) Oral two times a day  insulin glargine Injectable (LANTUS) 14 Unit(s) SubCutaneous at bedtime  insulin lispro (HumaLOG) corrective regimen sliding scale   SubCutaneous three times a day before meals  insulin lispro (HumaLOG) corrective regimen sliding scale   SubCutaneous at bedtime  levothyroxine 175 MICROGram(s) Oral daily  methadone    Tablet 5 milliGRAM(s) Oral three times a day  pantoprazole    Tablet 40 milliGRAM(s) Oral before breakfast    MEDICATIONS  (PRN):  acetaminophen   Tablet .. 650 milliGRAM(s) Oral every 6 hours PRN Temp greater or equal to 38C (100.4F)  benzonatate 100 milliGRAM(s) Oral three times a day PRN Cough  dextrose 40% Gel 15 Gram(s) Oral once PRN Blood Glucose LESS THAN 70 milliGRAM(s)/deciliter  glucagon  Injectable 1 milliGRAM(s) IntraMuscular once PRN Glucose LESS THAN 70 milligrams/deciliter  morphine  - Injectable 2 milliGRAM(s) IV Push every 4 hours PRN Severe Pain (7 - 10)  oxyCODONE    IR 10 milliGRAM(s) Oral every 6 hours PRN Severe Pain (7 - 10)  senna 2 Tablet(s) Oral at bedtime PRN Constipation    Vital Signs Last 24 Hrs  T(C): 36.9 (14 Dec 2019 06:41), Max: 37.4 (13 Dec 2019 21:20)  T(F): 98.5 (14 Dec 2019 06:41), Max: 99.3 (13 Dec 2019 21:20)  HR: 80 (14 Dec 2019 06:41) (80 - 94)  BP: 131/79 (14 Dec 2019 06:41) (120/79 - 131/79)  BP(mean): --  RR: 18 (14 Dec 2019 06:41) (18 - 18)  SpO2: 100% (14 Dec 2019 06:41) (99% - 100%)    PE:  AA&0 x 3, CN 2-12 grossly intact, pERRL  Motor: strength : BUE/ RLE 5/5, LLE 1/5 (baseline x 1 year)  Sensory: intact to light touch, but reports decreased sensation in left leg compared to right  No drift, no ataxia      LABS:                        9.2    7.25  )-----------( 344      ( 14 Dec 2019 07:00 )             31.3     12-14    136  |  88<L>  |  29<H>  ----------------------------<  130<H>  3.6   |  33<H>  |  1.02    Ca    9.6      14 Dec 2019 07:00  Phos  2.8     12-14  Mg     2.0     12-14 12-13-19 @ 07:01  -  12-14-19 @ 07:00  --------------------------------------------------------  IN: 240 mL / OUT: 2150 mL / NET: -1910 mL    12-14-19 @ 07:01 - 12-14-19 @ 12:04  --------------------------------------------------------  IN: 350 mL / OUT: 650 mL / NET: -300 mL

## 2019-12-14 NOTE — CONSULT NOTE ADULT - EXTREMITIES COMMENTS
left leg with limited range of motion, chronic per patient; right lower extremity full range of motion; no swelling noted bilateral, DP palpable bilateral  no swelling note upper extremity bilateral, radial pulse palpable bilateral

## 2019-12-14 NOTE — CONSULT NOTE ADULT - PROBLEM SELECTOR RECOMMENDATION 9
1. case to be d/w attending  2. oncology consult 1. case to d/w attending: Benefit of starting anticoagulation therapy to be determined by covering primary team. Patient is at risk of increasing the size of their intracerebral hemorrhage (ICH), SDH, EDH, IVH if they are restarted on anticoagulation, which could increase morbidity/mortality.  Our recommendation is to hold full ASA, Plavix, Coumadin, Eliquis, or therapeutic dose of Lovenox or Heparin drip or any other blood thinners for 2 weeks, and get f/u CT head before restarting. Reconsult neurosurgery for any change on CT head prior to restarting A/C. Pager [phone].  2. oncology consult

## 2019-12-14 NOTE — CONSULT NOTE ADULT - ASSESSMENT
4 /o M, with renal cell CA w/ known mets to brain and spine, with new hemorrhagic brain lesions on MRI, and recently diagnosed pulmonary embolus

## 2019-12-14 NOTE — PROGRESS NOTE ADULT - PROBLEM SELECTOR PLAN 9
Patient wishes to go home instead of [facility] - however wife involved with MVA, may need assistance. [facility] on 12/16.    1) PCP Contacted on Admission: [ x ] Y     [  ] N     [  ] N/A - [facility] - Dr. Hood  2) Date of Contact with PCP: 12/9  3) PCP Contacted at Discharge:  [  ] Y    [  ] N    [  ] N/A -   4) Summary of Handoff Given to PCP:   5) Post-Discharge Appointment Date and Location:

## 2019-12-14 NOTE — CONSULT NOTE ADULT - SUBJECTIVE AND OBJECTIVE BOX
VASCULAR SURGERY CONSULT NOTE    Patient is a 59y old  Male who presents with a chief complaint of Left sided chest pain and SOB/BENITEZ (14 Dec 2019 12:03), vascular consulted for IVC filter placement in context of PE with hemorrhagic brain metastatic lesion    60 yo male with right renal cell carcinoma with metastatic disease to the spine on chemotherapy presented with shortness of breath and found to have a left lower lobe segmental artery PE on CTA (12/08). Patient was started on Eliquis but was stopped on 12/13 after MRI showed metastatic brain lesion with hemorrhagic change. Patient had a negative duplex scan on 12/09 and no vegetation or thrombi seen on recent echocardiogram (12/11).     Initial HPI: 60 y/o M with PMH of right renal cell carcinoma with mets to the spine(s/p recently stopped Cabometyx 3 weeks ago), DM type II, BPH with chronic vasquez, GERD, HTN, HLD. Hypothyroidism presented with the complaint of pleuritic chest pain and SOB/BENITEZ. As per the patient he was in his usual state of health and developed left sided chest pain about 3-4 days ago. Patient stated that he was moving around in the house when the chest pain started. Patient described the chest pain as a pressure like sensation, constant, aggravated with deep inspiration, without any alleviating factors, with radiation of the pain to the left side of the neck, with a severity of 8/10. Patient also endorsed of BENITEZ and SOB and endorsed of bilateral LE swelling that started 3 days ago. Patient also endorsed of abdominal distention that also started 3-4 days ago. Patient stated that any minimal exertion he would feel winded and SOB. Patient endorsed of productive cough with white sputum production for the past 2 weeks. Patient denied any fevers or chills with the cough. Patient stated that he has been urinating without any issues and has been compliant with all his medications. Patient denied any N/V/D/C, melena, hematochezia, recent travel, sick contact, dysuria, pleuritic or positional chest pain.        10-points review of system performed with pertinent negative and postive findings documented in the HPI     PAST MEDICAL & SURGICAL HISTORY:  BPH with urinary obstruction  Urinary retention: Vasquez catheter placed since 10/2018  HLD (hyperlipidemia)  S/P radiation therapy: spine, on cabozantinib since Nov 2017  Malignant neoplasm of vertebral column  Hypothyroidism  GERD (gastroesophageal reflux disease)  Cervicalgia  Metastatic renal cell carcinoma to brain: lung/spine  Hypertension  Diabetes mellitus: Type 2  H/O kyphoplasty: T8, 11/2018  Status post gamma knife treatment: Left forehead/temple    FAMILY HISTORY:  Family history of colon cancer: Mother  Family history of diabetes mellitus in mother  Family history of diabetes mellitus in father    SOCIAL HISTORY: No pertinent social history    MEDICATIONS  (STANDING):  amLODIPine   Tablet 10 milliGRAM(s) Oral daily  atorvastatin 20 milliGRAM(s) Oral at bedtime  cefepime   IVPB 2000 milliGRAM(s) IV Intermittent every 8 hours  dextrose 5%. 1000 milliLiter(s) (50 mL/Hr) IV Continuous <Continuous>  dextrose 50% Injectable 12.5 Gram(s) IV Push once  dextrose 50% Injectable 25 Gram(s) IV Push once  dextrose 50% Injectable 25 Gram(s) IV Push once  finasteride 5 milliGRAM(s) Oral daily  furosemide    Tablet 40 milliGRAM(s) Oral two times a day  insulin glargine Injectable (LANTUS) 14 Unit(s) SubCutaneous at bedtime  insulin lispro (HumaLOG) corrective regimen sliding scale   SubCutaneous three times a day before meals  insulin lispro (HumaLOG) corrective regimen sliding scale   SubCutaneous at bedtime  levothyroxine 175 MICROGram(s) Oral daily  methadone    Tablet 5 milliGRAM(s) Oral three times a day  pantoprazole    Tablet 40 milliGRAM(s) Oral before breakfast    MEDICATIONS  (PRN):  acetaminophen   Tablet .. 650 milliGRAM(s) Oral every 6 hours PRN Temp greater or equal to 38C (100.4F)  benzonatate 100 milliGRAM(s) Oral three times a day PRN Cough  dextrose 40% Gel 15 Gram(s) Oral once PRN Blood Glucose LESS THAN 70 milliGRAM(s)/deciliter  glucagon  Injectable 1 milliGRAM(s) IntraMuscular once PRN Glucose LESS THAN 70 milligrams/deciliter  morphine  - Injectable 2 milliGRAM(s) IV Push every 4 hours PRN Severe Pain (7 - 10)  oxyCODONE    IR 10 milliGRAM(s) Oral every 6 hours PRN Severe Pain (7 - 10)  senna 2 Tablet(s) Oral at bedtime PRN Constipation    Allergies: No Known Allergies    Vital Signs Last 24 Hrs  T(C): 37.1 (14 Dec 2019 13:06), Max: 37.4 (13 Dec 2019 21:20)  T(F): 98.8 (14 Dec 2019 13:06), Max: 99.3 (13 Dec 2019 21:20)  HR: 82 (14 Dec 2019 13:06) (80 - 94)  BP: 113/70 (14 Dec 2019 13:06) (113/70 - 131/79)  BP(mean): --  RR: 17 (14 Dec 2019 13:06) (17 - 18)  SpO2: 98% (14 Dec 2019 13:06) (98% - 100%)                        9.2    7.25  )-----------( 344      ( 14 Dec 2019 07:00 )             31.3     12-14    136  |  88<L>  |  29<H>  ----------------------------<  130<H>  3.6   |  33<H>  |  1.02    Ca    9.6      14 Dec 2019 07:00  Phos  2.8     12-14  Mg     2.0     12-14    IMAGING STUDIES:  EXAM:  MR BRAIN WAW IC    PROCEDURE DATE:  Dec 13 2019     IMPRESSION:   2 new metastatic foci along the left precentral gyrus and right superior   cerebellar hemisphere.    Susceptibility along the left precentral gyrus may represent a punctate   hemorrhage versus small hemorrhagic metastatic focus.    Treated metastatic lesion with minimal associated enhancement in the left   postcentral gyrus and associated encephalomalacia.    Small focus of intraparenchymal hemorrhage within the right middle   frontal gyrus.      Procedure: Real-time grayscale and color Duplex  ultrasonography was used to interrogate the deep veins of  the bilateral lower extremities.  Indications: Localized swelling, mass and lump, lower  limb, bilateral (R22.43)    Summary/Impressions:  1.  No evidence of deep vein thrombosis in the right and  left lower extremities.  2.  No evidence of deep and superficial venous  insufficiency noted in the right and left lower  extremities.    EXAM:  CT ANGIO CHEST (W)AW IC    PROCEDURE DATE:  Dec  8 2019     IMPRESSION:   Acute pulmonary embolus in the left lower lobe segmental pulmonary   arteries. No evidence of right heart strain.    New multifocal pneumonia of the right lung, likely pneumonia. New small   right pleural effusion.    Stable metastatic disease to the lungs.

## 2019-12-14 NOTE — PROGRESS NOTE ADULT - SUBJECTIVE AND OBJECTIVE BOX
PROGRESS NOTE:     Patient is a 59y old  Male who presents with a chief complaint of Left sided chest pain and SOB/BENITEZ (14 Dec 2019 16:03)    SUBJECTIVE / OVERNIGHT EVENTS:  Patient seen and evaluated the patient at bedside.   Alert, awake, remains communicative, no apparent distress evident.   Denies any current complaints, including SOB, CP, abdominal pain, nausea, vomiting, headache.   Overnight MRI: showed new metastatic foci- with punctate hemorrhage, small hemorrhagic metastatic focus    Neurosurg consulted. Pending recommendations.   Hem follow up requested in view of PE , unable for anticoagulation d/t brain metastatic bleed.     ADDITIONAL REVIEW OF SYSTEMS:    MEDICATIONS  (STANDING):  amLODIPine   Tablet 10 milliGRAM(s) Oral daily  atorvastatin 20 milliGRAM(s) Oral at bedtime  cefepime   IVPB 2000 milliGRAM(s) IV Intermittent every 8 hours  dextrose 5%. 1000 milliLiter(s) (50 mL/Hr) IV Continuous <Continuous>  dextrose 50% Injectable 12.5 Gram(s) IV Push once  dextrose 50% Injectable 25 Gram(s) IV Push once  dextrose 50% Injectable 25 Gram(s) IV Push once  finasteride 5 milliGRAM(s) Oral daily  furosemide    Tablet 40 milliGRAM(s) Oral two times a day  insulin glargine Injectable (LANTUS) 14 Unit(s) SubCutaneous at bedtime  insulin lispro (HumaLOG) corrective regimen sliding scale   SubCutaneous three times a day before meals  insulin lispro (HumaLOG) corrective regimen sliding scale   SubCutaneous at bedtime  levothyroxine 175 MICROGram(s) Oral daily  methadone    Tablet 5 milliGRAM(s) Oral three times a day  pantoprazole    Tablet 40 milliGRAM(s) Oral before breakfast    MEDICATIONS  (PRN):  acetaminophen   Tablet .. 650 milliGRAM(s) Oral every 6 hours PRN Temp greater or equal to 38C (100.4F)  benzonatate 100 milliGRAM(s) Oral three times a day PRN Cough  dextrose 40% Gel 15 Gram(s) Oral once PRN Blood Glucose LESS THAN 70 milliGRAM(s)/deciliter  glucagon  Injectable 1 milliGRAM(s) IntraMuscular once PRN Glucose LESS THAN 70 milligrams/deciliter  morphine  - Injectable 2 milliGRAM(s) IV Push every 4 hours PRN Severe Pain (7 - 10)  oxyCODONE    IR 10 milliGRAM(s) Oral every 6 hours PRN Severe Pain (7 - 10)  senna 2 Tablet(s) Oral at bedtime PRN Constipation    CAPILLARY BLOOD GLUCOSE  POCT Blood Glucose.: 171 mg/dL (14 Dec 2019 12:27)  POCT Blood Glucose.: 139 mg/dL (14 Dec 2019 08:51)  POCT Blood Glucose.: 176 mg/dL (13 Dec 2019 22:50)  POCT Blood Glucose.: 155 mg/dL (13 Dec 2019 17:44)    I&O's Summary    13 Dec 2019 07:01  -  14 Dec 2019 07:00  --------------------------------------------------------  IN: 240 mL / OUT: 2150 mL / NET: -1910 mL    14 Dec 2019 07:01  -  14 Dec 2019 17:33  --------------------------------------------------------  IN: 350 mL / OUT: 650 mL / NET: -300 mL    PHYSICAL EXAM:  Vital Signs Last 24 Hrs  T(C): 37.1 (14 Dec 2019 13:06), Max: 37.4 (13 Dec 2019 21:20)  T(F): 98.8 (14 Dec 2019 13:06), Max: 99.3 (13 Dec 2019 21:20)  HR: 82 (14 Dec 2019 13:06) (80 - 94)  BP: 113/70 (14 Dec 2019 13:06) (113/70 - 131/79)  BP(mean): --  RR: 17 (14 Dec 2019 13:06) (17 - 18)  SpO2: 98% (14 Dec 2019 13:06) (98% - 100%)    CONSTITUTIONAL: NAD, well-developed, middle aged, pale no apparent distress.   RESPIRATORY: Normal respiratory effort; reduced air entry RLL, otherwise good air entry.   CARDIOVASCULAR: Regular rate and rhythm, normal S1 and S2, No lower extremity edema;   ABDOMEN: Nontender to palpation, normoactive bowel sounds, obese distended, but non tender    MUSCLOSKELETAL: following all commands, moving all extremities   PSYCH: A+O to person, place, and time; affect appropriate, calm and cooperative.     LABS: reviewed                         9.2    7.25  )-----------( 344      ( 14 Dec 2019 07:00 )             31.3     12-14    136  |  88<L>  |  29<H>  ----------------------------<  130<H>  3.6   |  33<H>  |  1.02    Ca    9.6      14 Dec 2019 07:00  Phos  2.8     12-14  Mg     2.0     12-14    RADIOLOGY & ADDITIONAL TESTS:  Results Reviewed:   Imaging Personally Reviewed:  Electrocardiogram Personally Reviewed:    < from: MR Head w/wo IV Cont (12.13.19 @ 20:00) >    IMPRESSION:     2 new metastatic foci along the left precentral gyrus and right superior   cerebellar hemisphere.    Susceptibility along the left precentral gyrus may represent a punctate   hemorrhage versus small hemorrhagic metastatic focus.    Treated metastatic lesion with minimal associated enhancement in the left   postcentral gyrus and associated encephalomalacia.    Small focus of intraparenchymal hemorrhage within the right middle   frontal gyrus.    < end of copied text >    COORDINATION OF CARE:  Care Discussed with Consultants/Other Providers [Y/N]:  Prior or Outpatient Records Reviewed [Y/N]:

## 2019-12-14 NOTE — PROVIDER CONTACT NOTE (OTHER) - ASSESSMENT
patient A&Ox4, Vital Signs Stable on 4L NC, Normal Sinus Rhythm on telemetry, no acute distress noted. denies chest pain & SOB at this time. infrequent productive cough noted with small blood-tinged sputum. patient reports this started last night

## 2019-12-14 NOTE — CHART NOTE - NSCHARTNOTEFT_GEN_A_CORE
Called by primary team today regarding recommendations for IVC filter placement due to new findings of hemorrhagic lesions on brain MRI. Lovenox for PE has been held as of 12/13. Agree with holding AC in the setting of brain bleed. Appreciate neurosurg recs. Would in this case favor IVC filter placement given this contraindication to anticoagulation (risks >benefits).       Socorro Lew MD  Hematology Oncology Fellow, PGY-4  [facility] Pager: [phone]/ [facility] Pager: [phone]  Weekend coverage only Called by primary team today regarding recommendations for IVC filter placement due to new findings of hemorrhagic lesions on brain MRI. Lovenox for PE has been held as of 12/13. Agree with holding AC in the setting of brain bleed. Appreciate neurosurg recs. Would in this case favor IVC filter placement given this contraindication to anticoagulation (risks >benefits).     Addendum:     Treatment for PEs is normally anticoagulation therapy; however, when this is contraindicated or there are additional risk factors, inferior vena cava (IVC) filters are commonly used to reduce risk of PE or recurrent PEs even in the absence of DVT.  There have been many studies supporting this. More recently, IVC filters have been used as a prophylactic measure for patients at high risk of developing, but without established, VTE. This includes those with severe trauma, hypercoaguable states, as in this patent with stage IV RCC, prolonged immobilization, and severe cardiopulmonary disease, where even a minor PE can be fatal. These filters are also used for patients at high risk of PE even if they do not have DVT.     Further, An absolute indication for IVC filter placement is the presence of DVT OR PE includes contraindication to anticoagulation as in this patient with brain bleed.     The patient should be resumed on anticoagulation as soon as they are cleared from neurosurgery, and IVC retrieval should be addressed when appropriate.     For further references please see:  [employer] Mar 29, 2019, Author: CECI Meyers, MRCS, MRCS(Buddy), FRCS(Buddy), FRCR, CBCCT, EBIR; Chief : Zane Genao MD  [facility] Phys[facility]. 2008 Jan; 54(1): 49 – 55 .     Socorro Lew MD  Hematology Oncology Fellow, PGY-4  [facility] Pager: [phone]/ [facility] Pager: [phone]  Weekend coverage only

## 2019-12-14 NOTE — PROGRESS NOTE ADULT - PROBLEM SELECTOR PLAN 3
s/p Chemotherapy.   Oncology for management.  CT Head of ordered for monitoring of the brain mets - but No clinical concern for ICH at this times.   Brain MRI: Showed punctate hemorrhage and small hemorrhagic metastatic focus.  High risk for further bleed, given RCC therefore no AC for now.

## 2019-12-14 NOTE — CONSULT NOTE ADULT - ASSESSMENT
60 yo male with metastatic renal cell carcinoma presented with shortness of breath found to have left lower lobe PE on CTA, initially started AC with Eliquis but had to be stopped due to a new metastatic lesion noted on MRI (12/13) with hemorrhagic changes. Patient had a negative duplex for DVT ON 12/09 and no vegetation/thrombus on echocardiogram     - Anticoagulation plan per hemotology/oncology   - Please consult IR for IVC filter placement indicated   - Discussed with Dr. Sahu 58 yo male with metastatic renal cell carcinoma presented with shortness of breath found to have left lower lobe PE on CTA, initially started AC with Eliquis but had to be stopped due to a new metastatic lesion noted on MRI (12/13) with hemorrhagic changes. Patient had a negative duplex for DVT ON 12/09 and no vegetation/thrombus on echocardiogram     - Anticoagulation plan per hemotology/oncology   - Please consult IR for IVC filter if placement indicated   - Discussed with Dr. Sahu

## 2019-12-14 NOTE — PROGRESS NOTE ADULT - PROBLEM SELECTOR PLAN 2
Immunocompromised - concern for gram neg organisms.  Continue Cefepime  Neg Urine legionella.  Anticipate 7 day course of treatment until 12/15

## 2019-12-15 NOTE — PROGRESS NOTE ADULT - PROBLEM SELECTOR PLAN 2
Immunocompromised - concern for gram neg organisms.  Continue Cefepime until 12/15, discontinue antibiotics on AM 12/16.   Negative Urine legionella.  Anticipate 7 day course of treatment until 12/15 - Immunocompromised - concern for gram neg organisms.  - Continue Cefepime q8 until 12/15, discontinue antibiotics on AM 12/16.   - Negative Urine legionella.  - Anticipate 7 day course of treatment until 12/15

## 2019-12-15 NOTE — CHART NOTE - NSCHARTNOTEFT_GEN_A_CORE
PRE-INTERVENTIONAL RADIOLOGY PROCEDURE NOTE    Patient: 60 y/o Male     Procedure: IVC filter     Diagnosis / Indication: Acute PE, cant be on AC d/t hemorrhagic  brain mets    MRI- w/ punctate hemorrhage and small hemorrhagic metastatic focus.  Given hemorrhagic mets, unable for full dose anticoagulation as risk >benefit.     Interventional Radiology Attending Physician:     Ordering Attending Physician: Dr Blackwell    Pertinent medical history: Rt RCC mets to spine and brain s/p chemo, DM type 2, BPH w/ chronic vasquez, GERD, HTN, hypothyroid, p/w acute PE and multifocal Rt gram-neg PNA w/ pleural effusion.      Pertinent labs:                       9.3    7.58  )-----------( 358      ( 15 Dec 2019 05:40 )             31.3     12-15    133<L>  |  86<L>  |  31<H>  ----------------------------<  130<H>  3.2<L>   |  32<H>  |  0.99    Ca    9.5      15 Dec 2019 05:40  Phos  2.9     12-15  Mg     1.9     12-15          Patient and Family aware? Yes (spoke w/ pt and pt's spouse)     Clarisse Jean [facility] NP pager [phone]

## 2019-12-15 NOTE — PROGRESS NOTE ADULT - PROBLEM SELECTOR PLAN 5
Continue vasquez to gravity. - Continue vasquez to gravity. Chronic vasquez > 1 year per patient.   - Monitor output, currently has good output.

## 2019-12-15 NOTE — PROGRESS NOTE ADULT - SUBJECTIVE AND OBJECTIVE BOX
PROGRESS NOTE:     Patient is a 59y old  Male who presents with a chief complaint of Left sided chest pain and SOB/BENITEZ (14 Dec 2019 16:03)    SUBJECTIVE / OVERNIGHT EVENTS:  Patient seen and evaluated the patient at bedside. Brother present at bedside today.   Alert, awake, remains communicative, no apparent distress evident.   Denies any current complaints, including SOB, CP, abdominal pain, nausea, vomiting, headache.   Plan IR Consult for IVC filter placement tomorrow.     ADDITIONAL REVIEW OF SYSTEMS:    MEDICATIONS  (STANDING):  amLODIPine   Tablet 10 milliGRAM(s) Oral daily  atorvastatin 20 milliGRAM(s) Oral at bedtime  cefepime   IVPB 2000 milliGRAM(s) IV Intermittent every 8 hours  dextrose 5%. 1000 milliLiter(s) (50 mL/Hr) IV Continuous <Continuous>  dextrose 50% Injectable 12.5 Gram(s) IV Push once  dextrose 50% Injectable 25 Gram(s) IV Push once  dextrose 50% Injectable 25 Gram(s) IV Push once  finasteride 5 milliGRAM(s) Oral daily  furosemide    Tablet 40 milliGRAM(s) Oral two times a day  insulin glargine Injectable (LANTUS) 14 Unit(s) SubCutaneous at bedtime  insulin lispro (HumaLOG) corrective regimen sliding scale   SubCutaneous three times a day before meals  insulin lispro (HumaLOG) corrective regimen sliding scale   SubCutaneous at bedtime  levothyroxine 175 MICROGram(s) Oral daily  methadone    Tablet 5 milliGRAM(s) Oral three times a day  pantoprazole    Tablet 40 milliGRAM(s) Oral before breakfast    MEDICATIONS  (PRN):  acetaminophen   Tablet .. 650 milliGRAM(s) Oral every 6 hours PRN Temp greater or equal to 38C (100.4F)  benzonatate 100 milliGRAM(s) Oral three times a day PRN Cough  dextrose 40% Gel 15 Gram(s) Oral once PRN Blood Glucose LESS THAN 70 milliGRAM(s)/deciliter  glucagon  Injectable 1 milliGRAM(s) IntraMuscular once PRN Glucose LESS THAN 70 milligrams/deciliter  morphine  - Injectable 2 milliGRAM(s) IV Push every 4 hours PRN Severe Pain (7 - 10)  oxyCODONE    IR 10 milliGRAM(s) Oral every 6 hours PRN Severe Pain (7 - 10)  polyethylene glycol 3350 17 Gram(s) Oral daily PRN Constipation  senna 2 Tablet(s) Oral at bedtime PRN Constipation    CAPILLARY BLOOD GLUCOSE  POCT Blood Glucose.: 186 mg/dL (15 Dec 2019 12:19)  POCT Blood Glucose.: 144 mg/dL (15 Dec 2019 08:37)  POCT Blood Glucose.: 169 mg/dL (14 Dec 2019 21:54)  POCT Blood Glucose.: 160 mg/dL (14 Dec 2019 17:51)    I&O's Summary    14 Dec 2019 07:01  -  15 Dec 2019 07:00  --------------------------------------------------------  IN: 850 mL / OUT: 1650 mL / NET: -800 mL    15 Dec 2019 07:01  -  15 Dec 2019 12:47  --------------------------------------------------------  IN: 250 mL / OUT: 800 mL / NET: -550 mL    PHYSICAL EXAM:  Vital Signs Last 24 Hrs  T(C): 36.7 (15 Dec 2019 05:08), Max: 37.1 (14 Dec 2019 13:06)  T(F): 98.1 (15 Dec 2019 05:08), Max: 98.8 (14 Dec 2019 13:06)  HR: 82 (15 Dec 2019 05:08) (81 - 88)  BP: 125/81 (15 Dec 2019 05:08) (110/79 - 125/81)  BP(mean): --  RR: 18 (15 Dec 2019 05:08) (16 - 18)  SpO2: 97% (15 Dec 2019 05:08) (97% - 98%)    CONSTITUTIONAL: NAD, well-developed, middle aged, pale no apparent distress.   RESPIRATORY: Normal respiratory effort; reduced air entry RLL, otherwise good air entry, no tachypnea noted.   CARDIOVASCULAR: Regular rate and rhythm, normal S1 and S2, No lower extremity edema;   ABDOMEN: Nontender to palpation, normoactive bowel sounds, obese distended, but non tender    MUSCLOSKELETAL: following all commands, moving all extremities   PSYCH: A+O to person, place, and time; affect appropriate, calm and cooperative.     LABS: reviewed                9.3    7.58  )-----------( 358      ( 15 Dec 2019 05:40 )             31.3     12-15    133<L>  |  86<L>  |  31<H>  ----------------------------<  130<H>  3.2<L>   |  32<H>  |  0.99    Ca    9.5      15 Dec 2019 05:40  Phos  2.9     12-15  Mg     1.9     12-15    RADIOLOGY & ADDITIONAL TESTS:  Results Reviewed:   Imaging Personally Reviewed:  Electrocardiogram Personally Reviewed:    COORDINATION OF CARE:  Care Discussed with Consultants/Other Providers [Y/N]:  Prior or Outpatient Records Reviewed [Y/N]:

## 2019-12-15 NOTE — PROGRESS NOTE ADULT - PROBLEM SELECTOR PLAN 3
s/p Chemotherapy.   Oncology for management.  CT Head of ordered for monitoring of the brain mets - but No clinical concern for ICH at this times.   Brain MRI: Showed punctate hemorrhage and small hemorrhagic metastatic focus.  High risk for further bleed, given RCC therefore no AC for now.  Needs GOC to establish GOC in the setting of high likelihood of decompensation 2/2 inability to AC an acute PE. Will likely benefit from Hospice services. - s/p Chemotherapy.   - Oncology for management.  - CT Head of ordered for monitoring of the brain mets.  - Brain MRI: Showed punctate hemorrhage and small hemorrhagic metastatic focus.  - High risk for further bleed, given RCC therefore no AC for now.  - Needs GOC to establish GOC in the setting of high likelihood of decompensation 2/2 inability to AC an acute PE. Will likely benefit from Hospice services.
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
## 2019-12-15 NOTE — PROGRESS NOTE ADULT - PROBLEM SELECTOR PLAN 1
Initially since brain lesion has been stable for last 2-3 years, Hem approved hep gtt with transition to Lovenox BID for home.  - Brain MRI: Showed punctate hemorrhage and small hemorrhagic metastatic focus.   - Given hemorrhagic mets, unable for full dose anticoagulation as risk >benefit.   - Neurosurgery consulted- no AC, antiplatelets for 2 weeks, repeat Head CT then.   - Hem notified, agree with no AC, plan now for IVC filter.   - Vascular surgery notifed- IR Consulted for IVC filter placement tomorrow.   - Continue to monitor O2 saturation , for any acute change in respiratory status.   - Currently denies any SOB, CP, and O2 sats stable.   - Will likely need family meeting to discuss GOC given high likelihood of decompensating in the setting of inability to AC an acute PE.   - May benefit from Hospice services.

## 2019-12-15 NOTE — PROGRESS NOTE ADULT - PROBLEM SELECTOR PLAN 6
NISS, FS QID. Lantus daily. - Lantus daily 14 Units, reduced to 10 units tonight as NPO for procedure.   - Continue NISS, FS QID.   - Currently, well controlled.   Consistent Carb diet.
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
## 2019-12-16 NOTE — DISCHARGE NOTE PROVIDER - NSDCMRMEDTOKEN_GEN_ALL_CORE_FT
amLODIPine 10 mg oral tablet: 1 tab(s) orally once a day  atorvastatin 20 mg oral tablet: 1 tab(s) orally once a day (at bedtime)  enalapril 20 mg oral tablet: 1 tab(s) orally once a day  enoxaparin 80 mg/0.8 mL injectable solution: 80 milligram(s) subcutaneously every 12 hours for PE  TEST   spectra [id number]  finasteride 5 mg oral tablet: 1 tab(s) orally once a day  gabapentin 300 mg oral capsule: 1 cap(s) orally 3 times a day  insulin glargine: 14 unit(s) subcutaneous once a day (at bedtime)  Lasix 40 mg oral tablet: 1 tab(s) orally once a day  levothyroxine 175 mcg (0.175 mg) oral tablet: 1 tab(s) orally once a day  methadone 5 mg oral tablet: 1 tab(s) orally 3 times a day    I-STOP REFERENCE NUMBER: [id number]   Last dispenced on 11/26/2019-90 tabs for 30 days   oxyCODONE 10 mg oral tablet: 1 tab(s) orally every 6 hours, As needed, Severe Pain (7 - 10)    I-STOP REFERENCE NUMBER: [id number]   Last dispenced on 11/26/2019-180 tabs for 30 days   pantoprazole 40 mg oral delayed release tablet: 1 tab(s) orally once a day (before a meal)  repaglinide 1 mg oral tablet: 1 tab(s) orally 3 times a day (with meals)  senna oral tablet: 2 tab(s) orally once a day (at bedtime) amLODIPine 10 mg oral tablet: 1 tab(s) orally once a day  atorvastatin 20 mg oral tablet: 1 tab(s) orally once a day (at bedtime)  enalapril 20 mg oral tablet: 1 tab(s) orally once a day  enoxaparin 80 mg/0.8 mL injectable solution: 80 milligram(s) subcutaneously every 12 hours for PE  TEST   spectra [id number]  finasteride 5 mg oral tablet: 1 tab(s) orally once a day  gabapentin 300 mg oral capsule: 1 cap(s) orally 3 times a day  insulin glargine: 14 unit(s) subcutaneous once a day (at bedtime)  Lasix 40 mg oral tablet: 1 tab(s) orally once a day  levothyroxine 175 mcg (0.175 mg) oral tablet: 1 tab(s) orally once a day  methadone 5 mg oral tablet: 1 tab(s) orally 3 times a day    I-STOP REFERENCE NUMBER: [id number]   Last dispenced on 11/26/2019-90 tabs for 30 days   oxyCODONE 10 mg oral tablet: 1 tab(s) orally every 6 hours, As needed, Severe Pain (7 - 10)    I-STOP REFERENCE NUMBER: [id number]   Last dispenced on 11/26/2019-180 tabs for 30 days   pantoprazole 40 mg oral delayed release tablet: 1 tab(s) orally once a day (before a meal)  repaglinide 1 mg oral tablet: 1 tab(s) orally 3 times a day (with meals)  senna oral tablet: 2 tab(s) orally once a day (at bedtime) amLODIPine 10 mg oral tablet: 1 tab(s) orally once a day  atorvastatin 20 mg oral tablet: 1 tab(s) orally once a day (at bedtime)  enalapril 20 mg oral tablet: 1 tab(s) orally once a day  enoxaparin 80 mg/0.8 mL injectable solution: 80 milligram(s) subcutaneously every 12 hours for PE  TEST   spectra [id number]  finasteride 5 mg oral tablet: 1 tab(s) orally once a day  gabapentin 300 mg oral capsule: 1 cap(s) orally 3 times a day  insulin glargine: 14 unit(s) subcutaneous once a day (at bedtime)  Lasix 40 mg oral tablet: 1 tab(s) orally once a day  levothyroxine 175 mcg (0.175 mg) oral tablet: 1 tab(s) orally once a day  methadone 5 mg oral tablet: 1 tab(s) orally 3 times a day    I-STOP REFERENCE NUMBER: [id number]   Last dispenced on 11/26/2019-90 tabs for 30 days   oxyCODONE 10 mg oral tablet: 1 tab(s) orally every 6 hours, As needed, Severe Pain (7 - 10)    I-STOP REFERENCE NUMBER: [id number]   Last dispenced on 11/26/2019-180 tabs for 30 days   pantoprazole 40 mg oral delayed release tablet: 1 tab(s) orally once a day (before a meal)  repaglinide 1 mg oral tablet: 1 tab(s) orally 3 times a day (with meals)  senna oral tablet: 2 tab(s) orally once a day (at bedtime) amLODIPine 10 mg oral tablet: 1 tab(s) orally once a day  atorvastatin 20 mg oral tablet: 1 tab(s) orally once a day (at bedtime)  finasteride 5 mg oral tablet: 1 tab(s) orally once a day  furosemide 40 mg oral tablet: 1 tab(s) orally 2 times a day  gabapentin 300 mg oral capsule: 1 cap(s) orally 3 times a day  levothyroxine 175 mcg (0.175 mg) oral tablet: 1 tab(s) orally once a day  methadone 5 mg oral tablet: 1 tab(s) orally 3 times a day  oxyCODONE 10 mg oral tablet: 1 tab(s) orally every 6 hours, As needed, Severe Pain (7 - 10)  pantoprazole 40 mg oral delayed release tablet: 1 tab(s) orally once a day (before a meal)  polyethylene glycol 3350 oral powder for reconstitution: 17 gram(s) orally once a day, As needed, Constipation  senna oral tablet: 2 tab(s) orally once a day (at bedtime), As needed, Constipation

## 2019-12-16 NOTE — DISCHARGE NOTE PROVIDER - NSDCCPCAREPLAN_GEN_ALL_CORE_FT
PRINCIPAL DISCHARGE DIAGNOSIS  Diagnosis: Pulmonary embolism  Assessment and Plan of Treatment: You came in with chest pain, shortness of breath and difficulty breathing.  You were found to have a blood clot that travelled to your lung.  You are currently not prescribed a blood thinner because you had a bleed in your brain. The risk is greater to be on a blood thinner.      SECONDARY DISCHARGE DIAGNOSES  Diagnosis: Pneumonia  Assessment and Plan of Treatment: A full course of antibiotics was completed during your hospital stay. Monitor for worsening of disease, such as, increased cough/sputum, difficulty breathing, fever/chills, or changes in mental status. Follow-up with your PCP as an outpatient for further medical care/recommendations.    Diagnosis: Hypertension, unspecified type  Assessment and Plan of Treatment: Continue blood pressure medication regimen as directed. Monitor for any visual changes, headaches or dizziness.  Monitor blood pressure regularly.  Follow up with your PCP for further management for high blood pressure.    Diagnosis: Type 2 diabetes mellitus with other specified complication, with long-term current use of insulin  Assessment and Plan of Treatment: Continue your medication regimen and a consistent carbohydrate diet (Meaning eating the same amount of carbohydrates at the same time each day). Monitor blood glucose levels throughout the day before meals and at bedtime. Record blood sugars and bring to outpatient providers appointment in order to be reviewed by your doctor for management modifications. Monitor for signs/symptoms of low blood glucose, such as, dizziness, altered mental status, or cool/clammy skin. In addition, monitor for signs/symptoms of high blood glucose, such as, feeling hot, dry, fatigued, or with increased thirst/urination. Make regular podiatry appointments in order to have feet checked for wounds and uncontrolled toe nail growth to prevent infections, as well as, appointments with an ophthalmologist to monitor your vision. PRINCIPAL DISCHARGE DIAGNOSIS  Diagnosis: Pulmonary embolism  Assessment and Plan of Treatment: You came in with chest pain, shortness of breath and difficulty breathing.  You were found to have a blood clot that travelled to your lung.  You have NOT been prescribed a blood thinner because you had a bleed in your brain. The risk is greater to be on a blood thinner. An IVC filter was placed with our IR team on 12/16/19. Follow up with your oncologist as scheduled - next appointment 12/23/19 9:15AM @ [facility].      SECONDARY DISCHARGE DIAGNOSES  Diagnosis: Pneumonia  Assessment and Plan of Treatment: A full course of antibiotics was completed during your hospital stay. Monitor for worsening of disease, such as, increased cough/sputum, difficulty breathing, fever/chills, or changes in mental status. Follow-up with your PCP as an outpatient for further medical care/recommendations.    Diagnosis: Hypertension, unspecified type  Assessment and Plan of Treatment: Continue blood pressure medication regimen as directed. Monitor for any visual changes, headaches or dizziness.  Monitor blood pressure regularly.  Follow up with your PCP for further management for high blood pressure.    Diagnosis: Type 2 diabetes mellitus with other specified complication, with long-term current use of insulin  Assessment and Plan of Treatment: Continue your medication regimen and a consistent carbohydrate diet (Meaning eating the same amount of carbohydrates at the same time each day). Monitor blood glucose levels throughout the day before meals and at bedtime. Record blood sugars and bring to outpatient providers appointment in order to be reviewed by your doctor for management modifications. Monitor for signs/symptoms of low blood glucose, such as, dizziness, altered mental status, or cool/clammy skin. In addition, monitor for signs/symptoms of high blood glucose, such as, feeling hot, dry, fatigued, or with increased thirst/urination. Make regular podiatry appointments in order to have feet checked for wounds and uncontrolled toe nail growth to prevent infections, as well as, appointments with an ophthalmologist to monitor your vision. PRINCIPAL DISCHARGE DIAGNOSIS  Diagnosis: Pulmonary embolism  Assessment and Plan of Treatment: You came in with chest pain, shortness of breath and difficulty breathing.  You were found to have a blood clot that travelled to your lung.  You have NOT been prescribed a blood thinner because you had a bleed in your brain. The risk is greater to be on a blood thinner. An IVC filter was placed with our IR team on 12/16/19. Follow up with your oncologist as scheduled - next appointment 12/23/19 9:15AM @ [facility].  YOU NEED REPEAT CT HEAD ON D/C from Rehab please discuse with Dr WOOTEN   Please FU with Dr Bedoya on d/c from rehab call for appointment      SECONDARY DISCHARGE DIAGNOSES  Diagnosis: Type 2 diabetes mellitus with other specified complication, with long-term current use of insulin  Assessment and Plan of Treatment: Continue your medication regimen and a consistent carbohydrate diet (Meaning eating the same amount of carbohydrates at the same time each day). Monitor blood glucose levels throughout the day before meals and at bedtime. Record blood sugars and bring to outpatient providers appointment in order to be reviewed by your doctor for management modifications. Monitor for signs/symptoms of low blood glucose, such as, dizziness, altered mental status, or cool/clammy skin. In addition, monitor for signs/symptoms of high blood glucose, such as, feeling hot, dry, fatigued, or with increased thirst/urination. Make regular podiatry appointments in order to have feet checked for wounds and uncontrolled toe nail growth to prevent infections, as well as, appointments with an ophthalmologist to monitor your vision.      Diagnosis: Hypertension, unspecified type  Assessment and Plan of Treatment: Continue blood pressure medication regimen as directed. Monitor for any visual changes, headaches or dizziness.  Monitor blood pressure regularly.  Follow up with your PCP for further management for high blood pressure.  If cleared by PCP Dr Conde may resume enalapril as out pt    Diagnosis: Pneumonia  Assessment and Plan of Treatment: A full course of antibiotics was completed during your hospital stay. Monitor for worsening of disease, such as, increased cough/sputum, difficulty breathing, fever/chills, or changes in mental status. Follow-up with your PCP as an outpatient for further medical care/recommendations.

## 2019-12-16 NOTE — PROGRESS NOTE ADULT - PROBLEM SELECTOR PLAN 2
- Immunocompromised - concern for gram neg organisms.  - Continue Cefepime q8 until 12/15, discontinue antibiotics on AM 12/16.   - Negative Urine legionella.  s/p 7 day course of treatment until 12/15

## 2019-12-16 NOTE — PROGRESS NOTE ADULT - PROBLEM SELECTOR PLAN 9
- Patient wishes to go home instead of [facility] - however wife involved with MVA, may need assistance.   1) PCP Contacted on Admission: [ x ] Y     [  ] N     [  ] N/A - [facility] - Dr. Hood  2) Date of Contact with PCP: 12/9  3) PCP Contacted at Discharge:  [  ] Y    [  ] N    [  ] N/A -   4) Summary of Handoff Given to PCP:   5) Post-Discharge Appointment Date and Location:

## 2019-12-16 NOTE — DISCHARGE NOTE PROVIDER - HOSPITAL COURSE
59M Rt RCC mets to spine and brain s/p chemo, DM type 2, BPH w/ chronic vasquez, GERD, HTN, hypothyroid, p/w acute PE and multifocal Rt gram-neg PNA w/ pleural effusion.        Acute pulmonary embolism without acute cor pulmonale, unspecified pulmonary embolism type.      - Initially since brain lesion has been stable for last 2-3 years, Hem approved hep gtt with transition to Lovenox BID for home.    - Brain MRI: Showed punctate hemorrhage and small hemorrhagic metastatic focus.     - Given hemorrhagic mets, unable for full dose anticoagulation as risk >benefit.     - Neurosurgery consulted- no AC, antiplatelets for 2 weeks, repeat Head CT then.     - Hem notified, agree with no AC, plan now for IVC filter.     - Vascular surgery notifed- IR Consulted for IVC filter placement tomorrow.     - Continue to monitor O2 saturation , for any acute change in respiratory status.     - Currently denies any SOB, CP, and O2 sats stable.     - Will likely need family meeting to discuss GOC given high likelihood of decompensating in the setting of inability to AC an acute PE---    - May benefit from Hospice services---        Multifocal pneumonia.      - Immunocompromised - concern for gram neg organisms.    - Negative Urine legionella.    - S/P 7 day course of treatment (completed 12/15).         Metastatic renal cell carcinoma to brain.      - s/p Chemotherapy.     - Oncology for management.    - CT Head of ordered for monitoring of the brain mets.    - Brain MRI: Showed punctate hemorrhage and small hemorrhagic metastatic focus.    - High risk for further bleed, given RCC therefore no AC for now.    - Needs GOC to establish GOC in the setting of high likelihood of decompensation 2/2 inability to AC an acute PE. Will likely benefit from Hospice services----        Hypervolemia, unspecified hypervolemia type.      - Lasix BID with good response.         BPH with urinary obstruction.      - Continue vasquez to gravity. Chronic vasquez > 1 year per patient.     - Monitor output, currently has good output.         Type 2 diabetes mellitus with other specified complication, with long-term current use of insulin.     - Lantus daily 14 Units    - Continue NISS, FS QID.     - Currently, well controlled.     - Consistent Carb diet.        Acquired hypothyroidism.      - Continue Synthroid.         Hypertension, unspecified type.      - Continue Norvasc with hold parameter.         Dispo: 59M Rt RCC mets to spine and brain s/p chemo, DM type 2, BPH w/ chronic vasquez, GERD, HTN, hypothyroid, p/w acute PE and multifocal Rt gram-neg PNA w/ pleural effusion.        Acute pulmonary embolism without acute cor pulmonale, unspecified pulmonary embolism type.      - Initially since brain lesion has been stable for last 2-3 years, Hem approved hep gtt with transition to Lovenox BID for home.    - Brain MRI: Showed punctate hemorrhage and small hemorrhagic metastatic focus.     - Given hemorrhagic mets, unable for full dose anticoagulation as risk >benefit.     - Neurosurgery consulted- no AC, antiplatelets for 2 weeks, repeat Head CT then.     - Hem notified, agree with no AC, plan now for IVC filter.     - Vascular surgery notified- IR Consulted for IVC filter placement - successfully placed 12/16/19    - Continue to monitor O2 saturation , for any acute change in respiratory status.     - Currently denies any SOB, CP, and O2 sats stable.     - Palliative consulted, GOC established - Patient made DNR/DNI, but wants to continue chemotherapy/immunotherapy per oncology recs        Multifocal pneumonia.      - Immunocompromised - concern for gram neg organisms.    - Negative Urine legionella.    - S/P 7 day course of treatment (completed 12/15).         Metastatic renal cell carcinoma to brain.      - s/p Chemotherapy.     - Oncology for management.    - CT Head of ordered for monitoring of the brain mets.    - Brain MRI: Showed punctate hemorrhage and small hemorrhagic metastatic focus.    - High risk for further bleed, given RCC therefore no AC for now.    - Needs GOC to establish GOC in the setting of high likelihood of decompensation 2/2 inability to AC an acute PE. Will likely benefit from Hospice services----        Hypervolemia, unspecified hypervolemia type.      - Lasix BID with good response.         BPH with urinary obstruction.      - Continue vasquez to gravity. Chronic vasquez > 1 year per patient.     - Monitor output, currently has good output.         Type 2 diabetes mellitus with other specified complication, with long-term current use of insulin.     - Lantus daily 14 Units    - Continue NISS, FS QID.     - Currently, well controlled.     - Consistent Carb diet.        Acquired hypothyroidism.      - Continue Synthroid.         Hypertension, unspecified type.      - Continue Norvasc with hold parameter.         Dispo: 59M Rt RCC mets to spine and brain s/p chemo, DM type 2, BPH w/ chronic vasquez, GERD, HTN, hypothyroid, p/w acute PE and multifocal Rt gram-neg PNA w/ pleural effusion.        Acute pulmonary embolism without acute cor pulmonale, unspecified pulmonary embolism type.      - Initially since brain lesion has been stable for last 2-3 years, Hem approved hep gtt with transition to Lovenox BID for home.    - Brain MRI: Showed punctate hemorrhage and small hemorrhagic metastatic focus.     - Given hemorrhagic mets, unable for full dose anticoagulation as risk >benefit.     - Neurosurgery consulted- no AC, antiplatelets for 2 weeks, repeat Head CT then.     -12/16/18 s/p IVC filter     - Hem notified, agree with no AC, plan now for IVC filter.     - Vascular surgery notified- IR Consulted for IVC filter placement - successfully placed 12/16/19    - Continue to monitor O2 saturation , for any acute change in respiratory status.     - Currently denies any SOB, CP, and O2 sats stable.     - Palliative consulted, GOC established - Patient made DNR/DNI, but wants to continue chemotherapy/immunotherapy per oncology recs        Multifocal pneumonia.      - Immunocompromised - concern for gram neg organisms.    - Negative Urine legionella.    - S/P 7 day course of treatment (completed 12/15).         Metastatic renal cell carcinoma to brain.      - s/p Chemotherapy.     - Oncology for management.    - CT Head revealed punctate hemorrage and small hemorrhagic metastatic focus .Pt seen by Neuro Surg rec hold AC for 2 weeks then repeat CTH    - Brain MRI: Showed punctate hemorrhage and small hemorrhagic metastatic focus.    - High risk for further bleed, given RCC therefore no AC for now.    - Pt to FU with Dr Pena on d/c from Rehab regarding further Chemo         Hypervolemia, unspecified hypervolemia type.      - Lasix BID with good response.         BPH with urinary obstruction.      - Continue vasquez to gravity. Chronic vasquez > 1 year per patient.     - Monitor output, currently has good output.         Type 2 diabetes mellitus with other specified complication, with long-term current use of insulin.     - Lantus daily 14 Units    - Continue NISS, FS QID.     - Currently, well controlled.     - Consistent Carb diet.        Acquired hypothyroidism.      - Continue Synthroid.         Hypertension, unspecified type.      - Continue Norvasc with hold parameter.         Dispo: 59M Rt RCC mets to spine and brain s/p chemo, DM type 2, BPH w/ chronic vasquez, GERD, HTN, hypothyroid, p/w acute PE and multifocal Rt gram-neg PNA w/ pleural effusion.        Acute pulmonary embolism without acute cor pulmonale, unspecified pulmonary embolism type.      - Initially since brain lesion has been stable for last 2-3 years, Hem approved hep gtt with transition to Lovenox BID for home.    - Brain MRI: Showed punctate hemorrhage and small hemorrhagic metastatic focus.     - Given hemorrhagic mets, unable for full dose anticoagulation as risk >benefit.     - Neurosurgery consulted- no AC, antiplatelets for 2 weeks, repeat Head CT then.     -12/16/18 s/p IVC filter     - Hem notified, agree with no AC, plan now for IVC filter.     - Vascular surgery notified- IR Consulted for IVC filter placement - successfully placed 12/16/19    - Continue to monitor O2 saturation , for any acute change in respiratory status.     - Currently denies any SOB, CP, and O2 sats stable.     - Palliative consulted, GOC established - Patient made DNR/DNI, but wants to continue chemotherapy/immunotherapy per oncology recs        Multifocal pneumonia.      - Immunocompromised - concern for gram neg organisms.    - Negative Urine legionella.    - S/P 7 day course of treatment (completed 12/15).         Metastatic renal cell carcinoma to brain.      - s/p Chemotherapy.     - Oncology for management.    - CT Head revealed punctate hemorrage and small hemorrhagic metastatic focus .Pt seen by Neuro Surg rec hold AC for 2 weeks then repeat CTH    - Brain MRI: Showed punctate hemorrhage and small hemorrhagic metastatic focus.    - High risk for further bleed, given RCC therefore no AC for now.    - Pt to FU with Dr Pena on d/c from Rehab regarding further Chemo         Hypervolemia, unspecified hypervolemia type.      - Lasix BID with good response.         BPH with urinary obstruction.      - Continue vasquez to gravity. Chronic vasquez > 1 year per patient.     - Monitor output, currently has good output.         Type 2 diabetes mellitus with other specified complication, with long-term current use of insulin.     - Lantus daily 8 units Q PM     - Continue NISS, FS QID.     - Currently, well controlled.     - Consistent Carb diet.        Acquired hypothyroidism.      - Continue Synthroid.         Hypertension, unspecified type.      - Continue Norvasc with hold parameter.         Dispo:

## 2019-12-16 NOTE — PROGRESS NOTE ADULT - SUBJECTIVE AND OBJECTIVE BOX
Patient would likely benefit from additional gamma knife to new intracranial lesions. Recommend follow up with both neurosurgery and radiation oncology as outpatient.

## 2019-12-16 NOTE — PROGRESS NOTE ADULT - PROBLEM SELECTOR PLAN 10
Patient is currently Full Code. Will benefit from family meeting to discuss GOC given advanced RCC and inability to anticoagulate acute PE. Patient follows with Dr. Elda Cooper at [facility]- HCP was filled on 11/26 appointing Salbador Jack (wife) as health care proxy. Patient is currently capacitated to make informed medical decisions by himself but would like to discuss MOLST with his wife before making any decisions as he has never discussed advanced directives before.  Per documentation, wife was recently in a MVA- therefore unable to care for patient at home-Palliative consult placed to assist GOC. Per outpatient Palliative [facility] notes , Hospice was introduced recently FU Palliative   As per my discussion with pt and wife, they want to continue further treatment as offered by Dr Pena

## 2019-12-16 NOTE — PROGRESS NOTE ADULT - SUBJECTIVE AND OBJECTIVE BOX
INTERVAL HPI/OVERNIGHT EVENTS:  Patient seen at bedside.      VITAL SIGNS:  T(F): 98.4 (12-16-19 @ 05:31)  HR: 84 (12-16-19 @ 05:31)  BP: 125/76 (12-16-19 @ 05:31)  RR: 18 (12-16-19 @ 05:31)  SpO2: 97% (12-16-19 @ 05:31)  Wt(kg): --    PHYSICAL EXAM:    Constitutional: NAD, resting in bed comfortably  Eyes: EOMI, sclera non-icteric  Neck: supple, no LAP  Respiratory: CTA b/l, good air entry b/l, no wheezing, rhonchi or crackels  Cardiovascular: RRR, normal S1S2, no M/R/G  Gastrointestinal: soft, NTND  Extremities: no edema  Neurological: AAOx3, non focal  Skin: Normal temperature    MEDICATIONS  (STANDING):  amLODIPine   Tablet 10 milliGRAM(s) Oral daily  atorvastatin 20 milliGRAM(s) Oral at bedtime  dextrose 5%. 1000 milliLiter(s) (50 mL/Hr) IV Continuous <Continuous>  dextrose 50% Injectable 12.5 Gram(s) IV Push once  dextrose 50% Injectable 25 Gram(s) IV Push once  dextrose 50% Injectable 25 Gram(s) IV Push once  finasteride 5 milliGRAM(s) Oral daily  furosemide    Tablet 40 milliGRAM(s) Oral two times a day  insulin lispro (HumaLOG) corrective regimen sliding scale   SubCutaneous three times a day before meals  insulin lispro (HumaLOG) corrective regimen sliding scale   SubCutaneous at bedtime  levothyroxine 175 MICROGram(s) Oral daily  methadone    Tablet 5 milliGRAM(s) Oral three times a day  pantoprazole    Tablet 40 milliGRAM(s) Oral before breakfast    MEDICATIONS  (PRN):  acetaminophen   Tablet .. 650 milliGRAM(s) Oral every 6 hours PRN Temp greater or equal to 38C (100.4F)  benzonatate 100 milliGRAM(s) Oral three times a day PRN Cough  dextrose 40% Gel 15 Gram(s) Oral once PRN Blood Glucose LESS THAN 70 milliGRAM(s)/deciliter  glucagon  Injectable 1 milliGRAM(s) IntraMuscular once PRN Glucose LESS THAN 70 milligrams/deciliter  morphine  - Injectable 2 milliGRAM(s) IV Push every 4 hours PRN Severe Pain (7 - 10)  oxyCODONE    IR 10 milliGRAM(s) Oral every 6 hours PRN Severe Pain (7 - 10)  polyethylene glycol 3350 17 Gram(s) Oral daily PRN Constipation  senna 2 Tablet(s) Oral at bedtime PRN Constipation      Allergies    No Known Allergies    Intolerances        LABS:                        9.6    7.65  )-----------( 347      ( 16 Dec 2019 07:50 )             32.1     12-16    137  |  88<L>  |  31<H>  ----------------------------<  154<H>  3.9   |  33<H>  |  0.99    Ca    9.7      16 Dec 2019 07:50  Phos  2.9     12-16  Mg     2.0     12-16      PT/INR - ( 16 Dec 2019 07:50 )   PT: 14.2 SEC;   INR: 1.27                RADIOLOGY & ADDITIONAL TESTS:  Studies reviewed.

## 2019-12-16 NOTE — CONSULT NOTE ADULT - REASON FOR ADMISSION
Left sided chest pain and SOB/BENITEZ

## 2019-12-16 NOTE — CONSULT NOTE ADULT - PROBLEM SELECTOR RECOMMENDATION 3
Patient followed by Dr. Lemos Attarian  - s/p gamma knife to mets to the brain, XRT to spine, and pelvis, patient found to have progression of disease on cabozantinib  - Per onc, plan to start immunotherapy upon discharge - 60 minutes spent with patient and wife.  - Discussed advanced nature of malignancy, overall goals of care.  - MOLST form filled out.  - Patient made DNR.

## 2019-12-16 NOTE — DISCHARGE NOTE PROVIDER - CARE PROVIDER_API CALL
Nazia Pena (MD; PhD)  Hematology; Internal Medicine; Medical Oncology  [address] [place] [address]  Phone: [phone]  Fax: [phone]  Follow Up Time: Nazia Pena (MD; PhD)  Hematology; Internal Medicine; Medical Oncology  [address] [place] [address]  Phone: [phone]  Fax: [phone]  Follow Up Time:

## 2019-12-16 NOTE — CONSULT NOTE ADULT - PROBLEM SELECTOR RECOMMENDATION 4
- Patient stating he would want to continue chemotherapy/immunotherapy per oncology recs  - However, requesting DNR/DNI and MOLST. Forms filled and placed in chart.  - Patient states he would want wife, Salbador, as HCP Patient followed by Dr. Lemos Attarian  - s/p gamma knife to mets to the brain, XRT to spine, and pelvis, patient found to have progression of disease on cabozantinib  - Per onc, plan to start immunotherapy upon discharge

## 2019-12-16 NOTE — PROGRESS NOTE ADULT - PROBLEM SELECTOR PLAN 5
- Continue vasquez to gravity. Chronic vasquez > 1 year per patient.   - Monitor output, currently has good output.  As per wife, Vasquez needs to be changed today    Staff

## 2019-12-16 NOTE — DISCHARGE NOTE PROVIDER - NSDCCAREPROVSEEN_GEN_ALL_CORE_FT
[facility] Oncology, Team  [facility] Medicine ACP, Team  [facility] Palliative Care, Team  [facility] Surgery, Marla Birch

## 2019-12-16 NOTE — PROGRESS NOTE ADULT - SUBJECTIVE AND OBJECTIVE BOX
PROGRESS NOTE:     Patient is a 59y old  Male who presents with a chief complaint of Left sided chest pain and SOB/BENITEZ (14 Dec 2019 16:03)    SUBJECTIVE / OVERNIGHT EVENTS:  Patient seen and evaluated the patient at bedside. wife present at bedside today.   Upset as he is not able to eat. Awaiting procedure   Plan IR Consult for IVC filter placement today     ADDITIONAL REVIEW OF SYSTEMS:    MEDICATIONS  (STANDING):  amLODIPine   Tablet 10 milliGRAM(s) Oral daily  atorvastatin 20 milliGRAM(s) Oral at bedtime  dextrose 5%. 1000 milliLiter(s) (50 mL/Hr) IV Continuous <Continuous>  dextrose 50% Injectable 12.5 Gram(s) IV Push once  dextrose 50% Injectable 25 Gram(s) IV Push once  dextrose 50% Injectable 25 Gram(s) IV Push once  finasteride 5 milliGRAM(s) Oral daily  furosemide    Tablet 40 milliGRAM(s) Oral two times a day  insulin lispro (HumaLOG) corrective regimen sliding scale   SubCutaneous three times a day before meals  insulin lispro (HumaLOG) corrective regimen sliding scale   SubCutaneous at bedtime  levothyroxine 175 MICROGram(s) Oral daily  methadone    Tablet 5 milliGRAM(s) Oral three times a day  pantoprazole    Tablet 40 milliGRAM(s) Oral before breakfast    MEDICATIONS  (PRN):  acetaminophen   Tablet .. 650 milliGRAM(s) Oral every 6 hours PRN Temp greater or equal to 38C (100.4F)  benzonatate 100 milliGRAM(s) Oral three times a day PRN Cough  dextrose 40% Gel 15 Gram(s) Oral once PRN Blood Glucose LESS THAN 70 milliGRAM(s)/deciliter  glucagon  Injectable 1 milliGRAM(s) IntraMuscular once PRN Glucose LESS THAN 70 milligrams/deciliter  morphine  - Injectable 2 milliGRAM(s) IV Push every 4 hours PRN Severe Pain (7 - 10)  oxyCODONE    IR 10 milliGRAM(s) Oral every 6 hours PRN Severe Pain (7 - 10)  polyethylene glycol 3350 17 Gram(s) Oral daily PRN Constipation  senna 2 Tablet(s) Oral at bedtime PRN Constipation      CAPILLARY BLOOD GLUCOSE  POCT Blood Glucose.: 158 mg/dL (16 Dec 2019 12:07)  POCT Blood Glucose.: 177 mg/dL (16 Dec 2019 07:27)  POCT Blood Glucose.: 160 mg/dL (15 Dec 2019 21:23)  POCT Blood Glucose.: 178 mg/dL (15 Dec 2019 17:36)    I&O's Summary    14 Dec 2019 07:01  -  15 Dec 2019 07:00  --------------------------------------------------------  IN: 850 mL / OUT: 1650 mL / NET: -800 mL    15 Dec 2019 07:01  -  15 Dec 2019 12:47  --------------------------------------------------------  IN: 250 mL / OUT: 800 mL / NET: -550 mL    PHYSICAL EXAM:  Vital Signs Last 24 Hrs  T(C): 36.7 (15 Dec 2019 05:08), Max: 37.1 (14 Dec 2019 13:06)  T(F): 98.1 (15 Dec 2019 05:08), Max: 98.8 (14 Dec 2019 13:06)  HR: 82 (15 Dec 2019 05:08) (81 - 88)  BP: 125/81 (15 Dec 2019 05:08) (110/79 - 125/81)  BP(mean): --  RR: 18 (15 Dec 2019 05:08) (16 - 18)  SpO2: 97% (15 Dec 2019 05:08) (97% - 98%)    CONSTITUTIONAL: NAD, well-developed no apparent distress.   RESPIRATORY: Normal respiratory effort; reduced air entry RLL, otherwise good air entry, no tachypnea noted.   CARDIOVASCULAR: Regular rate and rhythm, normal S1 and S2, No lower extremity edema;   ABDOMEN: Nontender to palpation, normoactive bowel sounds, obese distended, but non tender    MUSCLOSKELETAL: following all commands, moving all extremities   PSYCH: A+O to person, place, and time; affect appropriate, calm and cooperative.     LABS: reviewed                                    9.6    7.65  )-----------( 347      ( 16 Dec 2019 07:50 )             32.1   12-16    137  |  88<L>  |  31<H>  ----------------------------<  154<H>  3.9   |  33<H>  |  0.99    Ca    9.7      16 Dec 2019 07:50  Phos  2.9     12-16  Mg     2.0     12-16      RADIOLOGY & ADDITIONAL TESTS:  Results Reviewed:   Imaging Personally Reviewed:  Electrocardiogram Personally Reviewed:    COORDINATION OF CARE:  Care Discussed with Consultants/Other Providers [Y/N]:  Prior or Outpatient Records Reviewed [Y/N]:

## 2019-12-16 NOTE — CONSULT NOTE ADULT - SUBJECTIVE AND OBJECTIVE BOX
HPI:  60 y/o M with PMH of right renal cell carcinoma with mets to the spine(s/p recently stopped Cabometyx 3 weeks ago), DM type II, BPH with chronic vasquez, GERD, HTN, HLD. Hypothyroidism presented with the complaint of pleuritic chest pain and SOB/BENITEZ. As per the patient he was in his usual state of health and developed left sided chest pain about 3-4 days ago. Patient stated that he was moving around in the house when the chest pain started. Patient described the chest pain as a pressure like sensation, constant, aggravated with deep inspiration, without any alleviating factors, with radiation of the pain to the left side of the neck, with a severity of 8/10. Patient also endorsed of BENITEZ and SOB and endorsed of bilateral LE swelling that started 3 days ago. Patient also endorsed of abdominal distention that also started 3-4 days ago. Patient stated that any minimal exertion he would feel winded and SOB. Patient endorsed of productive cough with white sputum production for the past 2 weeks. Patient denied any fevers or chills with the cough. Patient stated that he has been urinating without any issues and has been compliant with all his medications. Patient denied any N/V/D/C, melena, hematochezia, recent travel, sick contact, dysuria, pleuritic or positional chest pain.      On ED admission EKG revealed NSR at a rate of 94 with LAD and QTc of 442, CE x 1: Trop: 22, CE x 2: Trop: 20, H&H: 10.8/35.0, K: 5.5->severely hemolyzed, Gluc: 221, Protein: 8.5, Alb: 3.2, AST: 81, BNP: 103.5, RVP: Negative. CTPA: Acute pulmonary embolus in the left lower lobe segmental pulmonary arteries. No evidence of right heart strain. New multifocal pneumonia of the right lung, likely pneumonia. New small right pleural effusion. Stable metastatic disease to the lungs. Prelim CXR: Large patchy opacity throughout the right lung field, diff includes multifocal pneumonia, CT chest can be performed for further characterization and evaluation. In the ED patient was started on heparin gtt. When examined patient still endorsed of a 7-8/10 chest pain and SOB. (09 Dec 2019 01:51)    PERTINENT PM/SXH:   BPH with urinary obstruction  BPH without obstruction/lower urinary tract symptoms  Urinary retention  HLD (hyperlipidemia)  S/P radiation therapy  Malignant neoplasm of vertebral column  Hypothyroidism  GERD (gastroesophageal reflux disease)  Elevated TSH  Cervicalgia  Metastatic renal cell carcinoma to brain  Metastatic carcinoma to bone  Coronary artery disease  Hypertension  Metastatic renal cell carcinoma  Diabetes mellitus  Cancer  No pertinent past medical history    H/O kyphoplasty  History of coronary artery stent placement  Status post gamma knife treatment  No significant past surgical history    FAMILY HISTORY:  Family history of colon cancer: Mother  Family history of diabetes mellitus in mother  Family history of diabetes mellitus in father    ITEMS NOT CHECKED ARE NOT PRESENT    SOCIAL HISTORY:   Significant other/partner:  [X]  Children: One Grandson (patient does not want to talk about children)  [X]  Quaker/Spirituality: Christian  Substance hx:  [ ]   Tobacco hx:  [ ]   Alcohol hx: [ ]   Home Opioid hx:  [ ] I-Stop Reference No:  Living Situation: [X]Home  [ ]Long term care  [ ]Rehab [ ]Other    ADVANCE DIRECTIVES:    DNR  MOLST  [ ]  Living Will  [ ]   DECISION MAKER(s):  [ ] Health Care Proxy(s)  [ ] Surrogate(s)  [ ] Guardian           Name(s): Phone Number(s):  Wife - Salbador Davenport [phone]    BASELINE (I)ADL(s) (prior to admission):  Gallagher: [ ]Total  [ ] Moderate [ ]Dependent    Allergies    No Known Allergies    Intolerances    MEDICATIONS  (STANDING):  amLODIPine   Tablet 10 milliGRAM(s) Oral daily  atorvastatin 20 milliGRAM(s) Oral at bedtime  dextrose 5%. 1000 milliLiter(s) (50 mL/Hr) IV Continuous <Continuous>  dextrose 50% Injectable 12.5 Gram(s) IV Push once  dextrose 50% Injectable 25 Gram(s) IV Push once  dextrose 50% Injectable 25 Gram(s) IV Push once  finasteride 5 milliGRAM(s) Oral daily  furosemide    Tablet 40 milliGRAM(s) Oral two times a day  insulin lispro (HumaLOG) corrective regimen sliding scale   SubCutaneous three times a day before meals  insulin lispro (HumaLOG) corrective regimen sliding scale   SubCutaneous at bedtime  levothyroxine 175 MICROGram(s) Oral daily  methadone    Tablet 5 milliGRAM(s) Oral three times a day  pantoprazole    Tablet 40 milliGRAM(s) Oral before breakfast    MEDICATIONS  (PRN):  acetaminophen   Tablet .. 650 milliGRAM(s) Oral every 6 hours PRN Temp greater or equal to 38C (100.4F)  benzonatate 100 milliGRAM(s) Oral three times a day PRN Cough  dextrose 40% Gel 15 Gram(s) Oral once PRN Blood Glucose LESS THAN 70 milliGRAM(s)/deciliter  glucagon  Injectable 1 milliGRAM(s) IntraMuscular once PRN Glucose LESS THAN 70 milligrams/deciliter  morphine  - Injectable 2 milliGRAM(s) IV Push every 4 hours PRN Severe Pain (7 - 10)  oxyCODONE    IR 10 milliGRAM(s) Oral every 6 hours PRN Severe Pain (7 - 10)  polyethylene glycol 3350 17 Gram(s) Oral daily PRN Constipation  senna 2 Tablet(s) Oral at bedtime PRN Constipation    PRESENT SYMPTOMS: [ ]Unable to obtain due to poor mentation   Source if other than patient:  [ ]Family   [ ]Team     Pain: [ ] yes [ ] no  QOL impact -   Location -                    Aggravating factors -  Quality -  Radiation -  Timing-  Severity (0-10 scale):  Minimal acceptable level (0-10 scale):     PAIN AD Score:     [email or web address] (press ctrl +  left click to view)    Dyspnea:                           [ ]Mild [ ]Moderate [ ]Severe  Anxiety:                             [ ]Mild [ ]Moderate [ ]Severe  Fatigue:                             [ ]Mild [ ]Moderate [ ]Severe  Nausea:                             [ ]Mild [ ]Moderate [ ]Severe  Loss of appetite:              [ ]Mild [ ]Moderate [ ]Severe  Constipation:                    [ ]Mild [ ]Moderate [ ]Severe    Other Symptoms:  [ ]All other review of systems negative     Karnofsky Performance Score/Palliative Performance Status Version 2:         %    http://npcrc.org/files/news/palliative_performance_scale_ppsv2.pdf  PHYSICAL EXAM:  Vital Signs Last 24 Hrs  T(C): 36.9 (16 Dec 2019 05:31), Max: 36.9 (16 Dec 2019 05:31)  T(F): 98.4 (16 Dec 2019 05:31), Max: 98.4 (16 Dec 2019 05:31)  HR: 84 (16 Dec 2019 05:31) (80 - 88)  BP: 125/76 (16 Dec 2019 05:31) (113/79 - 125/76)  BP(mean): --  RR: 18 (16 Dec 2019 05:31) (17 - 18)  SpO2: 97% (16 Dec 2019 05:31) (97% - 98%) I&O's Summary    15 Dec 2019 07:01  -  16 Dec 2019 07:00  --------------------------------------------------------  IN: 918 mL / OUT: 2450 mL / NET: -1532 mL    16 Dec 2019 07:01  -  16 Dec 2019 11:29  --------------------------------------------------------  IN: 0 mL / OUT: 500 mL / NET: -500 mL    GENERAL:  []Alert  [X]Oriented x 3   [X]Lethargic  [ ]Cachexia  [ ]Unarousable  [ ]Verbal  [ ]Non-Verbal  Behavioral:   [ ] Anxiety  [ ] Delirium [ ] Agitation [ ] Other  HEENT:  [X]Normal   [ ]Dry mouth   [ ]ET Tube/Trach  [ ]Oral lesions  PULMONARY:   [X]Clear [ ]Tachypnea  [ ]Audible excessive secretions   [ ]Rhonchi        [ ]Right [ ]Left [ ]Bilateral  [ ]Crackles        [ ]Right [ ]Left [ ]Bilateral  [ ]Wheezing     [ ]Right [ ]Left [ ]Bilateral  CARDIOVASCULAR:    [X]Regular [ ]Irregular [ ]Tachy  [ ]Kin [ ]Murmur [ ]Other  GASTROINTESTINAL:  [X]Soft  [ ]Distended   [ ]+BS  [ ]Non tender [ ]Tender  [ ]PEG [ ]OGT/ NGT  Last BM:   GENITOURINARY:  [ ]Normal [ ] Incontinent   [ ]Oliguria/Anuria   [ ]Vasquez  MUSCULOSKELETAL:   [X]Normal   [ ]Weakness  [ ]Bed/Wheelchair bound [ ]Edema  NEUROLOGIC:   [X]No focal deficits  [ ] Cognitive impairment  [ ] Dysphagia [ ]Dysarthria [ ] Paresis [ ]Other   SKIN:   [ ]Normal   [ ]Pressure ulcer(s)  [ ]Rash    CRITICAL CARE:  [ ] Shock Present  [ ]Septic [ ]Cardiogenic [ ]Neurologic [ ]Hypovolemic  [ ]  Vasopressors [ ]  Inotropes   [ ] Respiratory failure present [ ] mechanical ventilation [ ] non-invasive ventilatory support [ ] High flow  [ ] Acute  [ ] Chronic [ ] Hypoxic  [ ] Hypercarbic [ ] Other  [ ] Other organ failure     LABS:                        9.6    7.65  )-----------( 347      ( 16 Dec 2019 07:50 )             32.1   12-16    137  |  88<L>  |  31<H>  ----------------------------<  154<H>  3.9   |  33<H>  |  0.99    Ca    9.7      16 Dec 2019 07:50  Phos  2.9     12-16  Mg     2.0     12-16    PT/INR - ( 16 Dec 2019 07:50 )   PT: 14.2 SEC;   INR: 1.27                RADIOLOGY & ADDITIONAL STUDIES:    PROTEIN CALORIE MALNUTRITION PRESENT: [ ] Yes [ ] No  [ ] PPSV2 < or = to 30% [ ] significant weight loss  [ ] poor nutritional intake [ ] catabolic state [ ] anasarca     Albumin, Serum: 3.2 g/dL (12-08-19 @ 17:08)  Artificial Nutrition [ ]     REFERRALS:   [ ]Chaplaincy  [ ] Hospice  [ ]Child Life  [ ]Social Work  [ ]Case management [ ]Holistic Therapy     Goals of Care Document:   Care Coordination Assessment [C. Provider] (12-12-19 @ 14:09)

## 2019-12-16 NOTE — PROGRESS NOTE ADULT - PROBLEM SELECTOR PLAN 3
- s/p Chemotherapy.   - Oncology for management.  - CT Head of ordered for monitoring of the brain mets.  - Brain MRI: Showed punctate hemorrhage and small hemorrhagic metastatic focus.  - High risk for further bleed, given RCC therefore no AC for now.  Appreciate Onc- pt with  progression of disease off cabozantinib, he is not able to tolerate cabo, so plan was to start immunotherapy with ipilimumab/nivoluman  Discussed with pt and wife at bedside- As per wife, they wish to pursue further treatment- radiation, chemo as recommended by Dr Pena

## 2019-12-16 NOTE — CONSULT NOTE ADULT - ASSESSMENT
59M Rt RCC mets to spine and brain s/p chemo, DM type 2, BPH w/ chronic vasquez, GERD, HTN, hypothyroid, p/w acute PE and multifocal Rt gram-neg PNA w/ pleural effusion. Patient was found to have punctate hemorrhage on MR Head preventing systemic anticoagulation, with plans for an IVC filter. Palliative care consulted for GOC management.

## 2019-12-16 NOTE — DISCHARGE NOTE PROVIDER - NSDCFUSCHEDAPPT_GEN_ALL_CORE_FT
ROBIN PETTIT ; 12/16/2019 ; [facility]  ROBIN PETTIT P ; 12/16/2019 ; [facility] [address] [place] [address]  ROBIN PETTIT ; 12/16/2019 ; [facility]  ROBIN PETTIT P ; 12/23/2019 ; [facility]  ROBIN PETTIT P ; 12/23/2019 ; [facility]  ROBIN PETTIT ; 01/06/2020 ; [facility]  ROBIN PETTIT P ; 01/27/2020 ; [facility]  ROBIN PETTIT P ; 02/18/2020 ; [facility] ROBIN PETTIT ; 12/23/2019 ; [facility]  ROBIN PETTIT ; 12/23/2019 ; [facility]  ROBIN PETTIT ; 01/06/2020 ; [facility]  ROBIN PETTIT ; 01/27/2020 ; [facility]  ROBIN PETTIT ; 02/18/2020 ; [facility] ROBIN PETTIT ; 01/27/2020 ; [facility]  ROBIN PETTIT ; 02/18/2020 ; [facility]

## 2019-12-16 NOTE — CONSULT NOTE ADULT - PROBLEM SELECTOR RECOMMENDATION 9
Patient complaining of SOB 2/2 to pulmonary embolism  - Maintain O2 stats above 95%  - Nasal Cannula prn for hypoxia  - Patient pending IVC filter

## 2019-12-16 NOTE — PROGRESS NOTE ADULT - PROBLEM SELECTOR PLAN 6
- Lantus daily 14 Units, reduced to 10 units tonight as NPO for procedure.   - Continue NISS, FS QID.   - Currently, well controlled.   Consistent Carb diet.

## 2019-12-16 NOTE — CONSULT NOTE ADULT - ATTENDING COMMENTS
Patient seen and examined.  Agree with resident note.
As above  PE  Rec: Med/hemeonc mgmt  AC if safe  If IVC filter indicated- please contact IR

## 2019-12-16 NOTE — PROGRESS NOTE ADULT - ASSESSMENT
60 yo M w/ stage IV RCC and mets to lung, brain and bone, S/p gamma knife to brain met. MRI Jan 2017 showed stable disease, and repeat MRI March 3rd 2018 and 9/2019 showed stable findings. S/p palliative XRT to the spine. S/p systemic tx with Pazopanib, with POD and Sutent with POD, with progressive disease on Nivolumab about 17 months in Nov 2017 and cabozantinib since Nov 2017 on 60mg daily until April 2019, reduced to 40mg daily due to hypertensive crisis. s/p palliative radiation to R pelvis due to acetabular lesion. s/p palliative radiation to spine previously and now s/p SBRT to spine for progression with cord compression. - s/p kyphoplasty with IORT for T8 collapse/cord compression, Found to have progression of disease off cabozantinib, he is not able to tlerate cabo, so plan was to start immunotherapy with ipilimumab/nivoluman, DM type II, BPH with chronic vasquez, GERD, HTN, HLD. Hypothyroidism presented with the complaint of pleuritic chest pain and SOB/BENITEZ. Found to have acute PE and PNA.     -Will need follow up with radiation oncology  -  -  -Supportive care, pain control, Nutrition, PT, DVT ppx  -Outpatient oncology f/u    Will follow. Please do not hesitate to call back with questions.     Aminta Hood MD  Medical Oncology Attending  C: [phone] 58 yo M w/ stage IV RCC and mets to lung, brain and bone, S/p gamma knife to brain met. MRI Jan 2017 showed stable disease, and repeat MRI March 3rd 2018 and 9/2019 showed stable findings. S/p palliative XRT to the spine. S/p systemic tx with Pazopanib, with POD and Sutent with POD, with progressive disease on Nivolumab about 17 months in Nov 2017 and cabozantinib since Nov 2017 on 60mg daily until April 2019, reduced to 40mg daily due to hypertensive crisis. s/p palliative radiation to R pelvis due to acetabular lesion. s/p palliative radiation to spine previously and now s/p SBRT to spine for progression with cord compression. - s/p kyphoplasty with IORT for T8 collapse/cord compression, Found to have progression of disease off cabozantinib, he is not able to tlerate cabo, so plan was to start immunotherapy with ipilimumab/nivoluman, DM type II, BPH with chronic vasquez, GERD, HTN, HLD. Hypothyroidism presented with the complaint of pleuritic chest pain and SOB/BENITEZ. Found to have acute PE and PNA.   brain mri with new small hemorrhagic brain met and stable prior mets.    -Will need outpatient follow up with radiation oncology  -Start AC when brain mets are treated  -Supportive care, pain control, Nutrition, PT, DVT ppx  -Outpatient oncology f/u    Will follow. Please do not hesitate to call back with questions.     Aminta Hood MD  Medical Oncology Attending  C: [phone]

## 2019-12-17 NOTE — PROGRESS NOTE ADULT - PROBLEM SELECTOR PLAN 8
1) PCP Contacted on Admission: [ x ] Y     [  ] N     [  ] N/A - [facility] - Dr. Hood  2) Date of Contact with PCP: 12/9  3) PCP Contacted at Discharge:  [  ] Y    [  ] N    [  ] N/A -   4) Summary of Handoff Given to PCP:   5) Post-Discharge Appointment Date and Location:
Continue Norvasc with hold parameter.
1) PCP Contacted on Admission: [  ] Y     [  ] N     [ X ] N/A - [facility]  2) Date of Contact with PCP: 12/9  3) PCP Contacted at Discharge:  [  ] Y    [  ] N    [  ] N/A -   4) Summary of Handoff Given to PCP:   5) Post-Discharge Appointment Date and Location:

## 2019-12-17 NOTE — PROGRESS NOTE ADULT - PROBLEM SELECTOR PLAN 1
Initially since brain lesion has been stable for last 2-3 years, Hem approved hep gtt with transition to Lovenox BID for home.  - Brain MRI: Showed punctate hemorrhage and small hemorrhagic metastatic focus.   - Given hemorrhagic mets, unable for full dose anticoagulation as risk >benefit.   - Neurosurgery consulted- no AC, antiplatelets for 2 weeks, repeat Head CT then.   - Hem notified, agree with no AC, plan now for IVC filter.   - Vascular surgery notifed- IR Consulted for IVC filter placement today .   - Continue to monitor O2 saturation , for any acute change in respiratory status.   - Currently denies any SOB, CP, and O2 sats stable.   Discussed with pt and wife at bedside- As per wife, they wish to pursure further treatment- radiation, chemo as recommended by Dr Pena

## 2019-12-17 NOTE — CHART NOTE - NSCHARTNOTEFT_GEN_A_CORE
Pt recommending Rehab  pt and wife wants Rehab as they feel pt  deconditioned  DW Pt and wife- they understand that they will not receive chemo or Radiation while  at rehab. They understand and are want to pursue further treatment ( chemo /Radiation ) after zo discharged from rehab  DW Oncology attending Dr Hood

## 2019-12-17 NOTE — PROGRESS NOTE ADULT - SUBJECTIVE AND OBJECTIVE BOX
PROGRESS NOTE:     Patient is a 59y old  Male who presents with a chief complaint of Left sided chest pain and SOB/BENITEZ (14 Dec 2019 16:03)    SUBJECTIVE / OVERNIGHT EVENTS:  Patient seen and evaluated the patient at bedside. wife present at bedside today.       ADDITIONAL REVIEW OF SYSTEMS:    MEDICATIONS  (STANDING):  amLODIPine   Tablet 10 milliGRAM(s) Oral daily  atorvastatin 20 milliGRAM(s) Oral at bedtime  dextrose 5%. 1000 milliLiter(s) (50 mL/Hr) IV Continuous <Continuous>  dextrose 50% Injectable 12.5 Gram(s) IV Push once  dextrose 50% Injectable 25 Gram(s) IV Push once  dextrose 50% Injectable 25 Gram(s) IV Push once  finasteride 5 milliGRAM(s) Oral daily  furosemide    Tablet 40 milliGRAM(s) Oral two times a day  insulin lispro (HumaLOG) corrective regimen sliding scale   SubCutaneous three times a day before meals  insulin lispro (HumaLOG) corrective regimen sliding scale   SubCutaneous at bedtime  levothyroxine 175 MICROGram(s) Oral daily  methadone    Tablet 5 milliGRAM(s) Oral three times a day  pantoprazole    Tablet 40 milliGRAM(s) Oral before breakfast    MEDICATIONS  (PRN):  acetaminophen   Tablet .. 650 milliGRAM(s) Oral every 6 hours PRN Temp greater or equal to 38C (100.4F)  benzonatate 100 milliGRAM(s) Oral three times a day PRN Cough  dextrose 40% Gel 15 Gram(s) Oral once PRN Blood Glucose LESS THAN 70 milliGRAM(s)/deciliter  glucagon  Injectable 1 milliGRAM(s) IntraMuscular once PRN Glucose LESS THAN 70 milligrams/deciliter  morphine  - Injectable 2 milliGRAM(s) IV Push every 4 hours PRN Severe Pain (7 - 10)  oxyCODONE    IR 10 milliGRAM(s) Oral every 6 hours PRN Severe Pain (7 - 10)  polyethylene glycol 3350 17 Gram(s) Oral daily PRN Constipation  senna 2 Tablet(s) Oral at bedtime PRN Constipation      CAPILLARY BLOOD GLUCOSE  POCT Blood Glucose.: 158 mg/dL (16 Dec 2019 12:07)  POCT Blood Glucose.: 177 mg/dL (16 Dec 2019 07:27)  POCT Blood Glucose.: 160 mg/dL (15 Dec 2019 21:23)  POCT Blood Glucose.: 178 mg/dL (15 Dec 2019 17:36)    I&O's Summary    14 Dec 2019 07:01  -  15 Dec 2019 07:00  --------------------------------------------------------  IN: 850 mL / OUT: 1650 mL / NET: -800 mL    15 Dec 2019 07:01  -  15 Dec 2019 12:47  --------------------------------------------------------  IN: 250 mL / OUT: 800 mL / NET: -550 mL    PHYSICAL EXAM:  Vital Signs Last 24 Hrs  T(C): 36.7 (15 Dec 2019 05:08), Max: 37.1 (14 Dec 2019 13:06)  T(F): 98.1 (15 Dec 2019 05:08), Max: 98.8 (14 Dec 2019 13:06)  HR: 82 (15 Dec 2019 05:08) (81 - 88)  BP: 125/81 (15 Dec 2019 05:08) (110/79 - 125/81)  BP(mean): --  RR: 18 (15 Dec 2019 05:08) (16 - 18)  SpO2: 97% (15 Dec 2019 05:08) (97% - 98%)    CONSTITUTIONAL: NAD, well-developed no apparent distress.   RESPIRATORY: Normal respiratory effort; reduced air entry RLL, otherwise good air entry, no tachypnea noted.   CARDIOVASCULAR: Regular rate and rhythm, normal S1 and S2, No lower extremity edema;   ABDOMEN: Nontender to palpation, normoactive bowel sounds, obese distended, but non tender    MUSCLOSKELETAL: following all commands, moving all extremities   PSYCH: A+O to person, place, and time; affect appropriate, calm and cooperative.     LABS: reviewed                                    9.6    7.65  )-----------( 347      ( 16 Dec 2019 07:50 )             32.1   12-16    137  |  88<L>  |  31<H>  ----------------------------<  154<H>  3.9   |  33<H>  |  0.99    Ca    9.7      16 Dec 2019 07:50  Phos  2.9     12-16  Mg     2.0     12-16      RADIOLOGY & ADDITIONAL TESTS:  Results Reviewed:   Imaging Personally Reviewed:  Electrocardiogram Personally Reviewed:    COORDINATION OF CARE:  Care Discussed with Consultants/Other Providers [Y/N]:  Prior or Outpatient Records Reviewed [Y/N]:

## 2019-12-17 NOTE — PROGRESS NOTE ADULT - PROBLEM SELECTOR PROBLEM 8
Discharge planning issues
Discharge planning issues
Hypertension, unspecified type

## 2019-12-17 NOTE — CHART NOTE - NSCHARTNOTEFT_GEN_A_CORE
Please addendum to chart note written by fellow on 12/14 regarding IVC filter in pts with PE without DVT and resumption of AC as soon as possible when cleared from neurosurgery standpoint.     Socorro Lew MD  Hematology Oncology Fellow, PGY-4  [facility] Pager: [phone]/ [facility] Pager: [phone] Please see addendum to chart note written by fellow on 12/14 regarding IVC filter in pts with PE without DVT and resumption of AC as soon as possible when cleared from neurosurgery standpoint.     Socorro Lew MD  Hematology Oncology Fellow, PGY-4  [facility] Pager: [phone]/ [facility] Pager: [phone]

## 2019-12-17 NOTE — PROGRESS NOTE ADULT - PROBLEM SELECTOR PLAN 10
Patient is currently Full Code. Will benefit from family meeting to discuss GOC given advanced RCC and inability to anticoagulate acute PE. Patient follows with Dr. Elda Cooper at [facility]- HCP was filled on 11/26 appointing Salbador Jack (wife) as health care proxy. Patient is currently capacitated to make informed medical decisions by himself but would like to discuss MOLST with his wife before making any decisions as he has never discussed advanced directives before.  Per documentation, wife was recently in a MVA- therefore unable to care for patient at home-Palliative consult placed to assist GOC. Per outpatient Palliative [facility] notes , Hospice was introduced recently FU Palliative   As per my discussion with pt and wife, they want to continue further treatment as offered by Dr Pena

## 2019-12-17 NOTE — PROGRESS NOTE ADULT - REASON FOR ADMISSION
Left sided chest pain and SOB/BENITEZ

## 2019-12-17 NOTE — PROGRESS NOTE ADULT - PROBLEM SELECTOR PROBLEM 7
Hypertension, unspecified type
Acquired hypothyroidism
Hypertension, unspecified type

## 2019-12-17 NOTE — PROGRESS NOTE ADULT - PROBLEM SELECTOR PLAN 9
- Patient wishes to go home instead of [facility] - however wife involved with MVA, may need assistance.   1) PCP Contacted on Admission: [ x ] Y     [  ] N     [  ] N/A - [facility] center - Dr. Hood  2) Date of Contact with PCP: 12/9  3) PCP Contacted at Discharge:  [  ] Y    [  ] N    [  ] N/A -   4) Summary of Handoff Given to PCP:   5) Post-Discharge Appointment Date and Location:

## 2019-12-17 NOTE — PROGRESS NOTE ADULT - PROBLEM SELECTOR PROBLEM 5
Type 2 diabetes mellitus with other specified complication, with long-term current use of insulin
BPH with urinary obstruction
Type 2 diabetes mellitus with other specified complication, with long-term current use of insulin

## 2020-01-01 ENCOUNTER — APPOINTMENT (OUTPATIENT)
Dept: INFUSION THERAPY | Facility: HOSPITAL | Age: 60
End: 2020-01-01

## 2020-01-01 ENCOUNTER — LABORATORY RESULT (OUTPATIENT)
Age: 60
End: 2020-01-01

## 2020-01-01 ENCOUNTER — APPOINTMENT (OUTPATIENT)
Dept: HEMATOLOGY ONCOLOGY | Facility: CLINIC | Age: 60
End: 2020-01-01

## 2020-01-01 ENCOUNTER — INPATIENT (INPATIENT)
Facility: HOSPITAL | Age: 60
LOS: 6 days | Discharge: HOME | DRG: 686 | End: 2020-01-28
Attending: INTERNAL MEDICINE | Admitting: STUDENT IN AN ORGANIZED HEALTH CARE EDUCATION/TRAINING PROGRAM
Payer: MEDICARE

## 2020-01-01 ENCOUNTER — APPOINTMENT (OUTPATIENT)
Dept: UROLOGY | Facility: CLINIC | Age: 60
End: 2020-01-01

## 2020-01-01 ENCOUNTER — TRANSCRIPTION ENCOUNTER (OUTPATIENT)
Age: 60
End: 2020-01-01

## 2020-01-01 ENCOUNTER — APPOINTMENT (OUTPATIENT)
Dept: CT IMAGING | Facility: IMAGING CENTER | Age: 60
End: 2020-01-01

## 2020-01-01 ENCOUNTER — OUTPATIENT (OUTPATIENT)
Dept: OUTPATIENT SERVICES | Facility: HOSPITAL | Age: 60
LOS: 1 days | Discharge: HOME | End: 2020-01-01

## 2020-01-01 ENCOUNTER — INBOUND DOCUMENT (OUTPATIENT)
Age: 60
End: 2020-01-01

## 2020-01-01 VITALS
DIASTOLIC BLOOD PRESSURE: 94 MMHG | TEMPERATURE: 98 F | RESPIRATION RATE: 20 BRPM | HEART RATE: 89 BPM | SYSTOLIC BLOOD PRESSURE: 162 MMHG | OXYGEN SATURATION: 100 %

## 2020-01-01 VITALS
DIASTOLIC BLOOD PRESSURE: 67 MMHG | RESPIRATION RATE: 18 BRPM | HEART RATE: 78 BPM | WEIGHT: 160.06 LBS | SYSTOLIC BLOOD PRESSURE: 108 MMHG | TEMPERATURE: 98 F | HEIGHT: 67 IN | OXYGEN SATURATION: 92 %

## 2020-01-01 DIAGNOSIS — I26.99 OTHER PULMONARY EMBOLISM WITHOUT ACUTE COR PULMONALE: ICD-10-CM

## 2020-01-01 DIAGNOSIS — D64.9 ANEMIA, UNSPECIFIED: ICD-10-CM

## 2020-01-01 DIAGNOSIS — E11.69 TYPE 2 DIABETES MELLITUS WITH OTHER SPECIFIED COMPLICATION: ICD-10-CM

## 2020-01-01 DIAGNOSIS — C78.00 SECONDARY MALIGNANT NEOPLASM OF UNSPECIFIED LUNG: ICD-10-CM

## 2020-01-01 DIAGNOSIS — C64.9 MALIGNANT NEOPLASM OF UNSPECIFIED KIDNEY, EXCEPT RENAL PELVIS: ICD-10-CM

## 2020-01-01 DIAGNOSIS — Z29.9 ENCOUNTER FOR PROPHYLACTIC MEASURES, UNSPECIFIED: ICD-10-CM

## 2020-01-01 DIAGNOSIS — Z51.5 ENCOUNTER FOR PALLIATIVE CARE: ICD-10-CM

## 2020-01-01 DIAGNOSIS — C79.31 SECONDARY MALIGNANT NEOPLASM OF BRAIN: ICD-10-CM

## 2020-01-01 DIAGNOSIS — Z98.890 OTHER SPECIFIED POSTPROCEDURAL STATES: Chronic | ICD-10-CM

## 2020-01-01 DIAGNOSIS — G89.3 NEOPLASM RELATED PAIN (ACUTE) (CHRONIC): ICD-10-CM

## 2020-01-01 DIAGNOSIS — R52 PAIN, UNSPECIFIED: ICD-10-CM

## 2020-01-01 DIAGNOSIS — E83.52 HYPERCALCEMIA: ICD-10-CM

## 2020-01-01 DIAGNOSIS — N40.1 BENIGN PROSTATIC HYPERPLASIA WITH LOWER URINARY TRACT SYMPTOMS: ICD-10-CM

## 2020-01-01 DIAGNOSIS — G93.40 ENCEPHALOPATHY, UNSPECIFIED: ICD-10-CM

## 2020-01-01 DIAGNOSIS — L89.152 PRESSURE ULCER OF SACRAL REGION, STAGE 2: ICD-10-CM

## 2020-01-01 DIAGNOSIS — R53.2 FUNCTIONAL QUADRIPLEGIA: ICD-10-CM

## 2020-01-01 DIAGNOSIS — R06.00 DYSPNEA, UNSPECIFIED: ICD-10-CM

## 2020-01-01 LAB
ALBUMIN SERPL ELPH-MCNC: 2.4 G/DL — LOW (ref 3.3–5)
ALBUMIN SERPL ELPH-MCNC: 2.5 G/DL — LOW (ref 3.3–5)
ALBUMIN SERPL ELPH-MCNC: 2.5 G/DL — LOW (ref 3.3–5)
ALP SERPL-CCNC: 84 U/L — SIGNIFICANT CHANGE UP (ref 40–120)
ALP SERPL-CCNC: 90 U/L — SIGNIFICANT CHANGE UP (ref 40–120)
ALP SERPL-CCNC: 92 U/L — SIGNIFICANT CHANGE UP (ref 40–120)
ALT FLD-CCNC: 11 U/L — SIGNIFICANT CHANGE UP (ref 10–45)
ALT FLD-CCNC: 12 U/L — SIGNIFICANT CHANGE UP (ref 10–45)
ALT FLD-CCNC: 15 U/L — SIGNIFICANT CHANGE UP (ref 10–45)
ANION GAP SERPL CALC-SCNC: 10 MMOL/L — SIGNIFICANT CHANGE UP (ref 5–17)
ANION GAP SERPL CALC-SCNC: 13 MMOL/L — SIGNIFICANT CHANGE UP (ref 5–17)
ANION GAP SERPL CALC-SCNC: 7 MMOL/L — SIGNIFICANT CHANGE UP (ref 5–17)
ANISOCYTOSIS BLD QL: SLIGHT — SIGNIFICANT CHANGE UP
APTT BLD: 33.9 SEC — SIGNIFICANT CHANGE UP (ref 27.5–36.3)
AST SERPL-CCNC: 23 U/L — SIGNIFICANT CHANGE UP (ref 10–40)
AST SERPL-CCNC: 29 U/L — SIGNIFICANT CHANGE UP (ref 10–40)
AST SERPL-CCNC: 34 U/L — SIGNIFICANT CHANGE UP (ref 10–40)
BASE EXCESS BLDV CALC-SCNC: 17.8 MMOL/L — HIGH (ref -2–2)
BASOPHILS # BLD AUTO: 0.03 K/UL — SIGNIFICANT CHANGE UP (ref 0–0.2)
BASOPHILS NFR BLD AUTO: 0.4 % — SIGNIFICANT CHANGE UP (ref 0–2)
BILIRUB SERPL-MCNC: 0.3 MG/DL — SIGNIFICANT CHANGE UP (ref 0.2–1.2)
BILIRUB SERPL-MCNC: 0.5 MG/DL — SIGNIFICANT CHANGE UP (ref 0.2–1.2)
BILIRUB SERPL-MCNC: 1.1 MG/DL — SIGNIFICANT CHANGE UP (ref 0.2–1.2)
BLD GP AB SCN SERPL QL: NEGATIVE — SIGNIFICANT CHANGE UP
BUN SERPL-MCNC: 17 MG/DL — SIGNIFICANT CHANGE UP (ref 7–23)
BUN SERPL-MCNC: 18 MG/DL — SIGNIFICANT CHANGE UP (ref 7–23)
BUN SERPL-MCNC: 20 MG/DL — SIGNIFICANT CHANGE UP (ref 7–23)
CA-I SERPL-SCNC: 1.55 MMOL/L — HIGH (ref 1.12–1.3)
CALCIUM SERPL-MCNC: 10.6 MG/DL — HIGH (ref 8.4–10.5)
CALCIUM SERPL-MCNC: 11.5 MG/DL — HIGH (ref 8.4–10.5)
CALCIUM SERPL-MCNC: 11.9 MG/DL — HIGH (ref 8.4–10.5)
CHLORIDE BLDV-SCNC: 89 MMOL/L — LOW (ref 96–108)
CHLORIDE SERPL-SCNC: 90 MMOL/L — LOW (ref 96–108)
CHLORIDE SERPL-SCNC: 90 MMOL/L — LOW (ref 96–108)
CHLORIDE SERPL-SCNC: 91 MMOL/L — LOW (ref 96–108)
CO2 BLDV-SCNC: 46 MMOL/L — HIGH (ref 22–30)
CO2 SERPL-SCNC: 33 MMOL/L — HIGH (ref 22–31)
CO2 SERPL-SCNC: 35 MMOL/L — HIGH (ref 22–31)
CO2 SERPL-SCNC: 41 MMOL/L — HIGH (ref 22–31)
CREAT SERPL-MCNC: 0.68 MG/DL — SIGNIFICANT CHANGE UP (ref 0.5–1.3)
CREAT SERPL-MCNC: 0.73 MG/DL — SIGNIFICANT CHANGE UP (ref 0.5–1.3)
CREAT SERPL-MCNC: 0.75 MG/DL — SIGNIFICANT CHANGE UP (ref 0.5–1.3)
EOSINOPHIL # BLD AUTO: 0.16 K/UL — SIGNIFICANT CHANGE UP (ref 0–0.5)
EOSINOPHIL NFR BLD AUTO: 2.1 % — SIGNIFICANT CHANGE UP (ref 0–6)
GAS PNL BLDV: 138 MMOL/L — SIGNIFICANT CHANGE UP (ref 135–145)
GAS PNL BLDV: SIGNIFICANT CHANGE UP
GLUCOSE BLDC GLUCOMTR-MCNC: 154 MG/DL — HIGH (ref 70–99)
GLUCOSE BLDC GLUCOMTR-MCNC: 204 MG/DL — HIGH (ref 70–99)
GLUCOSE BLDC GLUCOMTR-MCNC: 215 MG/DL — HIGH (ref 70–99)
GLUCOSE BLDC GLUCOMTR-MCNC: 218 MG/DL — HIGH (ref 70–99)
GLUCOSE BLDV-MCNC: 139 MG/DL — HIGH (ref 70–99)
GLUCOSE SERPL-MCNC: 138 MG/DL — HIGH (ref 70–99)
GLUCOSE SERPL-MCNC: 142 MG/DL — HIGH (ref 70–99)
GLUCOSE SERPL-MCNC: 204 MG/DL — HIGH (ref 70–99)
HCO3 BLDV-SCNC: 44 MMOL/L — HIGH (ref 21–29)
HCT VFR BLD CALC: 28.8 % — LOW (ref 39–50)
HCT VFR BLD CALC: SIGNIFICANT CHANGE UP (ref 39–50)
HCT VFR BLDA CALC: 23 % — LOW (ref 39–50)
HGB BLD CALC-MCNC: 7.3 G/DL — LOW (ref 13–17)
HGB BLD-MCNC: 7.2 G/DL — LOW (ref 13–17)
HGB BLD-MCNC: 8.3 G/DL — LOW (ref 13–17)
HYPOCHROMIA BLD QL: SLIGHT — SIGNIFICANT CHANGE UP
IMM GRANULOCYTES NFR BLD AUTO: 0.4 % — SIGNIFICANT CHANGE UP (ref 0–1.5)
INR BLD: 1.25 RATIO — HIGH (ref 0.88–1.16)
LACTATE BLDV-MCNC: 1.7 MMOL/L — SIGNIFICANT CHANGE UP (ref 0.7–2)
LYMPHOCYTES # BLD AUTO: 0.71 K/UL — LOW (ref 1–3.3)
LYMPHOCYTES # BLD AUTO: 9.1 % — LOW (ref 13–44)
MACROCYTES BLD QL: SLIGHT — SIGNIFICANT CHANGE UP
MAGNESIUM SERPL-MCNC: 1.9 MG/DL — SIGNIFICANT CHANGE UP (ref 1.6–2.6)
MANUAL SMEAR VERIFICATION: SIGNIFICANT CHANGE UP
MCHC RBC-ENTMCNC: 25.9 PG — LOW (ref 27–34)
MCHC RBC-ENTMCNC: 28.8 GM/DL — LOW (ref 32–36)
MCHC RBC-ENTMCNC: SIGNIFICANT CHANGE UP (ref 27–34)
MCHC RBC-ENTMCNC: SIGNIFICANT CHANGE UP (ref 32–36)
MCV RBC AUTO: 89.7 FL — SIGNIFICANT CHANGE UP (ref 80–100)
MCV RBC AUTO: SIGNIFICANT CHANGE UP (ref 80–100)
MICROCYTES BLD QL: SLIGHT — SIGNIFICANT CHANGE UP
MONOCYTES # BLD AUTO: 0.58 K/UL — SIGNIFICANT CHANGE UP (ref 0–0.9)
MONOCYTES NFR BLD AUTO: 7.5 % — SIGNIFICANT CHANGE UP (ref 2–14)
NEUTROPHILS # BLD AUTO: 6.25 K/UL — SIGNIFICANT CHANGE UP (ref 1.8–7.4)
NEUTROPHILS NFR BLD AUTO: 80.5 % — HIGH (ref 43–77)
NRBC # BLD: 0 /100 WBCS — SIGNIFICANT CHANGE UP (ref 0–0)
NRBC # BLD: 0 /100 WBCS — SIGNIFICANT CHANGE UP (ref 0–0)
PCO2 BLDV: 64 MMHG — HIGH (ref 35–50)
PH BLDV: 7.45 — SIGNIFICANT CHANGE UP (ref 7.35–7.45)
PHOSPHATE SERPL-MCNC: 3.2 MG/DL — SIGNIFICANT CHANGE UP (ref 2.5–4.5)
PLAT MORPH BLD: NORMAL — SIGNIFICANT CHANGE UP
PLATELET # BLD AUTO: 318 K/UL — SIGNIFICANT CHANGE UP (ref 150–400)
PLATELET # BLD AUTO: 332 K/UL — SIGNIFICANT CHANGE UP (ref 150–400)
PO2 BLDV: 60 MMHG — HIGH (ref 25–45)
POTASSIUM BLDV-SCNC: 3.7 MMOL/L — SIGNIFICANT CHANGE UP (ref 3.5–5.3)
POTASSIUM SERPL-MCNC: 3.9 MMOL/L — SIGNIFICANT CHANGE UP (ref 3.5–5.3)
POTASSIUM SERPL-MCNC: 4.1 MMOL/L — SIGNIFICANT CHANGE UP (ref 3.5–5.3)
POTASSIUM SERPL-MCNC: 4.3 MMOL/L — SIGNIFICANT CHANGE UP (ref 3.5–5.3)
POTASSIUM SERPL-SCNC: 3.9 MMOL/L — SIGNIFICANT CHANGE UP (ref 3.5–5.3)
POTASSIUM SERPL-SCNC: 4.1 MMOL/L — SIGNIFICANT CHANGE UP (ref 3.5–5.3)
POTASSIUM SERPL-SCNC: 4.3 MMOL/L — SIGNIFICANT CHANGE UP (ref 3.5–5.3)
PROT SERPL-MCNC: 7 G/DL — SIGNIFICANT CHANGE UP (ref 6–8.3)
PROT SERPL-MCNC: 7.5 G/DL — SIGNIFICANT CHANGE UP (ref 6–8.3)
PROT SERPL-MCNC: 7.5 G/DL — SIGNIFICANT CHANGE UP (ref 6–8.3)
PROTHROM AB SERPL-ACNC: 14.4 SEC — HIGH (ref 10–12.9)
RAPID RVP RESULT: SIGNIFICANT CHANGE UP
RBC # BLD: 2.9 M/UL — LOW (ref 4.2–5.8)
RBC # BLD: 3.21 M/UL — LOW (ref 4.2–5.8)
RBC # FLD: 18.3 % — HIGH (ref 10.3–14.5)
RBC # FLD: SIGNIFICANT CHANGE UP (ref 10.3–14.5)
RBC BLD AUTO: ABNORMAL
RH IG SCN BLD-IMP: POSITIVE — SIGNIFICANT CHANGE UP
SAO2 % BLDV: 89 % — HIGH (ref 67–88)
SODIUM SERPL-SCNC: 134 MMOL/L — LOW (ref 135–145)
SODIUM SERPL-SCNC: 138 MMOL/L — SIGNIFICANT CHANGE UP (ref 135–145)
SODIUM SERPL-SCNC: 138 MMOL/L — SIGNIFICANT CHANGE UP (ref 135–145)
WBC # BLD: 7.76 K/UL — SIGNIFICANT CHANGE UP (ref 3.8–10.5)
WBC # BLD: 8.01 K/UL — SIGNIFICANT CHANGE UP (ref 3.8–10.5)
WBC # FLD AUTO: 7.76 K/UL — SIGNIFICANT CHANGE UP (ref 3.8–10.5)
WBC # FLD AUTO: 8.01 K/UL — SIGNIFICANT CHANGE UP (ref 3.8–10.5)

## 2020-01-01 PROCEDURE — 71260 CT THORAX DX C+: CPT | Mod: 26

## 2020-01-01 PROCEDURE — 86850 RBC ANTIBODY SCREEN: CPT

## 2020-01-01 PROCEDURE — 85730 THROMBOPLASTIN TIME PARTIAL: CPT

## 2020-01-01 PROCEDURE — 83735 ASSAY OF MAGNESIUM: CPT

## 2020-01-01 PROCEDURE — 99233 SBSQ HOSP IP/OBS HIGH 50: CPT | Mod: GC

## 2020-01-01 PROCEDURE — 87486 CHLMYD PNEUM DNA AMP PROBE: CPT

## 2020-01-01 PROCEDURE — 94640 AIRWAY INHALATION TREATMENT: CPT

## 2020-01-01 PROCEDURE — 86900 BLOOD TYPING SEROLOGIC ABO: CPT

## 2020-01-01 PROCEDURE — 84100 ASSAY OF PHOSPHORUS: CPT

## 2020-01-01 PROCEDURE — 74177 CT ABD & PELVIS W/CONTRAST: CPT

## 2020-01-01 PROCEDURE — 99232 SBSQ HOSP IP/OBS MODERATE 35: CPT | Mod: GC

## 2020-01-01 PROCEDURE — 70450 CT HEAD/BRAIN W/O DYE: CPT

## 2020-01-01 PROCEDURE — 82330 ASSAY OF CALCIUM: CPT

## 2020-01-01 PROCEDURE — 99497 ADVNCD CARE PLAN 30 MIN: CPT

## 2020-01-01 PROCEDURE — 71260 CT THORAX DX C+: CPT

## 2020-01-01 PROCEDURE — 82803 BLOOD GASES ANY COMBINATION: CPT

## 2020-01-01 PROCEDURE — 99223 1ST HOSP IP/OBS HIGH 75: CPT | Mod: GC

## 2020-01-01 PROCEDURE — 99223 1ST HOSP IP/OBS HIGH 75: CPT

## 2020-01-01 PROCEDURE — 74177 CT ABD & PELVIS W/CONTRAST: CPT | Mod: 26

## 2020-01-01 PROCEDURE — 99285 EMERGENCY DEPT VISIT HI MDM: CPT | Mod: 25

## 2020-01-01 PROCEDURE — 86923 COMPATIBILITY TEST ELECTRIC: CPT

## 2020-01-01 PROCEDURE — 93010 ELECTROCARDIOGRAM REPORT: CPT

## 2020-01-01 PROCEDURE — 70450 CT HEAD/BRAIN W/O DYE: CPT | Mod: 26

## 2020-01-01 PROCEDURE — 85014 HEMATOCRIT: CPT

## 2020-01-01 PROCEDURE — P9016: CPT

## 2020-01-01 PROCEDURE — 80053 COMPREHEN METABOLIC PANEL: CPT

## 2020-01-01 PROCEDURE — 87581 M.PNEUMON DNA AMP PROBE: CPT

## 2020-01-01 PROCEDURE — 99285 EMERGENCY DEPT VISIT HI MDM: CPT | Mod: GC

## 2020-01-01 PROCEDURE — 99233 SBSQ HOSP IP/OBS HIGH 50: CPT

## 2020-01-01 PROCEDURE — 36430 TRANSFUSION BLD/BLD COMPNT: CPT

## 2020-01-01 PROCEDURE — 86901 BLOOD TYPING SEROLOGIC RH(D): CPT

## 2020-01-01 PROCEDURE — 84295 ASSAY OF SERUM SODIUM: CPT

## 2020-01-01 PROCEDURE — 93005 ELECTROCARDIOGRAM TRACING: CPT

## 2020-01-01 PROCEDURE — 82962 GLUCOSE BLOOD TEST: CPT

## 2020-01-01 PROCEDURE — 85610 PROTHROMBIN TIME: CPT

## 2020-01-01 PROCEDURE — 83605 ASSAY OF LACTIC ACID: CPT

## 2020-01-01 PROCEDURE — 99232 SBSQ HOSP IP/OBS MODERATE 35: CPT

## 2020-01-01 PROCEDURE — 87798 DETECT AGENT NOS DNA AMP: CPT

## 2020-01-01 PROCEDURE — 99239 HOSP IP/OBS DSCHRG MGMT >30: CPT

## 2020-01-01 PROCEDURE — 87633 RESP VIRUS 12-25 TARGETS: CPT

## 2020-01-01 PROCEDURE — 85027 COMPLETE CBC AUTOMATED: CPT

## 2020-01-01 PROCEDURE — 82435 ASSAY OF BLOOD CHLORIDE: CPT

## 2020-01-01 PROCEDURE — 82947 ASSAY GLUCOSE BLOOD QUANT: CPT

## 2020-01-01 PROCEDURE — 84132 ASSAY OF SERUM POTASSIUM: CPT

## 2020-01-01 RX ORDER — PREGABALIN 225 MG/1
1000 CAPSULE ORAL DAILY
Refills: 0 | Status: DISCONTINUED | OUTPATIENT
Start: 2020-01-01 | End: 2020-01-01

## 2020-01-01 RX ORDER — HYDROMORPHONE HYDROCHLORIDE 2 MG/ML
2 INJECTION INTRAMUSCULAR; INTRAVENOUS; SUBCUTANEOUS
Qty: 0 | Refills: 0 | DISCHARGE
Start: 2020-01-01

## 2020-01-01 RX ORDER — ACETAMINOPHEN 500 MG
650 TABLET ORAL EVERY 6 HOURS
Refills: 0 | Status: DISCONTINUED | OUTPATIENT
Start: 2020-01-01 | End: 2020-01-01

## 2020-01-01 RX ORDER — TAMSULOSIN HYDROCHLORIDE 0.4 MG/1
2 CAPSULE ORAL
Qty: 0 | Refills: 0 | DISCHARGE

## 2020-01-01 RX ORDER — PANTOPRAZOLE SODIUM 20 MG/1
1 TABLET, DELAYED RELEASE ORAL
Qty: 0 | Refills: 0 | DISCHARGE

## 2020-01-01 RX ORDER — CALCITONIN SALMON 200 [IU]/ML
290 INJECTION, SOLUTION INTRAMUSCULAR EVERY 12 HOURS
Refills: 0 | Status: DISCONTINUED | OUTPATIENT
Start: 2020-01-01 | End: 2020-01-01

## 2020-01-01 RX ORDER — DEXTROSE 50 % IN WATER 50 %
12.5 SYRINGE (ML) INTRAVENOUS ONCE
Refills: 0 | Status: DISCONTINUED | OUTPATIENT
Start: 2020-01-01 | End: 2020-01-01

## 2020-01-01 RX ORDER — SODIUM CHLORIDE 9 MG/ML
1000 INJECTION INTRAMUSCULAR; INTRAVENOUS; SUBCUTANEOUS
Refills: 0 | Status: DISCONTINUED | OUTPATIENT
Start: 2020-01-01 | End: 2020-01-01

## 2020-01-01 RX ORDER — HYDROMORPHONE HYDROCHLORIDE 2 MG/ML
1 INJECTION INTRAMUSCULAR; INTRAVENOUS; SUBCUTANEOUS
Qty: 0 | Refills: 0 | DISCHARGE
Start: 2020-01-01

## 2020-01-01 RX ORDER — DEXTROSE 50 % IN WATER 50 %
15 SYRINGE (ML) INTRAVENOUS ONCE
Refills: 0 | Status: DISCONTINUED | OUTPATIENT
Start: 2020-01-01 | End: 2020-01-01

## 2020-01-01 RX ORDER — METRONIDAZOLE 500 MG
500 TABLET ORAL ONCE
Refills: 0 | Status: COMPLETED | OUTPATIENT
Start: 2020-01-01 | End: 2020-01-01

## 2020-01-01 RX ORDER — HYDROMORPHONE HYDROCHLORIDE 2 MG/ML
2 INJECTION INTRAMUSCULAR; INTRAVENOUS; SUBCUTANEOUS
Refills: 0 | Status: DISCONTINUED | OUTPATIENT
Start: 2020-01-01 | End: 2020-01-01

## 2020-01-01 RX ORDER — PANTOPRAZOLE SODIUM 20 MG/1
40 TABLET, DELAYED RELEASE ORAL
Refills: 0 | Status: DISCONTINUED | OUTPATIENT
Start: 2020-01-01 | End: 2020-01-01

## 2020-01-01 RX ORDER — ROBINUL 0.2 MG/ML
0.4 INJECTION INTRAMUSCULAR; INTRAVENOUS EVERY 6 HOURS
Refills: 0 | Status: DISCONTINUED | OUTPATIENT
Start: 2020-01-01 | End: 2020-01-01

## 2020-01-01 RX ORDER — INSULIN GLARGINE 100 [IU]/ML
20 INJECTION, SOLUTION SUBCUTANEOUS
Qty: 0 | Refills: 0 | DISCHARGE

## 2020-01-01 RX ORDER — DEXTROSE 50 % IN WATER 50 %
25 SYRINGE (ML) INTRAVENOUS ONCE
Refills: 0 | Status: DISCONTINUED | OUTPATIENT
Start: 2020-01-01 | End: 2020-01-01

## 2020-01-01 RX ORDER — SODIUM CHLORIDE 9 MG/ML
1000 INJECTION, SOLUTION INTRAVENOUS
Refills: 0 | Status: DISCONTINUED | OUTPATIENT
Start: 2020-01-01 | End: 2020-01-01

## 2020-01-01 RX ORDER — HYDROMORPHONE HYDROCHLORIDE 2 MG/ML
1 INJECTION INTRAMUSCULAR; INTRAVENOUS; SUBCUTANEOUS
Qty: 100 | Refills: 0 | Status: DISCONTINUED | OUTPATIENT
Start: 2020-01-01 | End: 2020-01-01

## 2020-01-01 RX ORDER — IPRATROPIUM/ALBUTEROL SULFATE 18-103MCG
3 AEROSOL WITH ADAPTER (GRAM) INHALATION EVERY 6 HOURS
Refills: 0 | Status: DISCONTINUED | OUTPATIENT
Start: 2020-01-01 | End: 2020-01-01

## 2020-01-01 RX ORDER — ATORVASTATIN CALCIUM 80 MG/1
20 TABLET, FILM COATED ORAL AT BEDTIME
Refills: 0 | Status: DISCONTINUED | OUTPATIENT
Start: 2020-01-01 | End: 2020-01-01

## 2020-01-01 RX ORDER — ENOXAPARIN SODIUM 100 MG/ML
100 INJECTION SUBCUTANEOUS
Refills: 0 | Status: ACTIVE | COMMUNITY
Start: 2020-01-01

## 2020-01-01 RX ORDER — SENNA PLUS 8.6 MG/1
2 TABLET ORAL AT BEDTIME
Refills: 0 | Status: DISCONTINUED | OUTPATIENT
Start: 2020-01-01 | End: 2020-01-01

## 2020-01-01 RX ORDER — HYDROMORPHONE HYDROCHLORIDE 2 MG/ML
1 INJECTION INTRAMUSCULAR; INTRAVENOUS; SUBCUTANEOUS EVERY 6 HOURS
Refills: 0 | Status: DISCONTINUED | OUTPATIENT
Start: 2020-01-01 | End: 2020-01-01

## 2020-01-01 RX ORDER — HYDROMORPHONE HYDROCHLORIDE 2 MG/ML
1 INJECTION INTRAMUSCULAR; INTRAVENOUS; SUBCUTANEOUS
Refills: 0 | Status: DISCONTINUED | OUTPATIENT
Start: 2020-01-01 | End: 2020-01-01

## 2020-01-01 RX ORDER — DEXAMETHASONE 0.5 MG/5ML
4 ELIXIR ORAL
Qty: 0 | Refills: 0 | DISCHARGE
Start: 2020-01-01

## 2020-01-01 RX ORDER — SODIUM CHLORIDE 9 MG/ML
1000 INJECTION, SOLUTION INTRAVENOUS ONCE
Refills: 0 | Status: COMPLETED | OUTPATIENT
Start: 2020-01-01 | End: 2020-01-01

## 2020-01-01 RX ORDER — ROBINUL 0.2 MG/ML
0.4 INJECTION INTRAMUSCULAR; INTRAVENOUS
Qty: 0 | Refills: 0 | DISCHARGE
Start: 2020-01-01

## 2020-01-01 RX ORDER — CALCITONIN SALMON 200 [IU]/ML
290 INJECTION, SOLUTION INTRAMUSCULAR EVERY 12 HOURS
Refills: 0 | Status: COMPLETED | OUTPATIENT
Start: 2020-01-01 | End: 2020-01-01

## 2020-01-01 RX ORDER — GLUCAGON INJECTION, SOLUTION 0.5 MG/.1ML
1 INJECTION, SOLUTION SUBCUTANEOUS ONCE
Refills: 0 | Status: DISCONTINUED | OUTPATIENT
Start: 2020-01-01 | End: 2020-01-01

## 2020-01-01 RX ORDER — INSULIN LISPRO 100/ML
VIAL (ML) SUBCUTANEOUS
Refills: 0 | Status: DISCONTINUED | OUTPATIENT
Start: 2020-01-01 | End: 2020-01-01

## 2020-01-01 RX ORDER — INSULIN LISPRO 100/ML
VIAL (ML) SUBCUTANEOUS EVERY 6 HOURS
Refills: 0 | Status: DISCONTINUED | OUTPATIENT
Start: 2020-01-01 | End: 2020-01-01

## 2020-01-01 RX ORDER — PANTOPRAZOLE SODIUM 20 MG/1
40 TABLET, DELAYED RELEASE ORAL DAILY
Refills: 0 | Status: DISCONTINUED | OUTPATIENT
Start: 2020-01-01 | End: 2020-01-01

## 2020-01-01 RX ORDER — LEVOTHYROXINE SODIUM 125 MCG
175 TABLET ORAL DAILY
Refills: 0 | Status: DISCONTINUED | OUTPATIENT
Start: 2020-01-01 | End: 2020-01-01

## 2020-01-01 RX ORDER — TAMSULOSIN HYDROCHLORIDE 0.4 MG/1
0.4 CAPSULE ORAL AT BEDTIME
Refills: 0 | Status: DISCONTINUED | OUTPATIENT
Start: 2020-01-01 | End: 2020-01-01

## 2020-01-01 RX ORDER — ACETAMINOPHEN 500 MG
2 TABLET ORAL
Qty: 0 | Refills: 0 | DISCHARGE
Start: 2020-01-01

## 2020-01-01 RX ORDER — PREGABALIN 225 MG/1
1 CAPSULE ORAL
Qty: 0 | Refills: 0 | DISCHARGE

## 2020-01-01 RX ORDER — ACETAMINOPHEN 500 MG
1000 TABLET ORAL ONCE
Refills: 0 | Status: COMPLETED | OUTPATIENT
Start: 2020-01-01 | End: 2020-01-01

## 2020-01-01 RX ORDER — DEXAMETHASONE 0.5 MG/5ML
10 ELIXIR ORAL ONCE
Refills: 0 | Status: COMPLETED | OUTPATIENT
Start: 2020-01-01 | End: 2020-01-01

## 2020-01-01 RX ORDER — DEXAMETHASONE 0.5 MG/5ML
4 ELIXIR ORAL EVERY 6 HOURS
Refills: 0 | Status: DISCONTINUED | OUTPATIENT
Start: 2020-01-01 | End: 2020-01-01

## 2020-01-01 RX ORDER — LEVOTHYROXINE SODIUM 125 MCG
175 TABLET ORAL AT BEDTIME
Refills: 0 | Status: DISCONTINUED | OUTPATIENT
Start: 2020-01-01 | End: 2020-01-01

## 2020-01-01 RX ORDER — METHADONE HYDROCHLORIDE 40 MG/1
5 TABLET ORAL THREE TIMES A DAY
Refills: 0 | Status: DISCONTINUED | OUTPATIENT
Start: 2020-01-01 | End: 2020-01-01

## 2020-01-01 RX ORDER — METHADONE HYDROCHLORIDE 40 MG/1
5 TABLET ORAL DAILY
Refills: 0 | Status: DISCONTINUED | OUTPATIENT
Start: 2020-01-01 | End: 2020-01-01

## 2020-01-01 RX ORDER — LIDOCAINE 4 G/100G
2 CREAM TOPICAL DAILY
Refills: 0 | Status: DISCONTINUED | OUTPATIENT
Start: 2020-01-01 | End: 2020-01-01

## 2020-01-01 RX ORDER — IPRATROPIUM/ALBUTEROL SULFATE 18-103MCG
3 AEROSOL WITH ADAPTER (GRAM) INHALATION
Qty: 0 | Refills: 0 | DISCHARGE

## 2020-01-01 RX ORDER — FUROSEMIDE 40 MG
40 TABLET ORAL
Refills: 0 | Status: DISCONTINUED | OUTPATIENT
Start: 2020-01-01 | End: 2020-01-01

## 2020-01-01 RX ORDER — HYDROMORPHONE HYDROCHLORIDE 2 MG/ML
0.4 INJECTION INTRAMUSCULAR; INTRAVENOUS; SUBCUTANEOUS
Qty: 100 | Refills: 0 | Status: DISCONTINUED | OUTPATIENT
Start: 2020-01-01 | End: 2020-01-01

## 2020-01-01 RX ORDER — GABAPENTIN 400 MG/1
1 CAPSULE ORAL
Qty: 0 | Refills: 0 | DISCHARGE

## 2020-01-01 RX ORDER — CABOZANTINIB 40 MG/1
40 TABLET ORAL DAILY
Qty: 30 | Refills: 2 | Status: DISCONTINUED | COMMUNITY
Start: 2017-11-09 | End: 2020-01-01

## 2020-01-01 RX ADMIN — ROBINUL 0.4 MILLIGRAM(S): 0.2 INJECTION INTRAMUSCULAR; INTRAVENOUS at 09:21

## 2020-01-01 RX ADMIN — HYDROMORPHONE HYDROCHLORIDE 2 MILLIGRAM(S): 2 INJECTION INTRAMUSCULAR; INTRAVENOUS; SUBCUTANEOUS at 07:56

## 2020-01-01 RX ADMIN — Medication 175 MICROGRAM(S): at 05:59

## 2020-01-01 RX ADMIN — HYDROMORPHONE HYDROCHLORIDE 1 MILLIGRAM(S): 2 INJECTION INTRAMUSCULAR; INTRAVENOUS; SUBCUTANEOUS at 19:05

## 2020-01-01 RX ADMIN — HYDROMORPHONE HYDROCHLORIDE 1 MILLIGRAM(S): 2 INJECTION INTRAMUSCULAR; INTRAVENOUS; SUBCUTANEOUS at 09:00

## 2020-01-01 RX ADMIN — HYDROMORPHONE HYDROCHLORIDE 1 MG/HR: 2 INJECTION INTRAMUSCULAR; INTRAVENOUS; SUBCUTANEOUS at 18:25

## 2020-01-01 RX ADMIN — Medication 175 MICROGRAM(S): at 21:54

## 2020-01-01 RX ADMIN — LIDOCAINE 2 PATCH: 4 CREAM TOPICAL at 23:57

## 2020-01-01 RX ADMIN — Medication 2: at 07:41

## 2020-01-01 RX ADMIN — SODIUM CHLORIDE 10 MILLILITER(S): 9 INJECTION INTRAMUSCULAR; INTRAVENOUS; SUBCUTANEOUS at 19:12

## 2020-01-01 RX ADMIN — Medication 0.5 MILLIGRAM(S): at 03:55

## 2020-01-01 RX ADMIN — Medication 4 MILLIGRAM(S): at 23:53

## 2020-01-01 RX ADMIN — PANTOPRAZOLE SODIUM 40 MILLIGRAM(S): 20 TABLET, DELAYED RELEASE ORAL at 05:59

## 2020-01-01 RX ADMIN — Medication 0.5 MILLIGRAM(S): at 22:33

## 2020-01-01 RX ADMIN — Medication 0.5 MILLIGRAM(S): at 19:29

## 2020-01-01 RX ADMIN — Medication 0.5 MILLIGRAM(S): at 23:53

## 2020-01-01 RX ADMIN — Medication 4 MILLIGRAM(S): at 00:01

## 2020-01-01 RX ADMIN — SODIUM CHLORIDE 1000 MILLILITER(S): 9 INJECTION, SOLUTION INTRAVENOUS at 17:20

## 2020-01-01 RX ADMIN — Medication 4 MILLIGRAM(S): at 12:46

## 2020-01-01 RX ADMIN — HYDROMORPHONE HYDROCHLORIDE 1 MILLIGRAM(S): 2 INJECTION INTRAMUSCULAR; INTRAVENOUS; SUBCUTANEOUS at 05:09

## 2020-01-01 RX ADMIN — SENNA PLUS 2 TABLET(S): 8.6 TABLET ORAL at 22:03

## 2020-01-01 RX ADMIN — HYDROMORPHONE HYDROCHLORIDE 2 MILLIGRAM(S): 2 INJECTION INTRAMUSCULAR; INTRAVENOUS; SUBCUTANEOUS at 13:46

## 2020-01-01 RX ADMIN — Medication 4 MILLIGRAM(S): at 17:36

## 2020-01-01 RX ADMIN — HYDROMORPHONE HYDROCHLORIDE 0.4 MG/HR: 2 INJECTION INTRAMUSCULAR; INTRAVENOUS; SUBCUTANEOUS at 19:19

## 2020-01-01 RX ADMIN — HYDROMORPHONE HYDROCHLORIDE 0.4 MG/HR: 2 INJECTION INTRAMUSCULAR; INTRAVENOUS; SUBCUTANEOUS at 11:48

## 2020-01-01 RX ADMIN — HYDROMORPHONE HYDROCHLORIDE 1 MILLIGRAM(S): 2 INJECTION INTRAMUSCULAR; INTRAVENOUS; SUBCUTANEOUS at 17:05

## 2020-01-01 RX ADMIN — Medication 4 MILLIGRAM(S): at 17:59

## 2020-01-01 RX ADMIN — TAMSULOSIN HYDROCHLORIDE 0.4 MILLIGRAM(S): 0.4 CAPSULE ORAL at 22:15

## 2020-01-01 RX ADMIN — Medication 175 MICROGRAM(S): at 05:09

## 2020-01-01 RX ADMIN — LIDOCAINE 2 PATCH: 4 CREAM TOPICAL at 12:06

## 2020-01-01 RX ADMIN — LIDOCAINE 2 PATCH: 4 CREAM TOPICAL at 22:15

## 2020-01-01 RX ADMIN — Medication 4 MILLIGRAM(S): at 11:18

## 2020-01-01 RX ADMIN — Medication 4 MILLIGRAM(S): at 13:00

## 2020-01-01 RX ADMIN — HYDROMORPHONE HYDROCHLORIDE 1 MILLIGRAM(S): 2 INJECTION INTRAMUSCULAR; INTRAVENOUS; SUBCUTANEOUS at 16:51

## 2020-01-01 RX ADMIN — ATORVASTATIN CALCIUM 20 MILLIGRAM(S): 80 TABLET, FILM COATED ORAL at 22:03

## 2020-01-01 RX ADMIN — Medication 4 MILLIGRAM(S): at 05:58

## 2020-01-01 RX ADMIN — Medication 4 MILLIGRAM(S): at 06:28

## 2020-01-01 RX ADMIN — Medication 4 MILLIGRAM(S): at 05:09

## 2020-01-01 RX ADMIN — HYDROMORPHONE HYDROCHLORIDE 2 MILLIGRAM(S): 2 INJECTION INTRAMUSCULAR; INTRAVENOUS; SUBCUTANEOUS at 21:26

## 2020-01-01 RX ADMIN — HYDROMORPHONE HYDROCHLORIDE 1 MILLIGRAM(S): 2 INJECTION INTRAMUSCULAR; INTRAVENOUS; SUBCUTANEOUS at 06:16

## 2020-01-01 RX ADMIN — HYDROMORPHONE HYDROCHLORIDE 1 MG/HR: 2 INJECTION INTRAMUSCULAR; INTRAVENOUS; SUBCUTANEOUS at 09:33

## 2020-01-01 RX ADMIN — Medication 4 MILLIGRAM(S): at 23:44

## 2020-01-01 RX ADMIN — Medication 3 MILLILITER(S): at 05:58

## 2020-01-01 RX ADMIN — LIDOCAINE 2 PATCH: 4 CREAM TOPICAL at 19:50

## 2020-01-01 RX ADMIN — PANTOPRAZOLE SODIUM 40 MILLIGRAM(S): 20 TABLET, DELAYED RELEASE ORAL at 11:19

## 2020-01-01 RX ADMIN — SODIUM CHLORIDE 10 MILLILITER(S): 9 INJECTION INTRAMUSCULAR; INTRAVENOUS; SUBCUTANEOUS at 06:36

## 2020-01-01 RX ADMIN — METHADONE HYDROCHLORIDE 5 MILLIGRAM(S): 40 TABLET ORAL at 05:09

## 2020-01-01 RX ADMIN — HYDROMORPHONE HYDROCHLORIDE 2 MILLIGRAM(S): 2 INJECTION INTRAMUSCULAR; INTRAVENOUS; SUBCUTANEOUS at 03:55

## 2020-01-01 RX ADMIN — LIDOCAINE 2 PATCH: 4 CREAM TOPICAL at 07:00

## 2020-01-01 RX ADMIN — Medication 10 MILLIGRAM(S): at 06:28

## 2020-01-01 RX ADMIN — Medication 0.5 MILLIGRAM(S): at 05:18

## 2020-01-01 RX ADMIN — HYDROMORPHONE HYDROCHLORIDE 2 MILLIGRAM(S): 2 INJECTION INTRAMUSCULAR; INTRAVENOUS; SUBCUTANEOUS at 12:17

## 2020-01-01 RX ADMIN — HYDROMORPHONE HYDROCHLORIDE 2 MILLIGRAM(S): 2 INJECTION INTRAMUSCULAR; INTRAVENOUS; SUBCUTANEOUS at 02:45

## 2020-01-01 RX ADMIN — HYDROMORPHONE HYDROCHLORIDE 1 MILLIGRAM(S): 2 INJECTION INTRAMUSCULAR; INTRAVENOUS; SUBCUTANEOUS at 18:48

## 2020-01-01 RX ADMIN — Medication 0.5 MILLIGRAM(S): at 23:23

## 2020-01-01 RX ADMIN — ROBINUL 0.4 MILLIGRAM(S): 0.2 INJECTION INTRAMUSCULAR; INTRAVENOUS at 19:30

## 2020-01-01 RX ADMIN — HYDROMORPHONE HYDROCHLORIDE 1 MILLIGRAM(S): 2 INJECTION INTRAMUSCULAR; INTRAVENOUS; SUBCUTANEOUS at 09:21

## 2020-01-01 RX ADMIN — Medication 4 MILLIGRAM(S): at 05:28

## 2020-01-01 RX ADMIN — HYDROMORPHONE HYDROCHLORIDE 1 MILLIGRAM(S): 2 INJECTION INTRAMUSCULAR; INTRAVENOUS; SUBCUTANEOUS at 17:17

## 2020-01-01 RX ADMIN — Medication 3 MILLILITER(S): at 23:23

## 2020-01-01 RX ADMIN — HYDROMORPHONE HYDROCHLORIDE 1 MG/HR: 2 INJECTION INTRAMUSCULAR; INTRAVENOUS; SUBCUTANEOUS at 17:01

## 2020-01-01 RX ADMIN — Medication 4 MILLIGRAM(S): at 06:36

## 2020-01-01 RX ADMIN — METHADONE HYDROCHLORIDE 5 MILLIGRAM(S): 40 TABLET ORAL at 22:15

## 2020-01-01 RX ADMIN — Medication 4 MILLIGRAM(S): at 00:06

## 2020-01-01 RX ADMIN — PANTOPRAZOLE SODIUM 40 MILLIGRAM(S): 20 TABLET, DELAYED RELEASE ORAL at 09:08

## 2020-01-01 RX ADMIN — HYDROMORPHONE HYDROCHLORIDE 1 MILLIGRAM(S): 2 INJECTION INTRAMUSCULAR; INTRAVENOUS; SUBCUTANEOUS at 12:46

## 2020-01-01 RX ADMIN — PANTOPRAZOLE SODIUM 40 MILLIGRAM(S): 20 TABLET, DELAYED RELEASE ORAL at 07:28

## 2020-01-01 RX ADMIN — HYDROMORPHONE HYDROCHLORIDE 2 MILLIGRAM(S): 2 INJECTION INTRAMUSCULAR; INTRAVENOUS; SUBCUTANEOUS at 08:11

## 2020-01-01 RX ADMIN — METHADONE HYDROCHLORIDE 5 MILLIGRAM(S): 40 TABLET ORAL at 14:51

## 2020-01-01 RX ADMIN — Medication 3 MILLILITER(S): at 17:36

## 2020-01-01 RX ADMIN — HYDROMORPHONE HYDROCHLORIDE 2 MILLIGRAM(S): 2 INJECTION INTRAMUSCULAR; INTRAVENOUS; SUBCUTANEOUS at 02:26

## 2020-01-01 RX ADMIN — Medication 4 MILLIGRAM(S): at 23:23

## 2020-01-01 RX ADMIN — Medication 175 MICROGRAM(S): at 21:24

## 2020-01-01 RX ADMIN — Medication 3 MILLILITER(S): at 05:09

## 2020-01-01 RX ADMIN — HYDROMORPHONE HYDROCHLORIDE 2 MILLIGRAM(S): 2 INJECTION INTRAMUSCULAR; INTRAVENOUS; SUBCUTANEOUS at 01:00

## 2020-01-01 RX ADMIN — Medication 4 MILLIGRAM(S): at 00:15

## 2020-01-01 RX ADMIN — Medication 3 MILLILITER(S): at 12:05

## 2020-01-01 RX ADMIN — HYDROMORPHONE HYDROCHLORIDE 2 MILLIGRAM(S): 2 INJECTION INTRAMUSCULAR; INTRAVENOUS; SUBCUTANEOUS at 00:39

## 2020-01-01 RX ADMIN — SODIUM CHLORIDE 10 MILLILITER(S): 9 INJECTION INTRAMUSCULAR; INTRAVENOUS; SUBCUTANEOUS at 19:20

## 2020-01-01 RX ADMIN — SODIUM CHLORIDE 10 MILLILITER(S): 9 INJECTION INTRAMUSCULAR; INTRAVENOUS; SUBCUTANEOUS at 18:32

## 2020-01-01 RX ADMIN — Medication 4 MILLIGRAM(S): at 11:26

## 2020-01-01 RX ADMIN — Medication 175 MICROGRAM(S): at 22:33

## 2020-01-01 RX ADMIN — Medication 2: at 12:20

## 2020-01-01 RX ADMIN — HYDROMORPHONE HYDROCHLORIDE 1 MG/HR: 2 INJECTION INTRAMUSCULAR; INTRAVENOUS; SUBCUTANEOUS at 17:00

## 2020-01-01 RX ADMIN — Medication 100 MILLIGRAM(S): at 12:29

## 2020-01-01 RX ADMIN — SODIUM CHLORIDE 10 MILLILITER(S): 9 INJECTION INTRAMUSCULAR; INTRAVENOUS; SUBCUTANEOUS at 07:27

## 2020-01-01 RX ADMIN — HYDROMORPHONE HYDROCHLORIDE 1 MG/HR: 2 INJECTION INTRAMUSCULAR; INTRAVENOUS; SUBCUTANEOUS at 07:27

## 2020-01-01 RX ADMIN — METHADONE HYDROCHLORIDE 5 MILLIGRAM(S): 40 TABLET ORAL at 22:03

## 2020-01-01 RX ADMIN — Medication 0.5 MILLIGRAM(S): at 02:26

## 2020-01-01 RX ADMIN — SENNA PLUS 2 TABLET(S): 8.6 TABLET ORAL at 22:14

## 2020-01-01 RX ADMIN — HYDROMORPHONE HYDROCHLORIDE 1 MG/HR: 2 INJECTION INTRAMUSCULAR; INTRAVENOUS; SUBCUTANEOUS at 16:56

## 2020-01-01 RX ADMIN — Medication 102 MILLIGRAM(S): at 20:07

## 2020-01-01 RX ADMIN — LIDOCAINE 2 PATCH: 4 CREAM TOPICAL at 10:00

## 2020-01-01 RX ADMIN — HYDROMORPHONE HYDROCHLORIDE 2 MILLIGRAM(S): 2 INJECTION INTRAMUSCULAR; INTRAVENOUS; SUBCUTANEOUS at 22:33

## 2020-01-01 RX ADMIN — PREGABALIN 1000 MICROGRAM(S): 225 CAPSULE ORAL at 12:09

## 2020-01-01 RX ADMIN — SODIUM CHLORIDE 200 MILLILITER(S): 9 INJECTION INTRAMUSCULAR; INTRAVENOUS; SUBCUTANEOUS at 20:07

## 2020-01-01 RX ADMIN — HYDROMORPHONE HYDROCHLORIDE 1 MILLIGRAM(S): 2 INJECTION INTRAMUSCULAR; INTRAVENOUS; SUBCUTANEOUS at 12:07

## 2020-01-01 RX ADMIN — SODIUM CHLORIDE 10 MILLILITER(S): 9 INJECTION INTRAMUSCULAR; INTRAVENOUS; SUBCUTANEOUS at 06:11

## 2020-01-01 RX ADMIN — Medication 400 MILLIGRAM(S): at 18:44

## 2020-01-01 RX ADMIN — HYDROMORPHONE HYDROCHLORIDE 2 MILLIGRAM(S): 2 INJECTION INTRAMUSCULAR; INTRAVENOUS; SUBCUTANEOUS at 18:32

## 2020-01-01 RX ADMIN — Medication 4 MILLIGRAM(S): at 06:11

## 2020-01-01 RX ADMIN — PANTOPRAZOLE SODIUM 40 MILLIGRAM(S): 20 TABLET, DELAYED RELEASE ORAL at 07:56

## 2020-01-01 RX ADMIN — Medication 4 MILLIGRAM(S): at 17:04

## 2020-01-01 RX ADMIN — Medication 4 MILLIGRAM(S): at 11:54

## 2020-01-01 RX ADMIN — Medication 4 MILLIGRAM(S): at 11:48

## 2020-01-01 RX ADMIN — HYDROMORPHONE HYDROCHLORIDE 2 MILLIGRAM(S): 2 INJECTION INTRAMUSCULAR; INTRAVENOUS; SUBCUTANEOUS at 12:39

## 2020-01-01 RX ADMIN — HYDROMORPHONE HYDROCHLORIDE 1 MILLIGRAM(S): 2 INJECTION INTRAMUSCULAR; INTRAVENOUS; SUBCUTANEOUS at 12:22

## 2020-01-01 RX ADMIN — HYDROMORPHONE HYDROCHLORIDE 1 MG/HR: 2 INJECTION INTRAMUSCULAR; INTRAVENOUS; SUBCUTANEOUS at 19:11

## 2020-01-01 RX ADMIN — Medication 4 MILLIGRAM(S): at 18:26

## 2020-01-01 RX ADMIN — SENNA PLUS 2 TABLET(S): 8.6 TABLET ORAL at 21:26

## 2020-01-01 RX ADMIN — Medication 4 MILLIGRAM(S): at 06:16

## 2020-01-01 RX ADMIN — Medication 4 MILLIGRAM(S): at 17:03

## 2020-01-01 RX ADMIN — HYDROMORPHONE HYDROCHLORIDE 2 MILLIGRAM(S): 2 INJECTION INTRAMUSCULAR; INTRAVENOUS; SUBCUTANEOUS at 21:17

## 2020-01-01 RX ADMIN — TAMSULOSIN HYDROCHLORIDE 0.4 MILLIGRAM(S): 0.4 CAPSULE ORAL at 22:03

## 2020-01-01 RX ADMIN — Medication 10 MILLIGRAM(S): at 11:58

## 2020-01-01 RX ADMIN — PANTOPRAZOLE SODIUM 40 MILLIGRAM(S): 20 TABLET, DELAYED RELEASE ORAL at 07:27

## 2020-01-01 RX ADMIN — Medication 4 MILLIGRAM(S): at 17:17

## 2020-01-01 RX ADMIN — Medication 4 MILLIGRAM(S): at 12:08

## 2020-01-01 RX ADMIN — HYDROMORPHONE HYDROCHLORIDE 1 MILLIGRAM(S): 2 INJECTION INTRAMUSCULAR; INTRAVENOUS; SUBCUTANEOUS at 23:23

## 2020-01-01 RX ADMIN — Medication 3 MILLILITER(S): at 23:25

## 2020-01-01 RX ADMIN — Medication 175 MICROGRAM(S): at 06:16

## 2020-01-01 RX ADMIN — ATORVASTATIN CALCIUM 20 MILLIGRAM(S): 80 TABLET, FILM COATED ORAL at 22:14

## 2020-01-01 RX ADMIN — CALCITONIN SALMON 290 INTERNATIONAL UNIT(S): 200 INJECTION, SOLUTION INTRAMUSCULAR at 02:18

## 2020-01-01 RX ADMIN — Medication 2: at 17:35

## 2020-01-01 RX ADMIN — HYDROMORPHONE HYDROCHLORIDE 1 MILLIGRAM(S): 2 INJECTION INTRAMUSCULAR; INTRAVENOUS; SUBCUTANEOUS at 08:44

## 2020-01-21 NOTE — ED PROVIDER NOTE - NS ED ROS FT
CONST: no fevers, no chills  EYES: no pain, no vision changes  ENT: no sore throat, no ear pain, no change in hearing  CV: no chest pain, no leg swelling  RESP: no shortness of breath, no cough  ABD: no abdominal pain, no nausea, no vomiting, no diarrhea  : no dysuria, no flank pain, no hematuria  MSK: no back pain, no extremity pain  NEURO: no headache   HEME: no easy bleeding  SKIN:  no rash

## 2020-01-21 NOTE — ED PROVIDER NOTE - PHYSICAL EXAMINATION
GENERAL: awake, lethargic, NAD  HEENT: NC/AT, dry mucous membranes, PERRL  LUNGS: CTAB, no wheezes or crackles   CARDIAC: RRR, no m/r/g  ABDOMEN: Soft, normal BS, non tender, non distended, no rebound, no guarding  EXT: No edema, no calf tenderness, 2+ DP pulses bilaterally, no deformities.  NEURO: A&Ox3. Moving all extremities. Slow to respond to questions.  SKIN: Warm and dry. No rash.  PSYCH: Normal affect.

## 2020-01-21 NOTE — H&P ADULT - PROBLEM SELECTOR PROBLEM 3
Hypercalcemia Anemia, unspecified type Malignant neoplasm metastatic to lung, unspecified laterality

## 2020-01-21 NOTE — ED PROVIDER NOTE - PROGRESS NOTE DETAILS
CTs with multiple new metastatic lesions. Discussed extensively with patient's family who are aware of condition and severity of illness. Will admit to hospitalist.

## 2020-01-21 NOTE — H&P ADULT - PROBLEM SELECTOR PLAN 10
DVT ppx: IVC filter and SCDs  Head elevation, aspiration precaution, and NPO      Transitions of Care Status:  1.  Name of PCP: Dr. Pena  2.  PCP Contacted on Admission: [ ] Y    [ ] N    3.  PCP contacted at Discharge: [ ] Y    [ ] N    [ ] N/A  4.  Post-Discharge Appointment Date and Location:  5.  Summary of Handoff given to PCP: DVT ppx: IVC filter and SCDs  Head elevation, aspiration precaution, and NPO  - large unstageable sacral ulcer. wound care consulted    Transitions of Care Status:  1.  Name of PCP: Dr. Pena  2.  PCP Contacted on Admission: [ ] Y    [ ] N    3.  PCP contacted at Discharge: [ ] Y    [ ] N    [ ] N/A  4.  Post-Discharge Appointment Date and Location:  5.  Summary of Handoff given to PCP:

## 2020-01-21 NOTE — ED ADULT NURSE NOTE - CHPI ED NUR SYMPTOMS NEG
no fever/no loss of consciousness/no dizziness/no confusion/no numbness/no vomiting/no blurred vision/no change in level of consciousness

## 2020-01-21 NOTE — H&P ADULT - PROBLEM SELECTOR PLAN 1
R sided renal cell carcinoma s/p multiple failed chemo regimens and RT/gamma knife to the spine for palliative measures.   -Heme/onc recs appreciated. Plan is to treat the symptoms for now and possible family discussion in the am regarding goals of care. Change in mental status from baseline AAOx2-3-more lethargic and weak today  -Most likely due brain mets and hypercalcemia   -Continue with hypercalcemia treatment as below  -Neurosurgery consulted/ F/w with recs  -Avoid sedative or pain medications for now. Only give pain medication if patient is AAOX3 and is able to ask for pain meds  -Head elevation

## 2020-01-21 NOTE — H&P ADULT - HISTORY OF PRESENT ILLNESS
60 y/o M with PMH of right renal cell carcinoma with mets to the brain, lungs, and bones (s/p radiation and chemotherapy), PE s/p IVC filter, DM type II, BPH with chronic vasquez, GERD, HTN, HLD. and hypothyroidism presented to the ED from the rehab with complaints of diffuse pain and generalized weakness. Patient was lethargic on admission and incoherent due to severe pain/weakness. Most of the history was collected from the chart and his wife. Patient was diagnosed with renal cell carcinoma and received multiple chemotherapy and RT. He had a recent admission at [facility] for PE, hemorrhagic brain mets, and multifocal PNA. He could not receive anticoagulation because of hemorrhagic brain mets. As a result, he underwent IVC filter placement  on 12/16/2019. He was then discharged with a plan to start immunotherapy. He received his last therapy on1/21/2020. Today, he was found to be very lethargic and in severe pain at the rehab, prompting him to be sent to the ED.   In the Ed, his vitals: T98.5 F, HR 80s, /60s, and RR 16 SO2 98% on 2 L of oxygen. His labs were significant for anemia with hgb of 7.2, hypercalcemia with Ca of 11.9. CT scan of the chest, abdomen, and brain showed new progression of disease. Patient was started on steroids and IVF. Hematology and oncology were consulted. 58 y/o M with PMH of right renal cell carcinoma with mets to the brain, lungs, and bones (s/p radiation and chemotherapy), hemorrhagic brain lesions, PE (Dec '19) s/p IVC filter not on a/c, DMII, BPH with chronic vasquez, GERD, HTN, HLD. and hypothyroidism presented to the ED from the rehab with complaints of diffuse pain and generalized weakness. Patient was lethargic on admission and incoherent due to severe pain/weakness. Most of the history was collected from the chart and his wife. Patient was diagnosed with renal cell carcinoma and received multiple chemotherapy and RT. He had a recent admission at [facility] for PE, hemorrhagic brain mets, and multifocal PNA. He could not receive anticoagulation because of hemorrhagic brain mets. As a result, he underwent IVC filter placement  on 12/16/2019. He was then discharged with a plan to start immunotherapy. He received his last therapy on1/21/2020. Today, he was found to be very lethargic and in severe pain at the rehab, prompting him to be sent to the ED. Pt also reports increasing SOB.    In the Ed, his vitals: T98.5 F, HR 80s, /60s, and RR 16 SO2 98% on 2 L of oxygen. His labs were significant for anemia with hgb of 7.2, hypercalcemia with Ca of 11.9. CT scan of the chest, abdomen, and brain showed new progression of disease. Patient was started on steroids and IVF. Hematology and oncology were consulted.

## 2020-01-21 NOTE — H&P ADULT - NSHPLABSRESULTS_GEN_ALL_CORE
7.2    7.76  )-----------( 332      ( 21 Jan 2020 12:30 )             See note    Hgb Trend: 7.2<--  01-21    138  |  90<L>  |  17  ----------------------------<  142<H>  3.9   |  41<H>  |  0.75    Ca    11.9<H>      21 Jan 2020 12:30  Phos  3.2     01-21  Mg     1.9     01-21    TPro  7.5  /  Alb  2.5<L>  /  TBili  0.3  /  DBili  x   /  AST  23  /  ALT  11  /  AlkPhos  84  01-21    Creatinine Trend: 0.75<--  PT/INR - ( 21 Jan 2020 17:02 )   PT: 14.4 sec;   INR: 1.25 ratio         PTT - ( 21 Jan 2020 17:02 )  PTT:33.9 sec          < from: CT Abdomen and Pelvis w/ IV Cont (01.21.20 @ 16:12) >    IMPRESSION:     Chest:  Bilateral pulmonary metastases are increased in size and number, with new complete collapse of the right lung secondary to an obstructing right mainstem bronchus.    Pleural metastases with large right and small left pleural effusion.    Enlarged supraclavicular, mediastinal, and epicardial lymph nodes, increased since prior.    Abdomen/pelvis:  Unchanged right interpolar RCC measuring 7.3 x 6.2 cm.    Increased retroperitoneal lymph nodes.    Increased lytic osseous metastases.        < end of copied text > Personally reviewed imaging, labs, ekg.    7.2    7.76  )-----------( 332      ( 21 Jan 2020 12:30 )             See note    Hgb Trend: 7.2<--  01-21    138  |  90<L>  |  17  ----------------------------<  142<H>  3.9   |  41<H>  |  0.75    Ca    11.9<H>      21 Jan 2020 12:30  Phos  3.2     01-21  Mg     1.9     01-21    TPro  7.5  /  Alb  2.5<L>  /  TBili  0.3  /  DBili  x   /  AST  23  /  ALT  11  /  AlkPhos  84  01-21    Creatinine Trend: 0.75<--  PT/INR - ( 21 Jan 2020 17:02 )   PT: 14.4 sec;   INR: 1.25 ratio         PTT - ( 21 Jan 2020 17:02 )  PTT:33.9 sec          < from: CT Abdomen and Pelvis w/ IV Cont (01.21.20 @ 16:12) >    IMPRESSION:     Chest:  Bilateral pulmonary metastases are increased in size and number, with new complete collapse of the right lung secondary to an obstructing right mainstem bronchus.    Pleural metastases with large right and small left pleural effusion.    Enlarged supraclavicular, mediastinal, and epicardial lymph nodes, increased since prior.    Abdomen/pelvis:  Unchanged right interpolar RCC measuring 7.3 x 6.2 cm.    Increased retroperitoneal lymph nodes.    Increased lytic osseous metastases.        < end of copied text >

## 2020-01-21 NOTE — H&P ADULT - PROBLEM SELECTOR PLAN 5
C/w finasteride and tamsulosin C/w methadone 5 mg daily and lidocaine patch  -Can increase methadone if necessary   -Can get pain meds if patient is awake and alert. C/w methadone 5 mg daily and lidocaine patch  -Can increase methadone if necessary   -Can get IV pain meds if patient is awake and alert.  - Need GOC conversation regarding comfort, pain mgmt. Hgb downtrended to 7.2 from baseline 9. MCV not reported. Most likely mixed picture in the setting of acute and chronic illness.   -CT A/P showed no signs of RP bleed.   -No signs of active bleeding as per documentation from rehab.   -Will monitor for any GI bleeding   -S/p 1 unit of pRBCs. Trend CBC q12 hours.

## 2020-01-21 NOTE — ED ADULT NURSE REASSESSMENT NOTE - NS ED NURSE REASSESS COMMENT FT1
Received report from Ilsa MANZANARES. Pt. arrived with indwelling vasquez catheter from rehab facility. Leg bag changed to gravity drainage bag. 1000 cc's noted in bag. Report called to floor for bed assignment. Safety and comfort measures in placed. NAD.

## 2020-01-21 NOTE — H&P ADULT - PROBLEM SELECTOR PLAN 3
Calcium elevated to 11.9->corrected 13.1   -Most likely due to bone mets and PTHrp secretion   -Started on IVF at 200 cc for 12 hours.   -Trend CMP every 8 hours. Hgb downtrended to 7.2 from baseline 9. MCV not reported. Most likely mixed picture in the setting of acute and chronic illness.   -CT A/P showed no signs of RP bleed.   -No signs of active bleeding as per documentation from rehab.   -Will monitor for any GI bleeding   -S/p 1 unit of pRBCs. Trend CBC q12 hours. Multiple mets to the lung with new complete collapse of the right lung secondary to an obstructing right mainstem bronchus.  - acute hypoxic respiratory failure on RA  -Pleural mets with pleural effusion   -However, sating well on 2 L of oxygen. Maintaining airway so far. Mild respiratory distress.  -C/w home furosemide 40 mg BID   -Continue to monitor respiratory status  - will need pulm consult in AM for possible palliative thoracentesis vs comfort care vs bronchus stenting. Multiple mets to the lung with new complete collapse of the right lung secondary to an obstructing right mainstem bronchus.  - acute hypoxic respiratory failure on RA  -Pleural mets with pleural effusion   -However, sating well on 2 L of oxygen. Maintaining airway so far. Mild respiratory distress.  -Continue to monitor respiratory status  - will need pulm consult in AM for possible palliative thoracentesis vs comfort care vs bronchus stenting.

## 2020-01-21 NOTE — H&P ADULT - PROBLEM SELECTOR PLAN 2
Hgb downtrended to 7.2 from baseline 9. MCV not reported. Most likely mixed picture in the setting of acute and chronic illness.   -CT A/P showed no signs of RP bleed.   -No signs of active bleeding as per documentation from rehab.   -Will monitor for any GI bleeding   -S/p 1 unit of pRBCs. Trend CBC q12 hours. R sided renal cell carcinoma s/p multiple failed chemo regimens and RT/gamma knife to the spine for palliative measures.   -Heme/onc recs appreciated. Plan is to treat the symptoms for now and possible family discussion in the am regarding goals of care.  -Palliative consulted.

## 2020-01-21 NOTE — CONSULT NOTE ADULT - ATTENDING COMMENTS
Agree with above. Pt evaluated at bedside.   64 M w/ rapidly progressive RCC with hemorrhagic brain mets which are growing. Very poor ECOG status. Unlikely to be a candidate for any disease modifying therapy. Will d/w Dr. Pena tomorrow. Started GOC discussion with wife.  Would give Decadron 10 mg x 1 now to see if mental status improves.

## 2020-01-21 NOTE — ED PROVIDER NOTE - OBJECTIVE STATEMENT
60 y/o male with hx HTN, DM, metastatic renal carcinoma presents to the ED c/o weakness for past few days. Is at rehab currently and per wife is having difficulty moving his arms. Patient with known mets to the brain. Also requiring increasing oxygen requirements, on 5L NC in the ED. No fevers, chills, diarrhea, dysuria, 58 y/o male with hx HTN, DM, metastatic renal carcinoma presents to the ED c/o weakness for past few days. Is at rehab currently and per wife is having difficulty moving his arms. Patient with known mets to the brain. Also requiring increasing oxygen requirements, on 5L NC in the ED. No fevers, chills, diarrhea, dysuria, headache.

## 2020-01-21 NOTE — H&P ADULT - PROBLEM SELECTOR PLAN 6
Lantus 20 units at bedtime  -ISS and FS  -NPO for now Mets to the brain. CT scan on 1/21/2020 showed new brain mets with concerning for edema vs bleeding   -may need MR head  w/wo IV contrast for further evaluation as per heme/onc  -Neurosurgery consulted. F/u with recs.   -C/w Iv dexamethasone 4 mg for now. Mets to the brain. CT scan on 1/21/2020 showed new brain mets with concerning for edema and poss bleeding   -may need MR head  w/wo IV contrast for further evaluation as per heme/onc  -Neurosurgery consulted. F/u with recs.   -C/w Iv dexamethasone 4 mg for now.  - no a/c

## 2020-01-21 NOTE — H&P ADULT - PROBLEM SELECTOR PROBLEM 6
Type 2 diabetes mellitus with other specified complication, unspecified whether long term insulin use Metastatic renal cell carcinoma to brain

## 2020-01-21 NOTE — CONSULT NOTE ADULT - SUBJECTIVE AND OBJECTIVE BOX
Hematology/Oncology Consult Note    HPI:  59 year old male with RCC clear cell carcinoma with LN, lung, and brain mets presents to ED from [facility] after he was found to have weakness and was admitted for progressive disease in brain mets.      PAST MEDICAL & SURGICAL HISTORY:  BPH with urinary obstruction  Urinary retention: Hampton catheter placed since 10/2018  HLD (hyperlipidemia)  S/P radiation therapy: spine, on cabozantinib since Nov 2017  Malignant neoplasm of vertebral column  Hypothyroidism  GERD (gastroesophageal reflux disease)  Cervicalgia  Metastatic renal cell carcinoma to brain: lung/spine  Hypertension  Diabetes mellitus: Type 2  H/O kyphoplasty: T8, 11/2018  Status post gamma knife treatment: Left forehead/temple      FAMILY HISTORY:  Family history of colon cancer: Mother  Family history of diabetes mellitus in mother  Family history of diabetes mellitus in father      MEDICATIONS  (STANDING):  acetaminophen  IVPB .. 1000 milliGRAM(s) IV Intermittent once  dexAMETHasone  IVPB 10 milliGRAM(s) IV Intermittent once    MEDICATIONS  (PRN):      Allergies  No Known Allergies    VITAL SIGNS:  Height (cm): 170.18 (01-21 @ 11:28)  Weight (kg): 72.6 (01-21 @ 11:28)  BMI (kg/m2): 25.1 (01-21 @ 11:28)  BSA (m2): 1.84 (01-21 @ 11:28)  T(F): 99.7 (01-21-20 @ 17:35), Max: 99.7 (01-21-20 @ 17:35)  HR: 86 (01-21-20 @ 17:35)  BP: 116/65 (01-21-20 @ 17:35)  RR: 20 (01-21-20 @ 17:35)  SpO2: 98% (01-21-20 @ 17:35)  Wt(kg): --    PHYSICAL EXAMINATION:  Constitutional: On O2   Neck: supple, no masses  Mouth: Dry oral mucosa.   Respiratory: Decreased breath sound in right   Cardiovascular: Normal rate regular rhythm. no murmur  Gastrointestinal: soft and flat, no tenderness to palpation, no masses palpable, normoactive bowel sound, no HSM  Extremities:  No c/c/e  MSK: No joint swelling, erythema, or tenderness   Skin: No rash    LABS:                        7.2    7.76  )-----------( 332      ( 21 Jan 2020 12:30 )             See note    01-21    138  |  90<L>  |  17  ----------------------------<  142<H>  3.9   |  41<H>  |  0.75    Ca    11.9<H>      21 Jan 2020 12:30  Phos  3.2     01-21  Mg     1.9     01-21    TPro  7.5  /  Alb  2.5<L>  /  TBili  0.3  /  DBili  x   /  AST  23  /  ALT  11  /  AlkPhos  84  01-21    PT/INR - ( 21 Jan 2020 17:02 )   PT: 14.4 sec;   INR: 1.25 ratio         PTT - ( 21 Jan 2020 17:02 )  PTT:33.9 sec Magnesium, Serum: 1.9 mg/dL (01-21 @ 12:30)  Phosphorus Level, Serum: 3.2 mg/dL (01-21 @ 12:30)    RADIOLOGY & ADDITIONAL TESTS:  < from: CT Abdomen and Pelvis w/ IV Cont (01.21.20 @ 16:12) >  FINDINGS:    CHEST:     LUNGS AND LARGE AIRWAYS: Complete occlusion of the right mainstem bronchus. Complete atelectasis of the right lung with extensive confluent right-sided pulmonary metastases. Increased size and number of left pulmonary metastases. For reference, a superior segment left lower lobe metastasis measures 2.4 x 2.1 cm (2:46), previously measuring 2.1 x 1.3 cm. Additional left lower lobe pulmonary nodule measures 2.1 x 1.4 cm (2:61), previously 1.0 x 1.0 cm.   PLEURA: Large right and small left pleural effusions. Pleural nodularity and enhancement consistent with pleural metastases.  VESSELS: Coronary artery calcifications.  HEART: Heart size is normal. Trace pericardial fluid.  MEDIASTINUM AND CHAVEZ: Enlarged mediastinal lymph nodes measuring up to 2.8 x 2.3 cm in a right lower paratracheal node. An epicardial lymph node measures 1.7 cm.  CHEST WALL AND LOWER NECK: Enlarged supraclavicular lymph nodes measuring up to 2.1 x 1.4 cm on the right. Posterior rightrib lytic lesion    ABDOMEN AND PELVIS:    LIVER: Within normal limits.  BILE DUCTS: Normal caliber.  GALLBLADDER: Cholelithiasis.  SPLEEN: Within normal limits.  PANCREAS: Within normal limits.  ADRENALS: Within normal limits.  KIDNEYS/URETERS: Unchanged heterogeneous right interpolar RCC measuring 7.3 x 6.2 cm.    BLADDER: Hampton catheter.  REPRODUCTIVE ORGANS: Prostate within normal limits.    BOWEL: No bowel obstruction. Appendix is normal.  PERITONEUM: No ascites.  VESSELS: Infrarenal IVC filter.  RETROPERITONEUM/LYMPH NODES: Increased size and number of retroperitoneal lymph nodes. Reference lesions including a 1.4 cm celiac axis node (2:67). 1.6 cm pancreatic tail lymph node is also new (2:72).  ABDOMINAL WALL: New intramuscular metastases, including in the left gluteal region (series 2, image 126), measuring 0.7 cm.  BONES: T9 vertebral body kyphoplasty. Increased lytic osseous lesions, including in the right posterior eighth rib and the right acetabular roof.    < end of copied text >    < from: CT Head No Cont (01.21.20 @ 16:04) >    IMPRESSION:     Multiple new hyperdense foci within the jeremy, bilateral frontal, left occipital and temporal lobes with surrounding vasogenic edema, concerning for hemorrhagic metastasis.    Dr. Butler discussed these findings with Dr. Rodriguez on 1/21/20203:28 PM, with read back.        < end of copied text >

## 2020-01-21 NOTE — CONSULT NOTE ADULT - ASSESSMENT
59 year old male with RCC clear cell carcinoma with LN, lung, and brain mets presents to ED from [facility] after he was found to have weakness and was admitted for progressive disease in brain mets and hypercalcemia.    # Renal clear cell carcinoma with LNs, lungs, brain mets.     Originally diagnosed in 2015 with right cleear cell renal cell carcinoma with diffuse lung mets, bone, and brain mets s/p Gamma knife in 2015.   Treatment hisotory  Pazopanib in 2/2015 c/b hypertension--> PD in 9/2015   XRT to the spine in 2015.   Sutent in 9/2015 --> PDD  Nivolumab in 5/2016 --> PD in 10/2017  palliative RT to the L hilum  cabozantinib 10/2017 c/b hypertensive urgency requiring BP meds adjustment and dose reduction. Cabozantinib was on and off for decubitus ulcer and eventually was discontinued due to AEs including poor appetite, weight loss, fatigue, and back pain, thigh pain and abdominal pain in 11/2019.  When he was admitted in [facility] in 12/2020, he was found to have hemorrhagic brain mets and discussed Gamma knife but he was discharged to rehab and never had [facility]. This time, he presents worsening weakness and probably has more brain mets (MRI is pending). CT cap also shows right main trach occlusion. Overall, RCC is progressing. His baseline PS is 4.     We have discussed the current situation with pt, pt's wife, and pt's sister (wife and sisters are key persons.) and tumors in the entire body including brain are glowing and causing most of the symptoms. We will treat his symptoms medically but he may not recover enough to continue systemic treatments because many of the symptoms might be from cancer itself. Wife and sister verbalized undersntanding. We will reassess the patient tomorrow and will talk to fam again tomorrow.  -Admit o medicine team  -Brain MRI with and without con  -Dexamethasone 4mg Q6h iv  -We will reassess the patient tomorrow and follow-up with family as well   -Will notify Dr. Becky vazquezin the finding    # PE without DVT s/p IVC filter  Fund in December but after this diagnosis, he was found to have hemorrahgic brain mets and AC was discontinued. IVC filter was placed. No AC since then.   -no AC due to new hemorrhagic brain mets    # Hypercalcemia   he has know bone mets and/or PTHrp related.   -Agree with aggresive IVF  -If his Ca does not improve tomorrow, we can give him pamidronate 90mg IV    The case was discussed with Dr. David Arguello MD, MPH  Hematology/Oncology Fellow  Pager: [phone]

## 2020-01-21 NOTE — H&P ADULT - NSHPPHYSICALEXAM_GEN_ALL_CORE
GENERAL: lethargic, in moderate pain, mumbling.   HEAD:  Atraumatic, Normocephalic  EYES: Conjunctiva and sclera clear  NECK: Supple, No JVD  CHEST/LUNG: decreased airflow. No crackles or wheezing. Tender to palpation over R ribs.   HEART: Regular rate and rhythm; No murmurs, rubs, or gallops  ABDOMEN: Soft, Nontender, Nondistended; Bowel sounds present  EXTREMITIES:  2+ Peripheral Pulses, No clubbing, cyanosis, or edema GENERAL: lethargic, in moderate pain, mumbling.   HEAD:  Atraumatic, Normocephalic. mild facial erythema.  EYES: Conjunctiva and sclera clear  NECK: Supple, No JVD  CHEST/LUNG: decreased airflow. No crackles or wheezing. Tender to palpation over R ribs.   HEART: Regular rate and rhythm; No murmurs, rubs, or gallops  ABDOMEN: Soft, Nontender, Nondistended; Bowel sounds present  EXTREMITIES:  2+ Peripheral Pulses, No clubbing, cyanosis, or edema. 2cm right heel ulcer.  back: large 12 cm unstageable sacral decub.  Psych: Flat affect. pulling at NC.

## 2020-01-21 NOTE — ED ADULT NURSE NOTE - NSIMPLEMENTINTERV_GEN_ALL_ED
Implemented All Fall with Harm Risk Interventions:  [place] to call system. Call bell, personal items and telephone within reach. Instruct patient to call for assistance. Room bathroom lighting operational. Non-slip footwear when patient is off stretcher. Physically safe environment: no spills, clutter or unnecessary equipment. Stretcher in lowest position, wheels locked, appropriate side rails in place. Provide visual cue, wrist band, yellow gown, etc. Monitor gait and stability. Monitor for mental status changes and reorient to person, place, and time. Review medications for side effects contributing to fall risk. Reinforce activity limits and safety measures with patient and family. Provide visual clues: red socks.

## 2020-01-21 NOTE — ED ADULT NURSE NOTE - ED STAT RN HANDOFF DETAILS
Report to Maddie MANZANARES in red, RN aware of Pt's daughter's wish to elope. Report to Maddie MANZANARES in red.

## 2020-01-21 NOTE — ED ADULT NURSE NOTE - OBJECTIVE STATEMENT
Male 59 years old with medical history of Renal cancer on oral chemo was brought in by EMS from [facility] for weakness. As per wife pt is on Rehab and for the last 2 days, pt's right arm is not moving and both legs are weak. Wife reports that he was ambulatory 6 weeks ago and both arms are mobile, able to hold on the bars during therapy. Wife also states that pt's voice change. No chest pain, sob, nausea, vomiting, fever or chills. Reports pain at right upper quadrant radiating to his back. RUQ area tender on palpation. Safety maintained. Will continue to reassess.

## 2020-01-21 NOTE — H&P ADULT - PROBLEM SELECTOR PLAN 7
DVT ppx: IVC filter and SCDs    Transitions of Care Status:  1.  Name of PCP: Dr. Pena  2.  PCP Contacted on Admission: [ ] Y    [ ] N    3.  PCP contacted at Discharge: [ ] Y    [ ] N    [ ] N/A  4.  Post-Discharge Appointment Date and Location:  5.  Summary of Handoff given to PCP: Multiple mets to the lung with new complete collapse of the right lung secondary to an obstructing right mainstem bronchus.  -Pleural mets with pleural effusion   -However, sating well on 2 L of oxygen. Maintaining airway and no acute distress  -C/w home furosemide 40 mg BID   -Continue to monitor respiratory status Multiple mets to the lung with new complete collapse of the right lung secondary to an obstructing right mainstem bronchus.  -Pleural mets with pleural effusion   -However, sating well on 2 L of oxygen. Maintaining airway so far. Mild respiratory distress.  -C/w home furosemide 40 mg BID   -Continue to monitor respiratory status  - will need pulm consult in AM for possible palliative thoracentesis vs comfort care vs bronchus stenting. C/w methadone 5 mg daily and lidocaine patch  -Can increase methadone if necessary   -Can get IV pain meds if patient is awake and alert.  - Need GOC conversation regarding comfort, pain mgmt.

## 2020-01-21 NOTE — H&P ADULT - PROBLEM SELECTOR PLAN 4
Mets to the brain. CT scan on 1/21/2020 showed new brain mets with concerning for edema vs bleeding   -MR/ w/wo IV contrast ordered  -Neurosurgery consulted.   -C/w Iv dexamethasone 4 mg for now. Calcium elevated to 11.9->corrected 13.1   -Most likely due to bone mets and PTHrp secretion   -Started on IVF at 200 cc for 12 hours.   -Calcitonin 4mg/kg one dose stat  -Trend CMP every 8 hours. Calcium elevated to 11.9->corrected 13.1   -Most likely due to bone mets and PTHrp secretion   -Started on IVF at 200 cc for 12 hours.   -Calcitonin 4mg/kg one dose stat  -Trend CMP every 12 hours.

## 2020-01-21 NOTE — ED PROVIDER NOTE - ATTENDING CONTRIBUTION TO CARE
59M, pmh htn, DM, RCC, presents with worsening weakness over last several days. Pt with known brain metastases, now with difficulty moving arms. Pt with increased O2 requirements over that time. No cough, f/c, vomiting, diarrhea.    PE: Chronically ill appearing, NCAT, MMM, Trachea midline, Normal conjunctiva, lungs CTAB, S1/S2 RRR, Normal perfusion, 2+ radial pulses bilat, Abdomen Soft, NTND, No rebound/guarding, No LE edema, No deformity of extremities, No rashes,  No focal motor or sensory deficits.     Concern for worsening brain mets vs metabolic disturbance, lower suspicion infection. Obtain labs, ct head, cxr, and admit. - Roshan Rodriguez MD

## 2020-01-21 NOTE — H&P ADULT - ASSESSMENT
60 y/o M with PMH of right renal cell carcinoma with mets to the brain, lungs, and bones (s/p radiation and chemotherapy), PE s/p IVC filter, DM type II, BPH with chronic vasquez, GERD, HTN, HLD. and hypothyroidism presented to the ED from the rehab with complaints of diffuse pain and generalized weakness, found to have anemia, hypercalcemia, and progression of cancer burden. 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 and hypothyroidism pw diffuse pain and generalized weakness, increased SOB, found to have anemia, hypercalcemia, collapsed right lung, and increasing number of brain and lung mets.

## 2020-01-21 NOTE — H&P ADULT - PROBLEM SELECTOR PLAN 9
Lantus 20 units at bedtime  -ISS and FS  -NPO for now - hold lantus for now  -ISS and FS  -NPO for now

## 2020-01-21 NOTE — ED PROVIDER NOTE - CLINICAL SUMMARY MEDICAL DECISION MAKING FREE TEXT BOX
60 y/o male with hx HTN, DM, metastatic renal carcinoma presents to the ED c/o weakness for past few days. Patient lethargic on exam, slow to respond, marked weakness upper extremities, hypoxic and on 5L O2. Concern for worsening of mets given exam findings, will draw labs and CT head, chest, and pelvis to evaluate for disease. Admit.

## 2020-01-22 NOTE — CONSULT NOTE ADULT - PROBLEM SELECTOR RECOMMENDATION 2
Started back on methadone 5 mgs q8h  started on Dilaudid 0.5mg ivp q3h prn  will monitor usage and effectiveness and adjust accordingly

## 2020-01-22 NOTE — PROGRESS NOTE ADULT - SUBJECTIVE AND OBJECTIVE BOX
SUBJECTIVE: Pt seen, chart reviewed.  60 yo male a/w advanced, metastatic renal cell carcinoma  Reported to have a buttock wound    Allergies    No Known Allergies    Intolerances            PAST MEDICAL & SURGICAL HISTORY:  BPH with urinary obstruction  Urinary retention: Hampton catheter placed since 10/2018  HLD (hyperlipidemia)  S/P radiation therapy: spine, on cabozantinib since Nov 2017  Malignant neoplasm of vertebral column  Hypothyroidism  GERD (gastroesophageal reflux disease)  Cervicalgia  Metastatic renal cell carcinoma to brain: lung/spine  Hypertension  Diabetes mellitus: Type 2  H/O kyphoplasty: T8, 11/2018  Status post gamma knife treatment: Left forehead/temple      HEALTH ISSUES - PROBLEM Dx:  Encounter for palliative care: Encounter for palliative care  Functional quadriplegia: Functional quadriplegia  Pain, neoplasm-related: Pain, neoplasm-related  Pulmonary thromboembolism  Malignant neoplasm metastatic to lung, unspecified laterality: Malignant neoplasm metastatic to lung, unspecified laterality  Pain management: Pain management  Encephalopathy: Encephalopathy  Preventive measure: Preventive measure  Type 2 diabetes mellitus with other specified complication, unspecified whether long term insulin use: Type 2 diabetes mellitus with other specified complication, unspecified whether long term insulin use  BPH with urinary obstruction: BPH with urinary obstruction  Metastatic renal cell carcinoma to brain: Metastatic renal cell carcinoma to brain  Hypercalcemia: Hypercalcemia  Anemia, unspecified type: Anemia, unspecified type  Renal cell carcinoma, unspecified laterality: Renal cell carcinoma, unspecified laterality          FAMILY HISTORY:  Family history of colon cancer: Mother  Family history of diabetes mellitus in mother  Family history of diabetes mellitus in father      Physical Exam:  Vital Signs Last 24 Hrs  T(C): 36.7 (22 Jan 2020 05:57), Max: 37.6 (21 Jan 2020 17:35)  T(F): 98.1 (22 Jan 2020 05:57), Max: 99.7 (21 Jan 2020 17:35)  HR: 88 (22 Jan 2020 05:57) (78 - 89)  BP: 115/61 (22 Jan 2020 05:57) (100/60 - 144/81)  BP(mean): --  RR: 19 (22 Jan 2020 05:57) (16 - 20)  SpO2: 96% (22 Jan 2020 05:57) (88% - 99%  At bedrest, moaning, using nasal O2  Moribund  Obese  Not ambulatory  Unable to turn in bed without assistance  Hampton cath in place  Incontinent of formed stool  Us wound of right buttock extending to midline, no fluctuance , adherent eschar  Honey placed   advise use of Foam  Offload  Prognosis guarded    LABS:                        8.3    8.01  )-----------( 318      ( 21 Jan 2020 21:48 )             28.8     PT/INR - ( 21 Jan 2020 17:02 )   PT: 14.4 sec;   INR: 1.25 ratio         PTT - ( 21 Jan 2020 17:02 )  PTT:33.9 sec      Outpatient follow up information provided- [phone]

## 2020-01-22 NOTE — PROGRESS NOTE ADULT - SUBJECTIVE AND OBJECTIVE BOX
PROGRESS NOTE:   Authoted by Dr. Cata Cuello MD  Pager [phone]     Patient is a 59y old  Male who presents with a chief complaint of generalized weakness and bone pain (22 Jan 2020 09:16)      SUBJECTIVE / OVERNIGHT EVENTS: Patient seen and examined at bedside - patient moaning/groaning from pain, says 10/10 pain in his chest and all over his body, has been going all night. Nothing relieves the pain, movement exacerbates the pain/ +BM, passing gas, reports feeling slightly sob    ADDITIONAL REVIEW OF SYSTEMS: as above    MEDICATIONS  (STANDING):  albuterol/ipratropium for Nebulization 3 milliLiter(s) Nebulizer every 6 hours  atorvastatin 20 milliGRAM(s) Oral at bedtime  cyanocobalamin 1000 MICROGram(s) Oral daily  dexAMETHasone  Injectable 4 milliGRAM(s) IV Push every 6 hours  dextrose 5%. 1000 milliLiter(s) (50 mL/Hr) IV Continuous <Continuous>  dextrose 50% Injectable 12.5 Gram(s) IV Push once  dextrose 50% Injectable 25 Gram(s) IV Push once  dextrose 50% Injectable 25 Gram(s) IV Push once  insulin lispro (HumaLOG) corrective regimen sliding scale   SubCutaneous every 6 hours  levothyroxine 175 MICROGram(s) Oral daily  lidocaine   Patch 2 Patch Transdermal daily  methadone    Tablet 5 milliGRAM(s) Oral three times a day  pantoprazole    Tablet 40 milliGRAM(s) Oral before breakfast  senna 2 Tablet(s) Oral at bedtime  sodium chloride 0.9%. 1000 milliLiter(s) (200 mL/Hr) IV Continuous <Continuous>  tamsulosin 0.4 milliGRAM(s) Oral at bedtime    MEDICATIONS  (PRN):  acetaminophen   Tablet .. 650 milliGRAM(s) Oral every 6 hours PRN Temp greater or equal to 38.5C (101.3F), Mild Pain (1 - 3)  dextrose 40% Gel 15 Gram(s) Oral once PRN Blood Glucose LESS THAN 70 milliGRAM(s)/deciliter  glucagon  Injectable 1 milliGRAM(s) IntraMuscular once PRN Glucose LESS THAN 70 milligrams/deciliter  HYDROmorphone  Injectable 1 milliGRAM(s) IV Push every 3 hours PRN Severe Pain (7 - 10)      CAPILLARY BLOOD GLUCOSE      POCT Blood Glucose.: 204 mg/dL (22 Jan 2020 06:50)  POCT Blood Glucose.: 154 mg/dL (21 Jan 2020 22:04)  POCT Blood Glucose.: 146 mg/dL (21 Jan 2020 11:49)    I&O's Summary    21 Jan 2020 07:01  -  22 Jan 2020 07:00  --------------------------------------------------------  IN: 1600 mL / OUT: 1550 mL / NET: 50 mL        PHYSICAL EXAM:  Vital Signs Last 24 Hrs  T(C): 36.7 (22 Jan 2020 05:57), Max: 37.6 (21 Jan 2020 17:35)  T(F): 98.1 (22 Jan 2020 05:57), Max: 99.7 (21 Jan 2020 17:35)  HR: 88 (22 Jan 2020 05:57) (78 - 89)  BP: 115/61 (22 Jan 2020 05:57) (100/60 - 144/81)  BP(mean): --  RR: 19 (22 Jan 2020 05:57) (16 - 20)  SpO2: 96% (22 Jan 2020 05:57) (88% - 99%)    CONSTITUTIONAL: NAD, in distress due to pain  RESPIRATORY: reduced b/l breath sounds  CARDIOVASCULAR: tachy, normal S1 and S2, no murmur/rub/gallop; No lower extremity edema; Peripheral pulses are 2+ bilaterally  ABDOMEN: distended but soft, +BS, tender all over his body   MUSCLOSKELETAL: no clubbing or cyanosis of digits; no joint swelling or tenderness to palpation  PSYCH: A+O to person, place; affect appropriate  Skin - stage 4 sacral decub    LABS:                        8.3    8.01  )-----------( 318      ( 21 Jan 2020 21:48 )             28.8     01-22    134<L>  |  91<L>  |  20  ----------------------------<  204<H>  4.1   |  33<H>  |  0.68    Ca    10.6<H>      22 Jan 2020 07:10  Phos  3.2     01-21  Mg     1.9     01-21    TPro  7.5  /  Alb  2.5<L>  /  TBili  0.5  /  DBili  x   /  AST  34  /  ALT  15  /  AlkPhos  92  01-22    PT/INR - ( 21 Jan 2020 17:02 )   PT: 14.4 sec;   INR: 1.25 ratio         PTT - ( 21 Jan 2020 17:02 )  PTT:33.9 sec            RADIOLOGY & ADDITIONAL TESTS:  Results Reviewed:   Imaging Personally Reviewed:  Electrocardiogram Personally Reviewed:    COORDINATION OF CARE:  Care Discussed with Consultants/Other Providers [Y/N]: d/w palliative NP - considering for PCU  Prior or Outpatient Records Reviewed [Y/N]:

## 2020-01-22 NOTE — CONSULT NOTE ADULT - SUBJECTIVE AND OBJECTIVE BOX
[facility] DIVISION OF PULMONARY, CRITICAL CARE AND SLEEP MEDICINE  PULMONARY CONSULTATION NOTE  OFFICE NUMBER: [phone]    CHIEF COMPLAINT: Patient is a 59y old  Male who presents with a chief complaint of generalized weakness and bone pain (2020 11:48)      HISTORY OF PRESENT ILLNESS:   59 year old male with right renal cell carcinoma with metastasis to the brain, lungs, and bones (s/p radiation and chemotherapy), hemorrhagic brain lesions, PE (Dec '19) s/p IVC filter not on anticoagulation due to hemorrhagic brain mets, DMII, BPH with chronic vasquez, GERD, HTN, HLD. and hypothyroidism who presents to the ED from the rehab with complaints of diffuse pain and generalized weakness. He has been on immunotherapy for the last few weeks, last on 2020. On admission, found to be very lethargic and in severe pain with increasing shortness of breath. CT       ====================BACKGROUND INFORMATION============================    FAMILY HISTORY: FAMILY HISTORY:  Family history of colon cancer: Mother  Family history of diabetes mellitus in mother  Family history of diabetes mellitus in father      PAST MEDICAL AND SURGICAL HISTORY: PAST MEDICAL & SURGICAL HISTORY:  BPH with urinary obstruction  Urinary retention: Vasquez catheter placed since 10/2018  HLD (hyperlipidemia)  S/P radiation therapy: spine, on cabozantinib since 2017  Malignant neoplasm of vertebral column  Hypothyroidism  GERD (gastroesophageal reflux disease)  Cervicalgia  Metastatic renal cell carcinoma to brain: lung/spine  Hypertension  Diabetes mellitus: Type 2  H/O kyphoplasty: T8, 2018  Status post gamma knife treatment: Left forehead/temple      ALLERGIES:Allergies    No Known Allergies    Intolerances        HOME MEDICATIONS:   atorvastatin 20 mg oral tablet: 1 tab(s) orally once a day (at bedtime) (2020 21:21)  cyanocobalamin 1000 mcg oral tablet: 1 tab(s) orally once a day (2020 21:21)  DuoNeb 0.5 mg-2.5 mg/3 mL inhalation solution: 3 milliliter(s) inhaled every 6 hours (2020 21:21)  finasteride 5 mg oral tablet: 1 tab(s) orally once a day (2020 21:21)  furosemide 40 mg oral tablet: 1 tab(s) orally 2 times a day (2020 21:21)  gabapentin 300 mg oral capsule: 1 cap(s) orally 3 times a day (2020 21:21)  Lantus 100 units/mL subcutaneous solution: 20 unit(s) subcutaneous once a day (in the morning) (2020 21:21)  levothyroxine 175 mcg (0.175 mg) oral tablet: 1 tab(s) orally once a day (:)  methadone 5 mg oral tablet: 1 tab(s) orally 3 times a day (2020 21:21)  pantoprazole 40 mg oral delayed release tablet: 1 tab(s) orally once a day (:)  Senna 8.6 mg oral tablet: 2 tab(s) orally once a day (at bedtime), As Needed (:)  tamsulosin 0.4 mg oral capsule: 2 cap(s) orally once a day (:)  Thera M Plus(ferrous fumarate) 9mg iron-400mcg tablet: 1 tab(s) orally once a day (:21)  Tylenol 325 mg oral tablet: 2 tab(s) orally once a day for 30 days- dressing change (2020 21:)      SOCIAL HISTORY:  TOBACCO USE:  ALCOHOL:  DRUGS:  MARITAL STATUS:  EMPLOYMENT:  EXPOSURES:  RECENT TRAVELS:  PETS:  CODE STATUS:    ====================REVIEW OF SYSTEMS=====================================  CONSTITUTIONAL:  CARDIOVASCULAR:  PULMONARY:  GASTROINTESTINAL:  [] ALL OTHER REVIEW OF SYSTEMS ARE NEGATIVE   [] UNABLE TO OBTAIN REVIEW OF SYSTEMS DUE TO ______________      ====================PHYSICAL EXAM=========================================    VITALS: ICU Vital Signs Last 24 Hrs  T(C): 36.4 (2020 09:55), Max: 37.6 (2020 17:35)  T(F): 97.6 (2020 09:55), Max: 99.7 (2020 17:35)  HR: 96 (2020 09:55) (84 - 96)  BP: 122/74 (2020 09:55) (100/60 - 144/81)  RR: 20 (2020 09:55) (16 - 20)  SpO2: 96% (2020 09:55) (88% - 99%)      INTAKE and OUTPUT: I&O's Summary    2020 07:01  -  2020 07:00  --------------------------------------------------------  IN: 1600 mL / OUT: 1550 mL / NET: 50 mL        WEIGHT: Daily     Daily Weight in k.2 (2020 09:55)    GLUCOSE: CAPILLARY BLOOD GLUCOSE  POCT Blood Glucose.: 215 mg/dL (2020 12:17)        GENERAL:  HEENT:  NECK:   LYMPH NODES:  CARDIOVASCULAR:  PULMONARY:  ABDOMEN:  SKIN:  EXTREMITIES:  NEUROLOGIC:  PSYCHIATRIC:    ====================MEDICATIONS===========================================  MEDICATIONS  (STANDING):  albuterol/ipratropium for Nebulization 3 milliLiter(s) Nebulizer every 6 hours  atorvastatin 20 milliGRAM(s) Oral at bedtime  cyanocobalamin 1000 MICROGram(s) Oral daily  dexAMETHasone  Injectable 4 milliGRAM(s) IV Push every 6 hours  dextrose 5%. 1000 milliLiter(s) (50 mL/Hr) IV Continuous <Continuous>  dextrose 50% Injectable 12.5 Gram(s) IV Push once  dextrose 50% Injectable 25 Gram(s) IV Push once  dextrose 50% Injectable 25 Gram(s) IV Push once  insulin lispro (HumaLOG) corrective regimen sliding scale   SubCutaneous every 6 hours  levothyroxine 175 MICROGram(s) Oral daily  lidocaine   Patch 2 Patch Transdermal daily  methadone    Tablet 5 milliGRAM(s) Oral three times a day  pantoprazole    Tablet 40 milliGRAM(s) Oral before breakfast  senna 2 Tablet(s) Oral at bedtime  sodium chloride 0.9%. 1000 milliLiter(s) (100 mL/Hr) IV Continuous <Continuous>  tamsulosin 0.4 milliGRAM(s) Oral at bedtime      MEDICATIONS  (PRN):  acetaminophen   Tablet .. 650 milliGRAM(s) Oral every 6 hours PRN Temp greater or equal to 38.5C (101.3F), Mild Pain (1 - 3)  dextrose 40% Gel 15 Gram(s) Oral once PRN Blood Glucose LESS THAN 70 milliGRAM(s)/deciliter  glucagon  Injectable 1 milliGRAM(s) IntraMuscular once PRN Glucose LESS THAN 70 milligrams/deciliter  HYDROmorphone  Injectable 1 milliGRAM(s) IV Push every 3 hours PRN Severe Pain (7 - 10)      ====================LABORATORY RESULTS====================================  CBC Full  -  ( 2020 21:48 )  WBC Count : 8.01 K/uL  RBC Count : 3.21 M/uL  Hemoglobin : 8.3 g/dL  Hematocrit : 28.8 %  Platelet Count - Automated : 318 K/uL  Mean Cell Volume : 89.7 fl  Mean Cell Hemoglobin : 25.9 pg  Mean Cell Hemoglobin Concentration : 28.8 gm/dL        134<L>  |  91<L>  |  20  ----------------------------<  204<H>  4.1   |  33<H>  |  0.68    Ca    10.6<H>      2020 07:10  Phos  3.2       Mg     1.9         TPro  7.5  /  Alb  2.5<L>  /  TBili  0.5  /  DBili  x   /  AST  34  /  ALT  15  /  AlkPhos  92  -22        PT/INR - ( 2020 17:02 )   PT: 14.4 sec;   INR: 1.25 ratio         PTT - ( 2020 17:02 )  PTT:33.9 sec    Creatinine Trend: 0.68<--, 0.73<--, 0.75<--      ====================RADIOLOGY and ECHOCARDIOGRAPHY=======================    CXR:    CT CHEST: Reviewed by me.    ECHOCARDIOGRAPHY:    POINT OF CARE ULTRASOUND: [facility] DIVISION OF PULMONARY, CRITICAL CARE AND SLEEP MEDICINE  PULMONARY CONSULTATION NOTE  OFFICE NUMBER: [phone]    CHIEF COMPLAINT: Patient is a 59y old  Male who presents with a chief complaint of generalized weakness and bone pain (2020 11:48)      HISTORY OF PRESENT ILLNESS:   59 year old male with right renal cell carcinoma with metastasis to the brain, lungs, and bones (s/p radiation and chemotherapy), hemorrhagic brain lesions, PE (Dec '19) s/p IVC filter not on anticoagulation due to hemorrhagic brain mets, DMII, BPH with chronic vasquez, GERD, HTN, HLD. and hypothyroidism who presents to the ED from the rehab with complaints of diffuse pain and generalized weakness. He has been on immunotherapy for the last few weeks, last on 2020. On admission, found to be very lethargic and in severe pain with increasing shortness of breath. CT chest shows progressive metastasis with new right mainstem occlusion with resultant atelectasis and pleural effusion.    ====================BACKGROUND INFORMATION============================    FAMILY HISTORY: FAMILY HISTORY:  Family history of colon cancer: Mother  Family history of diabetes mellitus in mother  Family history of diabetes mellitus in father      PAST MEDICAL AND SURGICAL HISTORY: PAST MEDICAL & SURGICAL HISTORY:  BPH with urinary obstruction  Urinary retention: Vasquez catheter placed since 10/2018  HLD (hyperlipidemia)  S/P radiation therapy: spine, on cabozantinib since 2017  Malignant neoplasm of vertebral column  Hypothyroidism  GERD (gastroesophageal reflux disease)  Cervicalgia  Metastatic renal cell carcinoma to brain: lung/spine  Hypertension  Diabetes mellitus: Type 2  H/O kyphoplasty: T8, 2018  Status post gamma knife treatment: Left forehead/temple      ALLERGIES:Allergies    No Known Allergies    Intolerances        HOME MEDICATIONS:   atorvastatin 20 mg oral tablet: 1 tab(s) orally once a day (at bedtime) (2020 21:21)  cyanocobalamin 1000 mcg oral tablet: 1 tab(s) orally once a day (2020 21:21)  DuoNeb 0.5 mg-2.5 mg/3 mL inhalation solution: 3 milliliter(s) inhaled every 6 hours (2020 21:21)  finasteride 5 mg oral tablet: 1 tab(s) orally once a day (:)  furosemide 40 mg oral tablet: 1 tab(s) orally 2 times a day (:)  gabapentin 300 mg oral capsule: 1 cap(s) orally 3 times a day (:)  Lantus 100 units/mL subcutaneous solution: 20 unit(s) subcutaneous once a day (in the morning) (:)  levothyroxine 175 mcg (0.175 mg) oral tablet: 1 tab(s) orally once a day (:)  methadone 5 mg oral tablet: 1 tab(s) orally 3 times a day (:)  pantoprazole 40 mg oral delayed release tablet: 1 tab(s) orally once a day (:)  Senna 8.6 mg oral tablet: 2 tab(s) orally once a day (at bedtime), As Needed (:)  tamsulosin 0.4 mg oral capsule: 2 cap(s) orally once a day (:)  Thera M Plus(ferrous fumarate) 9mg iron-400mcg tablet: 1 tab(s) orally once a day (:)  Tylenol 325 mg oral tablet: 2 tab(s) orally once a day for 30 days- dressing change (:)      SOCIAL HISTORY:  TOBACCO USE:  ALCOHOL:  DRUGS:  MARITAL STATUS:  EMPLOYMENT:  EXPOSURES:  RECENT TRAVELS:  PETS:  CODE STATUS:    ====================REVIEW OF SYSTEMS=====================================  CONSTITUTIONAL:  CARDIOVASCULAR:  PULMONARY:  GASTROINTESTINAL:  [] ALL OTHER REVIEW OF SYSTEMS ARE NEGATIVE   [] UNABLE TO OBTAIN REVIEW OF SYSTEMS DUE TO ______________      ====================PHYSICAL EXAM=========================================    VITALS: ICU Vital Signs Last 24 Hrs  T(C): 36.4 (2020 09:55), Max: 37.6 (2020 17:35)  T(F): 97.6 (2020 09:55), Max: 99.7 (2020 17:35)  HR: 96 (2020 09:55) (84 - 96)  BP: 122/74 (2020 09:55) (100/60 - 144/81)  RR: 20 (2020 09:55) (16 - 20)  SpO2: 96% (2020 09:55) (88% - 99%)      INTAKE and OUTPUT: I&O's Summary    2020 07:01  -  2020 07:00  --------------------------------------------------------  IN: 1600 mL / OUT: 1550 mL / NET: 50 mL        WEIGHT: Daily     Daily Weight in k.2 (2020 09:55)    GLUCOSE: CAPILLARY BLOOD GLUCOSE  POCT Blood Glucose.: 215 mg/dL (2020 12:17)        GENERAL:  HEENT:  NECK:   LYMPH NODES:  CARDIOVASCULAR:  PULMONARY:  ABDOMEN:  SKIN:  EXTREMITIES:  NEUROLOGIC:  PSYCHIATRIC:    ====================MEDICATIONS===========================================  MEDICATIONS  (STANDING):  albuterol/ipratropium for Nebulization 3 milliLiter(s) Nebulizer every 6 hours  atorvastatin 20 milliGRAM(s) Oral at bedtime  cyanocobalamin 1000 MICROGram(s) Oral daily  dexAMETHasone  Injectable 4 milliGRAM(s) IV Push every 6 hours  dextrose 5%. 1000 milliLiter(s) (50 mL/Hr) IV Continuous <Continuous>  dextrose 50% Injectable 12.5 Gram(s) IV Push once  dextrose 50% Injectable 25 Gram(s) IV Push once  dextrose 50% Injectable 25 Gram(s) IV Push once  insulin lispro (HumaLOG) corrective regimen sliding scale   SubCutaneous every 6 hours  levothyroxine 175 MICROGram(s) Oral daily  lidocaine   Patch 2 Patch Transdermal daily  methadone    Tablet 5 milliGRAM(s) Oral three times a day  pantoprazole    Tablet 40 milliGRAM(s) Oral before breakfast  senna 2 Tablet(s) Oral at bedtime  sodium chloride 0.9%. 1000 milliLiter(s) (100 mL/Hr) IV Continuous <Continuous>  tamsulosin 0.4 milliGRAM(s) Oral at bedtime      MEDICATIONS  (PRN):  acetaminophen   Tablet .. 650 milliGRAM(s) Oral every 6 hours PRN Temp greater or equal to 38.5C (101.3F), Mild Pain (1 - 3)  dextrose 40% Gel 15 Gram(s) Oral once PRN Blood Glucose LESS THAN 70 milliGRAM(s)/deciliter  glucagon  Injectable 1 milliGRAM(s) IntraMuscular once PRN Glucose LESS THAN 70 milligrams/deciliter  HYDROmorphone  Injectable 1 milliGRAM(s) IV Push every 3 hours PRN Severe Pain (7 - 10)      ====================LABORATORY RESULTS====================================  CBC Full  -  ( 2020 21:48 )  WBC Count : 8.01 K/uL  RBC Count : 3.21 M/uL  Hemoglobin : 8.3 g/dL  Hematocrit : 28.8 %  Platelet Count - Automated : 318 K/uL  Mean Cell Volume : 89.7 fl  Mean Cell Hemoglobin : 25.9 pg  Mean Cell Hemoglobin Concentration : 28.8 gm/dL        134<L>  |  91<L>  |  20  ----------------------------<  204<H>  4.1   |  33<H>  |  0.68    Ca    10.6<H>      2020 07:10  Phos  3.2       Mg     1.9         TPro  7.5  /  Alb  2.5<L>  /  TBili  0.5  /  DBili  x   /  AST  34  /  ALT  15  /  AlkPhos  92          PT/INR - ( 2020 17:02 )   PT: 14.4 sec;   INR: 1.25 ratio         PTT - ( 2020 17:02 )  PTT:33.9 sec    Creatinine Trend: 0.68<--, 0.73<--, 0.75<--      ====================RADIOLOGY and ECHOCARDIOGRAPHY=======================    CT chest: Reviewed by me    < from: CT Chest w/ IV Cont (20 @ 15:16) >  EXAM:  CT CHEST IC                          EXAM:  CT ABDOMEN AND PELVIS IC                            PROCEDURE DATE:  2020            INTERPRETATION:  CLINICAL INFORMATION: Weakness. Abdominal pain. Renal cancer.    COMPARISON: CT chest 2019. CT abdomen pelvis 2019.    PROCEDURE:   CT of the Chest, Abdomen and Pelvis was performed with intravenous contrast.   Intravenous contrast: 90 ml Omnipaque 350. 10 ml discarded.  Oral contrast: None.  Sagittal and coronal reformats wereperformed.    FINDINGS:    CHEST:     LUNGS AND LARGE AIRWAYS: Complete occlusion of the right mainstem bronchus. Complete atelectasis of the right lung with extensive confluent right-sided pulmonary metastases. Increased size and number of left pulmonary metastases. For reference, a superior segment left lower lobe metastasis measures 2.4 x 2.1 cm (2:46), previously measuring 2.1 x 1.3 cm. Additional left lower lobe pulmonary nodule measures 2.1 x 1.4 cm (2:61), previously 1.0 x 1.0 cm.   PLEURA: Large right and small left pleural effusions. Pleural nodularity and enhancement consistent with pleural metastases.  VESSELS: Coronary artery calcifications.  HEART: Heart size is normal. Trace pericardial fluid.  MEDIASTINUM AND CHAVEZ: Enlarged mediastinal lymph nodes measuring up to 2.8 x 2.3 cm in a right lower paratracheal node. An epicardial lymph node measures 1.7 cm.  CHEST WALL AND LOWER NECK: Enlarged supraclavicular lymph nodes measuring up to 2.1 x 1.4 cm on the right. Posterior rightrib lytic lesion    ABDOMEN AND PELVIS:    LIVER: Within normal limits.  BILE DUCTS: Normal caliber.  GALLBLADDER: Cholelithiasis.  SPLEEN: Within normal limits.  PANCREAS: Within normal limits.  ADRENALS: Within normal limits.  KIDNEYS/URETERS: Unchanged heterogeneous right interpolar RCC measuring 7.3 x 6.2 cm.    BLADDER: Vasquez catheter.  REPRODUCTIVE ORGANS: Prostate within normal limits.    BOWEL: No bowel obstruction. Appendix is normal.  PERITONEUM: No ascites.  VESSELS: Infrarenal IVC filter.  RETROPERITONEUM/LYMPH NODES: Increased size and number of retroperitoneal lymph nodes. Reference lesions including a 1.4 cm celiac axis node (2:67). 1.6 cm pancreatic tail lymph node is also new (2:72).  ABDOMINAL WALL: New intramuscular metastases, including in the left gluteal region (series 2, image 126), measuring 0.7 cm.  BONES: T9 vertebral body kyphoplasty. Increased lytic osseous lesions, including in the right posterior eighth rib and the right acetabular roof.    IMPRESSION:     < end of copied text > [facility] DIVISION OF PULMONARY, CRITICAL CARE AND SLEEP MEDICINE  PULMONARY CONSULTATION NOTE  OFFICE NUMBER: [phone]    CHIEF COMPLAINT: Patient is a 59y old  Male who presents with a chief complaint of generalized weakness and bone pain (2020 11:48)      HISTORY OF PRESENT ILLNESS:   59 year old male with right renal cell carcinoma with metastasis to the brain, lungs, and bones (s/p radiation and chemotherapy), hemorrhagic brain lesions, PE (Dec '19) s/p IVC filter not on anticoagulation due to hemorrhagic brain mets, DMII, BPH with chronic vasquez, GERD, HTN, HLD. and hypothyroidism who presents to the ED from the rehab with complaints of diffuse pain and generalized weakness. He has been on immunotherapy for the last few weeks, last on 2020. On admission, found to be very lethargic and in severe pain with increasing shortness of breath. CT chest shows progressive metastasis with new right mainstem occlusion with resultant atelectasis and pleural effusion.    ====================BACKGROUND INFORMATION============================    FAMILY HISTORY: FAMILY HISTORY:  Family history of colon cancer: Mother  Family history of diabetes mellitus in mother  Family history of diabetes mellitus in father      PAST MEDICAL AND SURGICAL HISTORY: PAST MEDICAL & SURGICAL HISTORY:  BPH with urinary obstruction  Urinary retention: Vasquez catheter placed since 10/2018  HLD (hyperlipidemia)  S/P radiation therapy: spine, on cabozantinib since 2017  Malignant neoplasm of vertebral column  Hypothyroidism  GERD (gastroesophageal reflux disease)  Cervicalgia  Metastatic renal cell carcinoma to brain: lung/spine  Hypertension  Diabetes mellitus: Type 2  H/O kyphoplasty: T8, 2018  Status post gamma knife treatment: Left forehead/temple      ALLERGIES:Allergies    No Known Allergies    Intolerances        HOME MEDICATIONS:   atorvastatin 20 mg oral tablet: 1 tab(s) orally once a day (at bedtime) (2020 21:21)  cyanocobalamin 1000 mcg oral tablet: 1 tab(s) orally once a day (2020 21:21)  DuoNeb 0.5 mg-2.5 mg/3 mL inhalation solution: 3 milliliter(s) inhaled every 6 hours (2020 21:21)  finasteride 5 mg oral tablet: 1 tab(s) orally once a day (:)  furosemide 40 mg oral tablet: 1 tab(s) orally 2 times a day (:)  gabapentin 300 mg oral capsule: 1 cap(s) orally 3 times a day (:)  Lantus 100 units/mL subcutaneous solution: 20 unit(s) subcutaneous once a day (in the morning) (:)  levothyroxine 175 mcg (0.175 mg) oral tablet: 1 tab(s) orally once a day (:)  methadone 5 mg oral tablet: 1 tab(s) orally 3 times a day (:)  pantoprazole 40 mg oral delayed release tablet: 1 tab(s) orally once a day (:)  Senna 8.6 mg oral tablet: 2 tab(s) orally once a day (at bedtime), As Needed (:)  tamsulosin 0.4 mg oral capsule: 2 cap(s) orally once a day (:)  Thera M Plus(ferrous fumarate) 9mg iron-400mcg tablet: 1 tab(s) orally once a day (:)  Tylenol 325 mg oral tablet: 2 tab(s) orally once a day for 30 days- dressing change (:)      SOCIAL HISTORY:  TOBACCO USE:  ALCOHOL:  DRUGS:  MARITAL STATUS:  EMPLOYMENT:  EXPOSURES:  RECENT TRAVELS:  PETS:  CODE STATUS:    ====================REVIEW OF SYSTEMS=====================================  CONSTITUTIONAL:  CARDIOVASCULAR:  PULMONARY:  GASTROINTESTINAL:  [] ALL OTHER REVIEW OF SYSTEMS ARE NEGATIVE   [] UNABLE TO OBTAIN REVIEW OF SYSTEMS DUE TO ______________      ====================PHYSICAL EXAM=========================================    VITALS: ICU Vital Signs Last 24 Hrs  T(C): 36.4 (2020 09:55), Max: 37.6 (2020 17:35)  T(F): 97.6 (2020 09:55), Max: 99.7 (2020 17:35)  HR: 96 (2020 09:55) (84 - 96)  BP: 122/74 (2020 09:55) (100/60 - 144/81)  RR: 20 (2020 09:55) (16 - 20)  SpO2: 96% (2020 09:55) (88% - 99%)      INTAKE and OUTPUT: I&O's Summary    2020 07:01  -  2020 07:00  --------------------------------------------------------  IN: 1600 mL / OUT: 1550 mL / NET: 50 mL        WEIGHT: Daily     Daily Weight in k.2 (2020 09:55)    GLUCOSE: CAPILLARY BLOOD GLUCOSE  POCT Blood Glucose.: 215 mg/dL (2020 12:17)      GENERAL: Awake, alert. Sitting in bed. No overt distress  HEENT: PERRL  CARDIOVASCULAR: S1S2 + RRR  PULMONARY: Absent breath sounds on right, decreased a  ABDOMEN:  SKIN:  EXTREMITIES:  NEUROLOGIC:  PSYCHIATRIC:    ====================MEDICATIONS===========================================  MEDICATIONS  (STANDING):  albuterol/ipratropium for Nebulization 3 milliLiter(s) Nebulizer every 6 hours  atorvastatin 20 milliGRAM(s) Oral at bedtime  cyanocobalamin 1000 MICROGram(s) Oral daily  dexAMETHasone  Injectable 4 milliGRAM(s) IV Push every 6 hours  dextrose 5%. 1000 milliLiter(s) (50 mL/Hr) IV Continuous <Continuous>  dextrose 50% Injectable 12.5 Gram(s) IV Push once  dextrose 50% Injectable 25 Gram(s) IV Push once  dextrose 50% Injectable 25 Gram(s) IV Push once  insulin lispro (HumaLOG) corrective regimen sliding scale   SubCutaneous every 6 hours  levothyroxine 175 MICROGram(s) Oral daily  lidocaine   Patch 2 Patch Transdermal daily  methadone    Tablet 5 milliGRAM(s) Oral three times a day  pantoprazole    Tablet 40 milliGRAM(s) Oral before breakfast  senna 2 Tablet(s) Oral at bedtime  sodium chloride 0.9%. 1000 milliLiter(s) (100 mL/Hr) IV Continuous <Continuous>  tamsulosin 0.4 milliGRAM(s) Oral at bedtime      MEDICATIONS  (PRN):  acetaminophen   Tablet .. 650 milliGRAM(s) Oral every 6 hours PRN Temp greater or equal to 38.5C (101.3F), Mild Pain (1 - 3)  dextrose 40% Gel 15 Gram(s) Oral once PRN Blood Glucose LESS THAN 70 milliGRAM(s)/deciliter  glucagon  Injectable 1 milliGRAM(s) IntraMuscular once PRN Glucose LESS THAN 70 milligrams/deciliter  HYDROmorphone  Injectable 1 milliGRAM(s) IV Push every 3 hours PRN Severe Pain (7 - 10)      ====================LABORATORY RESULTS====================================  CBC Full  -  ( 2020 21:48 )  WBC Count : 8.01 K/uL  RBC Count : 3.21 M/uL  Hemoglobin : 8.3 g/dL  Hematocrit : 28.8 %  Platelet Count - Automated : 318 K/uL  Mean Cell Volume : 89.7 fl  Mean Cell Hemoglobin : 25.9 pg  Mean Cell Hemoglobin Concentration : 28.8 gm/dL        134<L>  |  91<L>  |  20  ----------------------------<  204<H>  4.1   |  33<H>  |  0.68    Ca    10.6<H>      2020 07:10  Phos  3.2       Mg     1.9         TPro  7.5  /  Alb  2.5<L>  /  TBili  0.5  /  DBili  x   /  AST  34  /  ALT  15  /  AlkPhos  92  01-22        PT/INR - ( 2020 17:02 )   PT: 14.4 sec;   INR: 1.25 ratio         PTT - ( 2020 17:02 )  PTT:33.9 sec    Creatinine Trend: 0.68<--, 0.73<--, 0.75<--      ====================RADIOLOGY and ECHOCARDIOGRAPHY=======================    CT chest: Reviewed by me    < from: CT Chest w/ IV Cont (20 @ 15:16) >  EXAM:  CT CHEST IC                          EXAM:  CT ABDOMEN AND PELVIS IC                            PROCEDURE DATE:  2020            INTERPRETATION:  CLINICAL INFORMATION: Weakness. Abdominal pain. Renal cancer.    COMPARISON: CT chest 2019. CT abdomen pelvis 2019.    PROCEDURE:   CT of the Chest, Abdomen and Pelvis was performed with intravenous contrast.   Intravenous contrast: 90 ml Omnipaque 350. 10 ml discarded.  Oral contrast: None.  Sagittal and coronal reformats wereperformed.    FINDINGS:    CHEST:     LUNGS AND LARGE AIRWAYS: Complete occlusion of the right mainstem bronchus. Complete atelectasis of the right lung with extensive confluent right-sided pulmonary metastases. Increased size and number of left pulmonary metastases. For reference, a superior segment left lower lobe metastasis measures 2.4 x 2.1 cm (2:46), previously measuring 2.1 x 1.3 cm. Additional left lower lobe pulmonary nodule measures 2.1 x 1.4 cm (2:61), previously 1.0 x 1.0 cm.   PLEURA: Large right and small left pleural effusions. Pleural nodularity and enhancement consistent with pleural metastases.  VESSELS: Coronary artery calcifications.  HEART: Heart size is normal. Trace pericardial fluid.  MEDIASTINUM AND CHAVEZ: Enlarged mediastinal lymph nodes measuring up to 2.8 x 2.3 cm in a right lower paratracheal node. An epicardial lymph node measures 1.7 cm.  CHEST WALL AND LOWER NECK: Enlarged supraclavicular lymph nodes measuring up to 2.1 x 1.4 cm on the right. Posterior rightrib lytic lesion    ABDOMEN AND PELVIS:    LIVER: Within normal limits.  BILE DUCTS: Normal caliber.  GALLBLADDER: Cholelithiasis.  SPLEEN: Within normal limits.  PANCREAS: Within normal limits.  ADRENALS: Within normal limits.  KIDNEYS/URETERS: Unchanged heterogeneous right interpolar RCC measuring 7.3 x 6.2 cm.    BLADDER: Vasquez catheter.  REPRODUCTIVE ORGANS: Prostate within normal limits.    BOWEL: No bowel obstruction. Appendix is normal.  PERITONEUM: No ascites.  VESSELS: Infrarenal IVC filter.  RETROPERITONEUM/LYMPH NODES: Increased size and number of retroperitoneal lymph nodes. Reference lesions including a 1.4 cm celiac axis node (2:67). 1.6 cm pancreatic tail lymph node is also new (2:72).  ABDOMINAL WALL: New intramuscular metastases, including in the left gluteal region (series 2, image 126), measuring 0.7 cm.  BONES: T9 vertebral body kyphoplasty. Increased lytic osseous lesions, including in the right posterior eighth rib and the right acetabular roof.    IMPRESSION:     < end of copied text > [facility] DIVISION OF PULMONARY, CRITICAL CARE AND SLEEP MEDICINE  PULMONARY CONSULTATION NOTE  OFFICE NUMBER: [phone]    CHIEF COMPLAINT: Patient is a 59y old  Male who presents with a chief complaint of generalized weakness and bone pain (2020 11:48)      HISTORY OF PRESENT ILLNESS:   59 year old male with right renal cell carcinoma with metastasis to the brain, lungs, and bones (s/p radiation and chemotherapy), hemorrhagic brain lesions, PE (Dec '19) s/p IVC filter not on anticoagulation due to hemorrhagic brain mets, DMII, BPH with chronic vasquez, GERD, HTN, HLD. and hypothyroidism who presents to the ED from the rehab with complaints of diffuse pain and generalized weakness. He has been on immunotherapy for the last few weeks, last on 2020. On admission, found to be very lethargic and in severe pain with increasing shortness of breath. CT chest shows progressive metastasis with new right mainstem occlusion with resultant atelectasis and pleural effusion.    ====================BACKGROUND INFORMATION============================    FAMILY HISTORY: FAMILY HISTORY:  Family history of colon cancer: Mother  Family history of diabetes mellitus in mother  Family history of diabetes mellitus in father      PAST MEDICAL AND SURGICAL HISTORY: PAST MEDICAL & SURGICAL HISTORY:  BPH with urinary obstruction  Urinary retention: Vasquez catheter placed since 10/2018  HLD (hyperlipidemia)  S/P radiation therapy: spine, on cabozantinib since 2017  Malignant neoplasm of vertebral column  Hypothyroidism  GERD (gastroesophageal reflux disease)  Cervicalgia  Metastatic renal cell carcinoma to brain: lung/spine  Hypertension  Diabetes mellitus: Type 2  H/O kyphoplasty: T8, 2018  Status post gamma knife treatment: Left forehead/temple      ALLERGIES:Allergies    No Known Allergies    Intolerances        HOME MEDICATIONS:   atorvastatin 20 mg oral tablet: 1 tab(s) orally once a day (at bedtime) (2020 21:21)  cyanocobalamin 1000 mcg oral tablet: 1 tab(s) orally once a day (2020 21:21)  DuoNeb 0.5 mg-2.5 mg/3 mL inhalation solution: 3 milliliter(s) inhaled every 6 hours (2020 21:21)  finasteride 5 mg oral tablet: 1 tab(s) orally once a day (:)  furosemide 40 mg oral tablet: 1 tab(s) orally 2 times a day (2020 21:)  gabapentin 300 mg oral capsule: 1 cap(s) orally 3 times a day (:)  Lantus 100 units/mL subcutaneous solution: 20 unit(s) subcutaneous once a day (in the morning) (:)  levothyroxine 175 mcg (0.175 mg) oral tablet: 1 tab(s) orally once a day (:)  methadone 5 mg oral tablet: 1 tab(s) orally 3 times a day (:)  pantoprazole 40 mg oral delayed release tablet: 1 tab(s) orally once a day (:)  Senna 8.6 mg oral tablet: 2 tab(s) orally once a day (at bedtime), As Needed (:)  tamsulosin 0.4 mg oral capsule: 2 cap(s) orally once a day (:)  Thera M Plus(ferrous fumarate) 9mg iron-400mcg tablet: 1 tab(s) orally once a day (:)  Tylenol 325 mg oral tablet: 2 tab(s) orally once a day for 30 days- dressing change (:)      SOCIAL HISTORY:   TOBACCO USE: Denies  ALCOHOL: Former  CODE STATUS: DNR    ====================REVIEW OF SYSTEMS=====================================  CONSTITUTIONAL: No fevers, chills. Generalized weakness  CARDIOVASCULAR: No chest pain  PULMONARY: Shortness of breath - improved since admission  [x] ALL OTHER REVIEW OF SYSTEMS ARE NEGATIVE       ====================PHYSICAL EXAM=========================================    VITALS: ICU Vital Signs Last 24 Hrs  T(C): 36.4 (2020 09:55), Max: 37.6 (2020 17:35)  T(F): 97.6 (2020 09:55), Max: 99.7 (2020 17:35)  HR: 96 (2020 09:55) (84 - 96)  BP: 122/74 (2020 09:55) (100/60 - 144/81)  RR: 20 (2020 09:55) (16 - 20)  SpO2: 96% (2020 09:55) (88% - 99%)      INTAKE and OUTPUT: I&O's Summary    2020 07:01  -  2020 07:00  --------------------------------------------------------  IN: 1600 mL / OUT: 1550 mL / NET: 50 mL        WEIGHT: Daily     Daily Weight in k.2 (2020 09:55)    GLUCOSE: CAPILLARY BLOOD GLUCOSE  POCT Blood Glucose.: 215 mg/dL (2020 12:17)      GENERAL: Awake, alert. Sitting in bed. No overt distress  HEENT: PERRL  CARDIOVASCULAR: S1S2 + RRR  PULMONARY: Diminished breath sounds on right. No wheezing, rales  ABDOMEN: soft, NTND  EXTREMITIES: 1 + pitting edema   NEUROLOGIC: Awake, alert  PSYCHIATRIC: Flat affect    ====================MEDICATIONS===========================================  MEDICATIONS  (STANDING):  albuterol/ipratropium for Nebulization 3 milliLiter(s) Nebulizer every 6 hours  atorvastatin 20 milliGRAM(s) Oral at bedtime  cyanocobalamin 1000 MICROGram(s) Oral daily  dexAMETHasone  Injectable 4 milliGRAM(s) IV Push every 6 hours  dextrose 5%. 1000 milliLiter(s) (50 mL/Hr) IV Continuous <Continuous>  dextrose 50% Injectable 12.5 Gram(s) IV Push once  dextrose 50% Injectable 25 Gram(s) IV Push once  dextrose 50% Injectable 25 Gram(s) IV Push once  insulin lispro (HumaLOG) corrective regimen sliding scale   SubCutaneous every 6 hours  levothyroxine 175 MICROGram(s) Oral daily  lidocaine   Patch 2 Patch Transdermal daily  methadone    Tablet 5 milliGRAM(s) Oral three times a day  pantoprazole    Tablet 40 milliGRAM(s) Oral before breakfast  senna 2 Tablet(s) Oral at bedtime  sodium chloride 0.9%. 1000 milliLiter(s) (100 mL/Hr) IV Continuous <Continuous>  tamsulosin 0.4 milliGRAM(s) Oral at bedtime      MEDICATIONS  (PRN):  acetaminophen   Tablet .. 650 milliGRAM(s) Oral every 6 hours PRN Temp greater or equal to 38.5C (101.3F), Mild Pain (1 - 3)  dextrose 40% Gel 15 Gram(s) Oral once PRN Blood Glucose LESS THAN 70 milliGRAM(s)/deciliter  glucagon  Injectable 1 milliGRAM(s) IntraMuscular once PRN Glucose LESS THAN 70 milligrams/deciliter  HYDROmorphone  Injectable 1 milliGRAM(s) IV Push every 3 hours PRN Severe Pain (7 - 10)      ====================LABORATORY RESULTS====================================  CBC Full  -  ( 2020 21:48 )  WBC Count : 8.01 K/uL  RBC Count : 3.21 M/uL  Hemoglobin : 8.3 g/dL  Hematocrit : 28.8 %  Platelet Count - Automated : 318 K/uL  Mean Cell Volume : 89.7 fl  Mean Cell Hemoglobin : 25.9 pg  Mean Cell Hemoglobin Concentration : 28.8 gm/dL        134<L>  |  91<L>  |  20  ----------------------------<  204<H>  4.1   |  33<H>  |  0.68    Ca    10.6<H>      2020 07:10  Phos  3.2       Mg     1.9         TPro  7.5  /  Alb  2.5<L>  /  TBili  0.5  /  DBili  x   /  AST  34  /  ALT  15  /  AlkPhos  92  01-22        PT/INR - ( 2020 17:02 )   PT: 14.4 sec;   INR: 1.25 ratio         PTT - ( 2020 17:02 )  PTT:33.9 sec    Creatinine Trend: 0.68<--, 0.73<--, 0.75<--      ====================RADIOLOGY and ECHOCARDIOGRAPHY=======================    CT chest: Reviewed by me    < from: CT Chest w/ IV Cont (20 @ 15:16) >  EXAM:  CT CHEST IC                          EXAM:  CT ABDOMEN AND PELVIS IC                            PROCEDURE DATE:  2020            INTERPRETATION:  CLINICAL INFORMATION: Weakness. Abdominal pain. Renal cancer.    COMPARISON: CT chest 2019. CT abdomen pelvis 2019.    PROCEDURE:   CT of the Chest, Abdomen and Pelvis was performed with intravenous contrast.   Intravenous contrast: 90 ml Omnipaque 350. 10 ml discarded.  Oral contrast: None.  Sagittal and coronal reformats wereperformed.    FINDINGS:    CHEST:     LUNGS AND LARGE AIRWAYS: Complete occlusion of the right mainstem bronchus. Complete atelectasis of the right lung with extensive confluent right-sided pulmonary metastases. Increased size and number of left pulmonary metastases. For reference, a superior segment left lower lobe metastasis measures 2.4 x 2.1 cm (2:46), previously measuring 2.1 x 1.3 cm. Additional left lower lobe pulmonary nodule measures 2.1 x 1.4 cm (2:61), previously 1.0 x 1.0 cm.   PLEURA: Large right and small left pleural effusions. Pleural nodularity and enhancement consistent with pleural metastases.  VESSELS: Coronary artery calcifications.  HEART: Heart size is normal. Trace pericardial fluid.  MEDIASTINUM AND CHAVEZ: Enlarged mediastinal lymph nodes measuring up to 2.8 x 2.3 cm in a right lower paratracheal node. An epicardial lymph node measures 1.7 cm.  CHEST WALL AND LOWER NECK: Enlarged supraclavicular lymph nodes measuring up to 2.1 x 1.4 cm on the right. Posterior rightrib lytic lesion    ABDOMEN AND PELVIS:    LIVER: Within normal limits.  BILE DUCTS: Normal caliber.  GALLBLADDER: Cholelithiasis.  SPLEEN: Within normal limits.  PANCREAS: Within normal limits.  ADRENALS: Within normal limits.  KIDNEYS/URETERS: Unchanged heterogeneous right interpolar RCC measuring 7.3 x 6.2 cm.    BLADDER: Vasquez catheter.  REPRODUCTIVE ORGANS: Prostate within normal limits.    BOWEL: No bowel obstruction. Appendix is normal.  PERITONEUM: No ascites.  VESSELS: Infrarenal IVC filter.  RETROPERITONEUM/LYMPH NODES: Increased size and number of retroperitoneal lymph nodes. Reference lesions including a 1.4 cm celiac axis node (2:67). 1.6 cm pancreatic tail lymph node is also new (2:72).  ABDOMINAL WALL: New intramuscular metastases, including in the left gluteal region (series 2, image 126), measuring 0.7 cm.  BONES: T9 vertebral body kyphoplasty. Increased lytic osseous lesions, including in the right posterior eighth rib and the right acetabular roof.    IMPRESSION:     < end of copied text >

## 2020-01-22 NOTE — CONSULT NOTE ADULT - REASON FOR ADMISSION
generalized weakness and bone pain
generalized weakness and bone pain
weakness
generalized weakness and bone pain

## 2020-01-22 NOTE — PROGRESS NOTE ADULT - PROBLEM SELECTOR PLAN 1
MS appears at baseline alert and oriented x2-3  -Most likely due brain mets and hypercalcemia   -Neurosurgery consulted called yesterday but has not seen (re-call, although doubt any intervention)  c/w dexamethasone IV 4mg q6

## 2020-01-22 NOTE — CONSULT NOTE ADULT - ASSESSMENT
58 y/o M with PMH of right renal cell carcinoma with mets to the brain, lungs, and bones (s/p radiation and chemotherapy), hemorrhagic brain lesions, PE s/p IVC filter not on a/c, DMII, BPH with chronic vasquez presented to the ED from the rehab with complaints of diffuse pain and generalized weakness.  palliative care called for GOC and pain management.

## 2020-01-22 NOTE — PROGRESS NOTE ADULT - PROBLEM SELECTOR PLAN 2
R sided renal cell carcinoma s/p multiple failed chemo regimens and RT/gamma knife to the spine for palliative measures.   -Heme/onc recs appreciated. Plan is to treat the symptoms. GOC discussions ongoing  -Palliative consult pending

## 2020-01-22 NOTE — PROGRESS NOTE ADULT - PROBLEM SELECTOR PLAN 5
Hgb downtrended to 7.2 from baseline 9. MCV not reported. Most likely mixed picture in the setting of acute and chronic illness.   -CT A/P showed no signs of RP bleed.   -No signs of active bleeding as per documentation from rehab.   -Will monitor for any GI bleeding   -S/p 1 unit of pRBCs. Trend CBC

## 2020-01-22 NOTE — CONSULT NOTE ADULT - PROBLEM SELECTOR RECOMMENDATION 4
Met with pt at bedside. Pt  moaning with "pain everywhere"  restarted on Methadone and given Dilaudid prn.. Will monitor usage and results.  Called wife at home with no answer unable to leave message.  Will return to bedside to speak with wife later today.

## 2020-01-22 NOTE — PROGRESS NOTE ADULT - PROBLEM SELECTOR PLAN 3
Multiple mets to the lung with new complete collapse of the right lung secondary to an obstructing right mainstem bronchus.  - acute hypoxic respiratory failure on RA  -Pleural mets with pleural effusion   -However, sating well on 2 L of oxygen. Maintaining airway so far. Mild respiratory distress.  -Continue to monitor respiratory status  - pulm eval called for possible palliative thoracentesis

## 2020-01-22 NOTE — PATIENT PROFILE ADULT - FALL HARM RISK
Confusion; generalized weakness/bones(Osteoporosis,prev fx,steroid use,metastatic bone ca)/other/coagulation(Bleeding disorder R/T clinical cond/anti-coags)

## 2020-01-22 NOTE — PROGRESS NOTE ADULT - PROBLEM SELECTOR PLAN 10
DVT ppx: IVC filter and SCDs  Head elevation, aspiration precaution, and NPO  - large unstageable sacral ulcer. wound care consulted

## 2020-01-22 NOTE — PROGRESS NOTE ADULT - PROBLEM SELECTOR PLAN 4
Calcium elevated to 11.9->corrected 13.1   -Most likely due to bone mets and PTHrp secretion   -c/w aggressive IVF, Ca improved but still elevated  -Calcitonin 4mg/kg one dose given  -Trend CMP every 12 hours.  Will d/w onc regarding pamidronate infussion

## 2020-01-22 NOTE — PROGRESS NOTE ADULT - ASSESSMENT
59 year old male with RCC clear cell carcinoma with LN, lung, and brain mets presents to ED from [facility] after he was found to have weakness and was admitted for progressive disease in brain mets and hypercalcemia.    # Renal clear cell carcinoma with LNs, lungs, brain mets.     Originally diagnosed in 2015 with right cleear cell renal cell carcinoma with diffuse lung mets, bone, and brain mets s/p Gamma knife in 2015.   Treatment hisotory  Pazopanib in 2/2015 c/b hypertension--> PD in 9/2015   XRT to the spine in 2015.   Sutent in 9/2015 --> PDD  Nivolumab in 5/2016 --> PD in 10/2017  palliative RT to the L hilum  cabozantinib 10/2017 c/b hypertensive urgency requiring BP meds adjustment and dose reduction. Cabozantinib was on and off for decubitus ulcer and eventually was discontinued due to AEs including poor appetite, weight loss, fatigue, and back pain, thigh pain and abdominal pain in 11/2019.  When he was admitted in [facility] in 12/2020, he was found to have hemorrhagic brain mets and discussed Gamma knife but he was discharged to rehab and never had [facility]. This time, he presents worsening weakness and probably has more brain mets (MRI is pending). CT cap also shows right main trach occlusion. Overall, RCC is progressing. His baseline PS is 4.     We discussed the case with his primary oncologist, Dr. Pena, and due to progression of disease and poor PS he is not a candidate for further systemic therapy. We had a meeting with his wife outside because pt seems to be delirious and restless. Pt's sister is also on the phone. We told her that his RCC is progressing and giving further systemic therapy will give him harm without any clinical benefit. At this moment, our focus of treatment is on symptom management and because he needs 24hr care, PCU might be a good option. We introduced PCU and palliative care and notified that the palliative care team will come by and talk more in details. We updated our conversation to Luciana Rosario (PA) from pallitative care. She will talk to his family today.   -Please hold off MRI brain  -F/U [facility] meeting with palliative care team  -Ongoing [facility] discussion  -Symptom management    # PE without DVT s/p IVC filter  Fund in December but after this diagnosis, he was found to have hemorrahgic brain mets and AC was discontinued. IVC filter was placed. No AC since then.   -no AC due to new hemorrhagic brain mets    # Hypercalcemia   he has know bone mets and/or PTHrp related.     The case was discussed with Dr. David Arguello MD, MPH  Hematology/Oncology Fellow  Pager: [phone]

## 2020-01-22 NOTE — PROGRESS NOTE ADULT - PROBLEM SELECTOR PLAN 7
Increase methadone to 5 mg 3x/day per prior admission and lidocaine patch  start IV dilaudid 1mg IV q3hrs PRN pain, may need drip  Palliative follow up

## 2020-01-22 NOTE — CONSULT NOTE ADULT - SUBJECTIVE AND OBJECTIVE BOX
p (1480)     HPI:  60 y/o M with PMH of right renal cell carcinoma with mets to the brain, lungs, and bones (s/p radiation and chemotherapy), hemorrhagic brain lesions, PE (Dec '19) s/p IVC filter not on a/c, DMII, BPH with chronic vasquez, GERD, HTN, HLD. and hypothyroidism presented to the ED from the rehab with complaints of diffuse pain and generalized weakness. Patient was lethargic on admission and incoherent due to severe pain/weakness. Most of the history was collected from the chart and his wife. Patient was diagnosed with renal cell carcinoma and received multiple chemotherapy and RT. He had a recent admission at [facility] for PE, hemorrhagic brain mets, and multifocal PNA. He could not receive anticoagulation because of hemorrhagic brain mets. As a result, he underwent IVC filter placement  on 12/16/2019. He was then discharged with a plan to start immunotherapy. He received his last therapy on1/21/2020. Today, he was found to be very lethargic and in severe pain at the rehab, prompting him to be sent to the ED. Pt also reports increasing SOB.    In the Ed, his vitals: T98.5 F, HR 80s, /60s, and RR 16 SO2 98% on 2 L of oxygen. His labs were significant for anemia with hgb of 7.2, hypercalcemia with Ca of 11.9. CT scan of the chest, abdomen, and brain showed new progression of disease. Patient was started on steroids and IVF. Hematology and oncology were consulted. (21 Jan 2020 20:44)      Exam: Awake, oriented to person and wife only. PERRL, EOMI, face appears symmetric. At least antigravity with LUE, RUE swollen and patient has minimal movement distally, 2/5 strength in bilateral LEs    --Anticoagulation:    =====================  PAST MEDICAL HISTORY   BPH with urinary obstruction  Urinary retention  HLD (hyperlipidemia)  S/P radiation therapy  Malignant neoplasm of vertebral column  Hypothyroidism  GERD (gastroesophageal reflux disease)  Cervicalgia  Metastatic renal cell carcinoma to brain  Hypertension  Diabetes mellitus    PAST SURGICAL HISTORY   H/O kyphoplasty  History of coronary artery stent placement  Status post gamma knife treatment  No significant past surgical history        MEDICATIONS:  Antibiotics:    Neuro:  acetaminophen   Tablet .. 650 milliGRAM(s) Oral every 6 hours PRN  HYDROmorphone  Injectable 1 milliGRAM(s) IV Push every 3 hours PRN  methadone    Tablet 5 milliGRAM(s) Oral three times a day    Other:  albuterol/ipratropium for Nebulization 3 milliLiter(s) Nebulizer every 6 hours  atorvastatin 20 milliGRAM(s) Oral at bedtime  cyanocobalamin 1000 MICROGram(s) Oral daily  dexAMETHasone  Injectable 4 milliGRAM(s) IV Push every 6 hours  dextrose 40% Gel 15 Gram(s) Oral once PRN  dextrose 5%. 1000 milliLiter(s) IV Continuous <Continuous>  dextrose 50% Injectable 12.5 Gram(s) IV Push once  dextrose 50% Injectable 25 Gram(s) IV Push once  dextrose 50% Injectable 25 Gram(s) IV Push once  glucagon  Injectable 1 milliGRAM(s) IntraMuscular once PRN  insulin lispro (HumaLOG) corrective regimen sliding scale   SubCutaneous every 6 hours  levothyroxine 175 MICROGram(s) Oral daily  pantoprazole    Tablet 40 milliGRAM(s) Oral before breakfast  senna 2 Tablet(s) Oral at bedtime  sodium chloride 0.9%. 1000 milliLiter(s) IV Continuous <Continuous>  tamsulosin 0.4 milliGRAM(s) Oral at bedtime      SOCIAL HISTORY:   Occupation:   Marital Status:     FAMILY HISTORY:  Family history of colon cancer  Family history of diabetes mellitus in mother  Family history of coronary artery disease  Family history of diabetes mellitus in father      ROS: Negative except per HPI    LABS:  PT/INR - ( 21 Jan 2020 17:02 )   PT: 14.4 sec;   INR: 1.25 ratio         PTT - ( 21 Jan 2020 17:02 )  PTT:33.9 sec                        8.3    8.01  )-----------( 318      ( 21 Jan 2020 21:48 )             28.8     01-22    134<L>  |  91<L>  |  20  ----------------------------<  204<H>  4.1   |  33<H>  |  0.68    Ca    10.6<H>      22 Jan 2020 07:10  Phos  3.2     01-21  Mg     1.9     01-21    TPro  7.5  /  Alb  2.5<L>  /  TBili  0.5  /  DBili  x   /  AST  34  /  ALT  15  /  AlkPhos  92  01-22

## 2020-01-22 NOTE — CONSULT NOTE ADULT - SUBJECTIVE AND OBJECTIVE BOX
HPI:  58 y/o M with PMH of right renal cell carcinoma with mets to the brain, lungs, and bones (s/p radiation and chemotherapy), hemorrhagic brain lesions, PE (Dec '19) s/p IVC filter not on a/c, DMII, BPH with chronic vasquez, GERD, HTN, HLD. and hypothyroidism presented to the ED from the rehab with complaints of diffuse pain and generalized weakness. Patient was lethargic on admission and incoherent due to severe pain/weakness. Most of the history was collected from the chart and his wife. Patient was diagnosed with renal cell carcinoma and received multiple chemotherapy and RT. He had a recent admission at [facility] for PE, hemorrhagic brain mets, and multifocal PNA. He could not receive anticoagulation because of hemorrhagic brain mets. As a result, he underwent IVC filter placement  on 12/16/2019. He was then discharged with a plan to start immunotherapy. He received his last therapy on1/21/2020. Today, he was found to be very lethargic and in severe pain at the rehab, prompting him to be sent to the ED. Pt also reports increasing SOB.    In the Ed, his vitals: T98.5 F, HR 80s, /60s, and RR 16 SO2 98% on 2 L of oxygen. His labs were significant for anemia with hgb of 7.2, hypercalcemia with Ca of 11.9. CT scan of the chest, abdomen, and brain showed new progression of disease. Patient was started on steroids and IVF. Hematology and oncology were consulted. (21 Jan 2020 20:44)    PERTINENT PM/SXH:   BPH with urinary obstruction  BPH without obstruction/lower urinary tract symptoms  Urinary retention  HLD (hyperlipidemia)  S/P radiation therapy  Malignant neoplasm of vertebral column  Hypothyroidism  GERD (gastroesophageal reflux disease)  Elevated TSH  Cervicalgia  Metastatic renal cell carcinoma to brain  Metastatic carcinoma to bone  Coronary artery disease  Hypertension  Metastatic renal cell carcinoma  Diabetes mellitus  Cancer  No pertinent past medical history    H/O kyphoplasty  History of coronary artery stent placement  Status post gamma knife treatment  No significant past surgical history    FAMILY HISTORY:  Family history of colon cancer: Mother  Family history of diabetes mellitus in mother  Family history of diabetes mellitus in father    ITEMS NOT CHECKED ARE NOT PRESENT    SOCIAL HISTORY:   Significant other/partner Rukhmin [ ]  Children Enma[ ]  Mormon/Spirituality:  Substance hx:  [ ]   Tobacco hx:  [ ]   Alcohol hx: [ ]   Home Opioid hx:  [ ] I-Stop Reference No:  Living Situation: [x ]Home  [ ]Long term care  [ ]Rehab [ ]Other    ADVANCE DIRECTIVES:    DNR  Yes  MOLST  [x ]    Living Will  [ ]     DECISION MAKER(s):  [ ] Health Care Proxy(s)  [x ] Surrogate(s)  [ ] Guardian           Name(s): Phone Number(s): Salbador [phone]    BASELINE (I)ADL(s) (prior to admission):  [place]: [ ]Total  [x ] Moderate [ ]Dependent    Allergies    No Known Allergies    Intolerances    MEDICATIONS  (STANDING):  albuterol/ipratropium for Nebulization 3 milliLiter(s) Nebulizer every 6 hours  atorvastatin 20 milliGRAM(s) Oral at bedtime  cyanocobalamin 1000 MICROGram(s) Oral daily  dexAMETHasone  Injectable 4 milliGRAM(s) IV Push every 6 hours  dextrose 5%. 1000 milliLiter(s) (50 mL/Hr) IV Continuous <Continuous>  dextrose 50% Injectable 12.5 Gram(s) IV Push once  dextrose 50% Injectable 25 Gram(s) IV Push once  dextrose 50% Injectable 25 Gram(s) IV Push once  insulin lispro (HumaLOG) corrective regimen sliding scale   SubCutaneous every 6 hours  levothyroxine 175 MICROGram(s) Oral daily  lidocaine   Patch 2 Patch Transdermal daily  methadone    Tablet 5 milliGRAM(s) Oral three times a day  pantoprazole    Tablet 40 milliGRAM(s) Oral before breakfast  senna 2 Tablet(s) Oral at bedtime  sodium chloride 0.9%. 1000 milliLiter(s) (100 mL/Hr) IV Continuous <Continuous>  tamsulosin 0.4 milliGRAM(s) Oral at bedtime    MEDICATIONS  (PRN):  acetaminophen   Tablet .. 650 milliGRAM(s) Oral every 6 hours PRN Temp greater or equal to 38.5C (101.3F), Mild Pain (1 - 3)  dextrose 40% Gel 15 Gram(s) Oral once PRN Blood Glucose LESS THAN 70 milliGRAM(s)/deciliter  glucagon  Injectable 1 milliGRAM(s) IntraMuscular once PRN Glucose LESS THAN 70 milligrams/deciliter  HYDROmorphone  Injectable 1 milliGRAM(s) IV Push every 3 hours PRN Severe Pain (7 - 10)    PRESENT SYMPTOMS: [ ]Unable to obtain due to poor mentation   Source if other than patient:  [ ]Family   [ ]Team     Pain: [x ]yes [ ]no   QOL impact - moaning in pain  Location -    "all over"                Aggravating factors - movement  Quality - sharp, aching, sore  Radiation - everywhere  Timing- pt unable  Severity (0-10 scale):  Minimal acceptable level (0-10 scale):     PAIN AD Score:     [email or web address] (press ctrl +  left click to view)    Dyspnea:                           [x ]Mild [ ]Moderate [ ]Severe  Anxiety:                             [x ]Mild [ ]Moderate [ ]Severe  Fatigue:                             [ ]Mild [x ]Moderate [ ]Severe  Nausea:                            [ ]Mild [ ]Moderate [ ]Severe  Loss of appetite:               [ ]Mild [x ]Moderate [ ]Severe  Constipation:                    [ ]Mild [ ]Moderate [ ]Severe    Other Symptoms:  [ ]All other review of systems negative     Palliative Performance Status Version 2:     30    %    [email or web address]  PHYSICAL EXAM:  Vital Signs Last 24 Hrs  T(C): 36.7 (22 Jan 2020 05:57), Max: 37.6 (21 Jan 2020 17:35)  T(F): 98.1 (22 Jan 2020 05:57), Max: 99.7 (21 Jan 2020 17:35)  HR: 88 (22 Jan 2020 05:57) (78 - 89)  BP: 115/61 (22 Jan 2020 05:57) (100/60 - 144/81)  BP(mean): --  RR: 19 (22 Jan 2020 05:57) (16 - 20)  SpO2: 96% (22 Jan 2020 05:57) (88% - 99%) I&O's Summary    21 Jan 2020 07:01  -  22 Jan 2020 07:00  --------------------------------------------------------  IN: 1600 mL / OUT: 1550 mL / NET: 50 mL      GENERAL:  [x ]Alert  [x ]Oriented x   [ ]Lethargic  [ ]Cachexia  [ ]Unarousable  [ x]Verbal  [ ]Non-Verbal    Behavioral:   [ ] Anxiety  [ ] Delirium [ ] Agitation [ ] Other    HEENT:  [ ]Normal   [x ]Dry mouth   [ ]ET Tube/Trach  [ ]Oral lesions    PULMONARY: pt splinting,  taking shallow breaths 2/2 to pain  [ ]Clear [ ]Tachypnea  [ ]Audible excessive secretions   [ ]Rhonchi        [ ]Right [ ]Left [ ]Bilateral  [ ]Crackles        [ ]Right [ ]Left [ ]Bilateral  [ ]Wheezing     [ ]Right [ ]Left [ ]Bilatera  [x ]Diminished breath sounds [ ]right [ ]left [ x]bilateral    CARDIOVASCULAR:    [x ]Regular [ ]Irregular [ ]Tachy  [ ]Kin [ ]Murmur [ ]Other    GASTROINTESTINAL:  [x ]Soft  [ ]Distended   [x ]+BS  [x ]Non tender [ ]Tender  [ ]PEG [ ]OGT/ NGT  Last BM:     GENITOURINARY:  [ ]Normal [ ] Incontinent   [ ]Oliguria/Anuria   [x ]Vasquez    [ ]External cath    MUSCULOSKELETAL:   [ ]Normal   [x ]Weakness  [ x]Bed/Wheelchair bound [ ]Edema    NEUROLOGIC:   [x ]No focal deficits  [ ]Cognitive impairment  [ ]Dysphagia [ ]Dysarthria [ ]Paresis [ ]Other     SKIN:   [ ]Normal    [ ]Rash  [x ]Pressure ulcer(s)       Present on admission [x ]y [ ]n   [x] Sacral and R heel ulcer POA    CRITICAL CARE:  [ ]Shock Present  [ ]Septic [ ]Cardiogenic [ ]Neurologic [ ]Hypovolemic  [ ]  Vasopressors [ ]  Inotropes   [ ]Respiratory failure present [ ]Mechanical ventilation [ ]Non-invasive ventilatory support [ ]High flow  [ ]Acute  [ ]Chronic [ ]Hypoxic  [ ]Hypercarbic [ ]Other  [ ]Other organ failure     LABS:                        8.3    8.01  )-----------( 318      ( 21 Jan 2020 21:48 )             28.8   01-22    134<L>  |  91<L>  |  20  ----------------------------<  204<H>  4.1   |  33<H>  |  0.68    Ca    10.6<H>      22 Jan 2020 07:10  Phos  3.2     01-21  Mg     1.9     01-21    TPro  7.5  /  Alb  2.5<L>  /  TBili  0.5  /  DBili  x   /  AST  34  /  ALT  15  /  AlkPhos  92  01-22  PT/INR - ( 21 Jan 2020 17:02 )   PT: 14.4 sec;   INR: 1.25 ratio         PTT - ( 21 Jan 2020 17:02 )  PTT:33.9 sec      RADIOLOGY & ADDITIONAL STUDIES:    < from: CT Abdomen and Pelvis w/ IV Cont (01.21.20 @ 16:12) >  EXAM:  CT CHEST IC                          EXAM:  CT ABDOMEN AND PELVIS IC                            PROCEDURE DATE:  01/21/2020            INTERPRETATION:  CLINICAL INFORMATION: Weakness. Abdominal pain. Renal cancer.    COMPARISON: CT chest 12/20/2019. CT abdomen pelvis 9/13/2019.    PROCEDURE:   CT of the Chest, Abdomen and Pelvis was performed with intravenous contrast.   Intravenous contrast: 90 ml Omnipaque 350. 10 ml discarded.  Oral contrast: None.  Sagittal and coronal reformats wereperformed.    FINDINGS:    CHEST:     LUNGS AND LARGE AIRWAYS: Complete occlusion of the right mainstem bronchus. Complete atelectasis of the right lung with extensive confluent right-sided pulmonary metastases. Increased size and number of left pulmonary metastases. For reference, a superior segment left lower lobe metastasis measures 2.4 x 2.1 cm (2:46), previously measuring 2.1 x 1.3 cm. Additional left lower lobe pulmonary nodule measures 2.1 x 1.4 cm (2:61), previously 1.0 x 1.0 cm.   PLEURA: Large right and small left pleural effusions. Pleural nodularity and enhancement consistent with pleural metastases.  VESSELS: Coronary artery calcifications.  HEART: Heart size is normal. Trace pericardial fluid.  MEDIASTINUM AND CHAVEZ: Enlarged mediastinal lymph nodes measuring up to 2.8 x 2.3 cm in a right lower paratracheal node. An epicardial lymph node measures 1.7 cm.  CHEST WALL AND LOWER NECK: Enlarged supraclavicular lymph nodes measuring up to 2.1 x 1.4 cm on the right. Posterior rightrib lytic lesion    ABDOMEN AND PELVIS:    LIVER: Within normal limits.  BILE DUCTS: Normal caliber.  GALLBLADDER: Cholelithiasis.  SPLEEN: Within normal limits.  PANCREAS: Within normal limits.  ADRENALS: Within normal limits.  KIDNEYS/URETERS: Unchanged heterogeneous right interpolar RCC measuring 7.3 x 6.2 cm.    BLADDER: Vasquez catheter.  REPRODUCTIVE ORGANS: Prostate within normal limits.    BOWEL: No bowel obstruction. Appendix is normal.  PERITONEUM: No ascites.  VESSELS: Infrarenal IVC filter.  RETROPERITONEUM/LYMPH NODES: Increased size and number of retroperitoneal lymph nodes. Reference lesions including a 1.4 cm celiac axis node (2:67). 1.6 cm pancreatic tail lymph node is also new (2:72).  ABDOMINAL WALL: New intramuscular metastases, including in the left gluteal region (series 2, image 126), measuring 0.7 cm.  BONES: T9 vertebral body kyphoplasty. Increased lytic osseous lesions, including in the right posterior eighth rib and the right acetabular roof.    IMPRESSION:     Chest:  Bilateral pulmonary metastases are increased in size and number, with new complete collapse of the right lung secondary to an obstructing right mainstem bronchus.    Pleural metastases with large right and small left pleural effusion.    Enlarged supraclavicular, mediastinal, and epicardial lymph nodes, increased since prior.    Abdomen/pelvis:  Unchanged right interpolar RCC measuring 7.3 x 6.2 cm.    Increased retroperitoneal lymph nodes.    Increased lytic osseous metastases.    < end of copied text >      PROTEIN CALORIE MALNUTRITION PRESENT: [ ]mild [ ]moderate [ ]severe [ ]underweight [ ]morbid obesity  https://www.andeal.org/vault/2440/web/files/ONC/Table_Clinical%20Characteristics%20to%20Document%20Malnutrition-White%20JV%20et%20al%720489.pdf    Height (cm): 170.18 (01-21-20 @ 11:28), 167.6 (12-08-19 @ 23:15), 167.64 (07-15-19 @ 12:30)  Weight (kg): 72.6 (01-21-20 @ 11:28), 82.554 (12-08-19 @ 21:26), 82.9 (12-06-19 @ 15:11)  BMI (kg/m2): 25.1 (01-21-20 @ 11:28), 29.4 (12-08-19 @ 23:15), 29.4 (12-08-19 @ 21:26)    [ ]PPSV2 < or = to 30% [ ]significant weight loss  [ ]poor nutritional intake  [ ]anasarca     Albumin, Serum: 2.5 g/dL (01-22-20 @ 07:10)   [ ]Artificial Nutrition      REFERRALS:   [ ]Chaplaincy  [ ]Hospice  [ ]Child Life  [ ]Social Work  [ ]Case management [ ]Holistic Therapy     Goals of Care Document:

## 2020-01-22 NOTE — CONSULT NOTE ADULT - ASSESSMENT
59M w/metastatic RCC with CT evidence of new mets to the brain    Plan:  - Dex4q6 59M w/metastatic RCC with CT evidence of new mets to the brain    Plan:  - No neurosurgical intervention for this patient given the patient's overall clinical picture  - Dex4q6 at this time  - MRI w/wo of the brain would be recommended to evaluate the mets, however given the plan of care for this patient, we will defer to primary team whether they want this to be further worked up  - Prognosis poor per primary team. Patient's wife states that they are likely transitioning towards palliative  - In regards to the dexamethasone, we will defer to oncology team to manage the timing and dosing of this

## 2020-01-22 NOTE — PROGRESS NOTE ADULT - SUBJECTIVE AND OBJECTIVE BOX
Hematology/Oncology Follow-up    INTERVAL HPI/OVERNIGHT EVENTS:  Admitted last night    VITAL SIGNS:  T(F): 98.1 (01-22-20 @ 05:57)  HR: 88 (01-22-20 @ 05:57)  BP: 115/61 (01-22-20 @ 05:57)  RR: 19 (01-22-20 @ 05:57)  SpO2: 96% (01-22-20 @ 05:57)  Wt(kg): --    01-21-20 @ 07:01  -  01-22-20 @ 07:00  --------------------------------------------------------  IN: 1600 mL / OUT: 1550 mL / NET: 50 mL        PHYSICAL EXAM:  Constitutional: On O2   Neck: supple, no masses  Mouth: Dry oral mucosa.   Respiratory: Decreased breath sound in right   Cardiovascular: Normal rate regular rhythm. no murmur  Gastrointestinal: soft and flat, no tenderness to palpation, no masses palpable, normoactive bowel sound, no HSM  Extremities:  No c/c/e  MSK: No joint swelling, erythema, or tenderness   Skin: No rash    MEDICATIONS  (STANDING):  albuterol/ipratropium for Nebulization 3 milliLiter(s) Nebulizer every 6 hours  atorvastatin 20 milliGRAM(s) Oral at bedtime  cyanocobalamin 1000 MICROGram(s) Oral daily  dexAMETHasone  Injectable 4 milliGRAM(s) IV Push every 6 hours  dextrose 5%. 1000 milliLiter(s) (50 mL/Hr) IV Continuous <Continuous>  dextrose 50% Injectable 12.5 Gram(s) IV Push once  dextrose 50% Injectable 25 Gram(s) IV Push once  dextrose 50% Injectable 25 Gram(s) IV Push once  insulin lispro (HumaLOG) corrective regimen sliding scale   SubCutaneous every 6 hours  levothyroxine 175 MICROGram(s) Oral daily  lidocaine   Patch 2 Patch Transdermal daily  methadone    Tablet 5 milliGRAM(s) Oral three times a day  pantoprazole    Tablet 40 milliGRAM(s) Oral before breakfast  senna 2 Tablet(s) Oral at bedtime  sodium chloride 0.9%. 1000 milliLiter(s) (200 mL/Hr) IV Continuous <Continuous>  tamsulosin 0.4 milliGRAM(s) Oral at bedtime    MEDICATIONS  (PRN):  acetaminophen   Tablet .. 650 milliGRAM(s) Oral every 6 hours PRN Temp greater or equal to 38.5C (101.3F), Mild Pain (1 - 3)  dextrose 40% Gel 15 Gram(s) Oral once PRN Blood Glucose LESS THAN 70 milliGRAM(s)/deciliter  glucagon  Injectable 1 milliGRAM(s) IntraMuscular once PRN Glucose LESS THAN 70 milligrams/deciliter  HYDROmorphone  Injectable 1 milliGRAM(s) IV Push every 3 hours PRN Severe Pain (7 - 10)      No Known Allergies      LABS:                        8.3    8.01  )-----------( 318      ( 21 Jan 2020 21:48 )             28.8     01-22    134<L>  |  91<L>  |  20  ----------------------------<  204<H>  4.1   |  33<H>  |  0.68    Ca    10.6<H>      22 Jan 2020 07:10  Phos  3.2     01-21  Mg     1.9     01-21    TPro  7.5  /  Alb  2.5<L>  /  TBili  0.5  /  DBili  x   /  AST  34  /  ALT  15  /  AlkPhos  92  01-22    PT/INR - ( 21 Jan 2020 17:02 )   PT: 14.4 sec;   INR: 1.25 ratio         PTT - ( 21 Jan 2020 17:02 )  PTT:33.9 sec       RADIOLOGY & ADDITIONAL TESTS:  Studies reviewed. Hematology/Oncology Follow-up    INTERVAL HPI/OVERNIGHT EVENTS:  Admitted last night    VITAL SIGNS:  T(F): 98.1 (01-22-20 @ 05:57)  HR: 88 (01-22-20 @ 05:57)  BP: 115/61 (01-22-20 @ 05:57)  RR: 19 (01-22-20 @ 05:57)  SpO2: 96% (01-22-20 @ 05:57)  Wt(kg): --    01-21-20 @ 07:01  -  01-22-20 @ 07:00  --------------------------------------------------------  IN: 1600 mL / OUT: 1550 mL / NET: 50 mL        PHYSICAL EXAM:  Constitutional: On O2   Neck: supple, no masses  Mouth: Dry oral mucosa.   Respiratory: Decreased breath sound in right   Cardiovascular: Normal rate regular rhythm. no murmur  Gastrointestinal: soft and flat, no tenderness to palpation, no masses palpable, normoactive bowel sound, no HSM  Extremities:  Swelling in right arm  MSK: No joint swelling, erythema, or tenderness   Skin: No rash    MEDICATIONS  (STANDING):  albuterol/ipratropium for Nebulization 3 milliLiter(s) Nebulizer every 6 hours  atorvastatin 20 milliGRAM(s) Oral at bedtime  cyanocobalamin 1000 MICROGram(s) Oral daily  dexAMETHasone  Injectable 4 milliGRAM(s) IV Push every 6 hours  dextrose 5%. 1000 milliLiter(s) (50 mL/Hr) IV Continuous <Continuous>  dextrose 50% Injectable 12.5 Gram(s) IV Push once  dextrose 50% Injectable 25 Gram(s) IV Push once  dextrose 50% Injectable 25 Gram(s) IV Push once  insulin lispro (HumaLOG) corrective regimen sliding scale   SubCutaneous every 6 hours  levothyroxine 175 MICROGram(s) Oral daily  lidocaine   Patch 2 Patch Transdermal daily  methadone    Tablet 5 milliGRAM(s) Oral three times a day  pantoprazole    Tablet 40 milliGRAM(s) Oral before breakfast  senna 2 Tablet(s) Oral at bedtime  sodium chloride 0.9%. 1000 milliLiter(s) (200 mL/Hr) IV Continuous <Continuous>  tamsulosin 0.4 milliGRAM(s) Oral at bedtime    MEDICATIONS  (PRN):  acetaminophen   Tablet .. 650 milliGRAM(s) Oral every 6 hours PRN Temp greater or equal to 38.5C (101.3F), Mild Pain (1 - 3)  dextrose 40% Gel 15 Gram(s) Oral once PRN Blood Glucose LESS THAN 70 milliGRAM(s)/deciliter  glucagon  Injectable 1 milliGRAM(s) IntraMuscular once PRN Glucose LESS THAN 70 milligrams/deciliter  HYDROmorphone  Injectable 1 milliGRAM(s) IV Push every 3 hours PRN Severe Pain (7 - 10)      No Known Allergies      LABS:                        8.3    8.01  )-----------( 318      ( 21 Jan 2020 21:48 )             28.8     01-22    134<L>  |  91<L>  |  20  ----------------------------<  204<H>  4.1   |  33<H>  |  0.68    Ca    10.6<H>      22 Jan 2020 07:10  Phos  3.2     01-21  Mg     1.9     01-21    TPro  7.5  /  Alb  2.5<L>  /  TBili  0.5  /  DBili  x   /  AST  34  /  ALT  15  /  AlkPhos  92  01-22    PT/INR - ( 21 Jan 2020 17:02 )   PT: 14.4 sec;   INR: 1.25 ratio         PTT - ( 21 Jan 2020 17:02 )  PTT:33.9 sec       RADIOLOGY & ADDITIONAL TESTS:  Studies reviewed. Hematology/Oncology Follow-up    INTERVAL HPI/OVERNIGHT EVENTS:  Admitted last night. Delirious and more agitated today. Says "I want to go home"    VITAL SIGNS:  T(F): 98.1 (01-22-20 @ 05:57)  HR: 88 (01-22-20 @ 05:57)  BP: 115/61 (01-22-20 @ 05:57)  RR: 19 (01-22-20 @ 05:57)  SpO2: 96% (01-22-20 @ 05:57)  Wt(kg): --    01-21-20 @ 07:01  -  01-22-20 @ 07:00  --------------------------------------------------------  IN: 1600 mL / OUT: 1550 mL / NET: 50 mL        PHYSICAL EXAM:  Constitutional: On O2   Neck: supple, no masses  Mouth: Dry oral mucosa.   Respiratory: Decreased breath sound in right   Cardiovascular: Normal rate regular rhythm. no murmur  Gastrointestinal: soft and flat, no tenderness to palpation, no masses palpable, normoactive bowel sound, no HSM  Extremities:  Swelling in right arm  MSK: No joint swelling, erythema, or tenderness   Skin: No rash    MEDICATIONS  (STANDING):  albuterol/ipratropium for Nebulization 3 milliLiter(s) Nebulizer every 6 hours  atorvastatin 20 milliGRAM(s) Oral at bedtime  cyanocobalamin 1000 MICROGram(s) Oral daily  dexAMETHasone  Injectable 4 milliGRAM(s) IV Push every 6 hours  dextrose 5%. 1000 milliLiter(s) (50 mL/Hr) IV Continuous <Continuous>  dextrose 50% Injectable 12.5 Gram(s) IV Push once  dextrose 50% Injectable 25 Gram(s) IV Push once  dextrose 50% Injectable 25 Gram(s) IV Push once  insulin lispro (HumaLOG) corrective regimen sliding scale   SubCutaneous every 6 hours  levothyroxine 175 MICROGram(s) Oral daily  lidocaine   Patch 2 Patch Transdermal daily  methadone    Tablet 5 milliGRAM(s) Oral three times a day  pantoprazole    Tablet 40 milliGRAM(s) Oral before breakfast  senna 2 Tablet(s) Oral at bedtime  sodium chloride 0.9%. 1000 milliLiter(s) (200 mL/Hr) IV Continuous <Continuous>  tamsulosin 0.4 milliGRAM(s) Oral at bedtime    MEDICATIONS  (PRN):  acetaminophen   Tablet .. 650 milliGRAM(s) Oral every 6 hours PRN Temp greater or equal to 38.5C (101.3F), Mild Pain (1 - 3)  dextrose 40% Gel 15 Gram(s) Oral once PRN Blood Glucose LESS THAN 70 milliGRAM(s)/deciliter  glucagon  Injectable 1 milliGRAM(s) IntraMuscular once PRN Glucose LESS THAN 70 milligrams/deciliter  HYDROmorphone  Injectable 1 milliGRAM(s) IV Push every 3 hours PRN Severe Pain (7 - 10)      No Known Allergies      LABS:                        8.3    8.01  )-----------( 318      ( 21 Jan 2020 21:48 )             28.8     01-22    134<L>  |  91<L>  |  20  ----------------------------<  204<H>  4.1   |  33<H>  |  0.68    Ca    10.6<H>      22 Jan 2020 07:10  Phos  3.2     01-21  Mg     1.9     01-21    TPro  7.5  /  Alb  2.5<L>  /  TBili  0.5  /  DBili  x   /  AST  34  /  ALT  15  /  AlkPhos  92  01-22    PT/INR - ( 21 Jan 2020 17:02 )   PT: 14.4 sec;   INR: 1.25 ratio         PTT - ( 21 Jan 2020 17:02 )  PTT:33.9 sec       RADIOLOGY & ADDITIONAL TESTS:  Studies reviewed.

## 2020-01-23 NOTE — PROGRESS NOTE ADULT - PROBLEM SELECTOR PLAN 1
Patient used 6 PRN of IV Dilaudid in the last 24hrs. 4 for Pain and 2 for Dyspnea.   Will start IV Dialudid IV 1 mg q6h ATC and increase PRN to 2mg IV q1h.   Loosing PO Route, will D/C Methadone and oral medications.

## 2020-01-23 NOTE — PROGRESS NOTE ADULT - SUBJECTIVE AND OBJECTIVE BOX
GAP TEAM PALLIATIVE CARE UNIT PROGRESS NOTE:      [  ] Patient on hospice program.    INDICATION FOR PALLIATIVE CARE UNIT SERVICES:    INTERVAL HPI/OVERNIGHT EVENTS:    DNR on chart: Yes    Allergies    No Known Allergies    Intolerances    MEDICATIONS  (STANDING):  cyanocobalamin 1000 MICROGram(s) Oral daily  dexAMETHasone  Injectable 4 milliGRAM(s) IV Push every 6 hours  HYDROmorphone  Injectable 1 milliGRAM(s) IV Push every 6 hours  levothyroxine 175 MICROGram(s) Oral daily  lidocaine   Patch 2 Patch Transdermal daily  pantoprazole  Injectable 40 milliGRAM(s) IV Push daily  senna 2 Tablet(s) Oral at bedtime    MEDICATIONS  (PRN):  acetaminophen   Tablet .. 650 milliGRAM(s) Oral every 6 hours PRN Temp greater or equal to 38.5C (101.3F), Mild Pain (1 - 3)  glycopyrrolate Injectable 0.4 milliGRAM(s) IV Push every 6 hours PRN excessive upper airway secretions  HYDROmorphone  Injectable 2 milliGRAM(s) IV Push every 1 hour PRN pain  HYDROmorphone  Injectable 2 milliGRAM(s) IV Push every 1 hour PRN dyspnea  LORazepam   Injectable 0.5 milliGRAM(s) IV Push every 1 hour PRN anxiety/agitation      ITEMS UNCHECKED ARE NOT PRESENT    PRESENT SYMPTOMS: [ ]Unable to obtain due to poor mentation   Source if other than patient:  [ ]Family   [ ]Team     Pain: [ x ]yes [ ]no   QOL impact - moaning in pain  Location -    "all over"                Aggravating factors - movement  Quality - sharp, aching, sore  Radiation - everywhere  Timing- pt unable  Severity (0-10 scale):  Minimal acceptable level (0-10 scale):     PAIN AD Score:     http://geriatrictoolkit.[place].[facility]/cog/painad.pdf (press ctrl +  left click to view)    Dyspnea:                           [ x ]Mild [ ]Moderate [ ]Severe  Anxiety:                             [ x ]Mild [ ]Moderate [ ]Severe  Fatigue:                             [ ]Mild [ x ]Moderate [ ]Severe  Nausea:                            [ ]Mild [ ]Moderate [ ]Severe  Loss of appetite:               [ ]Mild [ x ]Moderate [ ]Severe  Constipation:                    [ ]Mild [ ]Moderate [ ]Severe    Other Symptoms:  [ ]All other review of systems negative     Palliative Performance Status Version 2:     30%  http://npcrc.org/files/news/palliative_performance_scale_ppsv2.pdf    PHYSICAL EXAM:  Vital Signs Last 24 Hrs  T(C): 36.7 (23 Jan 2020 08:35), Max: 37 (22 Jan 2020 18:44)  T(F): 98.1 (23 Jan 2020 08:35), Max: 98.6 (22 Jan 2020 18:44)  HR: 94 (23 Jan 2020 08:35) (94 - 107)  BP: 123/78 (23 Jan 2020 08:35) (102/62 - 123/78)  BP(mean): --  RR: 18 (23 Jan 2020 08:35) (18 - 20)  SpO2: 96% (23 Jan 2020 08:35) (95% - 99%) I&O's Summary    22 Jan 2020 07:01  -  23 Jan 2020 07:00  --------------------------------------------------------  IN: 10 mL / OUT: 1553 mL / NET: -1543 mL    GENERAL:  [x ]Alert  [x ]Oriented x   [ ]Lethargic  [ ]Cachexia  [ ]Unarousable  [ x]Verbal  [ ]Non-Verbal    Behavioral:   [ ] Anxiety  [ ] Delirium [ ] Agitation [ ] Other    HEENT:  [ ]Normal   [x ]Dry mouth   [ ]ET Tube/Trach  [ ]Oral lesions    PULMONARY: pt splinting,  taking shallow breaths 2/2 to pain  [ ]Clear [ ]Tachypnea  [ ]Audible excessive secretions   [ ]Rhonchi        [ ]Right [ ]Left [ ]Bilateral  [ ]Crackles        [ ]Right [ ]Left [ ]Bilateral  [ ]Wheezing     [ ]Right [ ]Left [ ]Bilatera  [x ]Diminished breath sounds [ ]right [ ]left [ x]bilateral  CARDIOVASCULAR:    [x ]Regular [ ]Irregular [ ]Tachy  [ ]Kin [ ]Murmur [ ]Other  GASTROINTESTINAL:  [x ]Soft  [ ]Distended   [x ]+BS  [x ]Non tender [ ]Tender  [ ]PEG [ ]OGT/ NGT  Last BM:   GENITOURINARY:  [ ]Normal [ ] Incontinent   [ ]Oliguria/Anuria   [x ]Hampton    [ ]External cath  MUSCULOSKELETAL:   [ ]Normal   [x ]Weakness  [ x]Bed/Wheelchair bound [ ]Edema  NEUROLOGIC:   [x ]No focal deficits  [ ]Cognitive impairment  [ ]Dysphagia [ ]Dysarthria [ ]Paresis [ ]Other   SKIN:   [ ]Normal    [ ]Rash  [ x ]Pressure ulcer(s)       Present on admission [ x ]y [ ]n   [ x ] Sacral and R heel ulcer POA    CRITICAL CARE:  [ ]Shock Present  [ ]Septic [ ]Cardiogenic [ ]Neurologic [ ]Hypovolemic  [ ]  Vasopressors [ ]  Inotropes   [ ]Respiratory failure present [ ]Mechanical ventilation [ ]Non-invasive ventilatory support [ ]High flow  [ ]Acute  [ ]Chronic [ ]Hypoxic  [ ]Hypercarbic [ ]Other  [ ]Other organ failure     LABS:                        8.3    8.01  )-----------( 318      ( 21 Jan 2020 21:48 )             28.8   01-22    134<L>  |  91<L>  |  20  ----------------------------<  204<H>  4.1   |  33<H>  |  0.68    Ca    10.6<H>      22 Jan 2020 07:10  Phos  3.2     01-21  Mg     1.9     01-21    TPro  7.5  /  Alb  2.5<L>  /  TBili  0.5  /  DBili  x   /  AST  34  /  ALT  15  /  AlkPhos  92  01-22  PT/INR - ( 21 Jan 2020 17:02 )   PT: 14.4 sec;   INR: 1.25 ratio         PTT - ( 21 Jan 2020 17:02 )  PTT:33.9 sec        RADIOLOGY & ADDITIONAL STUDIES:    PROTEIN CALORIE MALNUTRITION:   [ ] PPSV2 < or = 30% [ ] significant weight loss [ ] poor nutritional intake [ ] anasarca [ ] catabolic state   Albumin, Serum: 2.5 g/dL (01-22-20 @ 07:10)   Artificial Nutrition [ ]     REFERRALS:   [ ]Chaplaincy  [ ] Hospice  [ ]Child Life  [ ]Social Work  [ ]Case management [ ]Holistic Therapy [ ] Physical Therapy [ ] Dietary     Goals of Care Document:   Progress Notes - Care Coordination [C. Provider] (01-22-20 @ 10:50) GAP TEAM PALLIATIVE CARE UNIT PROGRESS NOTE:      [  ] Patient on hospice program.    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     INTERVAL HPI/OVERNIGHT EVENTS:  Patient used 6 PRN of IV Dilaudid in the last 24 hours.  4 for pain and 2 for dyspnea.  Alert and answering questions.    DNR on chart: Yes    Allergies    No Known Allergies    Intolerances    MEDICATIONS  (STANDING):  cyanocobalamin 1000 MICROGram(s) Oral daily  dexAMETHasone  Injectable 4 milliGRAM(s) IV Push every 6 hours  HYDROmorphone  Injectable 1 milliGRAM(s) IV Push every 6 hours  levothyroxine 175 MICROGram(s) Oral daily  lidocaine   Patch 2 Patch Transdermal daily  pantoprazole  Injectable 40 milliGRAM(s) IV Push daily  senna 2 Tablet(s) Oral at bedtime    MEDICATIONS  (PRN):  acetaminophen   Tablet .. 650 milliGRAM(s) Oral every 6 hours PRN Temp greater or equal to 38.5C (101.3F), Mild Pain (1 - 3)  glycopyrrolate Injectable 0.4 milliGRAM(s) IV Push every 6 hours PRN excessive upper airway secretions  HYDROmorphone  Injectable 2 milliGRAM(s) IV Push every 1 hour PRN pain  HYDROmorphone  Injectable 2 milliGRAM(s) IV Push every 1 hour PRN dyspnea  LORazepam   Injectable 0.5 milliGRAM(s) IV Push every 1 hour PRN anxiety/agitation      ITEMS UNCHECKED ARE NOT PRESENT    PRESENT SYMPTOMS: [ ]Unable to obtain due to poor mentation   Source if other than patient:  [ ]Family   [ ]Team     Pain: [ x ]yes [ ]no   QOL impact - moaning in pain  Location -    "all over"                Aggravating factors - movement  Quality - sharp, aching, sore  Radiation - everywhere  Timing- pt unable  Severity (0-10 scale):  Minimal acceptable level (0-10 scale):     PAIN AD Score:     http://geriatrictoolkit.[place].edu/cog/painad.pdf (press ctrl +  left click to view)    Dyspnea:                           [ x ]Mild [ ]Moderate [ ]Severe  Anxiety:                             [ x ]Mild [ ]Moderate [ ]Severe  Fatigue:                             [ ]Mild [ x ]Moderate [ ]Severe  Nausea:                            [ ]Mild [ ]Moderate [ ]Severe  Loss of appetite:               [ ]Mild [ x ]Moderate [ ]Severe  Constipation:                    [ ]Mild [ ]Moderate [ ]Severe    Other Symptoms:  [ ]All other review of systems negative     Palliative Performance Status Version 2:     30%  http://npcrc.org/files/news/palliative_performance_scale_ppsv2.pdf    PHYSICAL EXAM:  Vital Signs Last 24 Hrs  T(C): 36.7 (23 Jan 2020 08:35), Max: 37 (22 Jan 2020 18:44)  T(F): 98.1 (23 Jan 2020 08:35), Max: 98.6 (22 Jan 2020 18:44)  HR: 94 (23 Jan 2020 08:35) (94 - 107)  BP: 123/78 (23 Jan 2020 08:35) (102/62 - 123/78)  BP(mean): --  RR: 18 (23 Jan 2020 08:35) (18 - 20)  SpO2: 96% (23 Jan 2020 08:35) (95% - 99%) I&O's Summary    22 Jan 2020 07:01  -  23 Jan 2020 07:00  --------------------------------------------------------  IN: 10 mL / OUT: 1553 mL / NET: -1543 mL    GENERAL:  [x ]Alert  [x ]Oriented x   [ ]Lethargic  [ ]Cachexia  [ ]Unarousable  [ x]Verbal  [ ]Non-Verbal    Behavioral:   [ ] Anxiety  [ ] Delirium [ ] Agitation [ ] Other    HEENT:  [ ]Normal   [x ]Dry mouth   [ ]ET Tube/Trach  [ ]Oral lesions    PULMONARY: pt splinting,  taking shallow breaths 2/2 to pain  [ ]Clear [ ]Tachypnea  [ ]Audible excessive secretions   [ ]Rhonchi        [ ]Right [ ]Left [ ]Bilateral  [ ]Crackles        [ ]Right [ ]Left [ ]Bilateral  [ ]Wheezing     [ ]Right [ ]Left [ ]Bilatera  [x ]Diminished breath sounds [ ]right [ ]left [ x]bilateral  CARDIOVASCULAR:    [x ]Regular [ ]Irregular [ ]Tachy  [ ]Kin [ ]Murmur [ ]Other  GASTROINTESTINAL:  [x ]Soft  [ ]Distended   [x ]+BS  [x ]Non tender [ ]Tender  [ ]PEG [ ]OGT/ NGT  Last BM:   GENITOURINARY:  [ ]Normal [ ] Incontinent   [ ]Oliguria/Anuria   [x ]Hampton    [ ]External cath  MUSCULOSKELETAL:   [ ]Normal   [x ]Weakness  [ x]Bed/Wheelchair bound [ ]Edema  NEUROLOGIC:   [x ]No focal deficits  [ ]Cognitive impairment  [ ]Dysphagia [ ]Dysarthria [ ]Paresis [ ]Other   SKIN:   [ ]Normal    [ ]Rash  [ x ]Pressure ulcer(s)       Present on admission [ x ]y [ ]n   [ x ] Sacral and R heel ulcer POA    CRITICAL CARE:  [ ]Shock Present  [ ]Septic [ ]Cardiogenic [ ]Neurologic [ ]Hypovolemic  [ ]  Vasopressors [ ]  Inotropes   [ ]Respiratory failure present [ ]Mechanical ventilation [ ]Non-invasive ventilatory support [ ]High flow  [ ]Acute  [ ]Chronic [ ]Hypoxic  [ ]Hypercarbic [ ]Other  [ ]Other organ failure     LABS:                        8.3    8.01  )-----------( 318      ( 21 Jan 2020 21:48 )             28.8   01-22    134<L>  |  91<L>  |  20  ----------------------------<  204<H>  4.1   |  33<H>  |  0.68    Ca    10.6<H>      22 Jan 2020 07:10  Phos  3.2     01-21  Mg     1.9     01-21    TPro  7.5  /  Alb  2.5<L>  /  TBili  0.5  /  DBili  x   /  AST  34  /  ALT  15  /  AlkPhos  92  01-22  PT/INR - ( 21 Jan 2020 17:02 )   PT: 14.4 sec;   INR: 1.25 ratio         PTT - ( 21 Jan 2020 17:02 )  PTT:33.9 sec        RADIOLOGY & ADDITIONAL STUDIES:    PROTEIN CALORIE MALNUTRITION:   [ ] PPSV2 < or = 30% [ ] significant weight loss [ ] poor nutritional intake [ ] anasarca [ ] catabolic state   Albumin, Serum: 2.5 g/dL (01-22-20 @ 07:10)   Artificial Nutrition [ ]     REFERRALS:   [ ]Chaplaincy  [ ] Hospice  [ ]Child Life  [ ]Social Work  [ ]Case management [ ]Holistic Therapy [ ] Physical Therapy [ ] Dietary     Goals of Care Document:   Progress Notes - Care Coordination [C. Provider] (01-22-20 @ 10:50) GAP TEAM PALLIATIVE CARE UNIT PROGRESS NOTE:      [  ] Patient on hospice program.    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     INTERVAL HPI/OVERNIGHT EVENTS:  Patient used 6 PRN of IV Dilaudid in the last 24 hours.  4 for pain and 2 for dyspnea.  Alert and answering questions.    DNR on chart: Yes    Allergies    No Known Allergies    Intolerances    MEDICATIONS  (STANDING):  cyanocobalamin 1000 MICROGram(s) Oral daily  dexAMETHasone  Injectable 4 milliGRAM(s) IV Push every 6 hours  HYDROmorphone  Injectable 1 milliGRAM(s) IV Push every 6 hours  levothyroxine 175 MICROGram(s) Oral daily  lidocaine   Patch 2 Patch Transdermal daily  pantoprazole  Injectable 40 milliGRAM(s) IV Push daily  senna 2 Tablet(s) Oral at bedtime    MEDICATIONS  (PRN):  acetaminophen   Tablet .. 650 milliGRAM(s) Oral every 6 hours PRN Temp greater or equal to 38.5C (101.3F), Mild Pain (1 - 3)  glycopyrrolate Injectable 0.4 milliGRAM(s) IV Push every 6 hours PRN excessive upper airway secretions  HYDROmorphone  Injectable 2 milliGRAM(s) IV Push every 1 hour PRN pain  HYDROmorphone  Injectable 2 milliGRAM(s) IV Push every 1 hour PRN dyspnea  LORazepam   Injectable 0.5 milliGRAM(s) IV Push every 1 hour PRN anxiety/agitation      ITEMS UNCHECKED ARE NOT PRESENT    PRESENT SYMPTOMS: [ ]Unable to obtain due to poor mentation   Source if other than patient:  [ ]Family   [ ]Team     Pain: [ x ]yes [ ]no   QOL impact - moaning in pain  Location -    "all over"                Aggravating factors - movement  Quality - sharp, aching, sore  Radiation - everywhere  Timing- pt unable  Severity (0-10 scale): variable  Minimal acceptable level (0-10 scale):     PAIN AD Score:     http://geriatrictoolkit.[place].edu/cog/painad.pdf (press ctrl +  left click to view)    Dyspnea:                           [ x ]Mild [ ]Moderate [ ]Severe  Anxiety:                             [ x ]Mild [ ]Moderate [ ]Severe  Fatigue:                             [ ]Mild [ x ]Moderate [ ]Severe  Nausea:                            [ ]Mild [ ]Moderate [ ]Severe  Loss of appetite:               [ ]Mild [ x ]Moderate [ ]Severe  Constipation:                    [ ]Mild [ ]Moderate [ ]Severe    Other Symptoms:  [x ]All other review of systems negative     Palliative Performance Status Version 2:     30%  http://npcrc.org/files/news/palliative_performance_scale_ppsv2.pdf    PHYSICAL EXAM:  Vital Signs Last 24 Hrs  T(C): 36.7 (23 Jan 2020 08:35), Max: 37 (22 Jan 2020 18:44)  T(F): 98.1 (23 Jan 2020 08:35), Max: 98.6 (22 Jan 2020 18:44)  HR: 94 (23 Jan 2020 08:35) (94 - 107)  BP: 123/78 (23 Jan 2020 08:35) (102/62 - 123/78)  BP(mean): --  RR: 18 (23 Jan 2020 08:35) (18 - 20)  SpO2: 96% (23 Jan 2020 08:35) (95% - 99%) I&O's Summary    22 Jan 2020 07:01  -  23 Jan 2020 07:00  --------------------------------------------------------  IN: 10 mL / OUT: 1553 mL / NET: -1543 mL    GENERAL:  [x ]Alert  [x ]Oriented x   [ ]Lethargic  [ ]Cachexia  [ ]Unarousable  [ x]Verbal  [ ]Non-Verbal    Behavioral:   [ ] Anxiety  [ ] Delirium [ ] Agitation [ ] Other    HEENT:  [ ]Normal   [x ]Dry mouth   [ ]ET Tube/Trach  [ ]Oral lesions    PULMONARY: pt splinting,  taking shallow breaths 2/2 to pain  [ ]Clear [ ]Tachypnea  [ ]Audible excessive secretions   [ ]Rhonchi        [ ]Right [ ]Left [ ]Bilateral  [ ]Crackles        [ ]Right [ ]Left [ ]Bilateral  [ ]Wheezing     [ ]Right [ ]Left [ ]Bilatera  [x ]Diminished breath sounds [ ]right [ ]left [ x]bilateral  CARDIOVASCULAR:    [x ]Regular [ ]Irregular [ ]Tachy  [ ]Kin [ ]Murmur [ ]Other  GASTROINTESTINAL:  [x ]Soft  [ ]Distended   [x ]+BS  [x ]Non tender [ ]Tender  [ ]PEG [ ]OGT/ NGT  Last BM:  1/22/20  GENITOURINARY:  [ ]Normal [x ] Incontinent   [ ]Oliguria/Anuria   [x ]Hampton    [ ]External cath  MUSCULOSKELETAL:   [ ]Normal   [x ]Weakness  [ x]Bed/Wheelchair bound [ ]Edema  NEUROLOGIC:   [x ]No focal deficits  [ ]Cognitive impairment  [ ]Dysphagia [ ]Dysarthria [ ]Paresis [ ]Other   SKIN:   [ ]Normal    [ ]Rash  [ x ]Pressure ulcer(s)       Present on admission [ x ]y [ ]n   [ x ] Sacral and R heel ulcer POA    CRITICAL CARE:  [ ]Shock Present  [ ]Septic [ ]Cardiogenic [ ]Neurologic [ ]Hypovolemic  [ ]  Vasopressors [ ]  Inotropes   [ ]Respiratory failure present [ ]Mechanical ventilation [ ]Non-invasive ventilatory support [ ]High flow  [ ]Acute  [ ]Chronic [ ]Hypoxic  [ ]Hypercarbic [ ]Other  [ ]Other organ failure     LABS:                        8.3    8.01  )-----------( 318      ( 21 Jan 2020 21:48 )             28.8   01-22    134<L>  |  91<L>  |  20  ----------------------------<  204<H>  4.1   |  33<H>  |  0.68    Ca    10.6<H>      22 Jan 2020 07:10  Phos  3.2     01-21  Mg     1.9     01-21    TPro  7.5  /  Alb  2.5<L>  /  TBili  0.5  /  DBili  x   /  AST  34  /  ALT  15  /  AlkPhos  92  01-22  PT/INR - ( 21 Jan 2020 17:02 )   PT: 14.4 sec;   INR: 1.25 ratio         PTT - ( 21 Jan 2020 17:02 )  PTT:33.9 sec        RADIOLOGY & ADDITIONAL STUDIES:    PROTEIN CALORIE MALNUTRITION:   [x ] PPSV2 < or = 30% [ ] significant weight loss x[ ] poor nutritional intake [ ] anasarca [ ] catabolic state   Albumin, Serum: 2.5 g/dL (01-22-20 @ 07:10)   Artificial Nutrition [ ]     REFERRALS:   [ ]Chaplaincy  [x ] Hospice  [ ]Child Life  [x ]Social Work  [ ]Case management [ ]Holistic Therapy [ ] Physical Therapy [ ] Dietary     Goals of Care Document:   Progress Notes - Care Coordination [C. Provider] (01-22-20 @ 10:50)

## 2020-01-23 NOTE — PROGRESS NOTE ADULT - PROBLEM SELECTOR PLAN 2
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 Pulm eval. Thoracocentesis will not be therapeutic. Recommending symptom management.   Recommendations as above.

## 2020-01-23 NOTE — PROGRESS NOTE ADULT - PROBLEM SELECTOR PLAN 5
Met with patient at bedside. Symptom management recommendations as above.  Increased requirements of IV Dilaudid for pain and dyspnea. Losing PO route. D/C methadone and po meds.   MOLST filled and placed in chart. DNR/DNI. Wife surrogate.   Emotional support provided.

## 2020-01-24 NOTE — PROGRESS NOTE ADULT - PROBLEM SELECTOR PLAN 5
Met with patient at bedside. Symptom management recommendations as above.  Increased requirements of IV Dilaudid for pain and dyspnea. Losing PO route.   D/C methadone and po meds.   MOLST filled and placed in chart. DNR/DNI. Wife surrogate.   Emotional support provided. [facility] Referral made.

## 2020-01-24 NOTE — PROGRESS NOTE ADULT - SUBJECTIVE AND OBJECTIVE BOX
GAP TEAM PALLIATIVE CARE UNIT PROGRESS NOTE:      [  ] Patient on hospice program.    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     INTERVAL HPI/OVERNIGHT EVENTS:  Patient with increase needs for pain and dyspnea.  Used 3 PRN of IV Dilaudid in the last 24 hours.  More alert and oriented    DNR on chart: Yes    Allergies    No Known Allergies    Intolerances    MEDICATIONS  (STANDING):  dexAMETHasone  Injectable 4 milliGRAM(s) IV Push every 6 hours  HYDROmorphone Infusion 0.4 mG/Hr (0.4 mL/Hr) IV Continuous <Continuous>  pantoprazole  Injectable 40 milliGRAM(s) IV Push daily    MEDICATIONS  (PRN):  acetaminophen  Suppository .. 650 milliGRAM(s) Rectal every 6 hours PRN Temp greater or equal to 38C (100.4F), Mild Pain (1 - 3)  bisacodyl Suppository 10 milliGRAM(s) Rectal daily PRN Constipation  glycopyrrolate Injectable 0.4 milliGRAM(s) IV Push every 6 hours PRN excessive upper airway secretions  HYDROmorphone  Injectable 2 milliGRAM(s) IV Push every 1 hour PRN pain  HYDROmorphone  Injectable 2 milliGRAM(s) IV Push every 1 hour PRN dyspnea  LORazepam   Injectable 0.5 milliGRAM(s) IV Push every 1 hour PRN anxiety/agitation      ITEMS UNCHECKED ARE NOT PRESENT    PRESENT SYMPTOMS:[ ]Unable to obtain due to poor mentation   Source if other than patient:  [ ]Family   [ ]Team     Pain: [ x ]yes [ ]no   QOL impact - moaning in pain  Location -    "all over"                Aggravating factors - movement  Quality - sharp, aching, sore  Radiation - everywhere  Timing- pt unable  Severity (0-10 scale): variable  Minimal acceptable level (0-10 scale):     PAIN AD Score:     http://geriatrictoolkit.[place].[facility]/cog/painad.pdf (press ctrl +  left click to view)    Dyspnea:                           [ x ]Mild [ ]Moderate [ ]Severe  Anxiety:                             [ x ]Mild [ ]Moderate [ ]Severe  Fatigue:                             [ ]Mild [ x ]Moderate [ ]Severe  Nausea:                            [ ]Mild [ ]Moderate [ ]Severe  Loss of appetite:               [ ]Mild [ x ]Moderate [ ]Severe  Constipation:                    [ ]Mild [ ]Moderate [ ]Severe    Other Symptoms:  [x ]All other review of systems negative     Palliative Performance Status Version 2:     30%  http://[email or web address]_[email or web address]    PHYSICAL EXAM:  Vital Signs Last 24 Hrs  T(C): 36.3 (24 Jan 2020 07:50), Max: 36.3 (24 Jan 2020 07:50)  T(F): 97.4 (24 Jan 2020 07:50), Max: 97.4 (24 Jan 2020 07:50)  HR: 73 (24 Jan 2020 07:50) (73 - 73)  BP: 142/86 (24 Jan 2020 07:50) (142/86 - 142/86)  BP(mean): --  RR: 20 (24 Jan 2020 07:50) (20 - 20)  SpO2: 99% (24 Jan 2020 07:50) (99% - 99%) I&O's Summary    23 Jan 2020 07:01  -  24 Jan 2020 07:00  --------------------------------------------------------  IN: 0 mL / OUT: 1450 mL / NET: -1450 mL    GENERAL:  [x ]Alert  [x ]Oriented x   [ ]Lethargic  [ ]Cachexia  [ ]Unarousable  [ x]Verbal  [ ]Non-Verbal    Behavioral:   [ ] Anxiety  [ ] Delirium [ ] Agitation [ ] Other    HEENT:  [ ]Normal   [x ]Dry mouth   [ ]ET Tube/Trach  [ ]Oral lesions    PULMONARY: pt splinting,  taking shallow breaths 2/2 to pain  [ ]Clear [ ]Tachypnea  [ ]Audible excessive secretions   [ ]Rhonchi        [ ]Right [ ]Left [ ]Bilateral  [ ]Crackles        [ ]Right [ ]Left [ ]Bilateral  [ ]Wheezing     [ ]Right [ ]Left [ ]Bilatera  [x ]Diminished breath sounds [ ]right [ ]left [ x]bilateral  CARDIOVASCULAR:    [x ]Regular [ ]Irregular [ ]Tachy  [ ]Kin [ ]Murmur [ ]Other  GASTROINTESTINAL:  [x ]Soft  [ ]Distended   [x ]+BS  [x ]Non tender [ ]Tender  [ ]PEG [ ]OGT/ NGT  Last BM:  1/22/20  GENITOURINARY:  [ ]Normal [x ] Incontinent   [ ]Oliguria/Anuria   [x ]Hampton    [ ]External cath  MUSCULOSKELETAL:   [ ]Normal   [x ]Weakness  [ x]Bed/Wheelchair bound [ ]Edema  NEUROLOGIC:   [x ]No focal deficits  [ ]Cognitive impairment  [ ]Dysphagia [ ]Dysarthria [ ]Paresis [ ]Other   SKIN:   [ ]Normal    [ ]Rash  [ x ]Pressure ulcer(s)       Present on admission [ x ]y [ ]n   [ x ] Sacral and R heel ulcer POA    CRITICAL CARE:  [ ]Shock Present  [ ]Septic [ ]Cardiogenic [ ]Neurologic [ ]Hypovolemic  [ ]  Vasopressors [ ]  Inotropes   [ ]Respiratory failure present [ ]Mechanical ventilation [ ]Non-invasive ventilatory support [ ]High flow  [ ]Acute  [ ]Chronic [ ]Hypoxic  [ ]Hypercarbic [ ]Other  [ ]Other organ failure     LABS:  None new    RADIOLOGY & ADDITIONAL STUDIES:  None new    PROTEIN CALORIE MALNUTRITION:   [ x ] PPSV2 < or = 30% [ ] significant weight loss [ ] poor nutritional intake [ ] anasarca [ ] catabolic state   Albumin, Serum: 2.5 g/dL (01-22-20 @ 07:10)   Artificial Nutrition [ ]     REFERRALS:   [ ]Chaplaincy  [ x ] Hospice  [ ]Child Life  [ x ]Social Work  [ ]Case management [ ]Holistic Therapy [ ] Physical Therapy [ ] Dietary     Goals of Care Document:   Progress Notes - Care Coordination [C. Provider] (01-23-20 @ 13:19) GAP TEAM PALLIATIVE CARE UNIT PROGRESS NOTE:      [  ] Patient on hospice program.    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     INTERVAL HPI/OVERNIGHT EVENTS:  Patient with increase needs for pain and dyspnea.  Used 3 PRN of IV Dilaudid in the last 24 hours.  More alert and oriented.  Patient is thirsty this am.    DNR on chart: Yes    Allergies    No Known Allergies    Intolerances    MEDICATIONS  (STANDING):  dexAMETHasone  Injectable 4 milliGRAM(s) IV Push every 6 hours  HYDROmorphone Infusion 0.4 mG/Hr (0.4 mL/Hr) IV Continuous <Continuous>  pantoprazole  Injectable 40 milliGRAM(s) IV Push daily    MEDICATIONS  (PRN):  acetaminophen  Suppository .. 650 milliGRAM(s) Rectal every 6 hours PRN Temp greater or equal to 38C (100.4F), Mild Pain (1 - 3)  bisacodyl Suppository 10 milliGRAM(s) Rectal daily PRN Constipation  glycopyrrolate Injectable 0.4 milliGRAM(s) IV Push every 6 hours PRN excessive upper airway secretions  HYDROmorphone  Injectable 2 milliGRAM(s) IV Push every 1 hour PRN pain  HYDROmorphone  Injectable 2 milliGRAM(s) IV Push every 1 hour PRN dyspnea  LORazepam   Injectable 0.5 milliGRAM(s) IV Push every 1 hour PRN anxiety/agitation      ITEMS UNCHECKED ARE NOT PRESENT    PRESENT SYMPTOMS:[ ]Unable to obtain due to poor mentation   Source if other than patient:  [ ]Family   [ ]Team     Pain: [ x ]yes [ ]no   QOL impact - moaning in pain  Location -    "all over"                Aggravating factors - movement  Quality - sharp, aching, sore  Radiation - everywhere  Timing- pt unable  Severity (0-10 scale): variable  Minimal acceptable level (0-10 scale):     PAIN AD Score:     http://geriatrictoolkit.[place].edu/cog/painad.pdf (press ctrl +  left click to view)    Dyspnea:                           [ x ]Mild [ ]Moderate [ ]Severe  Anxiety:                             [ x ]Mild [ ]Moderate [ ]Severe  Fatigue:                             [ ]Mild [ x ]Moderate [ ]Severe  Nausea:                            [ ]Mild [ ]Moderate [ ]Severe  Loss of appetite:               [ ]Mild [ x ]Moderate [ ]Severe  Constipation:                    [ ]Mild [ ]Moderate [ ]Severe    Other Symptoms:  [x ]All other review of systems negative     Palliative Performance Status Version 2:     30%  http://[email or web address]/files/[email or web address]_performance_scale_[email or web address].pdf    PHYSICAL EXAM:  Vital Signs Last 24 Hrs  T(C): 36.3 (24 Jan 2020 07:50), Max: 36.3 (24 Jan 2020 07:50)  T(F): 97.4 (24 Jan 2020 07:50), Max: 97.4 (24 Jan 2020 07:50)  HR: 73 (24 Jan 2020 07:50) (73 - 73)  BP: 142/86 (24 Jan 2020 07:50) (142/86 - 142/86)  BP(mean): --  RR: 20 (24 Jan 2020 07:50) (20 - 20)  SpO2: 99% (24 Jan 2020 07:50) (99% - 99%) I&O's Summary    23 Jan 2020 07:01  -  24 Jan 2020 07:00  --------------------------------------------------------  IN: 0 mL / OUT: 1450 mL / NET: -1450 mL    GENERAL:  [x ]Alert  [x ]Oriented x   [ ]Lethargic  [ ]Cachexia  [ ]Unarousable  [ x]Verbal  [ ]Non-Verbal    Behavioral:   [ ] Anxiety  [ ] Delirium [ ] Agitation [ ] Other    HEENT:  [ ]Normal   [x ]Dry mouth   [ ]ET Tube/Trach  [ ]Oral lesions    PULMONARY: pt splinting,  taking shallow breaths 2/2 to pain  [ ]Clear [ ]Tachypnea  [ ]Audible excessive secretions   [ ]Rhonchi        [ ]Right [ ]Left [ ]Bilateral  [ ]Crackles        [ ]Right [ ]Left [ ]Bilateral  [ ]Wheezing     [ ]Right [ ]Left [ ]Bilatera  [x ]Diminished breath sounds [ ]right [ ]left [ x]bilateral  CARDIOVASCULAR:    [x ]Regular [ ]Irregular [ ]Tachy  [ ]Kin [ ]Murmur [ ]Other  GASTROINTESTINAL:  [x ]Soft  [ ]Distended   [x ]+BS  [x ]Non tender [ ]Tender  [ ]PEG [ ]OGT/ NGT  Last BM:  1/22/20  GENITOURINARY:  [ ]Normal [x ] Incontinent   [ ]Oliguria/Anuria   [x ]Hampton    [ ]External cath  MUSCULOSKELETAL:   [ ]Normal   [x ]Weakness  [ x]Bed/Wheelchair bound [ ]Edema  NEUROLOGIC:   [x ]No focal deficits  [ ]Cognitive impairment  [ ]Dysphagia [ ]Dysarthria [ ]Paresis [ ]Other   SKIN:   [ ]Normal    [ ]Rash  [ x ]Pressure ulcer(s)       Present on admission [ x ]y [ ]n   [ x ] Sacral and R heel ulcer POA    CRITICAL CARE:  [ ]Shock Present  [ ]Septic [ ]Cardiogenic [ ]Neurologic [ ]Hypovolemic  [ ]  Vasopressors [ ]  Inotropes   [ ]Respiratory failure present [ ]Mechanical ventilation [ ]Non-invasive ventilatory support [ ]High flow  [ ]Acute  [ ]Chronic [ ]Hypoxic  [ ]Hypercarbic [ ]Other  [ ]Other organ failure     LABS:  None new    RADIOLOGY & ADDITIONAL STUDIES:  None new    PROTEIN CALORIE MALNUTRITION:   [ x ] PPSV2 < or = 30% [ ] significant weight loss [ ] poor nutritional intake [ ] anasarca [ ] catabolic state   Albumin, Serum: 2.5 g/dL (01-22-20 @ 07:10)   Artificial Nutrition [ ]     REFERRALS:   [ ]Chaplaincy  [ x ] Hospice  [ ]Child Life  [ x ]Social Work  [ ]Case management [ ]Holistic Therapy [ ] Physical Therapy [ ] Dietary     Goals of Care Document:   Progress Notes - Care Coordination [C. Provider] (01-23-20 @ 13:19)

## 2020-01-24 NOTE — PROGRESS NOTE ADULT - PROBLEM SELECTOR PLAN 1
Patient used 3 PRN of IV Dilaudid in the last 24hrs. 2 for Pain and 1 for Dyspnea.   D/C IV Dialudid IV 1 mg q6h ATC. Start IV Dilaudi drip at 0.4 mg/hr.  C/w IV Dilaudid to 2mg q1h PRN Pain/SOB.

## 2020-01-24 NOTE — PROGRESS NOTE ADULT - ASSESSMENT
60 y/o M with PMH of right renal cell carcinoma with mets to the brain, lungs, and bones (s/p radiation and chemotherapy), hemorrhagic brain lesions, PE s/p IVC filter not on a/c, DMII, BPH with chronic vasquez presented to the ED from the rehab with complaints of diffuse pain and generalized weakness.  palliative care called for GOC and pain management. 60 y/o M with PMH of right renal cell carcinoma with mets to the brain, lungs, and bones (s/p radiation and chemotherapy), hemorrhagic brain lesions, PE s/p IVC filter not on a/c, DMII, BPH with chronic vasquez presented to the ED from the rehab with complaints of diffuse pain and generalized weakness.  Patient transferred to PCU for symptom management.

## 2020-01-24 NOTE — PROGRESS NOTE ADULT - PROBLEM SELECTOR PLAN 4
No surgical intervention. NSGY and HemOnc recommending symptom management. Further DMT will cause more harm than benefit.   C/w Decadron IV.

## 2020-01-25 NOTE — PROGRESS NOTE ADULT - ASSESSMENT
60 y/o M with PMH of right renal cell carcinoma with mets to the brain, lungs, and bones (s/p radiation and chemotherapy), hemorrhagic brain lesions, PE s/p IVC filter not on a/c, DMII, BPH with chronic vasquez presented to the ED from the rehab with complaints of diffuse pain and generalized weakness.  Patient transferred to PCU for symptom management.

## 2020-01-25 NOTE — PROGRESS NOTE ADULT - PROBLEM SELECTOR PLAN 5
Met with patient and wife at bedside. Symptom management as above.  Increased requirements of IV Dilaudid for pain and dyspnea. Losing PO route.   MOLST filled and placed in chart. DNR/DNI. Wife surrogate.   Emotional support provided. [facility] Referral made.  Wife is in agreement with the plan of discharge home with home hospice.

## 2020-01-25 NOTE — PROGRESS NOTE ADULT - SUBJECTIVE AND OBJECTIVE BOX
GAP TEAM PALLIATIVE CARE UNIT PROGRESS NOTE:      [  ] Patient on hospice program.    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     INTERVAL HPI/OVERNIGHT EVENTS:  Patient seen and examined. Has increased needs for pain and dyspnea. Required 4 PRN of IV Dilaudid and 2 prn ativan in the last 24 hours. Spoke to the wife briefly as she was heading out. She is in agreement with the discharge home with hospice when appropriate.     DNR on chart: Yes    Allergies: No Known Allergies    MEDICATIONS  (STANDING):  dexAMETHasone  Injectable 4 milliGRAM(s) IV Push every 6 hours  HYDROmorphone Infusion 1 mG/Hr (1 mL/Hr) IV Continuous <Continuous>  pantoprazole  Injectable 40 milliGRAM(s) IV Push daily    MEDICATIONS  (PRN):  acetaminophen  Suppository .. 650 milliGRAM(s) Rectal every 6 hours PRN Temp greater or equal to 38C (100.4F), Mild Pain (1 - 3)  bisacodyl Suppository 10 milliGRAM(s) Rectal daily PRN Constipation  glycopyrrolate Injectable 0.4 milliGRAM(s) IV Push every 6 hours PRN excessive upper airway secretions  HYDROmorphone  Injectable 2 milliGRAM(s) IV Push every 1 hour PRN pain  HYDROmorphone  Injectable 2 milliGRAM(s) IV Push every 1 hour PRN dyspnea  LORazepam   Injectable 0.5 milliGRAM(s) IV Push every 1 hour PRN anxiety/agitation      ITEMS UNCHECKED ARE NOT PRESENT    PRESENT SYMPTOMS:[ ]Unable to obtain due to poor mentation   Source if other than patient:  [x ]Family   [ x]Team     Pain: [ x ]yes [ ]no   QOL impact - moaning in pain  Location -    "all over"                Aggravating factors - movement  Quality - sharp, aching, sore  Radiation - everywhere  Timing- pt unable  Severity (0-10 scale): variable  Minimal acceptable level (0-10 scale):     PAIN AD Score:     http://geriatrictoolkit.[place].[facility]/cog/painad.pdf (press ctrl +  left click to view)    Dyspnea:                           [ x ]Mild [ ]Moderate [ ]Severe  Anxiety:                             [ x ]Mild [ ]Moderate [ ]Severe  Fatigue:                             [ ]Mild [ x ]Moderate [ ]Severe  Nausea:                            [ ]Mild [ ]Moderate [ ]Severe  Loss of appetite:               [ ]Mild [ x ]Moderate [ ]Severe  Constipation:                    [ ]Mild [ ]Moderate [ ]Severe    Other Symptoms:  [x ]All other review of systems negative     Palliative Performance Status Version 2:     30%  http://[email or web address]/[email or web address]_performance_[email or web address]_[email or web address]    PHYSICAL EXAM:  Vital Signs Last 24 Hrs  T(C): 36.3 (25 Jan 2020 07:54), Max: 36.3 (25 Jan 2020 07:54)  T(F): 97.4 (25 Jan 2020 07:54), Max: 97.4 (25 Jan 2020 07:54)  HR: 84 (25 Jan 2020 07:54) (84 - 84)  BP: 135/79 (25 Jan 2020 07:54) (135/79 - 135/79)  BP(mean): --  RR: 20 (25 Jan 2020 07:54) (20 - 20)  SpO2: 97% (25 Jan 2020 07:54) (97% - 97%)    GENERAL:  [x ]Alert  [x ]Oriented x   [ ]Lethargic  [ ]Cachexia  [ ]Unarousable  [ x]Verbal  [ ]Non-Verbal  Behavioral:   [ ] Anxiety  [ ] Delirium [ ] Agitation [ ] Other  HEENT:  [ ]Normal   [x ]Dry mouth   [ ]ET Tube/Trach  [ ]Oral lesions  PULMONARY: pt splinting,  taking shallow breaths 2/2 to pain  [ ]Clear [ ]Tachypnea  [ ]Audible excessive secretions   [ ]Rhonchi        [ ]Right [ ]Left [ ]Bilateral  [ ]Crackles        [ ]Right [ ]Left [ ]Bilateral  [ ]Wheezing     [ ]Right [ ]Left [ ]Bilatera  [x ]Diminished breath sounds [ ]right [ ]left [ x]bilateral  CARDIOVASCULAR:    [x ]Regular [ ]Irregular [ ]Tachy  [ ]Kin [ ]Murmur [ ]Other  GASTROINTESTINAL:  [x ]Soft  [ ]Distended   [x ]+BS  [x ]Non tender [ ]Tender  [ ]PEG [ ]OGT/ NGT  Last BM:  1/22/20  GENITOURINARY:  [ ]Normal [x ] Incontinent   [ ]Oliguria/Anuria   [x ]Hampton    [ ]External cath  MUSCULOSKELETAL:   [ ]Normal   [x ]Weakness  [ x]Bed/Wheelchair bound [ ]Edema  NEUROLOGIC:   [x ]No focal deficits  [ ]Cognitive impairment  [ ]Dysphagia [ ]Dysarthria [ ]Paresis [ ]Other   SKIN:   [ ]Normal    [ ]Rash  [ x ]Pressure ulcer(s)       Present on admission [ x ]y [ ]n   [ x ] Sacral and R heel ulcer POA    CRITICAL CARE:  [ ]Shock Present  [ ]Septic [ ]Cardiogenic [ ]Neurologic [ ]Hypovolemic  [ ]  Vasopressors [ ]  Inotropes   [ ]Respiratory failure present [ ]Mechanical ventilation [ ]Non-invasive ventilatory support [ ]High flow  [ ]Acute  [ ]Chronic [ ]Hypoxic  [ ]Hypercarbic [ ]Other  [ ]Other organ failure     LABS:  None new    RADIOLOGY & ADDITIONAL STUDIES:  None new    PROTEIN CALORIE MALNUTRITION:   [ x ] PPSV2 < or = 30% [ ] significant weight loss [ ] poor nutritional intake [ ] anasarca [ ] catabolic state   Albumin, Serum: 2.5 g/dL (01-22-20 @ 07:10)   Artificial Nutrition [ ]     REFERRALS:   [ ]Chaplaincy  [ x ] Hospice  [ ]Child Life  [ x ]Social Work  [ ]Case management [ ]Holistic Therapy [ ] Physical Therapy [ ] Dietary     Goals of Care Document:   Progress Notes - Care Coordination [C. Provider] (01-23-20 @ 13:19) GAP TEAM PALLIATIVE CARE UNIT PROGRESS NOTE:      [  ] Patient on hospice program.    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     INTERVAL HPI/OVERNIGHT EVENTS:  Patient seen and examined. Has increased needs for pain and dyspnea. Required 4 PRN of IV Dilaudid and 2 prn ativan in the last 24 hours. Spoke to the wife briefly as she was heading out. She is in agreement with the discharge home with hospice when appropriate.     DNR on chart: Yes    Allergies: No Known Allergies    MEDICATIONS  (STANDING):  dexAMETHasone  Injectable 4 milliGRAM(s) IV Push every 6 hours  HYDROmorphone Infusion 1 mG/Hr (1 mL/Hr) IV Continuous <Continuous>  pantoprazole  Injectable 40 milliGRAM(s) IV Push daily    MEDICATIONS  (PRN):  acetaminophen  Suppository .. 650 milliGRAM(s) Rectal every 6 hours PRN Temp greater or equal to 38C (100.4F), Mild Pain (1 - 3)  bisacodyl Suppository 10 milliGRAM(s) Rectal daily PRN Constipation  glycopyrrolate Injectable 0.4 milliGRAM(s) IV Push every 6 hours PRN excessive upper airway secretions  HYDROmorphone  Injectable 2 milliGRAM(s) IV Push every 1 hour PRN pain  HYDROmorphone  Injectable 2 milliGRAM(s) IV Push every 1 hour PRN dyspnea  LORazepam   Injectable 0.5 milliGRAM(s) IV Push every 1 hour PRN anxiety/agitation    ITEMS UNCHECKED ARE NOT PRESENT    PRESENT SYMPTOMS:[ ]Unable to obtain due to poor mentation   Source if other than patient:  [x ]Family   [ x]Team     Pain: [ x ]yes [ ]no   QOL impact - moaning in pain  Location -    "all over"                Aggravating factors - movement  Quality - sharp, aching, sore  Radiation - everywhere  Timing- pt unable  Severity (0-10 scale): variable  Minimal acceptable level (0-10 scale):     PAIN AD Score: 4    http://geriatrictoolkit.[place].edu/cog/painad.pdf (press ctrl +  left click to view)    Dyspnea:                           [ x ]Mild [ ]Moderate [ ]Severe  Anxiety:                             [ x ]Mild [ ]Moderate [ ]Severe  Fatigue:                             [ ]Mild [ x ]Moderate [ ]Severe  Nausea:                            [ ]Mild [ ]Moderate [ ]Severe  Loss of appetite:               [ ]Mild [ x ]Moderate [ ]Severe  Constipation:                    [ ]Mild [ ]Moderate [ ]Severe    Other Symptoms:  [x ]All other review of systems negative     Palliative Performance Status Version 2:     30%  http://[email or web address]_performance_[email or web address]_[email or web address]    PHYSICAL EXAM:  Vital Signs Last 24 Hrs  T(C): 36.3 (25 Jan 2020 07:54), Max: 36.3 (25 Jan 2020 07:54)  T(F): 97.4 (25 Jan 2020 07:54), Max: 97.4 (25 Jan 2020 07:54)  HR: 84 (25 Jan 2020 07:54) (84 - 84)  BP: 135/79 (25 Jan 2020 07:54) (135/79 - 135/79)  BP(mean): --  RR: 20 (25 Jan 2020 07:54) (20 - 20)  SpO2: 97% (25 Jan 2020 07:54) (97% - 97%)    GENERAL:  [x ]Alert  [x ]Oriented x   [ ]Lethargic  [ ]Cachexia  [ ]Unarousable  [ x]Verbal  [ ]Non-Verbal  Behavioral:   [ ] Anxiety  [ ] Delirium [ ] Agitation [ ] Other  HEENT:  [ ]Normal   [x ]Dry mouth   [ ]ET Tube/Trach  [ ]Oral lesions  PULMONARY: pt splinting,  taking shallow breaths 2/2 to pain  [ ]Clear [ ]Tachypnea  [ ]Audible excessive secretions   [ ]Rhonchi        [ ]Right [ ]Left [ ]Bilateral  [ ]Crackles        [ ]Right [ ]Left [ ]Bilateral  [ ]Wheezing     [ ]Right [ ]Left [ ]Bilatera  [x ]Diminished breath sounds [ ]right [ ]left [ x]bilateral  CARDIOVASCULAR:    [x ]Regular [ ]Irregular [ ]Tachy  [ ]Kin [ ]Murmur [ ]Other  GASTROINTESTINAL:  [x ]Soft  [ ]Distended   [x ]+BS  [x ]Non tender [ ]Tender  [ ]PEG [ ]OGT/ NGT  Last BM:  1/25/20  GENITOURINARY:  [ ]Normal [x ] Incontinent   [ ]Oliguria/Anuria   [x ]Hampton    [ ]External cath  MUSCULOSKELETAL:   [ ]Normal   [x ]Weakness  [ x]Bed/Wheelchair bound [ ]Edema  NEUROLOGIC:   [x ]No focal deficits  [ ]Cognitive impairment  [ ]Dysphagia [ ]Dysarthria [ ]Paresis [ ]Other   SKIN:   [ ]Normal    [ ]Rash  [ x ]Pressure ulcer(s)       Present on admission [ x ]y [ ]n   [ x ] Sacral and R heel ulcer POA    CRITICAL CARE:  [ ]Shock Present  [ ]Septic [ ]Cardiogenic [ ]Neurologic [ ]Hypovolemic  [ ]  Vasopressors [ ]  Inotropes   [ ]Respiratory failure present [ ]Mechanical ventilation [ ]Non-invasive ventilatory support [ ]High flow  [ ]Acute  [ ]Chronic [ ]Hypoxic  [ ]Hypercarbic [ ]Other  [ ]Other organ failure     LABS: reviewed  None new    RADIOLOGY & ADDITIONAL STUDIES: reviewed  None new    PROTEIN CALORIE MALNUTRITION:   [ x ] PPSV2 < or = 30% [ ] significant weight loss [ ] poor nutritional intake [ ] anasarca [ ] catabolic state   Albumin, Serum: 2.5 g/dL (01-22-20 @ 07:10)   Artificial Nutrition [ ]     REFERRALS:   [ ]Chaplaincy  [ x ] Hospice  [ ]Child Life  [ x ]Social Work  [ ]Case management [ ]Holistic Therapy [ ] Physical Therapy [ ] Dietary     Goals of Care Document:   Progress Notes - Care Coordination [C. Provider] (01-23-20 @ 13:19)

## 2020-01-25 NOTE — PROGRESS NOTE ADULT - PROBLEM SELECTOR PLAN 1
Patient used 4 PRN of IV Dilaudid in the last 24hrs.    Increased Dilaudid drip to 1 mg/hr.  C/w IV Dilaudid to 2mg q1h PRN Pain/SOB.

## 2020-01-26 NOTE — PROGRESS NOTE ADULT - PROBLEM SELECTOR PLAN 1
Patient used 2 PRN of IV Dilaudid in the last 24hrs.    C/w Dilaudid drip 1 mg/hr.  C/w IV Dilaudid to 2mg q1h PRN Pain/SOB.

## 2020-01-26 NOTE — PROGRESS NOTE ADULT - PROBLEM SELECTOR PLAN 5
Called patient's wife today to answer any questions or concerns - no answer and no option of leaving voicemail. Symptom management as above. Losing PO route.   MOLST filled and placed in chart. DNR/DNI. Wife surrogate. [facility] Referral made.  Wife is in agreement with the plan of discharge home with home hospice.

## 2020-01-26 NOTE — PROGRESS NOTE ADULT - SUBJECTIVE AND OBJECTIVE BOX
GAP TEAM PALLIATIVE CARE UNIT PROGRESS NOTE:      [  ] Patient on hospice program.    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     INTERVAL HPI/OVERNIGHT EVENTS:  Patient seen and examined. Appears comfortable today. Required 2 PRN of IV Dilaudid and 2 prn ativan in the last 24 hours. Wife concerned about feeding the patient.      DNR on chart: Yes    Allergies: No Known Allergies    MEDICATIONS  (STANDING):  dexAMETHasone  Injectable 4 milliGRAM(s) IV Push every 6 hours  HYDROmorphone Infusion 1 mG/Hr (1 mL/Hr) IV Continuous <Continuous>  pantoprazole  Injectable 40 milliGRAM(s) IV Push daily    MEDICATIONS  (PRN):  acetaminophen  Suppository 650 milliGRAM(s) Rectal every 6 hours PRN Temp greater or equal to 38C (100.4F), Mild Pain (1 - 3)  bisacodyl Suppository 10 milliGRAM(s) Rectal daily PRN Constipation  glycopyrrolate Injectable 0.4 milliGRAM(s) IV Push every 6 hours PRN excessive upper airway secretions  HYDROmorphone  Injectable 2 milliGRAM(s) IV Push every 1 hour PRN pain  HYDROmorphone  Injectable 2 milliGRAM(s) IV Push every 1 hour PRN dyspnea  LORazepam   Injectable 0.5 milliGRAM(s) IV Push every 1 hour PRN anxiety/agitation      ITEMS UNCHECKED ARE NOT PRESENT    PRESENT SYMPTOMS:[ ]Unable to obtain due to poor mentation   Source if other than patient:  [ ]Family   [ x]Team     Pain: [ ]yes [ ]no   QOL impact - moaning in pain  Location -    "all over"                Aggravating factors - movement  Quality - sharp, aching, sore  Radiation - everywhere  Timing- pt unable  Severity (0-10 scale): variable  Minimal acceptable level (0-10 scale):     PAIN AD Score:     http://geriatrictoolkit.[employer].edu/cog/painad.pdf (press ctrl +  left click to view)    Dyspnea:                           [  ]Mild [ ]Moderate [ ]Severe  Anxiety:                             [  ]Mild [ ]Moderate [ ]Severe  Fatigue:                             [ ]Mild [ x ]Moderate [ ]Severe  Nausea:                            [ ]Mild [ ]Moderate [ ]Severe  Loss of appetite:               [ ]Mild [ x ]Moderate [ ]Severe  Constipation:                    [ ]Mild [ ]Moderate [ ]Severe    Other Symptoms:  [x ]All other review of systems negative     Palliative Performance Status Version 2:     20%  [email or web address]_[email or web address]    PHYSICAL EXAM:  Vital Signs Last 24 Hrs  T(C): 36.2 (26 Jan 2020 07:53), Max: 36.2 (26 Jan 2020 07:53)  T(F): 97.1 (26 Jan 2020 07:53), Max: 97.1 (26 Jan 2020 07:53)  HR: 75 (26 Jan 2020 07:53) (75 - 75)  BP: 136/87 (26 Jan 2020 07:53) (136/87 - 136/87)  BP(mean): --  RR: 18 (26 Jan 2020 07:53) (18 - 18)  SpO2: 100% (26 Jan 2020 07:53) (100% - 100%)    GENERAL:  [ ]Alert  [ ]Oriented x   [ ]Lethargic  [ ]Cachexia  [ ]Unarousable  [ ]Verbal  [x ]Non-Verbal  Behavioral:   [ ] Anxiety  [ ] Delirium [ ] Agitation [ ] Other  HEENT:  [ ]Normal   [x ]Dry mouth   [ ]ET Tube/Trach  [ ]Oral lesions  PULMONARY:  [ ]Clear [ ]Tachypnea  [ ]Audible excessive secretions   [ ]Rhonchi        [ ]Right [ ]Left [ ]Bilateral  [ ]Crackles        [ ]Right [ ]Left [ ]Bilateral  [ ]Wheezing     [ ]Right [ ]Left [ ]Bilatera  [x ]Diminished breath sounds [ ]right [ ]left [ x]bilateral  CARDIOVASCULAR:    [x ]Regular [ ]Irregular [ ]Tachy  [ ]Kin [ ]Murmur [ ]Other  GASTROINTESTINAL:  [x ]Soft  [ ]Distended   [x ]+BS  [x ]Non tender [ ]Tender  [ ]PEG [ ]OGT/ NGT  Last BM:  1/22/20  GENITOURINARY:  [ ]Normal [x ] Incontinent   [ ]Oliguria/Anuria   [x ]Hampton    [ ]External cath  MUSCULOSKELETAL:   [ ]Normal   [x ]Weakness  [ x]Bed/Wheelchair bound [ ]Edema  NEUROLOGIC:   [ ]No focal deficits  [x ]Cognitive impairment  [ ]Dysphagia [ ]Dysarthria [ ]Paresis [ ]Other   SKIN:   [ ]Normal    [ ]Rash  [ x ]Pressure ulcer(s)       Present on admission [ x ]y [ ]n   [ x ] Sacral and R heel ulcer POA    CRITICAL CARE:  [ ]Shock Present  [ ]Septic [ ]Cardiogenic [ ]Neurologic [ ]Hypovolemic  [ ]  Vasopressors [ ]  Inotropes   [ ]Respiratory failure present [ ]Mechanical ventilation [ ]Non-invasive ventilatory support [ ]High flow  [ ]Acute  [ ]Chronic [ ]Hypoxic  [ ]Hypercarbic [ ]Other  [ ]Other organ failure     LABS:  None new    RADIOLOGY & ADDITIONAL STUDIES:  None new    PROTEIN CALORIE MALNUTRITION:   [ x ] PPSV2 < or = 30% [ ] significant weight loss [ ] poor nutritional intake [ ] anasarca [ ] catabolic state   Albumin, Serum: 2.5 g/dL (01-22-20 @ 07:10)   Artificial Nutrition [ ]     REFERRALS:   [ ]Chaplaincy  [ x ] Hospice  [ ]Child Life  [ x ]Social Work  [ ]Case management [ ]Holistic Therapy [ ] Physical Therapy [ ] Dietary     Goals of Care Document:   Progress Notes - Care Coordination [C. Provider] (01-23-20 @ 13:19) GAP TEAM PALLIATIVE CARE UNIT PROGRESS NOTE:      [  ] Patient on hospice program.    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     INTERVAL HPI/OVERNIGHT EVENTS:  Patient seen and examined. Appears comfortable today. Required 2 PRN of IV Dilaudid and 2 prn ativan in the last 24 hours. Wife concerned about feeding the patient.      DNR on chart: Yes    Allergies: No Known Allergies    MEDICATIONS  (STANDING):  dexAMETHasone  Injectable 4 milliGRAM(s) IV Push every 6 hours  HYDROmorphone Infusion 1 mG/Hr (1 mL/Hr) IV Continuous <Continuous>  pantoprazole  Injectable 40 milliGRAM(s) IV Push daily    MEDICATIONS  (PRN):  acetaminophen  Suppository 650 milliGRAM(s) Rectal every 6 hours PRN Temp greater or equal to 38C (100.4F), Mild Pain (1 - 3)  bisacodyl Suppository 10 milliGRAM(s) Rectal daily PRN Constipation  glycopyrrolate Injectable 0.4 milliGRAM(s) IV Push every 6 hours PRN excessive upper airway secretions  HYDROmorphone  Injectable 2 milliGRAM(s) IV Push every 1 hour PRN pain  HYDROmorphone  Injectable 2 milliGRAM(s) IV Push every 1 hour PRN dyspnea  LORazepam   Injectable 0.5 milliGRAM(s) IV Push every 1 hour PRN anxiety/agitation    ITEMS UNCHECKED ARE NOT PRESENT    PRESENT SYMPTOMS:[ ]Unable to obtain due to poor mentation   Source if other than patient:  [ ]Family   [ x]Team     Pain: [ ]yes [ ]no   QOL impact - moaning in pain  Location -    "all over"                Aggravating factors - movement  Quality - sharp, aching, sore  Radiation - everywhere  Timing- pt unable  Severity (0-10 scale): variable  Minimal acceptable level (0-10 scale):     PAIN AD Score: 0    http://geriatrictoolkit.[employer].edu/cog/painad.pdf (press ctrl +  left click to view)    Dyspnea:                           [  ]Mild [ ]Moderate [ ]Severe  Anxiety:                             [  ]Mild [ ]Moderate [ ]Severe  Fatigue:                             [ ]Mild [ x ]Moderate [ ]Severe  Nausea:                            [ ]Mild [ ]Moderate [ ]Severe  Loss of appetite:               [ ]Mild [ x ]Moderate [ ]Severe  Constipation:                    [ ]Mild [ ]Moderate [ ]Severe    Other Symptoms:  [x ]All other review of systems negative     Palliative Performance Status Version 2:     20%  [email or web address]/[email or web address]_[email or web address]_[email or web address]    PHYSICAL EXAM:  Vital Signs Last 24 Hrs  T(C): 36.2 (26 Jan 2020 07:53), Max: 36.2 (26 Jan 2020 07:53)  T(F): 97.1 (26 Jan 2020 07:53), Max: 97.1 (26 Jan 2020 07:53)  HR: 75 (26 Jan 2020 07:53) (75 - 75)  BP: 136/87 (26 Jan 2020 07:53) (136/87 - 136/87)  BP(mean): --  RR: 18 (26 Jan 2020 07:53) (18 - 18)  SpO2: 100% (26 Jan 2020 07:53) (100% - 100%)    GENERAL:  [ ]Alert  [ ]Oriented x   [ ]Lethargic  [ ]Cachexia  [ ]Unarousable  [ ]Verbal  [x ]Non-Verbal  Behavioral:   [ ] Anxiety  [ ] Delirium [ ] Agitation [ ] Other  HEENT:  [ ]Normal   [x ]Dry mouth   [ ]ET Tube/Trach  [ ]Oral lesions  PULMONARY:  [ ]Clear [ ]Tachypnea  [ ]Audible excessive secretions   [ ]Rhonchi        [ ]Right [ ]Left [ ]Bilateral  [ ]Crackles        [ ]Right [ ]Left [ ]Bilateral  [ ]Wheezing     [ ]Right [ ]Left [ ]Bilatera  [x ]Diminished breath sounds [ ]right [ ]left [ x]bilateral  CARDIOVASCULAR:    [x ]Regular [ ]Irregular [ ]Tachy  [ ]Kin [ ]Murmur [ ]Other  GASTROINTESTINAL:  [x ]Soft  [ ]Distended   [x ]+BS  [x ]Non tender [ ]Tender  [ ]PEG [ ]OGT/ NGT  Last BM:  1/22/20  GENITOURINARY:  [ ]Normal [x ] Incontinent   [ ]Oliguria/Anuria   [x ]Hampton    [ ]External cath  MUSCULOSKELETAL:   [ ]Normal   [x ]Weakness  [ x]Bed/Wheelchair bound [ ]Edema  NEUROLOGIC:   [ ]No focal deficits  [x ]Cognitive impairment  [ ]Dysphagia [ ]Dysarthria [ ]Paresis [ ]Other   SKIN:   [ ]Normal    [ ]Rash  [ x ]Pressure ulcer(s)       Present on admission [ x ]y [ ]n   [ x ] Sacral and R heel ulcer POA    CRITICAL CARE:  [ ]Shock Present  [ ]Septic [ ]Cardiogenic [ ]Neurologic [ ]Hypovolemic  [ ]  Vasopressors [ ]  Inotropes   [ ]Respiratory failure present [ ]Mechanical ventilation [ ]Non-invasive ventilatory support [ ]High flow  [ ]Acute  [ ]Chronic [ ]Hypoxic  [ ]Hypercarbic [ ]Other  [ ]Other organ failure     LABS: reviewed  None new    RADIOLOGY & ADDITIONAL STUDIES: reviewed  None new    PROTEIN CALORIE MALNUTRITION:   [ x ] PPSV2 < or = 30% [ ] significant weight loss [ ] poor nutritional intake [ ] anasarca [ ] catabolic state   Albumin, Serum: 2.5 g/dL (01-22-20 @ 07:10)   Artificial Nutrition [ ]     REFERRALS:   [ ]Chaplaincy  [ x ] Hospice  [ ]Child Life  [ x ]Social Work  [ ]Case management [ ]Holistic Therapy [ ] Physical Therapy [ ] Dietary     Goals of Care Document:   Progress Notes - Care Coordination [C. Provider] (01-23-20 @ 13:19)

## 2020-01-27 NOTE — PROGRESS NOTE ADULT - SUBJECTIVE AND OBJECTIVE BOX
GAP TEAM PALLIATIVE CARE UNIT PROGRESS NOTE:      [  ] Patient on hospice program.    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     INTERVAL HPI/OVERNIGHT EVENTS:  Patient seen and examined. Appears comfortable today. Required 1 PRN of IV Dilaudid and 1 PRN of ativan in the last 24 hours. Meeting today at 11AM with hospice.     DNR on chart: Yes    Allergies: No Known Allergies    MEDICATIONS  (STANDING):  dexAMETHasone  Injectable 4 milliGRAM(s) IV Push every 6 hours  HYDROmorphone Infusion 1 mG/Hr (1 mL/Hr) IV Continuous <Continuous>  levothyroxine Injectable 175 MICROGram(s) IV Push at bedtime  pantoprazole  Injectable 40 milliGRAM(s) IV Push daily  sodium chloride 0.9%. 1000 milliLiter(s) (10 mL/Hr) IV Continuous <Continuous>    MEDICATIONS  (PRN):  acetaminophen  Suppository .. 650 milliGRAM(s) Rectal every 6 hours PRN Temp greater or equal to 38C (100.4F), Mild Pain (1 - 3)  bisacodyl Suppository 10 milliGRAM(s) Rectal daily PRN Constipation  glycopyrrolate Injectable 0.4 milliGRAM(s) IV Push every 6 hours PRN excessive upper airway secretions  HYDROmorphone  Injectable 2 milliGRAM(s) IV Push every 1 hour PRN pain  HYDROmorphone  Injectable 2 milliGRAM(s) IV Push every 1 hour PRN dyspnea  LORazepam   Injectable 0.5 milliGRAM(s) IV Push every 1 hour PRN anxiety/agitation    ITEMS UNCHECKED ARE NOT PRESENT    PRESENT SYMPTOMS:[ ]Unable to obtain due to poor mentation   Source if other than patient:  [ ]Family   [ x]Team     Pain: [ ]yes [ ]no   QOL impact - yes  Location -    "all over"                Aggravating factors - movement  Quality - sharp, aching, sore  Radiation - everywhere  Timing- pt unable  Severity (0-10 scale): variable  Minimal acceptable level (0-10 scale):     PAIN AD Score: 0    [email or web address].[email or web address]/cog/painad.pdf (press ctrl +  left click to view)    Dyspnea:                           [  ]Mild [ ]Moderate [ ]Severe  Anxiety:                             [  ]Mild [ ]Moderate [ ]Severe  Fatigue:                             [ ]Mild [ x ]Moderate [ ]Severe  Nausea:                            [ ]Mild [ ]Moderate [ ]Severe  Loss of appetite:               [ ]Mild [ x ]Moderate [ ]Severe  Constipation:                    [ ]Mild [ ]Moderate [ ]Severe    Other Symptoms:  [x ]All other review of systems negative     Palliative Performance Status Version 2:     20%  [email or web address]    PHYSICAL EXAM:  Vital Signs Last 24 Hrs  T(C): 36.1 (27 Jan 2020 08:15), Max: 36.1 (27 Jan 2020 08:15)  T(F): 97 (27 Jan 2020 08:15), Max: 97 (27 Jan 2020 08:15)  HR: 82 (27 Jan 2020 08:15) (82 - 82)  BP: 150/93 (27 Jan 2020 08:15) (150/93 - 150/93)  BP(mean): --  RR: 18 (27 Jan 2020 08:15) (18 - 18)  SpO2: 99% (27 Jan 2020 08:15) (99% - 99%)    GENERAL:  [x]Alert  [ ]Oriented x   [ ]Lethargic  [ ]Cachexia  [ ]Unarousable  [x ]Verbal  [ ]Non-Verbal  Behavioral:   [ ] Anxiety  [ ] Delirium [ ] Agitation [ ] Other  HEENT:  [ ]Normal   [x ]Dry mouth   [ ]ET Tube/Trach  [ ]Oral lesions  PULMONARY:  [ ]Clear [ ]Tachypnea  [ ]Audible excessive secretions   [ ]Rhonchi        [ ]Right [ ]Left [ ]Bilateral  [ ]Crackles        [ ]Right [ ]Left [ ]Bilateral  [ ]Wheezing     [ ]Right [ ]Left [ ]Bilatera  [x ]Diminished breath sounds [ ]right [ ]left [ x]bilateral  CARDIOVASCULAR:    [x ]Regular [ ]Irregular [ ]Tachy  [ ]Kin [ ]Murmur [ ]Other  GASTROINTESTINAL:  [x ]Soft  [x]Distended   [x ]+BS  [x ]Non tender [ ]Tender  [ ]PEG [ ]OGT/ NGT  Last BM:  1/22/20  GENITOURINARY:  [ ]Normal [x ] Incontinent   [ ]Oliguria/Anuria   [x ]Hampton    [ ]External cath  MUSCULOSKELETAL:   [ ]Normal   [x ]Weakness  [ x]Bed/Wheelchair bound [x ]Edema  NEUROLOGIC:   [ ]No focal deficits  [x ]Cognitive impairment  [ ]Dysphagia [ ]Dysarthria [ ]Paresis [ ]Other   SKIN:   [ ]Normal    [ ]Rash  [ x ]Pressure ulcer(s)       Present on admission [ x ]y [ ]n   [ x ] Sacral and R heel ulcer POA    CRITICAL CARE:  [ ]Shock Present  [ ]Septic [ ]Cardiogenic [ ]Neurologic [ ]Hypovolemic  [ ]  Vasopressors [ ]  Inotropes   [ ]Respiratory failure present [ ]Mechanical ventilation [ ]Non-invasive ventilatory support [ ]High flow  [ ]Acute  [ ]Chronic [ ]Hypoxic  [ ]Hypercarbic [ ]Other  [ ]Other organ failure     LABS: reviewed  None new    RADIOLOGY & ADDITIONAL STUDIES: reviewed  None new    PROTEIN CALORIE MALNUTRITION:   [ x ] PPSV2 < or = 30% [ ] significant weight loss [ ] poor nutritional intake [ ] anasarca [ ] catabolic state   Albumin, Serum: 2.5 g/dL (01-22-20 @ 07:10)   Artificial Nutrition [ ]     REFERRALS:   [ ]Chaplaincy  [ x ] Hospice  [ ]Child Life  [ x ]Social Work  [ ]Case management [ ]Holistic Therapy [ ] Physical Therapy [ ] Dietary     Goals of Care Document:   Progress Notes - Care Coordination [C. Provider] (01-23-20 @ 13:19) GAP TEAM PALLIATIVE CARE UNIT PROGRESS NOTE:      [  ] Patient on hospice program.    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     INTERVAL HPI/OVERNIGHT EVENTS:  Patient seen and examined. Appears comfortable today. Required 1 PRN of IV Dilaudid and 1 PRN of ativan in the last 24 hours. Meeting today at 11AM with hospice.     DNR on chart: Yes    Allergies: No Known Allergies    MEDICATIONS  (STANDING):  dexAMETHasone  Injectable 4 milliGRAM(s) IV Push every 6 hours  HYDROmorphone Infusion 1 mG/Hr (1 mL/Hr) IV Continuous <Continuous>  levothyroxine Injectable 175 MICROGram(s) IV Push at bedtime  pantoprazole  Injectable 40 milliGRAM(s) IV Push daily  sodium chloride 0.9%. 1000 milliLiter(s) (10 mL/Hr) IV Continuous <Continuous>    MEDICATIONS  (PRN):  acetaminophen  Suppository .. 650 milliGRAM(s) Rectal every 6 hours PRN Temp greater or equal to 38C (100.4F), Mild Pain (1 - 3)  bisacodyl Suppository 10 milliGRAM(s) Rectal daily PRN Constipation  glycopyrrolate Injectable 0.4 milliGRAM(s) IV Push every 6 hours PRN excessive upper airway secretions  HYDROmorphone  Injectable 2 milliGRAM(s) IV Push every 1 hour PRN pain  HYDROmorphone  Injectable 2 milliGRAM(s) IV Push every 1 hour PRN dyspnea  LORazepam   Injectable 0.5 milliGRAM(s) IV Push every 1 hour PRN anxiety/agitation    ITEMS UNCHECKED ARE NOT PRESENT    PRESENT SYMPTOMS:[ ]Unable to obtain due to poor mentation   Source if other than patient:  [ ]Family   [ x]Team     Pain: [ ]yes [x ]no   QOL impact - yes  Location -    "all over"                Aggravating factors - movement  Quality - sharp, aching, sore  Radiation - everywhere  Timing- pt unable  Severity (0-10 scale): variable  Minimal acceptable level (0-10 scale):     PAIN AD Score: 0    [email or web address].[email or web address].[email or web address]/cog/painad.pdf (press ctrl +  left click to view)    Dyspnea:                           [  ]Mild [ ]Moderate [ ]Severe  Anxiety:                             [  ]Mild [ ]Moderate [ ]Severe  Fatigue:                             [ ]Mild [ x ]Moderate [ ]Severe  Nausea:                            [ ]Mild [ ]Moderate [ ]Severe  Loss of appetite:               [ ]Mild [ x ]Moderate [ ]Severe  Constipation:                    [ ]Mild [ ]Moderate [ ]Severe    Other Symptoms:  [x ]All other review of systems negative     Palliative Performance Status Version 2:     20%  [email or web address]_performance_[email or web address]_[email or web address]    PHYSICAL EXAM:  Vital Signs Last 24 Hrs  T(C): 36.1 (27 Jan 2020 08:15), Max: 36.1 (27 Jan 2020 08:15)  T(F): 97 (27 Jan 2020 08:15), Max: 97 (27 Jan 2020 08:15)  HR: 82 (27 Jan 2020 08:15) (82 - 82)  BP: 150/93 (27 Jan 2020 08:15) (150/93 - 150/93)  BP(mean): --  RR: 18 (27 Jan 2020 08:15) (18 - 18)  SpO2: 99% (27 Jan 2020 08:15) (99% - 99%)    GENERAL:  [x]Alert  [ ]Oriented x   [ ]Lethargic  [ ]Cachexia  [ ]Unarousable  [x ]Verbal  [ ]Non-Verbal  Behavioral:   [ ] Anxiety  [ ] Delirium [ ] Agitation [ ] Other  HEENT:  [ ]Normal   [x ]Dry mouth   [ ]ET Tube/Trach  [ ]Oral lesions  PULMONARY:  [ ]Clear [ ]Tachypnea  [ ]Audible excessive secretions   [ ]Rhonchi        [ ]Right [ ]Left [ ]Bilateral  [ ]Crackles        [ ]Right [ ]Left [ ]Bilateral  [ ]Wheezing     [ ]Right [ ]Left [ ]Bilatera  [x ]Diminished breath sounds [ ]right [ ]left [ x]bilateral  CARDIOVASCULAR:    [x ]Regular [ ]Irregular [ ]Tachy  [ ]Kin [ ]Murmur [ ]Other  GASTROINTESTINAL:  [x ]Soft  [x]Distended   [x ]+BS  [x ]Non tender [ ]Tender  [ ]PEG [ ]OGT/ NGT  Last BM:  1/22/20  GENITOURINARY:  [ ]Normal [x ] Incontinent   [ ]Oliguria/Anuria   [x ]Hampton    [ ]External cath  MUSCULOSKELETAL:   [ ]Normal   [x ]Weakness  [ x]Bed/Wheelchair bound [x ]Edema  NEUROLOGIC:   [ ]No focal deficits  [x ]Cognitive impairment  [ ]Dysphagia [ ]Dysarthria [ ]Paresis [ ]Other   SKIN:   [ ]Normal    [ ]Rash  [ x ]Pressure ulcer(s)       Present on admission [ x ]y [ ]n   [ x ] Sacral and R heel ulcer POA    CRITICAL CARE:  [ ]Shock Present  [ ]Septic [ ]Cardiogenic [ ]Neurologic [ ]Hypovolemic  [ ]  Vasopressors [ ]  Inotropes   [ ]Respiratory failure present [ ]Mechanical ventilation [ ]Non-invasive ventilatory support [ ]High flow  [ ]Acute  [ ]Chronic [ ]Hypoxic  [ ]Hypercarbic [ ]Other  [ ]Other organ failure     LABS: reviewed  None new    RADIOLOGY & ADDITIONAL STUDIES: reviewed  None new    PROTEIN CALORIE MALNUTRITION:   [ x ] PPSV2 < or = 30% [ ] significant weight loss [ ] poor nutritional intake [ ] anasarca [ ] catabolic state   Albumin, Serum: 2.5 g/dL (01-22-20 @ 07:10)   Artificial Nutrition [ ]     REFERRALS:   [ ]Chaplaincy  [ x ] Hospice  [ ]Child Life  [ x ]Social Work  [ ]Case management [ ]Holistic Therapy [ ] Physical Therapy [ ] Dietary     Goals of Care Document:   Progress Notes - Care Coordination [C. Provider] (01-23-20 @ 13:19)

## 2020-01-27 NOTE — DISCHARGE NOTE PROVIDER - HOSPITAL COURSE
60 y/o M with PMH of right renal cell carcinoma with mets to the brain, lungs, and bones (s/p radiation and chemotherapy), hemorrhagic brain lesions, PE s/p IVC filter not on a/c, DMII, BPH with chronic vasquez presented to the ED from the rehab with complaints of diffuse pain and generalized weakness.  Patient transferred to PCU for symptom management.        Pt has metastatic renal cell carcinoma to brain with no plan for surgical intervention. NSGY and Hem Onc recommending symptom management. Further DMT will cause more harm than benefit continue with Decadron IV. Patients neoplasm-related pain was managed with Dilaudid drip 1 mg/hr and IV Dilaudid to 2mg q1h PRN Pain/SOB. Pt also developed dyspnea secondary to Right mainstem occlusion, resulting in atelectasis. Pulmonology evaluated, thoracocentesis will not be therapeutic. Recommending symptom management, symptoms controlled with current regimen.         Family meeting with wife of pt along with hospice. MOLST filled and placed in chart. DNR/DNI. Wife surrogate. [facility] Referral made. Wife is in agreement with the plan of discharge to inpatient hospice, requesting [facility].

## 2020-01-27 NOTE — PROGRESS NOTE ADULT - PROBLEM SELECTOR PLAN 2
- Secondary to Right mainstem occlusion, resulting in atelectasis. Pulm eval. Thoracocentesis will not be therapeutic. Recommending symptom management.   Recommendations as above.  - Pt required BTD x 1 of Dilaudid for dyspnea

## 2020-01-27 NOTE — PROGRESS NOTE ADULT - PROBLEM SELECTOR PLAN 1
- Patient used 1 PRN of IV Dilaudid in the last 24hrs.    - c/w Dilaudid drip 1 mg/hr.  - c/w IV Dilaudid to 2mg q1h PRN Pain/SOB.

## 2020-01-27 NOTE — DISCHARGE NOTE PROVIDER - NSDCMRMEDTOKEN_GEN_ALL_CORE_FT
bisacodyl 10 mg rectal suppository: 1 suppository(ies) rectal once a day, As needed, Constipation  dexamethasone: 4 milligram(s) intravenous every 6 hours  glycopyrrolate 0.2 mg/mL injectable solution: 0.4 milligram(s) injectable every 6 hours, As Needed for upper airway secretions  HYDROmorphone: 1 mg/hr intravenous every 24 hours  HYDROmorphone 100 mg/50 mL-D5% intravenous solution: 2 milliliter(s) intravenous every hour, As Needed  HYDROmorphone 100 mg/50 mL-D5% intravenous solution: 2 milliliter(s) intravenous every hour, As Needed  levothyroxine 175 mcg (0.175 mg) oral tablet: 1 tab(s) orally once a day  LORazepam: 0.5 milligram(s) intravenous every 4 hours (5 times/day), As Needed for agitation  pantoprazole 40 mg oral delayed release tablet: 1 tab(s) orally once a day  Tylenol 325 mg oral tablet: 2 tab(s) orally once a day for 30 days- dressing change

## 2020-01-27 NOTE — PROGRESS NOTE ADULT - PROBLEM SELECTOR PLAN 5
- Family meeting today at 11AM with hospice  - MOLST filled and placed in chart. DNR/DNI. Wife surrogate. [facility] Referral made.  Wife is in agreement with the plan of discharge home with home hospice. - Family meeting today at 11AM with hospice  - MOLST filled and placed in chart. DNR/DNI. Wife surrogate. [facility] Referral made.  Wife is in agreement with the plan of discharge to inpatient hospice, requesting [facility].

## 2020-01-27 NOTE — DISCHARGE NOTE PROVIDER - NSDCCPCAREPLAN_GEN_ALL_CORE_FT
PRINCIPAL DISCHARGE DIAGNOSIS  Diagnosis: Metastatic renal cell carcinoma  Assessment and Plan of Treatment:       SECONDARY DISCHARGE DIAGNOSES  Diagnosis: Pain, neoplasm-related  Assessment and Plan of Treatment: Pain, neoplasm-related    Diagnosis: Dyspnea, unspecified type  Assessment and Plan of Treatment: Dyspnea, unspecified type

## 2020-01-27 NOTE — GOALS OF CARE CONVERSATION - ADVANCED CARE PLANNING - CONVERSATION DETAILS
[facility] RN  Note    RN met with patients family. They are agreeable for inpt hospice care. Pt was approved for I[facility] care at [facility]. Hospice Consents signed.    Jarad Berumen RN

## 2020-01-27 NOTE — PROGRESS NOTE ADULT - PROBLEM SELECTOR PLAN 4
- No surgical intervention. NSGY and Hem Onc recommending symptom management. Further DMT will cause more harm than benefit  - C/w Decadron IV.

## 2020-01-28 NOTE — PROGRESS NOTE ADULT - PROBLEM SELECTOR PLAN 4
- No surgical intervention. NSGY and Hem Onc recommending symptom management. Further DMT will cause more harm than benefit  - C/w Decadron IV. faint foul odor.  Prescribed flagyl spray in addition to routine wound care.

## 2020-01-28 NOTE — PROGRESS NOTE ADULT - SUBJECTIVE AND OBJECTIVE BOX
GAP TEAM PALLIATIVE CARE UNIT PROGRESS NOTE:      [  ] Patient on hospice program.    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     INTERVAL HPI/OVERNIGHT EVENTS:  Patient seen and examined. Appears comfortable today. Did not require BTD in the past 24 hrs. BP elevated 173/99 one reading will continue to monitor. Pt accepted for inpatient hospice at [facility].     DNR on chart: Yes    Allergies: No Known Allergies    MEDICATIONS  (STANDING):  dexAMETHasone  Injectable 4 milliGRAM(s) IV Push every 6 hours  HYDROmorphone Infusion 1 mG/Hr (1 mL/Hr) IV Continuous <Continuous>  levothyroxine Injectable 175 MICROGram(s) IV Push at bedtime  metroNIDAZOLE  IVPB 500 milliGRAM(s) IV Intermittent once  pantoprazole  Injectable 40 milliGRAM(s) IV Push daily  sodium chloride 0.9%. 1000 milliLiter(s) (10 mL/Hr) IV Continuous <Continuous>    MEDICATIONS  (PRN):  acetaminophen  Suppository .. 650 milliGRAM(s) Rectal every 6 hours PRN Temp greater or equal to 38C (100.4F), Mild Pain (1 - 3)  bisacodyl Suppository 10 milliGRAM(s) Rectal daily PRN Constipation  glycopyrrolate Injectable 0.4 milliGRAM(s) IV Push every 6 hours PRN excessive upper airway secretions  HYDROmorphone  Injectable 2 milliGRAM(s) IV Push every 1 hour PRN pain  HYDROmorphone  Injectable 2 milliGRAM(s) IV Push every 1 hour PRN dyspnea  LORazepam   Injectable 0.5 milliGRAM(s) IV Push every 1 hour PRN anxiety/agitation    ITEMS UNCHECKED ARE NOT PRESENT    PRESENT SYMPTOMS:[ ]Unable to obtain due to poor mentation   Source if other than patient:  [ ]Family   [ x]Team     Pain: [ ]yes [x ]no   QOL impact - yes  Location -    "all over"                Aggravating factors - movement  Quality - sharp, aching, sore  Radiation - everywhere  Timing- pt unable  Severity (0-10 scale): variable  Minimal acceptable level (0-10 scale):     PAIN AD Score: 0    [email or web address]/[email or web address] (press ctrl +  left click to view)    Dyspnea:                           [  ]Mild [ ]Moderate [ ]Severe  Anxiety:                             [  ]Mild [ ]Moderate [ ]Severe  Fatigue:                             [ ]Mild [ x ]Moderate [ ]Severe  Nausea:                            [ ]Mild [ ]Moderate [ ]Severe  Loss of appetite:               [ ]Mild [ x ]Moderate [ ]Severe  Constipation:                    [ ]Mild [ ]Moderate [ ]Severe    Other Symptoms:  [x ]All other review of systems negative     Palliative Performance Status Version 2:     20%  [email or web address]/[email or web address]_[email or web address]    PHYSICAL EXAM:  Vital Signs Last 24 Hrs  T(C): 36.8 (28 Jan 2020 07:17), Max: 36.8 (28 Jan 2020 07:17)  T(F): 98.3 (28 Jan 2020 07:17), Max: 98.3 (28 Jan 2020 07:17)  HR: 82 (28 Jan 2020 07:17) (82 - 82)  BP: 173/99 (28 Jan 2020 07:17) (173/99 - 173/99)  BP(mean): --  RR: 20 (28 Jan 2020 07:17) (20 - 20)  SpO2: 100% (28 Jan 2020 07:17) (100% - 100%)    GENERAL:  [x]Alert  [ ]Oriented x   [ ]Lethargic  [ ]Cachexia  [ ]Unarousable  [x ]Verbal  [ ]Non-Verbal  Behavioral:   [ ] Anxiety  [ ] Delirium [ ] Agitation [ ] Other  HEENT:  [ ]Normal   [x ]Dry mouth   [ ]ET Tube/Trach  [ ]Oral lesions  PULMONARY:  [ ]Clear [ ]Tachypnea  [ ]Audible excessive secretions   [ ]Rhonchi        [ ]Right [ ]Left [ ]Bilateral  [ ]Crackles        [ ]Right [ ]Left [ ]Bilateral  [ ]Wheezing     [ ]Right [ ]Left [ ]Bilatera  [x ]Diminished breath sounds [ ]right [ ]left [ x]bilateral  CARDIOVASCULAR:    [x ]Regular [ ]Irregular [ ]Tachy  [ ]Kin [ ]Murmur [ ]Other  GASTROINTESTINAL:  [x ]Soft  [x]Distended   [x ]+BS  [x ]Non tender [ ]Tender  [ ]PEG [ ]OGT/ NGT  Last BM:  1/22/20  GENITOURINARY:  [ ]Normal [x ] Incontinent   [ ]Oliguria/Anuria   [x ][place]    [ ]External cath  MUSCULOSKELETAL:   [ ]Normal   [x ]Weakness  [ x]Bed/Wheelchair bound [x ]Edema  NEUROLOGIC:   [ ]No focal deficits  [x ]Cognitive impairment  [ ]Dysphagia [ ]Dysarthria [ ]Paresis [ ]Other   SKIN:   [ ]Normal    [ ]Rash  [ x ]Pressure ulcer(s)       Present on admission [ x ]y [ ]n   [ x ] Sacral and R heel ulcer POA    CRITICAL CARE:  [ ]Shock Present  [ ]Septic [ ]Cardiogenic [ ]Neurologic [ ]Hypovolemic  [ ]  Vasopressors [ ]  Inotropes   [ ]Respiratory failure present [ ]Mechanical ventilation [ ]Non-invasive ventilatory support [ ]High flow  [ ]Acute  [ ]Chronic [ ]Hypoxic  [ ]Hypercarbic [ ]Other  [ ]Other organ failure     LABS: reviewed  None new    RADIOLOGY & ADDITIONAL STUDIES: reviewed  None new    PROTEIN CALORIE MALNUTRITION:   [ x ] PPSV2 < or = 30% [ ] significant weight loss [ ] poor nutritional intake [ ] anasarca [ ] catabolic state   Albumin, Serum: 2.5 g/dL (01-22-20 @ 07:10)   Artificial Nutrition [ ]     REFERRALS:   [ ]Chaplaincy  [ x ] Hospice  [ ]Child Life  [ x ]Social Work  [ ]Case management [ ]Holistic Therapy [ ] Physical Therapy [ ] Dietary     Goals of Care Document:   Progress Notes - Care Coordination [C. Provider] (01-23-20 @ 13:19) GAP TEAM PALLIATIVE CARE UNIT PROGRESS NOTE:      [  ] Patient on hospice program.    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     INTERVAL HPI/OVERNIGHT EVENTS:  Patient seen and examined. Appears comfortable today. Did not require BTD in the past 24 hrs. BP elevated 173/99 one reading will continue to monitor. Pt accepted for inpatient hospice at [facility].     DNR on chart: Yes    Allergies: No Known Allergies    MEDICATIONS  (STANDING):  dexAMETHasone  Injectable 4 milliGRAM(s) IV Push every 6 hours  HYDROmorphone Infusion 1 mG/Hr (1 mL/Hr) IV Continuous <Continuous>  levothyroxine Injectable 175 MICROGram(s) IV Push at bedtime  metroNIDAZOLE  IVPB 500 milliGRAM(s) IV Intermittent once  pantoprazole  Injectable 40 milliGRAM(s) IV Push daily  sodium chloride 0.9%. 1000 milliLiter(s) (10 mL/Hr) IV Continuous <Continuous>    MEDICATIONS  (PRN):  acetaminophen  Suppository .. 650 milliGRAM(s) Rectal every 6 hours PRN Temp greater or equal to 38C (100.4F), Mild Pain (1 - 3)  bisacodyl Suppository 10 milliGRAM(s) Rectal daily PRN Constipation  glycopyrrolate Injectable 0.4 milliGRAM(s) IV Push every 6 hours PRN excessive upper airway secretions  HYDROmorphone  Injectable 2 milliGRAM(s) IV Push every 1 hour PRN pain  HYDROmorphone  Injectable 2 milliGRAM(s) IV Push every 1 hour PRN dyspnea  LORazepam   Injectable 0.5 milliGRAM(s) IV Push every 1 hour PRN anxiety/agitation    ITEMS UNCHECKED ARE NOT PRESENT    PRESENT SYMPTOMS:[ ]Unable to obtain due to poor mentation   Source if other than patient:  [ ]Family   [ x]Team     Pain: [ ]yes [x ]no   QOL impact - yes  Location -    "all over"                Aggravating factors - movement  Quality - sharp, aching, sore  Radiation - everywhere  Timing- pt unable  Severity (0-10 scale): variable  Minimal acceptable level (0-10 scale):     PAIN AD Score: 0    [email or web address].[email or web address]/[email or web address] (press ctrl +  left click to view)    Dyspnea:                           [  ]Mild [ ]Moderate [ ]Severe  Anxiety:                             [  ]Mild [ ]Moderate [ ]Severe  Fatigue:                             [ ]Mild [ x ]Moderate [ ]Severe  Nausea:                            [ ]Mild [ ]Moderate [ ]Severe  Loss of appetite:               [ ]Mild [ x ]Moderate [ ]Severe  Constipation:                    [ ]Mild [ ]Moderate [ ]Severe    Other Symptoms:  [x ]All other review of systems negative     Palliative Performance Status Version 2:     20%  http://[email or web address]/files/news/palliative_performance_scale_[email or web address]    PHYSICAL EXAM:  Vital Signs Last 24 Hrs  T(C): 36.8 (28 Jan 2020 07:17), Max: 36.8 (28 Jan 2020 07:17)  T(F): 98.3 (28 Jan 2020 07:17), Max: 98.3 (28 Jan 2020 07:17)  HR: 82 (28 Jan 2020 07:17) (82 - 82)  BP: 173/99 (28 Jan 2020 07:17) (173/99 - 173/99)  BP(mean): --  RR: 20 (28 Jan 2020 07:17) (20 - 20)  SpO2: 100% (28 Jan 2020 07:17) (100% - 100%)    GENERAL:  [x]Alert  [ ]Oriented x   [ ]Lethargic  [ ]Cachexia  [ ]Unarousable  [x ]Verbal  [ ]Non-Verbal  Behavioral:   [ ] Anxiety  [ ] Delirium [ ] Agitation [ ] Other  HEENT:  [ ]Normal   [x ]Dry mouth   [ ]ET Tube/Trach  [ ]Oral lesions  PULMONARY:  [ ]Clear [ ]Tachypnea  [ ]Audible excessive secretions   [ ]Rhonchi        [ ]Right [ ]Left [ ]Bilateral  [ ]Crackles        [ ]Right [ ]Left [ ]Bilateral  [ ]Wheezing     [ ]Right [ ]Left [ ]Bilatera  [x ]Diminished breath sounds [ ]right [ ]left [ x]bilateral  CARDIOVASCULAR:    [x ]Regular [ ]Irregular [ ]Tachy  [ ]Kin [ ]Murmur [ ]Other  GASTROINTESTINAL:  [x ]Soft  [x]Distended   [x ]+BS  [x ]Non tender [ ]Tender  [ ]PEG [ ]OGT/ NGT  Last BM:  1/22/20  GENITOURINARY:  [ ]Normal [x ] Incontinent   [ ]Oliguria/Anuria   [x ][place]    [ ]External cath  MUSCULOSKELETAL:   [ ]Normal   [x ]Weakness  [ x]Bed/Wheelchair bound [x ]Edema  NEUROLOGIC:   [ ]No focal deficits  [x ]Cognitive impairment  [ ]Dysphagia [ ]Dysarthria [ ]Paresis [ ]Other   SKIN:   [ ]Normal    [ ]Rash  [ x ]Pressure ulcer(s)       Present on admission [ x ]y [ ]n   [ x ] Sacral and R heel ulcer POA sacral would with denuded skin and faint foul odor, despite not appearing infected.    CRITICAL CARE:  [ ]Shock Present  [ ]Septic [ ]Cardiogenic [ ]Neurologic [ ]Hypovolemic  [ ]  Vasopressors [ ]  Inotropes   [ ]Respiratory failure present [ ]Mechanical ventilation [ ]Non-invasive ventilatory support [ ]High flow  [ ]Acute  [ ]Chronic [ ]Hypoxic  [ ]Hypercarbic [ ]Other  [ ]Other organ failure     LABS: reviewed  None new    RADIOLOGY & ADDITIONAL STUDIES: reviewed  None new    PROTEIN CALORIE MALNUTRITION:   [ x ] PPSV2 < or = 30% [ ] significant weight loss [x ] poor nutritional intake [ ] anasarca [ ] catabolic state   Albumin, Serum: 2.5 g/dL (01-22-20 @ 07:10)   Artificial Nutrition [ ]     REFERRALS:   [ ]Chaplaincy  [ x ] Hospice  [ ]Child Life  [ x ]Social Work  [ ]Case management [ ]Holistic Therapy [ ] Physical Therapy [ ] Dietary     Goals of Care Document:   Progress Notes - Care Coordination [C. Provider] (01-23-20 @ 13:19)

## 2020-01-28 NOTE — PROGRESS NOTE ADULT - REASON FOR ADMISSION
generalized weakness and bone pain

## 2020-01-28 NOTE — PROGRESS NOTE ADULT - PROBLEM SELECTOR PLAN 6
Mets to the brain. CT scan on 1/21/2020 showed new brain mets with concerning for edema and poss bleeding   -hold off MRI brain pending C discussions  -Neurosurgery consulted. F/u with recs.   -C/w Iv dexamethasone 4 mg for now.  - no a/c
- MOLST filled and placed in chart. DNR/DNI. Wife surrogate. [facility] Referral made.  Wife is in agreement with the plan of discharge to inpatient hospice, pt will be transferred to [facility] for inpatient hospice today.

## 2020-01-28 NOTE — GOALS OF CARE CONVERSATION - ADVANCED CARE PLANNING - CONVERSATION DETAILS
[facility] RN Note        Patient pending d/c today to [facility] for IPU hospice care.    Jarad Berumen RN

## 2020-01-28 NOTE — PROGRESS NOTE ADULT - ASSESSMENT
58 y/o M with PMH of right renal cell carcinoma with mets to the brain, lungs, and bones (s/p radiation and chemotherapy), hemorrhagic brain lesions, PE s/p IVC filter not on a/c, DMII, BPH with chronic vasquez presented to the ED from the rehab with complaints of diffuse pain and generalized weakness.  Patient transferred to PCU for symptom management.

## 2020-01-28 NOTE — PROGRESS NOTE ADULT - PROBLEM SELECTOR PLAN 2
- Secondary to Right mainstem occlusion, resulting in atelectasis. Pulm eval. Thoracocentesis will not be therapeutic. Recommending symptom management.   Recommendations as above.  - Pt required no BTD for dyspnea

## 2020-01-28 NOTE — DISCHARGE NOTE NURSING/CASE MANAGEMENT/SOCIAL WORK - NSTRANSFERBELONGINGSDISPO_GEN_A_NUR
July 23, 2019     Patient: Nadia Paris   YOB: 2001   Date of Visit: 7/22/2019       To Whom it May Concern:    Rush James is under my professional care  He was seen in my office on 7/22/2019  He may return to work on 7/28/2019    If you have any questions or concerns, please don't hesitate to call           Sincerely,          PEARL Almodovar        CC: No Recipients with patient

## 2020-01-28 NOTE — PROGRESS NOTE ADULT - PROBLEM SELECTOR PLAN 1
- Patient used no PRN of IV Dilaudid in the last 24hrs.    - c/w Dilaudid drip 1 mg/hr.  - c/w IV Dilaudid to 2mg q1h PRN Pain/SOB.

## 2020-01-28 NOTE — PROGRESS NOTE ADULT - PROBLEM SELECTOR PLAN 5
- MOLST filled and placed in chart. DNR/DNI. Wife surrogate. [facility] Referral made.  Wife is in agreement with the plan of discharge to inpatient hospice, pt will be transferred to [facility] for inpatient hospice today. - No surgical intervention. NSGY and Hem Onc recommending symptom management. Further DMT will cause more harm than benefit  - C/w Decadron IV.

## 2020-01-28 NOTE — PROGRESS NOTE ADULT - ATTENDING COMMENTS
Agree with above.   Long conversation with wife outside the room. Discussed pt's deteriorating clinical status, likely terminal delirium and recommendation for comfort care. Palliative care to put him on the PCU board. Wife emotional but understanding.   Pt lacks capacity to participate in making decisions.
Pt seen with Fellow.  Agree with above plan.
Pt seen with fellow.  Agree with above plan.  GOC: symptom management, wife concerned about pts PO intake, attempts made to reach out to wife to explain that pts condition is worsening and the priority should be symptom management.   Symptoms: currently pt with improvement in symptom management after increase in drip according to 24hr usage. Will continue with the same.  Meeting planned for 11AM on Monday 1/27 with hospice.
I have personally seen and examined this patient and agree with the above assessment and plan, which I have reviewed and edited where appropriate.     GOC: symptom directed care in the setting of metastatic RCC  Symptoms: Pain, dyspnea, debility - plan as above  Disposition: Hospice evaluation for inpatient care at [facility].    Wife in agreement with plan as above.
I have personally seen and examined this patient and agree with the above assessment and plan, which I have reviewed and edited where appropriate.   Metastatic RCC with pain and dyspnea.  Started dilaudid infusion today for better pain control.  Hospice referral.  Poor prognosis.
I have personally seen and examined this patient and agree with the above assessment and plan, which I have reviewed and edited where appropriate.    GOC: comfort, symptom directed management in the setting of metastatic renal cell carcinoma  Symptoms: pain, dry mouth, immobility  Disposition: [facility] as inpatient hospice.

## 2020-01-28 NOTE — DISCHARGE NOTE NURSING/CASE MANAGEMENT/SOCIAL WORK - PATIENT PORTAL LINK FT
You can access the FollowMyHealth Patient Portal offered by [facility] by registering at the following website: [email or web address]. By joining [employer]’s FollowMyHealth portal, you will also be able to view your health information using other applications (apps) compatible with our system.

## 2020-02-18 ENCOUNTER — APPOINTMENT (OUTPATIENT)
Dept: INFUSION THERAPY | Facility: HOSPITAL | Age: 60
End: 2020-02-18

## 2020-04-13 NOTE — ED PROVIDER NOTE - SKIN, MLM
Internal Medicine
Skin normal color for race, warm, dry and intact. No evidence of rash.

## 2020-08-23 NOTE — H&P PST ADULT - PMH
Patient is an 68-year-old male who presents today with generalized weakness and lower back pain. He reports symptoms have been present for 2 weeks. He reports he has had increasing fatigue weakness as well as a 40 pound weight loss for past several months. Patient denies any chest pain or shortness of breath. Does have some lower abdominal pain that he is complaining about. Patient denies any fevers or chills, change in cough. He does wear oxygen at baseline for his history of COPD and heart failure. Patient denies any specific leg weakness but notes he has been unable to walk for the past couple of days due to back pain. He denies any incontinence or retention of urine. He denies any vomiting or diarrhea. Back pain is primarily lumbar in nature. No saddle anesthesia or history of drug use through an IV. He denies any prior cancer history. Past Medical History:  
Diagnosis Date  Aneurysm ([facility] [address] AAA repair 2006 & 2012  Aorto-iliac duplex 02/13/2015 Tech difficult. Patent aorta bi-iliac endograft w/o leak or limb dysfunction.  Arrhythmia   
 a fib  Cardiac cath 11/01/2012 RCA (sm, nondom) patent. LM patent. LAD 25%. CX (dom) 45% mid. LVEDP 12 mmHg. No WMA. PA 27/12. W 10-12. CO/CI 5.2/2.6 (TD).  Cardiac echocardiogram, abnormal 02/20/2016 EF 55%. No WMA. Indeterminate diastolic fx. RVSP 60 mmHg. Severe LAE. Mild MR. Mod TR.  IVCE. Similar to study of 10/26/12.  Cardiovascular aorto-iliac duplex 02/13/2015 Patent aorta bi-iliac endovascular graft w/o leak or limb dysfunction. Sac measures 4.21 x 4.47 cm (4.4 x 4.6 cm on 1/31/14).  Cardiovascular LE peripheral arterial testing 07/29/2013 No significant LE arterial disease bilaterally. R GHADA 1. 12.  L GHADA 1. 12.  R DBI 0.83. L DBI 0.71. Exercise deferred.  Cardiovascular renal duplex 10/31/2012 No RA stenosis. Intrinsic/med disease in left kidney.   Patent aortic endograft. Patent, thrombus-free renal veins bilaterally.  Carotid duplex 07/29/2013 Mild <50% bilateral ICA plaquing.  Chronic kidney disease  Chronic lung disease  Cigarette smoker  Congestive heart failure (CHF) ([address]  COPD (chronic obstructive pulmonary disease) (HCC)   
 and emphysema; likely secondary to tobacco abuse  Difficult intubation  Dyslipidemia   
 low HDL  Emphysema   
 HTN (hypertension)  Hypercholesterolemia  Ill-defined condition   
 hernia  Increased prostate specific antigen (PSA) velocity  Long term (current) use of anticoagulants   
 coumadin  Osteoarthritis  Osteomyelitis ([address]  Paroxysmal atrial fibrillation (HCC)  Peripheral vascular disease ([address] AAA repair 12/2007  Persistent atrial Fibrillation  Persistent atrial fibrillation (HCC)  Unspecified adverse effect of anesthesia   
 difficulty breathing placed in ICU Oct 2012 (AAA repair) Past Surgical History:  
Procedure Laterality Date  BRONCHOSCOPY DIAGNOSTIC  11/2/2012  CARDIAC CATHETERIZATION  11/1/2012  COLONOSCOPY N/A 7/26/2016 COLONOSCOPY with Polypectomies performed by César Bass MD at [address] ENDOSCOPY  COLONOSCOPY N/A 5/28/2019 COLONOSCOPY with polypectomy performed by Iza Givens MD at [address] [place] [address] HX AAA REPAIR    
 2006 & 2012  HX HEART CATHETERIZATION  3/4/2009 1. RCA small, nondominant; patent. 2. LMCA patent. 3. LAD is long, wrapped around apical vessel. Diffuse, 20-30% stenosis noted. 4. CCA is large, dominant vessel. Diffuse 20-30% stenosis. 5. LVEDP is 16 mmHg. 6. Overall left ventricular systolic function mildly diminshed with est. EF 45%. Mild, global hypokinesis noted. 7. No significant mitral regurgitation or aortic stenosis noted. (see report)  HX HERNIA REPAIR  2/2014  
 rt inguinal   
 HX HERNIA REPAIR  01/2016 LEFT INGUINAL HERNIA REPAIR DR. Ashley Jean  REPAIR ING HERNIA,5+Y/O,JESSIE Left 16 Dr. Garcia Brogan  YURIY LONG  2012 Family History:  
Problem Relation Age of Onset  Heart Surgery Father BYPASS  Stroke Father  Heart Disease Father  Hypertension Father  Heart Attack Father  Heart Surgery Mother BYPASS  Coronary Artery Disease Mother  Stroke Mother  Heart Disease Mother  Hypertension Mother  Diabetes Sister  Cancer Brother Social History Socioeconomic History  Marital status:  Spouse name: Not on file  Number of children: Not on file  Years of education: Not on file  Highest education level: Not on file Occupational History  Not on file Social Needs  Financial resource strain: Not on file  Food insecurity Worry: Not on file Inability: Not on file  Transportation needs Medical: Not on file Non-medical: Not on file Tobacco Use  Smoking status: Former Smoker Packs/day: 1.50 Years: 54.00 Pack years: 81.00 Types: Cigarettes Last attempt to quit: 10/23/2012 Years since quittin.8  Smokeless tobacco: Never Used Substance and Sexual Activity  Alcohol use: No  
  Alcohol/week: 0.0 standard drinks Comment: quit drinking alcohol 26 years ago, patient states stopped in \"5695\"  Drug use: No  
 Sexual activity: Not Currently Lifestyle  Physical activity Days per week: Not on file Minutes per session: Not on file  Stress: Not on file Relationships  Social connections Talks on phone: Not on file Gets together: Not on file Attends Church service: Not on file Active member of club or organization: Not on file Attends meetings of clubs or organizations: Not on file Relationship status: Not on file  Intimate partner violence Fear of current or ex partner: Not on file Emotionally abused: Not on file Physically abused: Not on file Forced sexual activity: Not on file Other Topics Concern  Not on file Social History Narrative  Not on file ALLERGIES: Codeine; Morphine; and Sulfa (sulfonamide antibiotics) Review of Systems Constitutional: Positive for diaphoresis (Nocturnal), fatigue and unexpected weight change. Negative for chills and fever. HENT: Negative for congestion, rhinorrhea, sinus pressure and sneezing. Eyes: Negative for visual disturbance. Respiratory: Negative for cough and shortness of breath. Cardiovascular: Negative for chest pain and leg swelling. Gastrointestinal: Positive for abdominal pain. Negative for diarrhea, nausea and vomiting. Genitourinary: Negative for dysuria, frequency and urgency. Musculoskeletal: Negative for back pain and neck pain. Skin: Negative for rash. Neurological: Negative for syncope, numbness and headaches. There were no vitals filed for this visit. Physical Exam 
Constitutional:   
   General: He is not in acute distress. Appearance: Normal appearance. He is normal weight. He is not ill-appearing or toxic-appearing. HENT:  
   Head: Normocephalic and atraumatic. Right Ear: External ear normal.  
   Left Ear: External ear normal.  
   Nose: Nose normal. No congestion or rhinorrhea. Mouth/Throat:  
   Mouth: Mucous membranes are moist.  
   Pharynx: Oropharynx is clear. No oropharyngeal exudate or posterior oropharyngeal erythema. Eyes:  
   Extraocular Movements: Extraocular movements intact. Pupils: Pupils are equal, round, and reactive to light. Neck: Musculoskeletal: Normal range of motion and neck supple. No muscular tenderness. Cardiovascular:  
   Rate and Rhythm: Normal rate and regular rhythm. Pulses: Normal pulses. Heart sounds: Normal heart sounds. No murmur.   
Pulmonary:  
   Effort: Pulmonary effort is normal.  
 Breath sounds: Normal breath sounds. No wheezing, rhonchi or rales. Abdominal:  
   General: Abdomen is flat. Tenderness: There is abdominal tenderness (Lower abdominal/suprapubic). There is no guarding or rebound. Genitourinary: 
   Rectum: Guaiac result positive. Comments: Grossly melanotic maroon-colored stool. Musculoskeletal: Normal range of motion. General: No swelling, tenderness or deformity. Skin: 
   General: Skin is warm and dry. Capillary Refill: Capillary refill takes less than 2 seconds. Findings: No rash. Neurological:  
   General: No focal deficit present. Mental Status: He is alert. Comments: Full strength in the upper extremities, symmetric. Cranial nerves II through XII are grossly intact. Lower extremities exhibit normal strength in hip flexion and extension, knee flexion extension, plantar dorsiflexion are 5/5 bilaterally. Normal EHL extension bilaterally. Normal sensory exam  
 
  
 
MDM Number of Diagnoses or Management Options Diagnosis management comments: 78-year-old male presented to the emergency department [facility] with a chief complaint of generalized weakness and lower back pain. On exam he has mild lower abdominal discomfort, he is guaiac positive with grossly melanotic stool. Differential GI bleed, other intra-abdominal problem or catastrophe such as aortic aneurysm rupture, aortic dissection, or other abdominal process also sepsis considered as well for hypotension. Patient seen during significant ER crowding, initial vital signs showed patient to be hypotensive. His lactic acid was elevated at over 7. Initial hemoglobin found to be 6.0. I went back and did the rectal exam at that point. This demonstrated guaiac positive stools. Patient was ordered 2 units of uncrossed matched packed red cells as his blood pressure at that point was between 50 and 60 mmHg systolic.   I started the patient on Protonix bolus and drip as well. Given concern for possible sepsis with elevated white blood cell count and lactic acid, start the patient on broad-spectrum antibiotics primarily for coverage of intra-abdominal process pathogen given his complaint of abdominal pain. I think this is unlikely to be septic nature and that patient was in more primarily hemorrhagic shock/hypovolemic shock. I consulted vascular surgery while waiting results of the CT scan, Dr. Dee Sylvester arrived in the emergency department and evaluated the patient and reviewed his CT scan, felt that the graft looks primarily patent, with no extravasation to explain the patient's hypotension, no significant hematomas retroperitoneally. Formal read was pending. I consulted Dr. Demarco Aly for consultation. Patient's blood pressure had improved in the department back towards normal after he received some volume with packed red cells and IV fluid. Patient remained stable on his home oxygen. Repeat lactic is down to 4. He has had waxing waning blood pressures between the 51Z and 454 systolic. They seem to be upwardly trending. I did order the patient another unit of packed red cells. I suspect he will need potentially more going forward however we will recheck a hemoglobin as he equilibrates. I consulted Dr. Demarco Aly with the GI service for consultation. He seems to be more stable at this point, however if that changes more emergent consultation may be necessary. I discussed patient's CODE STATUS with him and he indicated to me that he would not want chest compressions however he would be agreeable to intubation if needed. Consulted ICU, Dr. Regina Og and he would like a repeat hemoglobin, and monitoring of blood pressures. They will evaluate the patient. Based on the repeat hemoglobin, patient may be suitable for the floor at that point versus ICU admission.   Patient turned over to Dr Rupert Quinteros at the regular change of shift pending disposition after repeat hemoglobin, hospitalist vs ICU admission. NOTES   :  I have spent 60 minutes of critical care time involved in lab review, consultations with specialist, family decision- making, bedside attention and documentation. During this entire length of time I was immediately available to the patient. Benjamin Frank DO 
 
 
ED Course as of Aug 24 0013 Sun Aug 23, 2020  
1754 POC lactic elevated [JEREMIAH] [address] [place] Patient Hypotensive, Fluids started. [JEREMIAH] 1843 Patient now markedly hypotensive, I contacted blood bank for emergency crossmatch for blood. He is guaiac positive. I ordered Protonix drip and I paged GI.  
 [JEREMIAH] 2026 Patient on his way to CT scan, I paged vascular surgery as patient has extensive history of AAA, with prior stents. [JEREMIAH] 2029 Case discussed with Dr. Cary Whitmore with the vascular surgery service. Recommends waiting for results of CTA. [JEREMIAH] 2309 Case discussed with intensivist, Dr. Sharon Chacon. Request a repeat hemoglobin to determine status. [JEREMIAH] ED Course User Index [JEREMIAH] Hemant Crane DO  
 
 
Procedures Cervicalgia    Diabetes mellitus  Type 2  Elevated TSH    GERD (gastroesophageal reflux disease)    HLD (hyperlipidemia)    Hypertension    Hypothyroidism    Malignant neoplasm of vertebral column    Metastatic renal cell carcinoma to brain    S/P radiation therapy  spine, on cabozantinib since Nov 2017 Cervicalgia    Diabetes mellitus  Type 2  Elevated TSH    GERD (gastroesophageal reflux disease)    HLD (hyperlipidemia)    Hypertension    Hypothyroidism    Malignant neoplasm of vertebral column    Metastatic renal cell carcinoma to brain    S/P radiation therapy  spine, on cabozantinib since Nov 2017  Urinary retention  Hampton catheter inplaced - 4 weeks ago Cervicalgia    Diabetes mellitus  Type 2  Elevated TSH    GERD (gastroesophageal reflux disease)    HLD (hyperlipidemia)    Hypertension    Hypothyroidism    Malignant neoplasm of vertebral column    Metastatic renal cell carcinoma to brain    S/P radiation therapy  spine, on cabozantinib since Nov 2017  Urinary retention  Hampton catheter placed - 4 weeks ago

## 2020-10-16 NOTE — CONSULT NOTE ADULT - ATTENDING COMMENTS
This 58 year old male with metastatic renal carcinoma is admitted with hyper reflex and weakness of the lower extremities. He has intact sensation in his lower extremities. There is cord edema but no specific cord compression. I agree with steroid therapy and radiation to the involved area of the spinal cord. Muscle strength is 2+/4+ . He is able to stand
[Negative] : Endocrine

## 2020-10-20 NOTE — PROGRESS NOTE ADULT - PROVIDER SPECIALTY LIST ADULT
Hospitalist Outpatient Physical Therapy  [place]           [x] Phone: [phone]   Fax: [phone]  [facility] Pavon           [] Phone: [phone]   Fax: [phone]        Physical Therapy Daily Treatment Note  Date:  10/20/2020    Patient Name:  Britta Mueller    :  1957  MRN: [id number]  Restrictions/Precautions: Other position/activity restrictions: No formal restrictions  Diagnosis:   Diagnosis: Left leg pain  Date of Injury/Surgery: Chronic  Treatment Diagnosis: Treatment Diagnosis: L knee weakness, symptoms consistent w/patella tendon syndrome    Insurance/Certification information: PT Insurance Information: [place] - $40 co-pay   Referring Physician:  Referring Practitioner: Fuad Mcginnis Doctor Visit:   Unknown  Plan of care signed (Y/N):  Yes  Approved for 7 visits from -   Outcome Measure: KOOS 15/28 (53.6%)  Visit# / total visits:   /  Or as per insurance approval  Pain level: 0/10 at rest (can go up to 8-10/10 w/loading knee climbing stairs, lunges - temporary during the activity)  POC Date Range 20 - 10/22/20      Goals:          Long term goals  Time Frame for Long term goals : In 6 weeks, patient will  Long term goal 1: demonstrate compliance and independence w/HEP. Long term goal 2: score <= 5/28 on KOOS score as indication of improved LE function w/daily activities. Long term goal 3: have no >= 3/10 left knee discomfort w/weight bearing activities for improved LE function (stairs, on/off bike, sit><stand, squatting, kneeling)    Summary of Evaluation: Assessment: Patient is a 70 y.o. male w/L knee pain. Pt reports left knee pain x >= 15 years which has been worsening over the years. Especially over the last two months and w/certain activities. Pt points directly to left patella tendon and demonstrates increased pain w/standing knee flexion b/t 20 and 30 deg and w/deceleration. Increased pain also w/pivot over planted foot.   PLOF:  Patient has been and is independent w/all mobility, self-care, high level physical activities. WORSE:  stairs, getting on bike, deceleration LLE, standing prolonged periods,pain w/standing knee flexion b/t 20 and 30 deg and w/deceleration. Able to cycle up to 26 miles w/o left knee soreness, diffuse mild soreness w/running. Today, patient presents w/lateral tracking bilat patella, related to shortened/tight ITBs, shortened/tight hamstrings bilat, mild weakness L knee, pain w/eccentric loading left quad and will benefit from physical therapy. Subjective:  Pt reports left knee \"getting worse\" as he now has mild pain in larger ROM (approx 0-45 deg) as well as pain when rolling over in bed. Pt performs HEP regularly at home and gym. Avid biker, riding b/t 26 and up to 45 miles. Any changes in Ambulatory Summary Sheet? None      Objective:     Prior to today's treatment session, patient was screened for signs and symptoms related to COVID-19 including but not limited to verbally answering questions related to feeling ill, cough, or SOB, along with taking temperature via forehead thermometer. Patient presented with all negative signs and symptoms and had no fever >100 degrees Fahrenheit this date.     Creptitus palpation with B knee flexion and extension  Pain left patella tendon (inferior to patella) w/knee loading b/t 0 -20 deg knee flexion   Pain decreases w/setting adductors while performing leg press/shuttle  Gait is non-antalgic  Tight ITB bilat  Feet are outward going w/gait and natural resting/sitting position    10/20/20:    R knee:  No change  0/10 pain today during regular activities, including riding bike 13 miles  Tight ITB, patella tracks laterally    L knee:  Increased pain in larger w/AROM and PROM approx 0-45 deg; 0/10 at rest  Still has max pain patella w/stairs last week (avoids stairs/lunges or any movement that increases pain)      Exercises: (No more than 4 columns)   Exercise/Equipment 10/06/20  #3 10/13/20  #4 10/20/20  #5           WARM UP                     TABLE      SLR with VMO      Supine hamstring stretch 1 x 30 sec hold    Trial of sitting HSS performed; pt preferred supine strap       Supine IT band stretch 2x30\" hold  Continue at home  Discontinue standing ITB stretch     bridges               STANDING      Dynamic lateral leg stretch (hip swing) discontinue     Dynamic hams/hip flex stretch (hip swing forward and back) discontinue           Hip Adduction BLE  Hip Extension BLE Discussed rationale for these exercises. He states he has not been using hip extension machine at his community gym but will start. 37.5  1 x 10 ea  R/L                           PROPRIOCEPTION      SLS blue foam      BOSU lunge Pt voiced concerned about lunges as he has pain w/loading knee b/t 0-20 deg. Will attempt next visit. MODALITIES [place] stim DNT [place] Stim  Bilat quads                   Other Therapeutic Activities/Education:    Reviewed POC and treatment progression expected. 10/06/20:  Large amount of time was spent reviewing LE HEP w/changes made. Discussed rationale for use of [place] E-stim for VMO muscle re-education. Pt wished to proceed. 10/13/20:  Time spent reviewing gluteus medius strengthening, rationale for exercises currently recommended. 10/20/20:  Discussed w/patient possibility of symptoms related to distance bike riding. Pt states he does not have pain while riding. We continue to have lengthy discussion about symptoms, rationale for exercises, causation w/patient usually questioning every aspect of care. Difficult to redirect and gain control of session flow.       Home Exercise Program:    Self-massage ITB  SLR w/VMO focus  9/24/20:   Static hip stretching with stretch strap, IT band stretch and Hamstring stretch  9/29/20:  Bridge w/ball b/t knees  10/05/20    ITB and hamstring stretch to be performed supine w/strap  Discontinue dynamic hip stretches d/t back discomfort    Manual Treatments:    NA    Modalities:    B[place]ian stim w/active knee extension  bilat quads  Supine w/triangle knee bolster  10/30  10 min  19 down to 16 milliamperes tolerated d/t increased mild increased pain distal patella tendon      Communication with other providers:    POC faxed to ordering provider     Assessment:  (Response towards treatment session) (Pain Rating)  0/10 pain post session. Mild discomfort left knee w/[place]ian stim. Quads appear more well defined than at initial evaluation. Will continue conservative management through POC. If no improvement noted, will suggest return to physician. Assessment: Patient is a 70 y.o. male w/L knee pain. Pt reports left knee pain x >= 15 years which has been worsening over the years. Especially over the last two months and w/certain activities. Pt points directly to left patella tendon and demonstrates increased pain w/standing knee flexion b/t 20 and 30 deg and w/deceleration. Increased pain also w/pivot over planted foot. PLOF:  Patient has been and is independent w/all mobility, self-care, high level physical activities. WORSE:  stairs, getting on bike, deceleration LLE, standing prolonged periods,pain w/standing knee flexion b/t 20 and 30 deg and w/deceleration. Able to cycle up to 26 miles w/o left knee soreness, diffuse mild soreness w/running. Today, patient presents w/lateral tracking bilat patella, related to shortened/tight ITBs, shortened/tight hamstrings bilat, mild weakness L knee, pain w/eccentric loading left quad and will benefit from physical therapy.       Plan for Next Session: Specific instructions for Next Treatment as time allows and as symptoms dictate:   ITB myofascial/STM and stretch, hamstring stretch,quad strengthening/focus VMO, rotary hip add/ext, closed chain hamstring, US, Bol[place] vs DNT      Time In / Time Out:  1445/1530    If [facility] Please Indicate Time In/Out  CPT Code Time in Time out Timed Code/Total Treatment Minutes:  TE 27' (2), NRE 15' (1)    Next Progress Note due:   On or before 10/22/20      Plan of Care Interventions:  [x] Therapeutic Exercise  [x] Modalities:  [x] Therapeutic Activity     [x] Ultrasound  [x] Estim  [] Gait Training      [] Cervical Traction [] Lumbar Traction  [x] Neuromuscular Re-education    [x] Cold/hotpack [] Iontophoresis   [x] Instruction in HEP      [x] Vasopneumatic   [x] Dry Needling    [x] Manual Therapy               [] Aquatic Therapy              Electronically signed by:  Kyaw Alva, PT 10/20/2020, 2:57 PM

## 2020-10-28 NOTE — ED ADULT NURSE NOTE - NS PRO PASSIVE SMOKE EXP
Discharge Instructions- Postpartum    Immunizations given:   Most Recent Immunizations   Administered Date(s) Administered   • Influenza, injectable, quadrivalent, preservative-free 2020   • Tdap 2020   Pended Date(s) Pended   • MMR 10/26/2020   • Tdap 10/26/2020   • Varicella 10/26/2020       Diet: Eat a well balanced diet including plenty of fiber and fluids such as juice and water.    Activity:   Bathing: Shower or bathe as needed.   Driving: As directed by your doctor or midwife.   Return to work or school: As directed by your doctor or midwife.   Lifting/ Bending: Lift nothing heavier than 10 pounds, bend at the knees, not hips.   Resuming sexual activity: 6 weeks or as recommended by your doctor. Your doctor will discuss birth control options with you at your postpartum check up.   Housework: Limit for the first 2 weeks.   Exercise: Walking is fine. Check with your doctor or midwife for any restrictions.   Other: No douching or tampons.   Other: Wear a supportive bra at all times.    Follow up care  Self care:   -Vaginal discharge may continue for 10 days to 6 weeks, becoming light in color and amount.   -Continue to use your water bottle, tucks, spray, ointment, sitz bath until bleeding stops or discomfort is gone.   -If you had a  section or tubal ligation, keep incision clean and dry.   -Apply cold compresses to breasts to relieve swelling and soothe discomfort.   -Apply warm compresses 5 minutes prior to breastfeeding to relieve engorgement.    Notify your doctor/ midwife if:   -Your temperature is over 100.0 degrees F.   -You notice hard, red, warm or painful areas in your breasts or legs.   -Your vaginal discharge becomes foul smelling, increases in amount, soaks more than one pad in an hour, is bright red or you pass large clots.   -You have difficulty going to the bathroom.   -You notice swelling, drainage warmth or tenderness at your incision.   -You have symptoms of postpartum  depression (loss of appetite, feelings of hopelessness or loss of control, inability to sleep or extensive sleep).    In some cases, you may experience the following; If you do, call your doctor immediately:  · Increased swelling in your face, hands or legs  · Headache  · Blurred vision/ seeing spots  · Throbbing or rushing sounds in your ears  · Pain in your upper abdomen  · Tightness in your chest  · Shortness of breath  · Wheezing  · Coughing    Postpartum Adjustment:  Having a baby can be stressful, and can involve many changes to your mind and body. We care about your health and well-being as you adjust to this new journey. You may find that you are easily upset, impatient, irritable, or tearful. This is normal, and comes and goes quickly. “Baby Blues” may occur anywhere from a few days after delivery to several weeks postpartum and will pass in a short time.  Postpartum Depression goes beyond “baby blues”. Nobody deserves to experience feelings such as overwhelming anxiety, worry, and sadness, and these symptoms can be improved with proper care.    The following may be signs/symptoms of postpartum depression. Call your doctor or midwife immediately if you have the following thoughts/feelings:  •         Thoughts of harming yourself or the baby  •         A lack of interest in the baby, family, or friends  •         Feeling guilty or worthless  •         Feeling hopeless  •         Uncontrollable crying  •         Feelings of being a bad mother  •         Trouble sleeping or oversleeping  •         Changes in appetite  •         Strong feelings of irritability, anger, or nervousness    Below are other resources- remember, you are not alone:   Call:  ·     Your OB/GYN Provider  ·     Call [facility] at [phone] to arrange for outpatient counseling services (individual or group)  ·     Call [employer]partum [employer] (PSI) at 1-721.186.9574  Text:  ·    Text Postpartum Support  International at 497-839-5827 (calls/texts are confidential); you can receive information, encouragement, and resources through [employer]  Online/Facebook:  ·     [employer] has an online Facebook Support Group (request to join group from website or e-mail [email or web address]  ·     Momsmentalhealthmke.[email or web address] or postpartum.net  Hospital:  ·    Come to the Emergency Room or call 911 if you have thoughts of wanting to hurt yourself, others, or your baby    Weaning instructions if you were prescribed any narcotics/opioid medications (for example, Percocet, Norco/Vicodin, or other narcotic/opioid):   “PRN” or “as needed” medications are prescribed for patients to take only if these medications are needed for pain. They are not meant to be taken on a scheduled basis. If you feel like you don’t need to take your “PRN” or “as needed” narcotics/opioids as often anymore, please contact your doctor for instructions on how to wean off these medications.     Common instructions for weaning off “PRN” or “as needed” narcotic/opioid medications include:   • Increasing time between doses. For example:   o If you normally take your medication every 4 hours, increase the time between doses to 5 or 6 hours for a couple days.  • Decreasing the number of pills you take for each dose. For example:  o If you normally take two pills, then try taking only one pill for each dose.  Options for safe disposal of opioid medications:  · Follow specific disposal instructions on the prescription label or information that comes with the medicine.    Your city/county may have specific guidelines regarding disposal; contact the household trash/recycling services to learn about disposal guidelines. Another option is contacting your local non-emergency law enforcement agency to see if they sponsor a Drug Take-back program in your community.   The poison control center can also answer questions about safe disposal; the number is 1-862.111.6003.      Who is your help at home after you leave the hospital?    Ideas for help at home: friends, family members, neighbors, and members of your hina community are all there to help you.    [facility] Lactation Services (Breastfeeding help):[phone]    My Morning with Mom:  An opportunity to network with other new mothers and share experiences and discuss topics such as feeding infants, sleep patterns, soothing fussy babies and other parenting concerns.  The group is facilitated by a registered nurse and babies are welcome to attend.  Sessions are held Thursdays from 10:00 - 11:30 am at [facility].  No registration is required. For more information, call [phone].                     No

## 2020-12-01 NOTE — H&P PST ADULT - NS NEC GEN PE MLT EXAM PC
detailed exam
Artificial cardiac pacemaker    Cardiac defibrillator in place    Cardiac pacemaker    H/O: hysterectomy    History of cholecystectomy    Status post coronary artery stent placement

## 2020-12-21 PROBLEM — N39.0 ACUTE UTI: Status: RESOLVED | Noted: 2018-10-19 | Resolved: 2020-12-21

## 2021-02-17 NOTE — ED ADULT NURSE REASSESSMENT NOTE - NS ED NURSE REASSESS COMMENT FT1
Airway  Urgency: elective    Airway not difficult    General Information and Staff    Patient location during procedure: OR  Anesthesiologist: Yamila Mckeon MD  Resident/CRNA: Yun Headings, CRNA  Performed: CRNA     Indications and Patient Condition  Indica Report taken from LEIGHTON Loja. Pt taken to CT before oncoming shift assessment.

## 2021-05-26 NOTE — PROGRESS NOTE ADULT - PROBLEM SELECTOR PROBLEM 6
Acquired hypothyroidism
Acquired hypothyroidism
Type 2 diabetes mellitus with other specified complication, with long-term current use of insulin
show

## 2021-12-07 NOTE — PROGRESS NOTE ADULT - PROBLEM SELECTOR PLAN 4
No surgical intervention. NSGY and HemOnc recommending symptom management. Further DMT will cause more harm than benefit.   C/w Decadron IV. none

## 2021-12-17 NOTE — PROGRESS NOTE ADULT - PROBLEM SELECTOR PLAN 4
No surgical intervention. NSGY and HemOnc recommending symptom management. Further DMT will cause more harm than benefit.   C/w Decadron IV. minimum assist (75% patients effort)

## 2022-01-11 NOTE — ED PROVIDER NOTE - CPE EDP RESP NORM
normal...
70 yo female presents for PAST in preparation for laparoscopic bilateral salpingo-oophorectomy on 1/25/22 under general anesthesia by Dr. Snyder ([facility]).

## 2022-01-30 NOTE — DISCHARGE NOTE ADULT - PRINCIPAL DIAGNOSIS
*Pt reports a h/o EtOH use.  Denies drinking reporting that he quit 1.5 years ago.  No need to initiate CIWA protocol especially in the setting of acute on chronic hypoxic respiratory failure.  However, supplementing with vitamins would benefit patient especially if patient happens to have multiple vitamin deficiencies.  - multipe vitamins  - folic acid  - thaimine      Malignant neoplasm of vertebral column

## 2022-03-02 NOTE — PROGRESS NOTE ADULT - PROBLEM SELECTOR PROBLEM 1
Pain, neoplasm-related Complex Repair And O-T Advancement Flap Text: The defect edges were debeveled with a #15 scalpel blade.  The primary defect was closed partially with a complex linear closure.  Given the location of the remaining defect, shape of the defect and the proximity to free margins an O-T advancement flap was deemed most appropriate for complete closure of the defect.  Using a sterile surgical marker, an appropriate advancement flap was drawn incorporating the defect and placing the expected incisions within the relaxed skin tension lines where possible.    The area thus outlined was incised deep to adipose tissue with a #15 scalpel blade.  The skin margins were undermined to an appropriate distance in all directions utilizing iris scissors.

## 2022-08-12 NOTE — PATIENT PROFILE ADULT - NSPROEDALEARNER_GEN_A_NUR
Shweta is a pleasant 64-year-old female who presents today for follow up. Last visit treated with clarithromycin triple therapy for eradication of H. pylori.  EGD dated 6/17/2022 showed a regular Z-line.  Gastritis.  Normal duodenum.  A single 6 mm sessile polyp was biopsied in the first portion of the duodenum. Small bowel mucosa without histopathologic diagnosis.  Chronic superficial antral gastritis positive for H. pylori.  Benign lower third esophageal biopsies  Colonoscopy dated 6/17/2022 demonstrated fair preparation of the colon.  1 diminutive benign polyp removed from the proximal ascending colon.  Pandiverticulosis coli.  Nonbleeding internal hemorrhoids.  Repeat colonoscopy 1 year due to suboptimal prep.  Labs dated 6/13/2022 showed hemoglobin 10.7, hematocrit 34.1.  Normal ferritin.  Normal iron studies.  Normal vitamin B12/folate.  No serologic evidence of celiac disease  Labs dated 6/20/2022 showed normal LFTs.  Normal renal function.   Ultrasound dated 3/18/2022 showed cholelithiasis without evidence of acute cholecystitis    Labs dated 2/18/2022 showed a negative hepatitis panel.  Normal CMP.  TSH normal.  Hemoglobin 10.7, hematocrit 33.8.  MCV normal.    Completed antibiotics without issue. Notes 1-2 daily bowel movements without constipation or diarrhea. Chronic reflux well controlled on pantoprazole 20 mg qd. Scheduled to see surgeon for gallstones. Denies any nausea, vomiting, dysphagia, odynophagia, hematemesis, coffee ground emesis, abdominal pain, change in bowel habits, abnormal weight loss, BRBPR or melena. Denies any family history of colon cancer or colon polyps. Notes no other GI complaints at this time spouse

## 2022-08-16 NOTE — PATIENT PROFILE ADULT - NSPROGENANESREACTIONPT_GEN_A_NUR
Emergency Department TeleTriage Encounter Note      CHIEF COMPLAINT    Chief Complaint   Patient presents with    Post-op Problem     Patient states she was just released from gall bladder surgery last Monday was discharged Saturday from [facility] was sent from [facility]'s office for further eval due to fever        VITAL SIGNS   Initial Vitals [08/16/22 1558]   BP Pulse Resp Temp SpO2   (!) 142/76 110 20 (!) 100.4 °F (38 °C) 98 %      MAP       --            ALLERGIES    Review of patient's allergies indicates:  No Known Allergies    PROVIDER TRIAGE NOTE  Patient is a 26-year-old female who presents with fever, tachycardia, nausea, vomiting abdominal distention.  She had a recent cholecystectomy and ERCP for stent placement.  She had her hospital follow-up today and was instructed to come to the ER.    Physical exam:  Patient is ill-appearing.  She is answering my questions appropriately.  Vital signs show tachycardia and fever.    Sepsis order set initiated.  Triage nurse notified of need to call code sepsis.    Initial orders will be placed and care will be transferred to an alternate provider when patient is roomed for a full evaluation. Any additional orders and the final disposition will be determined by that provider.        ORDERS  Labs Reviewed   CULTURE, BLOOD   CULTURE, BLOOD   CBC W/ AUTO DIFFERENTIAL   COMPREHENSIVE METABOLIC PANEL   LACTIC ACID, PLASMA   URINALYSIS, REFLEX TO URINE CULTURE   PREGNANCY TEST, URINE RAPID       ED Orders (720h ago, onward)    Start Ordered     Status Ordering Provider    08/16/22 2006 08/16/22 1606  Lactic acid, plasma #2  In 4 hours         Ordered MARQUIS HUNT    08/16/22 1615 08/16/22 1606  lactated ringers bolus 2,775 mL  ED 1 Time         Ordered MARQUIS HUNT    08/16/22 1607 08/16/22 1606  Pregnancy, urine rapid  STAT         Ordered MARQUIS HUNT    08/16/22 1606 08/16/22 1606  ED Preference List Used to Initiate Sepsis Orders   Until discontinued         Ordered ANIYAH HUNTINE    08/16/22 1606 08/16/22 1606  Blood culture x two cultures. Draw prior to antibiotics.  Every 15 min      Comments: Aerobic and anaerobic     Order ID Start Status Ordering Provider   [id number] 08/16/22 1606 Pending Collection MARQUIS HUNT   [id number] 08/16/22 1621 Pending Collection ANIYAH HUNTINE       Ordered ANIYAH HUNTINE    08/16/22 1606 08/16/22 1606  CBC auto differential  STAT         Pending Collection ANIYAH HUNTINE    08/16/22 1606 08/16/22 1606  Comprehensive metabolic panel  STAT         Pending Collection MARQUIS HUNT    08/16/22 1606 08/16/22 1606  Lactic acid, plasma #1  STAT         Pending Collection MARQUIS HUNT    08/16/22 1606 08/16/22 1606  Vital signs  Every 15 min        Comments: Every 15 minutes until SBP greater than 90 or MAP greater than 65, then every 30 minutes times one hour, then every one hour.    Ordered ANIYAH HUNTINE    08/16/22 1606 08/16/22 1606  Bed rest  Until discontinued         Ordered ANIYAH HUNTINE    08/16/22 1606 08/16/22 1606  Cardiac Monitoring - Adult  Continuous        Comments: Notify Physician If:    Ordered ANIYAH HUNTINE    08/16/22 1606 08/16/22 1606  Pulse Oximetry Continuous  Continuous         Ordered KEELEY BOTELLO MARQUIS    08/16/22 1606 08/16/22 1606  Strict intake and output  Until discontinued         Ordered KEELEY BOTELLO MARQUIS    08/16/22 1606 08/16/22 1606  Urinalysis, Reflex to Urine Culture Urine, Clean Catch  STAT         Ordered KEELEY BOTELLO MARQUIS    08/16/22 1606 08/16/22 1606  Saline lock IV  Once         Ordered NEGBISITO ROSMERY, MARQUIS    08/16/22 1606 08/16/22 1606  EKG 12-lead  Once         Ordered NEGROTVESTA BOTELLO MARQUIS    08/16/22 1606 08/16/22 1606  X-Ray Chest AP Portable  1 time imaging         Ordered NEGROTTO  MARQUIS BOTELLO            Virtual Visit Note: The provider triage portion of this emergency department evaluation and documentation was performed via [employer], a HIPAA-compliant telemedicine application, in concert with a tele-presenter in the room. A face to face patient evaluation with one of my colleagues will occur once the patient is placed in an emergency department room.      DISCLAIMER: This note was prepared with [employer] voice recognition transcription software. Garbled syntax, mangled pronouns, and other bizarre constructions may be attributed to that software system.   respiratory complications

## 2022-11-14 NOTE — PATIENT PROFILE ADULT. - HEALTH/HEALTHCARE ANXIETIES, PROFILE
PULMONARY PROGRESS NOTE    DAMARI GUERRERO  MRN-[id number]    Patient is a 72y old  Female who presents with a chief complaint of Vomiting (14 Nov 2022 10:14)      HPI:  on her home 02  using trilogy  pending rehab placement     ROS:   -    ACTIVE MEDICATION LIST:  MEDICATIONS  (STANDING):  aMIOdarone    Tablet 200 milliGRAM(s) Oral daily  buMETAnide 2 milliGRAM(s) Oral daily  insulin glargine Injectable (LANTUS) 12 Unit(s) SubCutaneous at bedtime  insulin lispro (ADMELOG) corrective regimen sliding scale   SubCutaneous at bedtime  insulin lispro (ADMELOG) corrective regimen sliding scale   SubCutaneous three times a day before meals  isosorbide   mononitrate ER Tablet (IMDUR) 30 milliGRAM(s) Oral daily  lidocaine   4% Patch 1 Patch Transdermal daily  pantoprazole    Tablet 40 milliGRAM(s) Oral before breakfast  polyethylene glycol 3350 17 Gram(s) Oral daily  predniSONE   Tablet 5 milliGRAM(s) Oral daily  senna 2 Tablet(s) Oral at bedtime  simvastatin 10 milliGRAM(s) Oral at bedtime    MEDICATIONS  (PRN):  acetaminophen     Tablet .. 650 milliGRAM(s) Oral every 6 hours PRN Temp greater or equal to 38C (100.4F), Mild Pain (1 - 3)  ALBUTerol    90 MICROgram(s) HFA Inhaler 2 Puff(s) Inhalation every 6 hours PRN Bronchospasm  melatonin 3 milliGRAM(s) Oral at bedtime PRN Insomnia      EXAM:  Vital Signs Last 24 Hrs  T(C): 36.6 (14 Nov 2022 12:13), Max: 36.6 (14 Nov 2022 12:13)  T(F): 97.9 (14 Nov 2022 12:13), Max: 97.9 (14 Nov 2022 12:13)  HR: 67 (14 Nov 2022 12:13) (63 - 67)  BP: 122/68 (14 Nov 2022 12:13) (122/68 - 139/70)  BP(mean): --  RR: 18 (14 Nov 2022 12:13) (18 - 18)  SpO2: 100% (14 Nov 2022 12:13) (98% - 100%)    Parameters below as of 14 Nov 2022 05:02  Patient On (Oxygen Delivery Method): nasal cannula        GENERAL: The patient is awake and alert in no apparent distress.     LUNGS:  respirations unlabored      < from: Xray Chest 1 View-PORTABLE IMMEDIATE (Xray Chest 1 View-PORTABLE IMMEDIATE .) (10.23.22 @ 00:39) >    ACC: [id number] EXAM:  XR CHEST PORTABLE IMMED 1V                          PROCEDURE DATE:  10/23/2022          INTERPRETATION:  EXAMINATION: XR CHEST IMMEDIATE    CLINICAL INDICATION: Dyspnea    TECHNIQUE: Single frontal, portable view of the chest was obtained.    COMPARISON: Chest x-ray 9/6/2022, abdominal CT 10/22/2022    FINDINGS:  Status post median sternotomy.  The heart size is not well evaluated on this projection.  Redemonstrated bilateral hazy opacities similar to prior exam: Pulmonary  edema/interstitial/alveolar infiltrates.  No pneumothorax.    IMPRESSION:  Pulmonary edema/interstitial/alveolar infiltrates, similar to prior exam.    --- End of Report ---           KAZ DALE MD; Resident Radiologist  This document has been electronically signed.  SILVANA GALLARDO DO; Attending Radiologist  This document has been electronically signed. Oct 23 2022  8:27AM    < end of copied text >      PROBLEM LIST:  72y Female with HEALTH ISSUES - PROBLEM Dx:  Atrial flutter    Pulmonary hypertension    Acute on chronic combined systolic and diastolic congestive heart failure              RECS:  02  bronchodilators  prednisone  should be discharged to rehab/ NH that takes trilogy  use trilogy whenever sleeping         Please call with any questions.    Irma Eaton DO  [facility] Pulmonary/Sleep Medicine  [phone]   none

## 2022-11-18 NOTE — H&P PST ADULT - DOES PATIENT HAVE ADVANCE DIRECTIVE
What Type Of Note Output Would You Prefer (Optional)?: Bullet Format How Severe Is Your Acne?: moderate Is This A New Presentation, Or A Follow-Up?: Acne Additional Comments (Use Complete Sentences): The patient is currently using a salicylic acid cleanser. Yes

## 2022-12-15 NOTE — CONSULT NOTE ADULT - CONSULT REASON
Next appt 5/16/2023   Last appt 11-16-22    Refill Requested / Last Refill Info:   Disp Refills Start End    HYDROcodone-acetaminophen (NORCO) 5-325 MG per tablet 120 tablet 0 11/16/2022     Sig - Route: Take 1 tablet by mouth every 6 hours as needed for Pain. - Oral      Refill unable to be completed per standing System's protocol due to: Non-Protocol Medication    Orders pended, and routed to provider for approval.   Please route any notes back to your nursing pool via patient call NOT Rx Auth.  Thank you, Refill Center Staff     hematuria; indwelling vasquez

## 2023-02-23 NOTE — PROGRESS NOTE ADULT - ASSESSMENT
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 and hypothyroidism a/w uncontrolled intractable pain and FTT, found with  increased SOB due to collapsed right lung, also with anemia, hypercalcemia, and increasing number of brain and lung mets (progressive metastatic disease). IV pump

## 2023-04-07 NOTE — ED ADULT TRIAGE NOTE - AS TEMP SITE
I pended the refills  For you to sign?  Do you want him to do follow up and future  Refills with primary ?  
[facility] Call Center    Phone Message    May a detailed message be left on voicemail: yes     Reason for Call: Medication Refill Request    Has the patient contacted the pharmacy for the refill? Yes   Name of medication being requested: dutasteride (AVODART) 0.5 MG capsule and oxybutynin ER (DITROPAN-XL) 5 MG 24 hr tablet  Provider who prescribed the medication: Dr. Brantley  Pharmacy: [facility]  Date medication is needed: mikki Parmar's wife states that Dr. Brantley said no more follow ups requested.         Action Taken: Message routed to:  [facility] [facility] [place]    [place] Screening: Not Applicable                                                                      
oral

## 2023-05-16 NOTE — ASU DISCHARGE PLAN (ADULT/PEDIATRIC) - CLICK TO LAUNCH ORM
Caller: NATALIIA    Relationship: Other    Best call back number: [phone]    What is the best time to reach you: ANYTIME    Who are you requesting to speak with (clinical staff, provider,  specific staff member): CLINICAL TEAM    What was the call regarding: NATALIIA STATED THAT PT IS NOW A RESIDENT AT [facility] AND THEY WOULD LIKE TO DO HIS LAB DRAWS GOING FORWARD. PLEASE FAX 5/26 LAB ORDERS AND OTHERS GOING FORWARD TO THEM IF THAT IS OK. FAX # [phone].     PLEASE CALL TO ADVISE.     Do you require a callback: YES  
.

## 2023-06-14 NOTE — CHART NOTE - NSCHARTNOTEFT_GEN_A_CORE
MR Head reveals "2 new metastatic foci along the left precentral gyrus and right superior cerebellar hemisphere. Susceptibility along the left precentral gyrus may represent a punctate hemorrhage versus small hemorrhagic metastatic focus. Small focus of intraparenchymal hemorrhage within the right middle frontal gyrus." Case discussed with hematology- recommend continue to hold AC for PE treatment. Hematology resident to discuss with attending regarding role for IVC filter. Neurosurgery consulted- recommending to hold all blood thinners/AC x2 weeks and f/u CTH before restarting. HISTORY   has a past medical history of Arthritis, Diabetes mellitus ([address] GERD (gastroesophageal reflux disease), Hx of blood clots, Hyperlipidemia, Hypertension, and S/P cardiac cath: 5/14/2018: 40-50% mid-LAD lesion with FFR of 0.92. 20-30% ostial lCX lesion. 20-30% distal RCA lesion. LVEF 50%. .  []Additional information:          SURGICAL HISTORY   has a past surgical history that includes Cardiac catheterization; Colonoscopy; Knee arthroscopy (Right); and vascular surgery (Right). Additional information:       ALLERGIES   Allergies as of 06/14/2023 - Fully Reviewed 06/14/2023   Allergen Reaction Noted    Azithromycin Diarrhea 05/09/2018     Additional information:       MEDICATIONS     Current Facility-Administered Medications:     0.9 % sodium chloride infusion, , IntraVENous, Continuous, Madina Erazo PA-C    sodium chloride flush 0.9 % injection 5-40 mL, 5-40 mL, IntraVENous, 2 times per day, Juan Emery PA-C    sodium chloride flush 0.9 % injection 5-40 mL, 5-40 mL, IntraVENous, PRN, Kasandra Erazo PA-C    0.9 % sodium chloride infusion, , IntraVENous, PRN, Juan Emery PA-C    aspirin tablet 325 mg, 325 mg, Oral, Once, Juan Emery PA-C    nitroGLYCERIN (NITROSTAT) SL tablet 0.4 mg, 0.4 mg, SubLINGual, Q5 Min PRN, Juan Emery PA-C  Prior to Admission medications    Medication Sig Start Date End Date Taking?  Authorizing Provider   aspirin 81 MG EC tablet Take 1 tablet by mouth daily   Yes Historical Provider, MD   dilTIAZem (CARDIZEM) 60 MG tablet Take 1 tablet by mouth as needed Take 2 hours prior to sexual intercourse    Historical Provider, MD   metFORMIN (GLUCOPHAGE) 500 MG tablet Take 1 tablet by mouth 2 times daily (with meals)    Historical Provider, MD   esomeprazole Magnesium (NEXIUM) 20 MG PACK Take 1 packet by mouth 2 times daily    Historical Provider, MD   rosuvastatin (CRESTOR) 40 MG tablet Take 1 tablet by mouth every evening    Historical Provider, MD

## 2023-06-16 NOTE — ED PROVIDER NOTE - CADM POA PRESS ULCER LOCATION
Chronic. Controlled.   -Discussed that there are potential adverse effects with chronic PPI use. Discussed option of discontinuing omeprazole and recommend having further discussions at future appointment.    gluteal

## 2023-10-10 NOTE — ED ADULT NURSE NOTE - NS ED NOTE ABUSE RESPONSE YN
Pt here with c/o dental pain that began last Friday. Pt states her left side of her mouth and both upper/lower teeth have been getting increasingly painful.    Yes

## 2023-10-19 NOTE — PROGRESS NOTE ADULT - PROBLEM SELECTOR PLAN 5
Spoke with pt. Pt Covid positive 10.19.23  Pt c/o diarrhea, chills, low grade fever, congestion, weakness.   Requesting antiviral, Clarence Porras.    Continue vasquez to gravity.

## 2023-12-15 NOTE — ED PROVIDER NOTE - NOTES
none for acute cord compression 2/2 mets, recommend neurosurgery to be consulted for acute cord compression for acute intramedullary process leading to cord compression

## 2024-01-11 NOTE — CONSULT NOTE ADULT - ENMT
ANTICOAGULATION MANAGEMENT     Jerald D Andreyrosa maria 66 [facility] anticoagulation protocol    Kait Garcia Renny Connelly MD Referring Internal Medicine [phone]             No oral lesions; no gross abnormalities

## 2024-03-21 NOTE — H&P ADULT - ATTENDING COMMENTS
None Known Pt seen and examined. Case discussed with resident. H&P reviewed and edited where appropriate.

## 2024-03-22 NOTE — CONSULT NOTE ADULT - PROBLEM SELECTOR RECOMMENDATION 5
How Severe Is Your Rash?: mild Is This A New Presentation, Or A Follow-Up?: Rash - Patient stating he would want to continue chemotherapy/immunotherapy per oncology recs  - However, requesting DNR/DNI and MOLST. Forms filled and placed in chart.  - Patient states he would want wife, Salbador, as HCP

## 2024-03-22 NOTE — PROGRESS NOTE ADULT - PROBLEM SELECTOR PLAN 4
lasix BID with good response. Admission Reconciliation is Completed  Discharge Reconciliation is Not Complete Admission Reconciliation is Completed  Discharge Reconciliation is Completed

## 2024-03-27 NOTE — DIETITIAN INITIAL EVALUATION ADULT. - ENERGY NEEDS
[facility] notes/orders placed in Dr Villafana's folder for review and signature.  
Rcvd plan of care. Placed in nurses bin for providers signature.  
58 yo M w/ PMH of DM, HTN, metastatic RCC (Dx Jan 2015) with mets to brain, lung ,and iliac bone on chemo and RT, who came back from Rehab for 1 day history of fever, malaise, and chills.  Dx: Sepsis

## 2024-08-08 NOTE — PROGRESS NOTE ADULT - PROBLEM SELECTOR PLAN 1
Initially since brain lesion has been stable for last 2-3 years, Hem approved hep gtt with transition to Lovenox BID for home.  - Brain MRI: Showed punctate hemorrhage and small hemorrhagic metastatic focus.   - Given hemorrhagic mets, unable for full dose anticoagulation as risk >benefit.   - Neurosurgery consulted- no AC, antiplatelets for 2 weeks, repeat Head CT then.   - Hem notified, agree with no AC, plan now for IVC filter.   - Vascular surgery notifed- IR Consulted for IVC filter placement today .   - Continue to monitor O2 saturation , for any acute change in respiratory status.   - Currently denies any SOB, CP, and O2 sats stable.   Discussed with pt and wife at bedside- As per wife, they wish to pursure further treatment- radiation, chemo as recommended by Dr Pena English

## 2024-08-08 NOTE — PROGRESS NOTE ADULT - PROBLEM SELECTOR PROBLEM 1
Decubitus ulcer of back, stage 3
Decubitus ulcer of back, stage 3
[FreeTextEntry1] : 26 year old  who is currently ~18 weeks (MADHU 25) History of obesity with gastric sleeve in 2019 - had complete workup including stress test and echo were normal at that time She is currently on  mg daily EKG normal Normotensive 
Decubitus ulcer of back, stage 3

## 2024-09-11 NOTE — PROGRESS NOTE ADULT - PROBLEM/PLAN-1
potassium chloride ER (K-TAB) 20 MEQ CR tablet       Last Written Prescription Date:  7/10/24  Last Fill Quantity: 30,   # refills: 0  Last Office Visit : 3/22/23  Future Office visit:  None    Routing refill request to provider for review/approval because:  Potassium Supplements Protocol [id number]/2024 10:25 AM   Protocol Details Recent (12 mo) or future (90 days) visit within the authorizing provider's department    Normal serum potassium in past 12 months   Last K+  3/22/23  Eunice GUADARRAMA RN  P Central Nursing/Red Flag Triage & Med Refill Team   DISPLAY PLAN FREE TEXT

## 2025-04-08 NOTE — ED ADULT NURSE NOTE - TEMPLATE LIST FOR HEAD TO TOE ASSESSMENT
H&P reviewed. After examining the patient I find no changes in the patients condition since the H&P had been written.    Vitals:    04/08/25 1226   BP: 133/72   Pulse: 72   Resp: 16   SpO2: 94%       Symptoms